# Patient Record
Sex: FEMALE | Race: BLACK OR AFRICAN AMERICAN | NOT HISPANIC OR LATINO | Employment: UNEMPLOYED | URBAN - METROPOLITAN AREA
[De-identification: names, ages, dates, MRNs, and addresses within clinical notes are randomized per-mention and may not be internally consistent; named-entity substitution may affect disease eponyms.]

---

## 2023-04-17 ENCOUNTER — NURSING HOME VISIT (OUTPATIENT)
Dept: POST ACUTE CARE | Facility: EXTERNAL LOCATION | Age: 53
End: 2023-04-17
Payer: MEDICARE

## 2023-04-17 DIAGNOSIS — K51.819 OTHER ULCERATIVE COLITIS WITH COMPLICATION (MULTI): ICD-10-CM

## 2023-04-17 DIAGNOSIS — E78.5 HYPERLIPIDEMIA, UNSPECIFIED HYPERLIPIDEMIA TYPE: ICD-10-CM

## 2023-04-17 DIAGNOSIS — M62.81 GENERALIZED MUSCLE WEAKNESS: ICD-10-CM

## 2023-04-17 DIAGNOSIS — I69.30 SEQUELA OF CEREBROVASCULAR ACCIDENT: ICD-10-CM

## 2023-04-17 DIAGNOSIS — N93.9 VAGINAL BLEEDING: ICD-10-CM

## 2023-04-17 DIAGNOSIS — I50.22 CHRONIC SYSTOLIC HEART FAILURE (MULTI): ICD-10-CM

## 2023-04-17 DIAGNOSIS — G81.91 RIGHT HEMIPARESIS (MULTI): ICD-10-CM

## 2023-04-17 DIAGNOSIS — I10 BENIGN ESSENTIAL HTN: ICD-10-CM

## 2023-04-17 DIAGNOSIS — K21.9 GASTROESOPHAGEAL REFLUX DISEASE, UNSPECIFIED WHETHER ESOPHAGITIS PRESENT: ICD-10-CM

## 2023-04-17 DIAGNOSIS — E11.69 TYPE 2 DIABETES MELLITUS WITH OTHER SPECIFIED COMPLICATION, WITH LONG-TERM CURRENT USE OF INSULIN (MULTI): ICD-10-CM

## 2023-04-17 DIAGNOSIS — M79.604 RIGHT LEG PAIN: ICD-10-CM

## 2023-04-17 DIAGNOSIS — R26.89 IMPAIRED GAIT AND MOBILITY: ICD-10-CM

## 2023-04-17 DIAGNOSIS — I26.99 ACUTE PULMONARY EMBOLISM, UNSPECIFIED PULMONARY EMBOLISM TYPE, UNSPECIFIED WHETHER ACUTE COR PULMONALE PRESENT (MULTI): Primary | ICD-10-CM

## 2023-04-17 DIAGNOSIS — R09.02 HYPOXIA: ICD-10-CM

## 2023-04-17 DIAGNOSIS — E03.9 HYPOTHYROIDISM, UNSPECIFIED TYPE: ICD-10-CM

## 2023-04-17 DIAGNOSIS — R06.02 SHORTNESS OF BREATH: ICD-10-CM

## 2023-04-17 DIAGNOSIS — F32.9 MAJOR DEPRESSIVE DISORDER, REMISSION STATUS UNSPECIFIED, UNSPECIFIED WHETHER RECURRENT: ICD-10-CM

## 2023-04-17 DIAGNOSIS — B20 HIV INFECTION, UNSPECIFIED SYMPTOM STATUS (MULTI): ICD-10-CM

## 2023-04-17 DIAGNOSIS — J18.9 PNEUMONIA DUE TO INFECTIOUS ORGANISM, UNSPECIFIED LATERALITY, UNSPECIFIED PART OF LUNG: ICD-10-CM

## 2023-04-17 DIAGNOSIS — Z79.4 TYPE 2 DIABETES MELLITUS WITH OTHER SPECIFIED COMPLICATION, WITH LONG-TERM CURRENT USE OF INSULIN (MULTI): ICD-10-CM

## 2023-04-17 PROCEDURE — 99310 SBSQ NF CARE HIGH MDM 45: CPT | Performed by: NURSE PRACTITIONER

## 2023-04-17 NOTE — LETTER
Patient: Janette Martinez  : 1970    Encounter Date: 2023    Name: Janette Martinez    YOB: 1970    Chief complaint:  Initial visit after hospitalization;  Acute pulmonary embolism; pneumonia; etc.....      HPI 52-year-old female with past medical history of heart failure with reduced EF,   diabetes, hypertension, hyperlipidemia and CVA with right-sided weakness and HIV.      RECENT HOSPITAL COURSES:   Patient was sent  to [facility] For complaints of difficulty breathing for 4 days.  Patient has history of a CVA 8 months ago.  She does not normally require oxygen but she was put on 2 L.    Patient did not complain of any chest pain fevers or chills.  No coughing.    Nothing seems to make it better or worse, she normally does not have a respiratory history.  Evaluation in the ER was suspicious for pneumonia before but CT chest was performed.    CT chest showed a small pulmonary embolus, CT of the chest did not show any pulmonary infiltrates.    However patient was treated for pneumonia.   Discharged on Augmentin and azithromycin.   Pulmonology was consulted.  Patient is a poor historian.  Patient did not have any lower extremity edema.  Bilateral lower extremity Dopplers were negative for DVT.  Patient did have leukocytosis.    Patient was also recently sent to [facility] in 2023 for evaluation of her  vaginal bleeding.  GYN was consulted ultrasound pelvic ultrasound was ordered.  Per history and chart review and patient reported she has had a hysterectomy.  She was unsure if it was a full hysterectomy but cervix was visualized on the ED exam with blood in the vaginal canal and cervical bleeding.  CT AP in the ED showed no enlargement of the uterus with vascular calcifications versus small fibroids.  Ovaries were not confidently identified.    Patient reported what ever is left over uterus or ovaries she wanted removed.        PREVIOUS HOSPITAL  COURSE:   Patient presented to the  ED after a fall in May, 2022.  Patient had reported right lower extremity weakness and pain, denies head trauma or loss of consciousness.   In the ED, bat was called, with concern for stroke in the   setting of hyperglycemia, no tPA was administered.   Plan is for patient will be on lifelong aspirin.   Cholesterol is controlled on current statin.   Patient had echocardiogram which showed global hypokinesis   and continued reduced EF, mildly improved to 45%, both study was negative for   PFO.   [facility] then patient was [facility] to [facility] 6/29[facility] for skilled services then patient was discharged home.     Patient recently re-admitted to [facility] On 4/11/23.       Hospital and chronic diagnoses as follows:    Acute pulmonary embolism; pneumonia:  Patient was started on Eliquis 10 mg p.o. twice daily, then it was decreased to 5 mg PO BID.  Pulmonary was consulted.  Plan is to continue with Eliquis for 6 months.  Discharged on Augmentin and azithromycin.   Patient should consider hematology oncology consultation for hematologic work-up of her unprovoked pulmonary embolism, though the fact that she is bedridden and minimally mobile will increase the risk of her having a PE even though her Dopplers were negative for DVTs.  Titrate oxygen for saturation more than 88% especially at night.  She will need cardiology consult and echocardiogram to evaluate RV size and possibly pulmonary hypertension.  She will need repeat CT of the chest with contrast in 6 months for chronic thromboembolic disease.     Patient seen today, she is in bed.  Calm, cooperative.  No complaints of cough or SOB.  No fevers reported.       Dyspnea:  Probably occurring secondary to PE, but cannot rule out a pneumonia.    Hypoxia:  Was on 2 L oxygen in the hospital.  Hypoxia was probably secondary to PE.    Sequela of  Cerebrovascular accident;      History of Left ischemic stroke) stroke; h/o CVA with right sided weakness: dysphagia 2/2 cva:  Has right sided weakness/hemiparesis, facial droop, dysarthria.   h/o stroke 2018.   On atorvastatin.  Per hospital records might have possible central sleep apnea secondary to her previous CVA.  She also may have Cheyne-Kessler respirations.   She should have a work-up done and will need an in lab polysomnogram.     Chronic systolic HF & HTN:  On hydrochlorothiazide and amlodipine.  LVEF 35-40% 2/17/2022, improved to 45% on recent echo 5/23.   Seen by Cardiology during admission at [facility].    DM 2:   Uncontrolled   On Lantus 40 units QHS & Insulin Lispro 8 units subcutaneous before meals and Humalog sliding scale.    HIV:  On dolutegravir and Descovy.      RLE pain -  Unclear etiology;      Intermittent and sharp.   On gabapentin & Percocet PRN.        Hypothyroidism:      On Levothyroxine.      No recent TSH available.       HLD:    On Atorvastatin 40 mg daily      Major Depressive Disorder:   On Zoloft.  Flat affect.   No weight loss reported.      Hypothyroidism:  On Synthroid 200 mcg      Ulcerative colitis:  On Linaclotide 72 mg daily      GERD:   On omeprazole.     Vaginal bleeding/post-menopausal.   - pt reports inconsistent history of menopause, states she has not had bleeding   in years, and per sister and pt , they were under the impression she had a   hysterectomy. per imaging and gyn exam uterus is intact.     Impaired gait and mobility; Generalized muscle weakness:  At [facility] participating in PT/OT.             Reviewed [facility] records from recent hospitalization.  Reviewed medical, social, surgical and family history.  Reviewed all current medications and performed medication reconciliation.  Reviewed vital signs and recent blood work results.  Reviewed [facility] Documentation  Discussed patient with nursing.   Spent greater than 50 minutes on visit.        ROS: 10 point ROS performed; negative unless noted in HPI.       Code Status: Full Code.    Past Medical History:   Diagnosis Date   • Cervicalgia     Neck pain   • Essential (primary) hypertension 07/21/2013    Hypertension   • Pain in unspecified shoulder     Pain, joint, shoulder   • Personal history of other diseases of the digestive system 07/06/2022    History of ulcerative colitis   • Personal history of other diseases of the digestive system     History of esophageal reflux   • Personal history of other diseases of the nervous system and sense organs     History of cataract   • Personal history of other diseases of the nervous system and sense organs     History of glaucoma   • Personal history of other diseases of the respiratory system     History of bronchitis   • Personal history of other endocrine, nutritional and metabolic disease     History of diabetes mellitus   • Personal history of other endocrine, nutritional and metabolic disease     History of hyperlipidemia   • Personal history of other mental and behavioral disorders     History of depression   • Snoring     Snoring   • Unilateral primary osteoarthritis, unspecified knee 11/02/2015    Arthritis of knee           Past Surgical History:   Procedure Laterality Date   • CHOLECYSTECTOMY  01/08/2016    Cholecystectomy   • COLON SURGERY  01/08/2016    Colon Surgery   • CT HEAD ANGIO W AND WO IV CONTRAST  3/19/2019    CT HEAD ANGIO W AND WO IV [place] [facility] [place]   • CT NECK ANGIO W AND WO IV CONTRAST  3/19/2019    CT NECK ANGIO W AND WO IV CONTRAST [place] [facility] [place]   • HERNIA REPAIR  01/08/2016    Hernia Repair   • HYSTERECTOMY  01/08/2016    Hysterectomy   • MR HEAD ANGIO WO IV CONTRAST  6/4/2018    MR HEAD ANGIO WO IV CONTRAST 6/4/2018 [facility] CLINICAL LEGACY   • MR HEAD ANGIO WO IV CONTRAST  7/11/2020    MR HEAD ANGIO WO IV CONTRAST 7/11/2020 [facility] CLINICAL LEGACY   • MR HEAD ANGIO WO IV CONTRAST  9/30/2020     MR HEAD ANGIO WO IV CONTRAST 9/30/2020 [facility]   • MR HEAD ANGIO WO IV CONTRAST  5/22/2022    MR HEAD ANGIO WO IV CONTRAST 5/22/2022 [facility] CLINICAL LEGACY   • MR NECK ANGIO WO IV CONTRAST  6/4/2018    MR NECK ANGIO WO IV CONTRAST 6/4/2018 [facility] CLINICAL LEGACY   • MR NECK ANGIO WO IV CONTRAST  7/11/2020    MR NECK ANGIO WO IV CONTRAST 7/11/2020 [facility] CLINICAL LEGACY   • MR NECK ANGIO WO IV CONTRAST  9/30/2020    MR NECK ANGIO WO IV CONTRAST 9/30/2020 [facility]   • MR NECK ANGIO WO IV CONTRAST  5/22/2022    MR NECK ANGIO WO IV CONTRAST 5/22/2022 [facility] CLINICAL LEGACY   • OTHER SURGICAL HISTORY  01/08/2016    Tubal Stabilization   • OTHER SURGICAL HISTORY  09/01/2016    Cervical Surgery (Gyn) Laser Vaporization Of Transformation Zone   • THYROID SURGERY  09/27/2013    Thyroid Surgery          Lab Results   Component Value Date    WBC 10.1 11/25/2022    HGB 11.8 (L) 11/25/2022    HCT 34.2 (L) 11/25/2022     11/25/2022    CHOL 129 08/07/2022    TRIG 182 (H) 08/07/2022    HDL 31.0 (A) 08/07/2022    ALT 9 11/25/2022    AST 20 11/25/2022     11/25/2022    K 3.6 11/25/2022    CL 99 11/25/2022    CREATININE 1.21 (H) 11/25/2022    BUN 20 11/25/2022    CO2 26 11/25/2022    TSH 0.11 (L) 06/14/2022    INR 1.0 09/04/2022    HGBA1C 9.0 (A) 09/04/2022     Surgical History  Problems    · History of Cervical Surgery (Gyn) Laser Vaporization Of Transformation Zone   · History of Cholecystectomy   · History of Colon Surgery   · History of Hernia Repair   · History of Hysterectomy   · History of Thyroid Surgery   · History of Tubal Stabilization     Family History  Mother    · Family history of Cancer  Family History    · Family history of Diabetes Mellitus (V18.0)   · Family history of Hypertension (V17.49)     Social History  Problems    · Disabled   · Does not use illicit drugs (V49.89) (Z78.9)   · Former smoker (V15.82) (Z87.891)   · Marital History - Single   · No alcohol.   · Denied: History of Secondhand  "smoke exposure   · Single     Allergies  Medication    · Aspirin TABS   · Contrast Media Ready-Box MISC   · morphine   · Sulfa Drugs        /62   Pulse 89   Temp 36.5 °C (97.7 °F)   Resp 17   Ht 1.626 m (5' 4\")   Wt 105 kg (231 lb 9.6 oz)   SpO2 96%   BMI 39.75 kg/m²      Physical Exam  Vitals and nursing note reviewed.   Constitutional:       Appearance: Normal appearance.   HENT:      Head: Normocephalic.      Right Ear: External ear normal.      Left Ear: External ear normal.      Nose: Nose normal.      Mouth/Throat:      Mouth: Mucous membranes are moist.      Pharynx: Oropharynx is clear.   Eyes:      Extraocular Movements: Extraocular movements intact.      Conjunctiva/sclera: Conjunctivae normal.      Pupils: Pupils are equal, round, and reactive to light.   Cardiovascular:      Rate and Rhythm: Normal rate and regular rhythm.      Pulses: Normal pulses.      Heart sounds: Normal heart sounds.   Pulmonary:      Effort: Pulmonary effort is normal.      Breath sounds: Normal breath sounds.   Abdominal:      General: Bowel sounds are normal.      Palpations: Abdomen is soft.   Musculoskeletal:         General: Normal range of motion.      Cervical back: Normal range of motion and neck supple.   Skin:     General: Skin is warm and dry.   Neurological:      Mental Status: She is alert. Mental status is at baseline.      Motor: Weakness present.      Coordination: Coordination abnormal.      Gait: Gait abnormal.      Comments: Right sided weakness--RLE 1/5; RUE 3/5; sensation intact to touch x 4 extremities.      Psychiatric:         Mood and Affect: Mood normal.         Behavior: Behavior normal.          Assessment/Plan     Ordered CMP and CBC with diff. For tomorrow.    Acute pulmonary embolism; pneumonia:  Patient was started on Eliquis 10 mg p.o. twice daily, then it was decreased to 5 mg PO BID.  Pulmonary was consulted.  Continue Eliquis 5 mg PO BID.  Continue Augmentin and azithromycin. "   Patient should consider hematology oncology consultation for hematologic work-up of her unprovoked pulmonary embolism.  She will need cardiology consult and echocardiogram to evaluate RV size and possibly pulmonary hypertension.  She will need repeat CT of the chest with contrast in 6 months for chronic thromboembolic disease.    Dyspnea:  Probably occurring secondary to PE, but cannot rule out a pneumonia.  Monitor oxygen sats.    Hypoxia:  Continue 2 L oxygen in the hospital via O2 NC if I have a dog.  Hypoxia was probably secondary to PE.    Sequela Cerebrovascular accident;      History of Left ischemic stroke) stroke; h/o CVA with right sided weakness; dysphagia 2/2 cva:  Has right sided weakness/hemiparesis, facial droop, dysarthria.   h/o stroke 2018.   Continue atorvastatin.  Per hospital records might have possible central sleep apnea secondary to her previous CVA.  She also may have Cheyne-Kessler respirations.   She should have a work-up done and will need an in lab polysomnogram.     Chronic systolic HF & HTN:  Continue hydrochlorothiazide and amlodipine.  Follow up with cardiology.     DM 2--Uncontrolled:  Continue Lantus 40 units QHS & Insulin Lispro 8 units subcutaneous before meals and Humalog sliding scale.  Monitor accuchecks.  Monitor for hypoglycemia.     HIV:  Continue  dolutegravir and Descovy.   Follow up with ID.      RLE pain -  Unclear etiology;      Intermittent and sharp.   Continue gabapentin & Percocet PRN.        Hypothyroidism:      Continue Levothyroxine.     Monitor TSH.     HLD:    Continue Atorvastatin 40 mg daily      Major Depressive Disorder:   Continue  Zoloft.       Ulcerative colitis:  Continue Linaclotide 72 mg daily      GERD:   Continue omeprazole.     Vaginal bleeding/post-menopausal.   Monitor for bleeding.  Evaluated by GYN.     Impaired gait and mobility; Generalized muscle weakness:  At [facility]  participating in PT/OT.   Fall prevention strategies.                    Problem List Items Addressed This Visit    None  Visit Diagnoses       Acute pulmonary embolism, unspecified pulmonary embolism type, unspecified whether acute cor pulmonale present (CMS/formerly [facility])    -  Primary    Pneumonia due to infectious organism, unspecified laterality, unspecified part of lung        Shortness of breath        Hypoxia        Sequela of cerebrovascular accident        Right hemiparesis (CMS/formerly [facility])        Chronic systolic heart failure (CMS/formerly [facility])        Benign essential HTN        Type 2 diabetes mellitus with other specified complication, with long-term current use of insulin (CMS/formerly [facility])        HIV infection, unspecified symptom status (CMS/formerly [facility])        Right leg pain        Hypothyroidism, unspecified type        Hyperlipidemia, unspecified hyperlipidemia type        Major depressive disorder, remission status unspecified, unspecified whether recurrent        Other ulcerative colitis with complication (CMS/formerly [facility])        Gastroesophageal reflux disease, unspecified whether esophagitis present        Vaginal bleeding        Impaired gait and mobility        Generalized muscle weakness                JASON Espitia       Electronically Signed By: JASON Espitia   4/19/23  1:30 AM

## 2023-04-18 VITALS
TEMPERATURE: 97.7 F | SYSTOLIC BLOOD PRESSURE: 114 MMHG | OXYGEN SATURATION: 96 % | RESPIRATION RATE: 17 BRPM | HEART RATE: 89 BPM | DIASTOLIC BLOOD PRESSURE: 62 MMHG | HEIGHT: 64 IN | BODY MASS INDEX: 39.54 KG/M2 | WEIGHT: 231.6 LBS

## 2023-04-19 NOTE — PROGRESS NOTES
Name: Janette Martinez    YOB: 1970    Chief complaint:  Initial visit after hospitalization;  Acute pulmonary embolism; pneumonia; etc.....      HPI 52-year-old female with past medical history of heart failure with reduced EF,   diabetes, hypertension, hyperlipidemia and CVA with right-sided weakness and HIV.      RECENT HOSPITAL COURSES:   Patient was [facility]  [facility] For complaints of difficulty breathing for 4 days.  Patient has history of a CVA 8 months ago.  She does not normally require oxygen but she was put on 2 L.    Patient did not complain of any chest pain fevers or chills.  No coughing.    Nothing seems to make it better or worse, she normally does not have a respiratory history.  Evaluation in the ER was suspicious for pneumonia before but CT chest was performed.    CT chest showed a small pulmonary embolus, CT of the chest did not show any pulmonary infiltrates.    However patient was treated for pneumonia.   Discharged on Augmentin and azithromycin.   Pulmonology was consulted.  Patient is a poor historian.  Patient did not have any lower extremity edema.  Bilateral lower extremity Dopplers were negative for DVT.  Patient did have leukocytosis.    Patient was also recently sent to [facility] in February 2023 for evaluation of her  vaginal bleeding.  GYN was consulted ultrasound pelvic ultrasound was ordered.  Per history and chart review and patient reported she has had a hysterectomy.  She was unsure if it was a full hysterectomy but cervix was visualized on the ED exam with blood in the vaginal canal and cervical bleeding.  CT AP in the ED showed no enlargement of the uterus with vascular calcifications versus small fibroids.  Ovaries were not confidently identified.    Patient reported what ever is left over uterus or ovaries she wanted removed.        PREVIOUS HOSPITAL COURSE:   Patient presented to the  ED after a fall in May,  2022.  Patient had reported right lower extremity weakness and pain, denies head trauma or loss of consciousness.   In the ED, bat was called, with concern for stroke in the   setting of hyperglycemia, no tPA was administered.   Plan is for patient will be on lifelong aspirin.   Cholesterol is controlled on current statin.   Patient had echocardiogram which showed global hypokinesis   and continued reduced EF, mildly improved to 45%, both study was negative for   PFO.   Patient was transferred to [facility] on 5/21/22 from hospital for rehab., then patient was transferred to [facility] on 6/29/22 for skilled services then patient was discharged home.     Patient recently re-admitted to [facility]. On 4/11/23.       Hospital and chronic diagnoses as follows:    Acute pulmonary embolism; pneumonia:  Patient was started on Eliquis 10 mg p.o. twice daily, then it was decreased to 5 mg PO BID.  Pulmonary was consulted.  Plan is to continue with Eliquis for 6 months.  Discharged on Augmentin and azithromycin.   Patient should consider hematology oncology consultation for hematologic work-up of her unprovoked pulmonary embolism, though the fact that she is bedridden and minimally mobile will increase the risk of her having a PE even though her Dopplers were negative for DVTs.  Titrate oxygen for saturation more than 88% especially at night.  She will need cardiology consult and echocardiogram to evaluate RV size and possibly pulmonary hypertension.  She will need repeat CT of the chest with contrast in 6 months for chronic thromboembolic disease.     Patient seen today, she is in bed.  Calm, cooperative.  No complaints of cough or SOB.  No fevers reported.       Dyspnea:  Probably occurring secondary to PE, but cannot rule out a pneumonia.    Hypoxia:  Was on 2 L oxygen in the hospital.  Hypoxia was probably secondary to PE.    Sequela of Cerebrovascular accident;      History of Left ischemic stroke)  stroke; h/o CVA with right sided weakness: dysphagia 2/2 cva:  Has right sided weakness/hemiparesis, facial droop, dysarthria.   h/o stroke 2018.   On atorvastatin.  Per hospital records might have possible central sleep apnea secondary to her previous CVA.  She also may have Cheyne-Kessler respirations.   She should have a work-up done and will need an in lab polysomnogram.     Chronic systolic HF & HTN:  On hydrochlorothiazide and amlodipine.  LVEF 35-40% 2/17/2022, improved to 45% on recent echo 5/23.   Seen by Cardiology during admission at [facility].    DM 2:   Uncontrolled   On Lantus 40 units QHS & Insulin Lispro 8 units subcutaneous before meals and Humalog sliding scale.    HIV:  On dolutegravir and Descovy.      RLE pain -  Unclear etiology;      Intermittent and sharp.   On gabapentin & Percocet PRN.        Hypothyroidism:      On Levothyroxine.      No recent TSH available.       HLD:    On Atorvastatin 40 mg daily      Major Depressive Disorder:   On Zoloft.  Flat affect.   No weight loss reported.      Hypothyroidism:  On Synthroid 200 mcg      Ulcerative colitis:  On Linaclotide 72 mg daily      GERD:   On omeprazole.     Vaginal bleeding/post-menopausal.   - pt reports inconsistent history of menopause, states she has not had bleeding   in years, and per sister and pt , they were under the impression she had a   hysterectomy. per imaging and gyn exam uterus is intact.     Impaired gait and mobility; Generalized muscle weakness:  At [facility] participating in PT/OT.             Reviewed [facility] records from recent hospitalization.  Reviewed medical, social, surgical and family history.  Reviewed all current medications and performed medication reconciliation.  Reviewed vital signs and recent blood work results.  Reviewed [facility] Documentation  Discussed patient with nursing.   Spent greater than 50 minutes on visit.       ROS: 10 point ROS performed; negative unless noted in HPI.        Code Status: Full Code.    Past Medical History:   Diagnosis Date    Cervicalgia     Neck pain    Essential (primary) hypertension 07/21/2013    Hypertension    Pain in unspecified shoulder     Pain, joint, shoulder    Personal history of other diseases of the digestive system 07/06/2022    History of ulcerative colitis    Personal history of other diseases of the digestive system     History of esophageal reflux    Personal history of other diseases of the nervous system and sense organs     History of cataract    Personal history of other diseases of the nervous system and sense organs     History of glaucoma    Personal history of other diseases of the respiratory system     History of bronchitis    Personal history of other endocrine, nutritional and metabolic disease     History of diabetes mellitus    Personal history of other endocrine, nutritional and metabolic disease     History of hyperlipidemia    Personal history of other mental and behavioral disorders     History of depression    Snoring     Snoring    Unilateral primary osteoarthritis, unspecified knee 11/02/2015    Arthritis of knee           Past Surgical History:   Procedure Laterality Date    CHOLECYSTECTOMY  01/08/2016    Cholecystectomy    COLON SURGERY  01/08/2016    Colon Surgery    CT HEAD ANGIO W AND WO IV CONTRAST  3/19/2019    CT HEAD ANGIO W AND WO IV CONTRAST 3/19/2019 [facility]    CT NECK ANGIO W AND WO IV CONTRAST  3/19/2019    CT NECK ANGIO W AND WO IV CONTRAST 3/19/2019 [facility]    HERNIA REPAIR  01/08/2016    Hernia Repair    HYSTERECTOMY  01/08/2016    Hysterectomy    MR HEAD ANGIO WO IV CONTRAST  6/4/2018    MR HEAD ANGIO WO IV CONTRAST 6/4/2018 [facility]    MR HEAD ANGIO WO IV CONTRAST  7/11/2020    MR HEAD ANGIO WO IV CONTRAST 7/11/2020 [facility]    MR HEAD ANGIO WO IV CONTRAST  9/30/2020    MR HEAD ANGIO WO IV CONTRAST 9/30/2020 [facility]    MR HEAD ANGIO WO IV CONTRAST   5/22/2022    MR HEAD ANGIO WO IV CONTRAST 5/22/2022 [facility]ICAL [facility]GAC[facility]    MR NECK ANGIO WO IV CONTRAST  6/4/2018    MR NECK ANGIO WO IV CONTRAST 6/4/2018 [facility]    MR NECK ANGIO WO IV CONTRAST  7/11/2020    MR NECK ANGIO WO IV CONTRAST 7/11/2020 [facility]    MR NECK ANGIO WO IV CONTRAST  9/30/2020    MR NECK ANGIO WO IV CONTRAST 9/30/2020 [facility]    MR NECK ANGIO WO IV CONTRAST  5/22/2022    MR NECK ANGIO WO IV CONTRAST 5/22/2022 [facility]ICAL [facility]GAC[facility]    OTHER SURGICAL HISTORY  01/08/2016    Tubal Stabilization    OTHER SURGICAL HISTORY  09/01/2016    Cervical Surgery (Gyn) Laser Vaporization Of Transformation Zone    THYROID SURGERY  09/27/2013    Thyroid Surgery          Lab Results   Component Value Date    WBC 10.1 11/25/2022    HGB 11.8 (L) 11/25/2022    HCT 34.2 (L) 11/25/2022     11/25/2022    CHOL 129 08/07/2022    TRIG 182 (H) 08/07/2022    HDL 31.0 (A) 08/07/2022    ALT 9 11/25/2022    AST 20 11/25/2022     11/25/2022    K 3.6 11/25/2022    CL 99 11/25/2022    CREATININE 1.21 (H) 11/25/2022    BUN 20 11/25/2022    CO2 26 11/25/2022    TSH 0.11 (L) 06/14/2022    INR 1.0 09/04/2022    HGBA1C 9.0 (A) 09/04/2022     Surgical History  Problems    · History of Cervical Surgery (Gyn) Laser Vaporization Of Transformation Zone   · History of Cholecystectomy   · History of Colon Surgery   · History of Hernia Repair   · History of Hysterectomy   · History of Thyroid Surgery   · History of Tubal Stabilization     Family History  Mother    · Family history of Cancer  Family History    · Family history of Diabetes Mellitus (V18.0)   · Family history of Hypertension (V17.49)     Social History  Problems    · Disabled   · Does not use illicit drugs (V49.89) (Z78.9)   · Former smoker (V15.82) (Z87.891)   · Marital History - Single   · No alcohol.   · Denied: History of Secondhand smoke exposure   · Single     Allergies  Medication    · Aspirin TABS   · Contrast Media Ready-Box  "MISC   · morphine   · Sulfa Drugs        /62   Pulse 89   Temp 36.5 °C (97.7 °F)   Resp 17   Ht 1.626 m (5' 4\")   Wt 105 kg (231 lb 9.6 oz)   SpO2 96%   BMI 39.75 kg/m²      Physical Exam  Vitals and nursing note reviewed.   Constitutional:       Appearance: Normal appearance.   HENT:      Head: Normocephalic.      Right Ear: External ear normal.      Left Ear: External ear normal.      Nose: Nose normal.      Mouth/Throat:      Mouth: Mucous membranes are moist.      Pharynx: Oropharynx is clear.   Eyes:      Extraocular Movements: Extraocular movements intact.      Conjunctiva/sclera: Conjunctivae normal.      Pupils: Pupils are equal, round, and reactive to light.   Cardiovascular:      Rate and Rhythm: Normal rate and regular rhythm.      Pulses: Normal pulses.      Heart sounds: Normal heart sounds.   Pulmonary:      Effort: Pulmonary effort is normal.      Breath sounds: Normal breath sounds.   Abdominal:      General: Bowel sounds are normal.      Palpations: Abdomen is soft.   Musculoskeletal:         General: Normal range of motion.      Cervical back: Normal range of motion and neck supple.   Skin:     General: Skin is warm and dry.   Neurological:      Mental Status: She is alert. Mental status is at baseline.      Motor: Weakness present.      Coordination: Coordination abnormal.      Gait: Gait abnormal.      Comments: Right sided weakness--RLE 1/5; RUE 3/5; sensation intact to touch x 4 extremities.      Psychiatric:         Mood and Affect: Mood normal.         Behavior: Behavior normal.          Assessment/Plan      Ordered CMP and CBC with diff. For tomorrow.    Acute pulmonary embolism; pneumonia:  Patient was started on Eliquis 10 mg p.o. twice daily, then it was decreased to 5 mg PO BID.  Pulmonary was consulted.  Continue Eliquis 5 mg PO BID.  Continue Augmentin and azithromycin.   Patient should consider hematology oncology consultation for hematologic work-up of her unprovoked " pulmonary embolism.  She will need cardiology consult and echocardiogram to evaluate RV size and possibly pulmonary hypertension.  She will need repeat CT of the chest with contrast in 6 months for chronic thromboembolic disease.    Dyspnea:  Probably occurring secondary to PE, but cannot rule out a pneumonia.  Monitor oxygen sats.    Hypoxia:  Continue 2 L oxygen in the hospital via O2 NC if I have a dog.  Hypoxia was probably secondary to PE.    Sequela Cerebrovascular accident;      History of Left ischemic stroke) stroke; h/o CVA with right sided weakness; dysphagia 2/2 cva:  Has right sided weakness/hemiparesis, facial droop, dysarthria.   h/o stroke 2018.   Continue atorvastatin.  Per hospital records might have possible central sleep apnea secondary to her previous CVA.  She also may have Cheyne-Kessler respirations.   She should have a work-up done and will need an in lab polysomnogram.     Chronic systolic HF & HTN:  Continue hydrochlorothiazide and amlodipine.  Follow up with cardiology.     DM 2--Uncontrolled:  Continue Lantus 40 units QHS & Insulin Lispro 8 units subcutaneous before meals and Humalog sliding scale.  Monitor accuchecks.  Monitor for hypoglycemia.     HIV:  Continue  dolutegravir and Descovy.   Follow up with ID.      RLE pain -  Unclear etiology;      Intermittent and sharp.   Continue gabapentin & Percocet PRN.        Hypothyroidism:      Continue Levothyroxine.     Monitor TSH.     HLD:    Continue Atorvastatin 40 mg daily      Major Depressive Disorder:   Continue  Zoloft.       Ulcerative colitis:  Continue Linaclotide 72 mg daily      GERD:   Continue omeprazole.     Vaginal bleeding/post-menopausal.   Monitor for bleeding.  Evaluated by GYN.     Impaired gait and mobility; Generalized muscle weakness:  At [facility]  participating in PT/OT.   Fall prevention strategies.                   Problem List Items Addressed This Visit    None  Visit Diagnoses       Acute pulmonary  embolism, unspecified pulmonary embolism type, unspecified whether acute cor pulmonale present (CMS/[facility])    -  Primary    Pneumonia due to infectious organism, unspecified laterality, unspecified part of lung        Shortness of breath        Hypoxia        Sequela of cerebrovascular accident        Right hemiparesis (CMS/[facility])        Chronic systolic heart failure (CMS/[facility])        Benign essential HTN        Type 2 diabetes mellitus with other specified complication, with long-term current use of insulin (CMS/[facility])        HIV infection, unspecified symptom status (CMS/[facility])        Right leg pain        Hypothyroidism, unspecified type        Hyperlipidemia, unspecified hyperlipidemia type        Major depressive disorder, remission status unspecified, unspecified whether recurrent        Other ulcerative colitis with complication (CMS/[facility])        Gastroesophageal reflux disease, unspecified whether esophagitis present        Vaginal bleeding        Impaired gait and mobility        Generalized muscle weakness                Maria Del Carmen Garduno, APRN-CNP

## 2023-04-24 ENCOUNTER — NURSING HOME VISIT (OUTPATIENT)
Dept: POST ACUTE CARE | Facility: EXTERNAL LOCATION | Age: 53
End: 2023-04-24
Payer: MEDICARE

## 2023-04-24 DIAGNOSIS — M62.81 GENERALIZED MUSCLE WEAKNESS: ICD-10-CM

## 2023-04-24 DIAGNOSIS — I50.22 CHRONIC SYSTOLIC HEART FAILURE (MULTI): ICD-10-CM

## 2023-04-24 DIAGNOSIS — I69.351 HEMIPARESIS AFFECTING RIGHT SIDE AS LATE EFFECT OF STROKE (MULTI): ICD-10-CM

## 2023-04-24 DIAGNOSIS — I69.30 SEQUELA OF CEREBROVASCULAR ACCIDENT: Primary | ICD-10-CM

## 2023-04-24 DIAGNOSIS — K51.80 OTHER ULCERATIVE COLITIS WITHOUT COMPLICATION (MULTI): ICD-10-CM

## 2023-04-24 DIAGNOSIS — I26.99 ACUTE PULMONARY EMBOLISM, UNSPECIFIED PULMONARY EMBOLISM TYPE, UNSPECIFIED WHETHER ACUTE COR PULMONALE PRESENT (MULTI): ICD-10-CM

## 2023-04-24 DIAGNOSIS — Z79.4 TYPE 2 DIABETES MELLITUS WITH OTHER SPECIFIED COMPLICATION, WITH LONG-TERM CURRENT USE OF INSULIN (MULTI): ICD-10-CM

## 2023-04-24 DIAGNOSIS — Z86.73 RECENT CEREBROVASCULAR ACCIDENT: ICD-10-CM

## 2023-04-24 DIAGNOSIS — R26.89 IMPAIRED GAIT AND MOBILITY: ICD-10-CM

## 2023-04-24 DIAGNOSIS — R47.01 EXPRESSIVE APHASIA: ICD-10-CM

## 2023-04-24 DIAGNOSIS — R13.10 DYSPHAGIA, UNSPECIFIED TYPE: ICD-10-CM

## 2023-04-24 DIAGNOSIS — B20 HIV INFECTION, UNSPECIFIED SYMPTOM STATUS (MULTI): ICD-10-CM

## 2023-04-24 DIAGNOSIS — E11.69 TYPE 2 DIABETES MELLITUS WITH OTHER SPECIFIED COMPLICATION, WITH LONG-TERM CURRENT USE OF INSULIN (MULTI): ICD-10-CM

## 2023-04-24 DIAGNOSIS — I10 BENIGN ESSENTIAL HTN: ICD-10-CM

## 2023-04-24 PROCEDURE — 99310 SBSQ NF CARE HIGH MDM 45: CPT | Performed by: NURSE PRACTITIONER

## 2023-04-24 NOTE — LETTER
Patient: Janette Martinez  : 1970    Encounter Date: 2023    Name: Janette Martinez    YOB: 1970    Chief complaint:  Readmit after hospitalization;   Sequela of Cerebrovascular accident; etc.....      HPI     HOSPITAL COURSE:    52-year-old female with past medical history of heart failure with reduced EF,   diabetes, hypertension, hyperlipidemia, HIV,        CVA (left MCA  with residual right-sided hemiparesis), GERD, HIV, hypothyroidism, hypertension,   diabetes, VTE on Eliquis presenting with aphasia, right gaze preference,   left-sided weakness.     Patient was sent to [place] [facility] on 23.  She was Afebrile, vital signs were stable.   Stroke alert  was initiated.  Van positive, NIHSS 22.    Differential included acute CVA, complex migraine, seizure with postictal weakness, functional   neurologic disorder.  Plan was to get CT head and CT angio head and neck   however patient  has a contrast allergy so the vessel imaging was   unable to be obtained on arrival.  Patient as baseline has some dysarthria   but no significant aphasia, is requiring [facility] for transfers.  Dr. Jiang from [facility] neuro/stroke was consulted whether patient may benefit from transfer to [facility],   rapid MRA however given patient's baseline functional status, it was determined   that she was not a mechanical thrombectomy candidate.    Patient was not a thrombolytic candidate given Eliquis use.    Patient did not have nuchal rigidity, was low suspicion for CNS infection given this and no fever.    Labs reviewed and CBC with no leukocytosis, INR 1.6 consistent with DOAC use.    Chemistry reassuring.   Troponin negative.  VBG lactic acid was 0.7.  CT head did not show any acute   findings, no signs of intracranial hemorrhage.    Chest x-ray without acute findings.    Neurology was consulted and to see patient.         EKG showed normal sinus rhythm, rate 81, normal axis, normal   intervals, no STEMI, no ectopy,  similar to EKG from November 2022.       Pt has baseline dysarthria from a previous CVA and has Hemiparesis.       In the hospital she had worsening bilateral limb weakness   in both UE and LE. She also has expressive aphasia. Patient was able to nod   appropriately to questions.       Patient re-admitted to [facility] On 4/21/23 after her most recent hospitalization.    Hospital and chronic diagnoses as follows:      Acute/subacute stroke with probable extension; previous CVA with right-sided hemiparesis/right sided weakness; dysphagia 2/2 cva; expressive aphasia;  Sequela of Cerebrovascular accident:   MRI was performed it showed Acute/subacute tiny infarct in the left anterior brock.   Neuro was  consulted in the hospital.  She was NPO in the hospital and received IV fluids, and had a swallow evaluation.   Discharged on regular diet, regular texture, thin consistency.  Patient seen today she is in a wheelchair in her room.  Mentation at baseline.   Follow commands.   Alert and awake.  Appears comfortable.  Good appetite. No swallowing problems reported.   At [facility] for rehab. PT/OT and in house speech therapy following patient.        Benign essential HTN; Chronic systolic HF:      Treated with IV hydralazine in the hospital.  On Amlodipine and  hydrochlorothiazide.  Recent /92.  No complaints of chest pain.  No headaches no dizziness.   No SOB.       Recent acute PE:      In the hospital Eliquis was discontinued and she was started on Lovenox.    Then Eliquis was resumed. Currently on Eliquis.      Hematology oncology consultation for hematologic work-up of her unprovoked pulmonary embolism should be considered;     the fact that she is bedridden and minimally mobile will increase the risk of her having a PE even though her Dopplers were negative for DVTs.      DM type 2:    On Humalog sliding scale.  On Lantus 40 units QHS & Insulin Lispro 8 units subcutaneous before meals and Humalog  sliding scale.  Recent accuchecks: 138 mg/dL, 156 mg/dL, 250 mg/dL.   No episodes of hypoglycemia reported.       HIV   Descovy and Dolutegravir was stopped in the hospital.  She was discharged on Emtricitabine-Tenofovir.       RLE pain -  Unclear etiology;      Intermittent and sharp.   On gabapentin & Percocet PRN.        Hypothyroidism:      On Levothyroxine.      No recent TSH available.       HLD:    On Atorvastatin 40 mg daily      Major Depressive Disorder:   On Sertraline.  Flat affect.   No weight loss reported.      Hypothyroidism:  On Synthroid 175 mcg      Ulcerative colitis:  On Linaclotide 72 mg daily      Impaired gait and mobility; Generalized muscle weakness:  At  participating in PT/OT.                Reviewed [facility] records from recent hospitalization.  Reviewed medical, social, surgical and family history.  Reviewed all current medications and performed medication reconciliation.  Reviewed vital signs and recent blood work results.  Reviewed [facility] Documentation  Discussed patient with nursing.   Spent greater than 50 minutes on visit.   Ordered RX for Percocet      ROS: 10 point ROS performed; negative unless noted in HPI.       Code Status: Full Code.    Past Medical History:   Diagnosis Date   • Cervicalgia     Neck pain   • Essential (primary) hypertension 07/21/2013    Hypertension   • Pain in unspecified shoulder     Pain, joint, shoulder   • Personal history of other diseases of the digestive system 07/06/2022    History of ulcerative colitis   • Personal history of other diseases of the digestive system     History of esophageal reflux   • Personal history of other diseases of the nervous system and sense organs     History of cataract   • Personal history of other diseases of the nervous system and sense organs     History of glaucoma   • Personal history of other diseases of the respiratory system     History of bronchitis   • Personal history of other endocrine,  nutritional and metabolic disease     History of diabetes mellitus   • Personal history of other endocrine, nutritional and metabolic disease     History of hyperlipidemia   • Personal history of other mental and behavioral disorders     History of depression   • Snoring     Snoring   • Unilateral primary osteoarthritis, unspecified knee 11/02/2015    Arthritis of knee           Past Surgical History:   Procedure Laterality Date   • CHOLECYSTECTOMY  01/08/2016    Cholecystectomy   • COLON SURGERY  01/08/2016    Colon Surgery   • CT HEAD ANGIO W AND WO IV CONTRAST  3/19/2019    CT HEAD ANGIO W AND WO IV CONTRAST 3/19/2019 [facility]   • CT NECK ANGIO W AND WO IV CONTRAST  3/19/2019    CT NECK ANGIO W AND WO IV CONTRAST 3/19/2019 [facility]   • HERNIA REPAIR  01/08/2016    Hernia Repair   • HYSTERECTOMY  01/08/2016    Hysterectomy   • MR HEAD ANGIO WO IV CONTRAST  6/4/2018    MR HEAD ANGIO WO IV CONTRAST 6/4/2018 [facility] CLINICAL LEGAC[facility]   • MR HEAD ANGIO WO IV CONTRAST  7/11/2020    MR HEAD ANGIO WO IV CONTRAST 7/11/2020 [facility] CLINICAL LEGAC[facility]   • MR HEAD ANGIO WO IV CONTRAST  9/30/2020    MR HEAD ANGIO WO IV CONTRAST 9/30/2020 [facility] AIB LEGACY   • MR HEAD ANGIO WO IV CONTRAST  5/22/2022    MR HEAD ANGIO WO IV CONTRAST 5/22/2022 [facility] CLINICAL LEGACY   • MR HEAD ANGIO WO IV CONTRAST  4/19/2023    MR HEAD ANGIO WO IV CONTRAST [facility] MRI   • MR NECK ANGIO WO IV CONTRAST  6/4/2018    MR NECK ANGIO WO IV CONTRAST 6/4/2018 [facility] CLINICAL LEGACY   • MR NECK ANGIO WO IV CONTRAST  7/11/2020    MR NECK ANGIO WO IV CONTRAST 7/11/2020 [facility] CLINICAL LEGACY   • MR NECK ANGIO WO IV CONTRAST  9/30/2020    MR NECK ANGIO WO IV CONTRAST 9/30/2020 [facility] AIB LEGACY   • MR NECK ANGIO WO IV CONTRAST  5/22/2022    MR NECK ANGIO WO IV CONTRAST 5/22/2022 [facility] CLINICAL LEGACY   • MR NECK ANGIO WO IV CONTRAST  4/19/2023    MR NECK ANGIO WO IV CONTRAST [facility] MRI   • OTHER SURGICAL HISTORY  01/08/2016    Tubal Stabilization   • OTHER SURGICAL  "HISTORY  09/01/2016    Cervical Surgery (Gyn) Laser Vaporization Of Transformation Zone   • THYROID SURGERY  09/27/2013    Thyroid Surgery          Lab Results   Component Value Date    WBC CANCELED 04/22/2023    HGB CANCELED 04/22/2023    HCT CANCELED 04/22/2023    PLT CANCELED 04/22/2023    CHOL 129 08/07/2022    TRIG 182 (H) 08/07/2022    HDL 31.0 (A) 08/07/2022    ALT 8 04/21/2023    AST 23 04/21/2023    NA CANCELED 04/22/2023    K CANCELED 04/22/2023    CL CANCELED 04/22/2023    CREATININE CANCELED 04/22/2023    BUN CANCELED 04/22/2023    CO2 CANCELED 04/22/2023    TSH 0.32 (L) 04/20/2023    INR CANCELED 04/19/2023    HGBA1C 8.6 (A) 04/20/2023     Surgical History  Problems    · History of Cervical Surgery (Gyn) Laser Vaporization Of Transformation Zone   · History of Cholecystectomy   · History of Colon Surgery   · History of Hernia Repair   · History of Hysterectomy   · History of Thyroid Surgery   · History of Tubal Stabilization     Family History  Mother    · Family history of Cancer  Family History    · Family history of Diabetes Mellitus (V18.0)   · Family history of Hypertension (V17.49)     Social History  Problems    · Disabled   · Does not use illicit drugs (V49.89) (Z78.9)   · Former smoker (V15.82) (Z87.891)   · Marital History - Single   · No alcohol.   · Denied: History of Secondhand smoke exposure   · Single     Allergies  Medication    · Aspirin TABS   · Contrast [employer] Ready-Box MISC   · morphine   · Sulfa Drugs        BP (!) 135/92   Pulse 77   Temp 36.4 °C (97.6 °F)   Resp 16   Ht 1.626 m (5' 4\")   Wt 105 kg (231 lb 9.6 oz)   SpO2 98%   BMI 39.75 kg/m²      Physical Exam  Vitals and nursing note reviewed.   Constitutional:       Appearance: Normal appearance.   HENT:      Head: Normocephalic.      Right Ear: External ear normal.      Left Ear: External ear normal.      Nose: Nose normal.      Mouth/Throat:      Mouth: Mucous membranes are moist.      Pharynx: Oropharynx is clear. "   Eyes:      Extraocular Movements: Extraocular movements intact.      Conjunctiva/sclera: Conjunctivae normal.      Pupils: Pupils are equal, round, and reactive to light.   Cardiovascular:      Rate and Rhythm: Normal rate and regular rhythm.      Pulses: Normal pulses.      Heart sounds: Normal heart sounds.   Pulmonary:      Effort: Pulmonary effort is normal.      Breath sounds: Normal breath sounds.   Abdominal:      General: Bowel sounds are normal.      Palpations: Abdomen is soft.   Musculoskeletal:      Cervical back: Normal range of motion and neck supple.      Comments: Generalized muscle weakness; immobility.   Skin:     General: Skin is warm and dry.   Neurological:      Mental Status: She is alert. Mental status is at baseline.      Motor: Weakness present.      Coordination: Coordination abnormal.      Gait: Gait abnormal.      Comments: Right sided weakness--RLE 1/5; RUE 3/5; sensation intact to touch x 4 extremities.     Left sided facial droop.     Generalized muscle weakness   Psychiatric:         Mood and Affect: Mood normal.         Behavior: Behavior normal.          Assessment/Plan     Ordered CMP and CBC with diff. For tomorrow and next Monday.  Ordered Ha1c and TSH for next Monday.      Sequela of Cerebrovascular accident;   Acute and subacute stroke with probable extension;   previous CVA with right sided weakness; right-sided hemiparesis; dysphagia 2/2 cva;  MRI was performed it showed Acute/subacute tiny infarct in the left anterior brock.   Neuro was  consulted in the hospital.  She was NPO in the hospital and received IV fluids, and had a swallow evaluation.   Discharged on regular diet, regular texture, thin consistency.  Follow up with neurology Dr. Luz Black on 5/9/23 as scheduled.   Continue at  with therapy PT/OT/ST.         Benign essential HTN; Chronic systolic HF:  Continue Amlodipine and  hydrochlorothiazide.  Monitor Bps.       Recent acute PE:      Continue Eliquis as  ordered.     Hematology oncology consultation for hematologic work-up of her unprovoked pulmonary embolism should be considered;     the fact that she is bedridden and minimally mobile will increase the risk of her having a PE even though her Dopplers were negative for DVTs.      DM type 2:    Continue Humalog sliding scale.  Continue Lantus 40 units subcutaneous QD & Insulin Lispro 8 units subcutaneous before meals and Humalog sliding scale.      Monitor accuchecks.       Order Ha1c.      HIV:  Continue Emtricitabine-Tenofovir.       Ulcerative colitis:   Continue Linaclotide 145 mg daily       RLE pain:       Continue gabapentin & Percocet PRN.        Hypothyroidism:      Continue Levothyroxine.      Order TSH.     HLD:   Continue Atorvastatin 40 mg daily      Major Depressive Disorder:   Continue Sertraline.     Hypothyroidism:  Continue Synthroid 175 mcg      Ulcerative colitis:  Continue Linaclotide 72 mg daily      Impaired gait and mobility; Generalized muscle weakness:  Continue at  with skilled services PT/OT.               Problem List Items Addressed This Visit    None  Visit Diagnoses       Sequela of cerebrovascular accident    -  Primary    Recent cerebrovascular accident        Hemiparesis affecting right side as late effect of stroke (CMS/HCC)        Dysphagia, unspecified type        Expressive aphasia        Benign essential HTN        Chronic systolic heart failure (CMS/HCC)        Acute pulmonary embolism, unspecified pulmonary embolism type, unspecified whether acute cor pulmonale present (CMS/HCC)        Type 2 diabetes mellitus with other specified complication, with long-term current use of insulin (CMS/HCC)        HIV infection, unspecified symptom status (CMS/HCC)        Other ulcerative colitis without complication (CMS/HCC)        Impaired gait and mobility        Generalized muscle weakness              Maria Del Carmen Garduno, NAVEEN-CNP       Electronically Signed By: Maria Del Carmen Garduno,  APRN-CNP   4/25/23  5:45 PM

## 2023-04-25 VITALS
HEART RATE: 77 BPM | WEIGHT: 231.6 LBS | HEIGHT: 64 IN | BODY MASS INDEX: 39.54 KG/M2 | SYSTOLIC BLOOD PRESSURE: 135 MMHG | TEMPERATURE: 97.6 F | OXYGEN SATURATION: 98 % | DIASTOLIC BLOOD PRESSURE: 92 MMHG | RESPIRATION RATE: 16 BRPM

## 2023-04-25 NOTE — PROGRESS NOTES
Name: Janette Martinez    YOB: 1970    Chief complaint:  Readmit after hospitalization;   Sequela of Cerebrovascular accident; etc.....      HPI     HOSPITAL COURSE:    52-year-old female with past medical history of heart failure with reduced EF,   diabetes, hypertension, hyperlipidemia, HIV,        CVA (left MCA 2022 with residual right-sided hemiparesis), GERD, HIV, hypothyroidism, hypertension,   diabetes, VTE on Eliquis presenting with aphasia, right gaze preference,   left-sided weakness.     Patient was sent to [facility] ER on 4/19/23.  She was Afebrile, vital signs were stable.   Stroke alert  was initiated.  Van positive, NIHSS 22.    Differential included acute CVA, complex migraine, seizure with postictal weakness, functional   neurologic disorder.  Plan was to get CT head and CT angio head and neck   however patient  has a contrast allergy so the vessel imaging was   unable to be obtained on arrival.  Patient as baseline has some dysarthria   but no significant aphasia, is requiring [facility] for transfers.  Dr. Jiang from [facility] [place] [place] neuro/stroke was consulted whether patient may benefit from transfer to [facility] [place] [place],   rapid MRA however given patient's baseline functional status, it was determined   that she was not a mechanical thrombectomy candidate.    Patient was not a thrombolytic candidate given Eliquis use.    Patient did not have nuchal rigidity, was low suspicion for CNS infection given this and no fever.    Labs reviewed and CBC with no leukocytosis, INR 1.6 consistent with DOAC use.    Chemistry reassuring.   Troponin negative.  VBG lactic acid was 0.7.  CT head did not show any acute   findings, no signs of intracranial hemorrhage.    Chest x-ray without acute findings.    Neurology was consulted and to see patient.         EKG showed normal sinus rhythm, rate 81, normal axis, normal   intervals, no STEMI, no ectopy, similar to EKG from November 2022.       Pt has baseline dysarthria from a  previous CVA and has Hemiparesis.       In the hospital she had worsening bilateral limb weakness   in both UE and LE. She also has expressive aphasia. Patient was able to nod   appropriately to questions.       Patient re-admitted to [facility]. On 4/21/23 after her most recent hospitalization.    Hospital and chronic diagnoses as follows:      Acute/subacute stroke with probable extension; previous CVA with right-sided hemiparesis/right sided weakness; dysphagia 2/2 cva; expressive aphasia;  Sequela of Cerebrovascular accident:   MRI was performed it showed Acute/subacute tiny infarct in the left anterior brock.   Neuro was  consulted in the hospital.  She was NPO in the hospital and received IV fluids, and had a swallow evaluation.   Discharged on regular diet, regular texture, thin consistency.  Patient seen today she is in a wheelchair in her room.  Mentation at baseline.   Follow commands.   Alert and awake.  Appears comfortable.  Good appetite. No swallowing problems reported.   At [facility] for rehab. PT/OT and in house speech therapy following patient.        Benign essential HTN; Chronic systolic HF:      Treated with IV hydralazine in the hospital.  On Amlodipine and  hydrochlorothiazide.  Recent /92.  No complaints of chest pain.  No headaches no dizziness.   No SOB.       Recent acute PE:      In the hospital Eliquis was discontinued and she was started on Lovenox.    Then Eliquis was resumed. Currently on Eliquis.      Hematology oncology consultation for hematologic work-up of her unprovoked pulmonary embolism should be considered;     the fact that she is bedridden and minimally mobile will increase the risk of her having a PE even though her Dopplers were negative for DVTs.      DM type 2:    On Humalog sliding scale.  On Lantus 40 units QHS & Insulin Lispro 8 units subcutaneous before meals and Humalog sliding scale.  Recent accuchecks: 138 mg/dL, 156 mg/dL, 250 mg/dL.   No  episodes of hypoglycemia reported.       HIV   Descovy and Dolutegravir was stopped in the hospital.  She was discharged on Emtricitabine-Tenofovir.       RLE pain -  Unclear etiology;      Intermittent and sharp.   On gabapentin & Percocet PRN.        Hypothyroidism:      On Levothyroxine.      No recent TSH available.       HLD:    On Atorvastatin 40 mg daily      Major Depressive Disorder:   On Sertraline.  Flat affect.   No weight loss reported.      Hypothyroidism:  On Synthroid 175 mcg      Ulcerative colitis:  On Linaclotide 72 mg daily      Impaired gait and mobility; Generalized muscle weakness:  At  participating in PT/OT.                Reviewed [facility] records from recent hospitalization.  Reviewed medical, social, surgical and family history.  Reviewed all current medications and performed medication reconciliation.  Reviewed vital signs and recent blood work results.  Reviewed [facility] Documentation  Discussed patient with nursing.   Spent greater than 50 minutes on visit.   Ordered RX for Percocet      ROS: 10 point ROS performed; negative unless noted in HPI.       Code Status: Full Code.    Past Medical History:   Diagnosis Date    Cervicalgia     Neck pain    Essential (primary) hypertension 07/21/2013    Hypertension    Pain in unspecified shoulder     Pain, joint, shoulder    Personal history of other diseases of the digestive system 07/06/2022    History of ulcerative colitis    Personal history of other diseases of the digestive system     History of esophageal reflux    Personal history of other diseases of the nervous system and sense organs     History of cataract    Personal history of other diseases of the nervous system and sense organs     History of glaucoma    Personal history of other diseases of the respiratory system     History of bronchitis    Personal history of other endocrine, nutritional and metabolic disease     History of diabetes mellitus    Personal  history of other endocrine, nutritional and metabolic disease     History of hyperlipidemia    Personal history of other mental and behavioral disorders     History of depression    Snoring     Snoring    Unilateral primary osteoarthritis, unspecified knee 11/02/2015    Arthritis of knee           Past Surgical History:   Procedure Laterality Date    CHOLECYSTECTOMY  01/08/2016    Cholecystectomy    COLON SURGERY  01/08/2016    Colon Surgery    CT HEAD ANGIO W AND WO IV CONTRAST  3/19/2019    CT HEAD ANGIO W AND WO IV CONTRAST 3/19/2019 [facility] [place] [facility]    CT NECK ANGIO W AND WO IV CONTRAST  3/19/2019    CT NECK ANGIO W AND WO IV CONTRAST 3/19/2019 [facility] [place] [facility]    HERNIA REPAIR  01/08/2016    Hernia Repair    HYSTERECTOMY  01/08/2016    Hysterectomy    MR HEAD ANGIO WO IV CONTRAST  6/4/2018    MR HEAD ANGIO WO IV CONTRAST 6/4/2018 [facility] [place] CLINICAL LEGACY    MR HEAD ANGIO WO IV CONTRAST  7/11/2020    MR HEAD ANGIO WO IV CONTRAST 7/11/2020 [facility] [place] CLINICAL LEGACY    MR HEAD ANGIO WO IV CONTRAST  9/30/2020    MR HEAD ANGIO WO IV CONTRAST 9/30/2020 [facility] LEGAC[facility]    MR HEAD ANGIO WO IV CONTRAST  5/22/2022    MR HEAD ANGIO WO IV CONTRAST 5/22/2022 [facility] [place] CLINICAL LEGACY    MR HEAD ANGIO WO IV CONTRAST  4/19/2023    MR HEAD ANGIO WO IV CONTRAST [facility] MRI    MR NECK ANGIO WO IV CONTRAST  6/4/2018    MR NECK ANGIO WO IV CONTRAST 6/4/2018 [facility] [place] CLINICAL LEGACY    MR NECK ANGIO WO IV CONTRAST  7/11/2020    MR NECK ANGIO WO IV CONTRAST 7/11/2020 [facility] [place] CLINICAL LEGACY    MR NECK ANGIO WO IV CONTRAST  9/30/2020    MR NECK ANGIO WO IV CONTRAST 9/30/2020 [facility]GAC[facility]    MR NECK ANGIO WO IV CONTRAST  5/22/2022    MR NECK ANGIO WO IV CONTRAST 5/22/2022 [facility] [place] CLINICAL LEGAC[facility]    MR NECK ANGIO WO IV CONTRAST  4/19/2023    MR NECK ANGIO WO IV CONTRAST [facility] MRI    OTHER SURGICAL HISTORY  01/08/2016    Tubal Stabilization    OTHER SURGICAL HISTORY  09/01/2016    Cervical Surgery (Gyn) Laser Vaporization Of Transformation Zone    THYROID  "SURGERY  09/27/2013    Thyroid Surgery          Lab Results   Component Value Date    WBC CANCELED 04/22/2023    HGB CANCELED 04/22/2023    HCT CANCELED 04/22/2023    PLT CANCELED 04/22/2023    CHOL 129 08/07/2022    TRIG 182 (H) 08/07/2022    HDL 31.0 (A) 08/07/2022    ALT 8 04/21/2023    AST 23 04/21/2023    NA CANCELED 04/22/2023    K CANCELED 04/22/2023    CL CANCELED 04/22/2023    CREATININE CANCELED 04/22/2023    BUN CANCELED 04/22/2023    CO2 CANCELED 04/22/2023    TSH 0.32 (L) 04/20/2023    INR CANCELED 04/19/2023    HGBA1C 8.6 (A) 04/20/2023     Surgical History  Problems    · History of Cervical Surgery (Gyn) Laser Vaporization Of Transformation Zone   · History of Cholecystectomy   · History of Colon Surgery   · History of Hernia Repair   · History of Hysterectomy   · History of Thyroid Surgery   · History of Tubal Stabilization     Family History  Mother    · Family history of Cancer  Family History    · Family history of Diabetes Mellitus (V18.0)   · Family history of Hypertension (V17.49)     Social History  Problems    · Disabled   · Does not use illicit drugs (V49.89) (Z78.9)   · Former smoker (V15.82) (Z87.891)   · Marital History - Single   · No alcohol.   · Denied: History of Secondhand smoke exposure   · Single     Allergies  Medication    · Aspirin TABS   · Contrast Media Ready-Box MISC   · morphine   · Sulfa Drugs        BP (!) 135/92   Pulse 77   Temp 36.4 °C (97.6 °F)   Resp 16   Ht 1.626 m (5' 4\")   Wt 105 kg (231 lb 9.6 oz)   SpO2 98%   BMI 39.75 kg/m²      Physical Exam  Vitals and nursing note reviewed.   Constitutional:       Appearance: Normal appearance.   HENT:      Head: Normocephalic.      Right Ear: External ear normal.      Left Ear: External ear normal.      Nose: Nose normal.      Mouth/Throat:      Mouth: Mucous membranes are moist.      Pharynx: Oropharynx is clear.   Eyes:      Extraocular Movements: Extraocular movements intact.      Conjunctiva/sclera: Conjunctivae " normal.      Pupils: Pupils are equal, round, and reactive to light.   Cardiovascular:      Rate and Rhythm: Normal rate and regular rhythm.      Pulses: Normal pulses.      Heart sounds: Normal heart sounds.   Pulmonary:      Effort: Pulmonary effort is normal.      Breath sounds: Normal breath sounds.   Abdominal:      General: Bowel sounds are normal.      Palpations: Abdomen is soft.   Musculoskeletal:      Cervical back: Normal range of motion and neck supple.      Comments: Generalized muscle weakness; immobility.   Skin:     General: Skin is warm and dry.   Neurological:      Mental Status: She is alert. Mental status is at baseline.      Motor: Weakness present.      Coordination: Coordination abnormal.      Gait: Gait abnormal.      Comments: Right sided weakness--RLE 1/5; RUE 3/5; sensation intact to touch x 4 extremities.     Left sided facial droop.     Generalized muscle weakness   Psychiatric:         Mood and Affect: Mood normal.         Behavior: Behavior normal.          Assessment/Plan      Ordered CMP and CBC with diff. For tomorrow and next Monday.  Ordered Ha1c and TSH for next Monday.      Sequela of Cerebrovascular accident;   Acute and subacute stroke with probable extension;   previous CVA with right sided weakness; right-sided hemiparesis; dysphagia 2/2 cva;  MRI was performed it showed Acute/subacute tiny infarct in the left anterior brock.   Neuro was  consulted in the hospital.  She was NPO in the hospital and received IV fluids, and had a swallow evaluation.   Discharged on regular diet, regular texture, thin consistency.  Follow up with neurology Dr. Luz Black on 5/9/23 as scheduled.   Continue at  with therapy PT/OT/ST.         Benign essential HTN; Chronic systolic HF:  Continue Amlodipine and  hydrochlorothiazide.  Monitor Bps.       Recent acute PE:      Continue Eliquis as ordered.     Hematology oncology consultation for hematologic work-up of her unprovoked pulmonary  embolism should be considered;     the fact that she is bedridden and minimally mobile will increase the risk of her having a PE even though her Dopplers were negative for DVTs.      DM type 2:    Continue Humalog sliding scale.  Continue Lantus 40 units subcutaneous QD & Insulin Lispro 8 units subcutaneous before meals and Humalog sliding scale.      Monitor accuchecks.       Order Ha1c.      HIV:  Continue Emtricitabine-Tenofovir.       Ulcerative colitis:   Continue Linaclotide 145 mg daily       RLE pain:       Continue gabapentin & Percocet PRN.        Hypothyroidism:      Continue Levothyroxine.      Order TSH.     HLD:   Continue Atorvastatin 40 mg daily      Major Depressive Disorder:   Continue Sertraline.     Hypothyroidism:  Continue Synthroid 175 mcg      Ulcerative colitis:  Continue Linaclotide 72 mg daily      Impaired gait and mobility; Generalized muscle weakness:  Continue at  with skilled services PT/OT.               Problem List Items Addressed This Visit    None  Visit Diagnoses       Sequela of cerebrovascular accident    -  Primary    Recent cerebrovascular accident        Hemiparesis affecting right side as late effect of stroke (CMS/HCC)        Dysphagia, unspecified type        Expressive aphasia        Benign essential HTN        Chronic systolic heart failure (CMS/HCC)        Acute pulmonary embolism, unspecified pulmonary embolism type, unspecified whether acute cor pulmonale present (CMS/HCC)        Type 2 diabetes mellitus with other specified complication, with long-term current use of insulin (CMS/HCC)        HIV infection, unspecified symptom status (CMS/HCC)        Other ulcerative colitis without complication (CMS/HCC)        Impaired gait and mobility        Generalized muscle weakness              Maria Del Carmen Garduno, APRN-CNP

## 2023-05-08 ENCOUNTER — NURSING HOME VISIT (OUTPATIENT)
Dept: POST ACUTE CARE | Facility: EXTERNAL LOCATION | Age: 53
End: 2023-05-08
Payer: MEDICARE

## 2023-05-08 DIAGNOSIS — R13.10 DYSPHAGIA, UNSPECIFIED TYPE: ICD-10-CM

## 2023-05-08 DIAGNOSIS — I10 BENIGN ESSENTIAL HTN: ICD-10-CM

## 2023-05-08 DIAGNOSIS — M62.81 GENERALIZED MUSCLE WEAKNESS: ICD-10-CM

## 2023-05-08 DIAGNOSIS — E11.69 TYPE 2 DIABETES MELLITUS WITH OTHER SPECIFIED COMPLICATION, WITH LONG-TERM CURRENT USE OF INSULIN (MULTI): Primary | ICD-10-CM

## 2023-05-08 DIAGNOSIS — I69.351 HEMIPARESIS AFFECTING RIGHT SIDE AS LATE EFFECT OF STROKE (MULTI): ICD-10-CM

## 2023-05-08 DIAGNOSIS — E03.9 HYPOTHYROIDISM, UNSPECIFIED TYPE: ICD-10-CM

## 2023-05-08 DIAGNOSIS — M79.604 RIGHT LEG PAIN: ICD-10-CM

## 2023-05-08 DIAGNOSIS — R26.89 IMPAIRED GAIT AND MOBILITY: ICD-10-CM

## 2023-05-08 DIAGNOSIS — I69.30 SEQUELA OF CEREBROVASCULAR ACCIDENT: ICD-10-CM

## 2023-05-08 DIAGNOSIS — Z86.73 RECENT CEREBROVASCULAR ACCIDENT: ICD-10-CM

## 2023-05-08 DIAGNOSIS — I50.22 CHRONIC SYSTOLIC HEART FAILURE (MULTI): ICD-10-CM

## 2023-05-08 DIAGNOSIS — F32.9 MAJOR DEPRESSIVE DISORDER, REMISSION STATUS UNSPECIFIED, UNSPECIFIED WHETHER RECURRENT: ICD-10-CM

## 2023-05-08 DIAGNOSIS — Z79.4 TYPE 2 DIABETES MELLITUS WITH OTHER SPECIFIED COMPLICATION, WITH LONG-TERM CURRENT USE OF INSULIN (MULTI): Primary | ICD-10-CM

## 2023-05-08 PROCEDURE — 99309 SBSQ NF CARE MODERATE MDM 30: CPT | Performed by: NURSE PRACTITIONER

## 2023-05-08 NOTE — LETTER
Patient: Jantete Martinez  : 1970    Encounter Date: 2023    Name: Janette Martinez    YOB: 1970    Code Status: Full Code.      Chief complaint:  Follow up on DM 2;  etc.....      HPI:    52-year-old female with past medical history of heart failure with reduced EF,   diabetes, hypertension, hyperlipidemia, HIV,        CVA (left MCA  with residual right-sided hemiparesis), GERD, HIV, hypothyroidism, hypertension,   and diabetes.    Patient re-admitted to [facility]. On 23 after her most recent hospitalization.    Diagnoses as follows:    DM type 2:   On Lantus 30 units at bedtime and Humalog sliding scale.  Recent accuchecks: 286 mg/dL, 245 mg/dL, 176 mg/dL.   No episodes of hypoglycemia reported.   Recent Ha1c 8.1 % on 23.  Patient seen today she is in a wheelchair in her room.  Mentation at baseline.   Follow commands.   Alert and awake.  Appears comfortable.  Good appetite. No swallowing problems reported.   At [facility] for rehab. PT/OT and in house speech therapy following patient.       CVA Sequela; Acute/subacute stroke with probable extension; previous CVA with right-sided hemiparesis/right sided weakness;   dysphagia 2/2 cva; expressive aphasia;  Sequela of Cerebrovascular accident:   MRI was performed it showed Acute/subacute tiny infarct in the left anterior brock.   Neuro was  consulted in the hospital.  Dr. Jiang from [facility] neuro/stroke was consulted.  It was determined that she was not a mechanical thrombectomy candidate.    Patient was not a thrombolytic candidate given Eliquis use.   Pt has baseline dysarthria from a previous CVA and has Hemiparesis.  In the hospital she had worsening bilateral limb weakness in both UE and LE.   She also has expressive aphasia. Patient was able to nod   appropriately to questions.   Discharged from the hospital on regular diet, regular texture, thin consistency.       Benign essential HTN; Chronic systolic  HF:      Treated with IV hydralazine in the hospital.  On Amlodipine and  hydrochlorothiazide.  Recent /74  No complaints of chest pain.  No headaches no dizziness.   No SOB.         RLE pain -  Unclear etiology;      Intermittent and sharp.   On gabapentin & Percocet PRN.        Hypothyroidism:      On Levothyroxine.      No recent TSH available.        Major Depressive Disorder:   On Sertraline.  Flat affect.   No weight loss reported.      Impaired gait and mobility; Generalized muscle weakness:  At  participating in PT/OT.                 Reviewed medical, social, surgical and family history.  Reviewed all current medications and performed medication reconciliation.  Reviewed vital signs and recent blood work results.  Reviewed [facility] Documentation  Discussed patient with nursing.         ROS: 10 point ROS performed; negative unless noted in HPI.     BMP: Date: 5/1/2023 Na 137 K 4.3 Cr 1.1 BUN 15 glucose 141 Ca 8.8 GFR 52  CBC: Date: 5/1/2023 WBC 8.4 Hgb 11.1 hematocrit 33.9 platelets 337  Liver function: Date: 5/1/2023 ALT 10 AST 29 alkaline phos.  63 bilirubin 0.4 albumin 3.6 protein 7.7       Ha1c 8.1 % on 5/1/23      Past Medical History:   Diagnosis Date   • Cervicalgia     Neck pain   • Essential (primary) hypertension 07/21/2013    Hypertension   • Pain in unspecified shoulder     Pain, joint, shoulder   • Personal history of other diseases of the digestive system 07/06/2022    History of ulcerative colitis   • Personal history of other diseases of the digestive system     History of esophageal reflux   • Personal history of other diseases of the nervous system and sense organs     History of cataract   • Personal history of other diseases of the nervous system and sense organs     History of glaucoma   • Personal history of other diseases of the respiratory system     History of bronchitis   • Personal history of other endocrine, nutritional and metabolic disease     History of  diabetes mellitus   • Personal history of other endocrine, nutritional and metabolic disease     History of hyperlipidemia   • Personal history of other mental and behavioral disorders     History of depression   • Snoring     Snoring   • Unilateral primary osteoarthritis, unspecified knee 11/02/2015    Arthritis of knee           Past Surgical History:   Procedure Laterality Date   • CHOLECYSTECTOMY  01/08/2016    Cholecystectomy   • COLON SURGERY  01/08/2016    Colon Surgery   • CT HEAD ANGIO W AND WO IV CONTRAST  3/19/2019    CT HEAD ANGIO W AND WO IV CONTRAST 3/19/2019 [facility]GENCY LEGAC[facility]   • CT NECK ANGIO W AND WO IV CONTRAST  3/19/2019    CT NECK ANGIO W AND WO IV CONTRAST 3/19/2019 [facility]   • HERNIA REPAIR  01/08/2016    Hernia Repair   • HYSTERECTOMY  01/08/2016    Hysterectomy   • MR HEAD ANGIO WO IV CONTRAST  6/4/2018    MR HEAD ANGIO WO IV CONTRAST 6/4/2018 [facility] CLINICAL LEGACY   • MR HEAD ANGIO WO IV CONTRAST  7/11/2020    MR HEAD ANGIO WO IV CONTRAST 7/11/2020 [facility] CLINICAL LEGACY   • MR HEAD ANGIO WO IV CONTRAST  9/30/2020    MR HEAD ANGIO WO IV CONTRAST 9/30/2020 [facility] AIB LEGACY   • MR HEAD ANGIO WO IV CONTRAST  5/22/2022    MR HEAD ANGIO WO IV CONTRAST 5/22/2022 [facility] CLINICAL LEGACY   • MR HEAD ANGIO WO IV CONTRAST  4/19/2023    MR HEAD ANGIO WO IV CONTRAST [facility] MRI   • MR NECK ANGIO WO IV CONTRAST  6/4/2018    MR NECK ANGIO WO IV CONTRAST 6/4/2018 [facility] CLINICAL LEGACY   • MR NECK ANGIO WO IV CONTRAST  7/11/2020    MR NECK ANGIO WO IV CONTRAST 7/11/2020 [facility] CLINICAL LEGACY   • MR NECK ANGIO WO IV CONTRAST  9/30/2020    MR NECK ANGIO WO IV CONTRAST 9/30/2020 [facility] AIB LEGACY   • MR NECK ANGIO WO IV CONTRAST  5/22/2022    MR NECK ANGIO WO IV CONTRAST 5/22/2022 [facility] CLINICAL LEGACY   • MR NECK ANGIO WO IV CONTRAST  4/19/2023    MR NECK ANGIO WO IV CONTRAST [facility] MRI   • OTHER SURGICAL HISTORY  01/08/2016    Tubal Stabilization   • OTHER SURGICAL HISTORY  09/01/2016    Cervical Surgery (Gyn)  "Laser Vaporization Of Transformation Zone   • THYROID SURGERY  09/27/2013    Thyroid Surgery          Lab Results   Component Value Date    WBC CANCELED 04/22/2023    HGB CANCELED 04/22/2023    HCT CANCELED 04/22/2023    PLT CANCELED 04/22/2023    CHOL 129 08/07/2022    TRIG 182 (H) 08/07/2022    HDL 31.0 (A) 08/07/2022    ALT 8 04/21/2023    AST 23 04/21/2023    NA CANCELED 04/22/2023    K CANCELED 04/22/2023    CL CANCELED 04/22/2023    CREATININE CANCELED 04/22/2023    BUN CANCELED 04/22/2023    CO2 CANCELED 04/22/2023    TSH 0.32 (L) 04/20/2023    INR CANCELED 04/19/2023    HGBA1C 8.6 (A) 04/20/2023     Surgical History  Problems    · History of Cervical Surgery (Gyn) Laser Vaporization Of Transformation Zone   · History of Cholecystectomy   · History of Colon Surgery   · History of Hernia Repair   · History of Hysterectomy   · History of Thyroid Surgery   · History of Tubal Stabilization     Family History  Mother    · Family history of Cancer  Family History    · Family history of Diabetes Mellitus (V18.0)   · Family history of Hypertension (V17.49)     Social History  Problems    · Disabled   · Does not use illicit drugs (V49.89) (Z78.9)   · Former smoker (V15.82) (Z87.891)   · Marital History - Single   · No alcohol.   · Denied: History of Secondhand smoke exposure   · Single     Allergies  Medication    · Aspirin TABS   · Contrast Media Ready-Box MISC   · morphine   · Sulfa Drugs        /74   Pulse 65   Temp 35.6 °C (96.1 °F)   Resp 16   Ht 1.372 m (4' 6\")   Wt 106 kg (233 lb 9.6 oz)   SpO2 96%   BMI 56.32 kg/m²      Physical Exam  Vitals and nursing note reviewed.   Constitutional:       Appearance: Normal appearance.   HENT:      Head: Normocephalic.      Right Ear: External ear normal.      Left Ear: External ear normal.      Nose: Nose normal.      Mouth/Throat:      Mouth: Mucous membranes are moist.      Pharynx: Oropharynx is clear.   Eyes:      Extraocular Movements: Extraocular " movements intact.      Conjunctiva/sclera: Conjunctivae normal.      Pupils: Pupils are equal, round, and reactive to light.   Cardiovascular:      Rate and Rhythm: Normal rate and regular rhythm.      Pulses: Normal pulses.      Heart sounds: Normal heart sounds.   Pulmonary:      Effort: Pulmonary effort is normal.      Breath sounds: Normal breath sounds.   Abdominal:      General: Bowel sounds are normal.      Palpations: Abdomen is soft.   Musculoskeletal:      Cervical back: Normal range of motion and neck supple.      Comments: Generalized muscle weakness; immobility.   Skin:     General: Skin is warm and dry.   Neurological:      Mental Status: She is alert. Mental status is at baseline.      Motor: Weakness present.      Coordination: Coordination abnormal.      Gait: Gait abnormal.      Comments: Right sided weakness--RLE 1/5; RUE 3/5; sensation intact to touch x 4 extremities.     Left sided facial droop.     Generalized muscle weakness   Psychiatric:         Mood and Affect: Mood normal.         Behavior: Behavior normal.          Assessment/Plan     Ordered CMP and CBC with diff. for tomorrow     DM type 2:   Continue Lantus 40 units subcutaneous every day and Humalog sliding scale.      Monitor accuchecks.      Monitor for hypoglycemia.      Order Ha1c.        Sequela of Cerebrovascular accident;   Acute and subacute stroke with probable extension;   previous CVA with right sided weakness; right-sided hemiparesis; dysphagia 2/2 cva;  Neuro was  consulted in the hospital.  She was NPO in the hospital and received IV fluids, and had a swallow evaluation.   Discharged on regular diet, regular texture, thin consistency.  Follow up with neurology Dr. Luz Black on 5/9/23 as scheduled.   Continue at  with therapy PT/OT/ST.         Benign essential HTN; Chronic systolic HF:  Continue Amlodipine and  hydrochlorothiazide.  Monitor Bps.        RLE pain:  Continue gabapentin & Percocet PRN.         Hypothyroidism:      Continue Levothyroxine.      Order TSH.      Major Depressive Disorder:   Continue Sertraline.     Impaired gait and mobility; Generalized muscle weakness:  Continue at  participating in PT/OT.   Wheelchair bound.  Fall prevention strategies.          Problem List Items Addressed This Visit    None  Visit Diagnoses       Type 2 diabetes mellitus with other specified complication, with long-term current use of insulin (CMS/HCC)    -  Primary    Sequela of cerebrovascular accident        Recent cerebrovascular accident        Hemiparesis affecting right side as late effect of stroke (CMS/HCC)        Dysphagia, unspecified type        Benign essential HTN        Chronic systolic heart failure (CMS/HCC)        Right leg pain        Hypothyroidism, unspecified type        Major depressive disorder, remission status unspecified, unspecified whether recurrent        Impaired gait and mobility        Generalized muscle weakness            JASON Espitia       Electronically Signed By: JASON Espitia   5/9/23  6:33 PM

## 2023-05-09 VITALS
DIASTOLIC BLOOD PRESSURE: 74 MMHG | BODY MASS INDEX: 54.06 KG/M2 | RESPIRATION RATE: 16 BRPM | WEIGHT: 233.6 LBS | OXYGEN SATURATION: 96 % | TEMPERATURE: 96.1 F | HEIGHT: 55 IN | SYSTOLIC BLOOD PRESSURE: 142 MMHG | HEART RATE: 65 BPM

## 2023-05-09 NOTE — PROGRESS NOTES
Name: Janette Martinez    YOB: 1970    Code Status: Full Code.      Chief complaint:  Follow up on DM 2;  etc.....      HPI:    52-year-old female with past medical history of heart failure with reduced EF,   diabetes, hypertension, hyperlipidemia, HIV,        CVA (left MCA 2022 with residual right-sided hemiparesis), GERD, HIV, hypothyroidism, hypertension,   and diabetes.    Patient re-admitted to [facility]. On 4/21/23 after her most recent hospitalization.    Diagnoses as follows:    DM type 2:   On Lantus 30 units at bedtime and Humalog sliding scale.  Recent accuchecks: 286 mg/dL, 245 mg/dL, 176 mg/dL.   No episodes of hypoglycemia reported.   Recent Ha1c 8.1 % on 5/1/23.  Patient seen today she is in a wheelchair in her room.  Mentation at baseline.   Follow commands.   Alert and awake.  Appears comfortable.  Good appetite. No swallowing problems reported.   At [facility] for rehab. PT/OT and in house speech therapy following patient.       CVA Sequela; Acute/subacute stroke with probable extension; previous CVA with right-sided hemiparesis/right sided weakness;   dysphagia 2/2 cva; expressive aphasia;  Sequela of Cerebrovascular accident:   MRI was performed it showed Acute/subacute tiny infarct in the left anterior brock.   Neuro was  consulted in the hospital.  Dr. Jiang from [facility] neuro/stroke was consulted.  It was determined that she was not a mechanical thrombectomy candidate.    Patient was not a thrombolytic candidate given Eliquis use.   Pt has baseline dysarthria from a previous CVA and has Hemiparesis.  In the hospital she had worsening bilateral limb weakness in both UE and LE.   She also has expressive aphasia. Patient was able to nod   appropriately to questions.   Discharged from the hospital on regular diet, regular texture, thin consistency.       Benign essential HTN; Chronic systolic HF:      Treated with IV hydralazine in the hospital.  On Amlodipine and   hydrochlorothiazide.  Recent /74  No complaints of chest pain.  No headaches no dizziness.   No SOB.         RLE pain -  Unclear etiology;      Intermittent and sharp.   On gabapentin & Percocet PRN.        Hypothyroidism:      On Levothyroxine.      No recent TSH available.        Major Depressive Disorder:   On Sertraline.  Flat affect.   No weight loss reported.      Impaired gait and mobility; Generalized muscle weakness:  At  participating in PT/OT.                 Reviewed medical, social, surgical and family history.  Reviewed all current medications and performed medication reconciliation.  Reviewed vital signs and recent blood work results.  Reviewed [facility] Documentation  Discussed patient with nursing.         ROS: 10 point ROS performed; negative unless noted in HPI.     BMP: Date: 5/1/2023 Na 137 K 4.3 Cr 1.1 BUN 15 glucose 141 Ca 8.8 GFR 52  CBC: Date: 5/1/2023 WBC 8.4 Hgb 11.1 hematocrit 33.9 platelets 337  Liver function: Date: 5/1/2023 ALT 10 AST 29 alkaline phos.  63 bilirubin 0.4 albumin 3.6 protein 7.7       Ha1c 8.1 % on 5/1/23      Past Medical History:   Diagnosis Date    Cervicalgia     Neck pain    Essential (primary) hypertension 07/21/2013    Hypertension    Pain in unspecified shoulder     Pain, joint, shoulder    Personal history of other diseases of the digestive system 07/06/2022    History of ulcerative colitis    Personal history of other diseases of the digestive system     History of esophageal reflux    Personal history of other diseases of the nervous system and sense organs     History of cataract    Personal history of other diseases of the nervous system and sense organs     History of glaucoma    Personal history of other diseases of the respiratory system     History of bronchitis    Personal history of other endocrine, nutritional and metabolic disease     History of diabetes mellitus    Personal history of other endocrine, nutritional and metabolic  disease     History of hyperlipidemia    Personal history of other mental and behavioral disorders     History of depression    Snoring     Snoring    Unilateral primary osteoarthritis, unspecified knee 11/02/2015    Arthritis of knee           Past Surgical History:   Procedure Laterality Date    CHOLECYSTECTOMY  01/08/2016    Cholecystectomy    COLON SURGERY  01/08/2016    Colon Surgery    CT HEAD ANGIO W AND WO IV CONTRAST  3/19/2019    CT HEAD ANGIO W AND WO IV CONTRAST 3/19/2019 [facility]    CT NECK ANGIO W AND WO IV CONTRAST  3/19/2019    CT NECK ANGIO W AND WO IV CONTRAST 3/19/2019 [facility] [place] [facility]    HERNIA REPAIR  01/08/2016    Hernia Repair    HYSTERECTOMY  01/08/2016    Hysterectomy    MR HEAD ANGIO WO IV CONTRAST  6/4/2018    MR HEAD ANGIO WO IV CONTRAST 6/4/2018 [facility] CLINICAL LEGACY    MR HEAD ANGIO WO IV CONTRAST  7/11/2020    MR HEAD ANGIO WO IV CONTRAST 7/11/2020 [facility] CLINICAL LEGACY    MR HEAD ANGIO WO IV CONTRAST  9/30/2020    MR HEAD ANGIO WO IV CONTRAST 9/30/2020 [facility] AIB LEGACY    MR HEAD ANGIO WO IV CONTRAST  5/22/2022    MR HEAD ANGIO WO IV CONTRAST 5/22/2022 [facility] CLINICAL LEGACY    MR HEAD ANGIO WO IV CONTRAST  4/19/2023    MR HEAD ANGIO WO IV CONTRAST [facility] MRI    MR NECK ANGIO WO IV CONTRAST  6/4/2018    MR NECK ANGIO WO IV CONTRAST 6/4/2018 [facility] CLINICAL LEGACY    MR NECK ANGIO WO IV CONTRAST  7/11/2020    MR NECK ANGIO WO IV CONTRAST 7/11/2020 [facility] CLINICAL LEGACY    MR NECK ANGIO WO IV CONTRAST  9/30/2020    MR NECK ANGIO WO IV CONTRAST 9/30/2020 [facility] AIB LEGACY    MR NECK ANGIO WO IV CONTRAST  5/22/2022    MR NECK ANGIO WO IV CONTRAST 5/22/2022 [facility] CLINICAL LEGACY    MR NECK ANGIO WO IV CONTRAST  4/19/2023    MR NECK ANGIO WO IV CONTRAST [facility] MRI    OTHER SURGICAL HISTORY  01/08/2016    Tubal Stabilization    OTHER SURGICAL HISTORY  09/01/2016    Cervical Surgery (Gyn) Laser Vaporization Of Transformation Zone    THYROID SURGERY  09/27/2013    Thyroid Surgery          Lab  "Results   Component Value Date    WBC CANCELED 04/22/2023    HGB CANCELED 04/22/2023    HCT CANCELED 04/22/2023    PLT CANCELED 04/22/2023    CHOL 129 08/07/2022    TRIG 182 (H) 08/07/2022    HDL 31.0 (A) 08/07/2022    ALT 8 04/21/2023    AST 23 04/21/2023    NA CANCELED 04/22/2023    K CANCELED 04/22/2023    CL CANCELED 04/22/2023    CREATININE CANCELED 04/22/2023    BUN CANCELED 04/22/2023    CO2 CANCELED 04/22/2023    TSH 0.32 (L) 04/20/2023    INR CANCELED 04/19/2023    HGBA1C 8.6 (A) 04/20/2023     Surgical History  Problems    · History of Cervical Surgery (Gyn) Laser Vaporization Of Transformation Zone   · History of Cholecystectomy   · History of Colon Surgery   · History of Hernia Repair   · History of Hysterectomy   · History of Thyroid Surgery   · History of Tubal Stabilization     Family History  Mother    · Family history of Cancer  Family History    · Family history of Diabetes Mellitus (V18.0)   · Family history of Hypertension (V17.49)     Social History  Problems    · Disabled   · Does not use illicit drugs (V49.89) (Z78.9)   · Former smoker (V15.82) (Z87.891)   · Marital History - Single   · No alcohol.   · Denied: History of Secondhand smoke exposure   · Single     Allergies  Medication    · Aspirin TABS   · Contrast Media Ready-Box MISC   · morphine   · Sulfa Drugs        /74   Pulse 65   Temp 35.6 °C (96.1 °F)   Resp 16   Ht 1.372 m (4' 6\")   Wt 106 kg (233 lb 9.6 oz)   SpO2 96%   BMI 56.32 kg/m²      Physical Exam  Vitals and nursing note reviewed.   Constitutional:       Appearance: Normal appearance.   HENT:      Head: Normocephalic.      Right Ear: External ear normal.      Left Ear: External ear normal.      Nose: Nose normal.      Mouth/Throat:      Mouth: Mucous membranes are moist.      Pharynx: Oropharynx is clear.   Eyes:      Extraocular Movements: Extraocular movements intact.      Conjunctiva/sclera: Conjunctivae normal.      Pupils: Pupils are equal, round, and " reactive to light.   Cardiovascular:      Rate and Rhythm: Normal rate and regular rhythm.      Pulses: Normal pulses.      Heart sounds: Normal heart sounds.   Pulmonary:      Effort: Pulmonary effort is normal.      Breath sounds: Normal breath sounds.   Abdominal:      General: Bowel sounds are normal.      Palpations: Abdomen is soft.   Musculoskeletal:      Cervical back: Normal range of motion and neck supple.      Comments: Generalized muscle weakness; immobility.   Skin:     General: Skin is warm and dry.   Neurological:      Mental Status: She is alert. Mental status is at baseline.      Motor: Weakness present.      Coordination: Coordination abnormal.      Gait: Gait abnormal.      Comments: Right sided weakness--RLE 1/5; RUE 3/5; sensation intact to touch x 4 extremities.     Left sided facial droop.     Generalized muscle weakness   Psychiatric:         Mood and Affect: Mood normal.         Behavior: Behavior normal.          Assessment/Plan      Ordered CMP and CBC with diff. for tomorrow     DM type 2:   Continue Lantus 40 units subcutaneous every day and Humalog sliding scale.      Monitor accuchecks.      Monitor for hypoglycemia.      Order Ha1c.        Sequela of Cerebrovascular accident;   Acute and subacute stroke with probable extension;   previous CVA with right sided weakness; right-sided hemiparesis; dysphagia 2/2 cva;  Neuro was  consulted in the hospital.  She was NPO in the hospital and received IV fluids, and had a swallow evaluation.   Discharged on regular diet, regular texture, thin consistency.  Follow up with neurology Dr. Luz Black on 5/9/23 as scheduled.   Continue at  with therapy PT/OT/ST.         Benign essential HTN; Chronic systolic HF:  Continue Amlodipine and  hydrochlorothiazide.  Monitor Bps.        RLE pain:  Continue gabapentin & Percocet PRN.        Hypothyroidism:      Continue Levothyroxine.      Order TSH.      Major Depressive Disorder:   Continue  Sertraline.     Impaired gait and mobility; Generalized muscle weakness:  Continue at  participating in PT/OT.   Wheelchair bound.  Fall prevention strategies.          Problem List Items Addressed This Visit    None  Visit Diagnoses       Type 2 diabetes mellitus with other specified complication, with long-term current use of insulin (CMS/[facility])    -  Primary    Sequela of cerebrovascular accident        Recent cerebrovascular accident        Hemiparesis affecting right side as late effect of stroke (CMS/HCC)        Dysphagia, unspecified type        Benign essential HTN        Chronic systolic heart failure (CMS/HCC)        Right leg pain        Hypothyroidism, unspecified type        Major depressive disorder, remission status unspecified, unspecified whether recurrent        Impaired gait and mobility        Generalized muscle weakness            Maria Del Carmen Garduno, APRN-CNP

## 2023-05-22 ENCOUNTER — NURSING HOME VISIT (OUTPATIENT)
Dept: POST ACUTE CARE | Facility: EXTERNAL LOCATION | Age: 53
End: 2023-05-22
Payer: MEDICARE

## 2023-05-22 DIAGNOSIS — K21.9 GASTROESOPHAGEAL REFLUX DISEASE, UNSPECIFIED WHETHER ESOPHAGITIS PRESENT: ICD-10-CM

## 2023-05-22 DIAGNOSIS — R26.89 IMPAIRED GAIT AND MOBILITY: ICD-10-CM

## 2023-05-22 DIAGNOSIS — E11.69 TYPE 2 DIABETES MELLITUS WITH OTHER SPECIFIED COMPLICATION, WITH LONG-TERM CURRENT USE OF INSULIN (MULTI): ICD-10-CM

## 2023-05-22 DIAGNOSIS — R47.01 EXPRESSIVE APHASIA: ICD-10-CM

## 2023-05-22 DIAGNOSIS — M62.838 MUSCLE SPASM: ICD-10-CM

## 2023-05-22 DIAGNOSIS — I50.22 CHRONIC SYSTOLIC HEART FAILURE (MULTI): ICD-10-CM

## 2023-05-22 DIAGNOSIS — M62.81 GENERALIZED MUSCLE WEAKNESS: ICD-10-CM

## 2023-05-22 DIAGNOSIS — M79.604 RIGHT LEG PAIN: ICD-10-CM

## 2023-05-22 DIAGNOSIS — I10 BENIGN ESSENTIAL HTN: ICD-10-CM

## 2023-05-22 DIAGNOSIS — Z79.4 TYPE 2 DIABETES MELLITUS WITH OTHER SPECIFIED COMPLICATION, WITH LONG-TERM CURRENT USE OF INSULIN (MULTI): ICD-10-CM

## 2023-05-22 DIAGNOSIS — E87.6 HYPOKALEMIA: Primary | ICD-10-CM

## 2023-05-22 DIAGNOSIS — R13.10 DYSPHAGIA, UNSPECIFIED TYPE: ICD-10-CM

## 2023-05-22 DIAGNOSIS — I69.30 SEQUELA OF CEREBROVASCULAR ACCIDENT: ICD-10-CM

## 2023-05-22 DIAGNOSIS — I69.351 HEMIPARESIS AFFECTING RIGHT SIDE AS LATE EFFECT OF STROKE (MULTI): ICD-10-CM

## 2023-05-22 DIAGNOSIS — F32.9 MAJOR DEPRESSIVE DISORDER, REMISSION STATUS UNSPECIFIED, UNSPECIFIED WHETHER RECURRENT: ICD-10-CM

## 2023-05-22 PROCEDURE — 99309 SBSQ NF CARE MODERATE MDM 30: CPT | Performed by: NURSE PRACTITIONER

## 2023-05-22 NOTE — LETTER
Patient: Janette Martinez  : 1970    Encounter Date: 2023    Name: Janette Martinez    YOB: 1970    Code Status: Full Code.      Chief complaint:  Follow up on hypokalemia;  etc.....      HPI:    52-year-old female with past medical history of heart failure with reduced EF,   diabetes, hypertension, hyperlipidemia, HIV,        CVA (left MCA  with residual right-sided hemiparesis), GERD, HIV, hypothyroidism, hypertension,   and diabetes.    Patient re-admitted to [facility]. On 23 after her most recent hospitalization.    Diagnoses as follows:    Hypokalemia:   Not on potassium chloride.  Recent potassium trends:  5/15/23 potassium 3.4 L   23 potassium 3.1 L (today)  Patient seen today.  She is sitting in her room.  Calm, cooperative.  Mentation at baseline.   No complaints of chest pain.  No headaches. No dizziness.           Chronic systolic HF;  Benign essential HTN:  On Amlodipine and hydrochlorothiazide.  Recent /74  No complaints of chest pain.  No headaches no dizziness.   No SOB.         DM type 2:   On Lantus 30 units at bedtime and Humalog sliding scale.  Recent accuchecks: 278 mg/dL, 284 mg/dL, 400 mg/dL.   No episodes of hypoglycemia reported.   Recent Ha1c 8.1 % on 23.        CVA Sequela; Acute/subacute stroke with probable extension; previous CVA with right-sided hemiparesis/right sided weakness;   dysphagia 2/2 cva; expressive aphasia;  Sequela of Cerebrovascular accident:   MRI was performed it showed Acute/subacute tiny infarct in the left anterior brock.   Neuro was  consulted in the hospital.  [place] [facility] [place].  Patient was not a thrombolytic candidate given Eliquis use.   Pt has baseline dysarthria from a previous CVA and has Hemiparesis.  She also has expressive aphasia.   Patient able to nod appropriately to questions.   On regular diet, regular texture, thin consistency.            Muscle spasms; RLE  pain :      Intermittent.   On gabapentin, Percocet PRN and baclofen.         GERD:      On omeprazole.      No complaints of reflux or N/V today.    Major Depressive Disorder:   On Sertraline.  Flat affect.   No weight loss reported.      Impaired gait and mobility; Generalized muscle weakness:  At  participating in PT/OT.   Not able to ambulate.              Reviewed medical, social, surgical and family history.  Reviewed all current medications and performed medication reconciliation.  Reviewed vital signs and recent blood work results.  Reviewed [facility] Documentation  Discussed patient with nursing.         ROS: 10 point ROS performed; negative unless noted in HPI.     BMP: Date: 5/1/2023 Na 137 K 4.3 Cr 1.1 BUN 15 glucose 141 Ca 8.8 GFR 52  CBC: Date: 5/1/2023 WBC 8.4 Hgb 11.1 hematocrit 33.9 platelets 337  Liver function: Date: 5/1/2023 ALT 10 AST 29 alkaline phos.  63 bilirubin 0.4 albumin 3.6 protein 7.7       Ha1c 8.1 % on 5/1/23          BMP: Date: 5/15/2023 Na 139  K 3.4 Cr 1.2 BUN 20 glucose 122 Ca 8.8 GFR 57        CBC: Date: 5/15/2023 WBC 9 Hgb 10.2 hematocrit 30.9 platelets 277       Liver function: 5/15/2023 ALT 8 AST 28 alkaline phos.  57 bilirubin 0.5 albumin 3.5 protein 7.3          RECENT LABS:        BMP: Date: 5/22/2023 Na 141 K 3.1 Cr 1 BUN 11 glucose 227 Ca 8.7 GFR 70        CBC: Date: 5/22/2023 WBC 8.2 Hgb 10 hematocrit 30.5 platelets 372        Liver function: Date: 5/22/2023 ALT 8 AST 22 alkaline phos.  63 bilirubin 0.3 albumin 3.2 protein 7.2        Past Medical History:   Diagnosis Date   • Cervicalgia     Neck pain   • Essential (primary) hypertension 07/21/2013    Hypertension   • Pain in unspecified shoulder     Pain, joint, shoulder   • Personal history of other diseases of the digestive system 07/06/2022    History of ulcerative colitis   • Personal history of other diseases of the digestive system     History of esophageal reflux   • Personal history of other diseases  of the nervous system and sense organs     History of cataract   • Personal history of other diseases of the nervous system and sense organs     History of glaucoma   • Personal history of other diseases of the respiratory system     History of bronchitis   • Personal history of other endocrine, nutritional and metabolic disease     History of diabetes mellitus   • Personal history of other endocrine, nutritional and metabolic disease     History of hyperlipidemia   • Personal history of other mental and behavioral disorders     History of depression   • Snoring     Snoring   • Unilateral primary osteoarthritis, unspecified knee 11/02/2015    Arthritis of knee           Past Surgical History:   Procedure Laterality Date   • CHOLECYSTECTOMY  01/08/2016    Cholecystectomy   • COLON SURGERY  01/08/2016    Colon Surgery   • CT HEAD ANGIO W AND WO IV CONTRAST  3/19/2019    CT HEAD ANGIO W AND WO IV CONTRAST 3/19/2019 [facility] [place] [facility]   • CT NECK ANGIO W AND WO IV CONTRAST  3/19/2019    CT NECK ANGIO W AND WO IV CONTRAST 3/19/2019 [facility] [place] [facility]   • HERNIA REPAIR  01/08/2016    Hernia Repair   • HYSTERECTOMY  01/08/2016    Hysterectomy   • MR HEAD ANGIO WO IV CONTRAST  6/4/2018    MR HEAD ANGIO WO IV CONTRAST 6/4/2018 [facility] CLINICAL LEGACY   • MR HEAD ANGIO WO IV CONTRAST  7/11/2020    MR HEAD ANGIO WO IV CONTRAST 7/11/2020 [facility] CLINICAL LEGACY   • MR HEAD ANGIO WO IV CONTRAST  9/30/2020    MR HEAD ANGIO WO IV CONTRAST 9/30/2020 [facility]   • MR HEAD ANGIO WO IV CONTRAST  5/22/2022    MR HEAD ANGIO WO IV CONTRAST 5/22/2022 [facility] CLINICAL LEGACY   • MR HEAD ANGIO WO IV CONTRAST  4/19/2023    MR HEAD ANGIO WO IV CONTRAST [facility] MRI   • MR NECK ANGIO WO IV CONTRAST  6/4/2018    MR NECK ANGIO WO IV CONTRAST 6/4/2018 [facility] CLINICAL LEGACY   • MR NECK ANGIO WO IV CONTRAST  7/11/2020    MR NECK ANGIO WO IV CONTRAST 7/11/2020 [facility] CLINICAL LEGACY   • MR NECK ANGIO WO IV CONTRAST  9/30/2020    MR NECK ANGIO WO IV CONTRAST  "9/30/2020 [facility]   • MR NECK ANGIO WO IV CONTRAST  5/22/2022    MR NECK ANGIO WO IV CONTRAST 5/22/2022 [facility] CLINICAL LEGACY   • MR NECK ANGIO WO IV CONTRAST  4/19/2023    MR NECK ANGIO WO IV CONTRAST [facility] MRI   • OTHER SURGICAL HISTORY  01/08/2016    Tubal Stabilization   • OTHER SURGICAL HISTORY  09/01/2016    Cervical Surgery (Gyn) Laser Vaporization Of Transformation Zone   • THYROID SURGERY  09/27/2013    Thyroid Surgery          Lab Results   Component Value Date    WBC CANCELED 04/22/2023    HGB CANCELED 04/22/2023    HCT CANCELED 04/22/2023    PLT CANCELED 04/22/2023    CHOL 129 08/07/2022    TRIG 182 (H) 08/07/2022    HDL 31.0 (A) 08/07/2022    ALT 8 04/21/2023    AST 23 04/21/2023    NA CANCELED 04/22/2023    K CANCELED 04/22/2023    CL CANCELED 04/22/2023    CREATININE CANCELED 04/22/2023    BUN CANCELED 04/22/2023    CO2 CANCELED 04/22/2023    TSH 0.32 (L) 04/20/2023    INR CANCELED 04/19/2023    HGBA1C 8.6 (A) 04/20/2023     Surgical History  Problems    · History of Cervical Surgery (Gyn) Laser Vaporization Of Transformation Zone   · History of Cholecystectomy   · History of Colon Surgery   · History of Hernia Repair   · History of Hysterectomy   · History of Thyroid Surgery   · History of Tubal Stabilization     Family History  Mother    · Family history of Cancer  Family History    · Family history of Diabetes Mellitus (V18.0)   · Family history of Hypertension (V17.49)     Social History  Problems    · Disabled   · Does not use illicit drugs (V49.89) (Z78.9)   · Former smoker (V15.82) (Z87.891)   · Marital History - Single   · No alcohol.   · Denied: History of Secondhand smoke exposure   · Single     Allergies  Medication    · Aspirin TABS   · Contrast Media Ready-Box MISC   · morphine   · Sulfa Drugs        /74   Pulse 69   Temp 36.5 °C (97.7 °F)   Resp 18   Ht 1.372 m (4' 6\")   Wt 106 kg (233 lb 9.6 oz)   SpO2 98%   BMI 56.32 kg/m²      Physical Exam  Vitals and nursing note " reviewed.   Constitutional:       Appearance: Normal appearance.   HENT:      Head: Normocephalic.      Right Ear: External ear normal.      Left Ear: External ear normal.      Nose: Nose normal.      Mouth/Throat:      Mouth: Mucous membranes are moist.      Pharynx: Oropharynx is clear.   Eyes:      Extraocular Movements: Extraocular movements intact.      Conjunctiva/sclera: Conjunctivae normal.      Pupils: Pupils are equal, round, and reactive to light.   Cardiovascular:      Rate and Rhythm: Normal rate and regular rhythm.      Pulses: Normal pulses.      Heart sounds: Normal heart sounds.   Pulmonary:      Effort: Pulmonary effort is normal.      Breath sounds: Normal breath sounds.   Abdominal:      General: Bowel sounds are normal.      Palpations: Abdomen is soft.   Musculoskeletal:      Cervical back: Normal range of motion and neck supple.      Comments: Generalized muscle weakness; immobility.   Skin:     General: Skin is warm and dry.   Neurological:      Mental Status: She is alert. Mental status is at baseline.      Motor: Weakness present.      Coordination: Coordination abnormal.      Gait: Gait abnormal.      Comments: Right sided weakness--RLE 1/5; RUE 3/5; sensation intact to touch x 4 extremities.     Left sided facial droop.     Generalized muscle weakness   Psychiatric:         Mood and Affect: Mood normal.         Behavior: Behavior normal.          Assessment/Plan     Ordered CMP and CBC with diff. for tomorrow     Hypokalemia:   Monitor potassium level.  Give potassium chloride 20 meq PO X 1 now.  Start potassium 20 meq PO BID routinely.          Chronic systolic HF;  Benign essential HTN:  Continue Amlodipine and hydrochlorothiazide.  Monitor Bps.          DM type 2:   Continue Lantus 30 units at bedtime and Humalog sliding scale.  Monitor accuchecks.  Monitor for hypoglycemia.  Monitor Ha1c q 3 months.    CVA Sequela; Acute/subacute stroke with probable extension; previous CVA with  right-sided hemiparesis/right sided weakness;   dysphagia 2/2 cva; expressive aphasia;  Sequela of Cerebrovascular accident:   MRI was performed it showed Acute/subacute tiny infarct in the left anterior brock.   Neuro was  consulted in the hospital.  [place]. Jiang [place] [facility] [place].      Follow up with neurology.           Muscle spasms; RLE pain :  Continue gabapentin, Percocet PRN and baclofen.         GERD:      Continue omeprazole.      Sit upright for 2-3 hours after meals.      Major Depressive Disorder:   Continue Sertraline.     Impaired gait and mobility; Generalized muscle weakness:  Continue at  participating in PT/OT.   Wheelchair bound.  Patient requires a custom wheelchair-therapy department is ordering for patient.   A custom wheelchair will increase mobility, decrease risk of developing pressure ulcers and increase quality of life.   Fall prevention strategies.          Problem List Items Addressed This Visit    None  Visit Diagnoses       Hypokalemia    -  Primary    Chronic systolic heart failure (CMS/HCC)        Benign essential HTN        Type 2 diabetes mellitus with other specified complication, with long-term current use of insulin (CMS/HCC)        Sequela of cerebrovascular accident        Hemiparesis affecting right side as late effect of stroke (CMS/HCC)        Dysphagia, unspecified type        Expressive aphasia        Muscle spasm        Right leg pain        Gastroesophageal reflux disease, unspecified whether esophagitis present        Major depressive disorder, remission status unspecified, unspecified whether recurrent        Impaired gait and mobility        Generalized muscle weakness            JASON Espitia       Electronically Signed By: JASON Espitia   5/23/23  8:53 PM

## 2023-05-23 VITALS
TEMPERATURE: 97.7 F | RESPIRATION RATE: 18 BRPM | WEIGHT: 233.6 LBS | OXYGEN SATURATION: 98 % | BODY MASS INDEX: 54.06 KG/M2 | SYSTOLIC BLOOD PRESSURE: 132 MMHG | DIASTOLIC BLOOD PRESSURE: 74 MMHG | HEIGHT: 55 IN | HEART RATE: 69 BPM

## 2023-05-23 NOTE — PROGRESS NOTES
Name: Janette Martinez    YOB: 1970    Code Status: Full Code.      Chief complaint:  Follow up on hypokalemia;  etc.....      HPI:    52-year-old female with past medical history of heart failure with reduced EF,   diabetes, hypertension, hyperlipidemia, HIV,        CVA (left MCA 2022 with residual right-sided hemiparesis), GERD, HIV, hypothyroidism, hypertension,   and diabetes.    Patient re-admitted to [facility]. On 4/21/23 after her most recent hospitalization.    Diagnoses as follows:    Hypokalemia:   Not on potassium chloride.  Recent potassium trends:  5/15/23 potassium 3.4 L   5/22/23 potassium 3.1 L (today)  Patient seen today.  She is sitting in her room.  Calm, cooperative.  Mentation at baseline.   No complaints of chest pain.  No headaches. No dizziness.           Chronic systolic HF;  Benign essential HTN:  On Amlodipine and hydrochlorothiazide.  Recent /74  No complaints of chest pain.  No headaches no dizziness.   No SOB.         DM type 2:   On Lantus 30 units at bedtime and Humalog sliding scale.  Recent accuchecks: 278 mg/dL, 284 mg/dL, 400 mg/dL.   No episodes of hypoglycemia reported.   Recent Ha1c 8.1 % on 5/1/23.        CVA Sequela; Acute/subacute stroke with probable extension; previous CVA with right-sided hemiparesis/right sided weakness;   dysphagia 2/2 cva; expressive aphasia;  Sequela of Cerebrovascular accident:   MRI was performed it showed Acute/subacute tiny infarct in the left anterior brock.   Neuro was  consulted in the hospital.  [place]. [place] [facility] [place].  Patient was not a thrombolytic candidate given Eliquis use.   Pt has baseline dysarthria from a previous CVA and has Hemiparesis.  She also has expressive aphasia.   Patient able to nod appropriately to questions.   On regular diet, regular texture, thin consistency.            Muscle spasms; RLE pain :      Intermittent.   On gabapentin, Percocet PRN and baclofen.          GERD:      On omeprazole.      No complaints of reflux or N/V today.    Major Depressive Disorder:   On Sertraline.  Flat affect.   No weight loss reported.      Impaired gait and mobility; Generalized muscle weakness:  At  participating in PT/OT.   Not able to ambulate.              Reviewed medical, social, surgical and family history.  Reviewed all current medications and performed medication reconciliation.  Reviewed vital signs and recent blood work results.  Reviewed [facility] Documentation  Discussed patient with nursing.         ROS: 10 point ROS performed; negative unless noted in HPI.     BMP: Date: 5/1/2023 Na 137 K 4.3 Cr 1.1 BUN 15 glucose 141 Ca 8.8 GFR 52  CBC: Date: 5/1/2023 WBC 8.4 Hgb 11.1 hematocrit 33.9 platelets 337  Liver function: Date: 5/1/2023 ALT 10 AST 29 alkaline phos.  63 bilirubin 0.4 albumin 3.6 protein 7.7       Ha1c 8.1 % on 5/1/23          BMP: Date: 5/15/2023 Na 139  K 3.4 Cr 1.2 BUN 20 glucose 122 Ca 8.8 GFR 57        CBC: Date: 5/15/2023 WBC 9 Hgb 10.2 hematocrit 30.9 platelets 277       Liver function: 5/15/2023 ALT 8 AST 28 alkaline phos.  57 bilirubin 0.5 albumin 3.5 protein 7.3          RECENT LABS:        BMP: Date: 5/22/2023 Na 141 K 3.1 Cr 1 BUN 11 glucose 227 Ca 8.7 GFR 70        CBC: Date: 5/22/2023 WBC 8.2 Hgb 10 hematocrit 30.5 platelets 372        Liver function: Date: 5/22/2023 ALT 8 AST 22 alkaline phos.  63 bilirubin 0.3 albumin 3.2 protein 7.2        Past Medical History:   Diagnosis Date    Cervicalgia     Neck pain    Essential (primary) hypertension 07/21/2013    Hypertension    Pain in unspecified shoulder     Pain, joint, shoulder    Personal history of other diseases of the digestive system 07/06/2022    History of ulcerative colitis    Personal history of other diseases of the digestive system     History of esophageal reflux    Personal history of other diseases of the nervous system and sense organs     History of cataract    Personal  history of other diseases of the nervous system and sense organs     History of glaucoma    Personal history of other diseases of the respiratory system     History of bronchitis    Personal history of other endocrine, nutritional and metabolic disease     History of diabetes mellitus    Personal history of other endocrine, nutritional and metabolic disease     History of hyperlipidemia    Personal history of other mental and behavioral disorders     History of depression    Snoring     Snoring    Unilateral primary osteoarthritis, unspecified knee 11/02/2015    Arthritis of knee           Past Surgical History:   Procedure Laterality Date    CHOLECYSTECTOMY  01/08/2016    Cholecystectomy    COLON SURGERY  01/08/2016    Colon Surgery    CT HEAD ANGIO W AND WO IV CONTRAST  3/19/2019    CT HEAD ANGIO W AND WO IV CONTRAST 3/19/2019 [facility] [place] [facility]GENCY LEGACY    CT NECK ANGIO W AND WO IV CONTRAST  3/19/2019    CT NECK ANGIO W AND WO IV CONTRAST 3/19/2019 [facility] [place] [facility]ENCY [facility]    HERNIA REPAIR  01/08/2016    Hernia Repair    HYSTERECTOMY  01/08/2016    Hysterectomy    MR HEAD ANGIO WO IV CONTRAST  6/4/2018    MR HEAD ANGIO WO IV CONTRAST 6/4/2018 [facility] CLINICAL LEGACY    MR HEAD ANGIO WO IV CONTRAST  7/11/2020    MR HEAD ANGIO WO IV CONTRAST 7/11/2020 [facility] CLINICAL LEGACY    MR HEAD ANGIO WO IV CONTRAST  9/30/2020    MR HEAD ANGIO WO IV CONTRAST 9/30/2020 [facility]    MR HEAD ANGIO WO IV CONTRAST  5/22/2022    MR HEAD ANGIO WO IV CONTRAST 5/22/2022 [facility] CLINICAL LEGACY    MR HEAD ANGIO WO IV CONTRAST  4/19/2023    MR HEAD ANGIO WO IV CONTRAST [facility] MRI    MR NECK ANGIO WO IV CONTRAST  6/4/2018    MR NECK ANGIO WO IV CONTRAST 6/4/2018 [facility] CLINICAL LEGACY    MR NECK ANGIO WO IV CONTRAST  7/11/2020    MR NECK ANGIO WO IV CONTRAST 7/11/2020 [facility] CLINICAL LEGACY    MR NECK ANGIO WO IV CONTRAST  9/30/2020    MR NECK ANGIO WO IV CONTRAST 9/30/2020 [facility]    MR NECK ANGIO WO IV CONTRAST  5/22/2022    MR NECK ANGIO WO IV  "CONTRAST 5/22/2022 [facility] CLINICAL LEGACY    MR NECK ANGIO WO IV CONTRAST  4/19/2023    MR NECK ANGIO WO IV CONTRAST [facility] MRI    OTHER SURGICAL HISTORY  01/08/2016    Tubal Stabilization    OTHER SURGICAL HISTORY  09/01/2016    Cervical Surgery (Gyn) Laser Vaporization Of Transformation Zone    THYROID SURGERY  09/27/2013    Thyroid Surgery          Lab Results   Component Value Date    WBC CANCELED 04/22/2023    HGB CANCELED 04/22/2023    HCT CANCELED 04/22/2023    PLT CANCELED 04/22/2023    CHOL 129 08/07/2022    TRIG 182 (H) 08/07/2022    HDL 31.0 (A) 08/07/2022    ALT 8 04/21/2023    AST 23 04/21/2023    NA CANCELED 04/22/2023    K CANCELED 04/22/2023    CL CANCELED 04/22/2023    CREATININE CANCELED 04/22/2023    BUN CANCELED 04/22/2023    CO2 CANCELED 04/22/2023    TSH 0.32 (L) 04/20/2023    INR CANCELED 04/19/2023    HGBA1C 8.6 (A) 04/20/2023     Surgical History  Problems    · History of Cervical Surgery (Gyn) Laser Vaporization Of Transformation Zone   · History of Cholecystectomy   · History of Colon Surgery   · History of Hernia Repair   · History of Hysterectomy   · History of Thyroid Surgery   · History of Tubal Stabilization     Family History  Mother    · Family history of Cancer  Family History    · Family history of Diabetes Mellitus (V18.0)   · Family history of Hypertension (V17.49)     Social History  Problems    · Disabled   · Does not use illicit drugs (V49.89) (Z78.9)   · Former smoker (V15.82) (Z87.891)   · Marital History - Single   · No alcohol.   · Denied: History of Secondhand smoke exposure   · Single     Allergies  Medication    · Aspirin TABS   · Contrast Media Ready-Box MISC   · morphine   · Sulfa Drugs        /74   Pulse 69   Temp 36.5 °C (97.7 °F)   Resp 18   Ht 1.372 m (4' 6\")   Wt 106 kg (233 lb 9.6 oz)   SpO2 98%   BMI 56.32 kg/m²      Physical Exam  Vitals and nursing note reviewed.   Constitutional:       Appearance: Normal appearance.   HENT:      Head: Normocephalic. "      Right Ear: External ear normal.      Left Ear: External ear normal.      Nose: Nose normal.      Mouth/Throat:      Mouth: Mucous membranes are moist.      Pharynx: Oropharynx is clear.   Eyes:      Extraocular Movements: Extraocular movements intact.      Conjunctiva/sclera: Conjunctivae normal.      Pupils: Pupils are equal, round, and reactive to light.   Cardiovascular:      Rate and Rhythm: Normal rate and regular rhythm.      Pulses: Normal pulses.      Heart sounds: Normal heart sounds.   Pulmonary:      Effort: Pulmonary effort is normal.      Breath sounds: Normal breath sounds.   Abdominal:      General: Bowel sounds are normal.      Palpations: Abdomen is soft.   Musculoskeletal:      Cervical back: Normal range of motion and neck supple.      Comments: Generalized muscle weakness; immobility.   Skin:     General: Skin is warm and dry.   Neurological:      Mental Status: She is alert. Mental status is at baseline.      Motor: Weakness present.      Coordination: Coordination abnormal.      Gait: Gait abnormal.      Comments: Right sided weakness--RLE 1/5; RUE 3/5; sensation intact to touch x 4 extremities.     Left sided facial droop.     Generalized muscle weakness   Psychiatric:         Mood and Affect: Mood normal.         Behavior: Behavior normal.          Assessment/Plan      Ordered CMP and CBC with diff. for tomorrow     Hypokalemia:   Monitor potassium level.  Give potassium chloride 20 meq PO X 1 now.  Start potassium 20 meq PO BID routinely.          Chronic systolic HF;  Benign essential HTN:  Continue Amlodipine and hydrochlorothiazide.  Monitor Bps.          DM type 2:   Continue Lantus 30 units at bedtime and Humalog sliding scale.  Monitor accuchecks.  Monitor for hypoglycemia.  Monitor Ha1c q 3 months.    CVA Sequela; Acute/subacute stroke with probable extension; previous CVA with right-sided hemiparesis/right sided weakness;   dysphagia 2/2 cva; expressive aphasia;  Sequela of  Cerebrovascular accident:   MRI was performed it showed Acute/subacute tiny infarct in the left anterior brock.   Neuro was  consulted in the hospital.  Dr. Jiang from [facility] [place] [place] neuro/stroke was consulted.      Follow up with neurology.           Muscle spasms; RLE pain :  Continue gabapentin, Percocet PRN and baclofen.         GERD:      Continue omeprazole.      Sit upright for 2-3 hours after meals.      Major Depressive Disorder:   Continue Sertraline.     Impaired gait and mobility; Generalized muscle weakness:  Continue at  participating in PT/OT.   Wheelchair bound.  Patient requires a custom wheelchair-therapy department is ordering for patient.   A custom wheelchair will increase mobility, decrease risk of developing pressure ulcers and increase quality of life.   Fall prevention strategies.          Problem List Items Addressed This Visit    None  Visit Diagnoses       Hypokalemia    -  Primary    Chronic systolic heart failure (CMS/HCC)        Benign essential HTN        Type 2 diabetes mellitus with other specified complication, with long-term current use of insulin (CMS/HCC)        Sequela of cerebrovascular accident        Hemiparesis affecting right side as late effect of stroke (CMS/HCC)        Dysphagia, unspecified type        Expressive aphasia        Muscle spasm        Right leg pain        Gastroesophageal reflux disease, unspecified whether esophagitis present        Major depressive disorder, remission status unspecified, unspecified whether recurrent        Impaired gait and mobility        Generalized muscle weakness            Maria Del Carmen Garduno, APRN-CNP

## 2023-06-05 ENCOUNTER — NURSING HOME VISIT (OUTPATIENT)
Dept: POST ACUTE CARE | Facility: EXTERNAL LOCATION | Age: 53
End: 2023-06-05
Payer: MEDICARE

## 2023-06-05 VITALS
RESPIRATION RATE: 18 BRPM | BODY MASS INDEX: 54.06 KG/M2 | HEART RATE: 68 BPM | SYSTOLIC BLOOD PRESSURE: 134 MMHG | DIASTOLIC BLOOD PRESSURE: 72 MMHG | OXYGEN SATURATION: 96 % | WEIGHT: 233.6 LBS | HEIGHT: 55 IN | TEMPERATURE: 97.7 F

## 2023-06-05 DIAGNOSIS — M62.81 GENERALIZED MUSCLE WEAKNESS: ICD-10-CM

## 2023-06-05 DIAGNOSIS — Z86.73 RECENT CEREBROVASCULAR ACCIDENT: ICD-10-CM

## 2023-06-05 DIAGNOSIS — I50.22 CHRONIC SYSTOLIC HEART FAILURE (MULTI): ICD-10-CM

## 2023-06-05 DIAGNOSIS — Z79.4 TYPE 2 DIABETES MELLITUS WITH OTHER SPECIFIED COMPLICATION, WITH LONG-TERM CURRENT USE OF INSULIN (MULTI): ICD-10-CM

## 2023-06-05 DIAGNOSIS — M79.601 RIGHT ARM PAIN: Primary | ICD-10-CM

## 2023-06-05 DIAGNOSIS — R06.02 SHORTNESS OF BREATH: ICD-10-CM

## 2023-06-05 DIAGNOSIS — E11.69 TYPE 2 DIABETES MELLITUS WITH OTHER SPECIFIED COMPLICATION, WITH LONG-TERM CURRENT USE OF INSULIN (MULTI): ICD-10-CM

## 2023-06-05 DIAGNOSIS — R05.9 COUGH, UNSPECIFIED TYPE: ICD-10-CM

## 2023-06-05 DIAGNOSIS — E87.6 HYPOKALEMIA: ICD-10-CM

## 2023-06-05 DIAGNOSIS — I69.30 SEQUELA OF CEREBROVASCULAR ACCIDENT: ICD-10-CM

## 2023-06-05 DIAGNOSIS — Z74.09 IMMOBILITY: ICD-10-CM

## 2023-06-05 DIAGNOSIS — I10 BENIGN ESSENTIAL HTN: ICD-10-CM

## 2023-06-05 DIAGNOSIS — R47.01 EXPRESSIVE APHASIA: ICD-10-CM

## 2023-06-05 DIAGNOSIS — I69.351 HEMIPARESIS AFFECTING RIGHT SIDE AS LATE EFFECT OF STROKE (MULTI): ICD-10-CM

## 2023-06-05 DIAGNOSIS — R13.10 DYSPHAGIA, UNSPECIFIED TYPE: ICD-10-CM

## 2023-06-05 PROCEDURE — 99309 SBSQ NF CARE MODERATE MDM 30: CPT | Performed by: NURSE PRACTITIONER

## 2023-06-05 NOTE — LETTER
Patient: Janette Martinez  : 1970    Encounter Date: 2023    Name: Janette Martinez    YOB: 1970    Code Status: DNRcc arrest      Chief complaint:  Right arm pain;  etc.....      HPI:    52-year-old female with past medical history of heart failure with reduced EF,   diabetes, hypertension, hyperlipidemia, HIV,        CVA (left MCA  with residual right-sided hemiparesis), GERD, HIV, hypothyroidism, hypertension,   and diabetes.    Patient re-admitted to [facility]. On 23 after her most recent hospitalization.    Diagnoses as follows:    Right arm pain; previous CVA with right-sided hemiparesis; right sided weakness:  Patient is complaining of increased pain in her right arm.   She states the pain has been occurring for days and it is getting worse.  Also has related right sided hemiparesis/right sided weakness.  On Percocet 5-325 mg PO BID PRN.  Patient seen today, she is in bed.  Calm, cooperative, mentation at baseline.      No complaints of chest pain or dizziness.    Cough:  Patient complaining of a cough today.  It started few days ago.  Non productive cough.  No fevers reported.  Denies runny nose. No sore throat.      CVA Sequela; Acute/subacute stroke with probable extension;    dysphagia 2/2 cva; expressive aphasia;  Sequela of Cerebrovascular accident:   MRI was performed it showed Acute/subacute tiny infarct in the left anterior brock.   Neuro was  consulted in the hospital.  Dr. Jiang from [facility] neuro/stroke was consulted.  Patient was not a thrombolytic candidate given Eliquis use.   Pt has baseline dysarthria from a previous CVA and has Hemiparesis.  She also has expressive aphasia.   Patient able to nod appropriately to questions.   On regular diet, regular texture, thin consistency.          Hypokalemia:   Improved.  On potassium chloride.  Recent potassium trends:  5/15/23 potassium 3.4 L   23 potassium 3.1 L   23 K 4.2  Patient seen  today.  She is sitting in her room.  Calm, cooperative.  Mentation at baseline.   No complaints of chest pain.  No headaches. No dizziness.           Chronic systolic HF;  Benign essential HTN:  On Amlodipine and hydrochlorothiazide.  Recent /72.  No complaints of chest pain.  No headaches no dizziness.   No SOB.         DM type 2:   On Lantus 30 units at bedtime and Humalog sliding scale.  Recent accuchecks: 210 mg/dL, 181 mg/dL, 290 mg/dL.   No episodes of hypoglycemia reported.   Recent Ha1c 8.1 % on 5/1/23.      Immobility; Generalized muscle weakness:  At  participating in PT/OT.   Not able to ambulate.              Reviewed medical, social, surgical and family history.  Reviewed all current medications and performed medication reconciliation.  Reviewed vital signs and recent blood work results.  Reviewed [facility] Documentation  Discussed patient with nursing.         ROS: 10 point ROS performed; negative unless noted in HPI.     BMP: Date: 5/1/2023 Na 137 K 4.3 Cr 1.1 BUN 15 glucose 141 Ca 8.8 GFR 52  CBC: Date: 5/1/2023 WBC 8.4 Hgb 11.1 hematocrit 33.9 platelets 337  Liver function: Date: 5/1/2023 ALT 10 AST 29 alkaline phos.  63 bilirubin 0.4 albumin 3.6 protein 7.7       Ha1c 8.1 % on 5/1/23          BMP: Date: 5/15/2023 Na 139  K 3.4 Cr 1.2 BUN 20 glucose 122 Ca 8.8 GFR 57        CBC: Date: 5/15/2023 WBC 9 Hgb 10.2 hematocrit 30.9 platelets 277       Liver function: 5/15/2023 ALT 8 AST 28 alkaline phos.  57 bilirubin 0.5 albumin 3.5 protein 7.3          RECENT LABS:        BMP: Date: 5/22/2023 Na 141 K 3.1 Cr 1 BUN 11 glucose 227 Ca 8.7 GFR 70        CBC: Date: 5/22/2023 WBC 8.2 Hgb 10 hematocrit 30.5 platelets 372        Liver function: Date: 5/22/2023 ALT 8 AST 22 alkaline phos.  63 bilirubin 0.3 albumin 3.2 protein 7.2           BMP: Date: 6/5/2023 Na 140 K 4.2 Cr 1.1 BUN 14 glucose 79 Ca 1.1 GFR 63  CBC: Date: 6/5/2023 WBC 8.1 Hgb 10.5 hematocrit 32.3 platelets 350  Liver function:  Date: 6/5/2023 ALT 7 AST 25 alkaline phos.  59 bilirubin 0.4 albumin 3.3 protein 7.7          Past Medical History:   Diagnosis Date   • Cervicalgia     Neck pain   • Essential (primary) hypertension 07/21/2013    Hypertension   • Pain in unspecified shoulder     Pain, joint, shoulder   • Personal history of other diseases of the digestive system 07/06/2022    History of ulcerative colitis   • Personal history of other diseases of the digestive system     History of esophageal reflux   • Personal history of other diseases of the nervous system and sense organs     History of cataract   • Personal history of other diseases of the nervous system and sense organs     History of glaucoma   • Personal history of other diseases of the respiratory system     History of bronchitis   • Personal history of other endocrine, nutritional and metabolic disease     History of diabetes mellitus   • Personal history of other endocrine, nutritional and metabolic disease     History of hyperlipidemia   • Personal history of other mental and behavioral disorders     History of depression   • Snoring     Snoring   • Unilateral primary osteoarthritis, unspecified knee 11/02/2015    Arthritis of knee           Past Surgical History:   Procedure Laterality Date   • CHOLECYSTECTOMY  01/08/2016    Cholecystectomy   • COLON SURGERY  01/08/2016    Colon Surgery   • CT HEAD ANGIO W AND WO IV CONTRAST  3/19/2019    CT HEAD ANGIO W AND WO IV CONTRAST 3/19/2019 [facility] [place]   • CT NECK ANGIO W AND WO IV CONTRAST  3/19/2019    CT NECK ANGIO W AND WO IV CONTRAST 3/19/2019 [facility] [place] [facility]   • HERNIA REPAIR  01/08/2016    Hernia Repair   • HYSTERECTOMY  01/08/2016    Hysterectomy   • MR HEAD ANGIO WO IV CONTRAST  6/4/2018    MR HEAD ANGIO WO IV CONTRAST 6/4/2018 [facility] CLINICAL LEGACY   • MR HEAD ANGIO WO IV CONTRAST  7/11/2020    MR HEAD ANGIO WO IV CONTRAST 7/11/2020 [facility] CLINICAL LEGACY   • MR HEAD ANGIO WO IV CONTRAST  9/30/2020    MR  HEAD ANGIO WO IV CONTRAST 9/30/2020 [facility]   • MR HEAD ANGIO WO IV CONTRAST  5/22/2022    MR HEAD ANGIO WO IV CONTRAST 5/22/2022 [facility] CLINICAL LEGACY   • MR HEAD ANGIO WO IV CONTRAST  4/19/2023    MR HEAD ANGIO WO IV CONTRAST [facility] MRI   • MR NECK ANGIO WO IV CONTRAST  6/4/2018    MR NECK ANGIO WO IV CONTRAST 6/4/2018 [facility] CLINICAL LEGACY   • MR NECK ANGIO WO IV CONTRAST  7/11/2020    MR NECK ANGIO WO IV CONTRAST 7/11/2020 [facility] CLINICAL LEGACY   • MR NECK ANGIO WO IV CONTRAST  9/30/2020    MR NECK ANGIO WO IV CONTRAST 9/30/2020 [facility]GAC[facility]   • MR NECK ANGIO WO IV CONTRAST  5/22/2022    MR NECK ANGIO WO IV CONTRAST 5/22/2022 [facility] CLINICAL LEGACY   • MR NECK ANGIO WO IV CONTRAST  4/19/2023    MR NECK ANGIO WO IV CONTRAST [facility] MRI   • OTHER SURGICAL HISTORY  01/08/2016    Tubal Stabilization   • OTHER SURGICAL HISTORY  09/01/2016    Cervical Surgery (Gyn) Laser Vaporization Of Transformation Zone   • THYROID SURGERY  09/27/2013    Thyroid Surgery          Lab Results   Component Value Date    WBC CANCELED 04/22/2023    HGB CANCELED 04/22/2023    HCT CANCELED 04/22/2023    PLT CANCELED 04/22/2023    CHOL 129 08/07/2022    TRIG 182 (H) 08/07/2022    HDL 31.0 (A) 08/07/2022    ALT 8 04/21/2023    AST 23 04/21/2023    NA CANCELED 04/22/2023    K CANCELED 04/22/2023    CL CANCELED 04/22/2023    CREATININE CANCELED 04/22/2023    BUN CANCELED 04/22/2023    CO2 CANCELED 04/22/2023    TSH 0.32 (L) 04/20/2023    INR CANCELED 04/19/2023    HGBA1C 8.6 (A) 04/20/2023     Surgical History  Problems    · History of Cervical Surgery (Gyn) Laser Vaporization Of Transformation Zone   · History of Cholecystectomy   · History of Colon Surgery   · History of Hernia Repair   · History of Hysterectomy   · History of Thyroid Surgery   · History of Tubal Stabilization     Family History  Mother    · Family history of Cancer  Family History    · Family history of Diabetes Mellitus (V18.0)   · Family history of Hypertension (V17.49)    "  Social History  Problems    · Disabled   · Does not use illicit drugs (V49.89) (Z78.9)   · Former smoker (V15.82) (Z87.891)   · Marital History - Single   · No alcohol.   · Denied: History of Secondhand smoke exposure   · Single     Allergies  Medication    · Aspirin TABS   · Contrast Media Ready-Box MISC   · morphine   · Sulfa Drugs        /72   Pulse 68   Temp 36.5 °C (97.7 °F)   Resp 18   Ht 1.372 m (4' 6\")   Wt 106 kg (233 lb 9.6 oz)   SpO2 96%   BMI 56.32 kg/m²      Physical Exam  Vitals and nursing note reviewed.   Constitutional:       Appearance: Normal appearance.   HENT:      Head: Normocephalic.      Right Ear: External ear normal.      Left Ear: External ear normal.      Nose: Nose normal.      Mouth/Throat:      Mouth: Mucous membranes are moist.      Pharynx: Oropharynx is clear.   Eyes:      Extraocular Movements: Extraocular movements intact.      Conjunctiva/sclera: Conjunctivae normal.      Pupils: Pupils are equal, round, and reactive to light.   Cardiovascular:      Rate and Rhythm: Normal rate and regular rhythm.      Pulses: Normal pulses.      Heart sounds: Normal heart sounds.   Pulmonary:      Effort: Pulmonary effort is normal.      Breath sounds: Normal breath sounds.   Abdominal:      General: Bowel sounds are normal.      Palpations: Abdomen is soft.   Musculoskeletal:      Cervical back: Normal range of motion and neck supple.      Comments: Generalized muscle weakness; immobility.   Skin:     General: Skin is warm and dry.   Neurological:      Mental Status: She is alert. Mental status is at baseline.      Motor: Weakness present.      Coordination: Coordination abnormal.      Gait: Gait abnormal.      Comments: Right sided weakness--RLE 1/5; RUE 3/5; sensation intact to touch x 4 extremities.     Left sided facial droop.     Generalized muscle weakness   Psychiatric:         Mood and Affect: Mood normal.         Behavior: Behavior normal.          Assessment/Plan "     Ordered CMP and CBC with diff. for tomorrow     Right arm pain;previous CVA with right-sided hemiparesis/right sided weakness:  Ordered right arm x-ray (2-view)  Continue Percocet 5-325 mg PO BID PRN.    Cough:  Ordered chest x-ray 2-view.    CVA Sequela; Acute/subacute stroke with probable extension;    dysphagia 2/2 cva; expressive aphasia;  Sequela of Cerebrovascular accident:   Dr. Jiang [place] [facility] [place] in the hospital.  She also has expressive aphasia.   Continue regular diet, regular texture, thin consistency.  Continue PT/OT/ST at [facility].        Hypokalemia:   Continue potassium chloride.  Monitor potassium levels.Patient seen today.          Chronic systolic HF;  Benign essential HTN:        Continue Amlodipine and hydrochlorothiazide.        Monitor Bps.         DM type 2:   Continue Lantus 30 units at bedtime and Humalog sliding scale.       Monitor accuchecks.       Monitor Ha1c Q 3 months.       Immobility; Generalized muscle weakness:  Continue at   PT/OT.   Not able to ambulate.  Fall prevention.         Problem List Items Addressed This Visit    None  Visit Diagnoses       Right arm pain    -  Primary    Recent cerebrovascular accident        Hemiparesis affecting right side as late effect of stroke (CMS/HCC)        Cough, unspecified type        Sequela of cerebrovascular accident        Shortness of breath        Dysphagia, unspecified type        Expressive aphasia        Hypokalemia        Chronic systolic heart failure (CMS/HCC)        Benign essential HTN        Type 2 diabetes mellitus with other specified complication, with long-term current use of insulin (CMS/HCC)        Immobility        Generalized muscle weakness            JASON Espitia       Electronically Signed By: JASON Espitia   6/6/23  6:06 PM

## 2023-06-06 NOTE — PROGRESS NOTES
Name: Janette Martinez    YOB: 1970    Code Status: DNRcc arrest      Chief complaint:  Right arm pain;  etc.....      HPI:    52-year-old female with past medical history of heart failure with reduced EF,   diabetes, hypertension, hyperlipidemia, HIV,        CVA (left MCA 2022 with residual right-sided hemiparesis), GERD, HIV, hypothyroidism, hypertension,   and diabetes.    Patient re-admitted to [facility]. On 4/21/23 after her most recent hospitalization.    Diagnoses as follows:    Right arm pain; previous CVA with right-sided hemiparesis; right sided weakness:  Patient is complaining of increased pain in her right arm.   She states the pain has been occurring for days and it is getting worse.  Also has related right sided hemiparesis/right sided weakness.  On Percocet 5-325 mg PO BID PRN.  Patient seen today, she is in bed.  Calm, cooperative, mentation at baseline.      No complaints of chest pain or dizziness.    Cough:  Patient complaining of a cough today.  It started few days ago.  Non productive cough.  No fevers reported.  Denies runny nose. No sore throat.      CVA Sequela; Acute/subacute stroke with probable extension;    dysphagia 2/2 cva; expressive aphasia;  Sequela of Cerebrovascular accident:   MRI was performed it showed Acute/subacute tiny infarct in the left anterior brock.   Neuro was  consulted in the hospital.  Dr. Jiang from [facility] neuro/stroke was consulted.  Patient was not a thrombolytic candidate given Eliquis use.   Pt has baseline dysarthria from a previous CVA and has Hemiparesis.  She also has expressive aphasia.   Patient able to nod appropriately to questions.   On regular diet, regular texture, thin consistency.          Hypokalemia:   Improved.  On potassium chloride.  Recent potassium trends:  5/15/23 potassium 3.4 L   5/22/23 potassium 3.1 L   6/5/23 K 4.2  Patient seen today.  She is sitting in her room.  Calm, cooperative.  Mentation at  baseline.   No complaints of chest pain.  No headaches. No dizziness.           Chronic systolic HF;  Benign essential HTN:  On Amlodipine and hydrochlorothiazide.  Recent /72.  No complaints of chest pain.  No headaches no dizziness.   No SOB.         DM type 2:   On Lantus 30 units at bedtime and Humalog sliding scale.  Recent accuchecks: 210 mg/dL, 181 mg/dL, 290 mg/dL.   No episodes of hypoglycemia reported.   Recent Ha1c 8.1 % on 5/1/23.      Immobility; Generalized muscle weakness:  At  participating in PT/OT.   Not able to ambulate.              Reviewed medical, social, surgical and family history.  Reviewed all current medications and performed medication reconciliation.  Reviewed vital signs and recent blood work results.  Reviewed [facility] Documentation  Discussed patient with nursing.         ROS: 10 point ROS performed; negative unless noted in HPI.     BMP: Date: 5/1/2023 Na 137 K 4.3 Cr 1.1 BUN 15 glucose 141 Ca 8.8 GFR 52  CBC: Date: 5/1/2023 WBC 8.4 Hgb 11.1 hematocrit 33.9 platelets 337  Liver function: Date: 5/1/2023 ALT 10 AST 29 alkaline phos.  63 bilirubin 0.4 albumin 3.6 protein 7.7       Ha1c 8.1 % on 5/1/23          BMP: Date: 5/15/2023 Na 139  K 3.4 Cr 1.2 BUN 20 glucose 122 Ca 8.8 GFR 57        CBC: Date: 5/15/2023 WBC 9 Hgb 10.2 hematocrit 30.9 platelets 277       Liver function: 5/15/2023 ALT 8 AST 28 alkaline phos.  57 bilirubin 0.5 albumin 3.5 protein 7.3          RECENT LABS:        BMP: Date: 5/22/2023 Na 141 K 3.1 Cr 1 BUN 11 glucose 227 Ca 8.7 GFR 70        CBC: Date: 5/22/2023 WBC 8.2 Hgb 10 hematocrit 30.5 platelets 372        Liver function: Date: 5/22/2023 ALT 8 AST 22 alkaline phos.  63 bilirubin 0.3 albumin 3.2 protein 7.2           BMP: Date: 6/5/2023 Na 140 K 4.2 Cr 1.1 BUN 14 glucose 79 Ca 1.1 GFR 63  CBC: Date: 6/5/2023 WBC 8.1 Hgb 10.5 hematocrit 32.3 platelets 350  Liver function: Date: 6/5/2023 ALT 7 AST 25 alkaline phos.  59 bilirubin 0.4 albumin 3.3  protein 7.7          Past Medical History:   Diagnosis Date    Cervicalgia     Neck pain    Essential (primary) hypertension 07/21/2013    Hypertension    Pain in unspecified shoulder     Pain, joint, shoulder    Personal history of other diseases of the digestive system 07/06/2022    History of ulcerative colitis    Personal history of other diseases of the digestive system     History of esophageal reflux    Personal history of other diseases of the nervous system and sense organs     History of cataract    Personal history of other diseases of the nervous system and sense organs     History of glaucoma    Personal history of other diseases of the respiratory system     History of bronchitis    Personal history of other endocrine, nutritional and metabolic disease     History of diabetes mellitus    Personal history of other endocrine, nutritional and metabolic disease     History of hyperlipidemia    Personal history of other mental and behavioral disorders     History of depression    Snoring     Snoring    Unilateral primary osteoarthritis, unspecified knee 11/02/2015    Arthritis of knee           Past Surgical History:   Procedure Laterality Date    CHOLECYSTECTOMY  01/08/2016    Cholecystectomy    COLON SURGERY  01/08/2016    Colon Surgery    CT HEAD ANGIO W AND WO IV CONTRAST  3/19/2019    CT HEAD ANGIO W AND WO IV CONTRAST 3/19/2019 [facility] [place] [facility]GENCY LEGAC[facility]    CT NECK ANGIO W AND WO IV CONTRAST  3/19/2019    CT NECK ANGIO W AND WO IV CONTRAST 3/19/2019 [facility] [place] [facility]ENCY [facility]GAC[facility]    HERNIA REPAIR  01/08/2016    Hernia Repair    HYSTERECTOMY  01/08/2016    Hysterectomy    MR HEAD ANGIO WO IV CONTRAST  6/4/2018    MR HEAD ANGIO WO IV CONTRAST 6/4/2018 [facility] CLINICAL LEGACY    MR HEAD ANGIO WO IV CONTRAST  7/11/2020    MR HEAD ANGIO WO IV CONTRAST 7/11/2020 [facility] CLINICAL LEGACY    MR HEAD ANGIO WO IV CONTRAST  9/30/2020    MR HEAD ANGIO WO IV CONTRAST 9/30/2020 [facility]    MR HEAD ANGIO WO IV CONTRAST  5/22/2022     MR HEAD ANGIO WO IV CONTRAST 5/22/2022 [facility] CLINICAL LEGACY    MR HEAD ANGIO WO IV CONTRAST  4/19/2023    MR HEAD ANGIO WO IV CONTRAST [facility]    MR NECK ANGIO WO IV CONTRAST  6/4/2018    MR NECK ANGIO WO IV CONTRAST 6/4/2018 [facility] CLINICAL LEGACY    MR NECK ANGIO WO IV CONTRAST  7/11/2020    MR NECK ANGIO WO IV CONTRAST 7/11/2020 [facility] CLINICAL LEGACY    MR NECK ANGIO WO IV CONTRAST  9/30/2020    MR NECK ANGIO WO IV CONTRAST 9/30/2020 [facility]GAC[facility]    MR NECK ANGIO WO IV CONTRAST  5/22/2022    MR NECK ANGIO WO IV CONTRAST 5/22/2022 [facility] CLINICAL LEGACY    MR NECK ANGIO WO IV CONTRAST  4/19/2023    MR NECK ANGIO WO IV CONTRAST [facility]    OTHER SURGICAL HISTORY  01/08/2016    Tubal Stabilization    OTHER SURGICAL HISTORY  09/01/2016    Cervical Surgery (Gyn) Laser Vaporization Of Transformation Zone    THYROID SURGERY  09/27/2013    Thyroid Surgery          Lab Results   Component Value Date    WBC CANCELED 04/22/2023    HGB CANCELED 04/22/2023    HCT CANCELED 04/22/2023    PLT CANCELED 04/22/2023    CHOL 129 08/07/2022    TRIG 182 (H) 08/07/2022    HDL 31.0 (A) 08/07/2022    ALT 8 04/21/2023    AST 23 04/21/2023    NA CANCELED 04/22/2023    K CANCELED 04/22/2023    CL CANCELED 04/22/2023    CREATININE CANCELED 04/22/2023    BUN CANCELED 04/22/2023    CO2 CANCELED 04/22/2023    TSH 0.32 (L) 04/20/2023    INR CANCELED 04/19/2023    HGBA1C 8.6 (A) 04/20/2023     Surgical History  Problems    · History of Cervical Surgery (Gyn) Laser Vaporization Of Transformation Zone   · History of Cholecystectomy   · History of Colon Surgery   · History of Hernia Repair   · History of Hysterectomy   · History of Thyroid Surgery   · History of Tubal Stabilization     Family History  Mother    · Family history of Cancer  Family History    · Family history of Diabetes Mellitus (V18.0)   · Family history of Hypertension (V17.49)     Social History  Problems    · Disabled   · Does not use illicit drugs (V49.89) (Z78.9)   · Former smoker  "(V15.82) (Z87.891)   · Marital History - Single   · No alcohol.   · Denied: History of Secondhand smoke exposure   · Single     Allergies  Medication    · Aspirin TABS   · Contrast Media Ready-Box MISC   · morphine   · Sulfa Drugs        /72   Pulse 68   Temp 36.5 °C (97.7 °F)   Resp 18   Ht 1.372 m (4' 6\")   Wt 106 kg (233 lb 9.6 oz)   SpO2 96%   BMI 56.32 kg/m²      Physical Exam  Vitals and nursing note reviewed.   Constitutional:       Appearance: Normal appearance.   HENT:      Head: Normocephalic.      Right Ear: External ear normal.      Left Ear: External ear normal.      Nose: Nose normal.      Mouth/Throat:      Mouth: Mucous membranes are moist.      Pharynx: Oropharynx is clear.   Eyes:      Extraocular Movements: Extraocular movements intact.      Conjunctiva/sclera: Conjunctivae normal.      Pupils: Pupils are equal, round, and reactive to light.   Cardiovascular:      Rate and Rhythm: Normal rate and regular rhythm.      Pulses: Normal pulses.      Heart sounds: Normal heart sounds.   Pulmonary:      Effort: Pulmonary effort is normal.      Breath sounds: Normal breath sounds.   Abdominal:      General: Bowel sounds are normal.      Palpations: Abdomen is soft.   Musculoskeletal:      Cervical back: Normal range of motion and neck supple.      Comments: Generalized muscle weakness; immobility.   Skin:     General: Skin is warm and dry.   Neurological:      Mental Status: She is alert. Mental status is at baseline.      Motor: Weakness present.      Coordination: Coordination abnormal.      Gait: Gait abnormal.      Comments: Right sided weakness--RLE 1/5; RUE 3/5; sensation intact to touch x 4 extremities.     Left sided facial droop.     Generalized muscle weakness   Psychiatric:         Mood and Affect: Mood normal.         Behavior: Behavior normal.          Assessment/Plan      Ordered CMP and CBC with diff. for tomorrow     Right arm pain;previous CVA with right-sided " hemiparesis/right sided weakness:  Ordered right arm x-ray (2-view)  Continue Percocet 5-325 mg PO BID PRN.    Cough:  Ordered chest x-ray 2-view.    CVA Sequela; Acute/subacute stroke with probable extension;    dysphagia 2/2 cva; expressive aphasia;  Sequela of Cerebrovascular accident:   [place] [facility] [place].  She also has expressive aphasia.   Continue regular diet, regular texture, thin consistency.  Continue PT/OT/ST at [facility].        Hypokalemia:   Continue potassium chloride.  Monitor potassium levels.Patient seen today.          Chronic systolic HF;  Benign essential HTN:        Continue Amlodipine and hydrochlorothiazide.        Monitor Bps.         DM type 2:   Continue Lantus 30 units at bedtime and Humalog sliding scale.       Monitor accuchecks.       Monitor Ha1c Q 3 months.       Immobility; Generalized muscle weakness:  Continue at   PT/OT.   Not able to ambulate.  Fall prevention.         Problem List Items Addressed This Visit    None  Visit Diagnoses       Right arm pain    -  Primary    Recent cerebrovascular accident        Hemiparesis affecting right side as late effect of stroke (CMS/[facility])        Cough, unspecified type        Sequela of cerebrovascular accident        Shortness of breath        Dysphagia, unspecified type        Expressive aphasia        Hypokalemia        Chronic systolic heart failure (CMS/HCC)        Benign essential HTN        Type 2 diabetes mellitus with other specified complication, with long-term current use of insulin (CMS/[facility])        Immobility        Generalized muscle weakness            Maria Del Carmen Garduno, APRN-CNP

## 2023-06-12 ENCOUNTER — NURSING HOME VISIT (OUTPATIENT)
Dept: POST ACUTE CARE | Facility: EXTERNAL LOCATION | Age: 53
End: 2023-06-12
Payer: MEDICARE

## 2023-06-12 DIAGNOSIS — Z79.4 TYPE 2 DIABETES MELLITUS WITH OTHER SPECIFIED COMPLICATION, WITH LONG-TERM CURRENT USE OF INSULIN (MULTI): ICD-10-CM

## 2023-06-12 DIAGNOSIS — I69.30 SEQUELA OF CEREBROVASCULAR ACCIDENT: ICD-10-CM

## 2023-06-12 DIAGNOSIS — I50.22 CHRONIC SYSTOLIC HEART FAILURE (MULTI): ICD-10-CM

## 2023-06-12 DIAGNOSIS — Z86.73 RECENT CEREBROVASCULAR ACCIDENT: ICD-10-CM

## 2023-06-12 DIAGNOSIS — R47.01 EXPRESSIVE APHASIA: ICD-10-CM

## 2023-06-12 DIAGNOSIS — E11.69 TYPE 2 DIABETES MELLITUS WITH OTHER SPECIFIED COMPLICATION, WITH LONG-TERM CURRENT USE OF INSULIN (MULTI): ICD-10-CM

## 2023-06-12 DIAGNOSIS — I10 BENIGN ESSENTIAL HTN: ICD-10-CM

## 2023-06-12 DIAGNOSIS — M62.81 GENERALIZED MUSCLE WEAKNESS: ICD-10-CM

## 2023-06-12 DIAGNOSIS — Z74.09 IMMOBILITY: ICD-10-CM

## 2023-06-12 DIAGNOSIS — R13.10 DYSPHAGIA, UNSPECIFIED TYPE: ICD-10-CM

## 2023-06-12 DIAGNOSIS — M62.838 MUSCLE SPASTICITY: ICD-10-CM

## 2023-06-12 DIAGNOSIS — I69.351 HEMIPARESIS AFFECTING RIGHT SIDE AS LATE EFFECT OF STROKE (MULTI): ICD-10-CM

## 2023-06-12 DIAGNOSIS — M25.511 RIGHT SHOULDER PAIN, UNSPECIFIED CHRONICITY: Primary | ICD-10-CM

## 2023-06-12 DIAGNOSIS — R05.9 COUGH, UNSPECIFIED TYPE: ICD-10-CM

## 2023-06-12 PROCEDURE — 99309 SBSQ NF CARE MODERATE MDM 30: CPT | Performed by: NURSE PRACTITIONER

## 2023-06-12 NOTE — LETTER
Patient: Janette Martinez  : 1970    Encounter Date: 2023    Name: Janette Martinez    YOB: 1970    Code Status: DNRcc arrest      Chief complaint:  Follow up on right arm pain/right shoulder pain;  etc.....      HPI:    52-year-old female with past medical history of heart failure with reduced EF,   diabetes, hypertension, hyperlipidemia, HIV,        CVA (left MCA  with residual right-sided hemiparesis), GERD, HIV, hypothyroidism, hypertension,   and diabetes.    [facility] On [facility] after her most recent hospitalization.    Diagnoses as follows:    Right shoulder and arm pain; muscle spasticity; previous CVA with right-sided hemiparesis; right sided weakness:  Patient was complaining of increased pain in her right arm last week.  Right arm x-rays were ordered.  ###On 23 Humerus right arm x-ray performed.   Results as follows: Mild degenerative changes are present. No acute fracture of dislocation. No osteomyelitis.  ###On 23 radius and ulna x-ray was also performed.  Results as follows: right radius and ulna has normal ossification pattern.  No fracture or dislocation is seen.  Previously stated the pain has been occurring for days.  Patient still complaining of right arm pain today.  States entire arm hurts from shoulder to fingers.   Also has some spasticity  in the right arm.   Has baclofen 10 mg ordered PRN.   Also has related right sided hemiparesis/right sided weakness.  On Percocet 5-325 mg PO BID PRN.  Patient seen today, she is in bed.  Calm, cooperative, mentation at baseline.      No complaints of chest pain or dizziness.  She is sitting in her room.  No complaints of chest pain.  No headaches. No dizziness.     Cough:  Patient complaining of a cough last week.  ###Chest x-ray a/p lateral ordered/performed on 23.  Chest x-ray results: Both lungs are clear. No pleural effusion. Aorta and heart within normal limits.  No acute  pulmonary finding. No acute bony abnormality.   Cough improved.  No complaints of cough today.  No complaints of fevers.            Sequela of Cerebrovascular accident; CVA Sequela; Acute/subacute stroke with probable extension;    dysphagia 2/2 cva; expressive aphasia;   MRI was performed during recent hospitalizaiton it showed Acute/subacute tiny infarct in the left anterior brock.   Neuro was also consulted in the hospital.  Dr. Jiang from [facility] neuro/stroke was consulted.  Patient was not a thrombolytic candidate given Eliquis use.   Pt has baseline dysarthria from a previous CVA and has Hemiparesis.  She also has expressive aphasia.   Patient able to nod appropriately to questions.   On regular diet, regular texture, thin consistency.          Chronic systolic HF;  Benign essential HTN:  On Amlodipine and hydrochlorothiazide.  Recent /72.  No complaints of chest pain.  No headaches no dizziness.   No SOB.         DM type 2:   On Lantus 30 units at bedtime and Humalog sliding scale.  Recent accuchecks: 291 mg/dL,  264 mg/dL,188 mg/dL.   No episodes of hypoglycemia reported.   Recent Ha1c 8.1 % on 5/1/23.      Immobility; Generalized muscle weakness:  At  participating in PT/OT.   Not able to ambulate.              Reviewed medical, social, surgical and family history.  Reviewed all current medications and performed medication reconciliation.  Reviewed vital signs AND recent blood work results.  Performed prescription drug management.   Reviewed [facility] Documentation  Discussed patient with nursing and .        ROS: 10 point ROS performed; negative unless noted in HPI.     BMP: Date: 5/1/2023 Na 137 K 4.3 Cr 1.1 BUN 15 glucose 141 Ca 8.8 GFR 52  CBC: Date: 5/1/2023 WBC 8.4 Hgb 11.1 hematocrit 33.9 platelets 337  Liver function: Date: 5/1/2023 ALT 10 AST 29 alkaline phos.  63 bilirubin 0.4 albumin 3.6 protein 7.7       Ha1c 8.1 % on 5/1/23          BMP: Date: 5/15/2023 Na 139   K 3.4 Cr 1.2 BUN 20 glucose 122 Ca 8.8 GFR 57        CBC: Date: 5/15/2023 WBC 9 Hgb 10.2 hematocrit 30.9 platelets 277       Liver function: 5/15/2023 ALT 8 AST 28 alkaline phos.  57 bilirubin 0.5 albumin 3.5 protein 7.3          RECENT LABS:        BMP: Date: 5/22/2023 Na 141 K 3.1 Cr 1 BUN 11 glucose 227 Ca 8.7 GFR 70        CBC: Date: 5/22/2023 WBC 8.2 Hgb 10 hematocrit 30.5 platelets 372        Liver function: Date: 5/22/2023 ALT 8 AST 22 alkaline phos.  63 bilirubin 0.3 albumin 3.2 protein 7.2           BMP: Date: 6/5/2023 Na 140 K 4.2 Cr 1.1 BUN 14 glucose 79 Ca 1.1 GFR 63  CBC: Date: 6/5/2023 WBC 8.1 Hgb 10.5 hematocrit 32.3 platelets 350  Liver function: Date: 6/5/2023 ALT 7 AST 25 alkaline phos.  59 bilirubin 0.4 albumin 3.3 protein 7.7      Past Medical History:   Diagnosis Date   • Cervicalgia     Neck pain   • Essential (primary) hypertension 07/21/2013    Hypertension   • Pain in unspecified shoulder     Pain, joint, shoulder   • Personal history of other diseases of the digestive system 07/06/2022    History of ulcerative colitis   • Personal history of other diseases of the digestive system     History of esophageal reflux   • Personal history of other diseases of the nervous system and sense organs     History of cataract   • Personal history of other diseases of the nervous system and sense organs     History of glaucoma   • Personal history of other diseases of the respiratory system     History of bronchitis   • Personal history of other endocrine, nutritional and metabolic disease     History of diabetes mellitus   • Personal history of other endocrine, nutritional and metabolic disease     History of hyperlipidemia   • Personal history of other mental and behavioral disorders     History of depression   • Snoring     Snoring   • Unilateral primary osteoarthritis, unspecified knee 11/02/2015    Arthritis of knee           Past Surgical History:   Procedure Laterality Date   • CHOLECYSTECTOMY   01/08/2016    Cholecystectomy   • COLON SURGERY  01/08/2016    Colon Surgery   • CT HEAD ANGIO W AND WO IV CONTRAST  3/19/2019    CT HEAD ANGIO W AND WO IV CONTRAST 3/19/2019 [facility] EMERGENCY LEGACY   • CT NECK ANGIO W AND WO IV CONTRAST  3/19/2019    CT NECK ANGIO W AND WO IV CONTRAST 3/19/2019 [facility] EMERGENCY LEGACY   • HERNIA REPAIR  01/08/2016    Hernia Repair   • HYSTERECTOMY  01/08/2016    Hysterectomy   • MR HEAD ANGIO WO IV CONTRAST  6/4/2018    MR HEAD ANGIO WO IV CONTRAST 6/4/2018 [facility] CLINICAL LEGACY   • MR HEAD ANGIO WO IV CONTRAST  7/11/2020    MR HEAD ANGIO WO IV CONTRAST 7/11/2020 [facility] CLINICAL LEGACY   • MR HEAD ANGIO WO IV CONTRAST  9/30/2020    MR HEAD ANGIO WO IV CONTRAST 9/30/2020 [facility] AIB LEGACY   • MR HEAD ANGIO WO IV CONTRAST  5/22/2022    MR HEAD ANGIO WO IV CONTRAST 5/22/2022 [facility] CLINICAL LEGACY   • MR HEAD ANGIO WO IV CONTRAST  4/19/2023    MR HEAD ANGIO WO IV CONTRAST [facility] MRI   • MR NECK ANGIO WO IV CONTRAST  6/4/2018    MR NECK ANGIO WO IV CONTRAST 6/4/2018 [facility] CLINICAL LEGACY   • MR NECK ANGIO WO IV CONTRAST  7/11/2020    MR NECK ANGIO WO IV CONTRAST 7/11/2020 [facility] CLINICAL LEGACY   • MR NECK ANGIO WO IV CONTRAST  9/30/2020    MR NECK ANGIO WO IV CONTRAST 9/30/2020 [facility] AIB LEGACY   • MR NECK ANGIO WO IV CONTRAST  5/22/2022    MR NECK ANGIO WO IV CONTRAST 5/22/2022 [facility] CLINICAL LEGACY   • MR NECK ANGIO WO IV CONTRAST  4/19/2023    MR NECK ANGIO WO IV CONTRAST [facility] MRI   • OTHER SURGICAL HISTORY  01/08/2016    Tubal Stabilization   • OTHER SURGICAL HISTORY  09/01/2016    Cervical Surgery (Gyn) Laser Vaporization Of Transformation Zone   • THYROID SURGERY  09/27/2013    Thyroid Surgery          Lab Results   Component Value Date    WBC CANCELED 04/22/2023    HGB CANCELED 04/22/2023    HCT CANCELED 04/22/2023    PLT CANCELED 04/22/2023    CHOL 129 08/07/2022    TRIG 182 (H) 08/07/2022    HDL 31.0 (A) 08/07/2022    ALT 8 04/21/2023    AST 23 04/21/2023    NA CANCELED 04/22/2023    K CANCELED  "04/22/2023    CL CANCELED 04/22/2023    CREATININE CANCELED 04/22/2023    BUN CANCELED 04/22/2023    CO2 CANCELED 04/22/2023    TSH 0.32 (L) 04/20/2023    INR CANCELED 04/19/2023    HGBA1C 8.6 (A) 04/20/2023     Surgical History  Problems    · History of Cervical Surgery (Gyn) Laser Vaporization Of Transformation Zone   · History of Cholecystectomy   · History of Colon Surgery   · History of Hernia Repair   · History of Hysterectomy   · History of Thyroid Surgery   · History of Tubal Stabilization     Family History  Mother    · Family history of Cancer  Family History    · Family history of Diabetes Mellitus (V18.0)   · Family history of Hypertension (V17.49)     Social History  Problems    · Disabled   · Does not use illicit drugs (V49.89) (Z78.9)   · Former smoker (V15.82) (Z87.891)   · Marital History - Single   · No alcohol.   · Denied: History of Secondhand smoke exposure   · Single     Allergies  Medication    · Aspirin TABS   · Contrast Media Ready-Box MISC   · morphine   · Sulfa Drugs        /70   Pulse 71   Temp 36.5 °C (97.7 °F)   Resp 18   Ht 1.372 m (4' 6\")   Wt 105 kg (231 lb 6.4 oz)   SpO2 98%   BMI 55.79 kg/m²      Physical Exam  Vitals and nursing note reviewed.   Constitutional:       Appearance: Normal appearance.   HENT:      Head: Normocephalic.      Right Ear: External ear normal.      Left Ear: External ear normal.      Nose: Nose normal.      Mouth/Throat:      Mouth: Mucous membranes are moist.      Pharynx: Oropharynx is clear.   Eyes:      Extraocular Movements: Extraocular movements intact.      Conjunctiva/sclera: Conjunctivae normal.      Pupils: Pupils are equal, round, and reactive to light.   Cardiovascular:      Rate and Rhythm: Normal rate and regular rhythm.      Pulses: Normal pulses.      Heart sounds: Normal heart sounds.   Pulmonary:      Effort: Pulmonary effort is normal.      Breath sounds: Normal breath sounds.   Abdominal:      General: Bowel sounds are " normal.      Palpations: Abdomen is soft.   Musculoskeletal:      Cervical back: Normal range of motion and neck supple.      Comments: Generalized muscle weakness; immobility.    Right sided hemiparesis   Skin:     General: Skin is warm and dry.   Neurological:      Mental Status: She is alert. Mental status is at baseline.      Motor: Weakness present.      Coordination: Coordination abnormal.      Gait: Gait abnormal.      Comments: Right sided weakness--RLE 1/5; RUE 3/5; sensation intact to touch x 4 extremities.     Left sided facial droop.     Generalized muscle weakness   Psychiatric:         Mood and Affect: Mood normal.         Behavior: Behavior normal.          Assessment/Plan     Ordered CMP and CBC with diff. for tomorrow     Right shoulder and arm pain; muscle spasticity; previous CVA with right-sided hemiparesis/right sided weakness:  Ordered right shoulder x-ray (2-view)  Discontinue Baclofen 10 mg PO Q 8 hour PRN order.  Start Baclofen 5 mg PO Q 8 hours routinely for muscle spasticity.   Continue Percocet 5-325 mg PO BID PRN.    Cough:  Resolved.  Reviewed chest x-ray results.     CVA Sequela;  Hx Acute/subacute stroke with probable extension;    dysphagia 2/2 cva; expressive aphasia;  Sequela of Cerebrovascular accident:   Dr. Jiang from [facility] neuro/stroke was consulted in the hospital.  She also has expressive aphasia.   Continue regular diet, regular texture, thin consistency.  Continue PT/OT/ST at [facility].              Chronic systolic HF;  Benign essential HTN:        Continue Amlodipine and hydrochlorothiazide.        Monitor Bps.        Monitor weights.         DM type 2:   Continue Lantus 30 units at bedtime and Humalog sliding scale.       Monitor accuchecks.       Monitor Ha1c Q 3 months.       Immobility; Generalized muscle weakness:  Continue at   PT/OT.   Not able to ambulate.  Fall prevention.         Problem List Items Addressed This Visit    None  Visit Diagnoses        Right shoulder pain, unspecified chronicity    -  Primary    Hemiparesis affecting right side as late effect of stroke (CMS/[facility])        Muscle spasticity        Sequela of cerebrovascular accident        Cough, unspecified type        Expressive aphasia        Dysphagia, unspecified type        Chronic systolic heart failure (CMS/[facility])        Recent cerebrovascular accident        Benign essential HTN        Type 2 diabetes mellitus with other specified complication, with long-term current use of insulin (CMS/[facility])        Immobility        Generalized muscle weakness            JASON Espitia       Electronically Signed By: JASON Espitia   6/13/23  8:07 PM

## 2023-06-13 VITALS
HEART RATE: 71 BPM | DIASTOLIC BLOOD PRESSURE: 70 MMHG | SYSTOLIC BLOOD PRESSURE: 128 MMHG | OXYGEN SATURATION: 98 % | BODY MASS INDEX: 53.55 KG/M2 | TEMPERATURE: 97.7 F | HEIGHT: 55 IN | WEIGHT: 231.4 LBS | RESPIRATION RATE: 18 BRPM

## 2023-06-13 NOTE — PROGRESS NOTES
Name: Janette Martinez    YOB: 1970    Code Status: DNRcc arrest      Chief complaint:  Follow up on right arm pain/right shoulder pain;  etc.....      HPI:    52-year-old female with past medical history of heart failure with reduced EF,   diabetes, hypertension, hyperlipidemia, HIV,        CVA (left MCA 2022 with residual right-sided hemiparesis), GERD, HIV, hypothyroidism, hypertension,   and diabetes.    [facility] On [facility] after her most recent hospitalization.    Diagnoses as follows:    Right shoulder and arm pain; muscle spasticity; previous CVA with right-sided hemiparesis; right sided weakness:  Patient was complaining of increased pain in her right arm last week.  Right arm x-rays were ordered.  ###On 6/5/23 Humerus right arm x-ray performed.   Results as follows: Mild degenerative changes are present. No acute fracture of dislocation. No osteomyelitis.  ###On 6/5/23 radius and ulna x-ray was also performed.  Results as follows: right radius and ulna has normal ossification pattern.  No fracture or dislocation is seen.  Previously stated the pain has been occurring for days.  Patient still complaining of right arm pain today.  States entire arm hurts from shoulder to fingers.   Also has some spasticity  in the right arm.   Has baclofen 10 mg ordered PRN.   Also has related right sided hemiparesis/right sided weakness.  On Percocet 5-325 mg PO BID PRN.  Patient seen today, she is in bed.  Calm, cooperative, mentation at baseline.      No complaints of chest pain or dizziness.  She is sitting in her room.  No complaints of chest pain.  No headaches. No dizziness.     Cough:  Patient complaining of a cough last week.  ###Chest x-ray a/p lateral ordered/performed on 6/5/23.  Chest x-ray results: Both lungs are clear. No pleural effusion. Aorta and heart within normal limits.  No acute pulmonary finding. No acute bony abnormality.   Cough improved.  No  complaints of cough today.  No complaints of fevers.            Sequela of Cerebrovascular accident; CVA Sequela; Acute/subacute stroke with probable extension;    dysphagia 2/2 cva; expressive aphasia;   MRI was performed during recent hospitalizaiton it showed Acute/subacute tiny infarct in the left anterior brock.   Neuro was also consulted in the hospital.  Dr. Jiang from [facility] [place] neuro/[place] was consulted.  Patient was not a thrombolytic candidate given Eliquis use.   Pt has baseline dysarthria from a previous CVA and has Hemiparesis.  She also has expressive aphasia.   Patient able to nod appropriately to questions.   On regular diet, regular texture, thin consistency.          Chronic systolic HF;  Benign essential HTN:  On Amlodipine and hydrochlorothiazide.  Recent /72.  No complaints of chest pain.  No headaches no dizziness.   No SOB.         DM type 2:   On Lantus 30 units at bedtime and Humalog sliding scale.  Recent accuchecks: 291 mg/dL,  264 mg/dL,188 mg/dL.   No episodes of hypoglycemia reported.   Recent Ha1c 8.1 % on 5/1/23.      Immobility; Generalized muscle weakness:  At [facility] participating in PT/OT.   Not able to ambulate.              Reviewed medical, social, surgical and family history.  Reviewed all current medications and performed medication reconciliation.  Reviewed vital signs AND recent blood work results.  Performed prescription drug management.   Reviewed [facility] Documentation  Discussed patient with nursing and .        ROS: 10 point ROS performed; negative unless noted in HPI.     BMP: Date: 5/1/2023 Na 137 K 4.3 Cr 1.1 BUN 15 glucose 141 Ca 8.8 GFR 52  CBC: Date: 5/1/2023 WBC 8.4 Hgb 11.1 hematocrit 33.9 platelets 337  Liver function: Date: 5/1/2023 ALT 10 AST 29 alkaline phos.  63 bilirubin 0.4 albumin 3.6 protein 7.7       Ha1c 8.1 % on 5/1/23          BMP: Date: 5/15/2023 Na 139  K 3.4 Cr 1.2 BUN 20 glucose 122 Ca 8.8 GFR 57        CBC: Date:  5/15/2023 WBC 9 Hgb 10.2 hematocrit 30.9 platelets 277       Liver function: 5/15/2023 ALT 8 AST 28 alkaline phos.  57 bilirubin 0.5 albumin 3.5 protein 7.3          RECENT LABS:        BMP: Date: 5/22/2023 Na 141 K 3.1 Cr 1 BUN 11 glucose 227 Ca 8.7 GFR 70        CBC: Date: 5/22/2023 WBC 8.2 Hgb 10 hematocrit 30.5 platelets 372        Liver function: Date: 5/22/2023 ALT 8 AST 22 alkaline phos.  63 bilirubin 0.3 albumin 3.2 protein 7.2           BMP: Date: 6/5/2023 Na 140 K 4.2 Cr 1.1 BUN 14 glucose 79 Ca 1.1 GFR 63  CBC: Date: 6/5/2023 WBC 8.1 Hgb 10.5 hematocrit 32.3 platelets 350  Liver function: Date: 6/5/2023 ALT 7 AST 25 alkaline phos.  59 bilirubin 0.4 albumin 3.3 protein 7.7      Past Medical History:   Diagnosis Date    Cervicalgia     Neck pain    Essential (primary) hypertension 07/21/2013    Hypertension    Pain in unspecified shoulder     Pain, joint, shoulder    Personal history of other diseases of the digestive system 07/06/2022    History of ulcerative colitis    Personal history of other diseases of the digestive system     History of esophageal reflux    Personal history of other diseases of the nervous system and sense organs     History of cataract    Personal history of other diseases of the nervous system and sense organs     History of glaucoma    Personal history of other diseases of the respiratory system     History of bronchitis    Personal history of other endocrine, nutritional and metabolic disease     History of diabetes mellitus    Personal history of other endocrine, nutritional and metabolic disease     History of hyperlipidemia    Personal history of other mental and behavioral disorders     History of depression    Snoring     Snoring    Unilateral primary osteoarthritis, unspecified knee 11/02/2015    Arthritis of knee           Past Surgical History:   Procedure Laterality Date    CHOLECYSTECTOMY  01/08/2016    Cholecystectomy    COLON SURGERY  01/08/2016    Colon Surgery    CT  HEAD ANGIO W AND WO IV CONTRAST  3/19/2019    CT HEAD ANGIO W AND WO IV CONTRAST 3/19/2019 [facility] [place] [facility]GENCY [facility]GAC[facility]    CT NECK ANGIO W AND WO IV CONTRAST  3/19/2019    CT NECK ANGIO W AND WO IV CONTRAST 3/19/2019 [facility] [place] [facility]GENCY LEGAC[facility]    HERNIA REPAIR  01/08/2016    Hernia Repair    HYSTERECTOMY  01/08/2016    Hysterectomy    MR HEAD ANGIO WO IV CONTRAST  6/4/2018    MR HEAD ANGIO WO IV CONTRAST 6/4/2018 [facility] CLINICAL LEGACY    MR HEAD ANGIO WO IV CONTRAST  7/11/2020    MR HEAD ANGIO WO IV CONTRAST 7/11/2020 [facility] CLINICAL LEGACY    MR HEAD ANGIO WO IV CONTRAST  9/30/2020    MR HEAD ANGIO WO IV CONTRAST 9/30/2020 [facility] AIB LEGACY    MR HEAD ANGIO WO IV CONTRAST  5/22/2022    MR HEAD ANGIO WO IV CONTRAST 5/22/2022 [facility] CLINICAL LEGACY    MR HEAD ANGIO WO IV CONTRAST  4/19/2023    MR HEAD ANGIO WO IV CONTRAST [facility] MRI    MR NECK ANGIO WO IV CONTRAST  6/4/2018    MR NECK ANGIO WO IV CONTRAST 6/4/2018 [facility] CLINICAL LEGACY    MR NECK ANGIO WO IV CONTRAST  7/11/2020    MR NECK ANGIO WO IV CONTRAST 7/11/2020 [facility] CLINICAL LEGACY    MR NECK ANGIO WO IV CONTRAST  9/30/2020    MR NECK ANGIO WO IV CONTRAST 9/30/2020 [facility]IB LEGACY    MR NECK ANGIO WO IV CONTRAST  5/22/2022    MR NECK ANGIO WO IV CONTRAST 5/22/2022 [facility] CLINICAL LEGACY    MR NECK ANGIO WO IV CONTRAST  4/19/2023    MR NECK ANGIO WO IV CONTRAST [facility] MRI    OTHER SURGICAL HISTORY  01/08/2016    Tubal Stabilization    OTHER SURGICAL HISTORY  09/01/2016    Cervical Surgery (Gyn) Laser Vaporization Of Transformation Zone    THYROID SURGERY  09/27/2013    Thyroid Surgery          Lab Results   Component Value Date    WBC CANCELED 04/22/2023    HGB CANCELED 04/22/2023    HCT CANCELED 04/22/2023    PLT CANCELED 04/22/2023    CHOL 129 08/07/2022    TRIG 182 (H) 08/07/2022    HDL 31.0 (A) 08/07/2022    ALT 8 04/21/2023    AST 23 04/21/2023    NA CANCELED 04/22/2023    K CANCELED 04/22/2023    CL CANCELED 04/22/2023    CREATININE CANCELED 04/22/2023    BUN CANCELED 04/22/2023    CO2  "CANCELED 04/22/2023    TSH 0.32 (L) 04/20/2023    INR CANCELED 04/19/2023    HGBA1C 8.6 (A) 04/20/2023     Surgical History  Problems    · History of Cervical Surgery (Gyn) Laser Vaporization Of Transformation Zone   · History of Cholecystectomy   · History of Colon Surgery   · History of Hernia Repair   · History of Hysterectomy   · History of Thyroid Surgery   · History of Tubal Stabilization     Family History  Mother    · Family history of Cancer  Family History    · Family history of Diabetes Mellitus (V18.0)   · Family history of Hypertension (V17.49)     Social History  Problems    · Disabled   · Does not use illicit drugs (V49.89) (Z78.9)   · Former smoker (V15.82) (Z87.891)   · Marital History - Single   · No alcohol.   · Denied: History of Secondhand smoke exposure   · Single     Allergies  Medication    · Aspirin TABS   · Contrast Media Ready-Box MISC   · morphine   · Sulfa Drugs        /70   Pulse 71   Temp 36.5 °C (97.7 °F)   Resp 18   Ht 1.372 m (4' 6\")   Wt 105 kg (231 lb 6.4 oz)   SpO2 98%   BMI 55.79 kg/m²      Physical Exam  Vitals and nursing note reviewed.   Constitutional:       Appearance: Normal appearance.   HENT:      Head: Normocephalic.      Right Ear: External ear normal.      Left Ear: External ear normal.      Nose: Nose normal.      Mouth/Throat:      Mouth: Mucous membranes are moist.      Pharynx: Oropharynx is clear.   Eyes:      Extraocular Movements: Extraocular movements intact.      Conjunctiva/sclera: Conjunctivae normal.      Pupils: Pupils are equal, round, and reactive to light.   Cardiovascular:      Rate and Rhythm: Normal rate and regular rhythm.      Pulses: Normal pulses.      Heart sounds: Normal heart sounds.   Pulmonary:      Effort: Pulmonary effort is normal.      Breath sounds: Normal breath sounds.   Abdominal:      General: Bowel sounds are normal.      Palpations: Abdomen is soft.   Musculoskeletal:      Cervical back: Normal range of motion and " neck supple.      Comments: Generalized muscle weakness; immobility.    Right sided hemiparesis   Skin:     General: Skin is warm and dry.   Neurological:      Mental Status: She is alert. Mental status is at baseline.      Motor: Weakness present.      Coordination: Coordination abnormal.      Gait: Gait abnormal.      Comments: Right sided weakness--RLE 1/5; RUE 3/5; sensation intact to touch x 4 extremities.     Left sided facial droop.     Generalized muscle weakness   Psychiatric:         Mood and Affect: Mood normal.         Behavior: Behavior normal.          Assessment/Plan      Ordered CMP and CBC with diff. for tomorrow     Right shoulder and arm pain; muscle spasticity; previous CVA with right-sided hemiparesis/right sided weakness:  Ordered right shoulder x-ray (2-view)  Discontinue Baclofen 10 mg PO Q 8 hour PRN order.  Start Baclofen 5 mg PO Q 8 hours routinely for muscle spasticity.   Continue Percocet 5-325 mg PO BID PRN.    Cough:  Resolved.  Reviewed chest x-ray results.     CVA Sequela;  Hx Acute/subacute stroke with probable extension;    dysphagia 2/2 cva; expressive aphasia;  Sequela of Cerebrovascular accident:   Dr. Jiang from [facility] [place] neuro/stroke was consulted in the hospital.  She also has expressive aphasia.   Continue regular diet, regular texture, thin consistency.  Continue PT/OT/ST at [facility].              Chronic systolic HF;  Benign essential HTN:        Continue Amlodipine and hydrochlorothiazide.        Monitor Bps.        Monitor weights.         DM type 2:   Continue Lantus 30 units at bedtime and Humalog sliding scale.       Monitor accuchecks.       Monitor Ha1c Q 3 months.       Immobility; Generalized muscle weakness:  Continue at   PT/OT.   Not able to ambulate.  Fall prevention.         Problem List Items Addressed This Visit    None  Visit Diagnoses       Right shoulder pain, unspecified chronicity    -  Primary    Hemiparesis affecting right side as late  effect of stroke (CMS/HCC)        Muscle spasticity        Sequela of cerebrovascular accident        Cough, unspecified type        Expressive aphasia        Dysphagia, unspecified type        Chronic systolic heart failure (CMS/HCC)        Recent cerebrovascular accident        Benign essential HTN        Type 2 diabetes mellitus with other specified complication, with long-term current use of insulin (CMS/[facility])        Immobility        Generalized muscle weakness            Maria Del Carmen Garduno, APRN-CNP

## 2023-07-10 ENCOUNTER — NURSING HOME VISIT (OUTPATIENT)
Dept: POST ACUTE CARE | Facility: EXTERNAL LOCATION | Age: 53
End: 2023-07-10
Payer: MEDICARE

## 2023-07-10 VITALS
DIASTOLIC BLOOD PRESSURE: 74 MMHG | BODY MASS INDEX: 53.55 KG/M2 | OXYGEN SATURATION: 97 % | WEIGHT: 231.4 LBS | HEIGHT: 55 IN | HEART RATE: 72 BPM | TEMPERATURE: 97.5 F | RESPIRATION RATE: 18 BRPM | SYSTOLIC BLOOD PRESSURE: 130 MMHG

## 2023-07-10 DIAGNOSIS — M79.601 RIGHT ARM PAIN: ICD-10-CM

## 2023-07-10 DIAGNOSIS — Z74.09 IMMOBILITY: ICD-10-CM

## 2023-07-10 DIAGNOSIS — R47.01 EXPRESSIVE APHASIA: ICD-10-CM

## 2023-07-10 DIAGNOSIS — E11.69 TYPE 2 DIABETES MELLITUS WITH OTHER SPECIFIED COMPLICATION, WITH LONG-TERM CURRENT USE OF INSULIN (MULTI): ICD-10-CM

## 2023-07-10 DIAGNOSIS — M62.838 MUSCLE SPASTICITY: Primary | ICD-10-CM

## 2023-07-10 DIAGNOSIS — R13.10 DYSPHAGIA, UNSPECIFIED TYPE: ICD-10-CM

## 2023-07-10 DIAGNOSIS — I69.30 SEQUELA OF CEREBROVASCULAR ACCIDENT: ICD-10-CM

## 2023-07-10 DIAGNOSIS — I69.351 HEMIPARESIS AFFECTING RIGHT SIDE AS LATE EFFECT OF STROKE (MULTI): ICD-10-CM

## 2023-07-10 DIAGNOSIS — Z79.4 TYPE 2 DIABETES MELLITUS WITH OTHER SPECIFIED COMPLICATION, WITH LONG-TERM CURRENT USE OF INSULIN (MULTI): ICD-10-CM

## 2023-07-10 DIAGNOSIS — M25.511 RIGHT SHOULDER PAIN, UNSPECIFIED CHRONICITY: ICD-10-CM

## 2023-07-10 DIAGNOSIS — M62.81 GENERALIZED MUSCLE WEAKNESS: ICD-10-CM

## 2023-07-10 PROCEDURE — 99309 SBSQ NF CARE MODERATE MDM 30: CPT | Performed by: NURSE PRACTITIONER

## 2023-07-10 NOTE — LETTER
Patient: Janette Martinez  : 1970    Encounter Date: 07/10/2023    Name: Janette Martinez    YOB: 1970    Code Status: DNRcc arrest      Chief complaint:  Follow up on muscle spasticity; etc.....      HPI:    52-year-old female with past medical history of heart failure with reduced EF,   diabetes, hypertension, hyperlipidemia, HIV,        CVA (left MCA  with residual right-sided hemiparesis), GERD, HIV, hypothyroidism, hypertension,   and diabetes.    Patient re-admitted to [facility]. On 23 after her most recent hospitalization.    Diagnoses as follows:    Muscle spasticity; Right shoulder and arm pain;  previous CVA with right-sided hemiparesis; right sided weakness:  Patient was complaining of increased pain in her right arm weeks ago.  Right arm x-rays were ordered.  ###On 23 Humerus right arm x-ray performed.   Results as follows: Mild degenerative changes are present. No acute fracture of dislocation. No osteomyelitis.  ###On 23 radius and ulna x-ray was also performed.  Results as follows: right radius and ulna has normal ossification pattern.  No fracture or dislocation is seen.  The arm pain has become chronic.  She states entire arm hurts from shoulder to fingers.   Also spasticity  in the right arm.   Has baclofen 5 mg PO Q 8 hours ordered.  Also has related right sided hemiparesis/right sided weakness.  On Percocet 5-325 mg PO Q 6 hours PRN.  Patient seen today, she is in bed.  Calm, cooperative, mentation at baseline.      No complaints of chest pain or dizziness.  She is sitting in her room.  No complaints of chest pain.  No headaches. No dizziness.     DM type 2:   On Lantus 30 units at bedtime and Humalog sliding scale.  Recent accuchecks: 250 mg/dL, 391 mg/dL, 270 mg/dL.   No episodes of hypoglycemia reported.   Recent Ha1c 8.1 % on 23.    Benign essential HTN; Chronic systolic HF :  On Amlodipine and hydrochlorothiazide.  Recent /74  No  complaints of chest pain.  No headaches no dizziness.   No SOB.           Sequela of Cerebrovascular accident; CVA Sequela; Recent acute/subacute stroke;    dysphagia 2/2 cva; expressive aphasia;   MRI was performed during recent hospitalizaiton it showed Acute/subacute tiny infarct in the left anterior brock.   Neuro was also consulted in the hospital.  Dr. Jiang from [facility] neuro/stroke was consulted.  Patient was not a thrombolytic candidate given Eliquis use.   Pt has baseline dysarthria from a previous CVA and has Hemiparesis.  She also has expressive aphasia, but able to communicate short sentences.  Patient able to nod appropriately to questions.   On regular diet, regular texture, thin consistency.          Immobility; Generalized muscle weakness:  At  participating in PT/OT.   Not able to ambulate.              Reviewed medical, social, surgical and family history.  Reviewed all current medications and performed medication reconciliation.  Reviewed vital signs AND recent blood work results.  Performed prescription drug management.   Reviewed [facility] Documentation  Discussed patient with nursing and [facility] NP Pili.        ROS: 10 point ROS performed; negative unless noted in HPI.     BMP: Date: 5/1/2023 Na 137 K 4.3 Cr 1.1 BUN 15 glucose 141 Ca 8.8 GFR 52  CBC: Date: 5/1/2023 WBC 8.4 Hgb 11.1 hematocrit 33.9 platelets 337  Liver function: Date: 5/1/2023 ALT 10 AST 29 alkaline phos.  63 bilirubin 0.4 albumin 3.6 protein 7.7       Ha1c 8.1 % on 5/1/23          BMP: Date: 5/15/2023 Na 139  K 3.4 Cr 1.2 BUN 20 glucose 122 Ca 8.8 GFR 57        CBC: Date: 5/15/2023 WBC 9 Hgb 10.2 hematocrit 30.9 platelets 277       Liver function: 5/15/2023 ALT 8 AST 28 alkaline phos.  57 bilirubin 0.5 albumin 3.5 protein 7.3          RECENT LABS:        BMP: Date: 5/22/2023 Na 141 K 3.1 Cr 1 BUN 11 glucose 227 Ca 8.7 GFR 70        CBC: Date: 5/22/2023 WBC 8.2 Hgb 10 hematocrit 30.5 platelets 372        Liver  function: Date: 5/22/2023 ALT 8 AST 22 alkaline phos.  63 bilirubin 0.3 albumin 3.2 protein 7.2       BMP: Date: 6/5/2023 Na 140 K 4.2 Cr 1.1 BUN 14 glucose 79 Ca 1.1 GFR 63  CBC: Date: 6/5/2023 WBC 8.1 Hgb 10.5 hematocrit 32.3 platelets 350  Liver function: Date: 6/5/2023 ALT 7 AST 25 alkaline phos.  59 bilirubin 0.4 albumin 3.3 protein 7.7          Past Medical History:   Diagnosis Date   • Cervicalgia     Neck pain   • Essential (primary) hypertension 07/21/2013    Hypertension   • Pain in unspecified shoulder     Pain, joint, shoulder   • Personal history of other diseases of the digestive system 07/06/2022    History of ulcerative colitis   • Personal history of other diseases of the digestive system     History of esophageal reflux   • Personal history of other diseases of the nervous system and sense organs     History of cataract   • Personal history of other diseases of the nervous system and sense organs     History of glaucoma   • Personal history of other diseases of the respiratory system     History of bronchitis   • Personal history of other endocrine, nutritional and metabolic disease     History of diabetes mellitus   • Personal history of other endocrine, nutritional and metabolic disease     History of hyperlipidemia   • Personal history of other mental and behavioral disorders     History of depression   • Snoring     Snoring   • Unilateral primary osteoarthritis, unspecified knee 11/02/2015    Arthritis of knee           Past Surgical History:   Procedure Laterality Date   • CHOLECYSTECTOMY  01/08/2016    Cholecystectomy   • COLON SURGERY  01/08/2016    Colon Surgery   • CT HEAD ANGIO W AND WO IV CONTRAST  3/19/2019    CT HEAD ANGIO W AND WO IV CONTRAST 3/19/2019 [facility] EMERGENCY LEGACY   • CT NECK ANGIO W AND WO IV CONTRAST  3/19/2019    CT NECK ANGIO W AND WO IV CONTRAST 3/19/2019 [facility] EMERGENCY LEGACY   • HERNIA REPAIR  01/08/2016    Hernia Repair   • HYSTERECTOMY  01/08/2016    Hysterectomy   •  MR HEAD ANGIO WO IV CONTRAST  6/4/2018    MR HEAD ANGIO WO IV CONTRAST 6/4/2018 [facility] CLINICAL LEGACY   • MR HEAD ANGIO WO IV CONTRAST  7/11/2020    MR HEAD ANGIO WO IV CONTRAST 7/11/2020 [facility] CLINICAL LEGACY   • MR HEAD ANGIO WO IV CONTRAST  9/30/2020    MR HEAD ANGIO WO IV CONTRAST 9/30/2020 [facility]GAC[facility]   • MR HEAD ANGIO WO IV CONTRAST  5/22/2022    MR HEAD ANGIO WO IV CONTRAST 5/22/2022 [facility] CLINICAL LEGACY   • MR HEAD ANGIO WO IV CONTRAST  4/19/2023    MR HEAD ANGIO WO IV CONTRAST [facility] MRI   • MR NECK ANGIO WO IV CONTRAST  6/4/2018    MR NECK ANGIO WO IV CONTRAST 6/4/2018 [facility] CLINICAL LEGACY   • MR NECK ANGIO WO IV CONTRAST  7/11/2020    MR NECK ANGIO WO IV CONTRAST 7/11/2020 [facility] CLINICAL LEGACY   • MR NECK ANGIO WO IV CONTRAST  9/30/2020    MR NECK ANGIO WO IV CONTRAST 9/30/2020 [facility] LEGACY   • MR NECK ANGIO WO IV CONTRAST  5/22/2022    MR NECK ANGIO WO IV CONTRAST 5/22/2022 [facility] CLINICAL LEGACY   • MR NECK ANGIO WO IV CONTRAST  4/19/2023    MR NECK ANGIO WO IV CONTRAST [facility] MRI   • OTHER SURGICAL HISTORY  01/08/2016    Tubal Stabilization   • OTHER SURGICAL HISTORY  09/01/2016    Cervical Surgery (Gyn) Laser Vaporization Of Transformation Zone   • THYROID SURGERY  09/27/2013    Thyroid Surgery          Lab Results   Component Value Date    WBC CANCELED 04/22/2023    HGB CANCELED 04/22/2023    HCT CANCELED 04/22/2023    PLT CANCELED 04/22/2023    CHOL 129 08/07/2022    TRIG 182 (H) 08/07/2022    HDL 31.0 (A) 08/07/2022    ALT 8 04/21/2023    AST 23 04/21/2023    NA CANCELED 04/22/2023    K CANCELED 04/22/2023    CL CANCELED 04/22/2023    CREATININE CANCELED 04/22/2023    BUN CANCELED 04/22/2023    CO2 CANCELED 04/22/2023    TSH 0.32 (L) 04/20/2023    INR CANCELED 04/19/2023    HGBA1C 8.6 (A) 04/20/2023     Surgical History  Problems    · History of Cervical Surgery (Gyn) Laser Vaporization Of Transformation Zone   · History of Cholecystectomy   · History of Colon Surgery   · History of Hernia Repair   ·  "History of Hysterectomy   · History of Thyroid Surgery   · History of Tubal Stabilization     Family History  Mother    · Family history of Cancer  Family History    · Family history of Diabetes Mellitus (V18.0)   · Family history of Hypertension (V17.49)     Social History  Problems    · Disabled   · Does not use illicit drugs (V49.89) (Z78.9)   · Former smoker (V15.82) (Z87.891)   · Marital History - Single   · No alcohol.   · Denied: History of Secondhand smoke exposure   · Single     Allergies  Medication    · Aspirin TABS   · Contrast Media Ready-Box MISC   · morphine   · Sulfa Drugs        /74   Pulse 72   Temp 36.4 °C (97.5 °F)   Resp 18   Ht 1.372 m (4' 6\")   Wt 105 kg (231 lb 6.4 oz)   SpO2 97%   BMI 55.79 kg/m²      Physical Exam  Vitals and nursing note reviewed.   Constitutional:       Appearance: Normal appearance.   HENT:      Head: Normocephalic.      Right Ear: External ear normal.      Left Ear: External ear normal.      Nose: Nose normal.      Mouth/Throat:      Mouth: Mucous membranes are moist.      Pharynx: Oropharynx is clear.   Eyes:      Extraocular Movements: Extraocular movements intact.      Conjunctiva/sclera: Conjunctivae normal.      Pupils: Pupils are equal, round, and reactive to light.   Cardiovascular:      Rate and Rhythm: Normal rate and regular rhythm.      Pulses: Normal pulses.      Heart sounds: Normal heart sounds.   Pulmonary:      Effort: Pulmonary effort is normal.      Breath sounds: Normal breath sounds.   Abdominal:      General: Bowel sounds are normal.      Palpations: Abdomen is soft.   Musculoskeletal:      Cervical back: Normal range of motion and neck supple.      Comments: Generalized muscle weakness; immobility.    Right sided hemiparesis   Skin:     General: Skin is warm and dry.   Neurological:      Mental Status: She is alert. Mental status is at baseline.      Motor: Weakness present.      Coordination: Coordination abnormal.      Gait: Gait " abnormal.      Comments: Right sided weakness--RLE 1/5; RUE 3/5; sensation intact to touch x 4 extremities.     Left sided facial droop.     Generalized muscle weakness   Psychiatric:         Mood and Affect: Mood normal.         Behavior: Behavior normal.          Assessment/Plan     Ordered CMP and CBC with diff. & TSH for tomorrow     Muscle spasticity; Right shoulder and arm pain; ; previous CVA with right-sided hemiparesis/right sided weakness:  Discontinue Baclofen 5 mg PO Q 8 hours routinely for muscle spasticity.   Start Baclofen 10 mg PO Q 8 hours routinely for muscle spasticity.  Continue Percocet 5-325 mg PO Q 6 hours PRN.  In house palliative Care with [facility] ordered for pain control.     DM type 2:   Continue Lantus 30 units at bedtime and Humalog sliding scale.  Monitor accuchecks.               Monitor Ha1c Q 3 months.    Benign essential HTN; Chronic systolic HF :  Continue Amlodipine and hydrochlorothiazide.  Monitor Bps.         Sequela of Cerebrovascular accident; CVA Sequela; Recent acute/subacute stroke;    dysphagia 2/2 cva; expressive aphasia;   MRI was performed during recent hospitalizaiton it showed Acute/subacute tiny infarct in the left anterior brock.   Neuro was also consulted in the hospital.  Dr. Jiang from [facility] neuro/stroke was consulted.  Patient was not a thrombolytic candidate given Eliquis use.   Pt has baseline dysarthria from a previous CVA and has Hemiparesis.  She also has expressive aphasia, but able to communicate short sentences.  Patient able to nod appropriately to questions.    Continue regular diet, regular texture, thin consistency.          Immobility; Generalized muscle weakness:  Continue at  participating in PT/OT.   Not able to ambulate.  Fall prevention strategies.       Problem List Items Addressed This Visit    None  Visit Diagnoses       Muscle spasticity    -  Primary    Right shoulder pain, unspecified chronicity        Right arm pain         Sequela of cerebrovascular accident        Hemiparesis affecting right side as late effect of stroke (CMS/[facility])        Type 2 diabetes mellitus with other specified complication, with long-term current use of insulin (CMS/[facility])        Expressive aphasia        Dysphagia, unspecified type        Immobility        Generalized muscle weakness            JASON Espitia       Electronically Signed By: JASON Espitia   7/10/23 11:25 PM

## 2023-07-11 NOTE — PROGRESS NOTES
Name: Janette Martinez    YOB: 1970    Code Status: DNRcc arrest      Chief complaint:  Follow up on muscle spasticity; etc.....      HPI:    52-year-old female with past medical history of heart failure with reduced EF,   diabetes, hypertension, hyperlipidemia, HIV,        CVA (left MCA 2022 with residual right-sided hemiparesis), GERD, HIV, hypothyroidism, hypertension,   and diabetes.    Patient re-admitted to [facility]. On 4/21/23 after her most recent hospitalization.    Diagnoses as follows:    Muscle spasticity; Right shoulder and arm pain;  previous CVA with right-sided hemiparesis; right sided weakness:  Patient was complaining of increased pain in her right arm weeks ago.  Right arm x-rays were ordered.  ###On 6/5/23 Humerus right arm x-ray performed.   Results as follows: Mild degenerative changes are present. No acute fracture of dislocation. No osteomyelitis.  ###On 6/5/23 radius and ulna x-ray was also performed.  Results as follows: right radius and ulna has normal ossification pattern.  No fracture or dislocation is seen.  The arm pain has become chronic.  She states entire arm hurts from shoulder to fingers.   Also spasticity  in the right arm.   Has baclofen 5 mg PO Q 8 hours ordered.  Also has related right sided hemiparesis/right sided weakness.  On Percocet 5-325 mg PO Q 6 hours PRN.  Patient seen today, she is in bed.  Calm, cooperative, mentation at baseline.      No complaints of chest pain or dizziness.  She is sitting in her room.  No complaints of chest pain.  No headaches. No dizziness.     DM type 2:   On Lantus 30 units at bedtime and Humalog sliding scale.  Recent accuchecks: 250 mg/dL, 391 mg/dL, 270 mg/dL.   No episodes of hypoglycemia reported.   Recent Ha1c 8.1 % on 5/1/23.    Benign essential HTN; Chronic systolic HF :  On Amlodipine and hydrochlorothiazide.  Recent /74  No complaints of chest pain.  No headaches no dizziness.   No SOB.            Sequela of Cerebrovascular accident; CVA Sequela; Recent acute/subacute stroke;    dysphagia 2/2 cva; expressive aphasia;   MRI was performed during recent hospitalizaiton it showed Acute/subacute tiny infarct in the left anterior brock.   Neuro was also consulted in the hospital.  Dr. Jiang from [facility] neuro/stroke was consulted.  Patient was not a thrombolytic candidate given Eliquis use.   Pt has baseline dysarthria from a previous CVA and has Hemiparesis.  She also has expressive aphasia, but able to communicate short sentences.  Patient able to nod appropriately to questions.   On regular diet, regular texture, thin consistency.          Immobility; Generalized muscle weakness:  At  participating in PT/OT.   Not able to ambulate.              Reviewed medical, social, surgical and family history.  Reviewed all current medications and performed medication reconciliation.  Reviewed vital signs AND recent blood work results.  Performed prescription drug management.   Reviewed [facility] Documentation  Discussed patient with nursing and [facility] NP Pili.        ROS: 10 point ROS performed; negative unless noted in HPI.     BMP: Date: 5/1/2023 Na 137 K 4.3 Cr 1.1 BUN 15 glucose 141 Ca 8.8 GFR 52  CBC: Date: 5/1/2023 WBC 8.4 Hgb 11.1 hematocrit 33.9 platelets 337  Liver function: Date: 5/1/2023 ALT 10 AST 29 alkaline phos.  63 bilirubin 0.4 albumin 3.6 protein 7.7       Ha1c 8.1 % on 5/1/23          BMP: Date: 5/15/2023 Na 139  K 3.4 Cr 1.2 BUN 20 glucose 122 Ca 8.8 GFR 57        CBC: Date: 5/15/2023 WBC 9 Hgb 10.2 hematocrit 30.9 platelets 277       Liver function: 5/15/2023 ALT 8 AST 28 alkaline phos.  57 bilirubin 0.5 albumin 3.5 protein 7.3          RECENT LABS:        BMP: Date: 5/22/2023 Na 141 K 3.1 Cr 1 BUN 11 glucose 227 Ca 8.7 GFR 70        CBC: Date: 5/22/2023 WBC 8.2 Hgb 10 hematocrit 30.5 platelets 372        Liver function: Date: 5/22/2023 ALT 8 AST 22 alkaline phos.  63 bilirubin 0.3 albumin  3.2 protein 7.2       BMP: Date: 6/5/2023 Na 140 K 4.2 Cr 1.1 BUN 14 glucose 79 Ca 1.1 GFR 63  CBC: Date: 6/5/2023 WBC 8.1 Hgb 10.5 hematocrit 32.3 platelets 350  Liver function: Date: 6/5/2023 ALT 7 AST 25 alkaline phos.  59 bilirubin 0.4 albumin 3.3 protein 7.7          Past Medical History:   Diagnosis Date    Cervicalgia     Neck pain    Essential (primary) hypertension 07/21/2013    Hypertension    Pain in unspecified shoulder     Pain, joint, shoulder    Personal history of other diseases of the digestive system 07/06/2022    History of ulcerative colitis    Personal history of other diseases of the digestive system     History of esophageal reflux    Personal history of other diseases of the nervous system and sense organs     History of cataract    Personal history of other diseases of the nervous system and sense organs     History of glaucoma    Personal history of other diseases of the respiratory system     History of bronchitis    Personal history of other endocrine, nutritional and metabolic disease     History of diabetes mellitus    Personal history of other endocrine, nutritional and metabolic disease     History of hyperlipidemia    Personal history of other mental and behavioral disorders     History of depression    Snoring     Snoring    Unilateral primary osteoarthritis, unspecified knee 11/02/2015    Arthritis of knee           Past Surgical History:   Procedure Laterality Date    CHOLECYSTECTOMY  01/08/2016    Cholecystectomy    COLON SURGERY  01/08/2016    Colon Surgery    CT HEAD ANGIO W AND WO IV CONTRAST  3/19/2019    CT HEAD ANGIO W AND WO IV CONTRAST 3/19/2019 [facility]    CT NECK ANGIO W AND WO IV CONTRAST  3/19/2019    CT NECK ANGIO W AND WO IV CONTRAST 3/19/2019 [facility] [place] [facility]    HERNIA REPAIR  01/08/2016    Hernia Repair    HYSTERECTOMY  01/08/2016    Hysterectomy    MR HEAD ANGIO WO IV CONTRAST  6/4/2018    MR HEAD ANGIO WO IV CONTRAST 6/4/2018 [facility] CLINICAL LEGAC[facility]     MR HEAD ANGIO WO IV CONTRAST  7/11/2020    MR HEAD ANGIO WO IV CONTRAST 7/11/2020 [facility] CLINICAL LEGACY    MR HEAD ANGIO WO IV CONTRAST  9/30/2020    MR HEAD ANGIO WO IV CONTRAST 9/30/2020 [facility] LEGACY    MR HEAD ANGIO WO IV CONTRAST  5/22/2022    MR HEAD ANGIO WO IV CONTRAST 5/22/2022 [facility] CLINICAL LEGACY    MR HEAD ANGIO WO IV CONTRAST  4/19/2023    MR HEAD ANGIO WO IV CONTRAST [facility] MRI    MR NECK ANGIO WO IV CONTRAST  6/4/2018    MR NECK ANGIO WO IV CONTRAST 6/4/2018 [facility] CLINICAL LEGACY    MR NECK ANGIO WO IV CONTRAST  7/11/2020    MR NECK ANGIO WO IV CONTRAST 7/11/2020 [facility] CLINICAL LEGACY    MR NECK ANGIO WO IV CONTRAST  9/30/2020    MR NECK ANGIO WO IV CONTRAST 9/30/2020 [facility] LEGACY    MR NECK ANGIO WO IV CONTRAST  5/22/2022    MR NECK ANGIO WO IV CONTRAST 5/22/2022 [facility] CLINICAL LEGACY    MR NECK ANGIO WO IV CONTRAST  4/19/2023    MR NECK ANGIO WO IV CONTRAST [facility] MRI    OTHER SURGICAL HISTORY  01/08/2016    Tubal Stabilization    OTHER SURGICAL HISTORY  09/01/2016    Cervical Surgery (Gyn) Laser Vaporization Of Transformation Zone    THYROID SURGERY  09/27/2013    Thyroid Surgery          Lab Results   Component Value Date    WBC CANCELED 04/22/2023    HGB CANCELED 04/22/2023    HCT CANCELED 04/22/2023    PLT CANCELED 04/22/2023    CHOL 129 08/07/2022    TRIG 182 (H) 08/07/2022    HDL 31.0 (A) 08/07/2022    ALT 8 04/21/2023    AST 23 04/21/2023    NA CANCELED 04/22/2023    K CANCELED 04/22/2023    CL CANCELED 04/22/2023    CREATININE CANCELED 04/22/2023    BUN CANCELED 04/22/2023    CO2 CANCELED 04/22/2023    TSH 0.32 (L) 04/20/2023    INR CANCELED 04/19/2023    HGBA1C 8.6 (A) 04/20/2023     Surgical History  Problems    · History of Cervical Surgery (Gyn) Laser Vaporization Of Transformation Zone   · History of Cholecystectomy   · History of Colon Surgery   · History of Hernia Repair   · History of Hysterectomy   · History of Thyroid Surgery   · History of Tubal Stabilization     Family  "History  Mother    · Family history of Cancer  Family History    · Family history of Diabetes Mellitus (V18.0)   · Family history of Hypertension (V17.49)     Social History  Problems    · Disabled   · Does not use illicit drugs (V49.89) (Z78.9)   · Former smoker (V15.82) (Z87.891)   · Marital History - Single   · No alcohol.   · Denied: History of Secondhand smoke exposure   · Single     Allergies  Medication    · Aspirin TABS   · Contrast Media Ready-Box MISC   · morphine   · Sulfa Drugs        /74   Pulse 72   Temp 36.4 °C (97.5 °F)   Resp 18   Ht 1.372 m (4' 6\")   Wt 105 kg (231 lb 6.4 oz)   SpO2 97%   BMI 55.79 kg/m²      Physical Exam  Vitals and nursing note reviewed.   Constitutional:       Appearance: Normal appearance.   HENT:      Head: Normocephalic.      Right Ear: External ear normal.      Left Ear: External ear normal.      Nose: Nose normal.      Mouth/Throat:      Mouth: Mucous membranes are moist.      Pharynx: Oropharynx is clear.   Eyes:      Extraocular Movements: Extraocular movements intact.      Conjunctiva/sclera: Conjunctivae normal.      Pupils: Pupils are equal, round, and reactive to light.   Cardiovascular:      Rate and Rhythm: Normal rate and regular rhythm.      Pulses: Normal pulses.      Heart sounds: Normal heart sounds.   Pulmonary:      Effort: Pulmonary effort is normal.      Breath sounds: Normal breath sounds.   Abdominal:      General: Bowel sounds are normal.      Palpations: Abdomen is soft.   Musculoskeletal:      Cervical back: Normal range of motion and neck supple.      Comments: Generalized muscle weakness; immobility.    Right sided hemiparesis   Skin:     General: Skin is warm and dry.   Neurological:      Mental Status: She is alert. Mental status is at baseline.      Motor: Weakness present.      Coordination: Coordination abnormal.      Gait: Gait abnormal.      Comments: Right sided weakness--RLE 1/5; RUE 3/5; sensation intact to touch x 4 " extremities.     Left sided facial droop.     Generalized muscle weakness   Psychiatric:         Mood and Affect: Mood normal.         Behavior: Behavior normal.          Assessment/Plan      Ordered CMP and CBC with diff. & TSH for tomorrow     Muscle spasticity; Right shoulder and arm pain; ; previous CVA with right-sided hemiparesis/right sided weakness:  Discontinue Baclofen 5 mg PO Q 8 hours routinely for muscle spasticity.   Start Baclofen 10 mg PO Q 8 hours routinely for muscle spasticity.  Continue Percocet 5-325 mg PO Q 6 hours PRN.  In house palliative Care with [facility] ordered for pain control.     DM type 2:   Continue Lantus 30 units at bedtime and Humalog sliding scale.  Monitor accuchecks.               Monitor Ha1c Q 3 months.    Benign essential HTN; Chronic systolic HF :  Continue Amlodipine and hydrochlorothiazide.  Monitor Bps.         Sequela of Cerebrovascular accident; CVA Sequela; Recent acute/subacute stroke;    dysphagia 2/2 cva; expressive aphasia;   MRI was performed during recent hospitalizaiton it showed Acute/subacute tiny infarct in the left anterior brock.   Neuro was also consulted in the hospital.  Dr. Jiang from [facility] neuro/stroke was consulted.  Patient was not a thrombolytic candidate given Eliquis use.   Pt has baseline dysarthria from a previous CVA and has Hemiparesis.  She also has expressive aphasia, but able to communicate short sentences.  Patient able to nod appropriately to questions.    Continue regular diet, regular texture, thin consistency.          Immobility; Generalized muscle weakness:  Continue at  participating in PT/OT.   Not able to ambulate.  Fall prevention strategies.       Problem List Items Addressed This Visit    None  Visit Diagnoses       Muscle spasticity    -  Primary    Right shoulder pain, unspecified chronicity        Right arm pain        Sequela of cerebrovascular accident        Hemiparesis affecting right side as late effect  of stroke (CMS/[facility])        Type 2 diabetes mellitus with other specified complication, with long-term current use of insulin (CMS/[facility])        Expressive aphasia        Dysphagia, unspecified type        Immobility        Generalized muscle weakness            Maria DelC armen Garduno, APRN-CNP

## 2023-07-24 ENCOUNTER — NURSING HOME VISIT (OUTPATIENT)
Dept: POST ACUTE CARE | Facility: EXTERNAL LOCATION | Age: 53
End: 2023-07-24
Payer: MEDICARE

## 2023-07-24 DIAGNOSIS — I50.22 CHRONIC SYSTOLIC HEART FAILURE (MULTI): ICD-10-CM

## 2023-07-24 DIAGNOSIS — E11.65 TYPE 2 DIABETES MELLITUS WITH HYPERGLYCEMIA, WITH LONG-TERM CURRENT USE OF INSULIN (MULTI): ICD-10-CM

## 2023-07-24 DIAGNOSIS — J06.9 UPPER RESPIRATORY TRACT INFECTION, UNSPECIFIED TYPE: ICD-10-CM

## 2023-07-24 DIAGNOSIS — Z74.09 IMMOBILITY: ICD-10-CM

## 2023-07-24 DIAGNOSIS — R47.01 EXPRESSIVE APHASIA: ICD-10-CM

## 2023-07-24 DIAGNOSIS — I69.30 SEQUELA OF CEREBROVASCULAR ACCIDENT: ICD-10-CM

## 2023-07-24 DIAGNOSIS — R13.10 DYSPHAGIA, UNSPECIFIED TYPE: ICD-10-CM

## 2023-07-24 DIAGNOSIS — M62.81 GENERALIZED MUSCLE WEAKNESS: ICD-10-CM

## 2023-07-24 DIAGNOSIS — R05.9 COUGH, UNSPECIFIED TYPE: ICD-10-CM

## 2023-07-24 DIAGNOSIS — Z86.73 RECENT CEREBROVASCULAR ACCIDENT: ICD-10-CM

## 2023-07-24 DIAGNOSIS — Z79.4 TYPE 2 DIABETES MELLITUS WITH HYPERGLYCEMIA, WITH LONG-TERM CURRENT USE OF INSULIN (MULTI): ICD-10-CM

## 2023-07-24 DIAGNOSIS — D72.829 LEUKOCYTOSIS, UNSPECIFIED TYPE: Primary | ICD-10-CM

## 2023-07-24 DIAGNOSIS — I10 BENIGN ESSENTIAL HTN: ICD-10-CM

## 2023-07-24 PROCEDURE — 99309 SBSQ NF CARE MODERATE MDM 30: CPT | Performed by: NURSE PRACTITIONER

## 2023-07-24 NOTE — LETTER
Patient: Janette Martinez  : 1970    Encounter Date: 2023    Name: Janette Martinez    YOB: 1970    Code Status: DNRcc arrest      Chief complaint:  Leukocytosis; etc.....      HPI:    52-year-old female with past medical history of heart failure with reduced EF,   diabetes, hypertension, hyperlipidemia, HIV,        CVA (left MCA  with residual right-sided hemiparesis), GERD, HIV, hypothyroidism, hypertension,   and diabetes.    Patient re-admitted to [facility]. On 23 after her most recent hospitalization.    Diagnoses as follows:    Leukocytosis; cough; upper respiratory infection:  WBC trends as follows:  23 WBC 14.1 H  23 WBC 10.7  23 WBC 11.6 H  Patient was coughing, chest x-ray performed.   Negative for pneumonia, but at risk for aspiration.   Patient was not feeling well, was coughing.   Patient started on Augmentin on 23 and on prednisolone on 23.  Also on Duonebs PRN.   Patient seen today, she is feeling better.   Denies cough. On RA, no desaturations or fevers reported.  No complaints of chest pain, dizziness or headaches.  Calm, cooperative, mentation at baseline.  Patient is in bed today.    DM type 2 with hyperglycemia:   On Lantus 30 units at bedtime and Humalog sliding scale.  Patient has elevated accuchecks/hyperglycemia since she was started on prednisolone  Recent accuchecks: 234 mg/dL, 400 mg/dL, 365 mg/dL.   No episodes of hypoglycemia reported.   Recent Ha1c 8.1 % on 23.            Chronic systolic HF; Benign essential HTN:          On Amlodipine and hydrochlorothiazide.  Recent /72  No complaints of chest pain.  No headaches no dizziness.   No SOB.           Sequela of Cerebrovascular accident; Recent acute/subacute stroke;    dysphagia 2/2 cva; expressive aphasia;   MRI was performed during recent hospitalizaiton it showed Acute/subacute tiny infarct in the left anterior brock.   [facility]uro [facility] [place]  [place] [facility] [place].  Patient was not a thrombolytic candidate given Eliquis use.   Pt has baseline dysarthria from a previous CVA and has Hemiparesis.  She also has expressive aphasia, but able to communicate short sentences.  Patient able to nod appropriately to questions.   On regular diet, regular texture, thin consistency.         Generalized muscle weakness;  Immobility; :  At  participating in PT/OT.   Not able to ambulate.  Very weak.  Bedbound/wheelchair bound.               Reviewed medical, social, surgical and family history.  Reviewed all current medications and performed medication reconciliation.  Reviewed vital signs AND recent blood work results.  Performed prescription drug management.   Reviewed [facility] Documentation  Discussed patient with nursing and [facility] NP Pili.        ROS: 10 point ROS performed; negative unless noted in HPI.     BMP: Date: 5/1/2023 Na 137 K 4.3 Cr 1.1 BUN 15 glucose 141 Ca 8.8 GFR 52  CBC: Date: 5/1/2023 WBC 8.4 Hgb 11.1 hematocrit 33.9 platelets 337  Liver function: Date: 5/1/2023 ALT 10 AST 29 alkaline phos.  63 bilirubin 0.4 albumin 3.6 protein 7.7       Ha1c 8.1 % on 5/1/23          BMP: Date: 5/15/2023 Na 139  K 3.4 Cr 1.2 BUN 20 glucose 122 Ca 8.8 GFR 57        CBC: Date: 5/15/2023 WBC 9 Hgb 10.2 hematocrit 30.9 platelets 277       Liver function: 5/15/2023 ALT 8 AST 28 alkaline phos.  57 bilirubin 0.5 albumin 3.5 protein 7.3          BMP: Date: 5/22/2023 Na 141 K 3.1 Cr 1 BUN 11 glucose 227 Ca 8.7 GFR 70        CBC: Date: 5/22/2023 WBC 8.2 Hgb 10 hematocrit 30.5 platelets 372        Liver function: Date: 5/22/2023 ALT 8 AST 22 alkaline phos.  63 bilirubin 0.3 albumin 3.2 protein 7.2     BMP: Date: 6/5/2023 Na 140 K 4.2 Cr 1.1 BUN 14 glucose 79 Ca 1.1 GFR 63  CBC: Date: 6/5/2023 WBC 8.1 Hgb 10.5 hematocrit 32.3 platelets 350  Liver function: Date: 6/5/2023 ALT 7 AST 25 alkaline phos.  59 bilirubin 0.4 albumin 3.3  protein 7.7    BMP: Date: 7/17/2023 Na 140 K 4.1 Cr 0.8 BUN 17 glucose 126 Ca 9.1 GFR 90  CBC: Date: 7/17/2023 WBC 14.1 Hgb 10.2 hematocrit 32.1 platelets 265  Liver function: Date: 7/17/2023 ALT 8 AST 23 alkaline phos.  72 bilirubin 0.5 albumin 3.4 protein 7.4    CBC: Date: 7/19/2023 WBC 10.7 Hgb 9.5 hematocrit 28.8 platelets 257    RECENT LABS:  BMP: Date: 7/24/2023 Na 137 K 4.2 Cr 0.9 BUN 19 glucose 186 Ca 9.1 GFR 79  CBC: Date: 7/24/2023 WBC 11.6 Hgb 10.3 hematocrit 31.4 platelets 305  Liver function: Date: 7/24/2023 ALT 11 AST 27 alkaline phos.  77 bilirubin 0.3 albumin 3.6 protein 7.8      Past Medical History:   Diagnosis Date   • Cervicalgia     Neck pain   • Essential (primary) hypertension 07/21/2013    Hypertension   • Pain in unspecified shoulder     Pain, joint, shoulder   • Personal history of other diseases of the digestive system 07/06/2022    History of ulcerative colitis   • Personal history of other diseases of the digestive system     History of esophageal reflux   • Personal history of other diseases of the nervous system and sense organs     History of cataract   • Personal history of other diseases of the nervous system and sense organs     History of glaucoma   • Personal history of other diseases of the respiratory system     History of bronchitis   • Personal history of other endocrine, nutritional and metabolic disease     History of diabetes mellitus   • Personal history of other endocrine, nutritional and metabolic disease     History of hyperlipidemia   • Personal history of other mental and behavioral disorders     History of depression   • Snoring     Snoring   • Unilateral primary osteoarthritis, unspecified knee 11/02/2015    Arthritis of knee           Past Surgical History:   Procedure Laterality Date   • CHOLECYSTECTOMY  01/08/2016    Cholecystectomy   • COLON SURGERY  01/08/2016    Colon Surgery   • CT HEAD ANGIO W AND WO IV CONTRAST  3/19/2019    CT HEAD ANGIO W AND WO IV  CONTRAST 3/19/2019 [facility] [place] [facility]GENCY LEGAC[facility]   • CT NECK ANGIO W AND WO IV CONTRAST  3/19/2019    CT NECK ANGIO W AND WO IV CONTRAST 3/19/2019 [facility] [place] [facility]ERGENCY LEGACY   • HERNIA REPAIR  01/08/2016    Hernia Repair   • HYSTERECTOMY  01/08/2016    Hysterectomy   • MR HEAD ANGIO WO IV CONTRAST  6/4/2018    MR HEAD ANGIO WO IV CONTRAST 6/4/2018 [facility] CLINICAL LEGACY   • MR HEAD ANGIO WO IV CONTRAST  7/11/2020    MR HEAD ANGIO WO IV CONTRAST 7/11/2020 [facility] CLINICAL LEGACY   • MR HEAD ANGIO WO IV CONTRAST  9/30/2020    MR HEAD ANGIO WO IV CONTRAST 9/30/2020 [facility] LEGACY   • MR HEAD ANGIO WO IV CONTRAST  5/22/2022    MR HEAD ANGIO WO IV CONTRAST 5/22/2022 [facility] CLINICAL LEGACY   • MR HEAD ANGIO WO IV CONTRAST  4/19/2023    MR HEAD ANGIO WO IV CONTRAST [facility] MRI   • MR NECK ANGIO WO IV CONTRAST  6/4/2018    MR NECK ANGIO WO IV CONTRAST 6/4/2018 [facility] CLINICAL LEGACY   • MR NECK ANGIO WO IV CONTRAST  7/11/2020    MR NECK ANGIO WO IV CONTRAST 7/11/2020 [facility] CLINICAL LEGACY   • MR NECK ANGIO WO IV CONTRAST  9/30/2020    MR NECK ANGIO WO IV CONTRAST 9/30/2020 [facility] LEGACY   • MR NECK ANGIO WO IV CONTRAST  5/22/2022    MR NECK ANGIO WO IV CONTRAST 5/22/2022 [facility] CLINICAL LEGACY   • MR NECK ANGIO WO IV CONTRAST  4/19/2023    MR NECK ANGIO WO IV CONTRAST [facility] MRI   • OTHER SURGICAL HISTORY  01/08/2016    Tubal Stabilization   • OTHER SURGICAL HISTORY  09/01/2016    Cervical Surgery (Gyn) Laser Vaporization Of Transformation Zone   • THYROID SURGERY  09/27/2013    Thyroid Surgery          Lab Results   Component Value Date    WBC CANCELED 04/22/2023    HGB CANCELED 04/22/2023    HCT CANCELED 04/22/2023    PLT CANCELED 04/22/2023    CHOL 129 08/07/2022    TRIG 182 (H) 08/07/2022    HDL 31.0 (A) 08/07/2022    ALT 8 04/21/2023    AST 23 04/21/2023    NA CANCELED 04/22/2023    K CANCELED 04/22/2023    CL CANCELED 04/22/2023    CREATININE CANCELED 04/22/2023    BUN CANCELED 04/22/2023    CO2 CANCELED 04/22/2023    TSH 0.32 (L) 04/20/2023    INR  "CANCELED 04/19/2023    HGBA1C 8.6 (A) 04/20/2023     Surgical History  Problems    · History of Cervical Surgery (Gyn) Laser Vaporization Of Transformation Zone   · History of Cholecystectomy   · History of Colon Surgery   · History of Hernia Repair   · History of Hysterectomy   · History of Thyroid Surgery   · History of Tubal Stabilization     Family History  Mother    · Family history of Cancer  Family History    · Family history of Diabetes Mellitus (V18.0)   · Family history of Hypertension (V17.49)     Social History  Problems    · Disabled   · Does not use illicit drugs (V49.89) (Z78.9)   · Former smoker (V15.82) (Z87.891)   · Marital History - Single   · No alcohol.   · Denied: History of Secondhand smoke exposure   · Single     Allergies  Medication    · Aspirin TABS   · Contrast Media Ready-Box MISC   · morphine   · Sulfa Drugs        /72   Pulse 76   Temp 36.1 °C (97 °F)   Resp 18   Ht 1.372 m (4' 6\")   Wt 105 kg (231 lb 6.4 oz)   SpO2 97%   BMI 55.79 kg/m²      Physical Exam  Vitals and nursing note reviewed.   Constitutional:       Appearance: Normal appearance.   HENT:      Head: Normocephalic.      Right Ear: External ear normal.      Left Ear: External ear normal.      Nose: Nose normal.      Mouth/Throat:      Mouth: Mucous membranes are moist.      Pharynx: Oropharynx is clear.   Eyes:      Extraocular Movements: Extraocular movements intact.      Conjunctiva/sclera: Conjunctivae normal.      Pupils: Pupils are equal, round, and reactive to light.   Cardiovascular:      Rate and Rhythm: Normal rate and regular rhythm.      Pulses: Normal pulses.      Heart sounds: Normal heart sounds.   Pulmonary:      Effort: Pulmonary effort is normal.      Breath sounds: Normal breath sounds.   Abdominal:      General: Bowel sounds are normal.      Palpations: Abdomen is soft.   Musculoskeletal:      Cervical back: Normal range of motion and neck supple.      Comments: Generalized muscle weakness; " immobility.    Right sided hemiparesis   Skin:     General: Skin is warm and dry.   Neurological:      Mental Status: She is alert. Mental status is at baseline.      Motor: Weakness present.      Coordination: Coordination abnormal.      Gait: Gait abnormal.      Comments: Right sided weakness--RLE 1/5; RUE 3/5; sensation intact to touch x 4 extremities.     Left sided facial droop.     Generalized muscle weakness   Psychiatric:         Mood and Affect: Mood normal.         Behavior: Behavior normal.          Assessment/Plan     Ordered CMP, CBC with diff., Ha1c and lipid panel for tomorrow.    Leukocytosis; upper respiratory infection; cough:  Monitor WBC.  Continue Augmentin as ordered end date 7/25/23      Continue prednisolone as ordered end date 7/26/23.  Continue Duonebs PRN.      DM type 2 with hyperglycemia:   Discontinue Lantus 30 units at bedtime  Start Lantus 36 units subcutaneous at bedtime.   Continue Humalog sliding scale.  Monitor accuchecks.   Ordered Ha1c for tomorrow.             Chronic systolic HF; Benign essential HTN:          Continue Amlodipine and hydrochlorothiazide.          Monitor Bps.          Sequela of Cerebrovascular accident; Recent acute/subacute stroke;    dysphagia 2/2 cva; expressive aphasia;   MRI was performed during recent hospitalization it showed Acute/subacute tiny infarct in the left anterior brock.   Neuro was also consulted in the hospital.  Dr. Jiang from [facility] [place] was consulted.  Patient was not a thrombolytic candidate given Eliquis use.   Pt has baseline dysarthria from a previous CVA and has Hemiparesis.  She also has expressive aphasia, but able to communicate short sentences.  Patient able to nod appropriately to questions.   On regular diet, regular texture, thin consistency.         Generalized muscle weakness;  Immobility; :  At  participating in PT/OT.   Not able to ambulate.  Very weak.  Bedbound/wheelchair bound.         Problem List Items  Addressed This Visit    None  Visit Diagnoses       Leukocytosis, unspecified type    -  Primary    Upper respiratory tract infection, unspecified type        Cough, unspecified type        Type 2 diabetes mellitus with hyperglycemia, with long-term current use of insulin (CMS/[facility])        Chronic systolic heart failure (CMS/[facility])        Benign essential HTN        Sequela of cerebrovascular accident        Recent cerebrovascular accident        Dysphagia, unspecified type        Expressive aphasia        Generalized muscle weakness        Immobility            JASON Espitia       Electronically Signed By: JASON Espitia   7/25/23  8:41 PM

## 2023-07-25 VITALS
HEART RATE: 76 BPM | WEIGHT: 231.4 LBS | RESPIRATION RATE: 18 BRPM | DIASTOLIC BLOOD PRESSURE: 72 MMHG | OXYGEN SATURATION: 97 % | BODY MASS INDEX: 53.55 KG/M2 | HEIGHT: 55 IN | SYSTOLIC BLOOD PRESSURE: 130 MMHG | TEMPERATURE: 97 F

## 2023-07-25 NOTE — PROGRESS NOTES
Name: Janette Martinez    YOB: 1970    Code Status: DNRcc arrest      Chief complaint:  Leukocytosis; etc.....      HPI:    52-year-old female with past medical history of heart failure with reduced EF,   diabetes, hypertension, hyperlipidemia, HIV,        CVA (left MCA 2022 with residual right-sided hemiparesis), GERD, HIV, hypothyroidism, hypertension,   and diabetes.    Patient re-admitted to [facility]. On 4/21/23 after her most recent hospitalization.    Diagnoses as follows:    Leukocytosis; cough; upper respiratory infection:  WBC trends as follows:  7/17/23 WBC 14.1 H  7/19/23 WBC 10.7  7/24/23 WBC 11.6 H  Patient was coughing, chest x-ray performed.   Negative for pneumonia, but at risk for aspiration.   Patient was not feeling well, was coughing.   Patient started on Augmentin on 7/18/23 and on prednisolone on 7/21/23.  Also on Duonebs PRN.   Patient seen today, she is feeling better.   Denies cough. On RA, no desaturations or fevers reported.  No complaints of chest pain, dizziness or headaches.  Calm, cooperative, mentation at baseline.  Patient is in bed today.    DM type 2 with hyperglycemia:   On Lantus 30 units at bedtime and Humalog sliding scale.  Patient has elevated accuchecks/hyperglycemia since she was started on prednisolone  Recent accuchecks: 234 mg/dL, 400 mg/dL, 365 mg/dL.   No episodes of hypoglycemia reported.   Recent Ha1c 8.1 % on 5/1/23.            Chronic systolic HF; Benign essential HTN:          On Amlodipine and hydrochlorothiazide.  Recent /72  No complaints of chest pain.  No headaches no dizziness.   No SOB.           Sequela of Cerebrovascular accident; Recent acute/subacute stroke;    dysphagia 2/2 cva; expressive aphasia;   MRI was performed during recent hospitalizaiton it showed Acute/subacute tiny infarct in the left anterior brock.   Neuro was also consulted in the hospital.  Dr. Jiang from [facility] [place] neuro/stroke was consulted.  Patient  was not a thrombolytic candidate given Eliquis use.   Pt has baseline dysarthria from a previous CVA and has Hemiparesis.  She also has expressive aphasia, but able to communicate short sentences.  Patient able to nod appropriately to questions.   On regular diet, regular texture, thin consistency.         Generalized muscle weakness;  Immobility; :  At [facility] participating in PT/OT.   Not able to ambulate.  Very weak.  Bedbound/wheelchair bound.               Reviewed medical, social, surgical and family history.  Reviewed all current medications and performed medication reconciliation.  Reviewed vital signs AND recent blood work results.  Performed prescription drug management.   Reviewed [facility] Documentation  Discussed patient with nursing and [facility] NP Pili.        ROS: 10 point ROS performed; negative unless noted in HPI.     BMP: Date: 5/1/2023 Na 137 K 4.3 Cr 1.1 BUN 15 glucose 141 Ca 8.8 GFR 52  CBC: Date: 5/1/2023 WBC 8.4 Hgb 11.1 hematocrit 33.9 platelets 337  Liver function: Date: 5/1/2023 ALT 10 AST 29 alkaline phos.  63 bilirubin 0.4 albumin 3.6 protein 7.7       Ha1c 8.1 % on 5/1/23          BMP: Date: 5/15/2023 Na 139  K 3.4 Cr 1.2 BUN 20 glucose 122 Ca 8.8 GFR 57        CBC: Date: 5/15/2023 WBC 9 Hgb 10.2 hematocrit 30.9 platelets 277       Liver function: 5/15/2023 ALT 8 AST 28 alkaline phos.  57 bilirubin 0.5 albumin 3.5 protein 7.3          BMP: Date: 5/22/2023 Na 141 K 3.1 Cr 1 BUN 11 glucose 227 Ca 8.7 GFR 70        CBC: Date: 5/22/2023 WBC 8.2 Hgb 10 hematocrit 30.5 platelets 372        Liver function: Date: 5/22/2023 ALT 8 AST 22 alkaline phos.  63 bilirubin 0.3 albumin 3.2 protein 7.2     BMP: Date: 6/5/2023 Na 140 K 4.2 Cr 1.1 BUN 14 glucose 79 Ca 1.1 GFR 63  CBC: Date: 6/5/2023 WBC 8.1 Hgb 10.5 hematocrit 32.3 platelets 350  Liver function: Date: 6/5/2023 ALT 7 AST 25 alkaline phos.  59 bilirubin 0.4 albumin 3.3 protein 7.7    BMP: Date: 7/17/2023 Na 140 K 4.1 Cr 0.8 BUN 17 glucose  126 Ca 9.1 GFR 90  CBC: Date: 7/17/2023 WBC 14.1 Hgb 10.2 hematocrit 32.1 platelets 265  Liver function: Date: 7/17/2023 ALT 8 AST 23 alkaline phos.  72 bilirubin 0.5 albumin 3.4 protein 7.4    CBC: Date: 7/19/2023 WBC 10.7 Hgb 9.5 hematocrit 28.8 platelets 257    RECENT LABS:  BMP: Date: 7/24/2023 Na 137 K 4.2 Cr 0.9 BUN 19 glucose 186 Ca 9.1 GFR 79  CBC: Date: 7/24/2023 WBC 11.6 Hgb 10.3 hematocrit 31.4 platelets 305  Liver function: Date: 7/24/2023 ALT 11 AST 27 alkaline phos.  77 bilirubin 0.3 albumin 3.6 protein 7.8      Past Medical History:   Diagnosis Date    Cervicalgia     Neck pain    Essential (primary) hypertension 07/21/2013    Hypertension    Pain in unspecified shoulder     Pain, joint, shoulder    Personal history of other diseases of the digestive system 07/06/2022    History of ulcerative colitis    Personal history of other diseases of the digestive system     History of esophageal reflux    Personal history of other diseases of the nervous system and sense organs     History of cataract    Personal history of other diseases of the nervous system and sense organs     History of glaucoma    Personal history of other diseases of the respiratory system     History of bronchitis    Personal history of other endocrine, nutritional and metabolic disease     History of diabetes mellitus    Personal history of other endocrine, nutritional and metabolic disease     History of hyperlipidemia    Personal history of other mental and behavioral disorders     History of depression    Snoring     Snoring    Unilateral primary osteoarthritis, unspecified knee 11/02/2015    Arthritis of knee           Past Surgical History:   Procedure Laterality Date    CHOLECYSTECTOMY  01/08/2016    Cholecystectomy    COLON SURGERY  01/08/2016    Colon Surgery    CT HEAD ANGIO W AND WO IV CONTRAST  3/19/2019    CT HEAD ANGIO W AND WO IV CONTRAST 3/19/2019 [facility] [place] [facility]    CT NECK ANGIO W AND WO IV CONTRAST  3/19/2019     CT NECK ANGIO W AND WO IV CONTRAST 3/19/2019 [facility] EMERGENCY LEGACY    HERNIA REPAIR  01/08/2016    Hernia Repair    HYSTERECTOMY  01/08/2016    Hysterectomy    MR HEAD ANGIO WO IV CONTRAST  6/4/2018    MR HEAD ANGIO WO IV CONTRAST 6/4/2018 [facility] CLINICAL LEGACY    MR HEAD ANGIO WO IV CONTRAST  7/11/2020    MR HEAD ANGIO WO IV CONTRAST 7/11/2020 [facility] CLINICAL LEGACY    MR HEAD ANGIO WO IV CONTRAST  9/30/2020    MR HEAD ANGIO WO IV CONTRAST 9/30/2020 [facility] AIB LEGACY    MR HEAD ANGIO WO IV CONTRAST  5/22/2022    MR HEAD ANGIO WO IV CONTRAST 5/22/2022 [facility] CLINICAL LEGACY    MR HEAD ANGIO WO IV CONTRAST  4/19/2023    MR HEAD ANGIO WO IV CONTRAST [facility] MRI    MR NECK ANGIO WO IV CONTRAST  6/4/2018    MR NECK ANGIO WO IV CONTRAST 6/4/2018 [facility] CLINICAL LEGACY    MR NECK ANGIO WO IV CONTRAST  7/11/2020    MR NECK ANGIO WO IV CONTRAST 7/11/2020 [facility] CLINICAL LEGACY    MR NECK ANGIO WO IV CONTRAST  9/30/2020    MR NECK ANGIO WO IV CONTRAST 9/30/2020 [facility]IB LEGACY    MR NECK ANGIO WO IV CONTRAST  5/22/2022    MR NECK ANGIO WO IV CONTRAST 5/22/2022 [facility] CLINICAL LEGACY    MR NECK ANGIO WO IV CONTRAST  4/19/2023    MR NECK ANGIO WO IV CONTRAST [facility] MRI    OTHER SURGICAL HISTORY  01/08/2016    Tubal Stabilization    OTHER SURGICAL HISTORY  09/01/2016    Cervical Surgery (Gyn) Laser Vaporization Of Transformation Zone    THYROID SURGERY  09/27/2013    Thyroid Surgery          Lab Results   Component Value Date    WBC CANCELED 04/22/2023    HGB CANCELED 04/22/2023    HCT CANCELED 04/22/2023    PLT CANCELED 04/22/2023    CHOL 129 08/07/2022    TRIG 182 (H) 08/07/2022    HDL 31.0 (A) 08/07/2022    ALT 8 04/21/2023    AST 23 04/21/2023    NA CANCELED 04/22/2023    K CANCELED 04/22/2023    CL CANCELED 04/22/2023    CREATININE CANCELED 04/22/2023    BUN CANCELED 04/22/2023    CO2 CANCELED 04/22/2023    TSH 0.32 (L) 04/20/2023    INR CANCELED 04/19/2023    HGBA1C 8.6 (A) 04/20/2023     Surgical History  Problems    · History of Cervical  "Surgery (Gyn) Laser Vaporization Of Transformation Zone   · History of Cholecystectomy   · History of Colon Surgery   · History of Hernia Repair   · History of Hysterectomy   · History of Thyroid Surgery   · History of Tubal Stabilization     Family History  Mother    · Family history of Cancer  Family History    · Family history of Diabetes Mellitus (V18.0)   · Family history of Hypertension (V17.49)     Social History  Problems    · Disabled   · Does not use illicit drugs (V49.89) (Z78.9)   · Former smoker (V15.82) (Z87.891)   · Marital History - Single   · No alcohol.   · Denied: History of Secondhand smoke exposure   · Single     Allergies  Medication    · Aspirin TABS   · Contrast Media Ready-Box MISC   · morphine   · Sulfa Drugs        /72   Pulse 76   Temp 36.1 °C (97 °F)   Resp 18   Ht 1.372 m (4' 6\")   Wt 105 kg (231 lb 6.4 oz)   SpO2 97%   BMI 55.79 kg/m²      Physical Exam  Vitals and nursing note reviewed.   Constitutional:       Appearance: Normal appearance.   HENT:      Head: Normocephalic.      Right Ear: External ear normal.      Left Ear: External ear normal.      Nose: Nose normal.      Mouth/Throat:      Mouth: Mucous membranes are moist.      Pharynx: Oropharynx is clear.   Eyes:      Extraocular Movements: Extraocular movements intact.      Conjunctiva/sclera: Conjunctivae normal.      Pupils: Pupils are equal, round, and reactive to light.   Cardiovascular:      Rate and Rhythm: Normal rate and regular rhythm.      Pulses: Normal pulses.      Heart sounds: Normal heart sounds.   Pulmonary:      Effort: Pulmonary effort is normal.      Breath sounds: Normal breath sounds.   Abdominal:      General: Bowel sounds are normal.      Palpations: Abdomen is soft.   Musculoskeletal:      Cervical back: Normal range of motion and neck supple.      Comments: Generalized muscle weakness; immobility.    Right sided hemiparesis   Skin:     General: Skin is warm and dry.   Neurological:      " Mental Status: She is alert. Mental status is at baseline.      Motor: Weakness present.      Coordination: Coordination abnormal.      Gait: Gait abnormal.      Comments: Right sided weakness--RLE 1/5; RUE 3/5; sensation intact to touch x 4 extremities.     Left sided facial droop.     Generalized muscle weakness   Psychiatric:         Mood and Affect: Mood normal.         Behavior: Behavior normal.          Assessment/Plan      Ordered CMP, CBC with diff., Ha1c and lipid panel for tomorrow.    Leukocytosis; upper respiratory infection; cough:  Monitor WBC.  Continue Augmentin as ordered end date 7/25/23      Continue prednisolone as ordered end date 7/26/23.  Continue Duonebs PRN.      DM type 2 with hyperglycemia:   Discontinue Lantus 30 units at bedtime  Start Lantus 36 units subcutaneous at bedtime.   Continue Humalog sliding scale.  Monitor accuchecks.   Ordered Ha1c for tomorrow.             Chronic systolic HF; Benign essential HTN:          Continue Amlodipine and hydrochlorothiazide.          Monitor Bps.          Sequela of Cerebrovascular accident; Recent acute/subacute stroke;    dysphagia 2/2 cva; expressive aphasia;   MRI was performed during recent hospitalization it showed Acute/subacute tiny infarct in the left anterior brock.   Neuro was also consulted in the hospital.  Dr. Jiang from [facility] neuro/stroke was consulted.  Patient was not a thrombolytic candidate given Eliquis use.   Pt has baseline dysarthria from a previous CVA and has Hemiparesis.  She also has expressive aphasia, but able to communicate short sentences.  Patient able to nod appropriately to questions.   On regular diet, regular texture, thin consistency.         Generalized muscle weakness;  Immobility; :  At  participating in PT/OT.   Not able to ambulate.  Very weak.  Bedbound/wheelchair bound.         Problem List Items Addressed This Visit    None  Visit Diagnoses       Leukocytosis, unspecified type    -  Primary     Upper respiratory tract infection, unspecified type        Cough, unspecified type        Type 2 diabetes mellitus with hyperglycemia, with long-term current use of insulin (CMS/HCC)        Chronic systolic heart failure (CMS/HCC)        Benign essential HTN        Sequela of cerebrovascular accident        Recent cerebrovascular accident        Dysphagia, unspecified type        Expressive aphasia        Generalized muscle weakness        Immobility            Maria Del Carmen Garduno, APRN-CNP

## 2023-07-31 ENCOUNTER — NURSING HOME VISIT (OUTPATIENT)
Dept: POST ACUTE CARE | Facility: EXTERNAL LOCATION | Age: 53
End: 2023-07-31
Payer: MEDICARE

## 2023-07-31 DIAGNOSIS — I69.30 SEQUELA OF CEREBROVASCULAR ACCIDENT: ICD-10-CM

## 2023-07-31 DIAGNOSIS — Z74.09 IMMOBILITY: ICD-10-CM

## 2023-07-31 DIAGNOSIS — I10 BENIGN ESSENTIAL HTN: ICD-10-CM

## 2023-07-31 DIAGNOSIS — R13.10 DYSPHAGIA, UNSPECIFIED TYPE: ICD-10-CM

## 2023-07-31 DIAGNOSIS — Z79.4 TYPE 2 DIABETES MELLITUS WITH HYPERGLYCEMIA, WITH LONG-TERM CURRENT USE OF INSULIN (MULTI): Primary | ICD-10-CM

## 2023-07-31 DIAGNOSIS — J06.9 UPPER RESPIRATORY TRACT INFECTION, UNSPECIFIED TYPE: ICD-10-CM

## 2023-07-31 DIAGNOSIS — I69.351 HEMIPARESIS AFFECTING RIGHT SIDE AS LATE EFFECT OF STROKE (MULTI): ICD-10-CM

## 2023-07-31 DIAGNOSIS — I50.22 CHRONIC SYSTOLIC HEART FAILURE (MULTI): ICD-10-CM

## 2023-07-31 DIAGNOSIS — D72.829 LEUKOCYTOSIS, UNSPECIFIED TYPE: ICD-10-CM

## 2023-07-31 DIAGNOSIS — R05.9 COUGH, UNSPECIFIED TYPE: ICD-10-CM

## 2023-07-31 DIAGNOSIS — E03.9 HYPOTHYROIDISM, UNSPECIFIED TYPE: ICD-10-CM

## 2023-07-31 DIAGNOSIS — E11.65 TYPE 2 DIABETES MELLITUS WITH HYPERGLYCEMIA, WITH LONG-TERM CURRENT USE OF INSULIN (MULTI): Primary | ICD-10-CM

## 2023-07-31 DIAGNOSIS — M62.81 GENERALIZED MUSCLE WEAKNESS: ICD-10-CM

## 2023-07-31 DIAGNOSIS — Z86.73 RECENT CEREBROVASCULAR ACCIDENT: ICD-10-CM

## 2023-07-31 PROCEDURE — 99309 SBSQ NF CARE MODERATE MDM 30: CPT | Performed by: NURSE PRACTITIONER

## 2023-07-31 NOTE — LETTER
Patient: Janette Martinez  : 1970    Encounter Date: 2023    Name: Janette Martinez    YOB: 1970    Code Status: DNRcc arrest      Chief complaint:  Follow up on DM type 2 with hyperglycemia; etc.....      HPI:    52-year-old female with past medical history of heart failure with reduced EF,   diabetes, hypertension, hyperlipidemia, HIV,        CVA (left MCA  with residual right-sided hemiparesis), GERD, HIV, hypothyroidism, hypertension,   and diabetes.    Patient re-admitted to [facility]. On 23 after her most recent hospitalization.    Diagnoses as follows:    DM type 2 with hyperglycemia:   On Lantus 36 units at bedtime and Humalog sliding scale.  Patient has elevated accuchecks/hyperglycemia since she was started on prednisolone  Recent accuchecks: 382 mg/dL, 294 mg/dL, 179 mg/dL.   No episodes of hypoglycemia reported.   Recent Ha1c 8.1 % on 23.  Patient seen today, she is in bed.   Calm, cooperative, mentation at baseline.  Follows commands.  Reviewed importance  of being on a diabetic diet and of eating/drinking appropriate foods due to hyperglycemia.        Leukocytosis; cough; upper respiratory infection:  WBC trends as follows:  23 WBC 14.1 H  23 WBC 10.7  23 WBC 11.6 H  23 WBC 12.3 H   Patient was coughing, chest x-ray performed.   Negative for pneumonia, but at risk for aspiration.   Patient was not feeling well, was coughing.   Patient was treated with Augmentin and on prednisolone.  Also on Duonebs PRN.   Denies cough today.  On RA, no desaturations or fevers reported.  No complaints of chest pain, dizziness or headaches.            Benign essential HTN; Chronic systolic HF; :          On Amlodipine and hydrochlorothiazide.  Recent /68.  No complaints of chest pain.  No headaches no dizziness.   No SOB.         Hypothyroidism:       On levothyroxine.        23 TSH 1.610 wnl        Sequela of Cerebrovascular accident;  Recent acute/subacute stroke; right hemiparesis;   dysphagia 2/2 cva; expressive aphasia;   [facility] showed Acute/subacute tiny infarct in the left anterior brock.   Neuro was also consulted in the hospital.  Dr. Jiang from [facility] neuro/stroke was consulted.  Pt has baseline dysarthria from a previous CVA and has Hemiparesis.  She also has expressive aphasia, but able to communicate short sentences.  Patient able to nod appropriately to questions.   On regular diet, regular texture, thin consistency.         Immobility; Generalized muscle weakness;  :  At  participating in PT/OT.   Not able to ambulate.  Very weak.  Bedbound/wheelchair bound.               Reviewed medical, social, surgical and family history.  Reviewed all current medications and performed medication reconciliation.  Reviewed vital signs AND recent blood work results.  Performed prescription drug management.   Reviewed [facility] Documentation  Discussed patient with nursing and [facility] NP Pili.        ROS: 10 point ROS performed; negative unless noted in HPI.     BMP: Date: 5/1/2023 Na 137 K 4.3 Cr 1.1 BUN 15 glucose 141 Ca 8.8 GFR 52  CBC: Date: 5/1/2023 WBC 8.4 Hgb 11.1 hematocrit 33.9 platelets 337  Liver function: Date: 5/1/2023 ALT 10 AST 29 alkaline phos.  63 bilirubin 0.4 albumin 3.6 protein 7.7       Ha1c 8.1 % on 5/1/23          BMP: Date: 5/15/2023 Na 139  K 3.4 Cr 1.2 BUN 20 glucose 122 Ca 8.8 GFR 57        CBC: Date: 5/15/2023 WBC 9 Hgb 10.2 hematocrit 30.9 platelets 277       Liver function: 5/15/2023 ALT 8 AST 28 alkaline phos.  57 bilirubin 0.5 albumin 3.5 protein 7.3          BMP: Date: 5/22/2023 Na 141 K 3.1 Cr 1 BUN 11 glucose 227 Ca 8.7 GFR 70        CBC: Date: 5/22/2023 WBC 8.2 Hgb 10 hematocrit 30.5 platelets 372        Liver function: Date: 5/22/2023 ALT 8 AST 22 alkaline phos.  63 bilirubin 0.3 albumin 3.2 protein 7.2     BMP: Date: 6/5/2023 Na 140 K 4.2 Cr 1.1 BUN 14  glucose 79 Ca 1.1 GFR 63  CBC: Date: 6/5/2023 WBC 8.1 Hgb 10.5 hematocrit 32.3 platelets 350  Liver function: Date: 6/5/2023 ALT 7 AST 25 alkaline phos.  59 bilirubin 0.4 albumin 3.3 protein 7.7    BMP: Date: 7/17/2023 Na 140 K 4.1 Cr 0.8 BUN 17 glucose 126 Ca 9.1 GFR 90  CBC: Date: 7/17/2023 WBC 14.1 Hgb 10.2 hematocrit 32.1 platelets 265  Liver function: Date: 7/17/2023 ALT 8 AST 23 alkaline phos.  72 bilirubin 0.5 albumin 3.4 protein 7.4    CBC: Date: 7/19/2023 WBC 10.7 Hgb 9.5 hematocrit 28.8 platelets 257    BMP: Date: 7/24/2023 Na 137 K 4.2 Cr 0.9 BUN 19 glucose 186 Ca 9.1 GFR 79  CBC: Date: 7/24/2023 WBC 11.6 Hgb 10.3 hematocrit 31.4 platelets 305  Liver function: Date: 7/24/2023 ALT 11 AST 27 alkaline phos.  77 bilirubin 0.3 albumin 3.6 protein 7.8    BMP: Date: 7/31/2023 Na 141 K 4.2 Cr 0.8 BUN 17 glucose 138 Ca 8.7 GFR 90  CBC: Date: 7/31/2023 WBC 12.3 Hgb 10.5 hematocrit 32.4 platelets 307  Liver function: Date: 7/31/2023 ALT 14 AST 24 alkaline phos.  81 bilirubin 0.4 albumin 3.5 protein 7.4        Past Medical History:   Diagnosis Date   • Cervicalgia     Neck pain   • Essential (primary) hypertension 07/21/2013    Hypertension   • Pain in unspecified shoulder     Pain, joint, shoulder   • Personal history of other diseases of the digestive system 07/06/2022    History of ulcerative colitis   • Personal history of other diseases of the digestive system     History of esophageal reflux   • Personal history of other diseases of the nervous system and sense organs     History of cataract   • Personal history of other diseases of the nervous system and sense organs     History of glaucoma   • Personal history of other diseases of the respiratory system     History of bronchitis   • Personal history of other endocrine, nutritional and metabolic disease     History of diabetes mellitus   • Personal history of other endocrine, nutritional and metabolic disease     History of hyperlipidemia   • Personal history  of other mental and behavioral disorders     History of depression   • Snoring     Snoring   • Unilateral primary osteoarthritis, unspecified knee 11/02/2015    Arthritis of knee           Past Surgical History:   Procedure Laterality Date   • CHOLECYSTECTOMY  01/08/2016    Cholecystectomy   • COLON SURGERY  01/08/2016    Colon Surgery   • CT HEAD ANGIO W AND WO IV CONTRAST  3/19/2019    CT HEAD ANGIO W AND WO [place] [facility] [place]   • CT NECK ANGIO W AND WO IV CONTRAST  3/19/2019    CT NECK ANGIO W AND WO IV CONTRAST [place] [facility] [place]   • HERNIA REPAIR  01/08/2016    Hernia Repair   • HYSTERECTOMY  01/08/2016    Hysterectomy   • MR HEAD ANGIO WO IV CONTRAST  6/4/2018    MR HEAD ANGIO WO IV CONTRAST 6/4/2018 [facility] CLINICAL LEGAC[facility]   • MR HEAD ANGIO WO IV CONTRAST  7/11/2020    MR HEAD ANGIO WO IV CONTRAST 7/11/2020 [facility] CLINICAL LEGAC[facility]   • MR HEAD ANGIO WO IV CONTRAST  9/30/2020    MR HEAD ANGIO WO IV CONTRAST 9/30/2020 [facility]   • MR HEAD ANGIO WO IV CONTRAST  5/22/2022    MR HEAD ANGIO WO IV CONTRAST 5/22/2022 [facility] CLINICAL LEGACY   • MR HEAD ANGIO WO IV CONTRAST  4/19/2023    MR HEAD ANGIO WO IV CONTRAST [facility]   • MR NECK ANGIO WO IV CONTRAST  6/4/2018    MR NECK ANGIO WO IV CONTRAST 6/4/2018 [facility] CLINICAL LEGACY   • MR NECK ANGIO WO IV CONTRAST  7/11/2020    MR NECK ANGIO WO IV CONTRAST 7/11/2020 [facility] CL[facility]ICAL LEGAC[facility]   • MR NECK ANGIO WO IV CONTRAST  9/30/2020    MR NECK ANGIO WO IV CONTRAST 9/30/2020 [facility]   • MR NECK ANGIO WO IV CONTRAST  5/22/2022    MR NECK ANGIO WO IV CONTRAST 5/22/2022 [facility] CLINICAL LEGAC[facility]   • MR NECK ANGIO WO IV CONTRAST  4/19/2023    MR NECK ANGIO WO IV CONTRAST [facility] MRI   • OTHER SURGICAL HISTORY  01/08/2016    Tubal Stabilization   • OTHER SURGICAL HISTORY  09/01/2016    Cervical Surgery (Gyn) Laser Vaporization Of Transformation Zone   • THYROID SURGERY  09/27/2013    Thyroid Surgery          Lab Results   Component Value Date    WBC  "CANCELED 04/22/2023    HGB CANCELED 04/22/2023    HCT CANCELED 04/22/2023    PLT CANCELED 04/22/2023    CHOL 129 08/07/2022    TRIG 182 (H) 08/07/2022    HDL 31.0 (A) 08/07/2022    ALT 8 04/21/2023    AST 23 04/21/2023    NA CANCELED 04/22/2023    K CANCELED 04/22/2023    CL CANCELED 04/22/2023    CREATININE CANCELED 04/22/2023    BUN CANCELED 04/22/2023    CO2 CANCELED 04/22/2023    TSH 0.32 (L) 04/20/2023    INR CANCELED 04/19/2023    HGBA1C 8.6 (A) 04/20/2023     Surgical History  Problems    · History of Cervical Surgery (Gyn) Laser Vaporization Of Transformation Zone   · History of Cholecystectomy   · History of Colon Surgery   · History of Hernia Repair   · History of Hysterectomy   · History of Thyroid Surgery   · History of Tubal Stabilization     Family History  Mother    · Family history of Cancer  Family History    · Family history of Diabetes Mellitus (V18.0)   · Family history of Hypertension (V17.49)     Social History  Problems    · Disabled   · Does not use illicit drugs (V49.89) (Z78.9)   · Former smoker (V15.82) (Z87.891)   · Marital History - Single   · No alcohol.   · Denied: History of Secondhand smoke exposure   · Single     Allergies  Medication    · Aspirin TABS   · Contrast Media Ready-Box MISC   · morphine   · Sulfa Drugs        /68   Pulse 74   Temp 36.4 °C (97.5 °F)   Resp 18   Ht 1.372 m (4' 6\")   Wt 105 kg (231 lb 6.4 oz)   SpO2 97%   BMI 55.79 kg/m²      Physical Exam  Vitals and nursing note reviewed.   Constitutional:       Appearance: Normal appearance.   HENT:      Head: Normocephalic.      Right Ear: External ear normal.      Left Ear: External ear normal.      Nose: Nose normal.      Mouth/Throat:      Mouth: Mucous membranes are moist.      Pharynx: Oropharynx is clear.   Eyes:      Extraocular Movements: Extraocular movements intact.      Conjunctiva/sclera: Conjunctivae normal.      Pupils: Pupils are equal, round, and reactive to light.   Cardiovascular:      " Rate and Rhythm: Normal rate and regular rhythm.      Pulses: Normal pulses.      Heart sounds: Normal heart sounds.   Pulmonary:      Effort: Pulmonary effort is normal.      Breath sounds: Normal breath sounds.   Abdominal:      General: Bowel sounds are normal.      Palpations: Abdomen is soft.   Musculoskeletal:      Cervical back: Normal range of motion and neck supple.      Comments: Generalized muscle weakness; immobility.    Right sided hemiparesis   Skin:     General: Skin is warm and dry.   Neurological:      Mental Status: She is alert. Mental status is at baseline.      Motor: Weakness present.      Coordination: Coordination abnormal.      Gait: Gait abnormal.      Comments: Right sided weakness--RLE 1/5; RUE 3/5; sensation intact to touch x 4 extremities.     Left sided facial droop.     Generalized muscle weakness   Psychiatric:         Mood and Affect: Mood normal.         Behavior: Behavior normal.          Assessment/Plan     Ordered BMP and CBC with diff. For tomorrow.    DM type 2 with hyperglycemia:   DC Lantus 36 units subcutaneous at bedtime.  Start Lantus 40 units subcutaneous at bedtime.   Continue Humalog sliding scale.           Monitor accuchecks.           Monitor Ha1c Q 3 months.         Leukocytosis; cough; upper respiratory infection:      Monitor WBC.  Patient was treated with Augmentin and on prednisolone.  Continue Duonebs PRN.         Benign essential HTN; Chronic systolic HF; :      Continue amlodipine and hydrochlorothiazide.      Continue Bps.         Hypothyroidism:       Continue levothyroxine.        Monitor TSH.        Sequela of Cerebrovascular accident; Recent acute/subacute stroke; right hemiparesis;   dysphagia 2/2 cva; expressive aphasia;  Dr. Jiang from [facility] [place] was consulted during recent hospitalization.       Monitor for s/s of aspiration.         Immobility; Generalized muscle weakness;  :  Continue at  in skilled services if indicated and/or long  term care.   Not able to ambulate.  Bedbound/wheelchair bound.   Fall prevention strategies.       Problem List Items Addressed This Visit    None  Visit Diagnoses       Type 2 diabetes mellitus with hyperglycemia, with long-term current use of insulin (CMS/[facility])    -  Primary    Leukocytosis, unspecified type        Cough, unspecified type        Upper respiratory tract infection, unspecified type        Benign essential HTN        Chronic systolic heart failure (CMS/[facility])        Hypothyroidism, unspecified type        Sequela of cerebrovascular accident        Recent cerebrovascular accident        Hemiparesis affecting right side as late effect of stroke (CMS/[facility])        Dysphagia, unspecified type        Immobility        Generalized muscle weakness            JASON Espitia       Electronically Signed By: JASON Espitia   8/1/23  9:31 PM

## 2023-08-01 VITALS
DIASTOLIC BLOOD PRESSURE: 68 MMHG | TEMPERATURE: 97.5 F | OXYGEN SATURATION: 97 % | HEIGHT: 55 IN | BODY MASS INDEX: 53.55 KG/M2 | HEART RATE: 74 BPM | WEIGHT: 231.4 LBS | SYSTOLIC BLOOD PRESSURE: 134 MMHG | RESPIRATION RATE: 18 BRPM

## 2023-08-02 NOTE — PROGRESS NOTES
Name: Janette Martniez    YOB: 1970    Code Status: DNRcc arrest      Chief complaint:  Follow up on DM type 2 with hyperglycemia; etc.....      HPI:    52-year-old female with past medical history of heart failure with reduced EF,   diabetes, hypertension, hyperlipidemia, HIV,        CVA (left MCA 2022 with residual right-sided hemiparesis), GERD, HIV, hypothyroidism, hypertension,   and diabetes.    Patient re-admitted to [facility]. On 4/21/23 after her most recent hospitalization.    Diagnoses as follows:    DM type 2 with hyperglycemia:   On Lantus 36 units at bedtime and Humalog sliding scale.  Patient has elevated accuchecks/hyperglycemia since she was started on prednisolone  Recent accuchecks: 382 mg/dL, 294 mg/dL, 179 mg/dL.   No episodes of hypoglycemia reported.   Recent Ha1c 8.1 % on 5/1/23.  Patient seen today, she is in bed.   Calm, cooperative, mentation at baseline.  Follows commands.  Reviewed importance  of being on a diabetic diet and of eating/drinking appropriate foods due to hyperglycemia.        Leukocytosis; cough; upper respiratory infection:  WBC trends as follows:  7/17/23 WBC 14.1 H  7/19/23 WBC 10.7  7/24/23 WBC 11.6 H  7/31/23 WBC 12.3 H   Patient was coughing, chest x-ray performed.   Negative for pneumonia, but at risk for aspiration.   Patient was not feeling well, was coughing.   Patient was treated with Augmentin and on prednisolone.  Also on Duonebs PRN.   Denies cough today.  On RA, no desaturations or fevers reported.  No complaints of chest pain, dizziness or headaches.            Benign essential HTN; Chronic systolic HF; :          On Amlodipine and hydrochlorothiazide.  Recent /68.  No complaints of chest pain.  No headaches no dizziness.   No SOB.         Hypothyroidism:       On levothyroxine.        5/1/23 TSH 1.610 wnl        Sequela of Cerebrovascular accident; Recent acute/subacute stroke; right hemiparesis;   dysphagia 2/2 cva;  expressive aphasia;   MRI was performed during recent hospitalizaiton it showed Acute/subacute tiny infarct in the left anterior brock.   Neuro was also consulted in the hospital.  [place]. Jiang [place] [facility].  Pt has baseline dysarthria from a previous CVA and has Hemiparesis.  She also has expressive aphasia, but able to communicate short sentences.  Patient able to nod appropriately to questions.   On regular diet, regular texture, thin consistency.         Immobility; Generalized muscle weakness;  :  At  participating in PT/OT.   Not able to ambulate.  Very weak.  Bedbound/wheelchair bound.               Reviewed medical, social, surgical and family history.  Reviewed all current medications and performed medication reconciliation.  Reviewed vital signs AND recent blood work results.  Performed prescription drug management.   Reviewed [facility] Documentation  Discussed patient with nursing and [facility] NP Pili.        ROS: 10 point ROS performed; negative unless noted in HPI.     BMP: Date: 5/1/2023 Na 137 K 4.3 Cr 1.1 BUN 15 glucose 141 Ca 8.8 GFR 52  CBC: Date: 5/1/2023 WBC 8.4 Hgb 11.1 hematocrit 33.9 platelets 337  Liver function: Date: 5/1/2023 ALT 10 AST 29 alkaline phos.  63 bilirubin 0.4 albumin 3.6 protein 7.7       Ha1c 8.1 % on 5/1/23          BMP: Date: 5/15/2023 Na 139  K 3.4 Cr 1.2 BUN 20 glucose 122 Ca 8.8 GFR 57        CBC: Date: 5/15/2023 WBC 9 Hgb 10.2 hematocrit 30.9 platelets 277       Liver function: 5/15/2023 ALT 8 AST 28 alkaline phos.  57 bilirubin 0.5 albumin 3.5 protein 7.3          BMP: Date: 5/22/2023 Na 141 K 3.1 Cr 1 BUN 11 glucose 227 Ca 8.7 GFR 70        CBC: Date: 5/22/2023 WBC 8.2 Hgb 10 hematocrit 30.5 platelets 372        Liver function: Date: 5/22/2023 ALT 8 AST 22 alkaline phos.  63 bilirubin 0.3 albumin 3.2 protein 7.2     BMP: Date: 6/5/2023 Na 140 K 4.2 Cr 1.1 BUN 14 glucose 79 Ca 1.1 GFR 63  CBC: Date: 6/5/2023 WBC 8.1 Hgb 10.5 hematocrit  32.3 platelets 350  Liver function: Date: 6/5/2023 ALT 7 AST 25 alkaline phos.  59 bilirubin 0.4 albumin 3.3 protein 7.7    BMP: Date: 7/17/2023 Na 140 K 4.1 Cr 0.8 BUN 17 glucose 126 Ca 9.1 GFR 90  CBC: Date: 7/17/2023 WBC 14.1 Hgb 10.2 hematocrit 32.1 platelets 265  Liver function: Date: 7/17/2023 ALT 8 AST 23 alkaline phos.  72 bilirubin 0.5 albumin 3.4 protein 7.4    CBC: Date: 7/19/2023 WBC 10.7 Hgb 9.5 hematocrit 28.8 platelets 257    BMP: Date: 7/24/2023 Na 137 K 4.2 Cr 0.9 BUN 19 glucose 186 Ca 9.1 GFR 79  CBC: Date: 7/24/2023 WBC 11.6 Hgb 10.3 hematocrit 31.4 platelets 305  Liver function: Date: 7/24/2023 ALT 11 AST 27 alkaline phos.  77 bilirubin 0.3 albumin 3.6 protein 7.8    BMP: Date: 7/31/2023 Na 141 K 4.2 Cr 0.8 BUN 17 glucose 138 Ca 8.7 GFR 90  CBC: Date: 7/31/2023 WBC 12.3 Hgb 10.5 hematocrit 32.4 platelets 307  Liver function: Date: 7/31/2023 ALT 14 AST 24 alkaline phos.  81 bilirubin 0.4 albumin 3.5 protein 7.4        Past Medical History:   Diagnosis Date    Cervicalgia     Neck pain    Essential (primary) hypertension 07/21/2013    Hypertension    Pain in unspecified shoulder     Pain, joint, shoulder    Personal history of other diseases of the digestive system 07/06/2022    History of ulcerative colitis    Personal history of other diseases of the digestive system     History of esophageal reflux    Personal history of other diseases of the nervous system and sense organs     History of cataract    Personal history of other diseases of the nervous system and sense organs     History of glaucoma    Personal history of other diseases of the respiratory system     History of bronchitis    Personal history of other endocrine, nutritional and metabolic disease     History of diabetes mellitus    Personal history of other endocrine, nutritional and metabolic disease     History of hyperlipidemia    Personal history of other mental and behavioral disorders     History of depression    Snoring      Snoring    Unilateral primary osteoarthritis, unspecified knee 11/02/2015    Arthritis of knee           Past Surgical History:   Procedure Laterality Date    CHOLECYSTECTOMY  01/08/2016    Cholecystectomy    COLON SURGERY  01/08/2016    Colon Surgery    CT HEAD ANGIO W AND WO IV CONTRAST  3/19/2019    CT HEAD ANGIO W AND WO IV CONTRAST 3/19/2019 [facility] [place] [facility]Y [facility]GAC[facility]    CT NECK ANGIO W AND WO IV CONTRAST  3/19/2019    CT NECK ANGIO W AND WO IV CONTRAST 3/19/2019 [facility] [place] [facility]GENCY LEGAC[facility]    HERNIA REPAIR  01/08/2016    Hernia Repair    HYSTERECTOMY  01/08/2016    Hysterectomy    MR HEAD ANGIO WO IV CONTRAST  6/4/2018    MR HEAD ANGIO WO IV CONTRAST 6/4/2018 [facility] CLINICAL LEGACY    MR HEAD ANGIO WO IV CONTRAST  7/11/2020    MR HEAD ANGIO WO IV CONTRAST 7/11/2020 [facility] CLINICAL LEGACY    MR HEAD ANGIO WO IV CONTRAST  9/30/2020    MR HEAD ANGIO WO IV CONTRAST 9/30/2020 [facility]IB LEGACY    MR HEAD ANGIO WO IV CONTRAST  5/22/2022    MR HEAD ANGIO WO IV CONTRAST 5/22/2022 [facility] CLINICAL LEGACY    MR HEAD ANGIO WO IV CONTRAST  4/19/2023    MR HEAD ANGIO WO IV CONTRAST [facility] MRI    MR NECK ANGIO WO IV CONTRAST  6/4/2018    MR NECK ANGIO WO IV CONTRAST 6/4/2018 [facility] CLINICAL LEGACY    MR NECK ANGIO WO IV CONTRAST  7/11/2020    MR NECK ANGIO WO IV CONTRAST 7/11/2020 [facility] CLINICAL LEGACY    MR NECK ANGIO WO IV CONTRAST  9/30/2020    MR NECK ANGIO WO IV CONTRAST 9/30/2020 [facility] LEGACY    MR NECK ANGIO WO IV CONTRAST  5/22/2022    MR NECK ANGIO WO IV CONTRAST 5/22/2022 [facility] CLINICAL LEGACY    MR NECK ANGIO WO IV CONTRAST  4/19/2023    MR NECK ANGIO WO IV CONTRAST [facility] MRI    OTHER SURGICAL HISTORY  01/08/2016    Tubal Stabilization    OTHER SURGICAL HISTORY  09/01/2016    Cervical Surgery (Gyn) Laser Vaporization Of Transformation Zone    THYROID SURGERY  09/27/2013    Thyroid Surgery          Lab Results   Component Value Date    WBC CANCELED 04/22/2023    HGB CANCELED 04/22/2023    HCT CANCELED 04/22/2023    PLT CANCELED 04/22/2023     "CHOL 129 08/07/2022    TRIG 182 (H) 08/07/2022    HDL 31.0 (A) 08/07/2022    ALT 8 04/21/2023    AST 23 04/21/2023    NA CANCELED 04/22/2023    K CANCELED 04/22/2023    CL CANCELED 04/22/2023    CREATININE CANCELED 04/22/2023    BUN CANCELED 04/22/2023    CO2 CANCELED 04/22/2023    TSH 0.32 (L) 04/20/2023    INR CANCELED 04/19/2023    HGBA1C 8.6 (A) 04/20/2023     Surgical History  Problems    · History of Cervical Surgery (Gyn) Laser Vaporization Of Transformation Zone   · History of Cholecystectomy   · History of Colon Surgery   · History of Hernia Repair   · History of Hysterectomy   · History of Thyroid Surgery   · History of Tubal Stabilization     Family History  Mother    · Family history of Cancer  Family History    · Family history of Diabetes Mellitus (V18.0)   · Family history of Hypertension (V17.49)     Social History  Problems    · Disabled   · Does not use illicit drugs (V49.89) (Z78.9)   · Former smoker (V15.82) (Z87.891)   · Marital History - Single   · No alcohol.   · Denied: History of Secondhand smoke exposure   · Single     Allergies  Medication    · Aspirin TABS   · Contrast Media Ready-Box MISC   · morphine   · Sulfa Drugs        /68   Pulse 74   Temp 36.4 °C (97.5 °F)   Resp 18   Ht 1.372 m (4' 6\")   Wt 105 kg (231 lb 6.4 oz)   SpO2 97%   BMI 55.79 kg/m²      Physical Exam  Vitals and nursing note reviewed.   Constitutional:       Appearance: Normal appearance.   HENT:      Head: Normocephalic.      Right Ear: External ear normal.      Left Ear: External ear normal.      Nose: Nose normal.      Mouth/Throat:      Mouth: Mucous membranes are moist.      Pharynx: Oropharynx is clear.   Eyes:      Extraocular Movements: Extraocular movements intact.      Conjunctiva/sclera: Conjunctivae normal.      Pupils: Pupils are equal, round, and reactive to light.   Cardiovascular:      Rate and Rhythm: Normal rate and regular rhythm.      Pulses: Normal pulses.      Heart sounds: Normal " heart sounds.   Pulmonary:      Effort: Pulmonary effort is normal.      Breath sounds: Normal breath sounds.   Abdominal:      General: Bowel sounds are normal.      Palpations: Abdomen is soft.   Musculoskeletal:      Cervical back: Normal range of motion and neck supple.      Comments: Generalized muscle weakness; immobility.    Right sided hemiparesis   Skin:     General: Skin is warm and dry.   Neurological:      Mental Status: She is alert. Mental status is at baseline.      Motor: Weakness present.      Coordination: Coordination abnormal.      Gait: Gait abnormal.      Comments: Right sided weakness--RLE 1/5; RUE 3/5; sensation intact to touch x 4 extremities.     Left sided facial droop.     Generalized muscle weakness   Psychiatric:         Mood and Affect: Mood normal.         Behavior: Behavior normal.          Assessment/Plan      Ordered BMP and CBC with diff. For tomorrow.    DM type 2 with hyperglycemia:   DC Lantus 36 units subcutaneous at bedtime.  Start Lantus 40 units subcutaneous at bedtime.   Continue Humalog sliding scale.           Monitor accuchecks.           Monitor Ha1c Q 3 months.         Leukocytosis; cough; upper respiratory infection:      Monitor WBC.  Patient was treated with Augmentin and on prednisolone.  Continue Duonebs PRN.         Benign essential HTN; Chronic systolic HF; :      Continue amlodipine and hydrochlorothiazide.      Continue Bps.         Hypothyroidism:       Continue levothyroxine.        Monitor TSH.        Sequela of Cerebrovascular accident; Recent acute/subacute stroke; right hemiparesis;   dysphagia 2/2 cva; expressive aphasia;  Dr. Jiang from [facility] [place] neuro/[place] was consulted during recent hospitalization.       Monitor for s/s of aspiration.         Immobility; Generalized muscle weakness;  :  Continue at  in skilled services if indicated and/or long term care.   Not able to ambulate.  Bedbound/wheelchair bound.   Fall prevention strategies.        Problem List Items Addressed This Visit    None  Visit Diagnoses       Type 2 diabetes mellitus with hyperglycemia, with long-term current use of insulin (CMS/[facility])    -  Primary    Leukocytosis, unspecified type        Cough, unspecified type        Upper respiratory tract infection, unspecified type        Benign essential HTN        Chronic systolic heart failure (CMS/[facility])        Hypothyroidism, unspecified type        Sequela of cerebrovascular accident        Recent cerebrovascular accident        Hemiparesis affecting right side as late effect of stroke (CMS/[facility])        Dysphagia, unspecified type        Immobility        Generalized muscle weakness            Maria Del Carmen Garduno, APRN-CNP

## 2023-08-07 ENCOUNTER — NURSING HOME VISIT (OUTPATIENT)
Dept: POST ACUTE CARE | Facility: EXTERNAL LOCATION | Age: 53
End: 2023-08-07
Payer: COMMERCIAL

## 2023-08-07 VITALS
BODY MASS INDEX: 52.58 KG/M2 | TEMPERATURE: 97.5 F | HEIGHT: 55 IN | HEART RATE: 75 BPM | WEIGHT: 227.2 LBS | SYSTOLIC BLOOD PRESSURE: 124 MMHG | RESPIRATION RATE: 18 BRPM | DIASTOLIC BLOOD PRESSURE: 69 MMHG | OXYGEN SATURATION: 99 %

## 2023-08-07 DIAGNOSIS — J06.9 UPPER RESPIRATORY TRACT INFECTION, UNSPECIFIED TYPE: ICD-10-CM

## 2023-08-07 DIAGNOSIS — E11.65 TYPE 2 DIABETES MELLITUS WITH HYPERGLYCEMIA, WITH LONG-TERM CURRENT USE OF INSULIN (MULTI): Primary | ICD-10-CM

## 2023-08-07 DIAGNOSIS — D72.829 LEUKOCYTOSIS, UNSPECIFIED TYPE: ICD-10-CM

## 2023-08-07 DIAGNOSIS — Z79.4 TYPE 2 DIABETES MELLITUS WITH HYPERGLYCEMIA, WITH LONG-TERM CURRENT USE OF INSULIN (MULTI): Primary | ICD-10-CM

## 2023-08-07 DIAGNOSIS — Z74.09 IMMOBILITY: ICD-10-CM

## 2023-08-07 DIAGNOSIS — R05.9 COUGH, UNSPECIFIED TYPE: ICD-10-CM

## 2023-08-07 DIAGNOSIS — I69.30 SEQUELA OF CEREBROVASCULAR ACCIDENT: ICD-10-CM

## 2023-08-07 DIAGNOSIS — M62.81 GENERALIZED MUSCLE WEAKNESS: ICD-10-CM

## 2023-08-07 DIAGNOSIS — I10 BENIGN ESSENTIAL HTN: ICD-10-CM

## 2023-08-07 PROCEDURE — 99309 SBSQ NF CARE MODERATE MDM 30: CPT | Performed by: NURSE PRACTITIONER

## 2023-08-07 NOTE — LETTER
Patient: Janette Martinez  : 1970    Encounter Date: 2023    Name: Janette Martinez    YOB: 1970    Code Status: DNRcc arrest      Chief complaint:  Follow up on DM type 2 with hyperglycemia; etc.....      HPI:    52-year-old female with past medical history of heart failure with reduced EF,   diabetes, hypertension, hyperlipidemia, HIV,        CVA (left MCA  with residual right-sided hemiparesis), GERD, HIV, hypothyroidism, hypertension,   and diabetes.    Patient re-admitted to [facility]. On 23 after her most recent hospitalization.    Diagnoses as follows:    DM type 2 with hyperglycemia:   On Lantus 40 units at bedtime and Humalog sliding scale.  Patient has elevated accuchecks/hyperglycemia since she was started on prednisolone, now finished with   Recent accuchecks: 326 mg/dL, 354 mg/dL, 274 mg/dL.   No episodes of hypoglycemia reported.   Recent Ha1c 8.1 % on 23.  Patient seen today, she is in bed.   Calm, cooperative, mentation at baseline.  Follows commands.  Reviewed importance  of being on a diabetic diet and of eating/drinking appropriate foods due to hyperglycemia.        Leukocytosis; cough; upper respiratory infection:  WBC trends as follows:  23 WBC 14.1 H  23 WBC 10.7  23 WBC 11.6 H  23 WBC 12.3 H   23 WBC 10 wnl  Patient was previously coughing, chest x-ray was performed.   It was negative for pneumonia, but she is at risk for aspiration.   Therefore she was treated with Augmentin and prednisolone.  Also on Duonebs PRN.   Denies cough today.  On RA, no desaturations or fevers reported.  No complaints of chest pain, dizziness or headaches.            Chronic systolic HF; Benign essential HTN :          On Amlodipine and hydrochlorothiazide.  Recent /69    No complaints of chest pain.  No headaches no dizziness.   No SOB.               Sequela of Cerebrovascular accident; Recent acute/subacute stroke; ;   dysphagia 2/2  cva; expressive aphasia;   MRI was performed during recent hospitalizaiton it showed Acute/subacute tiny infarct in the left anterior brock.   Neuro was also consulted in the hospital.  [place] [facility] [place].  Pt has baseline dysarthria from a previous CVA and has Hemiparesis.  She also has expressive aphasia, but able to communicate short sentences.  Patient able to nod appropriately to questions.   On regular diet, regular texture, thin consistency.    Hypothyroidism:       On levothyroxine.        5/1/23 TSH 1.610 wnl           Immobility; Generalized muscle weakness :  At  participating in PT/OT.   Not able to ambulate.  Very weak.  Bedbound/wheelchair bound.               Reviewed medical, social, surgical and family history.  Reviewed all current medications and performed medication reconciliation.  Reviewed vital signs AND recent blood work results.  Performed prescription drug management.   Reviewed [facility] Documentation  Discussed patient with nursing and [facility] NP Pili.        ROS: 10 point ROS performed; negative unless noted in HPI.     BMP: Date: 5/1/2023 Na 137 K 4.3 Cr 1.1 BUN 15 glucose 141 Ca 8.8 GFR 52  CBC: Date: 5/1/2023 WBC 8.4 Hgb 11.1 hematocrit 33.9 platelets 337  Liver function: Date: 5/1/2023 ALT 10 AST 29 alkaline phos.  63 bilirubin 0.4 albumin 3.6 protein 7.7       Ha1c 8.1 % on 5/1/23          BMP: Date: 5/15/2023 Na 139  K 3.4 Cr 1.2 BUN 20 glucose 122 Ca 8.8 GFR 57        CBC: Date: 5/15/2023 WBC 9 Hgb 10.2 hematocrit 30.9 platelets 277       Liver function: 5/15/2023 ALT 8 AST 28 alkaline phos.  57 bilirubin 0.5 albumin 3.5 protein 7.3          BMP: Date: 5/22/2023 Na 141 K 3.1 Cr 1 BUN 11 glucose 227 Ca 8.7 GFR 70        CBC: Date: 5/22/2023 WBC 8.2 Hgb 10 hematocrit 30.5 platelets 372        Liver function: Date: 5/22/2023 ALT 8 AST 22 alkaline phos.  63 bilirubin 0.3 albumin 3.2 protein 7.2     BMP: Date: 6/5/2023 Na 140 K 4.2 Cr 1.1 BUN 14  glucose 79 Ca 1.1 GFR 63  CBC: Date: 6/5/2023 WBC 8.1 Hgb 10.5 hematocrit 32.3 platelets 350  Liver function: Date: 6/5/2023 ALT 7 AST 25 alkaline phos.  59 bilirubin 0.4 albumin 3.3 protein 7.7    BMP: Date: 7/17/2023 Na 140 K 4.1 Cr 0.8 BUN 17 glucose 126 Ca 9.1 GFR 90  CBC: Date: 7/17/2023 WBC 14.1 Hgb 10.2 hematocrit 32.1 platelets 265  Liver function: Date: 7/17/2023 ALT 8 AST 23 alkaline phos.  72 bilirubin 0.5 albumin 3.4 protein 7.4    CBC: Date: 7/19/2023 WBC 10.7 Hgb 9.5 hematocrit 28.8 platelets 257    BMP: Date: 7/24/2023 Na 137 K 4.2 Cr 0.9 BUN 19 glucose 186 Ca 9.1 GFR 79  CBC: Date: 7/24/2023 WBC 11.6 Hgb 10.3 hematocrit 31.4 platelets 305  Liver function: Date: 7/24/2023 ALT 11 AST 27 alkaline phos.  77 bilirubin 0.3 albumin 3.6 protein 7.8    BMP: Date: 7/31/2023 Na 141 K 4.2 Cr 0.8 BUN 17 glucose 138 Ca 8.7 GFR 90  CBC: Date: 7/31/2023 WBC 12.3 Hgb 10.5 hematocrit 32.4 platelets 307  Liver function: Date: 7/31/2023 ALT 14 AST 24 alkaline phos.  81 bilirubin 0.4 albumin 3.5 protein 7.4    BMP: Date: 8/7/2023 Na 141 K 3.9 Cr 1.0 BUN 12 glucose 167 Ca 8.6 GFR 70  CBC: Date: 8/7/2023 WBC 10 Hgb 10.5 hematocrit 32.7 platelets 256  Liver function: Date: 8/7/2023 ALT 9 AST 23 alkaline phos.  76 bilirubin 0.5 albumin 3.3 protein 7          Past Medical History:   Diagnosis Date   • Cervicalgia     Neck pain   • Essential (primary) hypertension 07/21/2013    Hypertension   • Pain in unspecified shoulder     Pain, joint, shoulder   • Personal history of other diseases of the digestive system 07/06/2022    History of ulcerative colitis   • Personal history of other diseases of the digestive system     History of esophageal reflux   • Personal history of other diseases of the nervous system and sense organs     History of cataract   • Personal history of other diseases of the nervous system and sense organs     History of glaucoma   • Personal history of other diseases of the respiratory system     History of  bronchitis   • Personal history of other endocrine, nutritional and metabolic disease     History of diabetes mellitus   • Personal history of other endocrine, nutritional and metabolic disease     History of hyperlipidemia   • Personal history of other mental and behavioral disorders     History of depression   • Snoring     Snoring   • Unilateral primary osteoarthritis, unspecified knee 11/02/2015    Arthritis of knee           Past Surgical History:   Procedure Laterality Date   • CHOLECYSTECTOMY  01/08/2016    Cholecystectomy   • COLON SURGERY  01/08/2016    Colon Surgery   • CT HEAD ANGIO W AND WO IV CONTRAST  3/19/2019    CT HEAD ANGIO W AND WO IV CONTRAST [place]19[place] [facility] [place]   • CT NECK ANGIO W AND WO IV CONTRAST  3/19/2019    CT NECK ANGIO W AND WO IV CONTRAST 3[place]19/2019 [facility] [place]   • HERNIA REPAIR  01/08/2016    Hernia Repair   • HYSTERECTOMY  01/08/2016    Hysterectomy   • MR HEAD ANGIO WO IV CONTRAST  6/4/2018    MR HEAD ANGIO WO IV CONTRAST 6/4/2018 [facility] CLINICAL LEGACY   • MR HEAD ANGIO WO IV CONTRAST  7/11/2020    MR HEAD ANGIO WO IV CONTRAST 7/11/2020 [facility] CLINICAL LEGACY   • MR HEAD ANGIO WO IV CONTRAST  9/30/2020    MR HEAD ANGIO WO IV CONTRAST 9/30/2020 [facility] AIB LEGACY   • MR HEAD ANGIO WO IV CONTRAST  5/22/2022    MR HEAD ANGIO WO IV CONTRAST 5/22/2022 [facility] CLINICAL LEGACY   • MR HEAD ANGIO WO IV CONTRAST  4/19/2023    MR HEAD ANGIO WO IV CONTRAST [facility] MRI   • MR NECK ANGIO WO IV CONTRAST  6/4/2018    MR NECK ANGIO WO IV CONTRAST 6/4/2018 [facility] CLINICAL LEGACY   • MR NECK ANGIO WO IV CONTRAST  7/11/2020    MR NECK ANGIO WO IV CONTRAST 7/11/2020 [facility] CLINICAL LEGACY   • MR NECK ANGIO WO IV CONTRAST  9/30/2020    MR NECK ANGIO WO IV CONTRAST 9/30/2020 [facility] AIB LEGACY   • MR NECK ANGIO WO IV CONTRAST  5/22/2022    MR NECK ANGIO WO IV CONTRAST 5/22/2022 [facility] CLINICAL LEGACY   • MR NECK ANGIO WO IV CONTRAST  4/19/2023    MR NECK ANGIO WO IV CONTRAST [facility] MRI   • OTHER SURGICAL HISTORY   "01/08/2016    Tubal Stabilization   • OTHER SURGICAL HISTORY  09/01/2016    Cervical Surgery (Gyn) Laser Vaporization Of Transformation Zone   • THYROID SURGERY  09/27/2013    Thyroid Surgery          Lab Results   Component Value Date    WBC CANCELED 04/22/2023    HGB CANCELED 04/22/2023    HCT CANCELED 04/22/2023    PLT CANCELED 04/22/2023    CHOL 129 08/07/2022    TRIG 182 (H) 08/07/2022    HDL 31.0 (A) 08/07/2022    ALT 8 04/21/2023    AST 23 04/21/2023    NA CANCELED 04/22/2023    K CANCELED 04/22/2023    CL CANCELED 04/22/2023    CREATININE CANCELED 04/22/2023    BUN CANCELED 04/22/2023    CO2 CANCELED 04/22/2023    TSH 0.32 (L) 04/20/2023    INR CANCELED 04/19/2023    HGBA1C 8.6 (A) 04/20/2023     Surgical History  Problems    · History of Cervical Surgery (Gyn) Laser Vaporization Of Transformation Zone   · History of Cholecystectomy   · History of Colon Surgery   · History of Hernia Repair   · History of Hysterectomy   · History of Thyroid Surgery   · History of Tubal Stabilization     Family History  Mother    · Family history of Cancer  Family History    · Family history of Diabetes Mellitus (V18.0)   · Family history of Hypertension (V17.49)     Social History  Problems    · Disabled   · Does not use illicit drugs (V49.89) (Z78.9)   · Former smoker (V15.82) (Z87.891)   · Marital History - Single   · No alcohol.   · Denied: History of Secondhand smoke exposure   · Single     Allergies  Medication    · Aspirin TABS   · Contrast Media Ready-Box MISC   · morphine   · Sulfa Drugs        /69   Pulse 75   Temp 36.4 °C (97.5 °F)   Resp 18   Ht 1.372 m (4' 6\")   Wt 103 kg (227 lb 3.2 oz)   SpO2 99%   BMI 54.78 kg/m²      Physical Exam  Vitals and nursing note reviewed.   Constitutional:       Appearance: Normal appearance.   HENT:      Head: Normocephalic.      Right Ear: External ear normal.      Left Ear: External ear normal.      Nose: Nose normal.      Mouth/Throat:      Mouth: Mucous membranes " are moist.      Pharynx: Oropharynx is clear.   Eyes:      Extraocular Movements: Extraocular movements intact.      Conjunctiva/sclera: Conjunctivae normal.      Pupils: Pupils are equal, round, and reactive to light.   Cardiovascular:      Rate and Rhythm: Normal rate and regular rhythm.      Pulses: Normal pulses.      Heart sounds: Normal heart sounds.   Pulmonary:      Effort: Pulmonary effort is normal.      Breath sounds: Normal breath sounds.   Abdominal:      General: Bowel sounds are normal.      Palpations: Abdomen is soft.   Musculoskeletal:      Cervical back: Normal range of motion and neck supple.      Comments: Generalized muscle weakness; immobility.    Right sided hemiparesis   Skin:     General: Skin is warm and dry.   Neurological:      Mental Status: She is alert. Mental status is at baseline.      Motor: Weakness present.      Coordination: Coordination abnormal.      Gait: Gait abnormal.      Comments: Right sided weakness--RLE 1/5; RUE 3/5; sensation intact to touch x 4 extremities.     Left sided facial droop.     Generalized muscle weakness   Psychiatric:         Mood and Affect: Mood normal.         Behavior: Behavior normal.          Assessment/Plan     Ordered BMP,  CBC with diff. For next Monday    DM type 2 with hyperglycemia:   DC Lantus 40 units subcutaneous at bedtime.  Start Lantus 43 units subcutaneous at bedtime.   Continue Humalog sliding scale.           Monitor accuchecks.           Monitor Ha1c Q 3 months.         Leukocytosis; cough; upper respiratory infection:      Monitor WBC.       Resolved leukocytosis.  Patient was treated with Augmentin and on prednisolone.  Continue Duonebs PRN.         Benign essential HTN; Chronic systolic HF; :      Continue amlodipine and hydrochlorothiazide.      Continue Bps.         Hypothyroidism:       Continue levothyroxine.        Monitor TSH.        Sequela of Cerebrovascular accident; Recent acute/subacute stroke; right hemiparesis;    dysphagia 2/2 cva; expressive aphasia;  Dr. Jiang from [facility] [place] neuro/[place] was consulted during recent hospitalization.       Monitor for s/s of aspiration.         Immobility; Generalized muscle weakness;  :  Continue at  in skilled services if indicated and/or long term care.   Not able to ambulate.  Bedbound/wheelchair bound.   Fall prevention strategies.       Problem List Items Addressed This Visit    None  Visit Diagnoses       Type 2 diabetes mellitus with hyperglycemia, with long-term current use of insulin (CMS/[facility])    -  Primary    Leukocytosis, unspecified type        Cough, unspecified type        Upper respiratory tract infection, unspecified type        Benign essential HTN        Sequela of cerebrovascular accident        Immobility        Generalized muscle weakness            JASON Espitia       Electronically Signed By: JASON Espitia   8/7/23 11:48 PM

## 2023-08-08 NOTE — PROGRESS NOTES
Name: Janette Martinez    YOB: 1970    Code Status: DNRcc arrest      Chief complaint:  Follow up on DM type 2 with hyperglycemia; etc.....      HPI:    52-year-old female with past medical history of heart failure with reduced EF,   diabetes, hypertension, hyperlipidemia, HIV,        CVA (left MCA 2022 with residual right-sided hemiparesis), GERD, HIV, hypothyroidism, hypertension,   and diabetes.    Patient re-admitted to [facility]. On 4/21/23 after her most recent hospitalization.    Diagnoses as follows:    DM type 2 with hyperglycemia:   On Lantus 40 units at bedtime and Humalog sliding scale.  Patient has elevated accuchecks/hyperglycemia since she was started on prednisolone, now finished with   Recent accuchecks: 326 mg/dL, 354 mg/dL, 274 mg/dL.   No episodes of hypoglycemia reported.   Recent Ha1c 8.1 % on 5/1/23.  Patient seen today, she is in bed.   Calm, cooperative, mentation at baseline.  Follows commands.  Reviewed importance  of being on a diabetic diet and of eating/drinking appropriate foods due to hyperglycemia.        Leukocytosis; cough; upper respiratory infection:  WBC trends as follows:  7/17/23 WBC 14.1 H  7/19/23 WBC 10.7  7/24/23 WBC 11.6 H  7/31/23 WBC 12.3 H   8/7/23 WBC 10 wnl  Patient was previously coughing, chest x-ray was performed.   It was negative for pneumonia, but she is at risk for aspiration.   Therefore she was treated with Augmentin and prednisolone.  Also on Duonebs PRN.   Denies cough today.  On RA, no desaturations or fevers reported.  No complaints of chest pain, dizziness or headaches.            Chronic systolic HF; Benign essential HTN :          On Amlodipine and hydrochlorothiazide.  Recent /69    No complaints of chest pain.  No headaches no dizziness.   No SOB.               Sequela of Cerebrovascular accident; Recent acute/subacute stroke; ;   dysphagia 2/2 cva; expressive aphasia;   MRI was performed during recent  [facility] it showed Acute/subacute tiny infarct in the left anterior brock.   Neuro was also consulted in the hospital.  [place] [facility] [place].  Pt has baseline dysarthria from a previous CVA and has Hemiparesis.  She also has expressive aphasia, but able to communicate short sentences.  Patient able to nod appropriately to questions.   On regular diet, regular texture, thin consistency.    Hypothyroidism:       On levothyroxine.        5/1/23 TSH 1.610 wnl           Immobility; Generalized muscle weakness :  At  participating in PT/OT.   Not able to ambulate.  Very weak.  Bedbound/wheelchair bound.               Reviewed medical, social, surgical and family history.  Reviewed all current medications and performed medication reconciliation.  Reviewed vital signs AND recent blood work results.  Performed prescription drug management.   Reviewed [facility] Documentation  Discussed patient with nursing and [facility] NP Pili.        ROS: 10 point ROS performed; negative unless noted in HPI.     BMP: Date: 5/1/2023 Na 137 K 4.3 Cr 1.1 BUN 15 glucose 141 Ca 8.8 GFR 52  CBC: Date: 5/1/2023 WBC 8.4 Hgb 11.1 hematocrit 33.9 platelets 337  Liver function: Date: 5/1/2023 ALT 10 AST 29 alkaline phos.  63 bilirubin 0.4 albumin 3.6 protein 7.7       Ha1c 8.1 % on 5/1/23          BMP: Date: 5/15/2023 Na 139  K 3.4 Cr 1.2 BUN 20 glucose 122 Ca 8.8 GFR 57        CBC: Date: 5/15/2023 WBC 9 Hgb 10.2 hematocrit 30.9 platelets 277       Liver function: 5/15/2023 ALT 8 AST 28 alkaline phos.  57 bilirubin 0.5 albumin 3.5 protein 7.3          BMP: Date: 5/22/2023 Na 141 K 3.1 Cr 1 BUN 11 glucose 227 Ca 8.7 GFR 70        CBC: Date: 5/22/2023 WBC 8.2 Hgb 10 hematocrit 30.5 platelets 372        Liver function: Date: 5/22/2023 ALT 8 AST 22 alkaline phos.  63 bilirubin 0.3 albumin 3.2 protein 7.2     BMP: Date: 6/5/2023 Na 140 K 4.2 Cr 1.1 BUN 14 glucose 79 Ca 1.1 GFR 63  CBC: Date: 6/5/2023 WBC 8.1 Hgb  10.5 hematocrit 32.3 platelets 350  Liver function: Date: 6/5/2023 ALT 7 AST 25 alkaline phos.  59 bilirubin 0.4 albumin 3.3 protein 7.7    BMP: Date: 7/17/2023 Na 140 K 4.1 Cr 0.8 BUN 17 glucose 126 Ca 9.1 GFR 90  CBC: Date: 7/17/2023 WBC 14.1 Hgb 10.2 hematocrit 32.1 platelets 265  Liver function: Date: 7/17/2023 ALT 8 AST 23 alkaline phos.  72 bilirubin 0.5 albumin 3.4 protein 7.4    CBC: Date: 7/19/2023 WBC 10.7 Hgb 9.5 hematocrit 28.8 platelets 257    BMP: Date: 7/24/2023 Na 137 K 4.2 Cr 0.9 BUN 19 glucose 186 Ca 9.1 GFR 79  CBC: Date: 7/24/2023 WBC 11.6 Hgb 10.3 hematocrit 31.4 platelets 305  Liver function: Date: 7/24/2023 ALT 11 AST 27 alkaline phos.  77 bilirubin 0.3 albumin 3.6 protein 7.8    BMP: Date: 7/31/2023 Na 141 K 4.2 Cr 0.8 BUN 17 glucose 138 Ca 8.7 GFR 90  CBC: Date: 7/31/2023 WBC 12.3 Hgb 10.5 hematocrit 32.4 platelets 307  Liver function: Date: 7/31/2023 ALT 14 AST 24 alkaline phos.  81 bilirubin 0.4 albumin 3.5 protein 7.4    BMP: Date: 8/7/2023 Na 141 K 3.9 Cr 1.0 BUN 12 glucose 167 Ca 8.6 GFR 70  CBC: Date: 8/7/2023 WBC 10 Hgb 10.5 hematocrit 32.7 platelets 256  Liver function: Date: 8/7/2023 ALT 9 AST 23 alkaline phos.  76 bilirubin 0.5 albumin 3.3 protein 7          Past Medical History:   Diagnosis Date    Cervicalgia     Neck pain    Essential (primary) hypertension 07/21/2013    Hypertension    Pain in unspecified shoulder     Pain, joint, shoulder    Personal history of other diseases of the digestive system 07/06/2022    History of ulcerative colitis    Personal history of other diseases of the digestive system     History of esophageal reflux    Personal history of other diseases of the nervous system and sense organs     History of cataract    Personal history of other diseases of the nervous system and sense organs     History of glaucoma    Personal history of other diseases of the respiratory system     History of bronchitis    Personal history of other endocrine, nutritional and  metabolic disease     History of diabetes mellitus    Personal history of other endocrine, nutritional and metabolic disease     History of hyperlipidemia    Personal history of other mental and behavioral disorders     History of depression    Snoring     Snoring    Unilateral primary osteoarthritis, unspecified knee 11/02/2015    Arthritis of knee           Past Surgical History:   Procedure Laterality Date    CHOLECYSTECTOMY  01/08/2016    Cholecystectomy    COLON SURGERY  01/08/2016    Colon Surgery    CT HEAD ANGIO W AND WO IV CONTRAST  3/19/2019    CT HEAD ANGIO W AND WO IV CONTRAST 3/19/2019 [facility]GENCY LEGAC[facility]    CT NECK ANGIO W AND WO IV CONTRAST  3/19/2019    CT NECK ANGIO W AND WO IV CONTRAST 3/19/2019 [facility] EMERGENCY LEGACY    HERNIA REPAIR  01/08/2016    Hernia Repair    HYSTERECTOMY  01/08/2016    Hysterectomy    MR HEAD ANGIO WO IV CONTRAST  6/4/2018    MR HEAD ANGIO WO IV CONTRAST 6/4/2018 [facility] CLINICAL LEGACY    MR HEAD ANGIO WO IV CONTRAST  7/11/2020    MR HEAD ANGIO WO IV CONTRAST 7/11/2020 [facility] CLINICAL LEGACY    MR HEAD ANGIO WO IV CONTRAST  9/30/2020    MR HEAD ANGIO WO IV CONTRAST 9/30/2020 [facility] LEGACY    MR HEAD ANGIO WO IV CONTRAST  5/22/2022    MR HEAD ANGIO WO IV CONTRAST 5/22/2022 [facility] CLINICAL LEGACY    MR HEAD ANGIO WO IV CONTRAST  4/19/2023    MR HEAD ANGIO WO IV CONTRAST [facility] MRI    MR NECK ANGIO WO IV CONTRAST  6/4/2018    MR NECK ANGIO WO IV CONTRAST 6/4/2018 [facility] CLINICAL LEGACY    MR NECK ANGIO WO IV CONTRAST  7/11/2020    MR NECK ANGIO WO IV CONTRAST 7/11/2020 [facility] CLINICAL LEGACY    MR NECK ANGIO WO IV CONTRAST  9/30/2020    MR NECK ANGIO WO IV CONTRAST 9/30/2020 [facility]IB LEGACY    MR NECK ANGIO WO IV CONTRAST  5/22/2022    MR NECK ANGIO WO IV CONTRAST 5/22/2022 [facility] CLINICAL LEGACY    MR NECK ANGIO WO IV CONTRAST  4/19/2023    MR NECK ANGIO WO IV CONTRAST [facility] MRI    OTHER SURGICAL HISTORY  01/08/2016    Tubal Stabilization    OTHER SURGICAL HISTORY  09/01/2016    Cervical  "Surgery (Gyn) Laser Vaporization Of Transformation Zone    THYROID SURGERY  09/27/2013    Thyroid Surgery          Lab Results   Component Value Date    WBC CANCELED 04/22/2023    HGB CANCELED 04/22/2023    HCT CANCELED 04/22/2023    PLT CANCELED 04/22/2023    CHOL 129 08/07/2022    TRIG 182 (H) 08/07/2022    HDL 31.0 (A) 08/07/2022    ALT 8 04/21/2023    AST 23 04/21/2023    NA CANCELED 04/22/2023    K CANCELED 04/22/2023    CL CANCELED 04/22/2023    CREATININE CANCELED 04/22/2023    BUN CANCELED 04/22/2023    CO2 CANCELED 04/22/2023    TSH 0.32 (L) 04/20/2023    INR CANCELED 04/19/2023    HGBA1C 8.6 (A) 04/20/2023     Surgical History  Problems    · History of Cervical Surgery (Gyn) Laser Vaporization Of Transformation Zone   · History of Cholecystectomy   · History of Colon Surgery   · History of Hernia Repair   · History of Hysterectomy   · History of Thyroid Surgery   · History of Tubal Stabilization     Family History  Mother    · Family history of Cancer  Family History    · Family history of Diabetes Mellitus (V18.0)   · Family history of Hypertension (V17.49)     Social History  Problems    · Disabled   · Does not use illicit drugs (V49.89) (Z78.9)   · Former smoker (V15.82) (Z87.891)   · Marital History - Single   · No alcohol.   · Denied: History of Secondhand smoke exposure   · Single     Allergies  Medication    · Aspirin TABS   · Contrast Media Ready-Box MISC   · morphine   · Sulfa Drugs        /69   Pulse 75   Temp 36.4 °C (97.5 °F)   Resp 18   Ht 1.372 m (4' 6\")   Wt 103 kg (227 lb 3.2 oz)   SpO2 99%   BMI 54.78 kg/m²      Physical Exam  Vitals and nursing note reviewed.   Constitutional:       Appearance: Normal appearance.   HENT:      Head: Normocephalic.      Right Ear: External ear normal.      Left Ear: External ear normal.      Nose: Nose normal.      Mouth/Throat:      Mouth: Mucous membranes are moist.      Pharynx: Oropharynx is clear.   Eyes:      Extraocular Movements: " Extraocular movements intact.      Conjunctiva/sclera: Conjunctivae normal.      Pupils: Pupils are equal, round, and reactive to light.   Cardiovascular:      Rate and Rhythm: Normal rate and regular rhythm.      Pulses: Normal pulses.      Heart sounds: Normal heart sounds.   Pulmonary:      Effort: Pulmonary effort is normal.      Breath sounds: Normal breath sounds.   Abdominal:      General: Bowel sounds are normal.      Palpations: Abdomen is soft.   Musculoskeletal:      Cervical back: Normal range of motion and neck supple.      Comments: Generalized muscle weakness; immobility.    Right sided hemiparesis   Skin:     General: Skin is warm and dry.   Neurological:      Mental Status: She is alert. Mental status is at baseline.      Motor: Weakness present.      Coordination: Coordination abnormal.      Gait: Gait abnormal.      Comments: Right sided weakness--RLE 1/5; RUE 3/5; sensation intact to touch x 4 extremities.     Left sided facial droop.     Generalized muscle weakness   Psychiatric:         Mood and Affect: Mood normal.         Behavior: Behavior normal.          Assessment/Plan      Ordered BMP,  CBC with diff. For next Monday    DM type 2 with hyperglycemia:   DC Lantus 40 units subcutaneous at bedtime.  Start Lantus 43 units subcutaneous at bedtime.   Continue Humalog sliding scale.           Monitor accuchecks.           Monitor Ha1c Q 3 months.         Leukocytosis; cough; upper respiratory infection:      Monitor WBC.       Resolved leukocytosis.  Patient was treated with Augmentin and on prednisolone.  Continue Duonebs PRN.         Benign essential HTN; Chronic systolic HF; :      Continue amlodipine and hydrochlorothiazide.      Continue Bps.         Hypothyroidism:       Continue levothyroxine.        Monitor TSH.        Sequela of Cerebrovascular accident; Recent acute/subacute stroke; right hemiparesis;   dysphagia 2/2 cva; expressive aphasia;  [place] [facility] [place] recent [place].       Monitor for s/s of aspiration.         Immobility; Generalized muscle weakness;  :  Continue at  in skilled services if indicated and/or long term care.   Not able to ambulate.  Bedbound/wheelchair bound.   Fall prevention strategies.       Problem List Items Addressed This Visit    None  Visit Diagnoses       Type 2 diabetes mellitus with hyperglycemia, with long-term current use of insulin (CMS/[facility])    -  Primary    Leukocytosis, unspecified type        Cough, unspecified type        Upper respiratory tract infection, unspecified type        Benign essential HTN        Sequela of cerebrovascular accident        Immobility        Generalized muscle weakness            Maria Del Carmen Garduno, APRN-CNP

## 2023-08-28 ENCOUNTER — NURSING HOME VISIT (OUTPATIENT)
Dept: POST ACUTE CARE | Facility: EXTERNAL LOCATION | Age: 53
End: 2023-08-28
Payer: COMMERCIAL

## 2023-08-28 DIAGNOSIS — Z79.4 TYPE 2 DIABETES MELLITUS WITH HYPERGLYCEMIA, WITH LONG-TERM CURRENT USE OF INSULIN (MULTI): ICD-10-CM

## 2023-08-28 DIAGNOSIS — J06.9 UPPER RESPIRATORY TRACT INFECTION, UNSPECIFIED TYPE: ICD-10-CM

## 2023-08-28 DIAGNOSIS — I50.22 CHRONIC SYSTOLIC HEART FAILURE (MULTI): ICD-10-CM

## 2023-08-28 DIAGNOSIS — Z86.73 RECENT CEREBROVASCULAR ACCIDENT: ICD-10-CM

## 2023-08-28 DIAGNOSIS — M62.81 GENERALIZED MUSCLE WEAKNESS: ICD-10-CM

## 2023-08-28 DIAGNOSIS — D72.829 LEUKOCYTOSIS, UNSPECIFIED TYPE: Primary | ICD-10-CM

## 2023-08-28 DIAGNOSIS — I69.351 HEMIPARESIS AFFECTING RIGHT SIDE AS LATE EFFECT OF STROKE (MULTI): ICD-10-CM

## 2023-08-28 DIAGNOSIS — E11.65 TYPE 2 DIABETES MELLITUS WITH HYPERGLYCEMIA, WITH LONG-TERM CURRENT USE OF INSULIN (MULTI): ICD-10-CM

## 2023-08-28 DIAGNOSIS — Z74.09 IMMOBILITY: ICD-10-CM

## 2023-08-28 DIAGNOSIS — I10 BENIGN ESSENTIAL HTN: ICD-10-CM

## 2023-08-28 DIAGNOSIS — R05.9 COUGH, UNSPECIFIED TYPE: ICD-10-CM

## 2023-08-28 DIAGNOSIS — R13.10 DYSPHAGIA, UNSPECIFIED TYPE: ICD-10-CM

## 2023-08-28 DIAGNOSIS — I69.30 SEQUELA OF CEREBROVASCULAR ACCIDENT: ICD-10-CM

## 2023-08-28 DIAGNOSIS — R47.01 EXPRESSIVE APHASIA: ICD-10-CM

## 2023-08-28 PROCEDURE — 99309 SBSQ NF CARE MODERATE MDM 30: CPT | Performed by: NURSE PRACTITIONER

## 2023-08-28 NOTE — LETTER
Patient: Janette Martinez  : 1970    Encounter Date: 2023    Name: Janette Martinez    YOB: 1970    Code Status: DNRcc arrest      Chief complaint:  Follow up on Leukocytosis; etc.....      HPI:    52-year-old female with past medical history of heart failure with reduced EF,   diabetes, hypertension, hyperlipidemia, HIV,        CVA (left MCA  with residual right-sided hemiparesis), GERD, HIV, hypothyroidism, hypertension,   and diabetes.    Patient re-admitted to [facility]. On 23 after her most recent hospitalization.    Diagnoses as follows:        Leukocytosis; cough; upper respiratory infection:  WBC trends as follows:  23 WBC 14.1 H  23 WBC 10.7  23 WBC 11.6 H  23 WBC 12.3 H   23 WBC 10 wnl  23 WBC 10.9 H  WBC only slightly elevated, patient is feeling better.  Patient was previously coughing, chest x-ray was performed.   It was negative for pneumonia, but she is at risk for aspiration.   She was treated with Augmentin and prednisolone.  On Duonebs routine and PRN and on mucinex q12 hours.  Denies cough today.  On RA, no desaturations or fevers reported.  No complaints of chest pain, dizziness or headaches.  Patient seen today.  Calm and cooperative, mentation at baseline.  Follows commands.   No shortness of breath.         Benign essential HTN; Chronic systolic HF :       On Amlodipine and hydrochlorothiazide.       Recent /70  No complaints of chest pain.  No headaches no dizziness.   No SOB.    DM type 2 with hyperglycemia:   On Lantus 43 units at bedtime and Humalog sliding scale.  Patient has elevated accuchecks/hyperglycemia since she was started on prednisolone, now finished with   Recent accuchecks: 266 mg/dL, 217 mg/dL, 253 mg/dL.   No episodes of hypoglycemia reported.   Recent Ha1c 8.1 % on 23.  Reviewed importance  of being on a diabetic diet and of eating/drinking appropriate foods due to hyperglycemia.         Sequela of Cerebrovascular accident; Recent acute/subacute stroke; ;   dysphagia 2/2 cva; expressive aphasia;   MRI was performed during recent hospitalizaiton it showed Acute/subacute tiny infarct in the left anterior brock.   Neuro was consulted in the hospital.  Dr. Jiang from [facility] neuro/stroke was consulted.  Pt has baseline dysarthria from a previous CVA and has Hemiparesis.  She also has expressive aphasia, but able to communicate short sentences.  Patient able to nod appropriately to questions.   On regular diet, regular texture, thin consistency.         Immobility; Generalized muscle weakness :  At  participating in PT/OT.   Not able to ambulate.  Very weak.  Bedbound/wheelchair bound.               Reviewed medical, social, surgical and family history.  Reviewed all current medications and performed medication reconciliation.  Reviewed vital signs AND recent blood work results.  Performed prescription drug management.   Reviewed [facility] Documentation  Discussed patient with nursing and [facility] NP Pili.        ROS: 10 point ROS performed; negative unless noted in HPI.     BMP: Date: 5/1/2023 Na 137 K 4.3 Cr 1.1 BUN 15 glucose 141 Ca 8.8 GFR 52  CBC: Date: 5/1/2023 WBC 8.4 Hgb 11.1 hematocrit 33.9 platelets 337  Liver function: Date: 5/1/2023 ALT 10 AST 29 alkaline phos.  63 bilirubin 0.4 albumin 3.6 protein 7.7       Ha1c 8.1 % on 5/1/23          BMP: Date: 5/15/2023 Na 139  K 3.4 Cr 1.2 BUN 20 glucose 122 Ca 8.8 GFR 57        CBC: Date: 5/15/2023 WBC 9 Hgb 10.2 hematocrit 30.9 platelets 277       Liver function: 5/15/2023 ALT 8 AST 28 alkaline phos.  57 bilirubin 0.5 albumin 3.5 protein 7.3          BMP: Date: 5/22/2023 Na 141 K 3.1 Cr 1 BUN 11 glucose 227 Ca 8.7 GFR 70        CBC: Date: 5/22/2023 WBC 8.2 Hgb 10 hematocrit 30.5 platelets 372        Liver function: Date: 5/22/2023 ALT 8 AST 22 alkaline phos.  63 bilirubin 0.3 albumin 3.2 protein 7.2     BMP: Date: 6/5/2023 Na 140 K 4.2 Cr 1.1  BUN 14 glucose 79 Ca 1.1 GFR 63  CBC: Date: 6/5/2023 WBC 8.1 Hgb 10.5 hematocrit 32.3 platelets 350  Liver function: Date: 6/5/2023 ALT 7 AST 25 alkaline phos.  59 bilirubin 0.4 albumin 3.3 protein 7.7    BMP: Date: 7/17/2023 Na 140 K 4.1 Cr 0.8 BUN 17 glucose 126 Ca 9.1 GFR 90  CBC: Date: 7/17/2023 WBC 14.1 Hgb 10.2 hematocrit 32.1 platelets 265  Liver function: Date: 7/17/2023 ALT 8 AST 23 alkaline phos.  72 bilirubin 0.5 albumin 3.4 protein 7.4    CBC: Date: 7/19/2023 WBC 10.7 Hgb 9.5 hematocrit 28.8 platelets 257    BMP: Date: 7/24/2023 Na 137 K 4.2 Cr 0.9 BUN 19 glucose 186 Ca 9.1 GFR 79  CBC: Date: 7/24/2023 WBC 11.6 Hgb 10.3 hematocrit 31.4 platelets 305  Liver function: Date: 7/24/2023 ALT 11 AST 27 alkaline phos.  77 bilirubin 0.3 albumin 3.6 protein 7.8    BMP: Date: 7/31/2023 Na 141 K 4.2 Cr 0.8 BUN 17 glucose 138 Ca 8.7 GFR 90  CBC: Date: 7/31/2023 WBC 12.3 Hgb 10.5 hematocrit 32.4 platelets 307  Liver function: Date: 7/31/2023 ALT 14 AST 24 alkaline phos.  81 bilirubin 0.4 albumin 3.5 protein 7.4    BMP: Date: 8/7/2023 Na 141 K 3.9 Cr 1.0 BUN 12 glucose 167 Ca 8.6 GFR 70  CBC: Date: 8/7/2023 WBC 10 Hgb 10.5 hematocrit 32.7 platelets 256  Liver function: Date: 8/7/2023 ALT 9 AST 23 alkaline phos.  76 bilirubin 0.5 albumin 3.3 protein 7    BMP: Date: 8/21/2023 Na 142 K 4.1 Cr 1 BUN 17 glucose 195 Ca 8.5 GFR 70  CBC: Date: 8/21/2023 WBC 10.9 Hgb 9.6 hematocrit 30 platelets 304  Liver function: Date: 8/21/2023 ALT 9 AST 24 alkaline phos.  74 bilirubin 0.4 albumin 3.2 protein 7.1        Past Medical History:   Diagnosis Date   • Cervicalgia     Neck pain   • Essential (primary) hypertension 07/21/2013    Hypertension   • Pain in unspecified shoulder     Pain, joint, shoulder   • Personal history of other diseases of the digestive system 07/06/2022    History of ulcerative colitis   • Personal history of other diseases of the digestive system     History of esophageal reflux   • Personal history of other  diseases of the nervous system and sense organs     History of cataract   • Personal history of other diseases of the nervous system and sense organs     History of glaucoma   • Personal history of other diseases of the respiratory system     History of bronchitis   • Personal history of other endocrine, nutritional and metabolic disease     History of diabetes mellitus   • Personal history of other endocrine, nutritional and metabolic disease     History of hyperlipidemia   • Personal history of other mental and behavioral disorders     History of depression   • Snoring     Snoring   • Unilateral primary osteoarthritis, unspecified knee 11/02/2015    Arthritis of knee           Past Surgical History:   Procedure Laterality Date   • CHOLECYSTECTOMY  01/08/2016    Cholecystectomy   • COLON SURGERY  01/08/2016    Colon Surgery   • CT HEAD ANGIO W AND WO IV CONTRAST  3/19/2019    CT HEAD ANGIO W AND WO IV CONTRAST 3/19/2019 [facility] EMERGENCY LEGACY   • CT NECK ANGIO W AND WO IV CONTRAST  3/19/2019    CT NECK ANGIO W AND WO IV CONTRAST 3/19/2019 [facility] EMERGENCY LEGACY   • HERNIA REPAIR  01/08/2016    Hernia Repair   • HYSTERECTOMY  01/08/2016    Hysterectomy   • MR HEAD ANGIO WO IV CONTRAST  6/4/2018    MR HEAD ANGIO WO IV CONTRAST 6/4/2018 [facility] CLINICAL LEGACY   • MR HEAD ANGIO WO IV CONTRAST  7/11/2020    MR HEAD ANGIO WO IV CONTRAST 7/11/2020 [facility] CLINICAL LEGACY   • MR HEAD ANGIO WO IV CONTRAST  9/30/2020    MR HEAD ANGIO WO IV CONTRAST 9/30/2020 [facility]   • MR HEAD ANGIO WO IV CONTRAST  5/22/2022    MR HEAD ANGIO WO IV CONTRAST 5/22/2022 [facility] CLINICAL LEGACY   • MR HEAD ANGIO WO IV CONTRAST  4/19/2023    MR HEAD ANGIO WO IV CONTRAST [facility]   • MR NECK ANGIO WO IV CONTRAST  6/4/2018    MR NECK ANGIO WO IV CONTRAST 6/4/2018 [facility] CLINICAL LEGACY   • MR NECK ANGIO WO IV CONTRAST  7/11/2020    MR NECK ANGIO WO IV CONTRAST 7/11/2020 [facility] CLINICAL LEGACY   • MR NECK ANGIO WO IV CONTRAST  9/30/2020    MR NECK ANGIO WO IV  "CONTRAST 9/30/2020 [facility]   • MR NECK ANGIO WO IV CONTRAST  5/22/2022    MR NECK ANGIO WO IV CONTRAST 5/22/2022 [facility] CLINICAL LEGACY   • MR NECK ANGIO WO IV CONTRAST  4/19/2023    MR NECK ANGIO WO IV CONTRAST [facility] MRI   • OTHER SURGICAL HISTORY  01/08/2016    Tubal Stabilization   • OTHER SURGICAL HISTORY  09/01/2016    Cervical Surgery (Gyn) Laser Vaporization Of Transformation Zone   • THYROID SURGERY  09/27/2013    Thyroid Surgery          Lab Results   Component Value Date    WBC CANCELED 04/22/2023    HGB CANCELED 04/22/2023    HCT CANCELED 04/22/2023    PLT CANCELED 04/22/2023    CHOL 129 08/07/2022    TRIG 182 (H) 08/07/2022    HDL 31.0 (A) 08/07/2022    ALT 8 04/21/2023    AST 23 04/21/2023    NA CANCELED 04/22/2023    K CANCELED 04/22/2023    CL CANCELED 04/22/2023    CREATININE CANCELED 04/22/2023    BUN CANCELED 04/22/2023    CO2 CANCELED 04/22/2023    TSH 0.32 (L) 04/20/2023    INR CANCELED 04/19/2023    HGBA1C 8.6 (A) 04/20/2023     Surgical History  Problems    · History of Cervical Surgery (Gyn) Laser Vaporization Of Transformation Zone   · History of Cholecystectomy   · History of Colon Surgery   · History of Hernia Repair   · History of Hysterectomy   · History of Thyroid Surgery   · History of Tubal Stabilization     Family History  Mother    · Family history of Cancer  Family History    · Family history of Diabetes Mellitus (V18.0)   · Family history of Hypertension (V17.49)     Social History  Problems    · Disabled   · Does not use illicit drugs (V49.89) (Z78.9)   · Former smoker (V15.82) (Z87.891)   · Marital History - Single   · No alcohol.   · Denied: History of Secondhand smoke exposure   · Single     Allergies  Medication    · Aspirin TABS   · Contrast Media Ready-Box MISC   · morphine   · Sulfa Drugs        /70   Pulse 72   Temp 36.3 °C (97.4 °F)   Resp 18   Ht 1.372 m (4' 6\")   Wt 103 kg (227 lb 3.2 oz)   SpO2 97%   BMI 54.78 kg/m²      Physical Exam  Vitals and " nursing note reviewed.   Constitutional:       Appearance: Normal appearance.   HENT:      Head: Normocephalic.      Right Ear: External ear normal.      Left Ear: External ear normal.      Nose: Nose normal.      Mouth/Throat:      Mouth: Mucous membranes are moist.      Pharynx: Oropharynx is clear.   Eyes:      Extraocular Movements: Extraocular movements intact.      Conjunctiva/sclera: Conjunctivae normal.      Pupils: Pupils are equal, round, and reactive to light.   Cardiovascular:      Rate and Rhythm: Normal rate and regular rhythm.      Pulses: Normal pulses.      Heart sounds: Normal heart sounds.   Pulmonary:      Effort: Pulmonary effort is normal.      Breath sounds: Normal breath sounds.   Abdominal:      General: Bowel sounds are normal.      Palpations: Abdomen is soft.   Musculoskeletal:      Cervical back: Normal range of motion and neck supple.      Comments: Generalized muscle weakness; immobility.    Right sided hemiparesis   Skin:     General: Skin is warm and dry.   Neurological:      Mental Status: She is alert. Mental status is at baseline.      Motor: Weakness present.      Coordination: Coordination abnormal.      Gait: Gait abnormal.      Comments: Right sided weakness--RLE 1/5; RUE 3/5; sensation intact to touch x 4 extremities.     Left sided facial droop.     Generalized muscle weakness   Psychiatric:         Mood and Affect: Mood normal.         Behavior: Behavior normal.          Assessment/Plan     Ordered BMP,  CBC with diff. For tomorrow.    Leukocytosis; cough; upper respiratory infection:  Monitor WBC.  Monitor for recurrent cough/URI.  She was treated with Augmentin and prednisolone.  DC Duonebs routine order.  Continue Duonebs PRN.  Discontinue mucinex q12 hours.         Benign essential HTN; Chronic systolic HF :       Continue Amlodipine and hydrochlorothiazide.       Monitor Bps.              DM type 2 with hyperglycemia:         Continue Lantus 43 units at bedtime and  Humalog sliding scale.         Monitor accuchecks.         Monitor Ha1c Q 3 months.        Sequela of Cerebrovascular accident; Recent acute/subacute stroke;   dysphagia 2/2 cva; expressive aphasia;   MRI was performed during recent hospitalizaiton it showed Acute/subacute tiny infarct in the left anterior brock.   Neuro was consulted in the hospital.  Dr. Jiang from [facility] neuro/stroke was consulted.  Pt has baseline dysarthria from a previous CVA and has Hemiparesis.  She also has expressive aphasia, but able to communicate short sentences.  Patient able to nod appropriately to questions.   Continue regular diet, regular texture, thin consistency.  Follow up with neurology.         Immobility; Generalized muscle weakness :  At  participating in PT/OT if indicated.  Not able to ambulate.  Very weak.  Bedbound/wheelchair bound.   Fall prevention strategies.  Requires full care.      Problem List Items Addressed This Visit    None  Visit Diagnoses       Leukocytosis, unspecified type    -  Primary    Cough, unspecified type        Upper respiratory tract infection, unspecified type        Benign essential HTN        Chronic systolic heart failure (CMS/HCC)        Type 2 diabetes mellitus with hyperglycemia, with long-term current use of insulin (CMS/HCC)        Sequela of cerebrovascular accident        Hemiparesis affecting right side as late effect of stroke (CMS/HCC)        Recent cerebrovascular accident        Expressive aphasia        Dysphagia, unspecified type        Immobility        Generalized muscle weakness            JASON Espitia       Electronically Signed By: JASON Espitia   8/29/23 11:33 PM

## 2023-08-29 VITALS
OXYGEN SATURATION: 97 % | RESPIRATION RATE: 18 BRPM | HEIGHT: 55 IN | DIASTOLIC BLOOD PRESSURE: 70 MMHG | HEART RATE: 72 BPM | SYSTOLIC BLOOD PRESSURE: 132 MMHG | BODY MASS INDEX: 52.58 KG/M2 | TEMPERATURE: 97.4 F | WEIGHT: 227.2 LBS

## 2023-08-30 NOTE — PROGRESS NOTES
Name: Janette Martinez    YOB: 1970    Code Status: DNRcc arrest      Chief complaint:  Follow up on Leukocytosis; etc.....      HPI:    52-year-old female with past medical history of heart failure with reduced EF,   diabetes, hypertension, hyperlipidemia, HIV,        CVA (left MCA 2022 with residual right-sided hemiparesis), GERD, HIV, hypothyroidism, hypertension,   and diabetes.    Patient re-admitted to [facility]. On 4/21/23 after her most recent hospitalization.    Diagnoses as follows:        Leukocytosis; cough; upper respiratory infection:  WBC trends as follows:  7/17/23 WBC 14.1 H  7/19/23 WBC 10.7  7/24/23 WBC 11.6 H  7/31/23 WBC 12.3 H   8/7/23 WBC 10 wnl  8/21/23 WBC 10.9 H  WBC only slightly elevated, patient is feeling better.  Patient was previously coughing, chest x-ray was performed.   It was negative for pneumonia, but she is at risk for aspiration.   She was treated with Augmentin and prednisolone.  On Duonebs routine and PRN and on mucinex q12 hours.  Denies cough today.  On RA, no desaturations or fevers reported.  No complaints of chest pain, dizziness or headaches.  Patient seen today.  Calm and cooperative, mentation at baseline.  Follows commands.   No shortness of breath.         Benign essential HTN; Chronic systolic HF :       On Amlodipine and hydrochlorothiazide.       Recent /70  No complaints of chest pain.  No headaches no dizziness.   No SOB.    DM type 2 with hyperglycemia:   On Lantus 43 units at bedtime and Humalog sliding scale.  Patient has elevated accuchecks/hyperglycemia since she was started on prednisolone, now finished with   Recent accuchecks: 266 mg/dL, 217 mg/dL, 253 mg/dL.   No episodes of hypoglycemia reported.   Recent Ha1c 8.1 % on 5/1/23.  Reviewed importance  of being on a diabetic diet and of eating/drinking appropriate foods due to hyperglycemia.        Sequela of Cerebrovascular accident; Recent acute/subacute stroke; ;    dysphagia 2/2 cva; expressive aphasia;   MRI was performed during recent hospitalizaiton it showed Acute/subacute tiny infarct in the left anterior brock.   Neuro was consulted in the hospital.  [place] [facility] [place].  Pt has baseline dysarthria from a previous CVA and has Hemiparesis.  She also has expressive aphasia, but able to communicate short sentences.  Patient able to nod appropriately to questions.   On regular diet, regular texture, thin consistency.         Immobility; Generalized muscle weakness :  At  participating in PT/OT.   Not able to ambulate.  Very weak.  Bedbound/wheelchair bound.               Reviewed medical, social, surgical and family history.  Reviewed all current medications and performed medication reconciliation.  Reviewed vital signs AND recent blood work results.  Performed prescription drug management.   Reviewed [facility] Documentation  Discussed patient with nursing and [facility] NP Pili.        ROS: 10 point ROS performed; negative unless noted in HPI.     BMP: Date: 5/1/2023 Na 137 K 4.3 Cr 1.1 BUN 15 glucose 141 Ca 8.8 GFR 52  CBC: Date: 5/1/2023 WBC 8.4 Hgb 11.1 hematocrit 33.9 platelets 337  Liver function: Date: 5/1/2023 ALT 10 AST 29 alkaline phos.  63 bilirubin 0.4 albumin 3.6 protein 7.7       Ha1c 8.1 % on 5/1/23          BMP: Date: 5/15/2023 Na 139  K 3.4 Cr 1.2 BUN 20 glucose 122 Ca 8.8 GFR 57        CBC: Date: 5/15/2023 WBC 9 Hgb 10.2 hematocrit 30.9 platelets 277       Liver function: 5/15/2023 ALT 8 AST 28 alkaline phos.  57 bilirubin 0.5 albumin 3.5 protein 7.3          BMP: Date: 5/22/2023 Na 141 K 3.1 Cr 1 BUN 11 glucose 227 Ca 8.7 GFR 70        CBC: Date: 5/22/2023 WBC 8.2 Hgb 10 hematocrit 30.5 platelets 372        Liver function: Date: 5/22/2023 ALT 8 AST 22 alkaline phos.  63 bilirubin 0.3 albumin 3.2 protein 7.2     BMP: Date: 6/5/2023 Na 140 K 4.2 Cr 1.1 BUN 14 glucose 79 Ca 1.1 GFR 63  CBC: Date: 6/5/2023 WBC 8.1 Hgb  10.5 hematocrit 32.3 platelets 350  Liver function: Date: 6/5/2023 ALT 7 AST 25 alkaline phos.  59 bilirubin 0.4 albumin 3.3 protein 7.7    BMP: Date: 7/17/2023 Na 140 K 4.1 Cr 0.8 BUN 17 glucose 126 Ca 9.1 GFR 90  CBC: Date: 7/17/2023 WBC 14.1 Hgb 10.2 hematocrit 32.1 platelets 265  Liver function: Date: 7/17/2023 ALT 8 AST 23 alkaline phos.  72 bilirubin 0.5 albumin 3.4 protein 7.4    CBC: Date: 7/19/2023 WBC 10.7 Hgb 9.5 hematocrit 28.8 platelets 257    BMP: Date: 7/24/2023 Na 137 K 4.2 Cr 0.9 BUN 19 glucose 186 Ca 9.1 GFR 79  CBC: Date: 7/24/2023 WBC 11.6 Hgb 10.3 hematocrit 31.4 platelets 305  Liver function: Date: 7/24/2023 ALT 11 AST 27 alkaline phos.  77 bilirubin 0.3 albumin 3.6 protein 7.8    BMP: Date: 7/31/2023 Na 141 K 4.2 Cr 0.8 BUN 17 glucose 138 Ca 8.7 GFR 90  CBC: Date: 7/31/2023 WBC 12.3 Hgb 10.5 hematocrit 32.4 platelets 307  Liver function: Date: 7/31/2023 ALT 14 AST 24 alkaline phos.  81 bilirubin 0.4 albumin 3.5 protein 7.4    BMP: Date: 8/7/2023 Na 141 K 3.9 Cr 1.0 BUN 12 glucose 167 Ca 8.6 GFR 70  CBC: Date: 8/7/2023 WBC 10 Hgb 10.5 hematocrit 32.7 platelets 256  Liver function: Date: 8/7/2023 ALT 9 AST 23 alkaline phos.  76 bilirubin 0.5 albumin 3.3 protein 7    BMP: Date: 8/21/2023 Na 142 K 4.1 Cr 1 BUN 17 glucose 195 Ca 8.5 GFR 70  CBC: Date: 8/21/2023 WBC 10.9 Hgb 9.6 hematocrit 30 platelets 304  Liver function: Date: 8/21/2023 ALT 9 AST 24 alkaline phos.  74 bilirubin 0.4 albumin 3.2 protein 7.1        Past Medical History:   Diagnosis Date    Cervicalgia     Neck pain    Essential (primary) hypertension 07/21/2013    Hypertension    Pain in unspecified shoulder     Pain, joint, shoulder    Personal history of other diseases of the digestive system 07/06/2022    History of ulcerative colitis    Personal history of other diseases of the digestive system     History of esophageal reflux    Personal history of other diseases of the nervous system and sense organs     History of cataract     Personal history of other diseases of the nervous system and sense organs     History of glaucoma    Personal history of other diseases of the respiratory system     History of bronchitis    Personal history of other endocrine, nutritional and metabolic disease     History of diabetes mellitus    Personal history of other endocrine, nutritional and metabolic disease     History of hyperlipidemia    Personal history of other mental and behavioral disorders     History of depression    Snoring     Snoring    Unilateral primary osteoarthritis, unspecified knee 11/02/2015    Arthritis of knee           Past Surgical History:   Procedure Laterality Date    CHOLECYSTECTOMY  01/08/2016    Cholecystectomy    COLON SURGERY  01/08/2016    Colon Surgery    CT HEAD ANGIO W AND WO IV CONTRAST  3/19/2019    CT HEAD ANGIO W AND WO IV CONTRAST 3[place]19[place] [facility] [place]    CT NECK ANGIO W AND WO IV CONTRAST  3/19/2019    CT NECK ANGIO W AND WO IV CONTRAST 3/19/2019 [facility] [place]    HERNIA REPAIR  01/08/2016    Hernia Repair    HYSTERECTOMY  01/08/2016    Hysterectomy    MR HEAD ANGIO WO IV CONTRAST  6/4/2018    MR HEAD ANGIO WO IV CONTRAST 6/4/2018 [facility] CLINICAL LEGAC[facility]    MR HEAD ANGIO WO IV CONTRAST  7/11/2020    MR HEAD ANGIO WO IV CONTRAST 7/11/2020 [facility] CLINICAL LEGACY    MR HEAD ANGIO WO IV CONTRAST  9/30/2020    MR HEAD ANGIO WO IV CONTRAST 9/30/2020 [facility]    MR HEAD ANGIO WO IV CONTRAST  5/22/2022    MR HEAD ANGIO WO IV CONTRAST 5/22/2022 [facility] CLINICAL LEGACY    MR HEAD ANGIO WO IV CONTRAST  4/19/2023    MR HEAD ANGIO WO IV CONTRAST [facility]    MR NECK ANGIO WO IV CONTRAST  6/4/2018    MR NECK ANGIO WO IV CONTRAST 6/4/2018 [facility] CLINICAL LEGACY    MR NECK ANGIO WO IV CONTRAST  7/11/2020    MR NECK ANGIO WO IV CONTRAST 7/11/2020 [facility] CL[facility]ICAL [facility]GAC[facility]    MR NECK ANGIO WO IV CONTRAST  9/30/2020    MR NECK ANGIO WO IV CONTRAST 9/30/2020 [facility]    MR NECK ANGIO WO IV CONTRAST  5/22/2022    MR NECK ANGIO  "WO IV CONTRAST 5/22/2022 [facility] CLINICAL LEGAC[facility]    MR NECK ANGIO WO IV CONTRAST  4/19/2023    MR NECK ANGIO WO IV CONTRAST [facility] MRI    OTHER SURGICAL HISTORY  01/08/2016    Tubal Stabilization    OTHER SURGICAL HISTORY  09/01/2016    Cervical Surgery (Gyn) Laser Vaporization Of Transformation Zone    THYROID SURGERY  09/27/2013    Thyroid Surgery          Lab Results   Component Value Date    WBC CANCELED 04/22/2023    HGB CANCELED 04/22/2023    HCT CANCELED 04/22/2023    PLT CANCELED 04/22/2023    CHOL 129 08/07/2022    TRIG 182 (H) 08/07/2022    HDL 31.0 (A) 08/07/2022    ALT 8 04/21/2023    AST 23 04/21/2023    NA CANCELED 04/22/2023    K CANCELED 04/22/2023    CL CANCELED 04/22/2023    CREATININE CANCELED 04/22/2023    BUN CANCELED 04/22/2023    CO2 CANCELED 04/22/2023    TSH 0.32 (L) 04/20/2023    INR CANCELED 04/19/2023    HGBA1C 8.6 (A) 04/20/2023     Surgical History  Problems    · History of Cervical Surgery (Gyn) Laser Vaporization Of Transformation Zone   · History of Cholecystectomy   · History of Colon Surgery   · History of Hernia Repair   · History of Hysterectomy   · History of Thyroid Surgery   · History of Tubal Stabilization     Family History  Mother    · Family history of Cancer  Family History    · Family history of Diabetes Mellitus (V18.0)   · Family history of Hypertension (V17.49)     Social History  Problems    · Disabled   · Does not use illicit drugs (V49.89) (Z78.9)   · Former smoker (V15.82) (Z87.891)   · Marital History - Single   · No alcohol.   · Denied: History of Secondhand smoke exposure   · Single     Allergies  Medication    · Aspirin TABS   · Contrast Media Ready-Box MISC   · morphine   · Sulfa Drugs        /70   Pulse 72   Temp 36.3 °C (97.4 °F)   Resp 18   Ht 1.372 m (4' 6\")   Wt 103 kg (227 lb 3.2 oz)   SpO2 97%   BMI 54.78 kg/m²      Physical Exam  Vitals and nursing note reviewed.   Constitutional:       Appearance: Normal appearance.   HENT:      Head: " Normocephalic.      Right Ear: External ear normal.      Left Ear: External ear normal.      Nose: Nose normal.      Mouth/Throat:      Mouth: Mucous membranes are moist.      Pharynx: Oropharynx is clear.   Eyes:      Extraocular Movements: Extraocular movements intact.      Conjunctiva/sclera: Conjunctivae normal.      Pupils: Pupils are equal, round, and reactive to light.   Cardiovascular:      Rate and Rhythm: Normal rate and regular rhythm.      Pulses: Normal pulses.      Heart sounds: Normal heart sounds.   Pulmonary:      Effort: Pulmonary effort is normal.      Breath sounds: Normal breath sounds.   Abdominal:      General: Bowel sounds are normal.      Palpations: Abdomen is soft.   Musculoskeletal:      Cervical back: Normal range of motion and neck supple.      Comments: Generalized muscle weakness; immobility.    Right sided hemiparesis   Skin:     General: Skin is warm and dry.   Neurological:      Mental Status: She is alert. Mental status is at baseline.      Motor: Weakness present.      Coordination: Coordination abnormal.      Gait: Gait abnormal.      Comments: Right sided weakness--RLE 1/5; RUE 3/5; sensation intact to touch x 4 extremities.     Left sided facial droop.     Generalized muscle weakness   Psychiatric:         Mood and Affect: Mood normal.         Behavior: Behavior normal.          Assessment/Plan      Ordered BMP,  CBC with diff. For tomorrow.    Leukocytosis; cough; upper respiratory infection:  Monitor WBC.  Monitor for recurrent cough/URI.  She was treated with Augmentin and prednisolone.  DC Duonebs routine order.  Continue Duonebs PRN.  Discontinue mucinex q12 hours.         Benign essential HTN; Chronic systolic HF :       Continue Amlodipine and hydrochlorothiazide.       Monitor Bps.              DM type 2 with hyperglycemia:         Continue Lantus 43 units at bedtime and Humalog sliding scale.         Monitor accuchecks.         Monitor Ha1c Q 3 months.        Sequela  of Cerebrovascular accident; Recent acute/subacute stroke;   dysphagia 2/2 cva; expressive aphasia;   MRI was performed during recent hospitalizaiton it showed Acute/subacute tiny infarct in the left anterior brock.   Neuro was consulted in the hospital.  Dr. Jiang from [facility] [place] was consulted.  Pt has baseline dysarthria from a previous CVA and has Hemiparesis.  She also has expressive aphasia, but able to communicate short sentences.  Patient able to nod appropriately to questions.   Continue regular diet, regular texture, thin consistency.  Follow up with neurology.         Immobility; Generalized muscle weakness :  At  participating in PT/OT if indicated.  Not able to ambulate.  Very weak.  Bedbound/wheelchair bound.   Fall prevention strategies.  Requires full care.      Problem List Items Addressed This Visit    None  Visit Diagnoses       Leukocytosis, unspecified type    -  Primary    Cough, unspecified type        Upper respiratory tract infection, unspecified type        Benign essential HTN        Chronic systolic heart failure (CMS/HCC)        Type 2 diabetes mellitus with hyperglycemia, with long-term current use of insulin (CMS/HCC)        Sequela of cerebrovascular accident        Hemiparesis affecting right side as late effect of stroke (CMS/HCC)        Recent cerebrovascular accident        Expressive aphasia        Dysphagia, unspecified type        Immobility        Generalized muscle weakness            Maria Del Carmen Garduno, APRN-CNP

## 2023-09-05 ENCOUNTER — NURSING HOME VISIT (OUTPATIENT)
Dept: POST ACUTE CARE | Facility: EXTERNAL LOCATION | Age: 53
End: 2023-09-05
Payer: COMMERCIAL

## 2023-09-05 DIAGNOSIS — R13.10 DYSPHAGIA, UNSPECIFIED TYPE: ICD-10-CM

## 2023-09-05 DIAGNOSIS — I69.30 SEQUELA OF CEREBROVASCULAR ACCIDENT: ICD-10-CM

## 2023-09-05 DIAGNOSIS — Z79.4 TYPE 2 DIABETES MELLITUS WITH HYPERGLYCEMIA, WITH LONG-TERM CURRENT USE OF INSULIN (MULTI): ICD-10-CM

## 2023-09-05 DIAGNOSIS — D72.829 LEUKOCYTOSIS, UNSPECIFIED TYPE: ICD-10-CM

## 2023-09-05 DIAGNOSIS — M62.81 GENERALIZED MUSCLE WEAKNESS: ICD-10-CM

## 2023-09-05 DIAGNOSIS — I50.22 CHRONIC SYSTOLIC HEART FAILURE (MULTI): ICD-10-CM

## 2023-09-05 DIAGNOSIS — Z86.73 RECENT CEREBROVASCULAR ACCIDENT: ICD-10-CM

## 2023-09-05 DIAGNOSIS — Z74.09 IMMOBILITY: ICD-10-CM

## 2023-09-05 DIAGNOSIS — I10 BENIGN ESSENTIAL HTN: ICD-10-CM

## 2023-09-05 DIAGNOSIS — K59.00 CONSTIPATION, UNSPECIFIED CONSTIPATION TYPE: Primary | ICD-10-CM

## 2023-09-05 DIAGNOSIS — R47.01 EXPRESSIVE APHASIA: ICD-10-CM

## 2023-09-05 DIAGNOSIS — E11.65 TYPE 2 DIABETES MELLITUS WITH HYPERGLYCEMIA, WITH LONG-TERM CURRENT USE OF INSULIN (MULTI): ICD-10-CM

## 2023-09-05 PROCEDURE — 99309 SBSQ NF CARE MODERATE MDM 30: CPT | Performed by: NURSE PRACTITIONER

## 2023-09-05 NOTE — LETTER
Patient: Janette Martinez  : 1970    Encounter Date: 2023    Name: Janette Martinez    YOB: 1970    Code Status: DNRcc arrest      Chief complaint:  Constipation; Follow up on Leukocytosis; etc.....      HPI:    52-year-old female with past medical history of heart failure with reduced EF,   diabetes, hypertension, hyperlipidemia, HIV,        CVA (left MCA  with residual right-sided hemiparesis), GERD, HIV, hypothyroidism, hypertension,   and diabetes.    Patient re-admitted to [facility]. On 23 after her most recent hospitalization.    Diagnoses as follows:    Constipation:  Patient states she feels constipated and having slight abdominal pain.  Patient states she has not had a BM for days.  Both patient and nurse are unsure of when patient had her last BM.   On Colace BID routinely.  On Lactulose PRN and bisacodyl PRN.  Staff reports patient recents some PRN medication and it is not helping   No nausea or vomiting reported.   Patient seen today.  Calm and cooperative, mentation at baseline.  Follows commands.          Leukocytosis:  WBC trends as follows:  23 WBC 14.1 H  23 WBC 10.7  23 WBC 11.6 H  23 WBC 12.3 H   23 WBC 10 wnl  23 WBC 10.9 H  23 WBC 12 H  23 WBC 12 H  WBC still elevated.  No fevers reported.  Patient not having any respiratory symptoms or dysuria.  Recent chest -x-ray last week was WNL.  UA C & S ordered, results pending.   Denies cough today.  On RA, no desaturations or fevers reported.  No complaints of chest pain, dizziness or headaches.         Benign essential HTN; Chronic systolic HF :       On Amlodipine and hydrochlorothiazide.       Recent /70.  No complaints of chest pain.  No headaches no dizziness.   No SOB.    DM type 2 with hyperglycemia:   On Lantus 43 units at bedtime and Humalog sliding scale.  Patient has elevated accuchecks/hyperglycemia since she was started on prednisolone, now finished  with   Recent accuchecks: 226 mg/dL, 146 mg/dL, 200 mg/dL.   No episodes of hypoglycemia reported.   Recent Ha1c 8.1 % on 5/1/23.  Reviewed importance  of being on a diabetic diet and of eating/drinking appropriate foods due to hyperglycemia.        Sequela of Cerebrovascular accident; Recent acute/subacute stroke ;   dysphagia 2/2 cva; expressive aphasia;   MRI was performed during recent hospitalizaiton it showed Acute/subacute tiny infarct in the left anterior brock.   Neuro was consulted in the hospital.  Dr. Jiang from [facility] [place] neuro/stroke was consulted.  Pt has baseline dysarthria from a previous CVA and has Hemiparesis.  She also has expressive aphasia, but able to communicate short sentences.  Patient able to nod appropriately to questions.   On regular diet, regular texture, thin consistency.         Generalized muscle weakness; Immobility :  At  participating in PT/OT.   Not able to ambulate.  Very weak.  Bedbound/wheelchair bound.               Reviewed medical, social, surgical and family history.  Reviewed all current medications and performed medication reconciliation.  Reviewed vital signs AND recent blood work results.  Performed prescription drug management.   Reviewed [facility] Documentation  Discussed patient with nursing and [facility] NP [facility].        ROS: 10 point ROS performed; negative unless noted in HPI.     BMP: Date: 5/1/2023 Na 137 K 4.3 Cr 1.1 BUN 15 glucose 141 Ca 8.8 GFR 52  CBC: Date: 5/1/2023 WBC 8.4 Hgb 11.1 hematocrit 33.9 platelets 337  Liver function: Date: 5/1/2023 ALT 10 AST 29 alkaline phos.  63 bilirubin 0.4 albumin 3.6 protein 7.7       Ha1c 8.1 % on 5/1/23          BMP: Date: 5/15/2023 Na 139  K 3.4 Cr 1.2 BUN 20 glucose 122 Ca 8.8 GFR 57        CBC: Date: 5/15/2023 WBC 9 Hgb 10.2 hematocrit 30.9 platelets 277       Liver function: 5/15/2023 ALT 8 AST 28 alkaline phos.  57 bilirubin 0.5 albumin 3.5 protein 7.3          BMP: Date: 5/22/2023 Na 141 K 3.1 Cr 1 BUN 11  glucose 227 Ca 8.7 GFR 70        CBC: Date: 5/22/2023 WBC 8.2 Hgb 10 hematocrit 30.5 platelets 372        Liver function: Date: 5/22/2023 ALT 8 AST 22 alkaline phos.  63 bilirubin 0.3 albumin 3.2 protein 7.2     BMP: Date: 6/5/2023 Na 140 K 4.2 Cr 1.1 BUN 14 glucose 79 Ca 1.1 GFR 63  CBC: Date: 6/5/2023 WBC 8.1 Hgb 10.5 hematocrit 32.3 platelets 350  Liver function: Date: 6/5/2023 ALT 7 AST 25 alkaline phos.  59 bilirubin 0.4 albumin 3.3 protein 7.7    BMP: Date: 7/17/2023 Na 140 K 4.1 Cr 0.8 BUN 17 glucose 126 Ca 9.1 GFR 90  CBC: Date: 7/17/2023 WBC 14.1 Hgb 10.2 hematocrit 32.1 platelets 265  Liver function: Date: 7/17/2023 ALT 8 AST 23 alkaline phos.  72 bilirubin 0.5 albumin 3.4 protein 7.4    CBC: Date: 7/19/2023 WBC 10.7 Hgb 9.5 hematocrit 28.8 platelets 257    BMP: Date: 7/24/2023 Na 137 K 4.2 Cr 0.9 BUN 19 glucose 186 Ca 9.1 GFR 79  CBC: Date: 7/24/2023 WBC 11.6 Hgb 10.3 hematocrit 31.4 platelets 305  Liver function: Date: 7/24/2023 ALT 11 AST 27 alkaline phos.  77 bilirubin 0.3 albumin 3.6 protein 7.8    BMP: Date: 7/31/2023 Na 141 K 4.2 Cr 0.8 BUN 17 glucose 138 Ca 8.7 GFR 90  CBC: Date: 7/31/2023 WBC 12.3 Hgb 10.5 hematocrit 32.4 platelets 307  Liver function: Date: 7/31/2023 ALT 14 AST 24 alkaline phos.  81 bilirubin 0.4 albumin 3.5 protein 7.4    BMP: Date: 8/7/2023 Na 141 K 3.9 Cr 1.0 BUN 12 glucose 167 Ca 8.6 GFR 70  CBC: Date: 8/7/2023 WBC 10 Hgb 10.5 hematocrit 32.7 platelets 256  Liver function: Date: 8/7/2023 ALT 9 AST 23 alkaline phos.  76 bilirubin 0.5 albumin 3.3 protein 7    BMP: Date: 8/21/2023 Na 142 K 4.1 Cr 1 BUN 17 glucose 195 Ca 8.5 GFR 70  CBC: Date: 8/21/2023 WBC 10.9 Hgb 9.6 hematocrit 30 platelets 304  Liver function: Date: 8/21/2023 ALT 9 AST 24 alkaline phos.  74 bilirubin 0.4 albumin 3.2 protein 7.1    BMP: Date: 8/31/2023 Na 141 K 3.8 Cr 0.9 BUN 14 glucose 118 Ca 8.2 GFR 79  CBC: Date: 8/31/2023 WBC 12 Hgb 9.6 hematocrit 29.8 platelets 294  Liver function: Date: 8/31/2023 ALT 8  AST 21 alkaline phos.  64 bilirubin 0.4 albumin 3.1 protein 7    RECENT LABS:  BMP: Date: 9/4/2023 Na 141 K 3.9 Cr 1.1 BUN 17 glucose 107 Ca 8.5 GFR 63   CBC: Date: 9/4/2023 WBC 12 Hgb 9.5 hematocrit 29.7 platelets 282  Liver function: Date: 9/4/2023 ALT 7 AST 22 alkaline phos.  64 bilirubin 0.4 albumin 3.3 protein 7.1            Past Medical History:   Diagnosis Date   • Cervicalgia     Neck pain   • Essential (primary) hypertension 07/21/2013    Hypertension   • Pain in unspecified shoulder     Pain, joint, shoulder   • Personal history of other diseases of the digestive system 07/06/2022    History of ulcerative colitis   • Personal history of other diseases of the digestive system     History of esophageal reflux   • Personal history of other diseases of the nervous system and sense organs     History of cataract   • Personal history of other diseases of the nervous system and sense organs     History of glaucoma   • Personal history of other diseases of the respiratory system     History of bronchitis   • Personal history of other endocrine, nutritional and metabolic disease     History of diabetes mellitus   • Personal history of other endocrine, nutritional and metabolic disease     History of hyperlipidemia   • Personal history of other mental and behavioral disorders     History of depression   • Snoring     Snoring   • Unilateral primary osteoarthritis, unspecified knee 11/02/2015    Arthritis of knee           Past Surgical History:   Procedure Laterality Date   • CHOLECYSTECTOMY  01/08/2016    Cholecystectomy   • COLON SURGERY  01/08/2016    Colon Surgery   • CT HEAD ANGIO W AND WO IV CONTRAST  3/19/2019    CT HEAD ANGIO W AND WO IV [place]AST [place] [facility] [place]   • CT NECK ANGIO W AND WO IV CONTRAST  3/19/2019    CT NECK ANGIO W AND WO IV CONTRAST [place] [facility] [place]   • HERNIA REPAIR  01/08/2016    Hernia Repair   • HYSTERECTOMY  01/08/2016    Hysterectomy   • MR HEAD ANGIO WO IV  CONTRAST  6/4/2018    MR HEAD ANGIO WO IV CONTRAST 6/4/2018 [facility] CLINICAL LEGAC[facility]   • MR HEAD ANGIO WO IV CONTRAST  7/11/2020    MR HEAD ANGIO WO IV CONTRAST 7/11/2020 [facility] CLINICAL LEGAC[facility]   • MR HEAD ANGIO WO IV CONTRAST  9/30/2020    MR HEAD ANGIO WO IV CONTRAST 9/30/2020 [facility]   • MR HEAD ANGIO WO IV CONTRAST  5/22/2022    MR HEAD ANGIO WO IV CONTRAST 5/22/2022 [facility] CLINICAL LEGAC[facility]   • MR HEAD ANGIO WO IV CONTRAST  4/19/2023    MR HEAD ANGIO WO IV CONTRAST [facility] MRI   • MR NECK ANGIO WO IV CONTRAST  6/4/2018    MR NECK ANGIO WO IV CONTRAST 6/4/2018 [facility] CLINICAL LEGAC[facility]   • MR NECK ANGIO WO IV CONTRAST  7/11/2020    MR NECK ANGIO WO IV CONTRAST 7/11/2020 [facility]   • MR NECK ANGIO WO IV CONTRAST  9/30/2020    MR NECK ANGIO WO IV CONTRAST 9/30/2020 [facility]   • MR NECK ANGIO WO IV CONTRAST  5/22/2022    MR NECK ANGIO WO IV CONTRAST 5/22/2022 [facility] CLINICAL LEGAC[facility]   • MR NECK ANGIO WO IV CONTRAST  4/19/2023    MR NECK ANGIO WO IV CONTRAST [facility] MRI   • OTHER SURGICAL HISTORY  01/08/2016    Tubal Stabilization   • OTHER SURGICAL HISTORY  09/01/2016    Cervical Surgery (Gyn) Laser Vaporization Of Transformation Zone   • THYROID SURGERY  09/27/2013    Thyroid Surgery          Lab Results   Component Value Date    WBC CANCELED 04/22/2023    HGB CANCELED 04/22/2023    HCT CANCELED 04/22/2023    PLT CANCELED 04/22/2023    CHOL 129 08/07/2022    TRIG 182 (H) 08/07/2022    HDL 31.0 (A) 08/07/2022    ALT 8 04/21/2023    AST 23 04/21/2023    NA CANCELED 04/22/2023    K CANCELED 04/22/2023    CL CANCELED 04/22/2023    CREATININE CANCELED 04/22/2023    BUN CANCELED 04/22/2023    CO2 CANCELED 04/22/2023    TSH 0.32 (L) 04/20/2023    INR CANCELED 04/19/2023    HGBA1C 8.6 (A) 04/20/2023     Surgical History  Problems    · History of Cervical Surgery (Gyn) Laser Vaporization Of Transformation Zone   · History of Cholecystectomy   · History of Colon Surgery   · History of Hernia Repair   · History of  "Hysterectomy   · History of Thyroid Surgery   · History of Tubal Stabilization     Family History  Mother    · Family history of Cancer  Family History    · Family history of Diabetes Mellitus (V18.0)   · Family history of Hypertension (V17.49)     Social History  Problems    · Disabled   · Does not use illicit drugs (V49.89) (Z78.9)   · Former smoker (V15.82) (Z87.891)   · Marital History - Single   · No alcohol.   · Denied: History of Secondhand smoke exposure   · Single     Allergies  Medication    · Aspirin TABS   · Contrast Media Ready-Box MISC   · morphine   · Sulfa Drugs        /70   Pulse 93   Temp 36.5 °C (97.7 °F)   Resp 18   Ht 1.372 m (4' 6\")   Wt 103 kg (227 lb 3.2 oz)   SpO2 97%   BMI 54.78 kg/m²      Physical Exam  Vitals and nursing note reviewed.   Constitutional:       Appearance: Normal appearance.   HENT:      Head: Normocephalic.      Right Ear: External ear normal.      Left Ear: External ear normal.      Nose: Nose normal.      Mouth/Throat:      Mouth: Mucous membranes are moist.      Pharynx: Oropharynx is clear.   Eyes:      Extraocular Movements: Extraocular movements intact.      Conjunctiva/sclera: Conjunctivae normal.      Pupils: Pupils are equal, round, and reactive to light.   Cardiovascular:      Rate and Rhythm: Normal rate and regular rhythm.      Pulses: Normal pulses.      Heart sounds: Normal heart sounds.   Pulmonary:      Effort: Pulmonary effort is normal.      Breath sounds: Normal breath sounds.   Abdominal:      General: Bowel sounds are normal.      Palpations: Abdomen is soft.   Musculoskeletal:      Cervical back: Normal range of motion and neck supple.      Comments: Generalized muscle weakness; immobility.    Right sided hemiparesis   Skin:     General: Skin is warm and dry.   Neurological:      Mental Status: She is alert. Mental status is at baseline.      Motor: Weakness present.      Coordination: Coordination abnormal.      Gait: Gait abnormal.      " Comments: Right sided weakness--RLE 1/5; RUE 3/5; sensation intact to touch x 4 extremities.     Left sided facial droop.     Generalized muscle weakness   Psychiatric:         Mood and Affect: Mood normal.         Behavior: Behavior normal.          Assessment/Plan     Constipation:  Continue Colace routinely.  Give Lactulose 30 ml PO x one NOW.  Give bisacodyl rectal suppository 10 mg CT x one NOW.  Give enema later today before bed if no bowel movement.  KUB STAT if no response to the above.          Leukocytosis:  Monitor WBC. WBC still elevated.  UA C & S results pending.         Benign essential HTN; Chronic systolic HF :       Continue Amlodipine and hydrochlorothiazide.       Monitor Bps.        DM type 2 with hyperglycemia:          Continue Lantus 43 units at bedtime and Humalog sliding scale.         Monitor accuchecks.        Monitor Ha1c.        Sequela of Cerebrovascular accident; Recent acute/subacute stroke ;   dysphagia 2/2 cva; expressive aphasia;   MRI was performed during recent hospitalization it showed Acute/subacute tiny infarct in the left anterior brock.   Neuro was consulted in the hospital.  Dr. Jiang from [facility] [place].  Pt has baseline dysarthria from a previous CVA and has Hemiparesis.  She also has expressive aphasia, but able to communicate short sentences.  Patient able to nod appropriately to questions.   On regular diet, regular texture, thin consistency.        Generalized muscle weakness; immobility:  Continue at  participating in PT/OT.   Not able to ambulate.  Very weak.  Bedbound/wheelchair bound.       Problem List Items Addressed This Visit    None  Visit Diagnoses       Constipation, unspecified constipation type    -  Primary    Leukocytosis, unspecified type        Benign essential HTN        Chronic systolic heart failure (CMS/HCC)        Type 2 diabetes mellitus with hyperglycemia, with long-term current use of insulin (CMS/HCC)        Sequela  of cerebrovascular accident        Recent cerebrovascular accident        Dysphagia, unspecified type        Expressive aphasia        Generalized muscle weakness        Immobility            JASON Espitia       Electronically Signed By: JASON Espitia   9/6/23  7:19 PM

## 2023-09-06 VITALS
SYSTOLIC BLOOD PRESSURE: 112 MMHG | RESPIRATION RATE: 18 BRPM | DIASTOLIC BLOOD PRESSURE: 70 MMHG | WEIGHT: 227.2 LBS | HEART RATE: 93 BPM | BODY MASS INDEX: 52.58 KG/M2 | OXYGEN SATURATION: 97 % | TEMPERATURE: 97.7 F | HEIGHT: 55 IN

## 2023-09-06 NOTE — PROGRESS NOTES
Name: Janette Martinez    YOB: 1970    Code Status: DNRcc arrest      Chief complaint:  Constipation; Follow up on Leukocytosis; etc.....      HPI:    52-year-old female with past medical history of heart failure with reduced EF,   diabetes, hypertension, hyperlipidemia, HIV,        CVA (left MCA 2022 with residual right-sided hemiparesis), GERD, HIV, hypothyroidism, hypertension,   and diabetes.    Patient re-admitted to [facility]. On 4/21/23 after her most recent hospitalization.    Diagnoses as follows:    Constipation:  Patient states she feels constipated and having slight abdominal pain.  Patient states she has not had a BM for days.  Both patient and nurse are unsure of when patient had her last BM.   On Colace BID routinely.  On Lactulose PRN and bisacodyl PRN.  Staff reports patient recents some PRN medication and it is not helping   No nausea or vomiting reported.   Patient seen today.  Calm and cooperative, mentation at baseline.  Follows commands.          Leukocytosis:  WBC trends as follows:  7/17/23 WBC 14.1 H  7/19/23 WBC 10.7  7/24/23 WBC 11.6 H  7/31/23 WBC 12.3 H   8/7/23 WBC 10 wnl  8/21/23 WBC 10.9 H  8/31/23 WBC 12 H  9/4/23 WBC 12 H  WBC still elevated.  No fevers reported.  Patient not having any respiratory symptoms or dysuria.  Recent chest -x-ray last week was WNL.  UA C & S ordered, results pending.   Denies cough today.  On RA, no desaturations or fevers reported.  No complaints of chest pain, dizziness or headaches.         Benign essential HTN; Chronic systolic HF :       On Amlodipine and hydrochlorothiazide.       Recent /70.  No complaints of chest pain.  No headaches no dizziness.   No SOB.    DM type 2 with hyperglycemia:   On Lantus 43 units at bedtime and Humalog sliding scale.  Patient has elevated accuchecks/hyperglycemia since she was started on prednisolone, now finished with   Recent accuchecks: 226 mg/dL, 146 mg/dL, 200 mg/dL.   No episodes  of hypoglycemia reported.   Recent Ha1c 8.1 % on 5/1/23.  Reviewed importance  of being on a diabetic diet and of eating/drinking appropriate foods due to hyperglycemia.        Sequela of Cerebrovascular accident; Recent acute/subacute stroke ;   dysphagia 2/2 cva; expressive aphasia;   MRI was performed during recent hospitalizaiton it showed Acute/subacute tiny infarct in the left anterior brock.   Neuro was consulted in the hospital.  Dr. Jiang from [facility] [place] neuro/stroke was consulted.  Pt has baseline dysarthria from a previous CVA and has Hemiparesis.  She also has expressive aphasia, but able to communicate short sentences.  Patient able to nod appropriately to questions.   On regular diet, regular texture, thin consistency.         Generalized muscle weakness; Immobility :  At  participating in PT/OT.   Not able to ambulate.  Very weak.  Bedbound/wheelchair bound.               Reviewed medical, social, surgical and family history.  Reviewed all current medications and performed medication reconciliation.  Reviewed vital signs AND recent blood work results.  Performed prescription drug management.   Reviewed [facility] Documentation  Discussed patient with nursing and [facility] NP Pili.        ROS: 10 point ROS performed; negative unless noted in HPI.     BMP: Date: 5/1/2023 Na 137 K 4.3 Cr 1.1 BUN 15 glucose 141 Ca 8.8 GFR 52  CBC: Date: 5/1/2023 WBC 8.4 Hgb 11.1 hematocrit 33.9 platelets 337  Liver function: Date: 5/1/2023 ALT 10 AST 29 alkaline phos.  63 bilirubin 0.4 albumin 3.6 protein 7.7       Ha1c 8.1 % on 5/1/23          BMP: Date: 5/15/2023 Na 139  K 3.4 Cr 1.2 BUN 20 glucose 122 Ca 8.8 GFR 57        CBC: Date: 5/15/2023 WBC 9 Hgb 10.2 hematocrit 30.9 platelets 277       Liver function: 5/15/2023 ALT 8 AST 28 alkaline phos.  57 bilirubin 0.5 albumin 3.5 protein 7.3          BMP: Date: 5/22/2023 Na 141 K 3.1 Cr 1 BUN 11 glucose 227 Ca 8.7 GFR 70        CBC: Date: 5/22/2023 WBC 8.2 Hgb 10  hematocrit 30.5 platelets 372        Liver function: Date: 5/22/2023 ALT 8 AST 22 alkaline phos.  63 bilirubin 0.3 albumin 3.2 protein 7.2     BMP: Date: 6/5/2023 Na 140 K 4.2 Cr 1.1 BUN 14 glucose 79 Ca 1.1 GFR 63  CBC: Date: 6/5/2023 WBC 8.1 Hgb 10.5 hematocrit 32.3 platelets 350  Liver function: Date: 6/5/2023 ALT 7 AST 25 alkaline phos.  59 bilirubin 0.4 albumin 3.3 protein 7.7    BMP: Date: 7/17/2023 Na 140 K 4.1 Cr 0.8 BUN 17 glucose 126 Ca 9.1 GFR 90  CBC: Date: 7/17/2023 WBC 14.1 Hgb 10.2 hematocrit 32.1 platelets 265  Liver function: Date: 7/17/2023 ALT 8 AST 23 alkaline phos.  72 bilirubin 0.5 albumin 3.4 protein 7.4    CBC: Date: 7/19/2023 WBC 10.7 Hgb 9.5 hematocrit 28.8 platelets 257    BMP: Date: 7/24/2023 Na 137 K 4.2 Cr 0.9 BUN 19 glucose 186 Ca 9.1 GFR 79  CBC: Date: 7/24/2023 WBC 11.6 Hgb 10.3 hematocrit 31.4 platelets 305  Liver function: Date: 7/24/2023 ALT 11 AST 27 alkaline phos.  77 bilirubin 0.3 albumin 3.6 protein 7.8    BMP: Date: 7/31/2023 Na 141 K 4.2 Cr 0.8 BUN 17 glucose 138 Ca 8.7 GFR 90  CBC: Date: 7/31/2023 WBC 12.3 Hgb 10.5 hematocrit 32.4 platelets 307  Liver function: Date: 7/31/2023 ALT 14 AST 24 alkaline phos.  81 bilirubin 0.4 albumin 3.5 protein 7.4    BMP: Date: 8/7/2023 Na 141 K 3.9 Cr 1.0 BUN 12 glucose 167 Ca 8.6 GFR 70  CBC: Date: 8/7/2023 WBC 10 Hgb 10.5 hematocrit 32.7 platelets 256  Liver function: Date: 8/7/2023 ALT 9 AST 23 alkaline phos.  76 bilirubin 0.5 albumin 3.3 protein 7    BMP: Date: 8/21/2023 Na 142 K 4.1 Cr 1 BUN 17 glucose 195 Ca 8.5 GFR 70  CBC: Date: 8/21/2023 WBC 10.9 Hgb 9.6 hematocrit 30 platelets 304  Liver function: Date: 8/21/2023 ALT 9 AST 24 alkaline phos.  74 bilirubin 0.4 albumin 3.2 protein 7.1    BMP: Date: 8/31/2023 Na 141 K 3.8 Cr 0.9 BUN 14 glucose 118 Ca 8.2 GFR 79  CBC: Date: 8/31/2023 WBC 12 Hgb 9.6 hematocrit 29.8 platelets 294  Liver function: Date: 8/31/2023 ALT 8 AST 21 alkaline phos.  64 bilirubin 0.4 albumin 3.1 protein  7    RECENT LABS:  BMP: Date: 9/4/2023 Na 141 K 3.9 Cr 1.1 BUN 17 glucose 107 Ca 8.5 GFR 63   CBC: Date: 9/4/2023 WBC 12 Hgb 9.5 hematocrit 29.7 platelets 282  Liver function: Date: 9/4/2023 ALT 7 AST 22 alkaline phos.  64 bilirubin 0.4 albumin 3.3 protein 7.1            Past Medical History:   Diagnosis Date    Cervicalgia     Neck pain    Essential (primary) hypertension 07/21/2013    Hypertension    Pain in unspecified shoulder     Pain, joint, shoulder    Personal history of other diseases of the digestive system 07/06/2022    History of ulcerative colitis    Personal history of other diseases of the digestive system     History of esophageal reflux    Personal history of other diseases of the nervous system and sense organs     History of cataract    Personal history of other diseases of the nervous system and sense organs     History of glaucoma    Personal history of other diseases of the respiratory system     History of bronchitis    Personal history of other endocrine, nutritional and metabolic disease     History of diabetes mellitus    Personal history of other endocrine, nutritional and metabolic disease     History of hyperlipidemia    Personal history of other mental and behavioral disorders     History of depression    Snoring     Snoring    Unilateral primary osteoarthritis, unspecified knee 11/02/2015    Arthritis of knee           Past Surgical History:   Procedure Laterality Date    CHOLECYSTECTOMY  01/08/2016    Cholecystectomy    COLON SURGERY  01/08/2016    Colon Surgery    CT HEAD ANGIO W AND WO IV CONTRAST  3/19/2019    CT HEAD ANGIO W AND WO IV CONTRAST 3/19/2019 [facility] [place] [facility]    CT NECK ANGIO W AND WO IV CONTRAST  3/19/2019    CT NECK ANGIO W AND WO IV CONTRAST 3/19/2019 [facility] [place] [facility]    HERNIA REPAIR  01/08/2016    Hernia Repair    HYSTERECTOMY  01/08/2016    Hysterectomy    MR HEAD ANGIO WO IV CONTRAST  6/4/2018    MR HEAD ANGIO WO IV CONTRAST 6/4/2018 [facility] CLINICAL LEGAC[facility]     MR HEAD ANGIO WO IV CONTRAST  7/11/2020    MR HEAD ANGIO WO IV CONTRAST 7/11/2020 [facility] CLINICAL LEGACY    MR HEAD ANGIO WO IV CONTRAST  9/30/2020    MR HEAD ANGIO WO IV CONTRAST 9/30/2020 [facility]    MR HEAD ANGIO WO IV CONTRAST  5/22/2022    MR HEAD ANGIO WO IV CONTRAST 5/22/2022 [facility] CLINICAL LEGACY    MR HEAD ANGIO WO IV CONTRAST  4/19/2023    MR HEAD ANGIO WO IV CONTRAST [facility] MRI    MR NECK ANGIO WO IV CONTRAST  6/4/2018    MR NECK ANGIO WO IV CONTRAST 6/4/2018 [facility] CLINICAL LEGACY    MR NECK ANGIO WO IV CONTRAST  7/11/2020    MR NECK ANGIO WO IV CONTRAST 7/11/2020 [facility] CLINICAL LEGACY    MR NECK ANGIO WO IV CONTRAST  9/30/2020    MR NECK ANGIO WO IV CONTRAST 9/30/2020 [facility]    MR NECK ANGIO WO IV CONTRAST  5/22/2022    MR NECK ANGIO WO IV CONTRAST 5/22/2022 [facility] CLINICAL LEGACY    MR NECK ANGIO WO IV CONTRAST  4/19/2023    MR NECK ANGIO WO IV CONTRAST [facility] MRI    OTHER SURGICAL HISTORY  01/08/2016    Tubal Stabilization    OTHER SURGICAL HISTORY  09/01/2016    Cervical Surgery (Gyn) Laser Vaporization Of Transformation Zone    THYROID SURGERY  09/27/2013    Thyroid Surgery          Lab Results   Component Value Date    WBC CANCELED 04/22/2023    HGB CANCELED 04/22/2023    HCT CANCELED 04/22/2023    PLT CANCELED 04/22/2023    CHOL 129 08/07/2022    TRIG 182 (H) 08/07/2022    HDL 31.0 (A) 08/07/2022    ALT 8 04/21/2023    AST 23 04/21/2023    NA CANCELED 04/22/2023    K CANCELED 04/22/2023    CL CANCELED 04/22/2023    CREATININE CANCELED 04/22/2023    BUN CANCELED 04/22/2023    CO2 CANCELED 04/22/2023    TSH 0.32 (L) 04/20/2023    INR CANCELED 04/19/2023    HGBA1C 8.6 (A) 04/20/2023     Surgical History  Problems    · History of Cervical Surgery (Gyn) Laser Vaporization Of Transformation Zone   · History of Cholecystectomy   · History of Colon Surgery   · History of Hernia Repair   · History of Hysterectomy   · History of Thyroid Surgery   · History of Tubal Stabilization     Family  "History  Mother    · Family history of Cancer  Family History    · Family history of Diabetes Mellitus (V18.0)   · Family history of Hypertension (V17.49)     Social History  Problems    · Disabled   · Does not use illicit drugs (V49.89) (Z78.9)   · Former smoker (V15.82) (Z87.891)   · Marital History - Single   · No alcohol.   · Denied: History of Secondhand smoke exposure   · Single     Allergies  Medication    · Aspirin TABS   · Contrast Media Ready-Box MISC   · morphine   · Sulfa Drugs        /70   Pulse 93   Temp 36.5 °C (97.7 °F)   Resp 18   Ht 1.372 m (4' 6\")   Wt 103 kg (227 lb 3.2 oz)   SpO2 97%   BMI 54.78 kg/m²      Physical Exam  Vitals and nursing note reviewed.   Constitutional:       Appearance: Normal appearance.   HENT:      Head: Normocephalic.      Right Ear: External ear normal.      Left Ear: External ear normal.      Nose: Nose normal.      Mouth/Throat:      Mouth: Mucous membranes are moist.      Pharynx: Oropharynx is clear.   Eyes:      Extraocular Movements: Extraocular movements intact.      Conjunctiva/sclera: Conjunctivae normal.      Pupils: Pupils are equal, round, and reactive to light.   Cardiovascular:      Rate and Rhythm: Normal rate and regular rhythm.      Pulses: Normal pulses.      Heart sounds: Normal heart sounds.   Pulmonary:      Effort: Pulmonary effort is normal.      Breath sounds: Normal breath sounds.   Abdominal:      General: Bowel sounds are normal.      Palpations: Abdomen is soft.   Musculoskeletal:      Cervical back: Normal range of motion and neck supple.      Comments: Generalized muscle weakness; immobility.    Right sided hemiparesis   Skin:     General: Skin is warm and dry.   Neurological:      Mental Status: She is alert. Mental status is at baseline.      Motor: Weakness present.      Coordination: Coordination abnormal.      Gait: Gait abnormal.      Comments: Right sided weakness--RLE 1/5; RUE 3/5; sensation intact to touch x 4 " extremities.     Left sided facial droop.     Generalized muscle weakness   Psychiatric:         Mood and Affect: Mood normal.         Behavior: Behavior normal.          Assessment/Plan      Constipation:  Continue Colace routinely.  Give Lactulose 30 ml PO x one NOW.  Give bisacodyl rectal suppository 10 mg AR x one NOW.  Give enema later today before bed if no bowel movement.  KUB STAT if no response to the above.          Leukocytosis:  Monitor WBC. WBC still elevated.  UA C & S results pending.         Benign essential HTN; Chronic systolic HF :       Continue Amlodipine and hydrochlorothiazide.       Monitor Bps.        DM type 2 with hyperglycemia:          Continue Lantus 43 units at bedtime and Humalog sliding scale.         Monitor accuchecks.        Monitor Ha1c.        Sequela of Cerebrovascular accident; Recent acute/subacute stroke ;   dysphagia 2/2 cva; expressive aphasia;   MRI was performed during recent hospitalization it showed Acute/subacute tiny infarct in the left anterior brock.   Neuro was consulted in the hospital.  Dr. Jiang from [facility] [place] neuro/stroke was consulted.  Pt has baseline dysarthria from a previous CVA and has Hemiparesis.  She also has expressive aphasia, but able to communicate short sentences.  Patient able to nod appropriately to questions.   On regular diet, regular texture, thin consistency.        Generalized muscle weakness; immobility:  Continue at  participating in PT/OT.   Not able to ambulate.  Very weak.  Bedbound/wheelchair bound.       Problem List Items Addressed This Visit    None  Visit Diagnoses       Constipation, unspecified constipation type    -  Primary    Leukocytosis, unspecified type        Benign essential HTN        Chronic systolic heart failure (CMS/HCC)        Type 2 diabetes mellitus with hyperglycemia, with long-term current use of insulin (CMS/HCC)        Sequela of cerebrovascular accident        Recent cerebrovascular accident         Dysphagia, unspecified type        Expressive aphasia        Generalized muscle weakness        Immobility            Maria Del Carmen Garduno, APRN-CNP

## 2023-09-11 ENCOUNTER — NURSING HOME VISIT (OUTPATIENT)
Dept: POST ACUTE CARE | Facility: EXTERNAL LOCATION | Age: 53
End: 2023-09-11
Payer: COMMERCIAL

## 2023-09-11 DIAGNOSIS — M62.81 GENERALIZED MUSCLE WEAKNESS: ICD-10-CM

## 2023-09-11 DIAGNOSIS — Z74.09 IMMOBILITY: ICD-10-CM

## 2023-09-11 DIAGNOSIS — R47.01 EXPRESSIVE APHASIA: ICD-10-CM

## 2023-09-11 DIAGNOSIS — I50.22 CHRONIC SYSTOLIC HEART FAILURE (MULTI): ICD-10-CM

## 2023-09-11 DIAGNOSIS — D72.829 LEUKOCYTOSIS, UNSPECIFIED TYPE: ICD-10-CM

## 2023-09-11 DIAGNOSIS — M25.471 EDEMA OF RIGHT ANKLE: ICD-10-CM

## 2023-09-11 DIAGNOSIS — R13.10 DYSPHAGIA, UNSPECIFIED TYPE: ICD-10-CM

## 2023-09-11 DIAGNOSIS — M25.571 PAIN IN JOINT INVOLVING RIGHT ANKLE AND FOOT: ICD-10-CM

## 2023-09-11 DIAGNOSIS — I69.30 SEQUELA OF CEREBROVASCULAR ACCIDENT: ICD-10-CM

## 2023-09-11 DIAGNOSIS — I10 BENIGN ESSENTIAL HTN: ICD-10-CM

## 2023-09-11 DIAGNOSIS — Z86.73 RECENT CEREBROVASCULAR ACCIDENT: ICD-10-CM

## 2023-09-11 DIAGNOSIS — K59.00 CONSTIPATION, UNSPECIFIED CONSTIPATION TYPE: ICD-10-CM

## 2023-09-11 DIAGNOSIS — N39.0 URINARY TRACT INFECTION WITHOUT HEMATURIA, SITE UNSPECIFIED: Primary | ICD-10-CM

## 2023-09-11 PROCEDURE — 99309 SBSQ NF CARE MODERATE MDM 30: CPT | Performed by: NURSE PRACTITIONER

## 2023-09-11 NOTE — LETTER
Patient: Janette Martinez  : 1970    Encounter Date: 2023    Name: Janette Martinez    YOB: 1970    Code Status: DNRcc arrest    Chief complaint:  UTI; etc.....      HPI:    52-year-old female with past medical history of heart failure with reduced EF,   diabetes, hypertension, hyperlipidemia, HIV,        CVA (left MCA  with residual right-sided hemiparesis), GERD, HIV, hypothyroidism, hypertension,   and diabetes.    Patient re-admitted to [facility]. On 23 after her most recent hospitalization.    Diagnoses as follows:    UTI:  Patient had a UA C & S performed on 23.   Results were reported positive on 9/10/23 for providencia stuartii 60-70,000 CFU/ml  Proteus <10,000 CFU/ml      Susceptibility recommended ciprofloxacin S <= 0.25      Patient has some dysuria, but no costovertebral angle pain.      Patient seen today, she is in bed.      Calm, cooperative, pleasant.      Mentation at baseline.   No fevers reported.  Denies cough today.  On RA, no desaturations or fevers reported.  No complaints of chest pain, dizziness or headaches.    Right foot and ankle edema:  Patient states her right foot and ankle are painful and swollen.  She noticed it the last couple of days.  No falls reported.          Leukocytosis:  WBC trends as follows:  23 WBC 14.1 H  23 WBC 10.7  23 WBC 11.6 H  23 WBC 12.3 H   23 WBC 10 wnl  23 WBC 10.9 H  23 WBC 12 H  23 WBC 12 H  UA C & S was ordered.     Constipation:  Patient states she feels constipated.  She had a BM a couple days ago.  On Colace BID routinely.  On Lactulose PRN and bisacodyl PRN.      No nausea or vomiting reported.        Chronic systolic HF; Benign essential HTN :       On Amlodipine and hydrochlorothiazide.       Recent /80.  No complaints of chest pain.  No headaches no dizziness.   No SOB.        Sequela of Cerebrovascular accident; Recent acute/subacute stroke ;   dysphagia  2/2 cva; expressive aphasia;   MRI was performed during recent hospitalizaiton it showed Acute/subacute tiny infarct in the left anterior brock.   Neuro was consulted in the hospital.  Dr. Jiang from [facility] neuro[facility]stroke was consulted.  Pt has baseline dysarthria from a previous CVA and has Hemiparesis.  She also has expressive aphasia, but able to communicate short sentences.  Patient able to nod appropriately to questions.   On regular diet, regular texture, thin consistency.         Generalized muscle weakness; Immobility :  At  participating in PT/OT.   Not able to ambulate.  Very weak.  Bedbound/wheelchair bound.               Reviewed medical, social, surgical and family history.  Reviewed all current medications and performed medication reconciliation.  Reviewed vital signs AND recent blood work results.  Performed prescription drug management.   Reviewed [facility] Documentation  Discussed patient with nursing and [facility] NP Pili.        ROS: 10 point ROS performed; negative unless noted in HPI.     BMP: Date: 5/1/2023 Na 137 K 4.3 Cr 1.1 BUN 15 glucose 141 Ca 8.8 GFR 52  CBC: Date: 5/1/2023 WBC 8.4 Hgb 11.1 hematocrit 33.9 platelets 337  Liver function: Date: 5/1/2023 ALT 10 AST 29 alkaline phos.  63 bilirubin 0.4 albumin 3.6 protein 7.7       Ha1c 8.1 % on 5/1/23          BMP: Date: 5/15/2023 Na 139  K 3.4 Cr 1.2 BUN 20 glucose 122 Ca 8.8 GFR 57        CBC: Date: 5/15/2023 WBC 9 Hgb 10.2 hematocrit 30.9 platelets 277       Liver function: 5/15/2023 ALT 8 AST 28 alkaline phos.  57 bilirubin 0.5 albumin 3.5 protein 7.3          BMP: Date: 5/22/2023 Na 141 K 3.1 Cr 1 BUN 11 glucose 227 Ca 8.7 GFR 70        CBC: Date: 5/22/2023 WBC 8.2 Hgb 10 hematocrit 30.5 platelets 372        Liver function: Date: 5/22/2023 ALT 8 AST 22 alkaline phos.  63 bilirubin 0.3 albumin 3.2 protein 7.2     BMP: Date: 6/5/2023 Na 140 K 4.2 Cr 1.1 BUN 14 glucose 79 Ca 1.1 GFR 63  CBC: Date: 6/5/2023 WBC 8.1 Hgb 10.5 hematocrit  32.3 platelets 350  Liver function: Date: 6/5/2023 ALT 7 AST 25 alkaline phos.  59 bilirubin 0.4 albumin 3.3 protein 7.7    BMP: Date: 7/17/2023 Na 140 K 4.1 Cr 0.8 BUN 17 glucose 126 Ca 9.1 GFR 90  CBC: Date: 7/17/2023 WBC 14.1 Hgb 10.2 hematocrit 32.1 platelets 265  Liver function: Date: 7/17/2023 ALT 8 AST 23 alkaline phos.  72 bilirubin 0.5 albumin 3.4 protein 7.4    CBC: Date: 7/19/2023 WBC 10.7 Hgb 9.5 hematocrit 28.8 platelets 257    BMP: Date: 7/24/2023 Na 137 K 4.2 Cr 0.9 BUN 19 glucose 186 Ca 9.1 GFR 79  CBC: Date: 7/24/2023 WBC 11.6 Hgb 10.3 hematocrit 31.4 platelets 305  Liver function: Date: 7/24/2023 ALT 11 AST 27 alkaline phos.  77 bilirubin 0.3 albumin 3.6 protein 7.8    BMP: Date: 7/31/2023 Na 141 K 4.2 Cr 0.8 BUN 17 glucose 138 Ca 8.7 GFR 90  CBC: Date: 7/31/2023 WBC 12.3 Hgb 10.5 hematocrit 32.4 platelets 307  Liver function: Date: 7/31/2023 ALT 14 AST 24 alkaline phos.  81 bilirubin 0.4 albumin 3.5 protein 7.4    BMP: Date: 8/7/2023 Na 141 K 3.9 Cr 1.0 BUN 12 glucose 167 Ca 8.6 GFR 70  CBC: Date: 8/7/2023 WBC 10 Hgb 10.5 hematocrit 32.7 platelets 256  Liver function: Date: 8/7/2023 ALT 9 AST 23 alkaline phos.  76 bilirubin 0.5 albumin 3.3 protein 7    BMP: Date: 8/21/2023 Na 142 K 4.1 Cr 1 BUN 17 glucose 195 Ca 8.5 GFR 70  CBC: Date: 8/21/2023 WBC 10.9 Hgb 9.6 hematocrit 30 platelets 304  Liver function: Date: 8/21/2023 ALT 9 AST 24 alkaline phos.  74 bilirubin 0.4 albumin 3.2 protein 7.1    BMP: Date: 8/31/2023 Na 141 K 3.8 Cr 0.9 BUN 14 glucose 118 Ca 8.2 GFR 79  CBC: Date: 8/31/2023 WBC 12 Hgb 9.6 hematocrit 29.8 platelets 294  Liver function: Date: 8/31/2023 ALT 8 AST 21 alkaline phos.  64 bilirubin 0.4 albumin 3.1 protein 7    BMP: Date: 9/4/2023 Na 141 K 3.9 Cr 1.1 BUN 17 glucose 107 Ca 8.5 GFR 63   CBC: Date: 9/4/2023 WBC 12 Hgb 9.5 hematocrit 29.7 platelets 282  Liver function: Date: 9/4/2023 ALT 7 AST 22 alkaline phos.  64 bilirubin 0.4 albumin 3.3 protein 7.1        Past Medical  History:   Diagnosis Date   • Cervicalgia     Neck pain   • Essential (primary) hypertension 07/21/2013    Hypertension   • Pain in unspecified shoulder     Pain, joint, shoulder   • Personal history of other diseases of the digestive system 07/06/2022    History of ulcerative colitis   • Personal history of other diseases of the digestive system     History of esophageal reflux   • Personal history of other diseases of the nervous system and sense organs     History of cataract   • Personal history of other diseases of the nervous system and sense organs     History of glaucoma   • Personal history of other diseases of the respiratory system     History of bronchitis   • Personal history of other endocrine, nutritional and metabolic disease     History of diabetes mellitus   • Personal history of other endocrine, nutritional and metabolic disease     History of hyperlipidemia   • Personal history of other mental and behavioral disorders     History of depression   • Snoring     Snoring   • Unilateral primary osteoarthritis, unspecified knee 11/02/2015    Arthritis of knee           Past Surgical History:   Procedure Laterality Date   • CHOLECYSTECTOMY  01/08/2016    Cholecystectomy   • COLON SURGERY  01/08/2016    Colon Surgery   • CT HEAD ANGIO W AND WO IV CONTRAST  3/19/2019    CT HEAD ANGIO W AND WO IV CONTRAST 3/19/2019 [facility]   • CT NECK ANGIO W AND WO IV CONTRAST  3/19/2019    CT NECK ANGIO W AND WO IV CONTRAST 3/19/2019 [facility]   • HERNIA REPAIR  01/08/2016    Hernia Repair   • HYSTERECTOMY  01/08/2016    Hysterectomy   • MR HEAD ANGIO WO IV CONTRAST  6/4/2018    MR HEAD ANGIO WO IV CONTRAST 6/4/2018 [facility] CLINICAL LEGACY   • MR HEAD ANGIO WO IV CONTRAST  7/11/2020    MR HEAD ANGIO WO IV CONTRAST 7/11/2020 [facility] CLINICAL LEGACY   • MR HEAD ANGIO WO IV CONTRAST  9/30/2020    MR HEAD ANGIO WO IV CONTRAST 9/30/2020 [facility] AIB LEGACY   • MR HEAD ANGIO WO IV CONTRAST  5/22/2022    MR HEAD  ANGIO WO IV CONTRAST 5/22/2022 [facility] CLINICAL LEGACY   • MR HEAD ANGIO WO IV CONTRAST  4/19/2023    MR HEAD ANGIO WO IV CONTRAST [facility] MRI   • MR NECK ANGIO WO IV CONTRAST  6/4/2018    MR NECK ANGIO WO IV CONTRAST 6/4/2018 [facility] CLINICAL LEGACY   • MR NECK ANGIO WO IV CONTRAST  7/11/2020    MR NECK ANGIO WO IV CONTRAST 7/11/2020 [facility] CLINICAL LEGACY   • MR NECK ANGIO WO IV CONTRAST  9/30/2020    MR NECK ANGIO WO IV CONTRAST 9/30/2020 [facility] AIB LEGACY   • MR NECK ANGIO WO IV CONTRAST  5/22/2022    MR NECK ANGIO WO IV CONTRAST 5/22/2022 [facility] CLINICAL LEGACY   • MR NECK ANGIO WO IV CONTRAST  4/19/2023    MR NECK ANGIO WO IV CONTRAST [facility] MRI   • OTHER SURGICAL HISTORY  01/08/2016    Tubal Stabilization   • OTHER SURGICAL HISTORY  09/01/2016    Cervical Surgery (Gyn) Laser Vaporization Of Transformation Zone   • THYROID SURGERY  09/27/2013    Thyroid Surgery          Lab Results   Component Value Date    WBC CANCELED 04/22/2023    HGB CANCELED 04/22/2023    HCT CANCELED 04/22/2023    PLT CANCELED 04/22/2023    CHOL 129 08/07/2022    TRIG 182 (H) 08/07/2022    HDL 31.0 (A) 08/07/2022    ALT 8 04/21/2023    AST 23 04/21/2023    NA CANCELED 04/22/2023    K CANCELED 04/22/2023    CL CANCELED 04/22/2023    CREATININE CANCELED 04/22/2023    BUN CANCELED 04/22/2023    CO2 CANCELED 04/22/2023    TSH 0.32 (L) 04/20/2023    INR CANCELED 04/19/2023    HGBA1C 8.6 (A) 04/20/2023     Surgical History  Problems    · History of Cervical Surgery (Gyn) Laser Vaporization Of Transformation Zone   · History of Cholecystectomy   · History of Colon Surgery   · History of Hernia Repair   · History of Hysterectomy   · History of Thyroid Surgery   · History of Tubal Stabilization     Family History  Mother    · Family history of Cancer  Family History    · Family history of Diabetes Mellitus (V18.0)   · Family history of Hypertension (V17.49)     Social History  Problems    · Disabled   · Does not use illicit drugs (V49.89) (Z78.9)   · Former smoker  "(V15.82) (Z87.891)   · Marital History - Single   · No alcohol.   · Denied: History of Secondhand smoke exposure   · Single     Allergies  Medication    · Aspirin TABS   · Contrast Media Ready-Box MISC   · morphine   · Sulfa Drugs        /80   Pulse 90   Temp 36.5 °C (97.7 °F)   Resp 18   Ht 1.372 m (4' 6\")   Wt 104 kg (228 lb 4.8 oz)   SpO2 98%   BMI 55.05 kg/m²      Physical Exam  Vitals and nursing note reviewed.   Constitutional:       Appearance: Normal appearance.   HENT:      Head: Normocephalic.      Right Ear: External ear normal.      Left Ear: External ear normal.      Nose: Nose normal.      Mouth/Throat:      Mouth: Mucous membranes are moist.      Pharynx: Oropharynx is clear.   Eyes:      Extraocular Movements: Extraocular movements intact.      Conjunctiva/sclera: Conjunctivae normal.      Pupils: Pupils are equal, round, and reactive to light.   Cardiovascular:      Rate and Rhythm: Normal rate and regular rhythm.      Pulses: Normal pulses.      Heart sounds: Normal heart sounds.   Pulmonary:      Effort: Pulmonary effort is normal.      Breath sounds: Normal breath sounds.   Abdominal:      General: Bowel sounds are normal.      Palpations: Abdomen is soft.   Musculoskeletal:      Cervical back: Normal range of motion and neck supple.      Comments: Generalized muscle weakness; immobility.    Right sided hemiparesis   Skin:     General: Skin is warm and dry.   Neurological:      Mental Status: She is alert. Mental status is at baseline.      Motor: Weakness present.      Coordination: Coordination abnormal.      Gait: Gait abnormal.      Comments: Right sided weakness--RLE 1/5; RUE 3/5; sensation intact to touch x 4 extremities.     Left sided facial droop.     Generalized muscle weakness   Psychiatric:         Mood and Affect: Mood normal.         Behavior: Behavior normal.          Assessment/Plan   Ordered BMP and CBC with diff. On Wednesday.        UTI:  Start ciprofloxacin 500 mg " PO Q 12 hours for 10 days for UTI.    Right foot and ankle pain & edema:  Elevate right ankle/foot.  Ordered right ankle/foot 2 view x-ray.          Leukocytosis:   Monitor WBC.     Constipation:  Continue Colace BID routinely.  Continue Lactulose PRN and bisacodyl PRN.  Give bisacodyl 10 mg supp [place] x 1        Chronic systolic HF; Benign essential HTN :       Continue Amlodipine and hydrochlorothiazide.       Monitor Bps.        Sequela of Cerebrovascular accident; Recent acute/subacute stroke ;   dysphagia 2/2 cva; expressive aphasia;   MRI was performed during recent hospitalizaiton it showed Acute/subacute tiny infarct in the left anterior brock.   Neuro was consulted in the hospital.  Dr. Jiang from [facility] neuro/stroke was consulted.  Pt has baseline dysarthria from a previous CVA and has Hemiparesis.  She also has expressive aphasia, but able to communicate short sentences.         Generalized muscle weakness; Immobility :  Continue at  participating in PT/OT.   Fall prevention strategies.    Problem List Items Addressed This Visit    None  Visit Diagnoses       Urinary tract infection without hematuria, site unspecified    -  Primary    Pain in joint involving right ankle and foot        Edema of right ankle        Leukocytosis, unspecified type        Constipation, unspecified constipation type        Chronic systolic heart failure (CMS/HCC)        Benign essential HTN        Sequela of cerebrovascular accident        Recent cerebrovascular accident        Dysphagia, unspecified type        Expressive aphasia        Generalized muscle weakness        Immobility            JASON Espitia       Electronically Signed By: JASON Espitia   9/12/23 11:02 PM

## 2023-09-12 VITALS
RESPIRATION RATE: 18 BRPM | BODY MASS INDEX: 52.84 KG/M2 | HEIGHT: 55 IN | SYSTOLIC BLOOD PRESSURE: 126 MMHG | HEART RATE: 90 BPM | WEIGHT: 228.3 LBS | OXYGEN SATURATION: 98 % | DIASTOLIC BLOOD PRESSURE: 80 MMHG | TEMPERATURE: 97.7 F

## 2023-09-13 NOTE — PROGRESS NOTES
Name: Janette Martinez    YOB: 1970    Code Status: DNRcc arrest    Chief complaint:  UTI; etc.....      HPI:    52-year-old female with past medical history of heart failure with reduced EF,   diabetes, hypertension, hyperlipidemia, HIV,        CVA (left MCA 2022 with residual right-sided hemiparesis), GERD, HIV, hypothyroidism, hypertension,   and diabetes.    Patient [facility] On [facility]23 after her most recent hospitalization.    Diagnoses as follows:    UTI:  Patient had a UA C & S performed on 9/7/23.   Results were reported positive on 9/10/23 for providencia stuartii 60-70,000 CFU/ml  Proteus <10,000 CFU/ml      Susceptibility recommended ciprofloxacin S <= 0.25      Patient has some dysuria, but no costovertebral angle pain.      Patient seen today, she is in bed.      Calm, cooperative, pleasant.      Mentation at baseline.   No fevers reported.  Denies cough today.  On RA, no desaturations or fevers reported.  No complaints of chest pain, dizziness or headaches.    Right foot and ankle edema:  Patient states her right foot and ankle are painful and swollen.  She noticed it the last couple of days.  No falls reported.          Leukocytosis:  WBC trends as follows:  7/17/23 WBC 14.1 H  7/19/23 WBC 10.7  7/24/23 WBC 11.6 H  7/31/23 WBC 12.3 H   8/7/23 WBC 10 wnl  8/21/23 WBC 10.9 H  8/31/23 WBC 12 H  9/4/23 WBC 12 H  UA C & S was ordered.     Constipation:  Patient states she feels constipated.  She had a BM a couple days ago.  On Colace BID routinely.  On Lactulose PRN and bisacodyl PRN.      No nausea or vomiting reported.        Chronic systolic HF; Benign essential HTN :       On Amlodipine and hydrochlorothiazide.       Recent /80.  No complaints of chest pain.  No headaches no dizziness.   No SOB.        Sequela of Cerebrovascular accident; Recent acute/subacute stroke ;   dysphagia 2/2 cva; expressive aphasia;   MRI was performed during recent  [facility] it showed Acute/subacute tiny infarct in the left anterior brock.   Neuro was consulted in the hospital.  Dr. Jiang from [facility] neuro/stroke was consulted.  Pt has baseline dysarthria from a previous CVA and has Hemiparesis.  She also has expressive aphasia, but able to communicate short sentences.  Patient able to nod appropriately to questions.   On regular diet, regular texture, thin consistency.         Generalized muscle weakness; Immobility :  At  participating in PT/OT.   Not able to ambulate.  Very weak.  Bedbound/wheelchair bound.               Reviewed medical, social, surgical and family history.  Reviewed all current medications and performed medication reconciliation.  Reviewed vital signs AND recent blood work results.  Performed prescription drug management.   Reviewed [facility] Documentation  Discussed patient with nursing and [facility] NP Pili.        ROS: 10 point ROS performed; negative unless noted in HPI.     BMP: Date: 5/1/2023 Na 137 K 4.3 Cr 1.1 BUN 15 glucose 141 Ca 8.8 GFR 52  CBC: Date: 5/1/2023 WBC 8.4 Hgb 11.1 hematocrit 33.9 platelets 337  Liver function: Date: 5/1/2023 ALT 10 AST 29 alkaline phos.  63 bilirubin 0.4 albumin 3.6 protein 7.7       Ha1c 8.1 % on 5/1/23          BMP: Date: 5/15/2023 Na 139  K 3.4 Cr 1.2 BUN 20 glucose 122 Ca 8.8 GFR 57        CBC: Date: 5/15/2023 WBC 9 Hgb 10.2 hematocrit 30.9 platelets 277       Liver function: 5/15/2023 ALT 8 AST 28 alkaline phos.  57 bilirubin 0.5 albumin 3.5 protein 7.3          BMP: Date: 5/22/2023 Na 141 K 3.1 Cr 1 BUN 11 glucose 227 Ca 8.7 GFR 70        CBC: Date: 5/22/2023 WBC 8.2 Hgb 10 hematocrit 30.5 platelets 372        Liver function: Date: 5/22/2023 ALT 8 AST 22 alkaline phos.  63 bilirubin 0.3 albumin 3.2 protein 7.2     BMP: Date: 6/5/2023 Na 140 K 4.2 Cr 1.1 BUN 14 glucose 79 Ca 1.1 GFR 63  CBC: Date: 6/5/2023 WBC 8.1 Hgb 10.5 hematocrit 32.3 platelets 350  Liver function: Date: 6/5/2023 ALT 7 AST  25 alkaline phos.  59 bilirubin 0.4 albumin 3.3 protein 7.7    BMP: Date: 7/17/2023 Na 140 K 4.1 Cr 0.8 BUN 17 glucose 126 Ca 9.1 GFR 90  CBC: Date: 7/17/2023 WBC 14.1 Hgb 10.2 hematocrit 32.1 platelets 265  Liver function: Date: 7/17/2023 ALT 8 AST 23 alkaline phos.  72 bilirubin 0.5 albumin 3.4 protein 7.4    CBC: Date: 7/19/2023 WBC 10.7 Hgb 9.5 hematocrit 28.8 platelets 257    BMP: Date: 7/24/2023 Na 137 K 4.2 Cr 0.9 BUN 19 glucose 186 Ca 9.1 GFR 79  CBC: Date: 7/24/2023 WBC 11.6 Hgb 10.3 hematocrit 31.4 platelets 305  Liver function: Date: 7/24/2023 ALT 11 AST 27 alkaline phos.  77 bilirubin 0.3 albumin 3.6 protein 7.8    BMP: Date: 7/31/2023 Na 141 K 4.2 Cr 0.8 BUN 17 glucose 138 Ca 8.7 GFR 90  CBC: Date: 7/31/2023 WBC 12.3 Hgb 10.5 hematocrit 32.4 platelets 307  Liver function: Date: 7/31/2023 ALT 14 AST 24 alkaline phos.  81 bilirubin 0.4 albumin 3.5 protein 7.4    BMP: Date: 8/7/2023 Na 141 K 3.9 Cr 1.0 BUN 12 glucose 167 Ca 8.6 GFR 70  CBC: Date: 8/7/2023 WBC 10 Hgb 10.5 hematocrit 32.7 platelets 256  Liver function: Date: 8/7/2023 ALT 9 AST 23 alkaline phos.  76 bilirubin 0.5 albumin 3.3 protein 7    BMP: Date: 8/21/2023 Na 142 K 4.1 Cr 1 BUN 17 glucose 195 Ca 8.5 GFR 70  CBC: Date: 8/21/2023 WBC 10.9 Hgb 9.6 hematocrit 30 platelets 304  Liver function: Date: 8/21/2023 ALT 9 AST 24 alkaline phos.  74 bilirubin 0.4 albumin 3.2 protein 7.1    BMP: Date: 8/31/2023 Na 141 K 3.8 Cr 0.9 BUN 14 glucose 118 Ca 8.2 GFR 79  CBC: Date: 8/31/2023 WBC 12 Hgb 9.6 hematocrit 29.8 platelets 294  Liver function: Date: 8/31/2023 ALT 8 AST 21 alkaline phos.  64 bilirubin 0.4 albumin 3.1 protein 7    BMP: Date: 9/4/2023 Na 141 K 3.9 Cr 1.1 BUN 17 glucose 107 Ca 8.5 GFR 63   CBC: Date: 9/4/2023 WBC 12 Hgb 9.5 hematocrit 29.7 platelets 282  Liver function: Date: 9/4/2023 ALT 7 AST 22 alkaline phos.  64 bilirubin 0.4 albumin 3.3 protein 7.1        Past Medical History:   Diagnosis Date    Cervicalgia     Neck pain    Essential  (primary) hypertension 07/21/2013    Hypertension    Pain in unspecified shoulder     Pain, joint, shoulder    Personal history of other diseases of the digestive system 07/06/2022    History of ulcerative colitis    Personal history of other diseases of the digestive system     History of esophageal reflux    Personal history of other diseases of the nervous system and sense organs     History of cataract    Personal history of other diseases of the nervous system and sense organs     History of glaucoma    Personal history of other diseases of the respiratory system     History of bronchitis    Personal history of other endocrine, nutritional and metabolic disease     History of diabetes mellitus    Personal history of other endocrine, nutritional and metabolic disease     History of hyperlipidemia    Personal history of other mental and behavioral disorders     History of depression    Snoring     Snoring    Unilateral primary osteoarthritis, unspecified knee 11/02/2015    Arthritis of knee           Past Surgical History:   Procedure Laterality Date    CHOLECYSTECTOMY  01/08/2016    Cholecystectomy    COLON SURGERY  01/08/2016    Colon Surgery    CT HEAD ANGIO W AND WO IV CONTRAST  3/19/2019    CT HEAD ANGIO W AND WO IV CONTRAST [place] [facility]    CT NECK ANGIO W AND WO IV CONTRAST  3/19/2019    CT NECK ANGIO W AND WO IV CONTRAST [place]19/[place] [facility]    HERNIA REPAIR  01/08/2016    Hernia Repair    HYSTERECTOMY  01/08/2016    Hysterectomy    MR HEAD ANGIO WO IV CONTRAST  6/4/2018    MR HEAD ANGIO WO IV CONTRAST 6/4/2018 [facility] CLINICAL LEGACY    MR HEAD ANGIO WO IV CONTRAST  7/11/2020    MR HEAD ANGIO WO IV CONTRAST 7/11/2020 [facility] CLINICAL LEGACY    MR HEAD ANGIO WO IV CONTRAST  9/30/2020    MR HEAD ANGIO WO IV CONTRAST 9/30/2020 [facility] LEGAC[facility]    MR HEAD ANGIO WO IV CONTRAST  5/22/2022    MR HEAD ANGIO WO IV CONTRAST 5/22/2022 [facility] CLINICAL LEGACY    MR HEAD ANGIO WO IV CONTRAST   4/19/2023    MR HEAD ANGIO WO IV CONTRAST [facility] MRI    MR NECK ANGIO WO IV CONTRAST  6/4/2018    MR NECK ANGIO WO IV CONTRAST 6/4/2018 [facility] CLINICAL LEGACY    MR NECK ANGIO WO IV CONTRAST  7/11/2020    MR NECK ANGIO WO IV CONTRAST 7/11/2020 [facility] CLINICAL LEGACY    MR NECK ANGIO WO IV CONTRAST  9/30/2020    MR NECK ANGIO WO IV CONTRAST 9/30/2020 [facility]GAC[facility]    MR NECK ANGIO WO IV CONTRAST  5/22/2022    MR NECK ANGIO WO IV CONTRAST 5/22/2022 [facility] CLINICAL LEGACY    MR NECK ANGIO WO IV CONTRAST  4/19/2023    MR NECK ANGIO WO IV CONTRAST [facility] MRI    OTHER SURGICAL HISTORY  01/08/2016    Tubal Stabilization    OTHER SURGICAL HISTORY  09/01/2016    Cervical Surgery (Gyn) Laser Vaporization Of Transformation Zone    THYROID SURGERY  09/27/2013    Thyroid Surgery          Lab Results   Component Value Date    WBC CANCELED 04/22/2023    HGB CANCELED 04/22/2023    HCT CANCELED 04/22/2023    PLT CANCELED 04/22/2023    CHOL 129 08/07/2022    TRIG 182 (H) 08/07/2022    HDL 31.0 (A) 08/07/2022    ALT 8 04/21/2023    AST 23 04/21/2023    NA CANCELED 04/22/2023    K CANCELED 04/22/2023    CL CANCELED 04/22/2023    CREATININE CANCELED 04/22/2023    BUN CANCELED 04/22/2023    CO2 CANCELED 04/22/2023    TSH 0.32 (L) 04/20/2023    INR CANCELED 04/19/2023    HGBA1C 8.6 (A) 04/20/2023     Surgical History  Problems    · History of Cervical Surgery (Gyn) Laser Vaporization Of Transformation Zone   · History of Cholecystectomy   · History of Colon Surgery   · History of Hernia Repair   · History of Hysterectomy   · History of Thyroid Surgery   · History of Tubal Stabilization     Family History  Mother    · Family history of Cancer  Family History    · Family history of Diabetes Mellitus (V18.0)   · Family history of Hypertension (V17.49)     Social History  Problems    · Disabled   · Does not use illicit drugs (V49.89) (Z78.9)   · Former smoker (V15.82) (Z87.891)   · Marital History - Single   · No alcohol.   · Denied: History of  "Secondhand smoke exposure   · Single     Allergies  Medication    · Aspirin TABS   · Contrast Media Ready-Box MISC   · morphine   · Sulfa Drugs        /80   Pulse 90   Temp 36.5 °C (97.7 °F)   Resp 18   Ht 1.372 m (4' 6\")   Wt 104 kg (228 lb 4.8 oz)   SpO2 98%   BMI 55.05 kg/m²      Physical Exam  Vitals and nursing note reviewed.   Constitutional:       Appearance: Normal appearance.   HENT:      Head: Normocephalic.      Right Ear: External ear normal.      Left Ear: External ear normal.      Nose: Nose normal.      Mouth/Throat:      Mouth: Mucous membranes are moist.      Pharynx: Oropharynx is clear.   Eyes:      Extraocular Movements: Extraocular movements intact.      Conjunctiva/sclera: Conjunctivae normal.      Pupils: Pupils are equal, round, and reactive to light.   Cardiovascular:      Rate and Rhythm: Normal rate and regular rhythm.      Pulses: Normal pulses.      Heart sounds: Normal heart sounds.   Pulmonary:      Effort: Pulmonary effort is normal.      Breath sounds: Normal breath sounds.   Abdominal:      General: Bowel sounds are normal.      Palpations: Abdomen is soft.   Musculoskeletal:      Cervical back: Normal range of motion and neck supple.      Comments: Generalized muscle weakness; immobility.    Right sided hemiparesis   Skin:     General: Skin is warm and dry.   Neurological:      Mental Status: She is alert. Mental status is at baseline.      Motor: Weakness present.      Coordination: Coordination abnormal.      Gait: Gait abnormal.      Comments: Right sided weakness--RLE 1/5; RUE 3/5; sensation intact to touch x 4 extremities.     Left sided facial droop.     Generalized muscle weakness   Psychiatric:         Mood and Affect: Mood normal.         Behavior: Behavior normal.          Assessment/Plan    Ordered BMP and CBC with diff. On Wednesday.        UTI:  Start ciprofloxacin 500 mg PO Q 12 hours for 10 days for UTI.    Right foot and ankle pain & edema:  Elevate " right ankle/foot.  Ordered right ankle/foot 2 view x-ray.          Leukocytosis:   Monitor WBC.     Constipation:  Continue Colace BID routinely.  Continue Lactulose PRN and bisacodyl PRN.  Give bisacodyl 10 mg supp TN x 1        Chronic systolic HF; Benign essential HTN :       Continue Amlodipine and hydrochlorothiazide.       Monitor Bps.        Sequela of Cerebrovascular accident; Recent acute/subacute stroke ;   dysphagia 2/2 cva; expressive aphasia;   MRI was performed during recent hospitalizaiton it showed Acute/subacute tiny infarct in the left anterior brock.   Neuro was consulted in the hospital.  Dr. Jiang from [facility] [place] was consulted.  Pt has baseline dysarthria from a previous CVA and has Hemiparesis.  She also has expressive aphasia, but able to communicate short sentences.         Generalized muscle weakness; Immobility :  Continue at  participating in PT/OT.   Fall prevention strategies.    Problem List Items Addressed This Visit    None  Visit Diagnoses       Urinary tract infection without hematuria, site unspecified    -  Primary    Pain in joint involving right ankle and foot        Edema of right ankle        Leukocytosis, unspecified type        Constipation, unspecified constipation type        Chronic systolic heart failure (CMS/HCC)        Benign essential HTN        Sequela of cerebrovascular accident        Recent cerebrovascular accident        Dysphagia, unspecified type        Expressive aphasia        Generalized muscle weakness        Immobility            Maria Del Carmen Garduno, APRN-CNP

## 2023-09-18 ENCOUNTER — NURSING HOME VISIT (OUTPATIENT)
Dept: POST ACUTE CARE | Facility: EXTERNAL LOCATION | Age: 53
End: 2023-09-18
Payer: COMMERCIAL

## 2023-09-18 DIAGNOSIS — Z74.09 IMMOBILITY: ICD-10-CM

## 2023-09-18 DIAGNOSIS — I10 BENIGN ESSENTIAL HTN: ICD-10-CM

## 2023-09-18 DIAGNOSIS — I69.30 SEQUELA OF CEREBROVASCULAR ACCIDENT: ICD-10-CM

## 2023-09-18 DIAGNOSIS — I50.22 CHRONIC SYSTOLIC HEART FAILURE (MULTI): ICD-10-CM

## 2023-09-18 DIAGNOSIS — K59.00 CONSTIPATION, UNSPECIFIED CONSTIPATION TYPE: ICD-10-CM

## 2023-09-18 DIAGNOSIS — M25.471 EDEMA OF RIGHT ANKLE: ICD-10-CM

## 2023-09-18 DIAGNOSIS — R13.10 DYSPHAGIA, UNSPECIFIED TYPE: ICD-10-CM

## 2023-09-18 DIAGNOSIS — N39.0 URINARY TRACT INFECTION WITHOUT HEMATURIA, SITE UNSPECIFIED: Primary | ICD-10-CM

## 2023-09-18 DIAGNOSIS — M62.81 GENERALIZED MUSCLE WEAKNESS: ICD-10-CM

## 2023-09-18 DIAGNOSIS — M25.571 PAIN IN JOINT INVOLVING RIGHT ANKLE AND FOOT: ICD-10-CM

## 2023-09-18 DIAGNOSIS — D72.829 LEUKOCYTOSIS, UNSPECIFIED TYPE: ICD-10-CM

## 2023-09-18 DIAGNOSIS — Z86.73 RECENT CEREBROVASCULAR ACCIDENT: ICD-10-CM

## 2023-09-18 DIAGNOSIS — R47.01 EXPRESSIVE APHASIA: ICD-10-CM

## 2023-09-18 PROCEDURE — 99309 SBSQ NF CARE MODERATE MDM 30: CPT | Performed by: NURSE PRACTITIONER

## 2023-09-18 NOTE — LETTER
Patient: Janette Martinez  : 1970    Encounter Date: 2023    Name: Janette Martinez    YOB: 1970    Code Status: DNRcc arrest    Chief complaint:  Follow up on UTI; etc.....      HPI:    52-year-old female with past medical history of heart failure with reduced EF,   diabetes, hypertension, hyperlipidemia, HIV,        CVA (left MCA  with residual right-sided hemiparesis), GERD, HIV, hypothyroidism, hypertension,   and diabetes.    Patient re-admitted to [facility]. On 23 after her most recent hospitalization.    Diagnoses as follows:    UTI:  Patient had a UA C & S performed on 23.   Results were reported positive on 9/10/23 for providencia stuartii 60-70,000 CFU/ml  Proteus <10,000 CFU/ml      Susceptibility recommended ciprofloxacin S <= 0.25      Patient was started on ciprofloxacin as recommended.      No dysuria complaints today.      No costovertebral angle pain.      Patient seen today, she is in bed.      Calm, cooperative, pleasant.      Mentation at baseline.   No fevers reported.  Denies cough today.  On RA, no desaturations or fevers reported.  No complaints of chest pain, dizziness or headaches.    Right foot and ankle edema:  During last visit patient stated her right foot and ankle were painful and swollen.  X-ray of the right ankle was performed, results were negative for fracture.   No falls reported.          Leukocytosis:  WBC trends as follows:  23 WBC 11.6 H  23 WBC 12.3 H   23 WBC 10 wnl  23 WBC 10.9 H  23 WBC 12 H  23 WBC 12 H  23 WBC 8.7  23 WBC 12.1 H  No fevers reported.     Chronic systolic HF; Benign essential HTN :       On Amlodipine and hydrochlorothiazide.       Recent /73.  No complaints of chest pain.  No headaches no dizziness.   No SOB.    Constipation:      Patient's constipation improved.      No complaints of constipation.  On Colace BID routinely.  On Lactulose PRN and bisacodyl  PRN.      No nausea or vomiting reported.             Sequela of Cerebrovascular accident; Recent acute/subacute stroke ;   dysphagia 2/2 cva; expressive aphasia;   MRI was performed during recent hospitalizaiton it showed Acute/subacute tiny infarct in the left anterior brock.   Neuro was consulted in the hospital.  Dr. Jiang from [facility] [place] neuro/stroke was consulted.  Pt has baseline dysarthria from a previous CVA and has Hemiparesis.  She also has expressive aphasia, but able to communicate short sentences.  Patient able to nod appropriately to questions.   On regular diet, regular texture, thin consistency.         Generalized muscle weakness; Immobility :  At  participating in PT/OT.   Not able to ambulate.  Very weak.  Bedbound/wheelchair bound.               Reviewed medical, social, surgical and family history.  Reviewed all current medications and performed medication reconciliation.  Reviewed vital signs AND recent blood work results.  Performed prescription drug management.   Reviewed [facility] Documentation  Discussed patient with nursing and [facility] NP Plii.        ROS: 10 point ROS performed; negative unless noted in HPI.     BMP: Date: 5/1/2023 Na 137 K 4.3 Cr 1.1 BUN 15 glucose 141 Ca 8.8 GFR 52  CBC: Date: 5/1/2023 WBC 8.4 Hgb 11.1 hematocrit 33.9 platelets 337  Liver function: Date: 5/1/2023 ALT 10 AST 29 alkaline phos.  63 bilirubin 0.4 albumin 3.6 protein 7.7       Ha1c 8.1 % on 5/1/23          BMP: Date: 5/15/2023 Na 139  K 3.4 Cr 1.2 BUN 20 glucose 122 Ca 8.8 GFR 57        CBC: Date: 5/15/2023 WBC 9 Hgb 10.2 hematocrit 30.9 platelets 277       Liver function: 5/15/2023 ALT 8 AST 28 alkaline phos.  57 bilirubin 0.5 albumin 3.5 protein 7.3          BMP: Date: 5/22/2023 Na 141 K 3.1 Cr 1 BUN 11 glucose 227 Ca 8.7 GFR 70        CBC: Date: 5/22/2023 WBC 8.2 Hgb 10 hematocrit 30.5 platelets 372        Liver function: Date: 5/22/2023 ALT 8 AST 22 alkaline phos.  63 bilirubin 0.3 albumin 3.2  protein 7.2     BMP: Date: 6/5/2023 Na 140 K 4.2 Cr 1.1 BUN 14 glucose 79 Ca 1.1 GFR 63  CBC: Date: 6/5/2023 WBC 8.1 Hgb 10.5 hematocrit 32.3 platelets 350  Liver function: Date: 6/5/2023 ALT 7 AST 25 alkaline phos.  59 bilirubin 0.4 albumin 3.3 protein 7.7    BMP: Date: 7/17/2023 Na 140 K 4.1 Cr 0.8 BUN 17 glucose 126 Ca 9.1 GFR 90  CBC: Date: 7/17/2023 WBC 14.1 Hgb 10.2 hematocrit 32.1 platelets 265  Liver function: Date: 7/17/2023 ALT 8 AST 23 alkaline phos.  72 bilirubin 0.5 albumin 3.4 protein 7.4    CBC: Date: 7/19/2023 WBC 10.7 Hgb 9.5 hematocrit 28.8 platelets 257    BMP: Date: 7/24/2023 Na 137 K 4.2 Cr 0.9 BUN 19 glucose 186 Ca 9.1 GFR 79  CBC: Date: 7/24/2023 WBC 11.6 Hgb 10.3 hematocrit 31.4 platelets 305  Liver function: Date: 7/24/2023 ALT 11 AST 27 alkaline phos.  77 bilirubin 0.3 albumin 3.6 protein 7.8    BMP: Date: 7/31/2023 Na 141 K 4.2 Cr 0.8 BUN 17 glucose 138 Ca 8.7 GFR 90  CBC: Date: 7/31/2023 WBC 12.3 Hgb 10.5 hematocrit 32.4 platelets 307  Liver function: Date: 7/31/2023 ALT 14 AST 24 alkaline phos.  81 bilirubin 0.4 albumin 3.5 protein 7.4    BMP: Date: 8/7/2023 Na 141 K 3.9 Cr 1.0 BUN 12 glucose 167 Ca 8.6 GFR 70  CBC: Date: 8/7/2023 WBC 10 Hgb 10.5 hematocrit 32.7 platelets 256  Liver function: Date: 8/7/2023 ALT 9 AST 23 alkaline phos.  76 bilirubin 0.5 albumin 3.3 protein 7    BMP: Date: 8/21/2023 Na 142 K 4.1 Cr 1 BUN 17 glucose 195 Ca 8.5 GFR 70  CBC: Date: 8/21/2023 WBC 10.9 Hgb 9.6 hematocrit 30 platelets 304  Liver function: Date: 8/21/2023 ALT 9 AST 24 alkaline phos.  74 bilirubin 0.4 albumin 3.2 protein 7.1    BMP: Date: 8/31/2023 Na 141 K 3.8 Cr 0.9 BUN 14 glucose 118 Ca 8.2 GFR 79  CBC: Date: 8/31/2023 WBC 12 Hgb 9.6 hematocrit 29.8 platelets 294  Liver function: Date: 8/31/2023 ALT 8 AST 21 alkaline phos.  64 bilirubin 0.4 albumin 3.1 protein 7    BMP: Date: 9/4/2023 Na 141 K 3.9 Cr 1.1 BUN 17 glucose 107 Ca 8.5 GFR 63   CBC: Date: 9/4/2023 WBC 12 Hgb 9.5 hematocrit 29.7  platelets 282  Liver function: Date: 9/4/2023 ALT 7 AST 22 alkaline phos.  64 bilirubin 0.4 albumin 3.3 protein 7.1    BMP: Date: 9/11/2023 Na 142 K 4 Cr 1 BUN 12 glucose 81 Ca 8.2 GFR 70  CBC: Date: 9/11/2023 WBC 8.7 Hgb 9.2 hematocrit 28.3 platelets 305  Liver function: Date: 9/11/2023 ALT 7 AST 22 alkaline phos.  61 bilirubin 0.3 albumin 3 protein 6.8    BMP: Date: 9/18/2023 Na 140 K 4 Cr 1 BUN 15 glucose 150 Ca 8.3 GFR 70  CBC: Date: 9/18/2023 WBC 12.1 Hgb 9.5 hematocrit 29.8 platelets 287  Liver function: Date: 9/18/2023 ALT 7 AST 23 alkaline phos.  62 bilirubin 0.4 albumin 3.4 protein 7.1      Past Medical History:   Diagnosis Date   • Cervicalgia     Neck pain   • Essential (primary) hypertension 07/21/2013    Hypertension   • Pain in unspecified shoulder     Pain, joint, shoulder   • Personal history of other diseases of the digestive system 07/06/2022    History of ulcerative colitis   • Personal history of other diseases of the digestive system     History of esophageal reflux   • Personal history of other diseases of the nervous system and sense organs     History of cataract   • Personal history of other diseases of the nervous system and sense organs     History of glaucoma   • Personal history of other diseases of the respiratory system     History of bronchitis   • Personal history of other endocrine, nutritional and metabolic disease     History of diabetes mellitus   • Personal history of other endocrine, nutritional and metabolic disease     History of hyperlipidemia   • Personal history of other mental and behavioral disorders     History of depression   • Snoring     Snoring   • Unilateral primary osteoarthritis, unspecified knee 11/02/2015    Arthritis of knee           Past Surgical History:   Procedure Laterality Date   • CHOLECYSTECTOMY  01/08/2016    Cholecystectomy   • COLON SURGERY  01/08/2016    Colon Surgery   • CT HEAD ANGIO W AND WO IV CONTRAST  3/19/2019    CT HEAD ANGIO W AND WO IV  CONTRAST 3/19/2019 [facility] [place] EMERGENCY LEGACY   • CT NECK ANGIO W AND WO IV CONTRAST  3/19/2019    CT NECK ANGIO W AND WO IV CONTRAST 3/19/2019 [facility] [place] EMERGENCY LEGACY   • HERNIA REPAIR  01/08/2016    Hernia Repair   • HYSTERECTOMY  01/08/2016    Hysterectomy   • MR HEAD ANGIO WO IV CONTRAST  6/4/2018    MR HEAD ANGIO WO IV CONTRAST 6/4/2018 [facility] CLINICAL LEGACY   • MR HEAD ANGIO WO IV CONTRAST  7/11/2020    MR HEAD ANGIO WO IV CONTRAST 7/11/2020 [facility] CLINICAL LEGACY   • MR HEAD ANGIO WO IV CONTRAST  9/30/2020    MR HEAD ANGIO WO IV CONTRAST 9/30/2020 [facility]IB LEGACY   • MR HEAD ANGIO WO IV CONTRAST  5/22/2022    MR HEAD ANGIO WO IV CONTRAST 5/22/2022 [facility] CLINICAL LEGACY   • MR HEAD ANGIO WO IV CONTRAST  4/19/2023    MR HEAD ANGIO WO IV CONTRAST [facility] MRI   • MR NECK ANGIO WO IV CONTRAST  6/4/2018    MR NECK ANGIO WO IV CONTRAST 6/4/2018 [facility] CLINICAL LEGACY   • MR NECK ANGIO WO IV CONTRAST  7/11/2020    MR NECK ANGIO WO IV CONTRAST 7/11/2020 [facility] CLINICAL LEGACY   • MR NECK ANGIO WO IV CONTRAST  9/30/2020    MR NECK ANGIO WO IV CONTRAST 9/30/2020 [facility]IB LEGACY   • MR NECK ANGIO WO IV CONTRAST  5/22/2022    MR NECK ANGIO WO IV CONTRAST 5/22/2022 [facility] CLINICAL LEGACY   • MR NECK ANGIO WO IV CONTRAST  4/19/2023    MR NECK ANGIO WO IV CONTRAST [facility] MRI   • OTHER SURGICAL HISTORY  01/08/2016    Tubal Stabilization   • OTHER SURGICAL HISTORY  09/01/2016    Cervical Surgery (Gyn) Laser Vaporization Of Transformation Zone   • THYROID SURGERY  09/27/2013    Thyroid Surgery            Surgical History  Problems    · History of Cervical Surgery (Gyn) Laser Vaporization Of Transformation Zone   · History of Cholecystectomy   · History of Colon Surgery   · History of Hernia Repair   · History of Hysterectomy   · History of Thyroid Surgery   · History of Tubal Stabilization     Family History  Mother    · Family history of Cancer  Family History    · Family history of Diabetes Mellitus (V18.0)   · Family history of Hypertension (V17.49)    "  Social History  Problems    · Disabled   · Does not use illicit drugs (V49.89) (Z78.9)   · Former smoker (V15.82) (Z87.891)   · Marital History - Single   · No alcohol.   · Denied: History of Secondhand smoke exposure   · Single     Allergies  Medication    · Aspirin TABS   · Contrast Media Ready-Box MISC   · morphine   · Sulfa Drugs        /73   Pulse 75   Temp 36.5 °C (97.7 °F)   Resp 18   Ht 1.372 m (4' 6\")   Wt 104 kg (228 lb 4.8 oz)   SpO2 97%   BMI 55.05 kg/m²      Physical Exam  Vitals and nursing note reviewed.   Constitutional:       Appearance: Normal appearance.   HENT:      Head: Normocephalic.      Right Ear: External ear normal.      Left Ear: External ear normal.      Nose: Nose normal.      Mouth/Throat:      Mouth: Mucous membranes are moist.      Pharynx: Oropharynx is clear.   Eyes:      Extraocular Movements: Extraocular movements intact.      Conjunctiva/sclera: Conjunctivae normal.      Pupils: Pupils are equal, round, and reactive to light.   Cardiovascular:      Rate and Rhythm: Normal rate and regular rhythm.      Pulses: Normal pulses.      Heart sounds: Normal heart sounds.   Pulmonary:      Effort: Pulmonary effort is normal.      Breath sounds: Normal breath sounds.   Abdominal:      General: Bowel sounds are normal.      Palpations: Abdomen is soft.   Musculoskeletal:      Cervical back: Normal range of motion and neck supple.      Comments: Generalized muscle weakness; immobility.    Right sided hemiparesis   Skin:     General: Skin is warm and dry.   Neurological:      Mental Status: She is alert. Mental status is at baseline.      Motor: Weakness present.      Coordination: Coordination abnormal.      Gait: Gait abnormal.      Comments: Right sided weakness--RLE 1/5; RUE 3/5; sensation intact to touch x 4 extremities.     Left sided facial droop.     Generalized muscle weakness   Psychiatric:         Mood and Affect: Mood normal.         Behavior: Behavior normal.      "     Assessment/Plan   Ordered BMP and CBC with diff. For tomorrow.        UTI:  Continue ciprofloxacin as ordered.  Encourage PO fluids.   Monitor for recurrent UTIs.    Right foot and ankle pain & edema:  Pain improved.  Elevate right ankle/foot.         Leukocytosis:   Monitor WBC.   Monitor for fevers.     Chronic systolic HF; Benign essential HTN :       Continue Amlodipine and hydrochlorothiazide.       Monitor Bps.        Constipation:  Continue Colace BID routinely.  Continue Lactulose PRN and bisacodyl PRN.         Sequela of Cerebrovascular accident; Recent acute/subacute stroke ;   dysphagia 2/2 cva; expressive aphasia;   MRI was performed during recent hospitalizaiton it showed Acute/subacute tiny infarct in the left anterior brock.   Neuro was consulted in the hospital.  [place] [facility].  Pt has baseline dysarthria from a previous CVA and has Hemiparesis.  She also has expressive aphasia, but able to communicate short sentences.         Generalized muscle weakness; Immobility :  Continue at  participating in PT/OT.   Fall prevention strategies.    Problem List Items Addressed This Visit    None  Visit Diagnoses       Urinary tract infection without hematuria, site unspecified    -  Primary    Edema of right ankle        Pain in joint involving right ankle and foot        Leukocytosis, unspecified type        Chronic systolic heart failure (CMS/HCC)        Benign essential HTN        Constipation, unspecified constipation type        Sequela of cerebrovascular accident        Recent cerebrovascular accident        Dysphagia, unspecified type        Expressive aphasia        Generalized muscle weakness        Immobility            JASON Espitia       Electronically Signed By: JASON Espitia   9/20/23  7:17 PM

## 2023-09-20 VITALS
HEART RATE: 75 BPM | HEIGHT: 55 IN | OXYGEN SATURATION: 97 % | BODY MASS INDEX: 52.84 KG/M2 | WEIGHT: 228.3 LBS | RESPIRATION RATE: 18 BRPM | SYSTOLIC BLOOD PRESSURE: 124 MMHG | TEMPERATURE: 97.7 F | DIASTOLIC BLOOD PRESSURE: 73 MMHG

## 2023-09-20 NOTE — PROGRESS NOTES
Name: Janette Martinez    YOB: 1970    Code Status: DNRcc arrest    Chief complaint:  Follow up on UTI; etc.....      HPI:    52-year-old female with past medical history of heart failure with reduced EF,   diabetes, hypertension, hyperlipidemia, HIV,        CVA (left MCA 2022 with residual right-sided hemiparesis), GERD, HIV, hypothyroidism, hypertension,   and diabetes.    Patient re-admitted to [facility] On 4/21/23 after her most recent hospitalization.    Diagnoses as follows:    UTI:  Patient had a UA C & S performed on 9/7/23.   Results were reported positive on 9/10/23 for providencia stuartii 60-70,000 CFU/ml  Proteus <10,000 CFU/ml      Susceptibility recommended ciprofloxacin S <= 0.25      Patient was started on ciprofloxacin as recommended.      No dysuria complaints today.      No costovertebral angle pain.      Patient seen today, she is in bed.      Calm, cooperative, pleasant.      Mentation at baseline.   No fevers reported.  Denies cough today.  On RA, no desaturations or fevers reported.  No complaints of chest pain, dizziness or headaches.    Right foot and ankle edema:  During last visit patient stated her right foot and ankle were painful and swollen.  X-ray of the right ankle was performed, results were negative for fracture.   No falls reported.          Leukocytosis:  WBC trends as follows:  7/24/23 WBC 11.6 H  7/31/23 WBC 12.3 H   8/7/23 WBC 10 wnl  8/21/23 WBC 10.9 H  8/31/23 WBC 12 H  9/4/23 WBC 12 H  9/11/23 WBC 8.7  9/18/23 WBC 12.1 H  No fevers reported.     Chronic systolic HF; Benign essential HTN :       On Amlodipine and hydrochlorothiazide.       Recent /73.  No complaints of chest pain.  No headaches no dizziness.   No SOB.    Constipation:      Patient's constipation improved.      No complaints of constipation.  On Colace BID routinely.  On Lactulose PRN and bisacodyl PRN.      No nausea or vomiting reported.             Sequela of  Cerebrovascular accident; Recent acute/subacute stroke ;   dysphagia 2/2 cva; expressive aphasia;   [facility] Acute/subacute tiny infarct in the left anterior brock.   Neuro was consulted in the hospital.  Dr. Jiang from [facility] [place] was consulted.  Pt has baseline dysarthria from a previous CVA and has Hemiparesis.  She also has expressive aphasia, but able to communicate short sentences.  Patient able to nod appropriately to questions.   On regular diet, regular texture, thin consistency.         Generalized muscle weakness; Immobility :  At  participating in PT/OT.   Not able to ambulate.  Very weak.  Bedbound/wheelchair bound.               Reviewed medical, social, surgical and family history.  Reviewed all current medications and performed medication reconciliation.  Reviewed vital signs AND recent blood work results.  Performed prescription drug management.   Reviewed [facility] Documentation  Discussed patient with nursing and [facility] NP Pili.        ROS: 10 point ROS performed; negative unless noted in HPI.     BMP: Date: 5/1/2023 Na 137 K 4.3 Cr 1.1 BUN 15 glucose 141 Ca 8.8 GFR 52  CBC: Date: 5/1/2023 WBC 8.4 Hgb 11.1 hematocrit 33.9 platelets 337  Liver function: Date: 5/1/2023 ALT 10 AST 29 alkaline phos.  63 bilirubin 0.4 albumin 3.6 protein 7.7       Ha1c 8.1 % on 5/1/23          BMP: Date: 5/15/2023 Na 139  K 3.4 Cr 1.2 BUN 20 glucose 122 Ca 8.8 GFR 57        CBC: Date: 5/15/2023 WBC 9 Hgb 10.2 hematocrit 30.9 platelets 277       Liver function: 5/15/2023 ALT 8 AST 28 alkaline phos.  57 bilirubin 0.5 albumin 3.5 protein 7.3          BMP: Date: 5/22/2023 Na 141 K 3.1 Cr 1 BUN 11 glucose 227 Ca 8.7 GFR 70        CBC: Date: 5/22/2023 WBC 8.2 Hgb 10 hematocrit 30.5 platelets 372        Liver function: Date: 5/22/2023 ALT 8 AST 22 alkaline phos.  63 bilirubin 0.3 albumin 3.2 protein 7.2     BMP: Date: 6/5/2023 Na 140 K 4.2 Cr 1.1 BUN 14  glucose 79 Ca 1.1 GFR 63  CBC: Date: 6/5/2023 WBC 8.1 Hgb 10.5 hematocrit 32.3 platelets 350  Liver function: Date: 6/5/2023 ALT 7 AST 25 alkaline phos.  59 bilirubin 0.4 albumin 3.3 protein 7.7    BMP: Date: 7/17/2023 Na 140 K 4.1 Cr 0.8 BUN 17 glucose 126 Ca 9.1 GFR 90  CBC: Date: 7/17/2023 WBC 14.1 Hgb 10.2 hematocrit 32.1 platelets 265  Liver function: Date: 7/17/2023 ALT 8 AST 23 alkaline phos.  72 bilirubin 0.5 albumin 3.4 protein 7.4    CBC: Date: 7/19/2023 WBC 10.7 Hgb 9.5 hematocrit 28.8 platelets 257    BMP: Date: 7/24/2023 Na 137 K 4.2 Cr 0.9 BUN 19 glucose 186 Ca 9.1 GFR 79  CBC: Date: 7/24/2023 WBC 11.6 Hgb 10.3 hematocrit 31.4 platelets 305  Liver function: Date: 7/24/2023 ALT 11 AST 27 alkaline phos.  77 bilirubin 0.3 albumin 3.6 protein 7.8    BMP: Date: 7/31/2023 Na 141 K 4.2 Cr 0.8 BUN 17 glucose 138 Ca 8.7 GFR 90  CBC: Date: 7/31/2023 WBC 12.3 Hgb 10.5 hematocrit 32.4 platelets 307  Liver function: Date: 7/31/2023 ALT 14 AST 24 alkaline phos.  81 bilirubin 0.4 albumin 3.5 protein 7.4    BMP: Date: 8/7/2023 Na 141 K 3.9 Cr 1.0 BUN 12 glucose 167 Ca 8.6 GFR 70  CBC: Date: 8/7/2023 WBC 10 Hgb 10.5 hematocrit 32.7 platelets 256  Liver function: Date: 8/7/2023 ALT 9 AST 23 alkaline phos.  76 bilirubin 0.5 albumin 3.3 protein 7    BMP: Date: 8/21/2023 Na 142 K 4.1 Cr 1 BUN 17 glucose 195 Ca 8.5 GFR 70  CBC: Date: 8/21/2023 WBC 10.9 Hgb 9.6 hematocrit 30 platelets 304  Liver function: Date: 8/21/2023 ALT 9 AST 24 alkaline phos.  74 bilirubin 0.4 albumin 3.2 protein 7.1    BMP: Date: 8/31/2023 Na 141 K 3.8 Cr 0.9 BUN 14 glucose 118 Ca 8.2 GFR 79  CBC: Date: 8/31/2023 WBC 12 Hgb 9.6 hematocrit 29.8 platelets 294  Liver function: Date: 8/31/2023 ALT 8 AST 21 alkaline phos.  64 bilirubin 0.4 albumin 3.1 protein 7    BMP: Date: 9/4/2023 Na 141 K 3.9 Cr 1.1 BUN 17 glucose 107 Ca 8.5 GFR 63   CBC: Date: 9/4/2023 WBC 12 Hgb 9.5 hematocrit 29.7 platelets 282  Liver function: Date: 9/4/2023 ALT 7 AST 22 alkaline  phos.  64 bilirubin 0.4 albumin 3.3 protein 7.1    BMP: Date: 9/11/2023 Na 142 K 4 Cr 1 BUN 12 glucose 81 Ca 8.2 GFR 70  CBC: Date: 9/11/2023 WBC 8.7 Hgb 9.2 hematocrit 28.3 platelets 305  Liver function: Date: 9/11/2023 ALT 7 AST 22 alkaline phos.  61 bilirubin 0.3 albumin 3 protein 6.8    BMP: Date: 9/18/2023 Na 140 K 4 Cr 1 BUN 15 glucose 150 Ca 8.3 GFR 70  CBC: Date: 9/18/2023 WBC 12.1 Hgb 9.5 hematocrit 29.8 platelets 287  Liver function: Date: 9/18/2023 ALT 7 AST 23 alkaline phos.  62 bilirubin 0.4 albumin 3.4 protein 7.1      Past Medical History:   Diagnosis Date    Cervicalgia     Neck pain    Essential (primary) hypertension 07/21/2013    Hypertension    Pain in unspecified shoulder     Pain, joint, shoulder    Personal history of other diseases of the digestive system 07/06/2022    History of ulcerative colitis    Personal history of other diseases of the digestive system     History of esophageal reflux    Personal history of other diseases of the nervous system and sense organs     History of cataract    Personal history of other diseases of the nervous system and sense organs     History of glaucoma    Personal history of other diseases of the respiratory system     History of bronchitis    Personal history of other endocrine, nutritional and metabolic disease     History of diabetes mellitus    Personal history of other endocrine, nutritional and metabolic disease     History of hyperlipidemia    Personal history of other mental and behavioral disorders     History of depression    Snoring     Snoring    Unilateral primary osteoarthritis, unspecified knee 11/02/2015    Arthritis of knee           Past Surgical History:   Procedure Laterality Date    CHOLECYSTECTOMY  01/08/2016    Cholecystectomy    COLON SURGERY  01/08/2016    Colon Surgery    CT HEAD ANGIO W AND WO IV CONTRAST  3/19/2019    CT HEAD ANGIO W AND WO IV CONTRAST 3/19/2019 [facility] [place] [facility]GENCY LEGACY    CT NECK ANGIO W AND WO IV CONTRAST   3/19/2019    CT NECK ANGIO W AND WO IV CONTRAST 3/19/2019 [facility] EMERGENCY LEGACY    HERNIA REPAIR  01/08/2016    Hernia Repair    HYSTERECTOMY  01/08/2016    Hysterectomy    MR HEAD ANGIO WO IV CONTRAST  6/4/2018    MR HEAD ANGIO WO IV CONTRAST 6/4/2018 [facility] CLINICAL LEGACY    MR HEAD ANGIO WO IV CONTRAST  7/11/2020    MR HEAD ANGIO WO IV CONTRAST 7/11/2020 [facility] CLINICAL LEGACY    MR HEAD ANGIO WO IV CONTRAST  9/30/2020    MR HEAD ANGIO WO IV CONTRAST 9/30/2020 [facility] AIB LEGACY    MR HEAD ANGIO WO IV CONTRAST  5/22/2022    MR HEAD ANGIO WO IV CONTRAST 5/22/2022 [facility] CLINICAL LEGACY    MR HEAD ANGIO WO IV CONTRAST  4/19/2023    MR HEAD ANGIO WO IV CONTRAST [facility] MRI    MR NECK ANGIO WO IV CONTRAST  6/4/2018    MR NECK ANGIO WO IV CONTRAST 6/4/2018 [facility] CLINICAL LEGACY    MR NECK ANGIO WO IV CONTRAST  7/11/2020    MR NECK ANGIO WO IV CONTRAST 7/11/2020 [facility] CLINICAL LEGACY    MR NECK ANGIO WO IV CONTRAST  9/30/2020    MR NECK ANGIO WO IV CONTRAST 9/30/2020 [facility]IB LEGACY    MR NECK ANGIO WO IV CONTRAST  5/22/2022    MR NECK ANGIO WO IV CONTRAST 5/22/2022 [facility] CLINICAL LEGACY    MR NECK ANGIO WO IV CONTRAST  4/19/2023    MR NECK ANGIO WO IV CONTRAST [facility] MRI    OTHER SURGICAL HISTORY  01/08/2016    Tubal Stabilization    OTHER SURGICAL HISTORY  09/01/2016    Cervical Surgery (Gyn) Laser Vaporization Of Transformation Zone    THYROID SURGERY  09/27/2013    Thyroid Surgery            Surgical History  Problems    · History of Cervical Surgery (Gyn) Laser Vaporization Of Transformation Zone   · History of Cholecystectomy   · History of Colon Surgery   · History of Hernia Repair   · History of Hysterectomy   · History of Thyroid Surgery   · History of Tubal Stabilization     Family History  Mother    · Family history of Cancer  Family History    · Family history of Diabetes Mellitus (V18.0)   · Family history of Hypertension (V17.49)     Social History  Problems    · Disabled   · Does not use illicit drugs (V49.89) (Z78.9)   ·  "Former smoker (V15.82) (Z87.891)   · Marital History - Single   · No alcohol.   · Denied: History of Secondhand smoke exposure   · Single     Allergies  Medication    · Aspirin TABS   · Contrast Media Ready-Box MISC   · morphine   · Sulfa Drugs        /73   Pulse 75   Temp 36.5 °C (97.7 °F)   Resp 18   Ht 1.372 m (4' 6\")   Wt 104 kg (228 lb 4.8 oz)   SpO2 97%   BMI 55.05 kg/m²      Physical Exam  Vitals and nursing note reviewed.   Constitutional:       Appearance: Normal appearance.   HENT:      Head: Normocephalic.      Right Ear: External ear normal.      Left Ear: External ear normal.      Nose: Nose normal.      Mouth/Throat:      Mouth: Mucous membranes are moist.      Pharynx: Oropharynx is clear.   Eyes:      Extraocular Movements: Extraocular movements intact.      Conjunctiva/sclera: Conjunctivae normal.      Pupils: Pupils are equal, round, and reactive to light.   Cardiovascular:      Rate and Rhythm: Normal rate and regular rhythm.      Pulses: Normal pulses.      Heart sounds: Normal heart sounds.   Pulmonary:      Effort: Pulmonary effort is normal.      Breath sounds: Normal breath sounds.   Abdominal:      General: Bowel sounds are normal.      Palpations: Abdomen is soft.   Musculoskeletal:      Cervical back: Normal range of motion and neck supple.      Comments: Generalized muscle weakness; immobility.    Right sided hemiparesis   Skin:     General: Skin is warm and dry.   Neurological:      Mental Status: She is alert. Mental status is at baseline.      Motor: Weakness present.      Coordination: Coordination abnormal.      Gait: Gait abnormal.      Comments: Right sided weakness--RLE 1/5; RUE 3/5; sensation intact to touch x 4 extremities.     Left sided facial droop.     Generalized muscle weakness   Psychiatric:         Mood and Affect: Mood normal.         Behavior: Behavior normal.          Assessment/Plan    Ordered BMP and CBC with diff. For tomorrow.        UTI:  Continue " ciprofloxacin as ordered.  Encourage PO fluids.   Monitor for recurrent UTIs.    Right foot and ankle pain & edema:  Pain improved.  Elevate right ankle/foot.         Leukocytosis:   Monitor WBC.   Monitor for fevers.     Chronic systolic HF; Benign essential HTN :       Continue Amlodipine and hydrochlorothiazide.       Monitor Bps.        Constipation:  Continue Colace BID routinely.  Continue Lactulose PRN and bisacodyl PRN.         Sequela of Cerebrovascular accident; Recent acute/subacute stroke ;   dysphagia 2/2 cva; expressive aphasia;   MRI was performed during recent hospitalizaiton it showed Acute/subacute tiny infarct in the left anterior brock.   Neuro was consulted in the hospital.  Dr. Jiang from [facility] neuro[facility]stroke was consulted.  Pt has baseline dysarthria from a previous CVA and has Hemiparesis.  She also has expressive aphasia, but able to communicate short sentences.         Generalized muscle weakness; Immobility :  Continue at  participating in PT/OT.   Fall prevention strategies.    Problem List Items Addressed This Visit    None  Visit Diagnoses       Urinary tract infection without hematuria, site unspecified    -  Primary    Edema of right ankle        Pain in joint involving right ankle and foot        Leukocytosis, unspecified type        Chronic systolic heart failure (CMS/HCC)        Benign essential HTN        Constipation, unspecified constipation type        Sequela of cerebrovascular accident        Recent cerebrovascular accident        Dysphagia, unspecified type        Expressive aphasia        Generalized muscle weakness        Immobility            Maria Del Carmen Garduno, NAVEEN-CNP

## 2023-10-23 ENCOUNTER — NURSING HOME VISIT (OUTPATIENT)
Dept: POST ACUTE CARE | Facility: EXTERNAL LOCATION | Age: 53
End: 2023-10-23
Payer: MEDICARE

## 2023-10-23 DIAGNOSIS — Z79.4 TYPE 2 DIABETES MELLITUS WITH HYPERGLYCEMIA, WITH LONG-TERM CURRENT USE OF INSULIN (MULTI): ICD-10-CM

## 2023-10-23 DIAGNOSIS — I10 BENIGN ESSENTIAL HTN: ICD-10-CM

## 2023-10-23 DIAGNOSIS — R47.01 EXPRESSIVE APHASIA: ICD-10-CM

## 2023-10-23 DIAGNOSIS — I69.30 SEQUELA OF CEREBROVASCULAR ACCIDENT: ICD-10-CM

## 2023-10-23 DIAGNOSIS — K59.00 CONSTIPATION, UNSPECIFIED CONSTIPATION TYPE: ICD-10-CM

## 2023-10-23 DIAGNOSIS — B20 HIV INFECTION, UNSPECIFIED SYMPTOM STATUS (MULTI): ICD-10-CM

## 2023-10-23 DIAGNOSIS — E78.5 HYPERLIPIDEMIA, UNSPECIFIED HYPERLIPIDEMIA TYPE: ICD-10-CM

## 2023-10-23 DIAGNOSIS — R41.82 ALTERED MENTAL STATUS, UNSPECIFIED ALTERED MENTAL STATUS TYPE: Primary | ICD-10-CM

## 2023-10-23 DIAGNOSIS — M62.81 GENERALIZED MUSCLE WEAKNESS: ICD-10-CM

## 2023-10-23 DIAGNOSIS — F32.9 MAJOR DEPRESSIVE DISORDER, REMISSION STATUS UNSPECIFIED, UNSPECIFIED WHETHER RECURRENT: ICD-10-CM

## 2023-10-23 DIAGNOSIS — E03.9 HYPOTHYROIDISM, UNSPECIFIED TYPE: ICD-10-CM

## 2023-10-23 DIAGNOSIS — D72.829 LEUKOCYTOSIS, UNSPECIFIED TYPE: ICD-10-CM

## 2023-10-23 DIAGNOSIS — R13.10 DYSPHAGIA, UNSPECIFIED TYPE: ICD-10-CM

## 2023-10-23 DIAGNOSIS — M62.838 MUSCLE SPASTICITY: ICD-10-CM

## 2023-10-23 DIAGNOSIS — I50.22 CHRONIC SYSTOLIC HEART FAILURE (MULTI): ICD-10-CM

## 2023-10-23 DIAGNOSIS — Z86.73 RECENT CEREBROVASCULAR ACCIDENT: ICD-10-CM

## 2023-10-23 DIAGNOSIS — G47.00 INSOMNIA, UNSPECIFIED TYPE: ICD-10-CM

## 2023-10-23 DIAGNOSIS — I69.351 HEMIPARESIS AFFECTING RIGHT SIDE AS LATE EFFECT OF STROKE (MULTI): ICD-10-CM

## 2023-10-23 DIAGNOSIS — M25.511 RIGHT SHOULDER PAIN, UNSPECIFIED CHRONICITY: ICD-10-CM

## 2023-10-23 DIAGNOSIS — E11.65 TYPE 2 DIABETES MELLITUS WITH HYPERGLYCEMIA, WITH LONG-TERM CURRENT USE OF INSULIN (MULTI): ICD-10-CM

## 2023-10-23 DIAGNOSIS — Z74.09 IMMOBILITY: ICD-10-CM

## 2023-10-23 PROCEDURE — 99310 SBSQ NF CARE HIGH MDM 45: CPT | Performed by: NURSE PRACTITIONER

## 2023-10-23 NOTE — LETTER
Patient: Janette Martinez  : 1970    Encounter Date: 10/23/2023    Name: Janette Martinez    YOB: 1970    Code Status: DNRcc arrest    Chief complaint:  Readmit after hospitalization;   Altered mental status; etc.....      HPI:    52-year-old female with past medical history of heart failure with reduced EF,   diabetes, hypertension, hyperlipidemia,        CVA (left MCA  with residual right-sided hemiparesis), GERD, HIV, hypothyroidism, hypertension,   and diabetes.    HOSPITAL COURSE:  Patient was sent to [facility] ER on 10/17/23 from SNF via 911.  Patient was sent to ER for possible stroke because she was not responsive and   had a change in mental status, in which she was not able to follow commands.  Nursing also reported that her pupils were fixed.   Patient has history of CVA with right sided weakness which is her baseline.  In the hospital she was evaluated and multiple diagnostic tests were performed.  NIH score 22. CT of the head was performed and it did not show ICH.  CTA was performed and it did not demonstrate LVO (large vessel occlusion).  Patient was discussed by attending in ER and telestroke physician.  She was not a good TNK (tenecteplase) candidate for multiple reasons.  Including that she is on eliquis, she had an ischemic stroke 2 months ago,   Her presentation was an atypical stroke presentation, and she had a   Hemorrhagic stroke in .   It was thought that patient possibly had a TIA.  MRI was performed and was negative for new stroke.  EKG was performed and showed normal sinus rhythm with nonspecific T wave abnormality.  Patient has baseline aphasia and dysarthria.  She had mild leukocytosis and a UA C & S was obtained.  The urine culture results were >= 100,000 CFU/ml (mixed microbiota).  She returned to her baseline mentation and was discharged back to SNF.        Patient re-admitted to [facility]. On 10/20/23 after her most recent  hospitalization.    Hospital and chronic diagnoses as follows:        Altered mental status:      Sent to [facility] ER via 911.      Was evaluated for possible CVA.      Patient was found to not have a CVA.     Multiple diagnostic tests performed and patient returned to baseline mental status.      Patient seen today, she is in bed.      Calm, cooperative, pleasant.      Mentation at baseline.   No fevers reported.  No cough or dysuria reported.  On RA, no desaturations or fevers reported.  No complaints of chest pain, dizziness or headaches.    Sequela of Cerebrovascular accident; History of acute/subacute stroke ; previous CVA with right-sided hemiparesis; right sided weakness;   dysphagia 2/2 cva; expressive aphasia:  On Eliquis 5 mg PO BID.   MRI was performed during a PREVIOUS hospitalization and it showed Acute/subacute tiny infarct in the left anterior brock.   Dr. Jiang from [facility] [place] neuro/stroke was consulted at that time.  Pt has baseline dysarthria from a previous CVA and has also has Hemiparesis.  Additionally she has expressive aphasia, but is able to communicate in short sentences.  Patient is able to nod appropriately to questions.   She is on a mechanical soft, thin consistency diet.    Leukocytosis:  Patient also had leukocytosis in the hospital.  Today 10/23/23 WBC 12.5 H           Benign essential HTN; Chronic systolic HF;        On Amlodipine and hydrochlorothiazide.       Recent /63.       No complaints of chest pain.           No headaches no dizziness.            No SOB.             DM type 2 with hyperglycemia:            On Lantus 30 units at bedtime and Humalog sliding scale.           Recent accuchecks: 268 mg/dL, 203 mg/dL, 311 mg/dL.            No episodes of hypoglycemia reported.            Recent Ha1c 8.1 % on 5/1/23.     Muscle spasticity; Right shoulder and arm pain:  Patient was complaining of increased pain in her right arm weeks ago.  Right arm x-rays were  ordered.  ###On 6/5/23 Humerus right arm x-ray performed.   Results as follows: Mild degenerative changes are present. No acute fracture of dislocation. No osteomyelitis.  ###On 6/5/23 radius and ulna x-ray was also performed.  Results as follows: right radius and ulna has normal ossification pattern.  No fracture or dislocation was seen.  The arm pain is chronic.  She also has spasticity in her right arm.   On gabapentin, Voltaren 1% gel, baclofen 10 mg PO Q 8 hours PRN and oxycodone 5 mg PO Q 6 hours PRN.    Hypothyroidism:  On levothyroxine.  No recent TSH available.    HIV:  On Descovy 200-25 mg PO every day.    Hyperlipidemia:  On atorvastatin.    Major depressive disorder:  On Sertraline.    Insomnia:  On melatonin.   No complaints of insomnia today.    Constipation:      On Colace, Linzess, and Senna PRN.       No complaints of recent constipation today.       No abdominal pain or N/V.       Generalized muscle weakness; Immobility :  At  in long term care.  Not able to ambulate.  Very weak.  Bedbound/wheelchair bound.             Reviewed hospital records from recent hospitalization at [facility].  Reviewed medical, social, surgical and family history.  Reviewed all current medications and performed medication reconciliation.  Reviewed vital signs AND recent blood work results.  Performed prescription drug management.   Reviewed [facility] Documentation  Discussed patient with nursing and [facility] NP Pili.  Spent greater than 50 minutes on visit.        ROS: 10 point ROS performed; negative unless noted in HPI.     BMP: Date: 5/1/2023 Na 137 K 4.3 Cr 1.1 BUN 15 glucose 141 Ca 8.8 GFR 52  CBC: Date: 5/1/2023 WBC 8.4 Hgb 11.1 hematocrit 33.9 platelets 337  Liver function: Date: 5/1/2023 ALT 10 AST 29 alkaline phos.  63 bilirubin 0.4 albumin 3.6 protein 7.7       Ha1c 8.1 % on 5/1/23          BMP: Date: 5/15/2023 Na 139  K 3.4 Cr 1.2 BUN 20 glucose 122 Ca 8.8 GFR 57        CBC: Date: 5/15/2023 WBC 9 Hgb  10.2 hematocrit 30.9 platelets 277       Liver function: 5/15/2023 ALT 8 AST 28 alkaline phos.  57 bilirubin 0.5 albumin 3.5 protein 7.3          BMP: Date: 5/22/2023 Na 141 K 3.1 Cr 1 BUN 11 glucose 227 Ca 8.7 GFR 70        CBC: Date: 5/22/2023 WBC 8.2 Hgb 10 hematocrit 30.5 platelets 372        Liver function: Date: 5/22/2023 ALT 8 AST 22 alkaline phos.  63 bilirubin 0.3 albumin 3.2 protein 7.2     BMP: Date: 6/5/2023 Na 140 K 4.2 Cr 1.1 BUN 14 glucose 79 Ca 1.1 GFR 63  CBC: Date: 6/5/2023 WBC 8.1 Hgb 10.5 hematocrit 32.3 platelets 350  Liver function: Date: 6/5/2023 ALT 7 AST 25 alkaline phos.  59 bilirubin 0.4 albumin 3.3 protein 7.7    BMP: Date: 7/17/2023 Na 140 K 4.1 Cr 0.8 BUN 17 glucose 126 Ca 9.1 GFR 90  CBC: Date: 7/17/2023 WBC 14.1 Hgb 10.2 hematocrit 32.1 platelets 265  Liver function: Date: 7/17/2023 ALT 8 AST 23 alkaline phos.  72 bilirubin 0.5 albumin 3.4 protein 7.4    CBC: Date: 7/19/2023 WBC 10.7 Hgb 9.5 hematocrit 28.8 platelets 257    BMP: Date: 7/24/2023 Na 137 K 4.2 Cr 0.9 BUN 19 glucose 186 Ca 9.1 GFR 79  CBC: Date: 7/24/2023 WBC 11.6 Hgb 10.3 hematocrit 31.4 platelets 305  Liver function: Date: 7/24/2023 ALT 11 AST 27 alkaline phos.  77 bilirubin 0.3 albumin 3.6 protein 7.8    BMP: Date: 7/31/2023 Na 141 K 4.2 Cr 0.8 BUN 17 glucose 138 Ca 8.7 GFR 90  CBC: Date: 7/31/2023 WBC 12.3 Hgb 10.5 hematocrit 32.4 platelets 307  Liver function: Date: 7/31/2023 ALT 14 AST 24 alkaline phos.  81 bilirubin 0.4 albumin 3.5 protein 7.4    BMP: Date: 8/7/2023 Na 141 K 3.9 Cr 1.0 BUN 12 glucose 167 Ca 8.6 GFR 70  CBC: Date: 8/7/2023 WBC 10 Hgb 10.5 hematocrit 32.7 platelets 256  Liver function: Date: 8/7/2023 ALT 9 AST 23 alkaline phos.  76 bilirubin 0.5 albumin 3.3 protein 7    BMP: Date: 8/21/2023 Na 142 K 4.1 Cr 1 BUN 17 glucose 195 Ca 8.5 GFR 70  CBC: Date: 8/21/2023 WBC 10.9 Hgb 9.6 hematocrit 30 platelets 304  Liver function: Date: 8/21/2023 ALT 9 AST 24 alkaline phos.  74 bilirubin 0.4 albumin 3.2  protein 7.1    BMP: Date: 8/31/2023 Na 141 K 3.8 Cr 0.9 BUN 14 glucose 118 Ca 8.2 GFR 79  CBC: Date: 8/31/2023 WBC 12 Hgb 9.6 hematocrit 29.8 platelets 294  Liver function: Date: 8/31/2023 ALT 8 AST 21 alkaline phos.  64 bilirubin 0.4 albumin 3.1 protein 7    BMP: Date: 9/4/2023 Na 141 K 3.9 Cr 1.1 BUN 17 glucose 107 Ca 8.5 GFR 63   CBC: Date: 9/4/2023 WBC 12 Hgb 9.5 hematocrit 29.7 platelets 282  Liver function: Date: 9/4/2023 ALT 7 AST 22 alkaline phos.  64 bilirubin 0.4 albumin 3.3 protein 7.1    BMP: Date: 9/11/2023 Na 142 K 4 Cr 1 BUN 12 glucose 81 Ca 8.2 GFR 70  CBC: Date: 9/11/2023 WBC 8.7 Hgb 9.2 hematocrit 28.3 platelets 305  Liver function: Date: 9/11/2023 ALT 7 AST 22 alkaline phos.  61 bilirubin 0.3 albumin 3 protein 6.8    BMP: Date: 9/18/2023 Na 140 K 4 Cr 1 BUN 15 glucose 150 Ca 8.3 GFR 70  CBC: Date: 9/18/2023 WBC 12.1 Hgb 9.5 hematocrit 29.8 platelets 287  Liver function: Date: 9/18/2023 ALT 7 AST 23 alkaline phos.  62 bilirubin 0.4 albumin 3.4 protein 7.1    10/17/23 Ha1c 8.3 %    BMP: Date: 10/23/2023 Na 141 K 3.6 Cr 1 BUN 22 glucose 65 Ca 8.3 GFR 70  CBC: Date: 10/23/2023 WBC 12.5 Hgb 10.8  hematocrit 33.2 platelets 290  Liver function: Date: 10/23/2023 ALT 9 AST 31 alkaline phos.  66 bilirubin 0.6 albumin 3.6 protein 7.8        Past Medical History:   Diagnosis Date   • Cervicalgia     Neck pain   • Essential (primary) hypertension 07/21/2013    Hypertension   • Pain in unspecified shoulder     Pain, joint, shoulder   • Personal history of other diseases of the digestive system 07/06/2022    History of ulcerative colitis   • Personal history of other diseases of the digestive system     History of esophageal reflux   • Personal history of other diseases of the nervous system and sense organs     History of cataract   • Personal history of other diseases of the nervous system and sense organs     History of glaucoma   • Personal history of other diseases of the respiratory system     History of  bronchitis   • Personal history of other endocrine, nutritional and metabolic disease     History of diabetes mellitus   • Personal history of other endocrine, nutritional and metabolic disease     History of hyperlipidemia   • Personal history of other mental and behavioral disorders     History of depression   • Snoring     Snoring   • Unilateral primary osteoarthritis, unspecified knee 11/02/2015    Arthritis of knee           Past Surgical History:   Procedure Laterality Date   • CHOLECYSTECTOMY  01/08/2016    Cholecystectomy   • COLON SURGERY  01/08/2016    Colon Surgery   • CT HEAD ANGIO W AND WO IV CONTRAST  3/19/2019    CT HEAD ANGIO W AND WO IV CONTRAST [place] [facility] [place]   • CT NECK ANGIO W AND WO IV CONTRAST  3/19/2019    CT NECK ANGIO W AND WO IV CONTRAST [place] [facility] [place]   • HERNIA REPAIR  01/08/2016    Hernia Repair   • HYSTERECTOMY  01/08/2016    Hysterectomy   • MR HEAD ANGIO WO IV CONTRAST  6/4/2018    MR HEAD ANGIO WO IV CONTRAST 6/4/2018 [facility] CLINICAL LEGACY   • MR HEAD ANGIO WO IV CONTRAST  7/11/2020    MR HEAD ANGIO WO IV CONTRAST 7/11/2020 [facility] CLINICAL LEGACY   • MR HEAD ANGIO WO IV CONTRAST  9/30/2020    MR HEAD ANGIO WO IV CONTRAST 9/30/2020 [facility] AIB LEGACY   • MR HEAD ANGIO WO IV CONTRAST  5/22/2022    MR HEAD ANGIO WO IV CONTRAST 5/22/2022 [facility] CLINICAL LEGACY   • MR HEAD ANGIO WO IV CONTRAST  4/19/2023    MR HEAD ANGIO WO IV CONTRAST [facility] MRI   • MR NECK ANGIO WO IV CONTRAST  6/4/2018    MR NECK ANGIO WO IV CONTRAST 6/4/2018 [facility] CLINICAL LEGACY   • MR NECK ANGIO WO IV CONTRAST  7/11/2020    MR NECK ANGIO WO IV CONTRAST 7/11/2020 [facility] CLINICAL LEGACY   • MR NECK ANGIO WO IV CONTRAST  9/30/2020    MR NECK ANGIO WO IV CONTRAST 9/30/2020 [facility] AIB LEGACY   • MR NECK ANGIO WO IV CONTRAST  5/22/2022    MR NECK ANGIO WO IV CONTRAST 5/22/2022 [facility] CLINICAL LEGACY   • MR NECK ANGIO WO IV CONTRAST  4/19/2023    MR NECK ANGIO WO IV CONTRAST [facility] MRI   • OTHER SURGICAL HISTORY   "01/08/2016    Tubal Stabilization   • OTHER SURGICAL HISTORY  09/01/2016    Cervical Surgery (Gyn) Laser Vaporization Of Transformation Zone   • THYROID SURGERY  09/27/2013    Thyroid Surgery            Surgical History  Problems    · History of Cervical Surgery (Gyn) Laser Vaporization Of Transformation Zone   · History of Cholecystectomy   · History of Colon Surgery   · History of Hernia Repair   · History of Hysterectomy   · History of Thyroid Surgery   · History of Tubal Stabilization     Family History  Mother    · Family history of Cancer  Family History    · Family history of Diabetes Mellitus (V18.0)   · Family history of Hypertension (V17.49)     Social History  Problems    · Disabled   · Does not use illicit drugs (V49.89) (Z78.9)   · Former smoker (V15.82) (Z87.891)   · Marital History - Single   · No alcohol.   · Denied: History of Secondhand smoke exposure   · Single     Allergies  Medication    · Aspirin TABS   · Contrast Media Ready-Box MISC   · morphine   · Sulfa Drugs        /68   Pulse 72   Temp 36.4 °C (97.6 °F)   Resp 18   Ht 1.372 m (4' 6\")   Wt 104 kg (228 lb 12.8 oz)   SpO2 98%   BMI 55.17 kg/m²      Physical Exam  Vitals and nursing note reviewed.   Constitutional:       Appearance: Normal appearance.   HENT:      Head: Normocephalic.      Right Ear: External ear normal.      Left Ear: External ear normal.      Nose: Nose normal.      Mouth/Throat:      Mouth: Mucous membranes are moist.      Pharynx: Oropharynx is clear.   Eyes:      Extraocular Movements: Extraocular movements intact.      Conjunctiva/sclera: Conjunctivae normal.      Pupils: Pupils are equal, round, and reactive to light.   Cardiovascular:      Rate and Rhythm: Normal rate and regular rhythm.      Pulses: Normal pulses.      Heart sounds: Normal heart sounds.   Pulmonary:      Effort: Pulmonary effort is normal.      Breath sounds: Normal breath sounds.   Abdominal:      General: Bowel sounds are normal.      " Palpations: Abdomen is soft.   Musculoskeletal:      Cervical back: Normal range of motion and neck supple.      Comments: Generalized muscle weakness; immobility.    Right sided hemiparesis   Skin:     General: Skin is warm and dry.   Neurological:      Mental Status: She is alert. Mental status is at baseline.      Motor: Weakness present.      Coordination: Coordination abnormal.      Gait: Gait abnormal.      Comments: Right sided weakness--RLE 1/5; RUE 3/5; sensation intact to touch x 4 extremities.     Left sided facial droop.     Generalized muscle weakness   Psychiatric:         Mood and Affect: Mood normal.         Behavior: Behavior normal.          Assessment/Plan   Ordered CMP and CBC with diff. For next Tuesday.     Altered mental status:      Evaluated at [facility] for possible CVA.      Patient was found to not have a CVA.     Multiple diagnostic tests performed and patient returned to baseline mental status.       Sequela of Cerebrovascular accident; History of acute/subacute stroke ; previous CVA with right-sided hemiparesis; right sided weakness;   dysphagia 2/2 cva; expressive aphasia:  Continue Eliquis 5 mg PO BID.   Pt has baseline dysarthria from a previous CVA and has also has Hemiparesis.  Continue mechanical soft, thin consistency diet.  Monitor for s/s of aspiration.  Follow up with speech therapy PRN.    Leukocytosis:  Monitor WBC.           Benign essential HTN; Chronic systolic HF;        Continue Amlodipine and hydrochlorothiazide.       Monitor Bps.                  DM type 2 with hyperglycemia:            Discontinue Lantus 30 units at bedtime.           Start Lantus 33 units subcutaneous at bedtime.           Continue Humalog sliding scale.            Monitor accuchecks.            Monitor Ha1c q 3 months.               Muscle spasticity; Right shoulder and arm pain:  The arm pain is chronic.  She also has spasticity in her right arm.   Continue gabapentin, Voltaren 1% gel,  baclofen 10 mg PO Q 8 hours PRN and oxycodone 5 mg PO Q 6 hours PRN.    Hypothyroidism:  Continue levothyroxine.  Monitor TSH.    HIV:  Continue Descovy 200-25 mg PO every day.    Hyperlipidemia:  Continue atorvastatin.    Major depressive disorder:  Continue Sertraline.    Insomnia:  Continue melatonin.    Constipation:      Continue Colace, Linzess, and Senna PRN.            Generalized muscle weakness; Immobility :  Continue at  in long term care.  Not able to ambulate.  Very weak.  Bedbound/wheelchair bound.   Fall prevention strategies.            Problem List Items Addressed This Visit    None  Visit Diagnoses       Altered mental status, unspecified altered mental status type    -  Primary    Sequela of cerebrovascular accident        Recent cerebrovascular accident        Hemiparesis affecting right side as late effect of stroke (CMS/HCC)        Dysphagia, unspecified type        Expressive aphasia        Leukocytosis, unspecified type        Benign essential HTN        Chronic systolic heart failure (CMS/HCC)        Type 2 diabetes mellitus with hyperglycemia, with long-term current use of insulin (CMS/HCC)        Muscle spasticity        Right shoulder pain, unspecified chronicity        Hypothyroidism, unspecified type        HIV infection, unspecified symptom status (CMS/HCC)        Hyperlipidemia, unspecified hyperlipidemia type        Major depressive disorder, remission status unspecified, unspecified whether recurrent        Insomnia, unspecified type        Constipation, unspecified constipation type        Generalized muscle weakness        Immobility            JASON Espitia       Electronically Signed By: JASON Espitia   10/25/23 10:15 PM

## 2023-10-25 VITALS
DIASTOLIC BLOOD PRESSURE: 68 MMHG | TEMPERATURE: 97.6 F | WEIGHT: 228.8 LBS | OXYGEN SATURATION: 98 % | HEART RATE: 72 BPM | SYSTOLIC BLOOD PRESSURE: 134 MMHG | RESPIRATION RATE: 18 BRPM | BODY MASS INDEX: 52.95 KG/M2 | HEIGHT: 55 IN

## 2023-10-26 NOTE — PROGRESS NOTES
Name: Janette Martinez    YOB: 1970    Code Status: DNRcc arrest    Chief complaint:  Readmit after hospitalization;   Altered mental status; etc.....      HPI:    52-year-old female with past medical history of heart failure with reduced EF,   diabetes, hypertension, hyperlipidemia,        CVA (left MCA 2022 with residual right-sided hemiparesis), GERD, HIV, hypothyroidism, hypertension,   and diabetes.    HOSPITAL COURSE:  Patient was sent to [facility] ER on 10/17/23 from SNF via 911.  Patient was sent to ER for possible stroke because she was not responsive and   had a change in mental status, in which she was not able to follow commands.  Nursing also reported that her pupils were fixed.   Patient has history of CVA with right sided weakness which is her baseline.  In the hospital she was evaluated and multiple diagnostic tests were performed.  NIH score 22. CT of the head was performed and it did not show ICH.  CTA was performed and it did not demonstrate LVO (large vessel occlusion).  Patient was discussed by attending in ER and telestroke physician.  She was not a good TNK (tenecteplase) candidate for multiple reasons.  Including that she is on eliquis, she had an ischemic stroke 2 months ago,   Her presentation was an atypical stroke presentation, and she had a   Hemorrhagic stroke in 2010.   It was thought that patient possibly had a TIA.  MRI was performed and was negative for new stroke.  EKG was performed and showed normal sinus rhythm with nonspecific T wave abnormality.  Patient has baseline aphasia and dysarthria.  She had mild leukocytosis and a UA C & S was obtained.  The urine culture results were >= 100,000 CFU/ml (mixed microbiota).  She returned to her baseline mentation and was discharged back to SNF.        Patient re-admitted to [facility] On 10/20/23 after her most recent hospitalization.    Hospital and chronic diagnoses as follows:        Altered mental  status:      Sent to [facility] ER via 911.      Was evaluated for possible CVA.      Patient was found to not have a CVA.     Multiple diagnostic tests performed and patient returned to baseline mental status.      Patient seen today, she is in bed.      Calm, cooperative, pleasant.      Mentation at baseline.   No fevers reported.  No cough or dysuria reported.  On RA, no desaturations or fevers reported.  No complaints of chest pain, dizziness or headaches.    Sequela of Cerebrovascular accident; History of acute/subacute stroke ; previous CVA with right-sided hemiparesis; right sided weakness;   dysphagia 2/2 cva; expressive aphasia:  On Eliquis 5 mg PO BID.   MRI was performed during a PREVIOUS hospitalization and it showed Acute/subacute tiny infarct in the left anterior brock.   Dr. Jiang from [facility] neuro/stroke was consulted at that time.  Pt has baseline dysarthria from a previous CVA and has also has Hemiparesis.  Additionally she has expressive aphasia, but is able to communicate in short sentences.  Patient is able to nod appropriately to questions.   She is on a mechanical soft, thin consistency diet.    Leukocytosis:  Patient also had leukocytosis in the hospital.  Today 10/23/23 WBC 12.5 H           Benign essential HTN; Chronic systolic HF;        On Amlodipine and hydrochlorothiazide.       Recent /63.       No complaints of chest pain.           No headaches no dizziness.            No SOB.             DM type 2 with hyperglycemia:            On Lantus 30 units at bedtime and Humalog sliding scale.           Recent accuchecks: 268 mg/dL, 203 mg/dL, 311 mg/dL.            No episodes of hypoglycemia reported.            Recent Ha1c 8.1 % on 5/1/23.     Muscle spasticity; Right shoulder and arm pain:  Patient was complaining of increased pain in her right arm weeks ago.  Right arm x-rays were ordered.  ###On 6/5/23 Humerus right arm x-ray performed.   Results as follows: Mild degenerative  changes are present. No acute fracture of dislocation. No osteomyelitis.  ###On 6/5/23 radius and ulna x-ray was also performed.  Results as follows: right radius and ulna has normal ossification pattern.  No fracture or dislocation was seen.  The arm pain is chronic.  She also has spasticity in her right arm.   On gabapentin, Voltaren 1% gel, baclofen 10 mg PO Q 8 hours PRN and oxycodone 5 mg PO Q 6 hours PRN.    Hypothyroidism:  On levothyroxine.  No recent TSH available.    HIV:  On Descovy 200-25 mg PO every day.    Hyperlipidemia:  On atorvastatin.    Major depressive disorder:  On Sertraline.    Insomnia:  On melatonin.   No complaints of insomnia today.    Constipation:      On Colace, Linzess, and Senna PRN.       No complaints of recent constipation today.       No abdominal pain or N/V.       Generalized muscle weakness; Immobility :  At  in long term care.  Not able to ambulate.  Very weak.  Bedbound/wheelchair bound.             Reviewed hospital records from recent hospitalization at [facility].  Reviewed medical, social, surgical and family history.  Reviewed all current medications and performed medication reconciliation.  Reviewed vital signs AND recent blood work results.  Performed prescription drug management.   Reviewed [facility] Documentation  Discussed patient with nursing and [facility] NP Pili.  Spent greater than 50 minutes on visit.        ROS: 10 point ROS performed; negative unless noted in HPI.     BMP: Date: 5/1/2023 Na 137 K 4.3 Cr 1.1 BUN 15 glucose 141 Ca 8.8 GFR 52  CBC: Date: 5/1/2023 WBC 8.4 Hgb 11.1 hematocrit 33.9 platelets 337  Liver function: Date: 5/1/2023 ALT 10 AST 29 alkaline phos.  63 bilirubin 0.4 albumin 3.6 protein 7.7       Ha1c 8.1 % on 5/1/23          BMP: Date: 5/15/2023 Na 139  K 3.4 Cr 1.2 BUN 20 glucose 122 Ca 8.8 GFR 57        CBC: Date: 5/15/2023 WBC 9 Hgb 10.2 hematocrit 30.9 platelets 277       Liver function: 5/15/2023 ALT 8 AST 28 alkaline phos.   57 bilirubin 0.5 albumin 3.5 protein 7.3          BMP: Date: 5/22/2023 Na 141 K 3.1 Cr 1 BUN 11 glucose 227 Ca 8.7 GFR 70        CBC: Date: 5/22/2023 WBC 8.2 Hgb 10 hematocrit 30.5 platelets 372        Liver function: Date: 5/22/2023 ALT 8 AST 22 alkaline phos.  63 bilirubin 0.3 albumin 3.2 protein 7.2     BMP: Date: 6/5/2023 Na 140 K 4.2 Cr 1.1 BUN 14 glucose 79 Ca 1.1 GFR 63  CBC: Date: 6/5/2023 WBC 8.1 Hgb 10.5 hematocrit 32.3 platelets 350  Liver function: Date: 6/5/2023 ALT 7 AST 25 alkaline phos.  59 bilirubin 0.4 albumin 3.3 protein 7.7    BMP: Date: 7/17/2023 Na 140 K 4.1 Cr 0.8 BUN 17 glucose 126 Ca 9.1 GFR 90  CBC: Date: 7/17/2023 WBC 14.1 Hgb 10.2 hematocrit 32.1 platelets 265  Liver function: Date: 7/17/2023 ALT 8 AST 23 alkaline phos.  72 bilirubin 0.5 albumin 3.4 protein 7.4    CBC: Date: 7/19/2023 WBC 10.7 Hgb 9.5 hematocrit 28.8 platelets 257    BMP: Date: 7/24/2023 Na 137 K 4.2 Cr 0.9 BUN 19 glucose 186 Ca 9.1 GFR 79  CBC: Date: 7/24/2023 WBC 11.6 Hgb 10.3 hematocrit 31.4 platelets 305  Liver function: Date: 7/24/2023 ALT 11 AST 27 alkaline phos.  77 bilirubin 0.3 albumin 3.6 protein 7.8    BMP: Date: 7/31/2023 Na 141 K 4.2 Cr 0.8 BUN 17 glucose 138 Ca 8.7 GFR 90  CBC: Date: 7/31/2023 WBC 12.3 Hgb 10.5 hematocrit 32.4 platelets 307  Liver function: Date: 7/31/2023 ALT 14 AST 24 alkaline phos.  81 bilirubin 0.4 albumin 3.5 protein 7.4    BMP: Date: 8/7/2023 Na 141 K 3.9 Cr 1.0 BUN 12 glucose 167 Ca 8.6 GFR 70  CBC: Date: 8/7/2023 WBC 10 Hgb 10.5 hematocrit 32.7 platelets 256  Liver function: Date: 8/7/2023 ALT 9 AST 23 alkaline phos.  76 bilirubin 0.5 albumin 3.3 protein 7    BMP: Date: 8/21/2023 Na 142 K 4.1 Cr 1 BUN 17 glucose 195 Ca 8.5 GFR 70  CBC: Date: 8/21/2023 WBC 10.9 Hgb 9.6 hematocrit 30 platelets 304  Liver function: Date: 8/21/2023 ALT 9 AST 24 alkaline phos.  74 bilirubin 0.4 albumin 3.2 protein 7.1    BMP: Date: 8/31/2023 Na 141 K 3.8 Cr 0.9 BUN 14 glucose 118 Ca 8.2 GFR 79  CBC: Date:  8/31/2023 WBC 12 Hgb 9.6 hematocrit 29.8 platelets 294  Liver function: Date: 8/31/2023 ALT 8 AST 21 alkaline phos.  64 bilirubin 0.4 albumin 3.1 protein 7    BMP: Date: 9/4/2023 Na 141 K 3.9 Cr 1.1 BUN 17 glucose 107 Ca 8.5 GFR 63   CBC: Date: 9/4/2023 WBC 12 Hgb 9.5 hematocrit 29.7 platelets 282  Liver function: Date: 9/4/2023 ALT 7 AST 22 alkaline phos.  64 bilirubin 0.4 albumin 3.3 protein 7.1    BMP: Date: 9/11/2023 Na 142 K 4 Cr 1 BUN 12 glucose 81 Ca 8.2 GFR 70  CBC: Date: 9/11/2023 WBC 8.7 Hgb 9.2 hematocrit 28.3 platelets 305  Liver function: Date: 9/11/2023 ALT 7 AST 22 alkaline phos.  61 bilirubin 0.3 albumin 3 protein 6.8    BMP: Date: 9/18/2023 Na 140 K 4 Cr 1 BUN 15 glucose 150 Ca 8.3 GFR 70  CBC: Date: 9/18/2023 WBC 12.1 Hgb 9.5 hematocrit 29.8 platelets 287  Liver function: Date: 9/18/2023 ALT 7 AST 23 alkaline phos.  62 bilirubin 0.4 albumin 3.4 protein 7.1    10/17/23 Ha1c 8.3 %    BMP: Date: 10/23/2023 Na 141 K 3.6 Cr 1 BUN 22 glucose 65 Ca 8.3 GFR 70  CBC: Date: 10/23/2023 WBC 12.5 Hgb 10.8  hematocrit 33.2 platelets 290  Liver function: Date: 10/23/2023 ALT 9 AST 31 alkaline phos.  66 bilirubin 0.6 albumin 3.6 protein 7.8        Past Medical History:   Diagnosis Date    Cervicalgia     Neck pain    Essential (primary) hypertension 07/21/2013    Hypertension    Pain in unspecified shoulder     Pain, joint, shoulder    Personal history of other diseases of the digestive system 07/06/2022    History of ulcerative colitis    Personal history of other diseases of the digestive system     History of esophageal reflux    Personal history of other diseases of the nervous system and sense organs     History of cataract    Personal history of other diseases of the nervous system and sense organs     History of glaucoma    Personal history of other diseases of the respiratory system     History of bronchitis    Personal history of other endocrine, nutritional and metabolic disease     History of diabetes  mellitus    Personal history of other endocrine, nutritional and metabolic disease     History of hyperlipidemia    Personal history of other mental and behavioral disorders     History of depression    Snoring     Snoring    Unilateral primary osteoarthritis, unspecified knee 11/02/2015    Arthritis of knee           Past Surgical History:   Procedure Laterality Date    CHOLECYSTECTOMY  01/08/2016    Cholecystectomy    COLON SURGERY  01/08/2016    Colon Surgery    CT HEAD ANGIO W AND WO IV CONTRAST  3/19/2019    CT HEAD ANGIO W AND WO IV CONTRAST 3/19/2019 [facility] [place] EMERGENCY LEGACY    CT NECK ANGIO W AND WO IV CONTRAST  3/19/2019    CT NECK ANGIO W AND WO IV CONTRAST 3/19/2019 [facility] [place] EMERGENCY LEGAC[facility]    HERNIA REPAIR  01/08/2016    Hernia Repair    HYSTERECTOMY  01/08/2016    Hysterectomy    MR HEAD ANGIO WO IV CONTRAST  6/4/2018    MR HEAD ANGIO WO IV CONTRAST 6/4/2018 [facility] CLINICAL LEGACY    MR HEAD ANGIO WO IV CONTRAST  7/11/2020    MR HEAD ANGIO WO IV CONTRAST 7/11/2020 [facility] CLINICAL LEGACY    MR HEAD ANGIO WO IV CONTRAST  9/30/2020    MR HEAD ANGIO WO IV CONTRAST 9/30/2020 [facility]IB LEGACY    MR HEAD ANGIO WO IV CONTRAST  5/22/2022    MR HEAD ANGIO WO IV CONTRAST 5/22/2022 [facility] CLINICAL LEGACY    MR HEAD ANGIO WO IV CONTRAST  4/19/2023    MR HEAD ANGIO WO IV CONTRAST [facility] MRI    MR NECK ANGIO WO IV CONTRAST  6/4/2018    MR NECK ANGIO WO IV CONTRAST 6/4/2018 [facility] CLINICAL LEGACY    MR NECK ANGIO WO IV CONTRAST  7/11/2020    MR NECK ANGIO WO IV CONTRAST 7/11/2020 [facility] CLINICAL LEGACY    MR NECK ANGIO WO IV CONTRAST  9/30/2020    MR NECK ANGIO WO IV CONTRAST 9/30/2020 [facility] LEGACY    MR NECK ANGIO WO IV CONTRAST  5/22/2022    MR NECK ANGIO WO IV CONTRAST 5/22/2022 [facility] CLINICAL LEGACY    MR NECK ANGIO WO IV CONTRAST  4/19/2023    MR NECK ANGIO WO IV CONTRAST [facility] MRI    OTHER SURGICAL HISTORY  01/08/2016    Tubal Stabilization    OTHER SURGICAL HISTORY  09/01/2016    Cervical Surgery (Gyn) Laser Vaporization Of  "Transformation Zone    THYROID SURGERY  09/27/2013    Thyroid Surgery            Surgical History  Problems    · History of Cervical Surgery (Gyn) Laser Vaporization Of Transformation Zone   · History of Cholecystectomy   · History of Colon Surgery   · History of Hernia Repair   · History of Hysterectomy   · History of Thyroid Surgery   · History of Tubal Stabilization     Family History  Mother    · Family history of Cancer  Family History    · Family history of Diabetes Mellitus (V18.0)   · Family history of Hypertension (V17.49)     Social History  Problems    · Disabled   · Does not use illicit drugs (V49.89) (Z78.9)   · Former smoker (V15.82) (Z87.891)   · Marital History - Single   · No alcohol.   · Denied: History of Secondhand smoke exposure   · Single     Allergies  Medication    · Aspirin TABS   · Contrast Media Ready-Box MISC   · morphine   · Sulfa Drugs        /68   Pulse 72   Temp 36.4 °C (97.6 °F)   Resp 18   Ht 1.372 m (4' 6\")   Wt 104 kg (228 lb 12.8 oz)   SpO2 98%   BMI 55.17 kg/m²      Physical Exam  Vitals and nursing note reviewed.   Constitutional:       Appearance: Normal appearance.   HENT:      Head: Normocephalic.      Right Ear: External ear normal.      Left Ear: External ear normal.      Nose: Nose normal.      Mouth/Throat:      Mouth: Mucous membranes are moist.      Pharynx: Oropharynx is clear.   Eyes:      Extraocular Movements: Extraocular movements intact.      Conjunctiva/sclera: Conjunctivae normal.      Pupils: Pupils are equal, round, and reactive to light.   Cardiovascular:      Rate and Rhythm: Normal rate and regular rhythm.      Pulses: Normal pulses.      Heart sounds: Normal heart sounds.   Pulmonary:      Effort: Pulmonary effort is normal.      Breath sounds: Normal breath sounds.   Abdominal:      General: Bowel sounds are normal.      Palpations: Abdomen is soft.   Musculoskeletal:      Cervical back: Normal range of motion and neck supple.      Comments: " Generalized muscle weakness; immobility.    Right sided hemiparesis   Skin:     General: Skin is warm and dry.   Neurological:      Mental Status: She is alert. Mental status is at baseline.      Motor: Weakness present.      Coordination: Coordination abnormal.      Gait: Gait abnormal.      Comments: Right sided weakness--RLE 1/5; RUE 3/5; sensation intact to touch x 4 extremities.     Left sided facial droop.     Generalized muscle weakness   Psychiatric:         Mood and Affect: Mood normal.         Behavior: Behavior normal.          Assessment/Plan    Ordered CMP and CBC with diff. For next Tuesday.     Altered mental status:      Evaluated at [facility] for possible CVA.      Patient was found to not have a CVA.     Multiple diagnostic tests performed and patient returned to baseline mental status.       Sequela of Cerebrovascular accident; History of acute/subacute stroke ; previous CVA with right-sided hemiparesis; right sided weakness;   dysphagia 2/2 cva; expressive aphasia:  Continue Eliquis 5 mg PO BID.   Pt has baseline dysarthria from a previous CVA and has also has Hemiparesis.  Continue mechanical soft, thin consistency diet.  Monitor for s/s of aspiration.  Follow up with speech therapy PRN.    Leukocytosis:  Monitor WBC.           Benign essential HTN; Chronic systolic HF;        Continue Amlodipine and hydrochlorothiazide.       Monitor Bps.                  DM type 2 with hyperglycemia:            Discontinue Lantus 30 units at bedtime.           Start Lantus 33 units subcutaneous at bedtime.           Continue Humalog sliding scale.            Monitor accuchecks.            Monitor Ha1c q 3 months.               Muscle spasticity; Right shoulder and arm pain:  The arm pain is chronic.  She also has spasticity in her right arm.   Continue gabapentin, Voltaren 1% gel, baclofen 10 mg PO Q 8 hours PRN and oxycodone 5 mg PO Q 6 hours PRN.    Hypothyroidism:  Continue levothyroxine.  Monitor  TSH.    HIV:  Continue Descovy 200-25 mg PO every day.    Hyperlipidemia:  Continue atorvastatin.    Major depressive disorder:  Continue Sertraline.    Insomnia:  Continue melatonin.    Constipation:      Continue Colace, Linzess, and Senna PRN.            Generalized muscle weakness; Immobility :  Continue at  in long term care.  Not able to ambulate.  Very weak.  Bedbound/wheelchair bound.   Fall prevention strategies.            Problem List Items Addressed This Visit    None  Visit Diagnoses       Altered mental status, unspecified altered mental status type    -  Primary    Sequela of cerebrovascular accident        Recent cerebrovascular accident        Hemiparesis affecting right side as late effect of stroke (CMS/[facility])        Dysphagia, unspecified type        Expressive aphasia        Leukocytosis, unspecified type        Benign essential HTN        Chronic systolic heart failure (CMS/[facility])        Type 2 diabetes mellitus with hyperglycemia, with long-term current use of insulin (CMS/HCC)        Muscle spasticity        Right shoulder pain, unspecified chronicity        Hypothyroidism, unspecified type        HIV infection, unspecified symptom status (CMS/HCC)        Hyperlipidemia, unspecified hyperlipidemia type        Major depressive disorder, remission status unspecified, unspecified whether recurrent        Insomnia, unspecified type        Constipation, unspecified constipation type        Generalized muscle weakness        Immobility            Maria Del Carmen Garduno, APRN-CNP

## 2023-10-31 ENCOUNTER — NURSING HOME VISIT (OUTPATIENT)
Dept: POST ACUTE CARE | Facility: EXTERNAL LOCATION | Age: 53
End: 2023-10-31
Payer: MEDICAID

## 2023-10-31 DIAGNOSIS — I69.30 SEQUELA OF CEREBROVASCULAR ACCIDENT: ICD-10-CM

## 2023-10-31 DIAGNOSIS — I50.22 CHRONIC SYSTOLIC HEART FAILURE (MULTI): ICD-10-CM

## 2023-10-31 DIAGNOSIS — E78.5 HYPERLIPIDEMIA, UNSPECIFIED HYPERLIPIDEMIA TYPE: ICD-10-CM

## 2023-10-31 DIAGNOSIS — R13.10 DYSPHAGIA, UNSPECIFIED TYPE: ICD-10-CM

## 2023-10-31 DIAGNOSIS — Z86.73 RECENT CEREBROVASCULAR ACCIDENT: ICD-10-CM

## 2023-10-31 DIAGNOSIS — M62.81 GENERALIZED MUSCLE WEAKNESS: ICD-10-CM

## 2023-10-31 DIAGNOSIS — R47.01 EXPRESSIVE APHASIA: ICD-10-CM

## 2023-10-31 DIAGNOSIS — I10 BENIGN ESSENTIAL HTN: ICD-10-CM

## 2023-10-31 DIAGNOSIS — I69.351 HEMIPARESIS AFFECTING RIGHT SIDE AS LATE EFFECT OF STROKE (MULTI): ICD-10-CM

## 2023-10-31 DIAGNOSIS — B20 HIV INFECTION, UNSPECIFIED SYMPTOM STATUS (MULTI): ICD-10-CM

## 2023-10-31 DIAGNOSIS — Z79.4 TYPE 2 DIABETES MELLITUS WITH HYPERGLYCEMIA, WITH LONG-TERM CURRENT USE OF INSULIN (MULTI): ICD-10-CM

## 2023-10-31 DIAGNOSIS — E11.65 TYPE 2 DIABETES MELLITUS WITH HYPERGLYCEMIA, WITH LONG-TERM CURRENT USE OF INSULIN (MULTI): ICD-10-CM

## 2023-10-31 DIAGNOSIS — H61.21 IMPACTED CERUMEN OF RIGHT EAR: ICD-10-CM

## 2023-10-31 DIAGNOSIS — D72.829 LEUKOCYTOSIS, UNSPECIFIED TYPE: ICD-10-CM

## 2023-10-31 DIAGNOSIS — U07.1 COVID-19: Primary | ICD-10-CM

## 2023-10-31 DIAGNOSIS — G47.00 INSOMNIA, UNSPECIFIED TYPE: ICD-10-CM

## 2023-10-31 PROCEDURE — 99309 SBSQ NF CARE MODERATE MDM 30: CPT | Performed by: NURSE PRACTITIONER

## 2023-10-31 NOTE — LETTER
Patient: Janette Martinez  : 1970    Encounter Date: 10/31/2023    Name: Janette Martinez    YOB: 1970    Code Status: DNRcc arrest    Chief complaint:  Covid-19; etc.....      HPI:    52-year-old female with past medical history of heart failure with reduced EF,   diabetes, hypertension, hyperlipidemia,        CVA (left MCA  with residual right-sided hemiparesis), GERD, HIV, hypothyroidism, hypertension,   and diabetes.        Patient re-admitted to [facility]. On 10/20/23 after her most recent hospitalization.    Diagnoses as follows:        Covid-19:      Patient recently diagnosed with Covid-19 last week.      Patient has been in Covid-isolation since diagnosed.      No fevers reported.       No oxygen desaturations.       No complaints of cough or SOB.       Patient seen today, she is in bed.      Calm, cooperative, pleasant.      Mentation at baseline.   No complaints of chest pain, dizziness or headaches.    Right ear cerumen impaction:  Patient complaining that she cannot hear out of right ear.  Audiologist consult ordered.   Has cerumen in right ear.   No complaints of ear pain.     DM type 2 with hyperglycemia:            On Lantus 33 units at bedtime and Humalog sliding scale.           Recent accuchecks: 278 mg/dL, 234 mg/dL, 180 mg/dL.            No episodes of hypoglycemia reported.           Ha1c trends:           23 Ha1c 8.1 %            10/17/23 Ha1c 8.3 %      Sequela of Cerebrovascular accident; History of acute/subacute stroke ; previous CVA with right-sided hemiparesis; right sided weakness;   dysphagia 2/2 cva; expressive aphasia:   MRI was performed during a PREVIOUS hospitalization and it showed Acute/subacute tiny infarct in the left anterior brock.   Dr. Jiang from [facility] [place] at that time.  On Eliquis 5 mg PO BID.  Pt has baseline dysarthria from a previous CVA, right Hemiparesis,  and expressive aphasia.  She is able to  communicate in short sentences.  Patient is also able to nod appropriately to questions.   On a mechanical soft, thin consistency diet.    Leukocytosis:  Patient  had leukocytosis during her recent hospitalization.  Recent WBC results:  10/23/23 WBC 12.5 H  10/30/23 WBC 9.2            Benign essential HTN; Chronic systolic HF:       On Amlodipine and hydrochlorothiazide.       Recent /64.       No complaints of chest pain.           No headaches no dizziness.            No SOB.         HIV:   On Descovy 200-25 mg PO every day.    Hyperlipidemia:  On atorvastatin.  10/26/2023: Lipid panel: Cholesterol 111; triglycerides 111; HDL 33; LDL 56; VLDL 22.        Immobility; Generalized muscle weakness:  At  in long term care.  Not able to ambulate.  Very weak.  Bedbound/wheelchair bound.                      Reviewed medical, social, surgical and family history.  Reviewed all current medications and performed medication reconciliation.  Reviewed vital signs AND recent blood work results.  Performed prescription drug management.   Reviewed [facility] Documentation  Discussed patient with nursing.      ROS: 10 point ROS performed; negative unless noted in HPI.       LABS:  BMP: Date: 5/1/2023 Na 137 K 4.3 Cr 1.1 BUN 15 glucose 141 Ca 8.8 GFR 52  CBC: Date: 5/1/2023 WBC 8.4 Hgb 11.1 hematocrit 33.9 platelets 337  Liver function: Date: 5/1/2023 ALT 10 AST 29 alkaline phos.  63 bilirubin 0.4 albumin 3.6 protein 7.7       Ha1c 8.1 % on 5/1/23          BMP: Date: 5/15/2023 Na 139  K 3.4 Cr 1.2 BUN 20 glucose 122 Ca 8.8 GFR 57        CBC: Date: 5/15/2023 WBC 9 Hgb 10.2 hematocrit 30.9 platelets 277       Liver function: 5/15/2023 ALT 8 AST 28 alkaline phos.  57 bilirubin 0.5 albumin 3.5 protein 7.3          BMP: Date: 5/22/2023 Na 141 K 3.1 Cr 1 BUN 11 glucose 227 Ca 8.7 GFR 70        CBC: Date: 5/22/2023 WBC 8.2 Hgb 10 hematocrit 30.5 platelets 372        Liver function: Date: 5/22/2023 ALT 8 AST 22 alkaline phos.   63 bilirubin 0.3 albumin 3.2 protein 7.2     BMP: Date: 6/5/2023 Na 140 K 4.2 Cr 1.1 BUN 14 glucose 79 Ca 1.1 GFR 63  CBC: Date: 6/5/2023 WBC 8.1 Hgb 10.5 hematocrit 32.3 platelets 350  Liver function: Date: 6/5/2023 ALT 7 AST 25 alkaline phos.  59 bilirubin 0.4 albumin 3.3 protein 7.7    BMP: Date: 7/17/2023 Na 140 K 4.1 Cr 0.8 BUN 17 glucose 126 Ca 9.1 GFR 90  CBC: Date: 7/17/2023 WBC 14.1 Hgb 10.2 hematocrit 32.1 platelets 265  Liver function: Date: 7/17/2023 ALT 8 AST 23 alkaline phos.  72 bilirubin 0.5 albumin 3.4 protein 7.4    CBC: Date: 7/19/2023 WBC 10.7 Hgb 9.5 hematocrit 28.8 platelets 257    BMP: Date: 7/24/2023 Na 137 K 4.2 Cr 0.9 BUN 19 glucose 186 Ca 9.1 GFR 79  CBC: Date: 7/24/2023 WBC 11.6 Hgb 10.3 hematocrit 31.4 platelets 305  Liver function: Date: 7/24/2023 ALT 11 AST 27 alkaline phos.  77 bilirubin 0.3 albumin 3.6 protein 7.8    BMP: Date: 7/31/2023 Na 141 K 4.2 Cr 0.8 BUN 17 glucose 138 Ca 8.7 GFR 90  CBC: Date: 7/31/2023 WBC 12.3 Hgb 10.5 hematocrit 32.4 platelets 307  Liver function: Date: 7/31/2023 ALT 14 AST 24 alkaline phos.  81 bilirubin 0.4 albumin 3.5 protein 7.4    BMP: Date: 8/7/2023 Na 141 K 3.9 Cr 1.0 BUN 12 glucose 167 Ca 8.6 GFR 70  CBC: Date: 8/7/2023 WBC 10 Hgb 10.5 hematocrit 32.7 platelets 256  Liver function: Date: 8/7/2023 ALT 9 AST 23 alkaline phos.  76 bilirubin 0.5 albumin 3.3 protein 7    BMP: Date: 8/21/2023 Na 142 K 4.1 Cr 1 BUN 17 glucose 195 Ca 8.5 GFR 70  CBC: Date: 8/21/2023 WBC 10.9 Hgb 9.6 hematocrit 30 platelets 304  Liver function: Date: 8/21/2023 ALT 9 AST 24 alkaline phos.  74 bilirubin 0.4 albumin 3.2 protein 7.1    BMP: Date: 8/31/2023 Na 141 K 3.8 Cr 0.9 BUN 14 glucose 118 Ca 8.2 GFR 79  CBC: Date: 8/31/2023 WBC 12 Hgb 9.6 hematocrit 29.8 platelets 294  Liver function: Date: 8/31/2023 ALT 8 AST 21 alkaline phos.  64 bilirubin 0.4 albumin 3.1 protein 7    BMP: Date: 9/4/2023 Na 141 K 3.9 Cr 1.1 BUN 17 glucose 107 Ca 8.5 GFR 63   CBC: Date: 9/4/2023 WBC 12  Hgb 9.5 hematocrit 29.7 platelets 282  Liver function: Date: 9/4/2023 ALT 7 AST 22 alkaline phos.  64 bilirubin 0.4 albumin 3.3 protein 7.1    BMP: Date: 9/11/2023 Na 142 K 4 Cr 1 BUN 12 glucose 81 Ca 8.2 GFR 70  CBC: Date: 9/11/2023 WBC 8.7 Hgb 9.2 hematocrit 28.3 platelets 305  Liver function: Date: 9/11/2023 ALT 7 AST 22 alkaline phos.  61 bilirubin 0.3 albumin 3 protein 6.8    BMP: Date: 9/18/2023 Na 140 K 4 Cr 1 BUN 15 glucose 150 Ca 8.3 GFR 70  CBC: Date: 9/18/2023 WBC 12.1 Hgb 9.5 hematocrit 29.8 platelets 287  Liver function: Date: 9/18/2023 ALT 7 AST 23 alkaline phos.  62 bilirubin 0.4 albumin 3.4 protein 7.1    10/17/23 Ha1c 8.3 %    BMP: Date: 10/23/2023 Na 141 K 3.6 Cr 1 BUN 22 glucose 65 Ca 8.3 GFR 70  CBC: Date: 10/23/2023 WBC 12.5 Hgb 10.8  hematocrit 33.2 platelets 290  Liver function: Date: 10/23/2023 ALT 9 AST 31 alkaline phos.  66 bilirubin 0.6 albumin 3.6 protein 7.8    10/26/2023: Lipid panel: Cholesterol 111; triglycerides 111; HDL 33; LDL 56; VLDL 22.    10/26/2023: TSH: 1.081 (range 0.340-5.500).    BMP: Date: 10/30/2023 Na 143  K 3.5 Cr 0.9 BUN 11 glucose 171 Ca 8 GFR 65  CBC: Date: 10/30/2023 WBC 9.2 Hgb 10.1 hematocrit 31.1 platelets 280  Liver function: Date: 10/30/2023 ALT 11 AST 27 alkaline phos.  66 bilirubin 0.4 albumin 3.4 protein 7.2          Past Medical History:   Diagnosis Date   • Cervicalgia     Neck pain   • Essential (primary) hypertension 07/21/2013    Hypertension   • Pain in unspecified shoulder     Pain, joint, shoulder   • Personal history of other diseases of the digestive system 07/06/2022    History of ulcerative colitis   • Personal history of other diseases of the digestive system     History of esophageal reflux   • Personal history of other diseases of the nervous system and sense organs     History of cataract   • Personal history of other diseases of the nervous system and sense organs     History of glaucoma   • Personal history of other diseases of the  respiratory system     History of bronchitis   • Personal history of other endocrine, nutritional and metabolic disease     History of diabetes mellitus   • Personal history of other endocrine, nutritional and metabolic disease     History of hyperlipidemia   • Personal history of other mental and behavioral disorders     History of depression   • Snoring     Snoring   • Unilateral primary osteoarthritis, unspecified knee 11/02/2015    Arthritis of knee           Past Surgical History:   Procedure Laterality Date   • CHOLECYSTECTOMY  01/08/2016    Cholecystectomy   • COLON SURGERY  01/08/2016    Colon Surgery   • CT HEAD ANGIO W AND WO IV CONTRAST  3/19/2019    CT HEAD ANGIO W AND WO IV CONTRAST 3/19/2019 [facility] [place] EMERGENCY LEGACY   • CT NECK ANGIO W AND WO IV CONTRAST  3/19/2019    CT NECK ANGIO W AND WO IV CONTRAST 3/19/2019 [facility] [place] EMERGENCY LEGACY   • HERNIA REPAIR  01/08/2016    Hernia Repair   • HYSTERECTOMY  01/08/2016    Hysterectomy   • MR HEAD ANGIO WO IV CONTRAST  6/4/2018    MR HEAD ANGIO WO IV CONTRAST 6/4/2018 [facility] CLINICAL LEGACY   • MR HEAD ANGIO WO IV CONTRAST  7/11/2020    MR HEAD ANGIO WO IV CONTRAST 7/11/2020 [facility] CLINICAL LEGACY   • MR HEAD ANGIO WO IV CONTRAST  9/30/2020    MR HEAD ANGIO WO IV CONTRAST 9/30/2020 [facility] AIB LEGACY   • MR HEAD ANGIO WO IV CONTRAST  5/22/2022    MR HEAD ANGIO WO IV CONTRAST 5/22/2022 [facility] CLINICAL LEGACY   • MR HEAD ANGIO WO IV CONTRAST  4/19/2023    MR HEAD ANGIO WO IV CONTRAST [facility] MRI   • MR NECK ANGIO WO IV CONTRAST  6/4/2018    MR NECK ANGIO WO IV CONTRAST 6/4/2018 [facility] CLINICAL LEGACY   • MR NECK ANGIO WO IV CONTRAST  7/11/2020    MR NECK ANGIO WO IV CONTRAST 7/11/2020 [facility] CLINICAL LEGACY   • MR NECK ANGIO WO IV CONTRAST  9/30/2020    MR NECK ANGIO WO IV CONTRAST 9/30/2020 [facility] AIB LEGACY   • MR NECK ANGIO WO IV CONTRAST  5/22/2022    MR NECK ANGIO WO IV CONTRAST 5/22/2022 [facility] CLINICAL LEGACY   • MR NECK ANGIO WO IV CONTRAST  4/19/2023    MR NECK ANGIO WO IV CONTRAST  "[facility] MRI   • OTHER SURGICAL HISTORY  01/08/2016    Tubal Stabilization   • OTHER SURGICAL HISTORY  09/01/2016    Cervical Surgery (Gyn) Laser Vaporization Of Transformation Zone   • THYROID SURGERY  09/27/2013    Thyroid Surgery            Surgical History  Problems    · History of Cervical Surgery (Gyn) Laser Vaporization Of Transformation Zone   · History of Cholecystectomy   · History of Colon Surgery   · History of Hernia Repair   · History of Hysterectomy   · History of Thyroid Surgery   · History of Tubal Stabilization     Family History  Mother    · Family history of Cancer  Family History    · Family history of Diabetes Mellitus (V18.0)   · Family history of Hypertension (V17.49)     Social History  Problems    · Disabled   · Does not use illicit drugs (V49.89) (Z78.9)   · Former smoker (V15.82) (Z87.891)   · Marital History - Single   · No alcohol.   · Denied: History of Secondhand smoke exposure   · Single     Allergies  Medication    · Aspirin TABS   · Contrast Media Ready-Box MISC   · morphine   · Sulfa Drugs        /64   Pulse 96   Temp 36.5 °C (97.7 °F)   Resp 18   Ht 1.372 m (4' 6\")   Wt 108 kg (238 lb)   SpO2 98%   BMI 57.38 kg/m²      Physical Exam  Vitals and nursing note reviewed.   Constitutional:       Appearance: Normal appearance.   HENT:      Head: Normocephalic.      Right Ear: External ear normal.      Left Ear: External ear normal.      Nose: Nose normal.      Mouth/Throat:      Mouth: Mucous membranes are moist.      Pharynx: Oropharynx is clear.   Eyes:      Extraocular Movements: Extraocular movements intact.      Conjunctiva/sclera: Conjunctivae normal.      Pupils: Pupils are equal, round, and reactive to light.   Cardiovascular:      Rate and Rhythm: Normal rate and regular rhythm.      Pulses: Normal pulses.      Heart sounds: Normal heart sounds.   Pulmonary:      Effort: Pulmonary effort is normal.      Breath sounds: Normal breath sounds.   Abdominal:      General: " Bowel sounds are normal.      Palpations: Abdomen is soft.   Musculoskeletal:      Cervical back: Normal range of motion and neck supple.      Comments: Generalized muscle weakness; immobility.    Right sided hemiparesis   Skin:     General: Skin is warm and dry.   Neurological:      Mental Status: She is alert. Mental status is at baseline.      Motor: Weakness present.      Coordination: Coordination abnormal.      Gait: Gait abnormal.      Comments: Right sided weakness--RLE 1/5; RUE 3/5; sensation intact to touch x 4 extremities.     Left sided facial droop.     Generalized muscle weakness   Psychiatric:         Mood and Affect: Mood normal.         Behavior: Behavior normal.          Assessment/Plan   Ordered CMP and CBC with diff. For next Thursday.    Covid-19:       Continue in Covid-isolation room.       Monitor vitals Q shift.       Monitor for fevers.        Right ear cerumen impaction:  Start carbamide peroxide 6.5 % otic solution: 5 drops in right ear for 4 days for cerumen obstruction.  Audiologist consult ordered.      DM type 2 with hyperglycemia:    Continue Lantus 33 units at bedtime and Humalog sliding scale.    Monitor accuchecks.    Monitor Ha1c Q 3 months.    Sequela of Cerebrovascular accident; History of acute/subacute stroke ; previous CVA with right-sided hemiparesis; right sided weakness;   dysphagia 2/2 cva; expressive aphasia:   [place] [facility] [place]  Continue  Eliquis 5 mg PO BID.  Patient requires full supportive care.  Continue mechanical soft, thin consistency diet.    Leukocytosis:           Monitor WBC.           Benign essential HTN; Chronic systolic HF:      Continue Amlodipine and hydrochlorothiazide.      Monitor Bps.          HIV:   Continue Descovy 200-25 mg PO every day.    Hyperlipidemia:  Continue atorvastatin.  Monitor lipid panel.        Immobility; Generalized muscle weakness:  Continue at  in long term care.  Fall prevention  strategies.   Patient requires full supportive care.      Problem List Items Addressed This Visit    None  Visit Diagnoses       COVID-19    -  Primary    Impacted cerumen of right ear        Type 2 diabetes mellitus with hyperglycemia, with long-term current use of insulin (CMS/[facility])        Sequela of cerebrovascular accident        Recent cerebrovascular accident        Hemiparesis affecting right side as late effect of stroke (CMS/[facility])        Dysphagia, unspecified type        Expressive aphasia        Leukocytosis, unspecified type        Benign essential HTN        Chronic systolic heart failure (CMS/[facility])        HIV infection, unspecified symptom status (CMS/[facility])        Hyperlipidemia, unspecified hyperlipidemia type        Insomnia, unspecified type        Generalized muscle weakness            JASON Espitia       Electronically Signed By: JASON Espitia   11/1/23  8:57 PM

## 2023-11-01 VITALS
WEIGHT: 238 LBS | BODY MASS INDEX: 55.08 KG/M2 | HEIGHT: 55 IN | SYSTOLIC BLOOD PRESSURE: 132 MMHG | TEMPERATURE: 97.7 F | OXYGEN SATURATION: 98 % | HEART RATE: 96 BPM | DIASTOLIC BLOOD PRESSURE: 64 MMHG | RESPIRATION RATE: 18 BRPM

## 2023-11-01 NOTE — PROGRESS NOTES
Name: Janette Martinez    YOB: 1970    Code Status: DNRcc arrest    Chief complaint:  Covid-19; etc.....      HPI:    52-year-old female with past medical history of heart failure with reduced EF,   diabetes, hypertension, hyperlipidemia,        CVA (left MCA 2022 with residual right-sided hemiparesis), GERD, HIV, hypothyroidism, hypertension,   and diabetes.        Patient re-admitted to [facility] On 10/20/23 after her most recent hospitalization.    Diagnoses as follows:        Covid-19:      Patient recently diagnosed with Covid-19 last week.      Patient has been in Covid-isolation since diagnosed.      No fevers reported.       No oxygen desaturations.       No complaints of cough or SOB.       Patient seen today, she is in bed.      Calm, cooperative, pleasant.      Mentation at baseline.   No complaints of chest pain, dizziness or headaches.    Right ear cerumen impaction:  Patient complaining that she cannot hear out of right ear.  Audiologist consult ordered.   Has cerumen in right ear.   No complaints of ear pain.     DM type 2 with hyperglycemia:            On Lantus 33 units at bedtime and Humalog sliding scale.           Recent accuchecks: 278 mg/dL, 234 mg/dL, 180 mg/dL.            No episodes of hypoglycemia reported.           Ha1c trends:           5/1/23 Ha1c 8.1 %            10/17/23 Ha1c 8.3 %      Sequela of Cerebrovascular accident; History of acute/subacute stroke ; previous CVA with right-sided hemiparesis; right sided weakness;   dysphagia 2/2 cva; expressive aphasia:   MRI was performed during a PREVIOUS hospitalization and it showed Acute/subacute tiny infarct in the left anterior brock.   Dr. Jiang from [facility] [place] neuro/stroke was consulted at that time.  On Eliquis 5 mg PO BID.  Pt has baseline dysarthria from a previous CVA, right Hemiparesis,  and expressive aphasia.  She is able to communicate in short sentences.  Patient is also able to nod appropriately to  questions.   On a mechanical soft, thin consistency diet.    Leukocytosis:  Patient  had leukocytosis during her recent hospitalization.  Recent WBC results:  10/23/23 WBC 12.5 H  10/30/23 WBC 9.2            Benign essential HTN; Chronic systolic HF:       On Amlodipine and hydrochlorothiazide.       Recent /64.       No complaints of chest pain.           No headaches no dizziness.            No SOB.         HIV:   On Descovy 200-25 mg PO every day.    Hyperlipidemia:  On atorvastatin.  10/26/2023: Lipid panel: Cholesterol 111; triglycerides 111; HDL 33; LDL 56; VLDL 22.        Immobility; Generalized muscle weakness:  At  in long term care.  Not able to ambulate.  Very weak.  Bedbound/wheelchair bound.                      Reviewed medical, social, surgical and family history.  Reviewed all current medications and performed medication reconciliation.  Reviewed vital signs AND recent blood work results.  Performed prescription drug management.   Reviewed [facility] Documentation  Discussed patient with nursing.      ROS: 10 point ROS performed; negative unless noted in HPI.       LABS:  BMP: Date: 5/1/2023 Na 137 K 4.3 Cr 1.1 BUN 15 glucose 141 Ca 8.8 GFR 52  CBC: Date: 5/1/2023 WBC 8.4 Hgb 11.1 hematocrit 33.9 platelets 337  Liver function: Date: 5/1/2023 ALT 10 AST 29 alkaline phos.  63 bilirubin 0.4 albumin 3.6 protein 7.7       Ha1c 8.1 % on 5/1/23          BMP: Date: 5/15/2023 Na 139  K 3.4 Cr 1.2 BUN 20 glucose 122 Ca 8.8 GFR 57        CBC: Date: 5/15/2023 WBC 9 Hgb 10.2 hematocrit 30.9 platelets 277       Liver function: 5/15/2023 ALT 8 AST 28 alkaline phos.  57 bilirubin 0.5 albumin 3.5 protein 7.3          BMP: Date: 5/22/2023 Na 141 K 3.1 Cr 1 BUN 11 glucose 227 Ca 8.7 GFR 70        CBC: Date: 5/22/2023 WBC 8.2 Hgb 10 hematocrit 30.5 platelets 372        Liver function: Date: 5/22/2023 ALT 8 AST 22 alkaline phos.  63 bilirubin 0.3 albumin 3.2 protein 7.2     BMP: Date: 6/5/2023 Na 140 K 4.2  Cr 1.1 BUN 14 glucose 79 Ca 1.1 GFR 63  CBC: Date: 6/5/2023 WBC 8.1 Hgb 10.5 hematocrit 32.3 platelets 350  Liver function: Date: 6/5/2023 ALT 7 AST 25 alkaline phos.  59 bilirubin 0.4 albumin 3.3 protein 7.7    BMP: Date: 7/17/2023 Na 140 K 4.1 Cr 0.8 BUN 17 glucose 126 Ca 9.1 GFR 90  CBC: Date: 7/17/2023 WBC 14.1 Hgb 10.2 hematocrit 32.1 platelets 265  Liver function: Date: 7/17/2023 ALT 8 AST 23 alkaline phos.  72 bilirubin 0.5 albumin 3.4 protein 7.4    CBC: Date: 7/19/2023 WBC 10.7 Hgb 9.5 hematocrit 28.8 platelets 257    BMP: Date: 7/24/2023 Na 137 K 4.2 Cr 0.9 BUN 19 glucose 186 Ca 9.1 GFR 79  CBC: Date: 7/24/2023 WBC 11.6 Hgb 10.3 hematocrit 31.4 platelets 305  Liver function: Date: 7/24/2023 ALT 11 AST 27 alkaline phos.  77 bilirubin 0.3 albumin 3.6 protein 7.8    BMP: Date: 7/31/2023 Na 141 K 4.2 Cr 0.8 BUN 17 glucose 138 Ca 8.7 GFR 90  CBC: Date: 7/31/2023 WBC 12.3 Hgb 10.5 hematocrit 32.4 platelets 307  Liver function: Date: 7/31/2023 ALT 14 AST 24 alkaline phos.  81 bilirubin 0.4 albumin 3.5 protein 7.4    BMP: Date: 8/7/2023 Na 141 K 3.9 Cr 1.0 BUN 12 glucose 167 Ca 8.6 GFR 70  CBC: Date: 8/7/2023 WBC 10 Hgb 10.5 hematocrit 32.7 platelets 256  Liver function: Date: 8/7/2023 ALT 9 AST 23 alkaline phos.  76 bilirubin 0.5 albumin 3.3 protein 7    BMP: Date: 8/21/2023 Na 142 K 4.1 Cr 1 BUN 17 glucose 195 Ca 8.5 GFR 70  CBC: Date: 8/21/2023 WBC 10.9 Hgb 9.6 hematocrit 30 platelets 304  Liver function: Date: 8/21/2023 ALT 9 AST 24 alkaline phos.  74 bilirubin 0.4 albumin 3.2 protein 7.1    BMP: Date: 8/31/2023 Na 141 K 3.8 Cr 0.9 BUN 14 glucose 118 Ca 8.2 GFR 79  CBC: Date: 8/31/2023 WBC 12 Hgb 9.6 hematocrit 29.8 platelets 294  Liver function: Date: 8/31/2023 ALT 8 AST 21 alkaline phos.  64 bilirubin 0.4 albumin 3.1 protein 7    BMP: Date: 9/4/2023 Na 141 K 3.9 Cr 1.1 BUN 17 glucose 107 Ca 8.5 GFR 63   CBC: Date: 9/4/2023 WBC 12 Hgb 9.5 hematocrit 29.7 platelets 282  Liver function: Date: 9/4/2023 ALT 7 AST  22 alkaline phos.  64 bilirubin 0.4 albumin 3.3 protein 7.1    BMP: Date: 9/11/2023 Na 142 K 4 Cr 1 BUN 12 glucose 81 Ca 8.2 GFR 70  CBC: Date: 9/11/2023 WBC 8.7 Hgb 9.2 hematocrit 28.3 platelets 305  Liver function: Date: 9/11/2023 ALT 7 AST 22 alkaline phos.  61 bilirubin 0.3 albumin 3 protein 6.8    BMP: Date: 9/18/2023 Na 140 K 4 Cr 1 BUN 15 glucose 150 Ca 8.3 GFR 70  CBC: Date: 9/18/2023 WBC 12.1 Hgb 9.5 hematocrit 29.8 platelets 287  Liver function: Date: 9/18/2023 ALT 7 AST 23 alkaline phos.  62 bilirubin 0.4 albumin 3.4 protein 7.1    10/17/23 Ha1c 8.3 %    BMP: Date: 10/23/2023 Na 141 K 3.6 Cr 1 BUN 22 glucose 65 Ca 8.3 GFR 70  CBC: Date: 10/23/2023 WBC 12.5 Hgb 10.8  hematocrit 33.2 platelets 290  Liver function: Date: 10/23/2023 ALT 9 AST 31 alkaline phos.  66 bilirubin 0.6 albumin 3.6 protein 7.8    10/26/2023: Lipid panel: Cholesterol 111; triglycerides 111; HDL 33; LDL 56; VLDL 22.    10/26/2023: TSH: 1.081 (range 0.340-5.500).    BMP: Date: 10/30/2023 Na 143  K 3.5 Cr 0.9 BUN 11 glucose 171 Ca 8 GFR 65  CBC: Date: 10/30/2023 WBC 9.2 Hgb 10.1 hematocrit 31.1 platelets 280  Liver function: Date: 10/30/2023 ALT 11 AST 27 alkaline phos.  66 bilirubin 0.4 albumin 3.4 protein 7.2          Past Medical History:   Diagnosis Date    Cervicalgia     Neck pain    Essential (primary) hypertension 07/21/2013    Hypertension    Pain in unspecified shoulder     Pain, joint, shoulder    Personal history of other diseases of the digestive system 07/06/2022    History of ulcerative colitis    Personal history of other diseases of the digestive system     History of esophageal reflux    Personal history of other diseases of the nervous system and sense organs     History of cataract    Personal history of other diseases of the nervous system and sense organs     History of glaucoma    Personal history of other diseases of the respiratory system     History of bronchitis    Personal history of other endocrine,  nutritional and metabolic disease     History of diabetes mellitus    Personal history of other endocrine, nutritional and metabolic disease     History of hyperlipidemia    Personal history of other mental and behavioral disorders     History of depression    Snoring     Snoring    Unilateral primary osteoarthritis, unspecified knee 11/02/2015    Arthritis of knee           Past Surgical History:   Procedure Laterality Date    CHOLECYSTECTOMY  01/08/2016    Cholecystectomy    COLON SURGERY  01/08/2016    Colon Surgery    CT HEAD ANGIO W AND WO IV CONTRAST  3/19/2019    CT HEAD ANGIO W AND WO IV CONTRAST 3/19/2019 [facility] [place] [facility]    CT NECK ANGIO W AND WO IV CONTRAST  3/19/2019    CT NECK ANGIO W AND WO IV CONTRAST 3/19/2019 [facility] [place] [facility]    HERNIA REPAIR  01/08/2016    Hernia Repair    HYSTERECTOMY  01/08/2016    Hysterectomy    MR HEAD ANGIO WO IV CONTRAST  6/4/2018    MR HEAD ANGIO WO IV CONTRAST 6/4/2018 [facility] CLINICAL LEGACY    MR HEAD ANGIO WO IV CONTRAST  7/11/2020    MR HEAD ANGIO WO IV CONTRAST 7/11/2020 [facility] CLINICAL LEGACY    MR HEAD ANGIO WO IV CONTRAST  9/30/2020    MR HEAD ANGIO WO IV CONTRAST 9/30/2020 [facility] LEGAC[facility]    MR HEAD ANGIO WO IV CONTRAST  5/22/2022    MR HEAD ANGIO WO IV CONTRAST 5/22/2022 [facility] CLINICAL LEGACY    MR HEAD ANGIO WO IV CONTRAST  4/19/2023    MR HEAD ANGIO WO IV CONTRAST [facility] MRI    MR NECK ANGIO WO IV CONTRAST  6/4/2018    MR NECK ANGIO WO IV CONTRAST 6/4/2018 [facility] CLINICAL LEGACY    MR NECK ANGIO WO IV CONTRAST  7/11/2020    MR NECK ANGIO WO IV CONTRAST 7/11/2020 [facility] CLINICAL LEGAC[facility]    MR NECK ANGIO WO IV CONTRAST  9/30/2020    MR NECK ANGIO WO IV CONTRAST 9/30/2020 [facility]GAC[facility]    MR NECK ANGIO WO IV CONTRAST  5/22/2022    MR NECK ANGIO WO IV CONTRAST 5/22/2022 [facility] CLINICAL LEGAC[facility]    MR NECK ANGIO WO IV CONTRAST  4/19/2023    MR NECK ANGIO WO IV CONTRAST [facility] MRI    OTHER SURGICAL HISTORY  01/08/2016    Tubal Stabilization    OTHER SURGICAL HISTORY  09/01/2016  "   Cervical Surgery (Gyn) Laser Vaporization Of Transformation Zone    THYROID SURGERY  09/27/2013    Thyroid Surgery            Surgical History  Problems    · History of Cervical Surgery (Gyn) Laser Vaporization Of Transformation Zone   · History of Cholecystectomy   · History of Colon Surgery   · History of Hernia Repair   · History of Hysterectomy   · History of Thyroid Surgery   · History of Tubal Stabilization     Family History  Mother    · Family history of Cancer  Family History    · Family history of Diabetes Mellitus (V18.0)   · Family history of Hypertension (V17.49)     Social History  Problems    · Disabled   · Does not use illicit drugs (V49.89) (Z78.9)   · Former smoker (V15.82) (Z87.891)   · Marital History - Single   · No alcohol.   · Denied: History of Secondhand smoke exposure   · Single     Allergies  Medication    · Aspirin TABS   · Contrast Media Ready-Box MISC   · morphine   · Sulfa Drugs        /64   Pulse 96   Temp 36.5 °C (97.7 °F)   Resp 18   Ht 1.372 m (4' 6\")   Wt 108 kg (238 lb)   SpO2 98%   BMI 57.38 kg/m²      Physical Exam  Vitals and nursing note reviewed.   Constitutional:       Appearance: Normal appearance.   HENT:      Head: Normocephalic.      Right Ear: External ear normal.      Left Ear: External ear normal.      Nose: Nose normal.      Mouth/Throat:      Mouth: Mucous membranes are moist.      Pharynx: Oropharynx is clear.   Eyes:      Extraocular Movements: Extraocular movements intact.      Conjunctiva/sclera: Conjunctivae normal.      Pupils: Pupils are equal, round, and reactive to light.   Cardiovascular:      Rate and Rhythm: Normal rate and regular rhythm.      Pulses: Normal pulses.      Heart sounds: Normal heart sounds.   Pulmonary:      Effort: Pulmonary effort is normal.      Breath sounds: Normal breath sounds.   Abdominal:      General: Bowel sounds are normal.      Palpations: Abdomen is soft.   Musculoskeletal:      Cervical back: Normal range " of motion and neck supple.      Comments: Generalized muscle weakness; immobility.    Right sided hemiparesis   Skin:     General: Skin is warm and dry.   Neurological:      Mental Status: She is alert. Mental status is at baseline.      Motor: Weakness present.      Coordination: Coordination abnormal.      Gait: Gait abnormal.      Comments: Right sided weakness--RLE 1/5; RUE 3/5; sensation intact to touch x 4 extremities.     Left sided facial droop.     Generalized muscle weakness   Psychiatric:         Mood and Affect: Mood normal.         Behavior: Behavior normal.          Assessment/Plan    Ordered CMP and CBC with diff. For next Thursday.    Covid-19:       Continue in Covid-isolation room.       Monitor vitals Q shift.       Monitor for fevers.        Right ear cerumen impaction:  Start carbamide peroxide 6.5 % otic solution: 5 drops in right ear for 4 days for cerumen obstruction.  Audiologist consult ordered.      DM type 2 with hyperglycemia:    Continue Lantus 33 units at bedtime and Humalog sliding scale.    Monitor accuchecks.    Monitor Ha1c Q 3 months.    Sequela of Cerebrovascular accident; History of acute/subacute stroke ; previous CVA with right-sided hemiparesis; right sided weakness;   dysphagia 2/2 cva; expressive aphasia:   Follow up with  Dr. Jiang from [facility] [place].  Continue  Eliquis 5 mg PO BID.  Patient requires full supportive care.  Continue mechanical soft, thin consistency diet.    Leukocytosis:           Monitor WBC.           Benign essential HTN; Chronic systolic HF:      Continue Amlodipine and hydrochlorothiazide.      Monitor Bps.          HIV:   Continue Descovy 200-25 mg PO every day.    Hyperlipidemia:  Continue atorvastatin.  Monitor lipid panel.        Immobility; Generalized muscle weakness:  Continue at  in long term care.  Fall prevention strategies.   Patient requires full supportive care.      Problem List Items Addressed This Visit    None  Visit  Diagnoses       COVID-19    -  Primary    Impacted cerumen of right ear        Type 2 diabetes mellitus with hyperglycemia, with long-term current use of insulin (CMS/[facility])        Sequela of cerebrovascular accident        Recent cerebrovascular accident        Hemiparesis affecting right side as late effect of stroke (CMS/[facility])        Dysphagia, unspecified type        Expressive aphasia        Leukocytosis, unspecified type        Benign essential HTN        Chronic systolic heart failure (CMS/[facility])        HIV infection, unspecified symptom status (CMS/[facility])        Hyperlipidemia, unspecified hyperlipidemia type        Insomnia, unspecified type        Generalized muscle weakness            Maria Del Carmen Garduno, APRN-CNP

## 2023-11-06 ENCOUNTER — NURSING HOME VISIT (OUTPATIENT)
Dept: POST ACUTE CARE | Facility: EXTERNAL LOCATION | Age: 53
End: 2023-11-06
Payer: MEDICARE

## 2023-11-06 DIAGNOSIS — I50.22 CHRONIC SYSTOLIC HEART FAILURE (MULTI): ICD-10-CM

## 2023-11-06 DIAGNOSIS — D72.829 LEUKOCYTOSIS, UNSPECIFIED TYPE: ICD-10-CM

## 2023-11-06 DIAGNOSIS — I69.351 HEMIPARESIS AFFECTING RIGHT SIDE AS LATE EFFECT OF STROKE (MULTI): ICD-10-CM

## 2023-11-06 DIAGNOSIS — R47.01 EXPRESSIVE APHASIA: ICD-10-CM

## 2023-11-06 DIAGNOSIS — I10 BENIGN ESSENTIAL HTN: ICD-10-CM

## 2023-11-06 DIAGNOSIS — H91.91 HEARING LOSS OF RIGHT EAR, UNSPECIFIED HEARING LOSS TYPE: ICD-10-CM

## 2023-11-06 DIAGNOSIS — M62.81 GENERALIZED MUSCLE WEAKNESS: ICD-10-CM

## 2023-11-06 DIAGNOSIS — R13.10 DYSPHAGIA, UNSPECIFIED TYPE: ICD-10-CM

## 2023-11-06 DIAGNOSIS — Z74.09 IMMOBILITY: ICD-10-CM

## 2023-11-06 DIAGNOSIS — H61.21 IMPACTED CERUMEN OF RIGHT EAR: ICD-10-CM

## 2023-11-06 DIAGNOSIS — Z79.4 TYPE 2 DIABETES MELLITUS WITH HYPERGLYCEMIA, WITH LONG-TERM CURRENT USE OF INSULIN (MULTI): ICD-10-CM

## 2023-11-06 DIAGNOSIS — I69.30 SEQUELA OF CEREBROVASCULAR ACCIDENT: ICD-10-CM

## 2023-11-06 DIAGNOSIS — U07.1 COVID-19: Primary | ICD-10-CM

## 2023-11-06 DIAGNOSIS — E11.65 TYPE 2 DIABETES MELLITUS WITH HYPERGLYCEMIA, WITH LONG-TERM CURRENT USE OF INSULIN (MULTI): ICD-10-CM

## 2023-11-06 PROCEDURE — 99309 SBSQ NF CARE MODERATE MDM 30: CPT | Performed by: NURSE PRACTITIONER

## 2023-11-06 NOTE — LETTER
Patient: Janette Martinez  : 1970    Encounter Date: 2023    Name: Janette Martinez    YOB: 1970    Code Status: DNRcc arrest    Chief complaint:  Follow up Covid-19 and cerumen impaction;  etc.....      HPI:    52-year-old female with past medical history of heart failure with reduced EF,   diabetes, hypertension, hyperlipidemia,        CVA (left MCA  with residual right-sided hemiparesis), GERD, HIV, hypothyroidism, hypertension,   and diabetes.        Patient re[facility] On 10/20/23 after her most recent hospitalization.    Diagnoses as follows:        Covid-19:      Patient has finished her Covid isolation.      She states she is feeling better.      No fevers reported.       No oxygen desaturations.       No complaints of cough or SOB.       Patient seen today, she is in bed.      Calm, cooperative, pleasant.      Mentation at baseline.   No complaints of chest pain, dizziness or headaches.    Right ear cerumen impaction; hearing loss right ear:   Last week patient was complaining that she could not hear out of right ear, she had some cerumen in her ear.  She received carbamide peroxide drops for 4 days.  Cerumen is gone, but she still cannot hear out of ear.   Audiologist consult ordered.   No complaints of ear pain.  No nasal congestion.      DM type 2 with hyperglycemia:            On Lantus 33 units at bedtime and Humalog sliding scale.           Recent accuchecks: 263 mg/dL, 188 mg/dL, 321 mg/dL.            No episodes of hypoglycemia reported.           Ha1c trends:           23 Ha1c 8.1 %            10/17/23 Ha1c 8.3 %      Sequela of Cerebrovascular accident; History of acute/subacute stroke ; previous CVA with right-sided hemiparesis; right sided weakness;   dysphagia 2/2 cva; expressive aphasia:   MRI was performed during a PREVIOUS hospitalization and it showed Acute/subacute tiny infarct in the left anterior brock.   Dr. Jiang from  [facility] neuro/stroke was consulted at that time.  On Eliquis 5 mg PO BID.  Pt has baseline dysarthria from a previous CVA, right Hemiparesis,  and expressive aphasia.  She is able to communicate in short sentences.  Patient is also able to nod appropriately to questions.   On a mechanical soft, thin consistency diet.            Chronic systolic HF; Benign essential HTN:       On Amlodipine and hydrochlorothiazide.       Recent /64.       No complaints of chest pain.           No headaches no dizziness.            No SOB.    Leukocytosis:  Patient  had leukocytosis during her recent hospitalization.  Recent WBC results:  10/23/23 WBC 12.5 H  10/30/23 WBC 9.2          Generalized muscle weakness; Immobility:  At  in long term care.  Not able to ambulate.  Very weak.  Bedbound/wheelchair bound.                      Reviewed medical, social, surgical and family history.  Reviewed all current medications and performed medication reconciliation.  Reviewed vital signs AND recent blood work results.  Performed prescription drug management.   Reviewed [facility] Documentation  Discussed patient with nursing.      ROS: 10 point ROS performed; negative unless noted in HPI.       LABS:  BMP: Date: 5/1/2023 Na 137 K 4.3 Cr 1.1 BUN 15 glucose 141 Ca 8.8 GFR 52  CBC: Date: 5/1/2023 WBC 8.4 Hgb 11.1 hematocrit 33.9 platelets 337  Liver function: Date: 5/1/2023 ALT 10 AST 29 alkaline phos.  63 bilirubin 0.4 albumin 3.6 protein 7.7       Ha1c 8.1 % on 5/1/23          BMP: Date: 5/15/2023 Na 139  K 3.4 Cr 1.2 BUN 20 glucose 122 Ca 8.8 GFR 57        CBC: Date: 5/15/2023 WBC 9 Hgb 10.2 hematocrit 30.9 platelets 277       Liver function: 5/15/2023 ALT 8 AST 28 alkaline phos.  57 bilirubin 0.5 albumin 3.5 protein 7.3          BMP: Date: 5/22/2023 Na 141 K 3.1 Cr 1 BUN 11 glucose 227 Ca 8.7 GFR 70        CBC: Date: 5/22/2023 WBC 8.2 Hgb 10 hematocrit 30.5 platelets 372        Liver function: Date: 5/22/2023 ALT 8 AST 22 alkaline  phos.  63 bilirubin 0.3 albumin 3.2 protein 7.2     BMP: Date: 6/5/2023 Na 140 K 4.2 Cr 1.1 BUN 14 glucose 79 Ca 1.1 GFR 63  CBC: Date: 6/5/2023 WBC 8.1 Hgb 10.5 hematocrit 32.3 platelets 350  Liver function: Date: 6/5/2023 ALT 7 AST 25 alkaline phos.  59 bilirubin 0.4 albumin 3.3 protein 7.7    BMP: Date: 7/17/2023 Na 140 K 4.1 Cr 0.8 BUN 17 glucose 126 Ca 9.1 GFR 90  CBC: Date: 7/17/2023 WBC 14.1 Hgb 10.2 hematocrit 32.1 platelets 265  Liver function: Date: 7/17/2023 ALT 8 AST 23 alkaline phos.  72 bilirubin 0.5 albumin 3.4 protein 7.4    CBC: Date: 7/19/2023 WBC 10.7 Hgb 9.5 hematocrit 28.8 platelets 257    BMP: Date: 7/24/2023 Na 137 K 4.2 Cr 0.9 BUN 19 glucose 186 Ca 9.1 GFR 79  CBC: Date: 7/24/2023 WBC 11.6 Hgb 10.3 hematocrit 31.4 platelets 305  Liver function: Date: 7/24/2023 ALT 11 AST 27 alkaline phos.  77 bilirubin 0.3 albumin 3.6 protein 7.8    BMP: Date: 7/31/2023 Na 141 K 4.2 Cr 0.8 BUN 17 glucose 138 Ca 8.7 GFR 90  CBC: Date: 7/31/2023 WBC 12.3 Hgb 10.5 hematocrit 32.4 platelets 307  Liver function: Date: 7/31/2023 ALT 14 AST 24 alkaline phos.  81 bilirubin 0.4 albumin 3.5 protein 7.4    BMP: Date: 8/7/2023 Na 141 K 3.9 Cr 1.0 BUN 12 glucose 167 Ca 8.6 GFR 70  CBC: Date: 8/7/2023 WBC 10 Hgb 10.5 hematocrit 32.7 platelets 256  Liver function: Date: 8/7/2023 ALT 9 AST 23 alkaline phos.  76 bilirubin 0.5 albumin 3.3 protein 7    BMP: Date: 8/21/2023 Na 142 K 4.1 Cr 1 BUN 17 glucose 195 Ca 8.5 GFR 70  CBC: Date: 8/21/2023 WBC 10.9 Hgb 9.6 hematocrit 30 platelets 304  Liver function: Date: 8/21/2023 ALT 9 AST 24 alkaline phos.  74 bilirubin 0.4 albumin 3.2 protein 7.1    BMP: Date: 8/31/2023 Na 141 K 3.8 Cr 0.9 BUN 14 glucose 118 Ca 8.2 GFR 79  CBC: Date: 8/31/2023 WBC 12 Hgb 9.6 hematocrit 29.8 platelets 294  Liver function: Date: 8/31/2023 ALT 8 AST 21 alkaline phos.  64 bilirubin 0.4 albumin 3.1 protein 7    BMP: Date: 9/4/2023 Na 141 K 3.9 Cr 1.1 BUN 17 glucose 107 Ca 8.5 GFR 63   CBC: Date: 9/4/2023  WBC 12 Hgb 9.5 hematocrit 29.7 platelets 282  Liver function: Date: 9/4/2023 ALT 7 AST 22 alkaline phos.  64 bilirubin 0.4 albumin 3.3 protein 7.1    BMP: Date: 9/11/2023 Na 142 K 4 Cr 1 BUN 12 glucose 81 Ca 8.2 GFR 70  CBC: Date: 9/11/2023 WBC 8.7 Hgb 9.2 hematocrit 28.3 platelets 305  Liver function: Date: 9/11/2023 ALT 7 AST 22 alkaline phos.  61 bilirubin 0.3 albumin 3 protein 6.8    BMP: Date: 9/18/2023 Na 140 K 4 Cr 1 BUN 15 glucose 150 Ca 8.3 GFR 70  CBC: Date: 9/18/2023 WBC 12.1 Hgb 9.5 hematocrit 29.8 platelets 287  Liver function: Date: 9/18/2023 ALT 7 AST 23 alkaline phos.  62 bilirubin 0.4 albumin 3.4 protein 7.1    10/17/23 Ha1c 8.3 %    BMP: Date: 10/23/2023 Na 141 K 3.6 Cr 1 BUN 22 glucose 65 Ca 8.3 GFR 70  CBC: Date: 10/23/2023 WBC 12.5 Hgb 10.8  hematocrit 33.2 platelets 290  Liver function: Date: 10/23/2023 ALT 9 AST 31 alkaline phos.  66 bilirubin 0.6 albumin 3.6 protein 7.8    10/26/2023: Lipid panel: Cholesterol 111; triglycerides 111; HDL 33; LDL 56; VLDL 22.    10/26/2023: TSH: 1.081 (range 0.340-5.500).    BMP: Date: 10/30/2023 Na 143  K 3.5 Cr 0.9 BUN 11 glucose 171 Ca 8 GFR 65  CBC: Date: 10/30/2023 WBC 9.2 Hgb 10.1 hematocrit 31.1 platelets 280  Liver function: Date: 10/30/2023 ALT 11 AST 27 alkaline phos.  66 bilirubin 0.4 albumin 3.4 protein 7.2          Past Medical History:   Diagnosis Date   • Cervicalgia     Neck pain   • Essential (primary) hypertension 07/21/2013    Hypertension   • Pain in unspecified shoulder     Pain, joint, shoulder   • Personal history of other diseases of the digestive system 07/06/2022    History of ulcerative colitis   • Personal history of other diseases of the digestive system     History of esophageal reflux   • Personal history of other diseases of the nervous system and sense organs     History of cataract   • Personal history of other diseases of the nervous system and sense organs     History of glaucoma   • Personal history of other diseases of the  respiratory system     History of bronchitis   • Personal history of other endocrine, nutritional and metabolic disease     History of diabetes mellitus   • Personal history of other endocrine, nutritional and metabolic disease     History of hyperlipidemia   • Personal history of other mental and behavioral disorders     History of depression   • Snoring     Snoring   • Unilateral primary osteoarthritis, unspecified knee 11/02/2015    Arthritis of knee           Past Surgical History:   Procedure Laterality Date   • CHOLECYSTECTOMY  01/08/2016    Cholecystectomy   • COLON SURGERY  01/08/2016    Colon Surgery   • CT HEAD ANGIO W AND WO IV CONTRAST  3/19/2019    CT HEAD ANGIO W AND WO IV CONTRAST 3/19/2019 [facility]   • CT NECK ANGIO W AND WO IV CONTRAST  3/19/2019    CT NECK ANGIO W AND WO IV CONTRAST 3/19/2019 [facility]   • HERNIA REPAIR  01/08/2016    Hernia Repair   • HYSTERECTOMY  01/08/2016    Hysterectomy   • MR HEAD ANGIO WO IV CONTRAST  6/4/2018    MR HEAD ANGIO WO IV CONTRAST 6/4/2018 [facility] CL[facility]ICAL LEGAC[facility]   • MR HEAD ANGIO WO IV CONTRAST  7/11/2020    MR HEAD ANGIO WO IV CONTRAST 7/11/2020 [facility] CLINICAL LEGAC[facility]   • MR HEAD ANGIO WO IV CONTRAST  9/30/2020    MR HEAD ANGIO WO IV CONTRAST 9/30/2020 [facility] AIB LEGACY   • MR HEAD ANGIO WO IV CONTRAST  5/22/2022    MR HEAD ANGIO WO IV CONTRAST 5/22/2022 [facility] CLINICAL LEGAC[facility]   • MR HEAD ANGIO WO IV CONTRAST  4/19/2023    MR HEAD ANGIO WO IV CONTRAST [facility] MRI   • MR NECK ANGIO WO IV CONTRAST  6/4/2018    MR NECK ANGIO WO IV CONTRAST 6/4/2018 [facility] CLINICAL LEGACY   • MR NECK ANGIO WO IV CONTRAST  7/11/2020    MR NECK ANGIO WO IV CONTRAST 7/11/2020 [facility]ICAL [facility]GAC[facility]   • MR NECK ANGIO WO IV CONTRAST  9/30/2020    MR NECK ANGIO WO IV CONTRAST 9/30/2020 [facility] AIB LEGAC[facility]   • MR NECK ANGIO WO IV CONTRAST  5/22/2022    MR NECK ANGIO WO IV CONTRAST 5/22/2022 [facility] CLINICAL LEGACY   • MR NECK ANGIO WO IV CONTRAST  4/19/2023    MR NECK ANGIO WO IV CONTRAST  [facility] MRI   • OTHER SURGICAL HISTORY  01/08/2016    Tubal Stabilization   • OTHER SURGICAL HISTORY  09/01/2016    Cervical Surgery (Gyn) Laser Vaporization Of Transformation Zone   • THYROID SURGERY  09/27/2013    Thyroid Surgery            Surgical History  Problems    · History of Cervical Surgery (Gyn) Laser Vaporization Of Transformation Zone   · History of Cholecystectomy   · History of Colon Surgery   · History of Hernia Repair   · History of Hysterectomy   · History of Thyroid Surgery   · History of Tubal Stabilization     Family History  Mother    · Family history of Cancer  Family History    · Family history of Diabetes Mellitus (V18.0)   · Family history of Hypertension (V17.49)     Social History  Problems    · Disabled   · Does not use illicit drugs (V49.89) (Z78.9)   · Former smoker (V15.82) (Z87.891)   · Marital History - Single   · No alcohol.   · Denied: History of Secondhand smoke exposure   · Single     Allergies  Medication    · Aspirin TABS   · Contrast Media Ready-Box MISC   · morphine   · Sulfa Drugs        /64   Pulse 96   Temp 36.5 °C (97.7 °F)   Resp 18   Wt 108 kg (238 lb)   SpO2 98%   BMI 57.38 kg/m²      Physical Exam  Vitals and nursing note reviewed.   Constitutional:       Appearance: Normal appearance.   HENT:      Head: Normocephalic.      Right Ear: External ear normal.      Left Ear: External ear normal.      Nose: Nose normal.      Mouth/Throat:      Mouth: Mucous membranes are moist.      Pharynx: Oropharynx is clear.   Eyes:      Extraocular Movements: Extraocular movements intact.      Conjunctiva/sclera: Conjunctivae normal.      Pupils: Pupils are equal, round, and reactive to light.   Cardiovascular:      Rate and Rhythm: Normal rate and regular rhythm.      Pulses: Normal pulses.      Heart sounds: Normal heart sounds.   Pulmonary:      Effort: Pulmonary effort is normal.      Breath sounds: Normal breath sounds.   Abdominal:      General: Bowel sounds are normal.       Palpations: Abdomen is soft.   Musculoskeletal:      Cervical back: Normal range of motion and neck supple.      Comments: Generalized muscle weakness; immobility.    Right sided hemiparesis   Skin:     General: Skin is warm and dry.   Neurological:      Mental Status: She is alert. Mental status is at baseline.      Motor: Weakness present.      Coordination: Coordination abnormal.      Gait: Gait abnormal.      Comments: Right sided weakness--RLE 1/5; RUE 3/5; sensation intact to touch x 4 extremities.     Left sided facial droop.     Generalized muscle weakness   Psychiatric:         Mood and Affect: Mood normal.         Behavior: Behavior normal.          Assessment/Plan   Ordered CMP and CBC with diff. Tomorrow.    Covid-19:       Finished Covid isolation.       Continue to monitor vitals Q shift.       Monitor for cough.        Right ear cerumen impaction; hearing loss right ear:  Cerumen impaction treated with carbamide peroxide 6.5 % otic solution.  In house audiology consult ordered.  Ordered  ENT consult for hearing loss--appointment needs to be scheduled.          DM type 2 with hyperglycemia:    Continue Lantus 33 units at bedtime and Humalog sliding scale.    Monitor accuchecks.    Monitor Ha1c Q 3 months.     Sequela of Cerebrovascular accident;  previous CVA with right-sided hemiparesis; right sided weakness;   dysphagia 2/2 cva; expressive aphasia:   Follow up with  Dr. Jiang from [facility] neuro/stroke.  Continue Eliquis 5 mg PO BID.  Patient requires full supportive care.  Continue mechanical soft, thin consistency diet.    Leukocytosis:           Monitor WBC.           Chronic systolic HF; Benign essential HTN; :      Continue Amlodipine and hydrochlorothiazide.      Monitor Bps.               Generalized muscle weakness; Immobility:  Continue at  in long term care.  Fall prevention strategies.   Patient requires full supportive care.      Problem List Items Addressed This Visit     None  Visit Diagnoses       COVID-19    -  Primary    Impacted cerumen of right ear        Hearing loss of right ear, unspecified hearing loss type        Type 2 diabetes mellitus with hyperglycemia, with long-term current use of insulin (CMS/[facility])        Sequela of cerebrovascular accident        Hemiparesis affecting right side as late effect of stroke (CMS/[facility])        Dysphagia, unspecified type        Expressive aphasia        Leukocytosis, unspecified type        Chronic systolic heart failure (CMS/[facility])        Benign essential HTN        Generalized muscle weakness        Immobility            JASON Espitia       Electronically Signed By: JASON Espitia   11/7/23  9:05 PM

## 2023-11-07 VITALS
HEART RATE: 96 BPM | TEMPERATURE: 97.7 F | BODY MASS INDEX: 57.38 KG/M2 | SYSTOLIC BLOOD PRESSURE: 132 MMHG | DIASTOLIC BLOOD PRESSURE: 64 MMHG | WEIGHT: 238 LBS | OXYGEN SATURATION: 98 % | RESPIRATION RATE: 18 BRPM

## 2023-11-08 NOTE — PROGRESS NOTES
Name: Janette Martinez    YOB: 1970    Code Status: DNRcc arrest    Chief complaint:  Follow up Covid-19 and cerumen impaction;  etc.....      HPI:    52-year-old female with past medical history of heart failure with reduced EF,   diabetes, hypertension, hyperlipidemia,        CVA (left MCA 2022 with residual right-sided hemiparesis), GERD, HIV, hypothyroidism, hypertension,   and diabetes.        [facility] after her most recent hospitalization.    Diagnoses as follows:        Covid-19:      Patient has finished her Covid isolation.      She states she is feeling better.      No fevers reported.       No oxygen desaturations.       No complaints of cough or SOB.       Patient seen today, she is in bed.      Calm, cooperative, pleasant.      Mentation at baseline.   No complaints of chest pain, dizziness or headaches.    Right ear cerumen impaction; hearing loss right ear:   Last week patient was complaining that she could not hear out of right ear, she had some cerumen in her ear.  She received carbamide peroxide drops for 4 days.  Cerumen is gone, but she still cannot hear out of ear.   Audiologist consult ordered.   No complaints of ear pain.  No nasal congestion.      DM type 2 with hyperglycemia:            On Lantus 33 units at bedtime and Humalog sliding scale.           Recent accuchecks: 263 mg/dL, 188 mg/dL, 321 mg/dL.            No episodes of hypoglycemia reported.           Ha1c trends:           5/1/23 Ha1c 8.1 %            10/17/23 Ha1c 8.3 %      Sequela of Cerebrovascular accident; History of acute/subacute stroke ; previous CVA with right-sided hemiparesis; right sided weakness;   dysphagia 2/2 cva; expressive aphasia:   MRI was performed during a PREVIOUS hospitalization and it showed Acute/subacute tiny infarct in the left anterior brock.   Dr. Jiang from [facility] [place] [facility] was consulted at that time.  On Eliquis 5 mg PO BID.  Pt  has baseline dysarthria from a previous CVA, right Hemiparesis,  and expressive aphasia.  She is able to communicate in short sentences.  Patient is also able to nod appropriately to questions.   On a mechanical soft, thin consistency diet.            Chronic systolic HF; Benign essential HTN:       On Amlodipine and hydrochlorothiazide.       Recent /64.       No complaints of chest pain.           No headaches no dizziness.            No SOB.    Leukocytosis:  Patient  had leukocytosis during her recent hospitalization.  Recent WBC results:  10/23/23 WBC 12.5 H  10/30/23 WBC 9.2          Generalized muscle weakness; Immobility:  At  in long term care.  Not able to ambulate.  Very weak.  Bedbound/wheelchair bound.                      Reviewed medical, social, surgical and family history.  Reviewed all current medications and performed medication reconciliation.  Reviewed vital signs AND recent blood work results.  Performed prescription drug management.   Reviewed [facility] Documentation  Discussed patient with nursing.      ROS: 10 point ROS performed; negative unless noted in HPI.       LABS:  BMP: Date: 5/1/2023 Na 137 K 4.3 Cr 1.1 BUN 15 glucose 141 Ca 8.8 GFR 52  CBC: Date: 5/1/2023 WBC 8.4 Hgb 11.1 hematocrit 33.9 platelets 337  Liver function: Date: 5/1/2023 ALT 10 AST 29 alkaline phos.  63 bilirubin 0.4 albumin 3.6 protein 7.7       Ha1c 8.1 % on 5/1/23          BMP: Date: 5/15/2023 Na 139  K 3.4 Cr 1.2 BUN 20 glucose 122 Ca 8.8 GFR 57        CBC: Date: 5/15/2023 WBC 9 Hgb 10.2 hematocrit 30.9 platelets 277       Liver function: 5/15/2023 ALT 8 AST 28 alkaline phos.  57 bilirubin 0.5 albumin 3.5 protein 7.3          BMP: Date: 5/22/2023 Na 141 K 3.1 Cr 1 BUN 11 glucose 227 Ca 8.7 GFR 70        CBC: Date: 5/22/2023 WBC 8.2 Hgb 10 hematocrit 30.5 platelets 372        Liver function: Date: 5/22/2023 ALT 8 AST 22 alkaline phos.  63 bilirubin 0.3 albumin 3.2 protein 7.2     BMP: Date: 6/5/2023  Na 140 K 4.2 Cr 1.1 BUN 14 glucose 79 Ca 1.1 GFR 63  CBC: Date: 6/5/2023 WBC 8.1 Hgb 10.5 hematocrit 32.3 platelets 350  Liver function: Date: 6/5/2023 ALT 7 AST 25 alkaline phos.  59 bilirubin 0.4 albumin 3.3 protein 7.7    BMP: Date: 7/17/2023 Na 140 K 4.1 Cr 0.8 BUN 17 glucose 126 Ca 9.1 GFR 90  CBC: Date: 7/17/2023 WBC 14.1 Hgb 10.2 hematocrit 32.1 platelets 265  Liver function: Date: 7/17/2023 ALT 8 AST 23 alkaline phos.  72 bilirubin 0.5 albumin 3.4 protein 7.4    CBC: Date: 7/19/2023 WBC 10.7 Hgb 9.5 hematocrit 28.8 platelets 257    BMP: Date: 7/24/2023 Na 137 K 4.2 Cr 0.9 BUN 19 glucose 186 Ca 9.1 GFR 79  CBC: Date: 7/24/2023 WBC 11.6 Hgb 10.3 hematocrit 31.4 platelets 305  Liver function: Date: 7/24/2023 ALT 11 AST 27 alkaline phos.  77 bilirubin 0.3 albumin 3.6 protein 7.8    BMP: Date: 7/31/2023 Na 141 K 4.2 Cr 0.8 BUN 17 glucose 138 Ca 8.7 GFR 90  CBC: Date: 7/31/2023 WBC 12.3 Hgb 10.5 hematocrit 32.4 platelets 307  Liver function: Date: 7/31/2023 ALT 14 AST 24 alkaline phos.  81 bilirubin 0.4 albumin 3.5 protein 7.4    BMP: Date: 8/7/2023 Na 141 K 3.9 Cr 1.0 BUN 12 glucose 167 Ca 8.6 GFR 70  CBC: Date: 8/7/2023 WBC 10 Hgb 10.5 hematocrit 32.7 platelets 256  Liver function: Date: 8/7/2023 ALT 9 AST 23 alkaline phos.  76 bilirubin 0.5 albumin 3.3 protein 7    BMP: Date: 8/21/2023 Na 142 K 4.1 Cr 1 BUN 17 glucose 195 Ca 8.5 GFR 70  CBC: Date: 8/21/2023 WBC 10.9 Hgb 9.6 hematocrit 30 platelets 304  Liver function: Date: 8/21/2023 ALT 9 AST 24 alkaline phos.  74 bilirubin 0.4 albumin 3.2 protein 7.1    BMP: Date: 8/31/2023 Na 141 K 3.8 Cr 0.9 BUN 14 glucose 118 Ca 8.2 GFR 79  CBC: Date: 8/31/2023 WBC 12 Hgb 9.6 hematocrit 29.8 platelets 294  Liver function: Date: 8/31/2023 ALT 8 AST 21 alkaline phos.  64 bilirubin 0.4 albumin 3.1 protein 7    BMP: Date: 9/4/2023 Na 141 K 3.9 Cr 1.1 BUN 17 glucose 107 Ca 8.5 GFR 63   CBC: Date: 9/4/2023 WBC 12 Hgb 9.5 hematocrit 29.7 platelets 282  Liver function: Date:  9/4/2023 ALT 7 AST 22 alkaline phos.  64 bilirubin 0.4 albumin 3.3 protein 7.1    BMP: Date: 9/11/2023 Na 142 K 4 Cr 1 BUN 12 glucose 81 Ca 8.2 GFR 70  CBC: Date: 9/11/2023 WBC 8.7 Hgb 9.2 hematocrit 28.3 platelets 305  Liver function: Date: 9/11/2023 ALT 7 AST 22 alkaline phos.  61 bilirubin 0.3 albumin 3 protein 6.8    BMP: Date: 9/18/2023 Na 140 K 4 Cr 1 BUN 15 glucose 150 Ca 8.3 GFR 70  CBC: Date: 9/18/2023 WBC 12.1 Hgb 9.5 hematocrit 29.8 platelets 287  Liver function: Date: 9/18/2023 ALT 7 AST 23 alkaline phos.  62 bilirubin 0.4 albumin 3.4 protein 7.1    10/17/23 Ha1c 8.3 %    BMP: Date: 10/23/2023 Na 141 K 3.6 Cr 1 BUN 22 glucose 65 Ca 8.3 GFR 70  CBC: Date: 10/23/2023 WBC 12.5 Hgb 10.8  hematocrit 33.2 platelets 290  Liver function: Date: 10/23/2023 ALT 9 AST 31 alkaline phos.  66 bilirubin 0.6 albumin 3.6 protein 7.8    10/26/2023: Lipid panel: Cholesterol 111; triglycerides 111; HDL 33; LDL 56; VLDL 22.    10/26/2023: TSH: 1.081 (range 0.340-5.500).    BMP: Date: 10/30/2023 Na 143  K 3.5 Cr 0.9 BUN 11 glucose 171 Ca 8 GFR 65  CBC: Date: 10/30/2023 WBC 9.2 Hgb 10.1 hematocrit 31.1 platelets 280  Liver function: Date: 10/30/2023 ALT 11 AST 27 alkaline phos.  66 bilirubin 0.4 albumin 3.4 protein 7.2          Past Medical History:   Diagnosis Date    Cervicalgia     Neck pain    Essential (primary) hypertension 07/21/2013    Hypertension    Pain in unspecified shoulder     Pain, joint, shoulder    Personal history of other diseases of the digestive system 07/06/2022    History of ulcerative colitis    Personal history of other diseases of the digestive system     History of esophageal reflux    Personal history of other diseases of the nervous system and sense organs     History of cataract    Personal history of other diseases of the nervous system and sense organs     History of glaucoma    Personal history of other diseases of the respiratory system     History of bronchitis    Personal history of other  endocrine, nutritional and metabolic disease     History of diabetes mellitus    Personal history of other endocrine, nutritional and metabolic disease     History of hyperlipidemia    Personal history of other mental and behavioral disorders     History of depression    Snoring     Snoring    Unilateral primary osteoarthritis, unspecified knee 11/02/2015    Arthritis of knee           Past Surgical History:   Procedure Laterality Date    CHOLECYSTECTOMY  01/08/2016    Cholecystectomy    COLON SURGERY  01/08/2016    Colon Surgery    CT HEAD ANGIO W AND WO IV CONTRAST  3/19/2019    CT HEAD ANGIO W AND WO IV CONTRAST 3/19/2019 [facility]GENCY LEGACY    CT NECK ANGIO W AND WO IV CONTRAST  3/19/2019    CT NECK ANGIO W AND WO IV CONTRAST 3/19/2019 [facility] EMERGENCY LEGACY    HERNIA REPAIR  01/08/2016    Hernia Repair    HYSTERECTOMY  01/08/2016    Hysterectomy    MR HEAD ANGIO WO IV CONTRAST  6/4/2018    MR HEAD ANGIO WO IV CONTRAST 6/4/2018 [facility] CLINICAL LEGACY    MR HEAD ANGIO WO IV CONTRAST  7/11/2020    MR HEAD ANGIO WO IV CONTRAST 7/11/2020 [facility] CLINICAL LEGACY    MR HEAD ANGIO WO IV CONTRAST  9/30/2020    MR HEAD ANGIO WO IV CONTRAST 9/30/2020 [facility] LEGACY    MR HEAD ANGIO WO IV CONTRAST  5/22/2022    MR HEAD ANGIO WO IV CONTRAST 5/22/2022 [facility] CLINICAL LEGACY    MR HEAD ANGIO WO IV CONTRAST  4/19/2023    MR HEAD ANGIO WO IV CONTRAST [facility] MRI    MR NECK ANGIO WO IV CONTRAST  6/4/2018    MR NECK ANGIO WO IV CONTRAST 6/4/2018 [facility] CLINICAL LEGACY    MR NECK ANGIO WO IV CONTRAST  7/11/2020    MR NECK ANGIO WO IV CONTRAST 7/11/2020 [facility] CLINICAL LEGACY    MR NECK ANGIO WO IV CONTRAST  9/30/2020    MR NECK ANGIO WO IV CONTRAST 9/30/2020 [facility] LEGACY    MR NECK ANGIO WO IV CONTRAST  5/22/2022    MR NECK ANGIO WO IV CONTRAST 5/22/2022 [facility] CLINICAL LEGACY    MR NECK ANGIO WO IV CONTRAST  4/19/2023    MR NECK ANGIO WO IV CONTRAST [facility] MRI    OTHER SURGICAL HISTORY  01/08/2016    Tubal Stabilization    OTHER SURGICAL HISTORY   09/01/2016    Cervical Surgery (Gyn) Laser Vaporization Of Transformation Zone    THYROID SURGERY  09/27/2013    Thyroid Surgery            Surgical History  Problems    · History of Cervical Surgery (Gyn) Laser Vaporization Of Transformation Zone   · History of Cholecystectomy   · History of Colon Surgery   · History of Hernia Repair   · History of Hysterectomy   · History of Thyroid Surgery   · History of Tubal Stabilization     Family History  Mother    · Family history of Cancer  Family History    · Family history of Diabetes Mellitus (V18.0)   · Family history of Hypertension (V17.49)     Social History  Problems    · Disabled   · Does not use illicit drugs (V49.89) (Z78.9)   · Former smoker (V15.82) (Z87.891)   · Marital History - Single   · No alcohol.   · Denied: History of Secondhand smoke exposure   · Single     Allergies  Medication    · Aspirin TABS   · Contrast Media Ready-Box MISC   · morphine   · Sulfa Drugs        /64   Pulse 96   Temp 36.5 °C (97.7 °F)   Resp 18   Wt 108 kg (238 lb)   SpO2 98%   BMI 57.38 kg/m²      Physical Exam  Vitals and nursing note reviewed.   Constitutional:       Appearance: Normal appearance.   HENT:      Head: Normocephalic.      Right Ear: External ear normal.      Left Ear: External ear normal.      Nose: Nose normal.      Mouth/Throat:      Mouth: Mucous membranes are moist.      Pharynx: Oropharynx is clear.   Eyes:      Extraocular Movements: Extraocular movements intact.      Conjunctiva/sclera: Conjunctivae normal.      Pupils: Pupils are equal, round, and reactive to light.   Cardiovascular:      Rate and Rhythm: Normal rate and regular rhythm.      Pulses: Normal pulses.      Heart sounds: Normal heart sounds.   Pulmonary:      Effort: Pulmonary effort is normal.      Breath sounds: Normal breath sounds.   Abdominal:      General: Bowel sounds are normal.      Palpations: Abdomen is soft.   Musculoskeletal:      Cervical back: Normal range of motion  and neck supple.      Comments: Generalized muscle weakness; immobility.    Right sided hemiparesis   Skin:     General: Skin is warm and dry.   Neurological:      Mental Status: She is alert. Mental status is at baseline.      Motor: Weakness present.      Coordination: Coordination abnormal.      Gait: Gait abnormal.      Comments: Right sided weakness--RLE 1/5; RUE 3/5; sensation intact to touch x 4 extremities.     Left sided facial droop.     Generalized muscle weakness   Psychiatric:         Mood and Affect: Mood normal.         Behavior: Behavior normal.          Assessment/Plan    Ordered CMP and CBC with diff. Tomorrow.    Covid-19:       Finished Covid isolation.       Continue to monitor vitals Q shift.       Monitor for cough.        Right ear cerumen impaction; hearing loss right ear:  Cerumen impaction treated with carbamide peroxide 6.5 % otic solution.  In house audiology consult ordered.  Ordered  ENT consult for hearing loss--appointment needs to be scheduled.          DM type 2 with hyperglycemia:    Continue Lantus 33 units at bedtime and Humalog sliding scale.    Monitor accuchecks.    Monitor Ha1c Q 3 months.     Sequela of Cerebrovascular accident;  previous CVA with right-sided hemiparesis; right sided weakness;   dysphagia 2/2 cva; expressive aphasia:   Follow up with  Dr. Jiang from [facility] [place].  Continue Eliquis 5 mg PO BID.  Patient requires full supportive care.  Continue mechanical soft, thin consistency diet.    Leukocytosis:           Monitor WBC.           Chronic systolic HF; Benign essential HTN; :      Continue Amlodipine and hydrochlorothiazide.      Monitor Bps.               Generalized muscle weakness; Immobility:  Continue at  in long term care.  Fall prevention strategies.   Patient requires full supportive care.      Problem List Items Addressed This Visit    None  Visit Diagnoses       COVID-19    -  Primary    Impacted cerumen of right ear        Hearing  loss of right ear, unspecified hearing loss type        Type 2 diabetes mellitus with hyperglycemia, with long-term current use of insulin (CMS/HCC)        Sequela of cerebrovascular accident        Hemiparesis affecting right side as late effect of stroke (CMS/HCC)        Dysphagia, unspecified type        Expressive aphasia        Leukocytosis, unspecified type        Chronic systolic heart failure (CMS/HCC)        Benign essential HTN        Generalized muscle weakness        Immobility            Maria Del Carmen Garduno, APRN-CNP

## 2023-11-13 ENCOUNTER — NURSING HOME VISIT (OUTPATIENT)
Dept: POST ACUTE CARE | Facility: EXTERNAL LOCATION | Age: 53
End: 2023-11-13
Payer: MEDICAID

## 2023-11-13 DIAGNOSIS — R47.01 EXPRESSIVE APHASIA: ICD-10-CM

## 2023-11-13 DIAGNOSIS — I10 BENIGN ESSENTIAL HTN: ICD-10-CM

## 2023-11-13 DIAGNOSIS — Z79.4 TYPE 2 DIABETES MELLITUS WITH HYPERGLYCEMIA, WITH LONG-TERM CURRENT USE OF INSULIN (MULTI): ICD-10-CM

## 2023-11-13 DIAGNOSIS — E11.65 TYPE 2 DIABETES MELLITUS WITH HYPERGLYCEMIA, WITH LONG-TERM CURRENT USE OF INSULIN (MULTI): ICD-10-CM

## 2023-11-13 DIAGNOSIS — R13.10 DYSPHAGIA, UNSPECIFIED TYPE: ICD-10-CM

## 2023-11-13 DIAGNOSIS — Z74.09 IMMOBILITY: ICD-10-CM

## 2023-11-13 DIAGNOSIS — I69.351 HEMIPARESIS AFFECTING RIGHT SIDE AS LATE EFFECT OF STROKE (MULTI): ICD-10-CM

## 2023-11-13 DIAGNOSIS — R06.00 DYSPNEA, UNSPECIFIED TYPE: ICD-10-CM

## 2023-11-13 DIAGNOSIS — I69.30 SEQUELA OF CEREBROVASCULAR ACCIDENT: ICD-10-CM

## 2023-11-13 DIAGNOSIS — I50.22 CHRONIC SYSTOLIC HEART FAILURE (MULTI): Primary | ICD-10-CM

## 2023-11-13 DIAGNOSIS — D72.829 LEUKOCYTOSIS, UNSPECIFIED TYPE: ICD-10-CM

## 2023-11-13 DIAGNOSIS — Z86.16 PERSONAL HISTORY OF COVID-19: ICD-10-CM

## 2023-11-13 DIAGNOSIS — M62.81 GENERALIZED MUSCLE WEAKNESS: ICD-10-CM

## 2023-11-13 PROCEDURE — 99309 SBSQ NF CARE MODERATE MDM 30: CPT | Performed by: NURSE PRACTITIONER

## 2023-11-13 NOTE — LETTER
Patient: Janette Martinez  : 1970    Encounter Date: 2023    Name: Janette Martinez    YOB: 1970    Code Status: DNRcc arrest    Chief complaint:  Chronic systolic HF; Dyspnea;  etc.....      HPI:    52-year-old female with past medical history of heart failure with reduced EF,   diabetes, hypertension, hyperlipidemia,        CVA (left MCA  with residual right-sided hemiparesis), GERD, HIV, hypothyroidism, hypertension,   and diabetes.        Patient re-admitted to [facility]. On 10/20/23 after her most recent hospitalization.    Diagnoses as follows:    Chronic systolic HF; Dyspnea; Benign essential HTN:            Patient is complaining of increased dyspnea.             She has CHF and recently had Covid.            No oxygen desaturations reported.       On Amlodipine and hydrochlorothiazide.       Recent /64       No complaints of chest pain.           No headaches no dizziness.       Patient seen today, she is in bed.      Calm, cooperative, pleasant.      Mentation at baseline.      DM type 2 with hyperglycemia:            On Lantus 33 units at bedtime and Humalog sliding scale.           Recent accuchecks: 242 mg/dL, 205 mg/dL, 289 mg/dL.            No episodes of hypoglycemia reported.           Ha1c trends:           23 Ha1c 8.1 %            10/17/23 Ha1c 8.3 %     Leukocytosis:  Patient  had leukocytosis during her recent hospitalization.  Recent WBC results:  10/23/23 WBC 12.5 H  10/30/23 WBC 9.2  23 WBC 8.2         Recent Covid-19:      Patient finished her Covid isolation a couple of weeks ago      No fevers reported.       No oxygen desaturations.       No complaints of cough.       Having some SOB.       No complaints of chest pain, dizziness or headaches.    Sequela of Cerebrovascular accident;  ; previous CVA with right-sided hemiparesis; right sided weakness;   dysphagia 2/2 cva; expressive aphasia; History of acute/subacute stroke:   MRI  was performed during a PREVIOUS hospitalization and it showed Acute/subacute tiny infarct in the left anterior brock.   Dr. Jiang from [facility] neuro/stroke was consulted at that time.  On Eliquis 5 mg PO BID.  Pt has baseline dysarthria from a previous CVA, right Hemiparesis,  and expressive aphasia.  She is able to communicate in short sentences.  Patient is also able to nod appropriately to questions.   On a mechanical soft, thin consistency diet.         Generalized muscle weakness; Immobility:  At  in long term care.  Not able to ambulate.  Very weak.  Bedbound/wheelchair bound.                      Reviewed medical, social, surgical and family history.  Reviewed all current medications and performed medication reconciliation.  Reviewed vital signs AND recent blood work results.  Performed prescription drug management.   Reviewed [facility] Documentation  Discussed patient with nursing.      ROS: 10 point ROS performed; negative unless noted in HPI.       LABS:  BMP: Date: 5/1/2023 Na 137 K 4.3 Cr 1.1 BUN 15 glucose 141 Ca 8.8 GFR 52  CBC: Date: 5/1/2023 WBC 8.4 Hgb 11.1 hematocrit 33.9 platelets 337  Liver function: Date: 5/1/2023 ALT 10 AST 29 alkaline phos.  63 bilirubin 0.4 albumin 3.6 protein 7.7       Ha1c 8.1 % on 5/1/23          BMP: Date: 5/15/2023 Na 139  K 3.4 Cr 1.2 BUN 20 glucose 122 Ca 8.8 GFR 57        CBC: Date: 5/15/2023 WBC 9 Hgb 10.2 hematocrit 30.9 platelets 277       Liver function: 5/15/2023 ALT 8 AST 28 alkaline phos.  57 bilirubin 0.5 albumin 3.5 protein 7.3          BMP: Date: 5/22/2023 Na 141 K 3.1 Cr 1 BUN 11 glucose 227 Ca 8.7 GFR 70        CBC: Date: 5/22/2023 WBC 8.2 Hgb 10 hematocrit 30.5 platelets 372        Liver function: Date: 5/22/2023 ALT 8 AST 22 alkaline phos.  63 bilirubin 0.3 albumin 3.2 protein 7.2     BMP: Date: 6/5/2023 Na 140 K 4.2 Cr 1.1 BUN 14 glucose 79 Ca 1.1 GFR 63  CBC: Date: 6/5/2023 WBC 8.1 Hgb 10.5 hematocrit 32.3 platelets 350  Liver function:  Date: 6/5/2023 ALT 7 AST 25 alkaline phos.  59 bilirubin 0.4 albumin 3.3 protein 7.7    BMP: Date: 7/17/2023 Na 140 K 4.1 Cr 0.8 BUN 17 glucose 126 Ca 9.1 GFR 90  CBC: Date: 7/17/2023 WBC 14.1 Hgb 10.2 hematocrit 32.1 platelets 265  Liver function: Date: 7/17/2023 ALT 8 AST 23 alkaline phos.  72 bilirubin 0.5 albumin 3.4 protein 7.4    CBC: Date: 7/19/2023 WBC 10.7 Hgb 9.5 hematocrit 28.8 platelets 257    BMP: Date: 7/24/2023 Na 137 K 4.2 Cr 0.9 BUN 19 glucose 186 Ca 9.1 GFR 79  CBC: Date: 7/24/2023 WBC 11.6 Hgb 10.3 hematocrit 31.4 platelets 305  Liver function: Date: 7/24/2023 ALT 11 AST 27 alkaline phos.  77 bilirubin 0.3 albumin 3.6 protein 7.8    BMP: Date: 7/31/2023 Na 141 K 4.2 Cr 0.8 BUN 17 glucose 138 Ca 8.7 GFR 90  CBC: Date: 7/31/2023 WBC 12.3 Hgb 10.5 hematocrit 32.4 platelets 307  Liver function: Date: 7/31/2023 ALT 14 AST 24 alkaline phos.  81 bilirubin 0.4 albumin 3.5 protein 7.4    BMP: Date: 8/7/2023 Na 141 K 3.9 Cr 1.0 BUN 12 glucose 167 Ca 8.6 GFR 70  CBC: Date: 8/7/2023 WBC 10 Hgb 10.5 hematocrit 32.7 platelets 256  Liver function: Date: 8/7/2023 ALT 9 AST 23 alkaline phos.  76 bilirubin 0.5 albumin 3.3 protein 7    BMP: Date: 8/21/2023 Na 142 K 4.1 Cr 1 BUN 17 glucose 195 Ca 8.5 GFR 70  CBC: Date: 8/21/2023 WBC 10.9 Hgb 9.6 hematocrit 30 platelets 304  Liver function: Date: 8/21/2023 ALT 9 AST 24 alkaline phos.  74 bilirubin 0.4 albumin 3.2 protein 7.1    BMP: Date: 8/31/2023 Na 141 K 3.8 Cr 0.9 BUN 14 glucose 118 Ca 8.2 GFR 79  CBC: Date: 8/31/2023 WBC 12 Hgb 9.6 hematocrit 29.8 platelets 294  Liver function: Date: 8/31/2023 ALT 8 AST 21 alkaline phos.  64 bilirubin 0.4 albumin 3.1 protein 7    BMP: Date: 9/4/2023 Na 141 K 3.9 Cr 1.1 BUN 17 glucose 107 Ca 8.5 GFR 63   CBC: Date: 9/4/2023 WBC 12 Hgb 9.5 hematocrit 29.7 platelets 282  Liver function: Date: 9/4/2023 ALT 7 AST 22 alkaline phos.  64 bilirubin 0.4 albumin 3.3 protein 7.1    BMP: Date: 9/11/2023 Na 142 K 4 Cr 1 BUN 12 glucose 81 Ca  8.2 GFR 70  CBC: Date: 9/11/2023 WBC 8.7 Hgb 9.2 hematocrit 28.3 platelets 305  Liver function: Date: 9/11/2023 ALT 7 AST 22 alkaline phos.  61 bilirubin 0.3 albumin 3 protein 6.8    BMP: Date: 9/18/2023 Na 140 K 4 Cr 1 BUN 15 glucose 150 Ca 8.3 GFR 70  CBC: Date: 9/18/2023 WBC 12.1 Hgb 9.5 hematocrit 29.8 platelets 287  Liver function: Date: 9/18/2023 ALT 7 AST 23 alkaline phos.  62 bilirubin 0.4 albumin 3.4 protein 7.1    10/17/23 Ha1c 8.3 %    BMP: Date: 10/23/2023 Na 141 K 3.6 Cr 1 BUN 22 glucose 65 Ca 8.3 GFR 70  CBC: Date: 10/23/2023 WBC 12.5 Hgb 10.8  hematocrit 33.2 platelets 290  Liver function: Date: 10/23/2023 ALT 9 AST 31 alkaline phos.  66 bilirubin 0.6 albumin 3.6 protein 7.8    10/26/2023: Lipid panel: Cholesterol 111; triglycerides 111; HDL 33; LDL 56; VLDL 22.    10/26/2023: TSH: 1.081 (range 0.340-5.500).    BMP: Date: 10/30/2023 Na 143  K 3.5 Cr 0.9 BUN 11 glucose 171 Ca 8 GFR 65  CBC: Date: 10/30/2023 WBC 9.2 Hgb 10.1 hematocrit 31.1 platelets 280  Liver function: Date: 10/30/2023 ALT 11 AST 27 alkaline phos.  66 bilirubin 0.4 albumin 3.4 protein 7.2    BMP: Date: 11/13/2023 Na 138 K 3.9 Cr 0.9 BUN 11 glucose 308 Ca 8.4 GFR 79  CBC: Date: 11/13/2023 WBC 8.2 Hgb 10.6 hematocrit 33 platelets 295  Liver function: Date: 11/13/2023 ALT 12 AST 37 alkaline phos.  69 bilirubin 0.4 albumin 3.2 protein 7.1            Past Medical History:   Diagnosis Date   • Cervicalgia     Neck pain   • Essential (primary) hypertension 07/21/2013    Hypertension   • Pain in unspecified shoulder     Pain, joint, shoulder   • Personal history of other diseases of the digestive system 07/06/2022    History of ulcerative colitis   • Personal history of other diseases of the digestive system     History of esophageal reflux   • Personal history of other diseases of the nervous system and sense organs     History of cataract   • Personal history of other diseases of the nervous system and sense organs     History of glaucoma    • Personal history of other diseases of the respiratory system     History of bronchitis   • Personal history of other endocrine, nutritional and metabolic disease     History of diabetes mellitus   • Personal history of other endocrine, nutritional and metabolic disease     History of hyperlipidemia   • Personal history of other mental and behavioral disorders     History of depression   • Snoring     Snoring   • Unilateral primary osteoarthritis, unspecified knee 11/02/2015    Arthritis of knee           Past Surgical History:   Procedure Laterality Date   • CHOLECYSTECTOMY  01/08/2016    Cholecystectomy   • COLON SURGERY  01/08/2016    Colon Surgery   • CT ANGIO NECK W AND WO IV CONTRAST  3/19/2019    CT NECK ANGIO W AND WO IV CONTRAST 3[place]19/2019 [facility]   • CT HEAD ANGIO W AND WO IV CONTRAST  3/19/2019    CT HEAD ANGIO W AND WO IV CONTRAST 3[place]19/[place] [facility]   • HERNIA REPAIR  01/08/2016    Hernia Repair   • HYSTERECTOMY  01/08/2016    Hysterectomy   • MR HEAD ANGIO WO IV CONTRAST  6/4/2018    MR HEAD ANGIO WO IV CONTRAST 6/4/2018 [facility] CLINICAL LEGACY   • MR HEAD ANGIO WO IV CONTRAST  7/11/2020    MR HEAD ANGIO WO IV CONTRAST 7/11/2020 [facility] CLINICAL LEGACY   • MR HEAD ANGIO WO IV CONTRAST  9/30/2020    MR HEAD ANGIO WO IV CONTRAST 9/30/2020 [facility] AIB LEGACY   • MR HEAD ANGIO WO IV CONTRAST  5/22/2022    MR HEAD ANGIO WO IV CONTRAST 5/22/2022 [facility] CLINICAL LEGACY   • MR HEAD ANGIO WO IV CONTRAST  4/19/2023    MR HEAD ANGIO WO IV CONTRAST [facility] MRI   • MR NECK ANGIO WO IV CONTRAST  6/4/2018    MR NECK ANGIO WO IV CONTRAST 6/4/2018 [facility] CLINICAL LEGACY   • MR NECK ANGIO WO IV CONTRAST  7/11/2020    MR NECK ANGIO WO IV CONTRAST 7/11/2020 [facility] CLINICAL LEGACY   • MR NECK ANGIO WO IV CONTRAST  9/30/2020    MR NECK ANGIO WO IV CONTRAST 9/30/2020 [facility] AIB LEGACY   • MR NECK ANGIO WO IV CONTRAST  5/22/2022    MR NECK ANGIO WO IV CONTRAST 5/22/2022 [facility] CLINICAL LEGACY   • MR NECK ANGIO WO IV CONTRAST  " 4/19/2023    MR NECK ANGIO WO IV CONTRAST [facility] MRI   • OTHER SURGICAL HISTORY  01/08/2016    Tubal Stabilization   • OTHER SURGICAL HISTORY  09/01/2016    Cervical Surgery (Gyn) Laser Vaporization Of Transformation Zone   • THYROID SURGERY  09/27/2013    Thyroid Surgery            Surgical History  Problems    · History of Cervical Surgery (Gyn) Laser Vaporization Of Transformation Zone   · History of Cholecystectomy   · History of Colon Surgery   · History of Hernia Repair   · History of Hysterectomy   · History of Thyroid Surgery   · History of Tubal Stabilization     Family History  Mother    · Family history of Cancer  Family History    · Family history of Diabetes Mellitus (V18.0)   · Family history of Hypertension (V17.49)     Social History  Problems    · Disabled   · Does not use illicit drugs (V49.89) (Z78.9)   · Former smoker (V15.82) (Z87.891)   · Marital History - Single   · No alcohol.   · Denied: History of Secondhand smoke exposure   · Single     Allergies  Medication    · Aspirin TABS   · Contrast Media Ready-Box MISC   · morphine   · Sulfa Drugs        /64   Pulse 85   Temp 36.1 °C (97 °F)   Resp 18   Ht 1.372 m (4' 6\")   Wt 108 kg (238 lb)   SpO2 97%   BMI 57.38 kg/m²      Physical Exam  Vitals and nursing note reviewed.   Constitutional:       Appearance: Normal appearance.   HENT:      Head: Normocephalic.      Right Ear: External ear normal.      Left Ear: External ear normal.      Nose: Nose normal.      Mouth/Throat:      Mouth: Mucous membranes are moist.      Pharynx: Oropharynx is clear.   Eyes:      Extraocular Movements: Extraocular movements intact.      Conjunctiva/sclera: Conjunctivae normal.      Pupils: Pupils are equal, round, and reactive to light.   Cardiovascular:      Rate and Rhythm: Normal rate and regular rhythm.      Pulses: Normal pulses.      Heart sounds: Normal heart sounds.   Pulmonary:      Effort: Pulmonary effort is normal.      Breath sounds: Normal " breath sounds.   Abdominal:      General: Bowel sounds are normal.      Palpations: Abdomen is soft.   Musculoskeletal:      Cervical back: Normal range of motion and neck supple.      Comments: Generalized muscle weakness; immobility.    Right sided hemiparesis   Skin:     General: Skin is warm and dry.   Neurological:      Mental Status: She is alert. Mental status is at baseline.      Motor: Weakness present.      Coordination: Coordination abnormal.      Gait: Gait abnormal.      Comments: Right sided weakness--RLE 1/5; RUE 3/5; sensation intact to touch x 4 extremities.     Left sided facial droop.     Generalized muscle weakness   Psychiatric:         Mood and Affect: Mood normal.         Behavior: Behavior normal.          Assessment/Plan     Chronic systolic HF; Dyspnea; Benign essential HTN:            Ordered chest x-ray 2-view a/p lateral for SOB.            Monitor oxygen saturations.        Continue Amlodipine and hydrochlorothiazide.        Monitor Bps.          DM type 2 with hyperglycemia:           DC Lantus 33 units at bedtime.          Start Lantus 36 units subcutaneous at bedtime.          Continue Humalog sliding scale.          Monitor accuchecks.           Monitor Ha1c Q 3 months.     Leukocytosis:        Monitor WBC.        Monitor for fevers.           Recent Covid-19:      Patient finished her Covid isolation a couple of weeks ago      Monitor vitals signs.      Encourage PO fluids.     Sequela of Cerebrovascular accident;  ; previous CVA with right-sided hemiparesis; right sided weakness;   dysphagia 2/2 cva; expressive aphasia; History of acute/subacute stroke:   MRI was performed during a PREVIOUS hospitalization and it showed Acute/subacute tiny infarct in the left anterior brock.   Follow up with Dr. Jiang from [facility] neuro/stroke PRN.  Continue Eliquis 5 mg PO BID.  Pt has baseline dysarthria from a previous CVA, right Hemiparesis,  and expressive aphasia.          Continue current  diet.          Generalized muscle weakness; Immobility:  Continue at  in long term care.  Bedbound/wheelchair bound.  Fall prevention strategies.     Problem List Items Addressed This Visit    None  Visit Diagnoses       Chronic systolic heart failure (CMS/HCC)    -  Primary    Dyspnea, unspecified type        Benign essential HTN        Type 2 diabetes mellitus with hyperglycemia, with long-term current use of insulin (CMS/HCC)        Leukocytosis, unspecified type        Personal history of COVID-19        Sequela of cerebrovascular accident        Hemiparesis affecting right side as late effect of stroke (CMS/HCC)        Dysphagia, unspecified type        Expressive aphasia        Generalized muscle weakness        Immobility            JASON Espitia       Electronically Signed By: JASON Espitia   11/14/23  9:36 PM

## 2023-11-14 VITALS
SYSTOLIC BLOOD PRESSURE: 132 MMHG | HEART RATE: 85 BPM | OXYGEN SATURATION: 97 % | HEIGHT: 55 IN | BODY MASS INDEX: 55.08 KG/M2 | DIASTOLIC BLOOD PRESSURE: 64 MMHG | RESPIRATION RATE: 18 BRPM | TEMPERATURE: 97 F | WEIGHT: 238 LBS

## 2023-11-15 NOTE — PROGRESS NOTES
Name: Janette Martinez    YOB: 1970    Code Status: DNRcc arrest    Chief complaint:  Chronic systolic HF; Dyspnea;  etc.....      HPI:    52-year-old female with past medical history of heart failure with reduced EF,   diabetes, hypertension, hyperlipidemia,        CVA (left MCA 2022 with residual right-sided hemiparesis), GERD, HIV, hypothyroidism, hypertension,   and diabetes.        Patient re-admitted to [facility]. On 10/20/23 after her most recent hospitalization.    Diagnoses as follows:    Chronic systolic HF; Dyspnea; Benign essential HTN:            Patient is complaining of increased dyspnea.             She has CHF and recently had Covid.            No oxygen desaturations reported.       On Amlodipine and hydrochlorothiazide.       Recent /64       No complaints of chest pain.           No headaches no dizziness.       Patient seen today, she is in bed.      Calm, cooperative, pleasant.      Mentation at baseline.      DM type 2 with hyperglycemia:            On Lantus 33 units at bedtime and Humalog sliding scale.           Recent accuchecks: 242 mg/dL, 205 mg/dL, 289 mg/dL.            No episodes of hypoglycemia reported.           Ha1c trends:           5/1/23 Ha1c 8.1 %            10/17/23 Ha1c 8.3 %     Leukocytosis:  Patient  had leukocytosis during her recent hospitalization.  Recent WBC results:  10/23/23 WBC 12.5 H  10/30/23 WBC 9.2  11/13/23 WBC 8.2         Recent Covid-19:      Patient finished her Covid isolation a couple of weeks ago      No fevers reported.       No oxygen desaturations.       No complaints of cough.       Having some SOB.       No complaints of chest pain, dizziness or headaches.    Sequela of Cerebrovascular accident;  ; previous CVA with right-sided hemiparesis; right sided weakness;   dysphagia 2/2 cva; expressive aphasia; History of acute/subacute stroke:   MRI was performed during a PREVIOUS hospitalization and it showed  Acute/subacute tiny infarct in the left anterior brock.   Dr. Jiang from [facility] [place] neuro/stroke was consulted at that time.  On Eliquis 5 mg PO BID.  Pt has baseline dysarthria from a previous CVA, right Hemiparesis,  and expressive aphasia.  She is able to communicate in short sentences.  Patient is also able to nod appropriately to questions.   On a mechanical soft, thin consistency diet.         Generalized muscle weakness; Immobility:  At  in long term care.  Not able to ambulate.  Very weak.  Bedbound/wheelchair bound.                      Reviewed medical, social, surgical and family history.  Reviewed all current medications and performed medication reconciliation.  Reviewed vital signs AND recent blood work results.  Performed prescription drug management.   Reviewed [facility] Documentation  Discussed patient with nursing.      ROS: 10 point ROS performed; negative unless noted in HPI.       LABS:  BMP: Date: 5/1/2023 Na 137 K 4.3 Cr 1.1 BUN 15 glucose 141 Ca 8.8 GFR 52  CBC: Date: 5/1/2023 WBC 8.4 Hgb 11.1 hematocrit 33.9 platelets 337  Liver function: Date: 5/1/2023 ALT 10 AST 29 alkaline phos.  63 bilirubin 0.4 albumin 3.6 protein 7.7       Ha1c 8.1 % on 5/1/23          BMP: Date: 5/15/2023 Na 139  K 3.4 Cr 1.2 BUN 20 glucose 122 Ca 8.8 GFR 57        CBC: Date: 5/15/2023 WBC 9 Hgb 10.2 hematocrit 30.9 platelets 277       Liver function: 5/15/2023 ALT 8 AST 28 alkaline phos.  57 bilirubin 0.5 albumin 3.5 protein 7.3          BMP: Date: 5/22/2023 Na 141 K 3.1 Cr 1 BUN 11 glucose 227 Ca 8.7 GFR 70        CBC: Date: 5/22/2023 WBC 8.2 Hgb 10 hematocrit 30.5 platelets 372        Liver function: Date: 5/22/2023 ALT 8 AST 22 alkaline phos.  63 bilirubin 0.3 albumin 3.2 protein 7.2     BMP: Date: 6/5/2023 Na 140 K 4.2 Cr 1.1 BUN 14 glucose 79 Ca 1.1 GFR 63  CBC: Date: 6/5/2023 WBC 8.1 Hgb 10.5 hematocrit 32.3 platelets 350  Liver function: Date: 6/5/2023 ALT 7 AST 25 alkaline phos.  59 bilirubin 0.4  albumin 3.3 protein 7.7    BMP: Date: 7/17/2023 Na 140 K 4.1 Cr 0.8 BUN 17 glucose 126 Ca 9.1 GFR 90  CBC: Date: 7/17/2023 WBC 14.1 Hgb 10.2 hematocrit 32.1 platelets 265  Liver function: Date: 7/17/2023 ALT 8 AST 23 alkaline phos.  72 bilirubin 0.5 albumin 3.4 protein 7.4    CBC: Date: 7/19/2023 WBC 10.7 Hgb 9.5 hematocrit 28.8 platelets 257    BMP: Date: 7/24/2023 Na 137 K 4.2 Cr 0.9 BUN 19 glucose 186 Ca 9.1 GFR 79  CBC: Date: 7/24/2023 WBC 11.6 Hgb 10.3 hematocrit 31.4 platelets 305  Liver function: Date: 7/24/2023 ALT 11 AST 27 alkaline phos.  77 bilirubin 0.3 albumin 3.6 protein 7.8    BMP: Date: 7/31/2023 Na 141 K 4.2 Cr 0.8 BUN 17 glucose 138 Ca 8.7 GFR 90  CBC: Date: 7/31/2023 WBC 12.3 Hgb 10.5 hematocrit 32.4 platelets 307  Liver function: Date: 7/31/2023 ALT 14 AST 24 alkaline phos.  81 bilirubin 0.4 albumin 3.5 protein 7.4    BMP: Date: 8/7/2023 Na 141 K 3.9 Cr 1.0 BUN 12 glucose 167 Ca 8.6 GFR 70  CBC: Date: 8/7/2023 WBC 10 Hgb 10.5 hematocrit 32.7 platelets 256  Liver function: Date: 8/7/2023 ALT 9 AST 23 alkaline phos.  76 bilirubin 0.5 albumin 3.3 protein 7    BMP: Date: 8/21/2023 Na 142 K 4.1 Cr 1 BUN 17 glucose 195 Ca 8.5 GFR 70  CBC: Date: 8/21/2023 WBC 10.9 Hgb 9.6 hematocrit 30 platelets 304  Liver function: Date: 8/21/2023 ALT 9 AST 24 alkaline phos.  74 bilirubin 0.4 albumin 3.2 protein 7.1    BMP: Date: 8/31/2023 Na 141 K 3.8 Cr 0.9 BUN 14 glucose 118 Ca 8.2 GFR 79  CBC: Date: 8/31/2023 WBC 12 Hgb 9.6 hematocrit 29.8 platelets 294  Liver function: Date: 8/31/2023 ALT 8 AST 21 alkaline phos.  64 bilirubin 0.4 albumin 3.1 protein 7    BMP: Date: 9/4/2023 Na 141 K 3.9 Cr 1.1 BUN 17 glucose 107 Ca 8.5 GFR 63   CBC: Date: 9/4/2023 WBC 12 Hgb 9.5 hematocrit 29.7 platelets 282  Liver function: Date: 9/4/2023 ALT 7 AST 22 alkaline phos.  64 bilirubin 0.4 albumin 3.3 protein 7.1    BMP: Date: 9/11/2023 Na 142 K 4 Cr 1 BUN 12 glucose 81 Ca 8.2 GFR 70  CBC: Date: 9/11/2023 WBC 8.7 Hgb 9.2 hematocrit  28.3 platelets 305  Liver function: Date: 9/11/2023 ALT 7 AST 22 alkaline phos.  61 bilirubin 0.3 albumin 3 protein 6.8    BMP: Date: 9/18/2023 Na 140 K 4 Cr 1 BUN 15 glucose 150 Ca 8.3 GFR 70  CBC: Date: 9/18/2023 WBC 12.1 Hgb 9.5 hematocrit 29.8 platelets 287  Liver function: Date: 9/18/2023 ALT 7 AST 23 alkaline phos.  62 bilirubin 0.4 albumin 3.4 protein 7.1    10/17/23 Ha1c 8.3 %    BMP: Date: 10/23/2023 Na 141 K 3.6 Cr 1 BUN 22 glucose 65 Ca 8.3 GFR 70  CBC: Date: 10/23/2023 WBC 12.5 Hgb 10.8  hematocrit 33.2 platelets 290  Liver function: Date: 10/23/2023 ALT 9 AST 31 alkaline phos.  66 bilirubin 0.6 albumin 3.6 protein 7.8    10/26/2023: Lipid panel: Cholesterol 111; triglycerides 111; HDL 33; LDL 56; VLDL 22.    10/26/2023: TSH: 1.081 (range 0.340-5.500).    BMP: Date: 10/30/2023 Na 143  K 3.5 Cr 0.9 BUN 11 glucose 171 Ca 8 GFR 65  CBC: Date: 10/30/2023 WBC 9.2 Hgb 10.1 hematocrit 31.1 platelets 280  Liver function: Date: 10/30/2023 ALT 11 AST 27 alkaline phos.  66 bilirubin 0.4 albumin 3.4 protein 7.2    BMP: Date: 11/13/2023 Na 138 K 3.9 Cr 0.9 BUN 11 glucose 308 Ca 8.4 GFR 79  CBC: Date: 11/13/2023 WBC 8.2 Hgb 10.6 hematocrit 33 platelets 295  Liver function: Date: 11/13/2023 ALT 12 AST 37 alkaline phos.  69 bilirubin 0.4 albumin 3.2 protein 7.1            Past Medical History:   Diagnosis Date    Cervicalgia     Neck pain    Essential (primary) hypertension 07/21/2013    Hypertension    Pain in unspecified shoulder     Pain, joint, shoulder    Personal history of other diseases of the digestive system 07/06/2022    History of ulcerative colitis    Personal history of other diseases of the digestive system     History of esophageal reflux    Personal history of other diseases of the nervous system and sense organs     History of cataract    Personal history of other diseases of the nervous system and sense organs     History of glaucoma    Personal history of other diseases of the respiratory system      History of bronchitis    Personal history of other endocrine, nutritional and metabolic disease     History of diabetes mellitus    Personal history of other endocrine, nutritional and metabolic disease     History of hyperlipidemia    Personal history of other mental and behavioral disorders     History of depression    Snoring     Snoring    Unilateral primary osteoarthritis, unspecified knee 11/02/2015    Arthritis of knee           Past Surgical History:   Procedure Laterality Date    CHOLECYSTECTOMY  01/08/2016    Cholecystectomy    COLON SURGERY  01/08/2016    Colon Surgery    CT ANGIO NECK W AND WO IV CONTRAST  3/19/2019    CT NECK ANGIO W AND WO IV CONTRAST 3/19/2019 [facility] [place] [facility]    CT HEAD ANGIO W AND WO IV CONTRAST  3/19/2019    CT HEAD ANGIO W AND WO IV CONTRAST 3/19/2019 [facility] [place] [facility]    HERNIA REPAIR  01/08/2016    Hernia Repair    HYSTERECTOMY  01/08/2016    Hysterectomy    MR HEAD ANGIO WO IV CONTRAST  6/4/2018    MR HEAD ANGIO WO IV CONTRAST 6/4/2018 [facility] CLINICAL LEGAC[facility]    MR HEAD ANGIO WO IV CONTRAST  7/11/2020    MR HEAD ANGIO WO IV CONTRAST 7/11/2020 [facility] CLINICAL [facility]GAC[facility]    MR HEAD ANGIO WO IV CONTRAST  9/30/2020    MR HEAD ANGIO WO IV CONTRAST 9/30/2020 [facility]    MR HEAD ANGIO WO IV CONTRAST  5/22/2022    MR HEAD ANGIO WO IV CONTRAST 5/22/2022 [facility] CLINICAL LEGAC[facility]    MR HEAD ANGIO WO IV CONTRAST  4/19/2023    MR HEAD ANGIO WO IV CONTRAST [facility] MRI    MR NECK ANGIO WO IV CONTRAST  6/4/2018    MR NECK ANGIO WO IV CONTRAST 6/4/2018 [facility] CLINICAL LEGAC[facility]    MR NECK ANGIO WO IV CONTRAST  7/11/2020    MR NECK ANGIO WO IV CONTRAST 7/11/2020 [facility] CL[facility]GAC[facility]    MR NECK ANGIO WO IV CONTRAST  9/30/2020    MR NECK ANGIO WO IV CONTRAST 9/30/2020 [facility]    MR NECK ANGIO WO IV CONTRAST  5/22/2022    MR NECK ANGIO WO IV CONTRAST 5/22/2022 [facility] CLINICAL [facility]GAC[facility]    MR NECK ANGIO WO IV CONTRAST  4/19/2023    MR NECK ANGIO WO IV CONTRAST U MRI    OTHER SURGICAL HISTORY  01/08/2016  "   Tubal Stabilization    OTHER SURGICAL HISTORY  09/01/2016    Cervical Surgery (Gyn) Laser Vaporization Of Transformation Zone    THYROID SURGERY  09/27/2013    Thyroid Surgery            Surgical History  Problems    · History of Cervical Surgery (Gyn) Laser Vaporization Of Transformation Zone   · History of Cholecystectomy   · History of Colon Surgery   · History of Hernia Repair   · History of Hysterectomy   · History of Thyroid Surgery   · History of Tubal Stabilization     Family History  Mother    · Family history of Cancer  Family History    · Family history of Diabetes Mellitus (V18.0)   · Family history of Hypertension (V17.49)     Social History  Problems    · Disabled   · Does not use illicit drugs (V49.89) (Z78.9)   · Former smoker (V15.82) (Z87.891)   · Marital History - Single   · No alcohol.   · Denied: History of Secondhand smoke exposure   · Single     Allergies  Medication    · Aspirin TABS   · Contrast Media Ready-Box MISC   · morphine   · Sulfa Drugs        /64   Pulse 85   Temp 36.1 °C (97 °F)   Resp 18   Ht 1.372 m (4' 6\")   Wt 108 kg (238 lb)   SpO2 97%   BMI 57.38 kg/m²      Physical Exam  Vitals and nursing note reviewed.   Constitutional:       Appearance: Normal appearance.   HENT:      Head: Normocephalic.      Right Ear: External ear normal.      Left Ear: External ear normal.      Nose: Nose normal.      Mouth/Throat:      Mouth: Mucous membranes are moist.      Pharynx: Oropharynx is clear.   Eyes:      Extraocular Movements: Extraocular movements intact.      Conjunctiva/sclera: Conjunctivae normal.      Pupils: Pupils are equal, round, and reactive to light.   Cardiovascular:      Rate and Rhythm: Normal rate and regular rhythm.      Pulses: Normal pulses.      Heart sounds: Normal heart sounds.   Pulmonary:      Effort: Pulmonary effort is normal.      Breath sounds: Normal breath sounds.   Abdominal:      General: Bowel sounds are normal.      Palpations: Abdomen is " soft.   Musculoskeletal:      Cervical back: Normal range of motion and neck supple.      Comments: Generalized muscle weakness; immobility.    Right sided hemiparesis   Skin:     General: Skin is warm and dry.   Neurological:      Mental Status: She is alert. Mental status is at baseline.      Motor: Weakness present.      Coordination: Coordination abnormal.      Gait: Gait abnormal.      Comments: Right sided weakness--RLE 1/5; RUE 3/5; sensation intact to touch x 4 extremities.     Left sided facial droop.     Generalized muscle weakness   Psychiatric:         Mood and Affect: Mood normal.         Behavior: Behavior normal.          Assessment/Plan      Chronic systolic HF; Dyspnea; Benign essential HTN:            Ordered chest x-ray 2-view a/p lateral for SOB.            Monitor oxygen saturations.        Continue Amlodipine and hydrochlorothiazide.        Monitor Bps.          DM type 2 with hyperglycemia:           DC Lantus 33 units at bedtime.          Start Lantus 36 units subcutaneous at bedtime.          Continue Humalog sliding scale.          Monitor accuchecks.           Monitor Ha1c Q 3 months.     Leukocytosis:        Monitor WBC.        Monitor for fevers.           Recent Covid-19:      Patient finished her Covid isolation a couple of weeks ago      Monitor vitals signs.      Encourage PO fluids.     Sequela of Cerebrovascular accident;  ; previous CVA with right-sided hemiparesis; right sided weakness;   dysphagia 2/2 cva; expressive aphasia; History of acute/subacute stroke:   MRI was performed during a PREVIOUS hospitalization and it showed Acute/subacute tiny infarct in the left anterior brock.   Follow up with Dr. Jiang from [facility] [place] neuro/stroke PRN.  Continue Eliquis 5 mg PO BID.  Pt has baseline dysarthria from a previous CVA, right Hemiparesis,  and expressive aphasia.          Continue current diet.          Generalized muscle weakness; Immobility:  Continue at  in long term  care.  Bedbound/wheelchair bound.  Fall prevention strategies.     Problem List Items Addressed This Visit    None  Visit Diagnoses       Chronic systolic heart failure (CMS/HCC)    -  Primary    Dyspnea, unspecified type        Benign essential HTN        Type 2 diabetes mellitus with hyperglycemia, with long-term current use of insulin (CMS/HCC)        Leukocytosis, unspecified type        Personal history of COVID-19        Sequela of cerebrovascular accident        Hemiparesis affecting right side as late effect of stroke (CMS/HCC)        Dysphagia, unspecified type        Expressive aphasia        Generalized muscle weakness        Immobility            Maria Del Carmen Garduno, APRN-CNP

## 2023-11-21 ENCOUNTER — NURSING HOME VISIT (OUTPATIENT)
Dept: POST ACUTE CARE | Facility: EXTERNAL LOCATION | Age: 53
End: 2023-11-21
Payer: MEDICAID

## 2023-11-21 DIAGNOSIS — I69.30 SEQUELA OF CEREBROVASCULAR ACCIDENT: ICD-10-CM

## 2023-11-21 DIAGNOSIS — Z74.09 IMMOBILITY: ICD-10-CM

## 2023-11-21 DIAGNOSIS — D72.829 LEUKOCYTOSIS, UNSPECIFIED TYPE: ICD-10-CM

## 2023-11-21 DIAGNOSIS — Z86.73 RECENT CEREBROVASCULAR ACCIDENT: ICD-10-CM

## 2023-11-21 DIAGNOSIS — E11.65 TYPE 2 DIABETES MELLITUS WITH HYPERGLYCEMIA, WITH LONG-TERM CURRENT USE OF INSULIN (MULTI): Primary | ICD-10-CM

## 2023-11-21 DIAGNOSIS — Z91.119 NONCOMPLIANCE WITH DIETARY RESTRICTION: ICD-10-CM

## 2023-11-21 DIAGNOSIS — Z86.16 PERSONAL HISTORY OF COVID-19: ICD-10-CM

## 2023-11-21 DIAGNOSIS — Z79.4 TYPE 2 DIABETES MELLITUS WITH HYPERGLYCEMIA, WITH LONG-TERM CURRENT USE OF INSULIN (MULTI): Primary | ICD-10-CM

## 2023-11-21 DIAGNOSIS — R47.01 EXPRESSIVE APHASIA: ICD-10-CM

## 2023-11-21 DIAGNOSIS — I69.351 HEMIPARESIS AFFECTING RIGHT SIDE AS LATE EFFECT OF STROKE (MULTI): ICD-10-CM

## 2023-11-21 DIAGNOSIS — R13.10 DYSPHAGIA, UNSPECIFIED TYPE: ICD-10-CM

## 2023-11-21 DIAGNOSIS — M62.81 GENERALIZED MUSCLE WEAKNESS: ICD-10-CM

## 2023-11-21 DIAGNOSIS — I50.22 CHRONIC SYSTOLIC HEART FAILURE (MULTI): ICD-10-CM

## 2023-11-21 DIAGNOSIS — I10 BENIGN ESSENTIAL HTN: ICD-10-CM

## 2023-11-21 PROCEDURE — 99309 SBSQ NF CARE MODERATE MDM 30: CPT | Performed by: NURSE PRACTITIONER

## 2023-11-21 NOTE — LETTER
Patient: Janette Martinez  : 1970    Encounter Date: 2023    Name: Janette Martinez    YOB: 1970    Code Status: DNRcc arrest    Chief complaint:  Follow up on DM 2 with hyperglycemia; etc.....      HPI:    52-year-old female with past medical history of heart failure with reduced EF,   diabetes, hypertension, hyperlipidemia,        CVA (left MCA  with residual right-sided hemiparesis), GERD, HIV, hypothyroidism, hypertension,   and diabetes.        Patient re-admitted to [facility] On 10/20/23 after her most recent hospitalization.    Diagnoses as follows:     DM type 2 with hyperglycemia; noncompliance with diabetic diet:            Patient's accuchecks are elevated.           Patient is frequently noncompliant with diet.           Eating some cookies today.           On Lantus 36 units at bedtime and Humalog sliding scale.           Recent accuchecks: 218 mg/dL, 333 mg/dL, 293 mg/dL.            No episodes of hypoglycemia reported.           Ha1c trends:           23 Ha1c 8.1 %            10/17/23 Ha1c 8.3 %      Patient seen today, she is in bed.      Calm, cooperative, pleasant.      Mentation at baseline.       Discussed importance of compliance with diet.    Chronic systolic HF; Benign essential HTN:            On Amlodipine and hydrochlorothiazide.            No oxygen desaturations reported.       Recent /72                Leukocytosis:  Patient had leukocytosis during her recent hospitalization.  Recent WBC results:  10/23/23 WBC 12.5 H  10/30/23 WBC 9.2  23 WBC 8.2   2023 WBC 8.8         Sequela of Cerebrovascular accident;  ; previous CVA with right-sided hemiparesis; right sided weakness;   dysphagia 2/2 cva; expressive aphasia; History of acute/subacute stroke:   MRI was performed during a PREVIOUS hospitalization and it showed Acute/subacute tiny infarct in the left anterior brock.   Dr. Jiang from [facility] neuro/stroke was consulted at  that time.  On Eliquis 5 mg PO BID.  Pt has baseline dysarthria from a previous CVA, right Hemiparesis,  and expressive aphasia.  She is able to communicate in short sentences.  Patient is also able to nod appropriately to questions.   On a mechanical soft, thin consistency diet.     Hx. Covid-19:      Patient finished her Covid isolation a couple of weeks ago      No fevers reported.       No oxygen desaturations.       No complaints of cough.       Having some SOB.       No complaints of chest pain, dizziness or headaches.           Immobility; Generalized muscle weakness:  At  in long term care.  Not able to ambulate.  Very weak.  Bedbound/wheelchair bound.                        Reviewed medical, social, surgical and family history.  Reviewed all current medications and performed medication reconciliation.  Reviewed vital signs AND recent blood work results.  Performed prescription drug management.   Reviewed [facility] Documentation  Discussed patient with nursing.      ROS: 10 point ROS performed; negative unless noted in HPI.       LABS:  BMP: Date: 5/1/2023 Na 137 K 4.3 Cr 1.1 BUN 15 glucose 141 Ca 8.8 GFR 52  CBC: Date: 5/1/2023 WBC 8.4 Hgb 11.1 hematocrit 33.9 platelets 337  Liver function: Date: 5/1/2023 ALT 10 AST 29 alkaline phos.  63 bilirubin 0.4 albumin 3.6 protein 7.7       Ha1c 8.1 % on 5/1/23          BMP: Date: 5/15/2023 Na 139  K 3.4 Cr 1.2 BUN 20 glucose 122 Ca 8.8 GFR 57        CBC: Date: 5/15/2023 WBC 9 Hgb 10.2 hematocrit 30.9 platelets 277       Liver function: 5/15/2023 ALT 8 AST 28 alkaline phos.  57 bilirubin 0.5 albumin 3.5 protein 7.3          BMP: Date: 5/22/2023 Na 141 K 3.1 Cr 1 BUN 11 glucose 227 Ca 8.7 GFR 70        CBC: Date: 5/22/2023 WBC 8.2 Hgb 10 hematocrit 30.5 platelets 372        Liver function: Date: 5/22/2023 ALT 8 AST 22 alkaline phos.  63 bilirubin 0.3 albumin 3.2 protein 7.2     BMP: Date: 6/5/2023 Na 140 K 4.2 Cr 1.1 BUN 14 glucose 79 Ca 1.1 GFR 63  CBC:  Date: 6/5/2023 WBC 8.1 Hgb 10.5 hematocrit 32.3 platelets 350  Liver function: Date: 6/5/2023 ALT 7 AST 25 alkaline phos.  59 bilirubin 0.4 albumin 3.3 protein 7.7    BMP: Date: 7/17/2023 Na 140 K 4.1 Cr 0.8 BUN 17 glucose 126 Ca 9.1 GFR 90  CBC: Date: 7/17/2023 WBC 14.1 Hgb 10.2 hematocrit 32.1 platelets 265  Liver function: Date: 7/17/2023 ALT 8 AST 23 alkaline phos.  72 bilirubin 0.5 albumin 3.4 protein 7.4    CBC: Date: 7/19/2023 WBC 10.7 Hgb 9.5 hematocrit 28.8 platelets 257    BMP: Date: 7/24/2023 Na 137 K 4.2 Cr 0.9 BUN 19 glucose 186 Ca 9.1 GFR 79  CBC: Date: 7/24/2023 WBC 11.6 Hgb 10.3 hematocrit 31.4 platelets 305  Liver function: Date: 7/24/2023 ALT 11 AST 27 alkaline phos.  77 bilirubin 0.3 albumin 3.6 protein 7.8    BMP: Date: 7/31/2023 Na 141 K 4.2 Cr 0.8 BUN 17 glucose 138 Ca 8.7 GFR 90  CBC: Date: 7/31/2023 WBC 12.3 Hgb 10.5 hematocrit 32.4 platelets 307  Liver function: Date: 7/31/2023 ALT 14 AST 24 alkaline phos.  81 bilirubin 0.4 albumin 3.5 protein 7.4    BMP: Date: 8/7/2023 Na 141 K 3.9 Cr 1.0 BUN 12 glucose 167 Ca 8.6 GFR 70  CBC: Date: 8/7/2023 WBC 10 Hgb 10.5 hematocrit 32.7 platelets 256  Liver function: Date: 8/7/2023 ALT 9 AST 23 alkaline phos.  76 bilirubin 0.5 albumin 3.3 protein 7    BMP: Date: 8/21/2023 Na 142 K 4.1 Cr 1 BUN 17 glucose 195 Ca 8.5 GFR 70  CBC: Date: 8/21/2023 WBC 10.9 Hgb 9.6 hematocrit 30 platelets 304  Liver function: Date: 8/21/2023 ALT 9 AST 24 alkaline phos.  74 bilirubin 0.4 albumin 3.2 protein 7.1    BMP: Date: 8/31/2023 Na 141 K 3.8 Cr 0.9 BUN 14 glucose 118 Ca 8.2 GFR 79  CBC: Date: 8/31/2023 WBC 12 Hgb 9.6 hematocrit 29.8 platelets 294  Liver function: Date: 8/31/2023 ALT 8 AST 21 alkaline phos.  64 bilirubin 0.4 albumin 3.1 protein 7    BMP: Date: 9/4/2023 Na 141 K 3.9 Cr 1.1 BUN 17 glucose 107 Ca 8.5 GFR 63   CBC: Date: 9/4/2023 WBC 12 Hgb 9.5 hematocrit 29.7 platelets 282  Liver function: Date: 9/4/2023 ALT 7 AST 22 alkaline phos.  64 bilirubin 0.4 albumin  3.3 protein 7.1    BMP: Date: 9/11/2023 Na 142 K 4 Cr 1 BUN 12 glucose 81 Ca 8.2 GFR 70  CBC: Date: 9/11/2023 WBC 8.7 Hgb 9.2 hematocrit 28.3 platelets 305  Liver function: Date: 9/11/2023 ALT 7 AST 22 alkaline phos.  61 bilirubin 0.3 albumin 3 protein 6.8    BMP: Date: 9/18/2023 Na 140 K 4 Cr 1 BUN 15 glucose 150 Ca 8.3 GFR 70  CBC: Date: 9/18/2023 WBC 12.1 Hgb 9.5 hematocrit 29.8 platelets 287  Liver function: Date: 9/18/2023 ALT 7 AST 23 alkaline phos.  62 bilirubin 0.4 albumin 3.4 protein 7.1    10/17/23 Ha1c 8.3 %    BMP: Date: 10/23/2023 Na 141 K 3.6 Cr 1 BUN 22 glucose 65 Ca 8.3 GFR 70  CBC: Date: 10/23/2023 WBC 12.5 Hgb 10.8  hematocrit 33.2 platelets 290  Liver function: Date: 10/23/2023 ALT 9 AST 31 alkaline phos.  66 bilirubin 0.6 albumin 3.6 protein 7.8    10/26/2023: Lipid panel: Cholesterol 111; triglycerides 111; HDL 33; LDL 56; VLDL 22.    10/26/2023: TSH: 1.081 (range 0.340-5.500).    BMP: Date: 10/30/2023 Na 143  K 3.5 Cr 0.9 BUN 11 glucose 171 Ca 8 GFR 65  CBC: Date: 10/30/2023 WBC 9.2 Hgb 10.1 hematocrit 31.1 platelets 280  Liver function: Date: 10/30/2023 ALT 11 AST 27 alkaline phos.  66 bilirubin 0.4 albumin 3.4 protein 7.2    BMP: Date: 11/13/2023 Na 138 K 3.9 Cr 0.9 BUN 11 glucose 308 Ca 8.4 GFR 79  CBC: Date: 11/13/2023 WBC 8.2 Hgb 10.6 hematocrit 33 platelets 295  Liver function: Date: 11/13/2023 ALT 12 AST 37 alkaline phos.  69 bilirubin 0.4 albumin 3.2 protein 7.1    BMP: Date: 11/21/2023 Na 142 K 3.6 Cr 0.9 BUN 14 glucose 200 Ca 8.4 GFR 79  CBC: Date: 11/21/2023 WBC 8.8 Hgb 10.1 hematocrit 30.4 platelets 290              Past Medical History:   Diagnosis Date   • Cervicalgia     Neck pain   • Essential (primary) hypertension 07/21/2013    Hypertension   • Pain in unspecified shoulder     Pain, joint, shoulder   • Personal history of other diseases of the digestive system 07/06/2022    History of ulcerative colitis   • Personal history of other diseases of the digestive system     History  of esophageal reflux   • Personal history of other diseases of the nervous system and sense organs     History of cataract   • Personal history of other diseases of the nervous system and sense organs     History of glaucoma   • Personal history of other diseases of the respiratory system     History of bronchitis   • Personal history of other endocrine, nutritional and metabolic disease     History of diabetes mellitus   • Personal history of other endocrine, nutritional and metabolic disease     History of hyperlipidemia   • Personal history of other mental and behavioral disorders     History of depression   • Snoring     Snoring   • Unilateral primary osteoarthritis, unspecified knee 11/02/2015    Arthritis of knee           Past Surgical History:   Procedure Laterality Date   • CHOLECYSTECTOMY  01/08/2016    Cholecystectomy   • COLON SURGERY  01/08/2016    Colon Surgery   • CT ANGIO NECK W AND WO IV CONTRAST  3/19/2019    CT NECK ANGIO W AND WO IV CONTRAST [place] [facility] [place]   • CT HEAD ANGIO W AND WO IV CONTRAST  3/19/2019    CT HEAD ANGIO W AND WO IV CONTRAST [place] [facility] [place] [facility]   • HERNIA REPAIR  01/08/2016    Hernia Repair   • HYSTERECTOMY  01/08/2016    Hysterectomy   • MR HEAD ANGIO WO IV CONTRAST  6/4/2018    MR HEAD ANGIO WO IV CONTRAST 6/4/2018 [facility] CLINICAL LEGACY   • MR HEAD ANGIO WO IV CONTRAST  7/11/2020    MR HEAD ANGIO WO IV CONTRAST 7/11/2020 [facility] CLINICAL LEGACY   • MR HEAD ANGIO WO IV CONTRAST  9/30/2020    MR HEAD ANGIO WO IV CONTRAST 9/30/2020 [facility] AIB LEGACY   • MR HEAD ANGIO WO IV CONTRAST  5/22/2022    MR HEAD ANGIO WO IV CONTRAST 5/22/2022 [facility] CLINICAL LEGACY   • MR HEAD ANGIO WO IV CONTRAST  4/19/2023    MR HEAD ANGIO WO IV CONTRAST [facility] MRI   • MR NECK ANGIO WO IV CONTRAST  6/4/2018    MR NECK ANGIO WO IV CONTRAST 6/4/2018 [facility] CLINICAL LEGACY   • MR NECK ANGIO WO IV CONTRAST  7/11/2020    MR NECK ANGIO WO IV CONTRAST 7/11/2020 [facility] CLINICAL LEGACY   • MR NECK  "ANGIO WO IV CONTRAST  9/30/2020    MR NECK ANGIO WO IV CONTRAST 9/30/2020 [facility]IB LEGACY   • MR NECK ANGIO WO IV CONTRAST  5/22/2022    MR NECK ANGIO WO IV CONTRAST 5/22/2022 [facility] CLINICAL LEGACY   • MR NECK ANGIO WO IV CONTRAST  4/19/2023    MR NECK ANGIO WO IV CONTRAST [facility] MRI   • OTHER SURGICAL HISTORY  01/08/2016    Tubal Stabilization   • OTHER SURGICAL HISTORY  09/01/2016    Cervical Surgery (Gyn) Laser Vaporization Of Transformation Zone   • THYROID SURGERY  09/27/2013    Thyroid Surgery            Surgical History  Problems    · History of Cervical Surgery (Gyn) Laser Vaporization Of Transformation Zone   · History of Cholecystectomy   · History of Colon Surgery   · History of Hernia Repair   · History of Hysterectomy   · History of Thyroid Surgery   · History of Tubal Stabilization     Family History  Mother    · Family history of Cancer  Family History    · Family history of Diabetes Mellitus (V18.0)   · Family history of Hypertension (V17.49)     Social History  Problems    · Disabled   · Does not use illicit drugs (V49.89) (Z78.9)   · Former smoker (V15.82) (Z87.891)   · Marital History - Single   · No alcohol.   · Denied: History of Secondhand smoke exposure   · Single     Allergies  Medication    · Aspirin TABS   · Contrast Media Ready-Box MISC   · morphine   · Sulfa Drugs        /72   Pulse 84   Temp 36.7 °C (98 °F)   Resp 18   Ht 1.372 m (4' 6\")   Wt 108 kg (238 lb)   SpO2 97%   BMI 57.38 kg/m²      Physical Exam  Vitals and nursing note reviewed.   Constitutional:       Appearance: Normal appearance.   HENT:      Head: Normocephalic.      Right Ear: External ear normal.      Left Ear: External ear normal.      Nose: Nose normal.      Mouth/Throat:      Mouth: Mucous membranes are moist.      Pharynx: Oropharynx is clear.   Eyes:      Extraocular Movements: Extraocular movements intact.      Conjunctiva/sclera: Conjunctivae normal.      Pupils: Pupils are equal, round, and reactive to " light.   Cardiovascular:      Rate and Rhythm: Normal rate and regular rhythm.      Pulses: Normal pulses.      Heart sounds: Normal heart sounds.   Pulmonary:      Effort: Pulmonary effort is normal.      Breath sounds: Normal breath sounds.   Abdominal:      General: Bowel sounds are normal.      Palpations: Abdomen is soft.   Musculoskeletal:      Cervical back: Normal range of motion and neck supple.      Comments: Generalized muscle weakness; immobility.    Right sided hemiparesis   Skin:     General: Skin is warm and dry.   Neurological:      Mental Status: She is alert. Mental status is at baseline.      Motor: Weakness present.      Coordination: Coordination abnormal.      Gait: Gait abnormal.      Comments: Right sided weakness--RLE 1/5; RUE 3/5; sensation intact to touch x 4 extremities.     Left sided facial droop.     Generalized muscle weakness   Psychiatric:         Mood and Affect: Mood normal.         Behavior: Behavior normal.          Assessment/Plan     DM type 2 with hyperglycemia; noncompliance with diabetic diet:            DC Lantus 36 units at bedtime            Start Lantus 39 units at bedtime.           Continue Humalog sliding scale.           Monitor Ha1c q 3 months.            Chronic systolic HF; Benign essential HTN:            Continue Amlodipine and hydrochlorothiazide.           Monitor Bps.                Leukocytosis:  Monitor WBC.  Monitor for fevers.       Sequela of Cerebrovascular accident ; previous CVA with right-sided hemiparesis; right sided weakness;   dysphagia 2/2 cva; expressive aphasia; History of acute/subacute stroke:   MRI was performed during a PREVIOUS hospitalization and it showed Acute/subacute tiny infarct in the left anterior brock.   Dr. Jiang from [facility] neuro/stroke was consulted at that time.  Continue Eliquis 5 mg PO BID.  Pt has baseline dysarthria from a previous CVA, right Hemiparesis,  and expressive aphasia.  She is able to communicate in short  sentences.  Patient is also able to nod appropriately to questions.   Continue mechanical soft, thin consistency diet.     Hx. Covid-19:      Patient finished her Covid isolation a couple of weeks ago      Monitor for long Covid symptoms.         Immobility; Generalized muscle weakness:  Continue at   in long term care.  Not able to ambulate.  Bedbound/wheelchair bound.   Fall prevention strategies.      Problem List Items Addressed This Visit    None  Visit Diagnoses       Type 2 diabetes mellitus with hyperglycemia, with long-term current use of insulin (CMS/HCC)    -  Primary    Noncompliance with dietary restriction        Chronic systolic heart failure (CMS/HCC)        Benign essential HTN        Leukocytosis, unspecified type        Sequela of cerebrovascular accident        Recent cerebrovascular accident        Hemiparesis affecting right side as late effect of stroke (CMS/HCC)        Dysphagia, unspecified type        Expressive aphasia        Personal history of COVID-19        Immobility        Generalized muscle weakness            JASON Espitia       Electronically Signed By: JASON Espitia   11/22/23 11:39 PM

## 2023-11-22 VITALS
RESPIRATION RATE: 18 BRPM | WEIGHT: 238 LBS | DIASTOLIC BLOOD PRESSURE: 72 MMHG | TEMPERATURE: 98 F | HEART RATE: 84 BPM | HEIGHT: 55 IN | OXYGEN SATURATION: 97 % | BODY MASS INDEX: 55.08 KG/M2 | SYSTOLIC BLOOD PRESSURE: 136 MMHG

## 2023-11-23 NOTE — PROGRESS NOTES
Name: Janette Martinez    YOB: 1970    Code Status: DNRcc arrest    Chief complaint:  Follow up on DM 2 with hyperglycemia; etc.....      HPI:    52-year-old female with past medical history of heart failure with reduced EF,   diabetes, hypertension, hyperlipidemia,        CVA (left MCA 2022 with residual right-sided hemiparesis), GERD, HIV, hypothyroidism, hypertension,   and diabetes.        Patient re-admitted to [facility]. On 10/20/23 after her most recent hospitalization.    Diagnoses as follows:     DM type 2 with hyperglycemia; noncompliance with diabetic diet:            Patient's accuchecks are elevated.           Patient is frequently noncompliant with diet.           Eating some cookies today.           On Lantus 36 units at bedtime and Humalog sliding scale.           Recent accuchecks: 218 mg/dL, 333 mg/dL, 293 mg/dL.            No episodes of hypoglycemia reported.           Ha1c trends:           5/1/23 Ha1c 8.1 %            10/17/23 Ha1c 8.3 %      Patient seen today, she is in bed.      Calm, cooperative, pleasant.      Mentation at baseline.       Discussed importance of compliance with diet.    Chronic systolic HF; Benign essential HTN:            On Amlodipine and hydrochlorothiazide.            No oxygen desaturations reported.       Recent /72                Leukocytosis:  Patient had leukocytosis during her recent hospitalization.  Recent WBC results:  10/23/23 WBC 12.5 H  10/30/23 WBC 9.2  11/13/23 WBC 8.2   11/21/2023 WBC 8.8         Sequela of Cerebrovascular accident;  ; previous CVA with right-sided hemiparesis; right sided weakness;   dysphagia 2/2 cva; expressive aphasia; History of acute/subacute stroke:   MRI was performed during a PREVIOUS hospitalization and it showed Acute/subacute tiny infarct in the left anterior brock.   [place] [facility] [place].  On Eliquis 5 mg PO BID.  Pt has baseline dysarthria from a  previous CVA, right Hemiparesis,  and expressive aphasia.  She is able to communicate in short sentences.  Patient is also able to nod appropriately to questions.   On a mechanical soft, thin consistency diet.     Hx. Covid-19:      Patient finished her Covid isolation a couple of weeks ago      No fevers reported.       No oxygen desaturations.       No complaints of cough.       Having some SOB.       No complaints of chest pain, dizziness or headaches.           Immobility; Generalized muscle weakness:  At  in long term care.  Not able to ambulate.  Very weak.  Bedbound/wheelchair bound.                        Reviewed medical, social, surgical and family history.  Reviewed all current medications and performed medication reconciliation.  Reviewed vital signs AND recent blood work results.  Performed prescription drug management.   Reviewed [facility] Documentation  Discussed patient with nursing.      ROS: 10 point ROS performed; negative unless noted in HPI.       LABS:  BMP: Date: 5/1/2023 Na 137 K 4.3 Cr 1.1 BUN 15 glucose 141 Ca 8.8 GFR 52  CBC: Date: 5/1/2023 WBC 8.4 Hgb 11.1 hematocrit 33.9 platelets 337  Liver function: Date: 5/1/2023 ALT 10 AST 29 alkaline phos.  63 bilirubin 0.4 albumin 3.6 protein 7.7       Ha1c 8.1 % on 5/1/23          BMP: Date: 5/15/2023 Na 139  K 3.4 Cr 1.2 BUN 20 glucose 122 Ca 8.8 GFR 57        CBC: Date: 5/15/2023 WBC 9 Hgb 10.2 hematocrit 30.9 platelets 277       Liver function: 5/15/2023 ALT 8 AST 28 alkaline phos.  57 bilirubin 0.5 albumin 3.5 protein 7.3          BMP: Date: 5/22/2023 Na 141 K 3.1 Cr 1 BUN 11 glucose 227 Ca 8.7 GFR 70        CBC: Date: 5/22/2023 WBC 8.2 Hgb 10 hematocrit 30.5 platelets 372        Liver function: Date: 5/22/2023 ALT 8 AST 22 alkaline phos.  63 bilirubin 0.3 albumin 3.2 protein 7.2     BMP: Date: 6/5/2023 Na 140 K 4.2 Cr 1.1 BUN 14 glucose 79 Ca 1.1 GFR 63  CBC: Date: 6/5/2023 WBC 8.1 Hgb 10.5 hematocrit 32.3 platelets 350  Liver  function: Date: 6/5/2023 ALT 7 AST 25 alkaline phos.  59 bilirubin 0.4 albumin 3.3 protein 7.7    BMP: Date: 7/17/2023 Na 140 K 4.1 Cr 0.8 BUN 17 glucose 126 Ca 9.1 GFR 90  CBC: Date: 7/17/2023 WBC 14.1 Hgb 10.2 hematocrit 32.1 platelets 265  Liver function: Date: 7/17/2023 ALT 8 AST 23 alkaline phos.  72 bilirubin 0.5 albumin 3.4 protein 7.4    CBC: Date: 7/19/2023 WBC 10.7 Hgb 9.5 hematocrit 28.8 platelets 257    BMP: Date: 7/24/2023 Na 137 K 4.2 Cr 0.9 BUN 19 glucose 186 Ca 9.1 GFR 79  CBC: Date: 7/24/2023 WBC 11.6 Hgb 10.3 hematocrit 31.4 platelets 305  Liver function: Date: 7/24/2023 ALT 11 AST 27 alkaline phos.  77 bilirubin 0.3 albumin 3.6 protein 7.8    BMP: Date: 7/31/2023 Na 141 K 4.2 Cr 0.8 BUN 17 glucose 138 Ca 8.7 GFR 90  CBC: Date: 7/31/2023 WBC 12.3 Hgb 10.5 hematocrit 32.4 platelets 307  Liver function: Date: 7/31/2023 ALT 14 AST 24 alkaline phos.  81 bilirubin 0.4 albumin 3.5 protein 7.4    BMP: Date: 8/7/2023 Na 141 K 3.9 Cr 1.0 BUN 12 glucose 167 Ca 8.6 GFR 70  CBC: Date: 8/7/2023 WBC 10 Hgb 10.5 hematocrit 32.7 platelets 256  Liver function: Date: 8/7/2023 ALT 9 AST 23 alkaline phos.  76 bilirubin 0.5 albumin 3.3 protein 7    BMP: Date: 8/21/2023 Na 142 K 4.1 Cr 1 BUN 17 glucose 195 Ca 8.5 GFR 70  CBC: Date: 8/21/2023 WBC 10.9 Hgb 9.6 hematocrit 30 platelets 304  Liver function: Date: 8/21/2023 ALT 9 AST 24 alkaline phos.  74 bilirubin 0.4 albumin 3.2 protein 7.1    BMP: Date: 8/31/2023 Na 141 K 3.8 Cr 0.9 BUN 14 glucose 118 Ca 8.2 GFR 79  CBC: Date: 8/31/2023 WBC 12 Hgb 9.6 hematocrit 29.8 platelets 294  Liver function: Date: 8/31/2023 ALT 8 AST 21 alkaline phos.  64 bilirubin 0.4 albumin 3.1 protein 7    BMP: Date: 9/4/2023 Na 141 K 3.9 Cr 1.1 BUN 17 glucose 107 Ca 8.5 GFR 63   CBC: Date: 9/4/2023 WBC 12 Hgb 9.5 hematocrit 29.7 platelets 282  Liver function: Date: 9/4/2023 ALT 7 AST 22 alkaline phos.  64 bilirubin 0.4 albumin 3.3 protein 7.1    BMP: Date: 9/11/2023 Na 142 K 4 Cr 1 BUN 12  glucose 81 Ca 8.2 GFR 70  CBC: Date: 9/11/2023 WBC 8.7 Hgb 9.2 hematocrit 28.3 platelets 305  Liver function: Date: 9/11/2023 ALT 7 AST 22 alkaline phos.  61 bilirubin 0.3 albumin 3 protein 6.8    BMP: Date: 9/18/2023 Na 140 K 4 Cr 1 BUN 15 glucose 150 Ca 8.3 GFR 70  CBC: Date: 9/18/2023 WBC 12.1 Hgb 9.5 hematocrit 29.8 platelets 287  Liver function: Date: 9/18/2023 ALT 7 AST 23 alkaline phos.  62 bilirubin 0.4 albumin 3.4 protein 7.1    10/17/23 Ha1c 8.3 %    BMP: Date: 10/23/2023 Na 141 K 3.6 Cr 1 BUN 22 glucose 65 Ca 8.3 GFR 70  CBC: Date: 10/23/2023 WBC 12.5 Hgb 10.8  hematocrit 33.2 platelets 290  Liver function: Date: 10/23/2023 ALT 9 AST 31 alkaline phos.  66 bilirubin 0.6 albumin 3.6 protein 7.8    10/26/2023: Lipid panel: Cholesterol 111; triglycerides 111; HDL 33; LDL 56; VLDL 22.    10/26/2023: TSH: 1.081 (range 0.340-5.500).    BMP: Date: 10/30/2023 Na 143  K 3.5 Cr 0.9 BUN 11 glucose 171 Ca 8 GFR 65  CBC: Date: 10/30/2023 WBC 9.2 Hgb 10.1 hematocrit 31.1 platelets 280  Liver function: Date: 10/30/2023 ALT 11 AST 27 alkaline phos.  66 bilirubin 0.4 albumin 3.4 protein 7.2    BMP: Date: 11/13/2023 Na 138 K 3.9 Cr 0.9 BUN 11 glucose 308 Ca 8.4 GFR 79  CBC: Date: 11/13/2023 WBC 8.2 Hgb 10.6 hematocrit 33 platelets 295  Liver function: Date: 11/13/2023 ALT 12 AST 37 alkaline phos.  69 bilirubin 0.4 albumin 3.2 protein 7.1    BMP: Date: 11/21/2023 Na 142 K 3.6 Cr 0.9 BUN 14 glucose 200 Ca 8.4 GFR 79  CBC: Date: 11/21/2023 WBC 8.8 Hgb 10.1 hematocrit 30.4 platelets 290              Past Medical History:   Diagnosis Date    Cervicalgia     Neck pain    Essential (primary) hypertension 07/21/2013    Hypertension    Pain in unspecified shoulder     Pain, joint, shoulder    Personal history of other diseases of the digestive system 07/06/2022    History of ulcerative colitis    Personal history of other diseases of the digestive system     History of esophageal reflux    Personal history of other diseases of the  nervous system and sense organs     History of cataract    Personal history of other diseases of the nervous system and sense organs     History of glaucoma    Personal history of other diseases of the respiratory system     History of bronchitis    Personal history of other endocrine, nutritional and metabolic disease     History of diabetes mellitus    Personal history of other endocrine, nutritional and metabolic disease     History of hyperlipidemia    Personal history of other mental and behavioral disorders     History of depression    Snoring     Snoring    Unilateral primary osteoarthritis, unspecified knee 11/02/2015    Arthritis of knee           Past Surgical History:   Procedure Laterality Date    CHOLECYSTECTOMY  01/08/2016    Cholecystectomy    COLON SURGERY  01/08/2016    Colon Surgery    CT ANGIO NECK W AND WO IV CONTRAST  3/19/2019    CT NECK ANGIO W AND WO IV CONTRAST 3/19/2019 [facility] [place] [place] EMERGENCY LEGACY    CT HEAD ANGIO W AND WO IV CONTRAST  3/19/2019    CT HEAD ANGIO W AND WO IV CONTRAST 3/19/2019 [facility] [place] [place] EMERGENCY LEGACY    HERNIA REPAIR  01/08/2016    Hernia Repair    HYSTERECTOMY  01/08/2016    Hysterectomy    MR HEAD ANGIO WO IV CONTRAST  6/4/2018    MR HEAD ANGIO WO IV CONTRAST 6/4/2018 [facility] CLINICAL LEGACY    MR HEAD ANGIO WO IV CONTRAST  7/11/2020    MR HEAD ANGIO WO IV CONTRAST 7/11/2020 [facility] CLINICAL LEGACY    MR HEAD ANGIO WO IV CONTRAST  9/30/2020    MR HEAD ANGIO WO IV CONTRAST 9/30/2020 [facility]IB LEGACY    MR HEAD ANGIO WO IV CONTRAST  5/22/2022    MR HEAD ANGIO WO IV CONTRAST 5/22/2022 [facility] CLINICAL LEGACY    MR HEAD ANGIO WO IV CONTRAST  4/19/2023    MR HEAD ANGIO WO IV CONTRAST [facility] MRI    MR NECK ANGIO WO IV CONTRAST  6/4/2018    MR NECK ANGIO WO IV CONTRAST 6/4/2018 [facility] CLINICAL LEGACY    MR NECK ANGIO WO IV CONTRAST  7/11/2020    MR NECK ANGIO WO IV CONTRAST 7/11/2020 [facility] CLINICAL LEGACY    MR NECK ANGIO WO IV CONTRAST  9/30/2020    MR NECK ANGIO WO IV CONTRAST 9/30/2020 [facility] LEGACY     "MR NECK ANGIO WO IV CONTRAST  5/22/2022    MR NECK ANGIO WO IV CONTRAST 5/22/2022 [facility] CLINICAL LEGACY    MR NECK ANGIO WO IV CONTRAST  4/19/2023    MR NECK ANGIO WO IV CONTRAST [facility] MRI    OTHER SURGICAL HISTORY  01/08/2016    Tubal Stabilization    OTHER SURGICAL HISTORY  09/01/2016    Cervical Surgery (Gyn) Laser Vaporization Of Transformation Zone    THYROID SURGERY  09/27/2013    Thyroid Surgery            Surgical History  Problems    · History of Cervical Surgery (Gyn) Laser Vaporization Of Transformation Zone   · History of Cholecystectomy   · History of Colon Surgery   · History of Hernia Repair   · History of Hysterectomy   · History of Thyroid Surgery   · History of Tubal Stabilization     Family History  Mother    · Family history of Cancer  Family History    · Family history of Diabetes Mellitus (V18.0)   · Family history of Hypertension (V17.49)     Social History  Problems    · Disabled   · Does not use illicit drugs (V49.89) (Z78.9)   · Former smoker (V15.82) (Z87.891)   · Marital History - Single   · No alcohol.   · Denied: History of Secondhand smoke exposure   · Single     Allergies  Medication    · Aspirin TABS   · Contrast Media Ready-Box MISC   · morphine   · Sulfa Drugs        /72   Pulse 84   Temp 36.7 °C (98 °F)   Resp 18   Ht 1.372 m (4' 6\")   Wt 108 kg (238 lb)   SpO2 97%   BMI 57.38 kg/m²      Physical Exam  Vitals and nursing note reviewed.   Constitutional:       Appearance: Normal appearance.   HENT:      Head: Normocephalic.      Right Ear: External ear normal.      Left Ear: External ear normal.      Nose: Nose normal.      Mouth/Throat:      Mouth: Mucous membranes are moist.      Pharynx: Oropharynx is clear.   Eyes:      Extraocular Movements: Extraocular movements intact.      Conjunctiva/sclera: Conjunctivae normal.      Pupils: Pupils are equal, round, and reactive to light.   Cardiovascular:      Rate and Rhythm: Normal rate and regular rhythm.      Pulses: Normal " pulses.      Heart sounds: Normal heart sounds.   Pulmonary:      Effort: Pulmonary effort is normal.      Breath sounds: Normal breath sounds.   Abdominal:      General: Bowel sounds are normal.      Palpations: Abdomen is soft.   Musculoskeletal:      Cervical back: Normal range of motion and neck supple.      Comments: Generalized muscle weakness; immobility.    Right sided hemiparesis   Skin:     General: Skin is warm and dry.   Neurological:      Mental Status: She is alert. Mental status is at baseline.      Motor: Weakness present.      Coordination: Coordination abnormal.      Gait: Gait abnormal.      Comments: Right sided weakness--RLE 1/5; RUE 3/5; sensation intact to touch x 4 extremities.     Left sided facial droop.     Generalized muscle weakness   Psychiatric:         Mood and Affect: Mood normal.         Behavior: Behavior normal.          Assessment/Plan      DM type 2 with hyperglycemia; noncompliance with diabetic diet:            DC Lantus 36 units at bedtime            Start Lantus 39 units at bedtime.           Continue Humalog sliding scale.           Monitor Ha1c q 3 months.            Chronic systolic HF; Benign essential HTN:            Continue Amlodipine and hydrochlorothiazide.           Monitor Bps.                Leukocytosis:  Monitor WBC.  Monitor for fevers.       Sequela of Cerebrovascular accident ; previous CVA with right-sided hemiparesis; right sided weakness;   dysphagia 2/2 cva; expressive aphasia; History of acute/subacute stroke:   MRI was performed during a PREVIOUS hospitalization and it showed Acute/subacute tiny infarct in the left anterior brock.   Dr. Jiang from [place] [facility] [place] neuro/stroke was consulted at that time.  Continue Eliquis 5 mg PO BID.  Pt has baseline dysarthria from a previous CVA, right Hemiparesis,  and expressive aphasia.  She is able to communicate in short sentences.  Patient is also able to nod appropriately to questions.   Continue mechanical soft,  thin consistency diet.     Hx. Covid-19:      Patient finished her Covid isolation a couple of weeks ago      Monitor for long Covid symptoms.         Immobility; Generalized muscle weakness:  Continue at   in long term care.  Not able to ambulate.  Bedbound/wheelchair bound.   Fall prevention strategies.      Problem List Items Addressed This Visit    None  Visit Diagnoses       Type 2 diabetes mellitus with hyperglycemia, with long-term current use of insulin (CMS/HCC)    -  Primary    Noncompliance with dietary restriction        Chronic systolic heart failure (CMS/HCC)        Benign essential HTN        Leukocytosis, unspecified type        Sequela of cerebrovascular accident        Recent cerebrovascular accident        Hemiparesis affecting right side as late effect of stroke (CMS/HCC)        Dysphagia, unspecified type        Expressive aphasia        Personal history of COVID-19        Immobility        Generalized muscle weakness            Maria Del Carmen Garduno, APRN-CNP

## 2023-11-27 ENCOUNTER — NURSING HOME VISIT (OUTPATIENT)
Dept: POST ACUTE CARE | Facility: EXTERNAL LOCATION | Age: 53
End: 2023-11-27
Payer: MEDICAID

## 2023-11-27 DIAGNOSIS — D72.829 LEUKOCYTOSIS, UNSPECIFIED TYPE: Primary | ICD-10-CM

## 2023-11-27 DIAGNOSIS — E11.65 TYPE 2 DIABETES MELLITUS WITH HYPERGLYCEMIA, WITH LONG-TERM CURRENT USE OF INSULIN (MULTI): ICD-10-CM

## 2023-11-27 DIAGNOSIS — Z74.09 IMMOBILITY: ICD-10-CM

## 2023-11-27 DIAGNOSIS — I50.22 CHRONIC SYSTOLIC HEART FAILURE (MULTI): ICD-10-CM

## 2023-11-27 DIAGNOSIS — Z91.119 NONCOMPLIANCE WITH DIETARY RESTRICTION: ICD-10-CM

## 2023-11-27 DIAGNOSIS — I10 BENIGN ESSENTIAL HTN: ICD-10-CM

## 2023-11-27 DIAGNOSIS — R13.10 DYSPHAGIA, UNSPECIFIED TYPE: ICD-10-CM

## 2023-11-27 DIAGNOSIS — K59.00 CONSTIPATION, UNSPECIFIED CONSTIPATION TYPE: ICD-10-CM

## 2023-11-27 DIAGNOSIS — I69.351 HEMIPARESIS AFFECTING RIGHT SIDE AS LATE EFFECT OF STROKE (MULTI): ICD-10-CM

## 2023-11-27 DIAGNOSIS — Z79.4 TYPE 2 DIABETES MELLITUS WITH HYPERGLYCEMIA, WITH LONG-TERM CURRENT USE OF INSULIN (MULTI): ICD-10-CM

## 2023-11-27 DIAGNOSIS — I69.30 SEQUELA OF CEREBROVASCULAR ACCIDENT: ICD-10-CM

## 2023-11-27 DIAGNOSIS — R47.01 EXPRESSIVE APHASIA: ICD-10-CM

## 2023-11-27 DIAGNOSIS — M62.81 GENERALIZED MUSCLE WEAKNESS: ICD-10-CM

## 2023-11-27 DIAGNOSIS — Z86.73 RECENT CEREBROVASCULAR ACCIDENT: ICD-10-CM

## 2023-11-27 PROCEDURE — 99309 SBSQ NF CARE MODERATE MDM 30: CPT | Performed by: NURSE PRACTITIONER

## 2023-11-27 NOTE — LETTER
Patient: Janette Martinez  : 1970    Encounter Date: 2023    Name: Janette Martinez    YOB: 1970    Code Status: DNRcc arrest    Chief complaint:  Follow up on DM 2 with hyperglycemia; etc.....      HPI:    52-year-old female with past medical history of heart failure with reduced EF,   diabetes, hypertension, hyperlipidemia,        CVA (left MCA  with residual right-sided hemiparesis), GERD, HIV, hypothyroidism, hypertension,   and diabetes.        Patient re-admitted to [facility]. On 10/20/23 after her most recent hospitalization.    Diagnoses as follows:    Leukocytosis:  Patient had leukocytosis during her recent hospitalization, it had improved, but today WBC elevated.   Recent WBC results:  10/23/23 WBC 12.5 H  10/30/23 WBC 9.2  23 WBC 8.2   2023 WBC 8.8  TODAY---23 WBC 12.3 H   Patient seen today, she is in bed.      Calm, cooperative, pleasant.      Mentation at baseline.       No reports of fevers.     No complaints of cough or SOB.     No chest pain.     No dysuria.              DM type 2 with hyperglycemia; noncompliance with diabetic diet:            Patient's accuchecks are elevated.           Patient is frequently noncompliant with diet.           Eating some cookies today.           On Lantus 39 units at bedtime and Humalog sliding scale.           Recent accuchecks:  mg/dL,  mg/dL, mg/dL.            No episodes of hypoglycemia reported.           Ha1c trends:           23 Ha1c 8.1 %            10/17/23 Ha1c 8.3 %       Benign essential HTN; Chronic systolic HF:            On Amlodipine and hydrochlorothiazide.            No oxygen desaturations reported.       Recent /72.       No complaints of headaches or dizziness.          Sequela of Cerebrovascular accident;  ; previous CVA with right-sided hemiparesis; right sided weakness;   dysphagia 2/2 cva; expressive aphasia; History of acute/subacute stroke:   MRI was performed during  a PREVIOUS hospitalization and it showed Acute/subacute tiny infarct in the left anterior brock.   Dr. Jiang [place] [facility] [place] at that time.  On Eliquis 5 mg PO BID.  Pt has baseline dysarthria from a previous CVA, right Hemiparesis,  and expressive aphasia.  She is able to communicate in short sentences.  Patient is also able to nod appropriately to questions.   On a mechanical soft, thin consistency diet.    Constipation:  Patient was previously on Linzess, nursing reports that her insurance does not cover her Linzess.  Asking for alternative.  Patient states she had a BM yesterday.  No complaints of abdominal pain, nausea or vomiting today.  On Senna routinely and lactulose PRN.        Immobility; Generalized muscle weakness:  At  in long term care.  Not able to ambulate.  Very weak.  Bedbound/wheelchair bound.                        Reviewed medical, social, surgical and family history.  Reviewed all current medications and performed medication reconciliation.  Reviewed vital signs AND recent blood work results.  Performed prescription drug management.   Reviewed [facility] Documentation  Discussed patient with nursing.      ROS: 10 point ROS performed; negative unless noted in HPI.       LABS:  BMP: Date: 5/1/2023 Na 137 K 4.3 Cr 1.1 BUN 15 glucose 141 Ca 8.8 GFR 52  CBC: Date: 5/1/2023 WBC 8.4 Hgb 11.1 hematocrit 33.9 platelets 337  Liver function: Date: 5/1/2023 ALT 10 AST 29 alkaline phos.  63 bilirubin 0.4 albumin 3.6 protein 7.7       Ha1c 8.1 % on 5/1/23          BMP: Date: 5/15/2023 Na 139  K 3.4 Cr 1.2 BUN 20 glucose 122 Ca 8.8 GFR 57        CBC: Date: 5/15/2023 WBC 9 Hgb 10.2 hematocrit 30.9 platelets 277       Liver function: 5/15/2023 ALT 8 AST 28 alkaline phos.  57 bilirubin 0.5 albumin 3.5 protein 7.3          BMP: Date: 5/22/2023 Na 141 K 3.1 Cr 1 BUN 11 glucose 227 Ca 8.7 GFR 70        CBC: Date: 5/22/2023 WBC 8.2 Hgb 10 hematocrit 30.5 platelets 372        Liver  function: Date: 5/22/2023 ALT 8 AST 22 alkaline phos.  63 bilirubin 0.3 albumin 3.2 protein 7.2     BMP: Date: 6/5/2023 Na 140 K 4.2 Cr 1.1 BUN 14 glucose 79 Ca 1.1 GFR 63  CBC: Date: 6/5/2023 WBC 8.1 Hgb 10.5 hematocrit 32.3 platelets 350  Liver function: Date: 6/5/2023 ALT 7 AST 25 alkaline phos.  59 bilirubin 0.4 albumin 3.3 protein 7.7    BMP: Date: 7/17/2023 Na 140 K 4.1 Cr 0.8 BUN 17 glucose 126 Ca 9.1 GFR 90  CBC: Date: 7/17/2023 WBC 14.1 Hgb 10.2 hematocrit 32.1 platelets 265  Liver function: Date: 7/17/2023 ALT 8 AST 23 alkaline phos.  72 bilirubin 0.5 albumin 3.4 protein 7.4    CBC: Date: 7/19/2023 WBC 10.7 Hgb 9.5 hematocrit 28.8 platelets 257    BMP: Date: 7/24/2023 Na 137 K 4.2 Cr 0.9 BUN 19 glucose 186 Ca 9.1 GFR 79  CBC: Date: 7/24/2023 WBC 11.6 Hgb 10.3 hematocrit 31.4 platelets 305  Liver function: Date: 7/24/2023 ALT 11 AST 27 alkaline phos.  77 bilirubin 0.3 albumin 3.6 protein 7.8    BMP: Date: 7/31/2023 Na 141 K 4.2 Cr 0.8 BUN 17 glucose 138 Ca 8.7 GFR 90  CBC: Date: 7/31/2023 WBC 12.3 Hgb 10.5 hematocrit 32.4 platelets 307  Liver function: Date: 7/31/2023 ALT 14 AST 24 alkaline phos.  81 bilirubin 0.4 albumin 3.5 protein 7.4    BMP: Date: 8/7/2023 Na 141 K 3.9 Cr 1.0 BUN 12 glucose 167 Ca 8.6 GFR 70  CBC: Date: 8/7/2023 WBC 10 Hgb 10.5 hematocrit 32.7 platelets 256  Liver function: Date: 8/7/2023 ALT 9 AST 23 alkaline phos.  76 bilirubin 0.5 albumin 3.3 protein 7    BMP: Date: 8/21/2023 Na 142 K 4.1 Cr 1 BUN 17 glucose 195 Ca 8.5 GFR 70  CBC: Date: 8/21/2023 WBC 10.9 Hgb 9.6 hematocrit 30 platelets 304  Liver function: Date: 8/21/2023 ALT 9 AST 24 alkaline phos.  74 bilirubin 0.4 albumin 3.2 protein 7.1    BMP: Date: 8/31/2023 Na 141 K 3.8 Cr 0.9 BUN 14 glucose 118 Ca 8.2 GFR 79  CBC: Date: 8/31/2023 WBC 12 Hgb 9.6 hematocrit 29.8 platelets 294  Liver function: Date: 8/31/2023 ALT 8 AST 21 alkaline phos.  64 bilirubin 0.4 albumin 3.1 protein 7    BMP: Date: 9/4/2023 Na 141 K 3.9 Cr 1.1 BUN 17  glucose 107 Ca 8.5 GFR 63   CBC: Date: 9/4/2023 WBC 12 Hgb 9.5 hematocrit 29.7 platelets 282  Liver function: Date: 9/4/2023 ALT 7 AST 22 alkaline phos.  64 bilirubin 0.4 albumin 3.3 protein 7.1    BMP: Date: 9/11/2023 Na 142 K 4 Cr 1 BUN 12 glucose 81 Ca 8.2 GFR 70  CBC: Date: 9/11/2023 WBC 8.7 Hgb 9.2 hematocrit 28.3 platelets 305  Liver function: Date: 9/11/2023 ALT 7 AST 22 alkaline phos.  61 bilirubin 0.3 albumin 3 protein 6.8    BMP: Date: 9/18/2023 Na 140 K 4 Cr 1 BUN 15 glucose 150 Ca 8.3 GFR 70  CBC: Date: 9/18/2023 WBC 12.1 Hgb 9.5 hematocrit 29.8 platelets 287  Liver function: Date: 9/18/2023 ALT 7 AST 23 alkaline phos.  62 bilirubin 0.4 albumin 3.4 protein 7.1    10/17/23 Ha1c 8.3 %    BMP: Date: 10/23/2023 Na 141 K 3.6 Cr 1 BUN 22 glucose 65 Ca 8.3 GFR 70  CBC: Date: 10/23/2023 WBC 12.5 Hgb 10.8  hematocrit 33.2 platelets 290  Liver function: Date: 10/23/2023 ALT 9 AST 31 alkaline phos.  66 bilirubin 0.6 albumin 3.6 protein 7.8    10/26/2023: Lipid panel: Cholesterol 111; triglycerides 111; HDL 33; LDL 56; VLDL 22.    10/26/2023: TSH: 1.081 (range 0.340-5.500).    BMP: Date: 10/30/2023 Na 143  K 3.5 Cr 0.9 BUN 11 glucose 171 Ca 8 GFR 65  CBC: Date: 10/30/2023 WBC 9.2 Hgb 10.1 hematocrit 31.1 platelets 280  Liver function: Date: 10/30/2023 ALT 11 AST 27 alkaline phos.  66 bilirubin 0.4 albumin 3.4 protein 7.2    BMP: Date: 11/13/2023 Na 138 K 3.9 Cr 0.9 BUN 11 glucose 308 Ca 8.4 GFR 79  CBC: Date: 11/13/2023 WBC 8.2 Hgb 10.6 hematocrit 33 platelets 295  Liver function: Date: 11/13/2023 ALT 12 AST 37 alkaline phos.  69 bilirubin 0.4 albumin 3.2 protein 7.1    BMP: Date: 11/21/2023 Na 142 K 3.6 Cr 0.9 BUN 14 glucose 200 Ca 8.4 GFR 79.  CBC: Date: 11/21/2023 WBC 8.8 Hgb 10.1 hematocrit 30.4 platelets 290    RECENT LABS:  BMP: Date: 11/27/2023 Na 138 K 3.4 Cr 1 BUN 12 glucose 54 Ca 8.7 GFR 70  CBC: Date: 11/27/2023 WBC 12.3 Hgb 10.6 hematocrit 32.4 platelets 307  Liver function: Date: 11/27/2023 ALT 7 AST 27  alkaline phos.  72 bilirubin 0.5 albumin 3.3 protein 7.7        Past Medical History:   Diagnosis Date   • Cervicalgia     Neck pain   • Essential (primary) hypertension 07/21/2013    Hypertension   • Pain in unspecified shoulder     Pain, joint, shoulder   • Personal history of other diseases of the digestive system 07/06/2022    History of ulcerative colitis   • Personal history of other diseases of the digestive system     History of esophageal reflux   • Personal history of other diseases of the nervous system and sense organs     History of cataract   • Personal history of other diseases of the nervous system and sense organs     History of glaucoma   • Personal history of other diseases of the respiratory system     History of bronchitis   • Personal history of other endocrine, nutritional and metabolic disease     History of diabetes mellitus   • Personal history of other endocrine, nutritional and metabolic disease     History of hyperlipidemia   • Personal history of other mental and behavioral disorders     History of depression   • Snoring     Snoring   • Unilateral primary osteoarthritis, unspecified knee 11/02/2015    Arthritis of knee           Past Surgical History:   Procedure Laterality Date   • CHOLECYSTECTOMY  01/08/2016    Cholecystectomy   • COLON SURGERY  01/08/2016    Colon Surgery   • CT ANGIO NECK W AND WO IV CONTRAST  3/19/2019    CT NECK ANGIO W AND WO IV CONTRAST [place] [facility] [place] [facility]   • CT HEAD ANGIO W AND WO IV CONTRAST  3/19/2019    CT HEAD ANGIO W AND WO IV CONTRAST [place] [facility] [place] [facility]   • HERNIA REPAIR  01/08/2016    Hernia Repair   • HYSTERECTOMY  01/08/2016    Hysterectomy   • MR HEAD ANGIO WO IV CONTRAST  6/4/2018    MR HEAD ANGIO WO IV CONTRAST 6/4/2018 [facility]ICAL [facility]GAC[facility]   • MR HEAD ANGIO WO IV CONTRAST  7/11/2020    MR HEAD ANGIO WO IV CONTRAST 7/11/2020 [facility] CL[facility]ICAL LEGACY   • MR HEAD ANGIO WO IV CONTRAST  9/30/2020    MR HEAD ANGIO WO IV CONTRAST  "9/30/2020 [facility] LEGAC[facility]   • MR HEAD ANGIO WO IV CONTRAST  5/22/2022    MR HEAD ANGIO WO IV CONTRAST 5/22/2022 [facility] CLINICAL LEGACY   • MR HEAD ANGIO WO IV CONTRAST  4/19/2023    MR HEAD ANGIO WO IV CONTRAST [facility] MRI   • MR NECK ANGIO WO IV CONTRAST  6/4/2018    MR NECK ANGIO WO IV CONTRAST 6/4/2018 [facility] CLINICAL LEGACY   • MR NECK ANGIO WO IV CONTRAST  7/11/2020    MR NECK ANGIO WO IV CONTRAST 7/11/2020 [facility] CLINICAL LEGACY   • MR NECK ANGIO WO IV CONTRAST  9/30/2020    MR NECK ANGIO WO IV CONTRAST 9/30/2020 [facility]   • MR NECK ANGIO WO IV CONTRAST  5/22/2022    MR NECK ANGIO WO IV CONTRAST 5/22/2022 [facility] CLINICAL LEGACY   • MR NECK ANGIO WO IV CONTRAST  4/19/2023    MR NECK ANGIO WO IV CONTRAST [facility] MRI   • OTHER SURGICAL HISTORY  01/08/2016    Tubal Stabilization   • OTHER SURGICAL HISTORY  09/01/2016    Cervical Surgery (Gyn) Laser Vaporization Of Transformation Zone   • THYROID SURGERY  09/27/2013    Thyroid Surgery            Surgical History  Problems    · History of Cervical Surgery (Gyn) Laser Vaporization Of Transformation Zone   · History of Cholecystectomy   · History of Colon Surgery   · History of Hernia Repair   · History of Hysterectomy   · History of Thyroid Surgery   · History of Tubal Stabilization     Family History  Mother    · Family history of Cancer  Family History    · Family history of Diabetes Mellitus (V18.0)   · Family history of Hypertension (V17.49)     Social History  Problems    · Disabled   · Does not use illicit drugs (V49.89) (Z78.9)   · Former smoker (V15.82) (Z87.891)   · Marital History - Single   · No alcohol.   · Denied: History of Secondhand smoke exposure   · Single     Allergies  Medication    · Aspirin TABS   · Contrast Media Ready-Box MISC   · morphine   · Sulfa Drugs        /72   Pulse 84   Temp 36.7 °C (98 °F)   Resp 18   Ht 1.372 m (4' 6\")   Wt 108 kg (238 lb)   SpO2 97%   BMI 57.38 kg/m²      Physical Exam  Vitals and nursing note reviewed. "   Constitutional:       Appearance: Normal appearance.   HENT:      Head: Normocephalic.      Right Ear: External ear normal.      Left Ear: External ear normal.      Nose: Nose normal.      Mouth/Throat:      Mouth: Mucous membranes are moist.      Pharynx: Oropharynx is clear.   Eyes:      Extraocular Movements: Extraocular movements intact.      Conjunctiva/sclera: Conjunctivae normal.      Pupils: Pupils are equal, round, and reactive to light.   Cardiovascular:      Rate and Rhythm: Normal rate and regular rhythm.      Pulses: Normal pulses.      Heart sounds: Normal heart sounds.   Pulmonary:      Effort: Pulmonary effort is normal.      Breath sounds: Normal breath sounds.   Abdominal:      General: Bowel sounds are normal.      Palpations: Abdomen is soft.   Musculoskeletal:      Cervical back: Normal range of motion and neck supple.      Comments: Generalized muscle weakness; immobility.    Right sided hemiparesis   Skin:     General: Skin is warm and dry.   Neurological:      Mental Status: She is alert. Mental status is at baseline.      Motor: Weakness present.      Coordination: Coordination abnormal.      Gait: Gait abnormal.      Comments: Right sided weakness--RLE 1/5; RUE 3/5; sensation intact to touch x 4 extremities.     Left sided facial droop.     Generalized muscle weakness   Psychiatric:         Mood and Affect: Mood normal.         Behavior: Behavior normal.          Assessment/Plan   Ordered BMP and CBC with diff. In 3 days.         Leukocytosis:     Ordered chest x-ray a/p lateral STAT     Ordered UA C & S STAT     Ordered follow up CBC with diff.  in 3 days.        Monitor for fevers.        DM type 2 with hyperglycemia; noncompliance with diabetic diet:            DC Lantus 39 units at bedtime            Start Lantus 40 units at bedtime.           Continue Humalog sliding scale.           Monitor Ha1c q 3 months.            Benign essential HTN; Chronic systolic HF:            Continue  Amlodipine and hydrochlorothiazide.           Monitor Bps.               Sequela of Cerebrovascular accident ; previous CVA with right-sided hemiparesis; right sided weakness;   dysphagia 2/2 cva; expressive aphasia; History of acute/subacute stroke:   MRI was performed during a PREVIOUS hospitalization and it showed Acute/subacute tiny infarct in the left anterior brock.   Dr. Jiang from [facility] [place]/stroke was consulted at that time.  Continue Eliquis 5 mg PO BID.  Pt has baseline dysarthria from a previous CVA, right Hemiparesis,  and expressive aphasia.  She is able to communicate in short sentences.  Patient is also able to nod appropriately to questions.   Continue mechanical soft, thin consistency diet.    Constipation:  Patient was previously on Linzess, nursing reports that her insurance does not cover her Linzess.  Asking for alternative.   Start Miralax 17 gram PO every day.  Continue Senna routinely and lactulose PRN.  Monitor for constipation.         Immobility; Generalized muscle weakness:  Continue at   in long term care.  Not able to ambulate.  Bedbound/wheelchair bound.   Fall prevention strategies.      Problem List Items Addressed This Visit    None  Visit Diagnoses       Leukocytosis, unspecified type    -  Primary    Type 2 diabetes mellitus with hyperglycemia, with long-term current use of insulin (CMS/HCC)        Noncompliance with dietary restriction        Benign essential HTN        Chronic systolic heart failure (CMS/HCC)        Sequela of cerebrovascular accident        Recent cerebrovascular accident        Hemiparesis affecting right side as late effect of stroke (CMS/HCC)        Dysphagia, unspecified type        Expressive aphasia        Constipation, unspecified constipation type        Immobility        Generalized muscle weakness            JASON Espitia       Electronically Signed By: JASON Espitia   11/29/23 12:16 AM

## 2023-11-28 VITALS
OXYGEN SATURATION: 97 % | TEMPERATURE: 98 F | SYSTOLIC BLOOD PRESSURE: 136 MMHG | HEART RATE: 84 BPM | DIASTOLIC BLOOD PRESSURE: 72 MMHG | RESPIRATION RATE: 18 BRPM | WEIGHT: 238 LBS | HEIGHT: 55 IN | BODY MASS INDEX: 55.08 KG/M2

## 2023-11-29 NOTE — PROGRESS NOTES
Name: Janette Martinez    YOB: 1970    Code Status: DNRcc arrest    Chief complaint:  Follow up on DM 2 with hyperglycemia; etc.....      HPI:    52-year-old female with past medical history of heart failure with reduced EF,   diabetes, hypertension, hyperlipidemia,        CVA (left MCA 2022 with residual right-sided hemiparesis), GERD, HIV, hypothyroidism, hypertension,   and diabetes.        Patient re-admitted to [facility] On 10/20/23 after her most recent hospitalization.    Diagnoses as follows:    Leukocytosis:  Patient had leukocytosis during her recent hospitalization, it had improved, but today WBC elevated.   Recent WBC results:  10/23/23 WBC 12.5 H  10/30/23 WBC 9.2  11/13/23 WBC 8.2   11/21/2023 WBC 8.8  TODAY---11/27/23 WBC 12.3 H   Patient seen today, she is in bed.      Calm, cooperative, pleasant.      Mentation at baseline.       No reports of fevers.     No complaints of cough or SOB.     No chest pain.     No dysuria.              DM type 2 with hyperglycemia; noncompliance with diabetic diet:            Patient's accuchecks are elevated.           Patient is frequently noncompliant with diet.           Eating some cookies today.           On Lantus 39 units at bedtime and Humalog sliding scale.           Recent accuchecks:  mg/dL,  mg/dL, mg/dL.            No episodes of hypoglycemia reported.           Ha1c trends:           5/1/23 Ha1c 8.1 %            10/17/23 Ha1c 8.3 %       Benign essential HTN; Chronic systolic HF:            On Amlodipine and hydrochlorothiazide.            No oxygen desaturations reported.       Recent /72.       No complaints of headaches or dizziness.          Sequela of Cerebrovascular accident;  ; previous CVA with right-sided hemiparesis; right sided weakness;   dysphagia 2/2 cva; expressive aphasia; History of acute/subacute stroke:   MRI was performed during a PREVIOUS hospitalization and it showed Acute/subacute tiny infarct in  the left anterior brock.   [place] [facility] [place] at that time.  On Eliquis 5 mg PO BID.  Pt has baseline dysarthria from a previous CVA, right Hemiparesis,  and expressive aphasia.  She is able to communicate in short sentences.  Patient is also able to nod appropriately to questions.   On a mechanical soft, thin consistency diet.    Constipation:  Patient was previously on Linzess, nursing reports that her insurance does not cover her Linzess.  Asking for alternative.  Patient states she had a BM yesterday.  No complaints of abdominal pain, nausea or vomiting today.  On Senna routinely and lactulose PRN.        Immobility; Generalized muscle weakness:  At  in long term care.  Not able to ambulate.  Very weak.  Bedbound/wheelchair bound.                        Reviewed medical, social, surgical and family history.  Reviewed all current medications and performed medication reconciliation.  Reviewed vital signs AND recent blood work results.  Performed prescription drug management.   Reviewed [facility] Documentation  Discussed patient with nursing.      ROS: 10 point ROS performed; negative unless noted in HPI.       LABS:  BMP: Date: 5/1/2023 Na 137 K 4.3 Cr 1.1 BUN 15 glucose 141 Ca 8.8 GFR 52  CBC: Date: 5/1/2023 WBC 8.4 Hgb 11.1 hematocrit 33.9 platelets 337  Liver function: Date: 5/1/2023 ALT 10 AST 29 alkaline phos.  63 bilirubin 0.4 albumin 3.6 protein 7.7       Ha1c 8.1 % on 5/1/23          BMP: Date: 5/15/2023 Na 139  K 3.4 Cr 1.2 BUN 20 glucose 122 Ca 8.8 GFR 57        CBC: Date: 5/15/2023 WBC 9 Hgb 10.2 hematocrit 30.9 platelets 277       Liver function: 5/15/2023 ALT 8 AST 28 alkaline phos.  57 bilirubin 0.5 albumin 3.5 protein 7.3          BMP: Date: 5/22/2023 Na 141 K 3.1 Cr 1 BUN 11 glucose 227 Ca 8.7 GFR 70        CBC: Date: 5/22/2023 WBC 8.2 Hgb 10 hematocrit 30.5 platelets 372        Liver function: Date: 5/22/2023 ALT 8 AST 22 alkaline phos.  63 bilirubin 0.3  albumin 3.2 protein 7.2     BMP: Date: 6/5/2023 Na 140 K 4.2 Cr 1.1 BUN 14 glucose 79 Ca 1.1 GFR 63  CBC: Date: 6/5/2023 WBC 8.1 Hgb 10.5 hematocrit 32.3 platelets 350  Liver function: Date: 6/5/2023 ALT 7 AST 25 alkaline phos.  59 bilirubin 0.4 albumin 3.3 protein 7.7    BMP: Date: 7/17/2023 Na 140 K 4.1 Cr 0.8 BUN 17 glucose 126 Ca 9.1 GFR 90  CBC: Date: 7/17/2023 WBC 14.1 Hgb 10.2 hematocrit 32.1 platelets 265  Liver function: Date: 7/17/2023 ALT 8 AST 23 alkaline phos.  72 bilirubin 0.5 albumin 3.4 protein 7.4    CBC: Date: 7/19/2023 WBC 10.7 Hgb 9.5 hematocrit 28.8 platelets 257    BMP: Date: 7/24/2023 Na 137 K 4.2 Cr 0.9 BUN 19 glucose 186 Ca 9.1 GFR 79  CBC: Date: 7/24/2023 WBC 11.6 Hgb 10.3 hematocrit 31.4 platelets 305  Liver function: Date: 7/24/2023 ALT 11 AST 27 alkaline phos.  77 bilirubin 0.3 albumin 3.6 protein 7.8    BMP: Date: 7/31/2023 Na 141 K 4.2 Cr 0.8 BUN 17 glucose 138 Ca 8.7 GFR 90  CBC: Date: 7/31/2023 WBC 12.3 Hgb 10.5 hematocrit 32.4 platelets 307  Liver function: Date: 7/31/2023 ALT 14 AST 24 alkaline phos.  81 bilirubin 0.4 albumin 3.5 protein 7.4    BMP: Date: 8/7/2023 Na 141 K 3.9 Cr 1.0 BUN 12 glucose 167 Ca 8.6 GFR 70  CBC: Date: 8/7/2023 WBC 10 Hgb 10.5 hematocrit 32.7 platelets 256  Liver function: Date: 8/7/2023 ALT 9 AST 23 alkaline phos.  76 bilirubin 0.5 albumin 3.3 protein 7    BMP: Date: 8/21/2023 Na 142 K 4.1 Cr 1 BUN 17 glucose 195 Ca 8.5 GFR 70  CBC: Date: 8/21/2023 WBC 10.9 Hgb 9.6 hematocrit 30 platelets 304  Liver function: Date: 8/21/2023 ALT 9 AST 24 alkaline phos.  74 bilirubin 0.4 albumin 3.2 protein 7.1    BMP: Date: 8/31/2023 Na 141 K 3.8 Cr 0.9 BUN 14 glucose 118 Ca 8.2 GFR 79  CBC: Date: 8/31/2023 WBC 12 Hgb 9.6 hematocrit 29.8 platelets 294  Liver function: Date: 8/31/2023 ALT 8 AST 21 alkaline phos.  64 bilirubin 0.4 albumin 3.1 protein 7    BMP: Date: 9/4/2023 Na 141 K 3.9 Cr 1.1 BUN 17 glucose 107 Ca 8.5 GFR 63   CBC: Date: 9/4/2023 WBC 12 Hgb 9.5 hematocrit  29.7 platelets 282  Liver function: Date: 9/4/2023 ALT 7 AST 22 alkaline phos.  64 bilirubin 0.4 albumin 3.3 protein 7.1    BMP: Date: 9/11/2023 Na 142 K 4 Cr 1 BUN 12 glucose 81 Ca 8.2 GFR 70  CBC: Date: 9/11/2023 WBC 8.7 Hgb 9.2 hematocrit 28.3 platelets 305  Liver function: Date: 9/11/2023 ALT 7 AST 22 alkaline phos.  61 bilirubin 0.3 albumin 3 protein 6.8    BMP: Date: 9/18/2023 Na 140 K 4 Cr 1 BUN 15 glucose 150 Ca 8.3 GFR 70  CBC: Date: 9/18/2023 WBC 12.1 Hgb 9.5 hematocrit 29.8 platelets 287  Liver function: Date: 9/18/2023 ALT 7 AST 23 alkaline phos.  62 bilirubin 0.4 albumin 3.4 protein 7.1    10/17/23 Ha1c 8.3 %    BMP: Date: 10/23/2023 Na 141 K 3.6 Cr 1 BUN 22 glucose 65 Ca 8.3 GFR 70  CBC: Date: 10/23/2023 WBC 12.5 Hgb 10.8  hematocrit 33.2 platelets 290  Liver function: Date: 10/23/2023 ALT 9 AST 31 alkaline phos.  66 bilirubin 0.6 albumin 3.6 protein 7.8    10/26/2023: Lipid panel: Cholesterol 111; triglycerides 111; HDL 33; LDL 56; VLDL 22.    10/26/2023: TSH: 1.081 (range 0.340-5.500).    BMP: Date: 10/30/2023 Na 143  K 3.5 Cr 0.9 BUN 11 glucose 171 Ca 8 GFR 65  CBC: Date: 10/30/2023 WBC 9.2 Hgb 10.1 hematocrit 31.1 platelets 280  Liver function: Date: 10/30/2023 ALT 11 AST 27 alkaline phos.  66 bilirubin 0.4 albumin 3.4 protein 7.2    BMP: Date: 11/13/2023 Na 138 K 3.9 Cr 0.9 BUN 11 glucose 308 Ca 8.4 GFR 79  CBC: Date: 11/13/2023 WBC 8.2 Hgb 10.6 hematocrit 33 platelets 295  Liver function: Date: 11/13/2023 ALT 12 AST 37 alkaline phos.  69 bilirubin 0.4 albumin 3.2 protein 7.1    BMP: Date: 11/21/2023 Na 142 K 3.6 Cr 0.9 BUN 14 glucose 200 Ca 8.4 GFR 79.  CBC: Date: 11/21/2023 WBC 8.8 Hgb 10.1 hematocrit 30.4 platelets 290    RECENT LABS:  BMP: Date: 11/27/2023 Na 138 K 3.4 Cr 1 BUN 12 glucose 54 Ca 8.7 GFR 70  CBC: Date: 11/27/2023 WBC 12.3 Hgb 10.6 hematocrit 32.4 platelets 307  Liver function: Date: 11/27/2023 ALT 7 AST 27 alkaline phos.  72 bilirubin 0.5 albumin 3.3 protein 7.7        Past  Medical History:   Diagnosis Date    Cervicalgia     Neck pain    Essential (primary) hypertension 07/21/2013    Hypertension    Pain in unspecified shoulder     Pain, joint, shoulder    Personal history of other diseases of the digestive system 07/06/2022    History of ulcerative colitis    Personal history of other diseases of the digestive system     History of esophageal reflux    Personal history of other diseases of the nervous system and sense organs     History of cataract    Personal history of other diseases of the nervous system and sense organs     History of glaucoma    Personal history of other diseases of the respiratory system     History of bronchitis    Personal history of other endocrine, nutritional and metabolic disease     History of diabetes mellitus    Personal history of other endocrine, nutritional and metabolic disease     History of hyperlipidemia    Personal history of other mental and behavioral disorders     History of depression    Snoring     Snoring    Unilateral primary osteoarthritis, unspecified knee 11/02/2015    Arthritis of knee           Past Surgical History:   Procedure Laterality Date    CHOLECYSTECTOMY  01/08/2016    Cholecystectomy    COLON SURGERY  01/08/2016    Colon Surgery    CT ANGIO NECK W AND WO IV CONTRAST  3/19/2019    CT NECK ANGIO W AND WO IV CONTRAST 3/19/2019 [facility]ERGENCY LEGACY    CT HEAD ANGIO W AND WO IV CONTRAST  3/19/2019    CT HEAD ANGIO W AND WO IV CONTRAST 3/19/2019 [facility] EMERGENCY LEGACY    HERNIA REPAIR  01/08/2016    Hernia Repair    HYSTERECTOMY  01/08/2016    Hysterectomy    MR HEAD ANGIO WO IV CONTRAST  6/4/2018    MR HEAD ANGIO WO IV CONTRAST 6/4/2018 [facility] CLINICAL LEGACY    MR HEAD ANGIO WO IV CONTRAST  7/11/2020    MR HEAD ANGIO WO IV CONTRAST 7/11/2020 [facility] CLINICAL LEGACY    MR HEAD ANGIO WO IV CONTRAST  9/30/2020    MR HEAD ANGIO WO IV CONTRAST 9/30/2020 [facility]    MR HEAD ANGIO WO IV CONTRAST  5/22/2022    MR HEAD ANGIO WO IV  "CONTRAST 5/22/2022 [facility] CLINICAL LEGACY    MR HEAD ANGIO WO IV CONTRAST  4/19/2023    MR HEAD ANGIO WO IV CONTRAST [facility]    MR NECK ANGIO WO IV CONTRAST  6/4/2018    MR NECK ANGIO WO IV CONTRAST 6/4/2018 [facility] CLINICAL LEGACY    MR NECK ANGIO WO IV CONTRAST  7/11/2020    MR NECK ANGIO WO IV CONTRAST 7/11/2020 [facility] CLINICAL LEGACY    MR NECK ANGIO WO IV CONTRAST  9/30/2020    MR NECK ANGIO WO IV CONTRAST 9/30/2020 [facility] LEGAC[facility]    MR NECK ANGIO WO IV CONTRAST  5/22/2022    MR NECK ANGIO WO IV CONTRAST 5/22/2022 [facility] CLINICAL LEGACY    MR NECK ANGIO WO IV CONTRAST  4/19/2023    MR NECK ANGIO WO IV CONTRAST [facility] MRI    OTHER SURGICAL HISTORY  01/08/2016    Tubal Stabilization    OTHER SURGICAL HISTORY  09/01/2016    Cervical Surgery (Gyn) Laser Vaporization Of Transformation Zone    THYROID SURGERY  09/27/2013    Thyroid Surgery            Surgical History  Problems    · History of Cervical Surgery (Gyn) Laser Vaporization Of Transformation Zone   · History of Cholecystectomy   · History of Colon Surgery   · History of Hernia Repair   · History of Hysterectomy   · History of Thyroid Surgery   · History of Tubal Stabilization     Family History  Mother    · Family history of Cancer  Family History    · Family history of Diabetes Mellitus (V18.0)   · Family history of Hypertension (V17.49)     Social History  Problems    · Disabled   · Does not use illicit drugs (V49.89) (Z78.9)   · Former smoker (V15.82) (Z87.891)   · Marital History - Single   · No alcohol.   · Denied: History of Secondhand smoke exposure   · Single     Allergies  Medication    · Aspirin TABS   · Contrast Media Ready-Box MISC   · morphine   · Sulfa Drugs        /72   Pulse 84   Temp 36.7 °C (98 °F)   Resp 18   Ht 1.372 m (4' 6\")   Wt 108 kg (238 lb)   SpO2 97%   BMI 57.38 kg/m²      Physical Exam  Vitals and nursing note reviewed.   Constitutional:       Appearance: Normal appearance.   HENT:      Head: Normocephalic.      Right Ear: " External ear normal.      Left Ear: External ear normal.      Nose: Nose normal.      Mouth/Throat:      Mouth: Mucous membranes are moist.      Pharynx: Oropharynx is clear.   Eyes:      Extraocular Movements: Extraocular movements intact.      Conjunctiva/sclera: Conjunctivae normal.      Pupils: Pupils are equal, round, and reactive to light.   Cardiovascular:      Rate and Rhythm: Normal rate and regular rhythm.      Pulses: Normal pulses.      Heart sounds: Normal heart sounds.   Pulmonary:      Effort: Pulmonary effort is normal.      Breath sounds: Normal breath sounds.   Abdominal:      General: Bowel sounds are normal.      Palpations: Abdomen is soft.   Musculoskeletal:      Cervical back: Normal range of motion and neck supple.      Comments: Generalized muscle weakness; immobility.    Right sided hemiparesis   Skin:     General: Skin is warm and dry.   Neurological:      Mental Status: She is alert. Mental status is at baseline.      Motor: Weakness present.      Coordination: Coordination abnormal.      Gait: Gait abnormal.      Comments: Right sided weakness--RLE 1/5; RUE 3/5; sensation intact to touch x 4 extremities.     Left sided facial droop.     Generalized muscle weakness   Psychiatric:         Mood and Affect: Mood normal.         Behavior: Behavior normal.          Assessment/Plan    Ordered BMP and CBC with diff. In 3 days.         Leukocytosis:     Ordered chest x-ray a/p lateral STAT     Ordered UA C & S STAT     Ordered follow up CBC with diff.  in 3 days.        Monitor for fevers.        DM type 2 with hyperglycemia; noncompliance with diabetic diet:            DC Lantus 39 units at bedtime            Start Lantus 40 units at bedtime.           Continue Humalog sliding scale.           Monitor Ha1c q 3 months.            Benign essential HTN; Chronic systolic HF:            Continue Amlodipine and hydrochlorothiazide.           Monitor Bps.               Sequela of Cerebrovascular  accident ; previous CVA with right-sided hemiparesis; right sided weakness;   dysphagia 2/2 cva; expressive aphasia; History of acute/subacute stroke:   MRI was performed during a PREVIOUS hospitalization and it showed Acute/subacute tiny infarct in the left anterior brock.   Dr. Jiang from [facility] [place] at that time.  Continue Eliquis 5 mg PO BID.  Pt has baseline dysarthria from a previous CVA, right Hemiparesis,  and expressive aphasia.  She is able to communicate in short sentences.  Patient is also able to nod appropriately to questions.   Continue mechanical soft, thin consistency diet.    Constipation:  Patient was previously on Linzess, nursing reports that her insurance does not cover her Linzess.  Asking for alternative.   Start Miralax 17 gram PO every day.  Continue Senna routinely and lactulose PRN.  Monitor for constipation.         Immobility; Generalized muscle weakness:  Continue at   in long term care.  Not able to ambulate.  Bedbound/wheelchair bound.   Fall prevention strategies.      Problem List Items Addressed This Visit    None  Visit Diagnoses       Leukocytosis, unspecified type    -  Primary    Type 2 diabetes mellitus with hyperglycemia, with long-term current use of insulin (CMS/[facility])        Noncompliance with dietary restriction        Benign essential HTN        Chronic systolic heart failure (CMS/HCC)        Sequela of cerebrovascular accident        Recent cerebrovascular accident        Hemiparesis affecting right side as late effect of stroke (CMS/[facility])        Dysphagia, unspecified type        Expressive aphasia        Constipation, unspecified constipation type        Immobility        Generalized muscle weakness            Maria Del Carmen Garduno, APRN-CNP

## 2023-12-04 ENCOUNTER — NURSING HOME VISIT (OUTPATIENT)
Dept: POST ACUTE CARE | Facility: EXTERNAL LOCATION | Age: 53
End: 2023-12-04
Payer: MEDICAID

## 2023-12-04 DIAGNOSIS — I10 BENIGN ESSENTIAL HTN: ICD-10-CM

## 2023-12-04 DIAGNOSIS — Z91.119 NONCOMPLIANCE WITH DIETARY RESTRICTION: ICD-10-CM

## 2023-12-04 DIAGNOSIS — Z86.73 RECENT CEREBROVASCULAR ACCIDENT: ICD-10-CM

## 2023-12-04 DIAGNOSIS — E11.65 TYPE 2 DIABETES MELLITUS WITH HYPERGLYCEMIA, WITH LONG-TERM CURRENT USE OF INSULIN (MULTI): ICD-10-CM

## 2023-12-04 DIAGNOSIS — R47.01 EXPRESSIVE APHASIA: ICD-10-CM

## 2023-12-04 DIAGNOSIS — I69.351 HEMIPARESIS AFFECTING RIGHT SIDE AS LATE EFFECT OF STROKE (MULTI): ICD-10-CM

## 2023-12-04 DIAGNOSIS — Z74.09 IMMOBILITY: ICD-10-CM

## 2023-12-04 DIAGNOSIS — M62.81 GENERALIZED MUSCLE WEAKNESS: ICD-10-CM

## 2023-12-04 DIAGNOSIS — Z79.4 TYPE 2 DIABETES MELLITUS WITH HYPERGLYCEMIA, WITH LONG-TERM CURRENT USE OF INSULIN (MULTI): ICD-10-CM

## 2023-12-04 DIAGNOSIS — I69.30 SEQUELA OF CEREBROVASCULAR ACCIDENT: ICD-10-CM

## 2023-12-04 DIAGNOSIS — R13.10 DYSPHAGIA, UNSPECIFIED TYPE: ICD-10-CM

## 2023-12-04 DIAGNOSIS — D72.829 LEUKOCYTOSIS, UNSPECIFIED TYPE: Primary | ICD-10-CM

## 2023-12-04 DIAGNOSIS — E03.9 HYPOTHYROIDISM, UNSPECIFIED TYPE: ICD-10-CM

## 2023-12-04 DIAGNOSIS — I50.22 CHRONIC SYSTOLIC HEART FAILURE (MULTI): ICD-10-CM

## 2023-12-04 PROCEDURE — 99309 SBSQ NF CARE MODERATE MDM 30: CPT | Performed by: NURSE PRACTITIONER

## 2023-12-04 NOTE — LETTER
Patient: Janette Martinez  : 1970    Encounter Date: 2023    Name: Janette Martinez    YOB: 1970    Code Status: DNRcc arrest    Chief complaint:  Follow up on leukocytosis; etc.....      HPI:    52-year-old female with past medical history of heart failure with reduced EF,   diabetes, hypertension, hyperlipidemia,        CVA (left MCA  with residual right-sided hemiparesis), GERD, HIV, hypothyroidism, hypertension,   and diabetes.        Patient re-admitted to [facility]. On 10/20/23 after her most recent hospitalization.    Diagnoses as follows:    Leukocytosis:  Patient had leukocytosis during her recent hospitalization, it had resolved, then last week was elevated again.  Chest x-ray was performed, it was negative.  Recent WBC results:  10/23/23 WBC 12.5 H  10/30/23 WBC 9.2  23 WBC 8.2   2023 WBC 8.8        23 WBC 12.3 H        23 WBC 8.5         WBC improved today.        UA C & S was ordered last week, results pending.                     Patient seen today, she is in bed.      Calm, cooperative, pleasant.      Mentation at baseline.       No reports of fevers.     No complaints of cough or SOB.     No chest pain.     No dysuria.              DM type 2 with hyperglycemia; noncompliance with diabetic diet:            Patient's accuchecks are elevated.           Patient is frequently noncompliant with diet.           Eating some cookies today.           On Lantus 40 units at bedtime and Humalog sliding scale.           Recent accuchecks: 264 mg/dL, 288 mg/dL, 323 mg/dL.            No episodes of hypoglycemia reported.           Ha1c trends:           23 Ha1c 8.1 %            10/17/23 Ha1c 8.3 %            2023 hemoglobin A1c 8.9%        Hypothyroidism:     On levothyroxine 200 mcg.    10/26/2023: TSH: 1.081 (range 0.340-5.500).    Pharmacy recommendation to recheck TSH due to dose change of levothyroxine when she was in the hospital in  October, 2023.      Chronic systolic HF; Benign essential HTN :            On Amlodipine and hydrochlorothiazide.            No oxygen desaturations reported.       Recent /72.       No complaints of headaches or dizziness.          Sequela of Cerebrovascular accident;  ; previous CVA with right-sided hemiparesis; right sided weakness;   dysphagia 2/2 cva; expressive aphasia; History of acute/subacute stroke:   MRI was performed during a PREVIOUS hospitalization and it showed Acute/subacute tiny infarct in the left anterior brock.   Dr. Jiang from [facility] [place] neuro/stroke was consulted at that time.  On Eliquis 5 mg PO BID.  Pt has baseline dysarthria from a previous CVA, right Hemiparesis,  and expressive aphasia.  She is able to communicate in short sentences.  Patient is also able to nod appropriately to questions.   On a mechanical soft, thin consistency diet.         Immobility; Generalized muscle weakness:  At  in long term care.  Not able to ambulate.  Very weak.  Bedbound/wheelchair bound.                        Reviewed medical, social, surgical and family history.  Reviewed all current medications and performed medication reconciliation.  Reviewed vital signs AND recent blood work results.  Performed prescription drug management.   Reviewed [facility] Documentation  Discussed patient with nursing.      ROS: 10 point ROS performed; negative unless noted in HPI.       LABS:  BMP: Date: 5/1/2023 Na 137 K 4.3 Cr 1.1 BUN 15 glucose 141 Ca 8.8 GFR 52  CBC: Date: 5/1/2023 WBC 8.4 Hgb 11.1 hematocrit 33.9 platelets 337  Liver function: Date: 5/1/2023 ALT 10 AST 29 alkaline phos.  63 bilirubin 0.4 albumin 3.6 protein 7.7       Ha1c 8.1 % on 5/1/23          BMP: Date: 5/15/2023 Na 139  K 3.4 Cr 1.2 BUN 20 glucose 122 Ca 8.8 GFR 57        CBC: Date: 5/15/2023 WBC 9 Hgb 10.2 hematocrit 30.9 platelets 277       Liver function: 5/15/2023 ALT 8 AST 28 alkaline phos.  57 bilirubin 0.5 albumin 3.5 protein  7.3          BMP: Date: 5/22/2023 Na 141 K 3.1 Cr 1 BUN 11 glucose 227 Ca 8.7 GFR 70        CBC: Date: 5/22/2023 WBC 8.2 Hgb 10 hematocrit 30.5 platelets 372        Liver function: Date: 5/22/2023 ALT 8 AST 22 alkaline phos.  63 bilirubin 0.3 albumin 3.2 protein 7.2     BMP: Date: 6/5/2023 Na 140 K 4.2 Cr 1.1 BUN 14 glucose 79 Ca 1.1 GFR 63  CBC: Date: 6/5/2023 WBC 8.1 Hgb 10.5 hematocrit 32.3 platelets 350  Liver function: Date: 6/5/2023 ALT 7 AST 25 alkaline phos.  59 bilirubin 0.4 albumin 3.3 protein 7.7    BMP: Date: 7/17/2023 Na 140 K 4.1 Cr 0.8 BUN 17 glucose 126 Ca 9.1 GFR 90  CBC: Date: 7/17/2023 WBC 14.1 Hgb 10.2 hematocrit 32.1 platelets 265  Liver function: Date: 7/17/2023 ALT 8 AST 23 alkaline phos.  72 bilirubin 0.5 albumin 3.4 protein 7.4    CBC: Date: 7/19/2023 WBC 10.7 Hgb 9.5 hematocrit 28.8 platelets 257    BMP: Date: 7/24/2023 Na 137 K 4.2 Cr 0.9 BUN 19 glucose 186 Ca 9.1 GFR 79  CBC: Date: 7/24/2023 WBC 11.6 Hgb 10.3 hematocrit 31.4 platelets 305  Liver function: Date: 7/24/2023 ALT 11 AST 27 alkaline phos.  77 bilirubin 0.3 albumin 3.6 protein 7.8    BMP: Date: 7/31/2023 Na 141 K 4.2 Cr 0.8 BUN 17 glucose 138 Ca 8.7 GFR 90  CBC: Date: 7/31/2023 WBC 12.3 Hgb 10.5 hematocrit 32.4 platelets 307  Liver function: Date: 7/31/2023 ALT 14 AST 24 alkaline phos.  81 bilirubin 0.4 albumin 3.5 protein 7.4    BMP: Date: 8/7/2023 Na 141 K 3.9 Cr 1.0 BUN 12 glucose 167 Ca 8.6 GFR 70  CBC: Date: 8/7/2023 WBC 10 Hgb 10.5 hematocrit 32.7 platelets 256  Liver function: Date: 8/7/2023 ALT 9 AST 23 alkaline phos.  76 bilirubin 0.5 albumin 3.3 protein 7    BMP: Date: 8/21/2023 Na 142 K 4.1 Cr 1 BUN 17 glucose 195 Ca 8.5 GFR 70  CBC: Date: 8/21/2023 WBC 10.9 Hgb 9.6 hematocrit 30 platelets 304  Liver function: Date: 8/21/2023 ALT 9 AST 24 alkaline phos.  74 bilirubin 0.4 albumin 3.2 protein 7.1    BMP: Date: 8/31/2023 Na 141 K 3.8 Cr 0.9 BUN 14 glucose 118 Ca 8.2 GFR 79  CBC: Date: 8/31/2023 WBC 12 Hgb 9.6 hematocrit  29.8 platelets 294  Liver function: Date: 8/31/2023 ALT 8 AST 21 alkaline phos.  64 bilirubin 0.4 albumin 3.1 protein 7    BMP: Date: 9/4/2023 Na 141 K 3.9 Cr 1.1 BUN 17 glucose 107 Ca 8.5 GFR 63   CBC: Date: 9/4/2023 WBC 12 Hgb 9.5 hematocrit 29.7 platelets 282  Liver function: Date: 9/4/2023 ALT 7 AST 22 alkaline phos.  64 bilirubin 0.4 albumin 3.3 protein 7.1    BMP: Date: 9/11/2023 Na 142 K 4 Cr 1 BUN 12 glucose 81 Ca 8.2 GFR 70  CBC: Date: 9/11/2023 WBC 8.7 Hgb 9.2 hematocrit 28.3 platelets 305  Liver function: Date: 9/11/2023 ALT 7 AST 22 alkaline phos.  61 bilirubin 0.3 albumin 3 protein 6.8    BMP: Date: 9/18/2023 Na 140 K 4 Cr 1 BUN 15 glucose 150 Ca 8.3 GFR 70  CBC: Date: 9/18/2023 WBC 12.1 Hgb 9.5 hematocrit 29.8 platelets 287  Liver function: Date: 9/18/2023 ALT 7 AST 23 alkaline phos.  62 bilirubin 0.4 albumin 3.4 protein 7.1    10/17/23 Ha1c 8.3 %    BMP: Date: 10/23/2023 Na 141 K 3.6 Cr 1 BUN 22 glucose 65 Ca 8.3 GFR 70  CBC: Date: 10/23/2023 WBC 12.5 Hgb 10.8  hematocrit 33.2 platelets 290  Liver function: Date: 10/23/2023 ALT 9 AST 31 alkaline phos.  66 bilirubin 0.6 albumin 3.6 protein 7.8    10/26/2023: Lipid panel: Cholesterol 111; triglycerides 111; HDL 33; LDL 56; VLDL 22.    10/26/2023: TSH: 1.081 (range 0.340-5.500).    BMP: Date: 10/30/2023 Na 143  K 3.5 Cr 0.9 BUN 11 glucose 171 Ca 8 GFR 65  CBC: Date: 10/30/2023 WBC 9.2 Hgb 10.1 hematocrit 31.1 platelets 280  Liver function: Date: 10/30/2023 ALT 11 AST 27 alkaline phos.  66 bilirubin 0.4 albumin 3.4 protein 7.2    BMP: Date: 11/13/2023 Na 138 K 3.9 Cr 0.9 BUN 11 glucose 308 Ca 8.4 GFR 79  CBC: Date: 11/13/2023 WBC 8.2 Hgb 10.6 hematocrit 33 platelets 295  Liver function: Date: 11/13/2023 ALT 12 AST 37 alkaline phos.  69 bilirubin 0.4 albumin 3.2 protein 7.1    BMP: Date: 11/21/2023 Na 142 K 3.6 Cr 0.9 BUN 14 glucose 200 Ca 8.4 GFR 79.  CBC: Date: 11/21/2023 WBC 8.8 Hgb 10.1 hematocrit 30.4 platelets 290    BMP: Date: 11/27/2023 Na 138 K  3.4 Cr 1 BUN 12 glucose 54 Ca 8.7 GFR 70  CBC: Date: 11/27/2023 WBC 12.3 Hgb 10.6 hematocrit 32.4 platelets 307  Liver function: Date: 11/27/2023 ALT 7 AST 27 alkaline phos.  72 bilirubin 0.5 albumin 3.3 protein 7.7    BMP: Date: 12/4/2023 Na 141 K 4 Cr 1 BUN 12 glucose 209 Ca 8.5 GFR 70  CBC: Date: 12/4/2023 WBC 8.5 Hgb 10.6 hematocrit 32.7 platelets 326  Liver function: Date: 12/4/2023 ALT 8 AST 25 alkaline phos.  64 bilirubin 0.4 albumin 3.3 protein 7.1  12/4/2023 hemoglobin A1c 8.9%          Past Medical History:   Diagnosis Date   • Cervicalgia     Neck pain   • Essential (primary) hypertension 07/21/2013    Hypertension   • Pain in unspecified shoulder     Pain, joint, shoulder   • Personal history of other diseases of the digestive system 07/06/2022    History of ulcerative colitis   • Personal history of other diseases of the digestive system     History of esophageal reflux   • Personal history of other diseases of the nervous system and sense organs     History of cataract   • Personal history of other diseases of the nervous system and sense organs     History of glaucoma   • Personal history of other diseases of the respiratory system     History of bronchitis   • Personal history of other endocrine, nutritional and metabolic disease     History of diabetes mellitus   • Personal history of other endocrine, nutritional and metabolic disease     History of hyperlipidemia   • Personal history of other mental and behavioral disorders     History of depression   • Snoring     Snoring   • Unilateral primary osteoarthritis, unspecified knee 11/02/2015    Arthritis of knee           Past Surgical History:   Procedure Laterality Date   • CHOLECYSTECTOMY  01/08/2016    Cholecystectomy   • COLON SURGERY  01/08/2016    Colon Surgery   • CT ANGIO NECK W AND WO IV CONTRAST  3/19/2019    CT NECK ANGIO W AND WO IV CONTRAST 3/19/2019 [facility] [place] [facility]   • CT HEAD ANGIO W AND WO IV CONTRAST  3/19/2019    CT HEAD ANGIO  W AND WO IV CONTRAST 3/19/2019 [facility] [place] [facility]   • HERNIA REPAIR  01/08/2016    Hernia Repair   • HYSTERECTOMY  01/08/2016    Hysterectomy   • MR HEAD ANGIO WO IV CONTRAST  6/4/2018    MR HEAD ANGIO WO IV CONTRAST 6/4/2018 [facility] CLINICAL LEGACY   • MR HEAD ANGIO WO IV CONTRAST  7/11/2020    MR HEAD ANGIO WO IV CONTRAST 7/11/2020 [facility] CLINICAL LEGACY   • MR HEAD ANGIO WO IV CONTRAST  9/30/2020    MR HEAD ANGIO WO IV CONTRAST 9/30/2020 [facility]GAC[facility]   • MR HEAD ANGIO WO IV CONTRAST  5/22/2022    MR HEAD ANGIO WO IV CONTRAST 5/22/2022 [facility] CLINICAL LEGACY   • MR HEAD ANGIO WO IV CONTRAST  4/19/2023    MR HEAD ANGIO WO IV CONTRAST [facility] MRI   • MR NECK ANGIO WO IV CONTRAST  6/4/2018    MR NECK ANGIO WO IV CONTRAST 6/4/2018 [facility] CLINICAL LEGACY   • MR NECK ANGIO WO IV CONTRAST  7/11/2020    MR NECK ANGIO WO IV CONTRAST 7/11/2020 [facility] CLINICAL LEGACY   • MR NECK ANGIO WO IV CONTRAST  9/30/2020    MR NECK ANGIO WO IV CONTRAST 9/30/2020 [facility]   • MR NECK ANGIO WO IV CONTRAST  5/22/2022    MR NECK ANGIO WO IV CONTRAST 5/22/2022 [facility] CLINICAL LEGACY   • MR NECK ANGIO WO IV CONTRAST  4/19/2023    MR NECK ANGIO WO IV CONTRAST [facility] MRI   • OTHER SURGICAL HISTORY  01/08/2016    Tubal Stabilization   • OTHER SURGICAL HISTORY  09/01/2016    Cervical Surgery (Gyn) Laser Vaporization Of Transformation Zone   • THYROID SURGERY  09/27/2013    Thyroid Surgery            Surgical History  Problems    · History of Cervical Surgery (Gyn) Laser Vaporization Of Transformation Zone   · History of Cholecystectomy   · History of Colon Surgery   · History of Hernia Repair   · History of Hysterectomy   · History of Thyroid Surgery   · History of Tubal Stabilization     Family History  Mother    · Family history of Cancer  Family History    · Family history of Diabetes Mellitus (V18.0)   · Family history of Hypertension (V17.49)     Social History  Problems    · Disabled   · Does not use illicit drugs (V49.89) (Z78.9)   · Former smoker  "(V15.82) (Z87.891)   · Marital History - Single   · No alcohol.   · Denied: History of Secondhand smoke exposure   · Single     Allergies  Medication    · Aspirin TABS   · Contrast Media Ready-Box MISC   · morphine   · Sulfa Drugs        /72   Pulse 84   Temp 36.7 °C (98 °F)   Resp 18   Ht 1.372 m (4' 6\")   Wt 108 kg (238 lb)   SpO2 97%   BMI 57.38 kg/m²      Physical Exam  Vitals and nursing note reviewed.   Constitutional:       Appearance: Normal appearance.   HENT:      Head: Normocephalic.      Right Ear: External ear normal.      Left Ear: External ear normal.      Nose: Nose normal.      Mouth/Throat:      Mouth: Mucous membranes are moist.      Pharynx: Oropharynx is clear.   Eyes:      Extraocular Movements: Extraocular movements intact.      Conjunctiva/sclera: Conjunctivae normal.      Pupils: Pupils are equal, round, and reactive to light.   Cardiovascular:      Rate and Rhythm: Normal rate and regular rhythm.      Pulses: Normal pulses.      Heart sounds: Normal heart sounds.   Pulmonary:      Effort: Pulmonary effort is normal.      Breath sounds: Normal breath sounds.   Abdominal:      General: Bowel sounds are normal.      Palpations: Abdomen is soft.   Musculoskeletal:      Cervical back: Normal range of motion and neck supple.      Comments: Generalized muscle weakness; immobility.    Right sided hemiparesis   Skin:     General: Skin is warm and dry.   Neurological:      Mental Status: She is alert. Mental status is at baseline.      Motor: Weakness present.      Coordination: Coordination abnormal.      Gait: Gait abnormal.      Comments: Right sided weakness--RLE 1/5; RUE 3/5; sensation intact to touch x 4 extremities.     Left sided facial droop.     Generalized muscle weakness   Psychiatric:         Mood and Affect: Mood normal.         Behavior: Behavior normal.          Assessment/Plan   Ordered BMP and CBC with diff., TSH for tomorrow.         Leukocytosis:     Monitor WBC.     " Monitor for fevers.       DM type 2 with hyperglycemia; noncompliance with diabetic diet:            DC Lantus 40 units at bedtime            Start Lantus 43 units at bedtime.           Continue Humalog sliding scale.           Monitor Ha1c q 3 months.    Hypothyroidism:    Continue levothyroxine 200 mcg.    Ordered TSH level for tomorrow.    Pharmacy recommendation to recheck TSH due to dose change of levothyroxine when she was in the hospital in October,             Chronic systolic HF; Benign essential HTN :            Continue Amlodipine and hydrochlorothiazide.           Monitor Bps.               Sequela of Cerebrovascular accident ; previous CVA with right-sided hemiparesis; right sided weakness;   dysphagia 2/2 cva; expressive aphasia; History of acute/subacute stroke:   MRI was performed during a PREVIOUS hospitalization and it showed Acute/subacute tiny infarct in the left anterior brock.   Follow up with Dr. Jiang from [facility] [place] neuro/stroke PRN.  Continue Eliquis 5 mg PO BID.  Pt has baseline dysarthria from a previous CVA, right Hemiparesis,  and expressive aphasia.  Continue mechanical soft, thin consistency diet.          Immobility; Generalized muscle weakness:  Continue at   in long term care.  Not able to ambulate.  Bedbound/wheelchair bound.   Fall prevention strategies.      Problem List Items Addressed This Visit    None  Visit Diagnoses       Leukocytosis, unspecified type    -  Primary    Type 2 diabetes mellitus with hyperglycemia, with long-term current use of insulin (CMS/HCC)        Noncompliance with dietary restriction        Hypothyroidism, unspecified type        Chronic systolic heart failure (CMS/HCC)        Benign essential HTN        Sequela of cerebrovascular accident        Recent cerebrovascular accident        Hemiparesis affecting right side as late effect of stroke (CMS/HCC)        Dysphagia, unspecified type        Expressive aphasia        Immobility        Generalized  muscle weakness            JASON Espitia       Electronically Signed By: JASON Espitia   12/5/23  6:59 PM

## 2023-12-05 VITALS
DIASTOLIC BLOOD PRESSURE: 72 MMHG | BODY MASS INDEX: 55.08 KG/M2 | OXYGEN SATURATION: 97 % | TEMPERATURE: 98 F | HEIGHT: 55 IN | SYSTOLIC BLOOD PRESSURE: 136 MMHG | HEART RATE: 84 BPM | WEIGHT: 238 LBS | RESPIRATION RATE: 18 BRPM

## 2023-12-05 NOTE — PROGRESS NOTES
Name: Janette Martinez    YOB: 1970    Code Status: DNRcc arrest    Chief complaint:  Follow up on leukocytosis; etc.....      HPI:    52-year-old female with past medical history of heart failure with reduced EF,   diabetes, hypertension, hyperlipidemia,        CVA (left MCA 2022 with residual right-sided hemiparesis), GERD, HIV, hypothyroidism, hypertension,   and diabetes.        Patient re-admitted to [facility] On 10/20/23 after her most recent hospitalization.    Diagnoses as follows:    Leukocytosis:  Patient had leukocytosis during her recent hospitalization, it had resolved, then last week was elevated again.  Chest x-ray was performed, it was negative.  Recent WBC results:  10/23/23 WBC 12.5 H  10/30/23 WBC 9.2  11/13/23 WBC 8.2   11/21/2023 WBC 8.8        11/27/23 WBC 12.3 H        12/4/23 WBC 8.5         WBC improved today.        UA C & S was ordered last week, results pending.                     Patient seen today, she is in bed.      Calm, cooperative, pleasant.      Mentation at baseline.       No reports of fevers.     No complaints of cough or SOB.     No chest pain.     No dysuria.              DM type 2 with hyperglycemia; noncompliance with diabetic diet:            Patient's accuchecks are elevated.           Patient is frequently noncompliant with diet.           Eating some cookies today.           On Lantus 40 units at bedtime and Humalog sliding scale.           Recent accuchecks: 264 mg/dL, 288 mg/dL, 323 mg/dL.            No episodes of hypoglycemia reported.           Ha1c trends:           5/1/23 Ha1c 8.1 %            10/17/23 Ha1c 8.3 %            12/4/2023 hemoglobin A1c 8.9%        Hypothyroidism:     On levothyroxine 200 mcg.    10/26/2023: TSH: 1.081 (range 0.340-5.500).    Pharmacy recommendation to recheck TSH due to dose change of levothyroxine when she was in the hospital in October, 2023.      Chronic systolic HF; Benign essential HTN :             On Amlodipine and hydrochlorothiazide.            No oxygen desaturations reported.       Recent /72.       No complaints of headaches or dizziness.          Sequela of Cerebrovascular accident;  ; previous CVA with right-sided hemiparesis; right sided weakness;   dysphagia 2/2 cva; expressive aphasia; History of acute/subacute stroke:   MRI was performed during a PREVIOUS hospitalization and it showed Acute/subacute tiny infarct in the left anterior brock.   Dr. Jiang from [facility] neuro/stroke was consulted at that time.  On Eliquis 5 mg PO BID.  Pt has baseline dysarthria from a previous CVA, right Hemiparesis,  and expressive aphasia.  She is able to communicate in short sentences.  Patient is also able to nod appropriately to questions.   On a mechanical soft, thin consistency diet.         Immobility; Generalized muscle weakness:  At  in long term care.  Not able to ambulate.  Very weak.  Bedbound/wheelchair bound.                        Reviewed medical, social, surgical and family history.  Reviewed all current medications and performed medication reconciliation.  Reviewed vital signs AND recent blood work results.  Performed prescription drug management.   Reviewed [facility] Documentation  Discussed patient with nursing.      ROS: 10 point ROS performed; negative unless noted in HPI.       LABS:  BMP: Date: 5/1/2023 Na 137 K 4.3 Cr 1.1 BUN 15 glucose 141 Ca 8.8 GFR 52  CBC: Date: 5/1/2023 WBC 8.4 Hgb 11.1 hematocrit 33.9 platelets 337  Liver function: Date: 5/1/2023 ALT 10 AST 29 alkaline phos.  63 bilirubin 0.4 albumin 3.6 protein 7.7       Ha1c 8.1 % on 5/1/23          BMP: Date: 5/15/2023 Na 139  K 3.4 Cr 1.2 BUN 20 glucose 122 Ca 8.8 GFR 57        CBC: Date: 5/15/2023 WBC 9 Hgb 10.2 hematocrit 30.9 platelets 277       Liver function: 5/15/2023 ALT 8 AST 28 alkaline phos.  57 bilirubin 0.5 albumin 3.5 protein 7.3          BMP: Date: 5/22/2023 Na 141 K 3.1 Cr 1 BUN 11 glucose 227 Ca 8.7  GFR 70        CBC: Date: 5/22/2023 WBC 8.2 Hgb 10 hematocrit 30.5 platelets 372        Liver function: Date: 5/22/2023 ALT 8 AST 22 alkaline phos.  63 bilirubin 0.3 albumin 3.2 protein 7.2     BMP: Date: 6/5/2023 Na 140 K 4.2 Cr 1.1 BUN 14 glucose 79 Ca 1.1 GFR 63  CBC: Date: 6/5/2023 WBC 8.1 Hgb 10.5 hematocrit 32.3 platelets 350  Liver function: Date: 6/5/2023 ALT 7 AST 25 alkaline phos.  59 bilirubin 0.4 albumin 3.3 protein 7.7    BMP: Date: 7/17/2023 Na 140 K 4.1 Cr 0.8 BUN 17 glucose 126 Ca 9.1 GFR 90  CBC: Date: 7/17/2023 WBC 14.1 Hgb 10.2 hematocrit 32.1 platelets 265  Liver function: Date: 7/17/2023 ALT 8 AST 23 alkaline phos.  72 bilirubin 0.5 albumin 3.4 protein 7.4    CBC: Date: 7/19/2023 WBC 10.7 Hgb 9.5 hematocrit 28.8 platelets 257    BMP: Date: 7/24/2023 Na 137 K 4.2 Cr 0.9 BUN 19 glucose 186 Ca 9.1 GFR 79  CBC: Date: 7/24/2023 WBC 11.6 Hgb 10.3 hematocrit 31.4 platelets 305  Liver function: Date: 7/24/2023 ALT 11 AST 27 alkaline phos.  77 bilirubin 0.3 albumin 3.6 protein 7.8    BMP: Date: 7/31/2023 Na 141 K 4.2 Cr 0.8 BUN 17 glucose 138 Ca 8.7 GFR 90  CBC: Date: 7/31/2023 WBC 12.3 Hgb 10.5 hematocrit 32.4 platelets 307  Liver function: Date: 7/31/2023 ALT 14 AST 24 alkaline phos.  81 bilirubin 0.4 albumin 3.5 protein 7.4    BMP: Date: 8/7/2023 Na 141 K 3.9 Cr 1.0 BUN 12 glucose 167 Ca 8.6 GFR 70  CBC: Date: 8/7/2023 WBC 10 Hgb 10.5 hematocrit 32.7 platelets 256  Liver function: Date: 8/7/2023 ALT 9 AST 23 alkaline phos.  76 bilirubin 0.5 albumin 3.3 protein 7    BMP: Date: 8/21/2023 Na 142 K 4.1 Cr 1 BUN 17 glucose 195 Ca 8.5 GFR 70  CBC: Date: 8/21/2023 WBC 10.9 Hgb 9.6 hematocrit 30 platelets 304  Liver function: Date: 8/21/2023 ALT 9 AST 24 alkaline phos.  74 bilirubin 0.4 albumin 3.2 protein 7.1    BMP: Date: 8/31/2023 Na 141 K 3.8 Cr 0.9 BUN 14 glucose 118 Ca 8.2 GFR 79  CBC: Date: 8/31/2023 WBC 12 Hgb 9.6 hematocrit 29.8 platelets 294  Liver function: Date: 8/31/2023 ALT 8 AST 21 alkaline  phos.  64 bilirubin 0.4 albumin 3.1 protein 7    BMP: Date: 9/4/2023 Na 141 K 3.9 Cr 1.1 BUN 17 glucose 107 Ca 8.5 GFR 63   CBC: Date: 9/4/2023 WBC 12 Hgb 9.5 hematocrit 29.7 platelets 282  Liver function: Date: 9/4/2023 ALT 7 AST 22 alkaline phos.  64 bilirubin 0.4 albumin 3.3 protein 7.1    BMP: Date: 9/11/2023 Na 142 K 4 Cr 1 BUN 12 glucose 81 Ca 8.2 GFR 70  CBC: Date: 9/11/2023 WBC 8.7 Hgb 9.2 hematocrit 28.3 platelets 305  Liver function: Date: 9/11/2023 ALT 7 AST 22 alkaline phos.  61 bilirubin 0.3 albumin 3 protein 6.8    BMP: Date: 9/18/2023 Na 140 K 4 Cr 1 BUN 15 glucose 150 Ca 8.3 GFR 70  CBC: Date: 9/18/2023 WBC 12.1 Hgb 9.5 hematocrit 29.8 platelets 287  Liver function: Date: 9/18/2023 ALT 7 AST 23 alkaline phos.  62 bilirubin 0.4 albumin 3.4 protein 7.1    10/17/23 Ha1c 8.3 %    BMP: Date: 10/23/2023 Na 141 K 3.6 Cr 1 BUN 22 glucose 65 Ca 8.3 GFR 70  CBC: Date: 10/23/2023 WBC 12.5 Hgb 10.8  hematocrit 33.2 platelets 290  Liver function: Date: 10/23/2023 ALT 9 AST 31 alkaline phos.  66 bilirubin 0.6 albumin 3.6 protein 7.8    10/26/2023: Lipid panel: Cholesterol 111; triglycerides 111; HDL 33; LDL 56; VLDL 22.    10/26/2023: TSH: 1.081 (range 0.340-5.500).    BMP: Date: 10/30/2023 Na 143  K 3.5 Cr 0.9 BUN 11 glucose 171 Ca 8 GFR 65  CBC: Date: 10/30/2023 WBC 9.2 Hgb 10.1 hematocrit 31.1 platelets 280  Liver function: Date: 10/30/2023 ALT 11 AST 27 alkaline phos.  66 bilirubin 0.4 albumin 3.4 protein 7.2    BMP: Date: 11/13/2023 Na 138 K 3.9 Cr 0.9 BUN 11 glucose 308 Ca 8.4 GFR 79  CBC: Date: 11/13/2023 WBC 8.2 Hgb 10.6 hematocrit 33 platelets 295  Liver function: Date: 11/13/2023 ALT 12 AST 37 alkaline phos.  69 bilirubin 0.4 albumin 3.2 protein 7.1    BMP: Date: 11/21/2023 Na 142 K 3.6 Cr 0.9 BUN 14 glucose 200 Ca 8.4 GFR 79.  CBC: Date: 11/21/2023 WBC 8.8 Hgb 10.1 hematocrit 30.4 platelets 290    BMP: Date: 11/27/2023 Na 138 K 3.4 Cr 1 BUN 12 glucose 54 Ca 8.7 GFR 70  CBC: Date: 11/27/2023 WBC 12.3 Hgb  10.6 hematocrit 32.4 platelets 307  Liver function: Date: 11/27/2023 ALT 7 AST 27 alkaline phos.  72 bilirubin 0.5 albumin 3.3 protein 7.7    BMP: Date: 12/4/2023 Na 141 K 4 Cr 1 BUN 12 glucose 209 Ca 8.5 GFR 70  CBC: Date: 12/4/2023 WBC 8.5 Hgb 10.6 hematocrit 32.7 platelets 326  Liver function: Date: 12/4/2023 ALT 8 AST 25 alkaline phos.  64 bilirubin 0.4 albumin 3.3 protein 7.1  12/4/2023 hemoglobin A1c 8.9%          Past Medical History:   Diagnosis Date    Cervicalgia     Neck pain    Essential (primary) hypertension 07/21/2013    Hypertension    Pain in unspecified shoulder     Pain, joint, shoulder    Personal history of other diseases of the digestive system 07/06/2022    History of ulcerative colitis    Personal history of other diseases of the digestive system     History of esophageal reflux    Personal history of other diseases of the nervous system and sense organs     History of cataract    Personal history of other diseases of the nervous system and sense organs     History of glaucoma    Personal history of other diseases of the respiratory system     History of bronchitis    Personal history of other endocrine, nutritional and metabolic disease     History of diabetes mellitus    Personal history of other endocrine, nutritional and metabolic disease     History of hyperlipidemia    Personal history of other mental and behavioral disorders     History of depression    Snoring     Snoring    Unilateral primary osteoarthritis, unspecified knee 11/02/2015    Arthritis of knee           Past Surgical History:   Procedure Laterality Date    CHOLECYSTECTOMY  01/08/2016    Cholecystectomy    COLON SURGERY  01/08/2016    Colon Surgery    CT ANGIO NECK W AND WO IV CONTRAST  3/19/2019    CT NECK ANGIO W AND WO IV CONTRAST 3[place]19/2019 [facility]    CT HEAD ANGIO W AND WO IV CONTRAST  3/19/2019    CT HEAD ANGIO W AND WO IV CONTRAST 3/19/2019 [facility]    HERNIA REPAIR  01/08/2016    Hernia  Repair    HYSTERECTOMY  01/08/2016    Hysterectomy    MR HEAD ANGIO WO IV CONTRAST  6/4/2018    MR HEAD ANGIO WO IV CONTRAST 6/4/2018 [facility]GAC[facility]    MR HEAD ANGIO WO IV CONTRAST  7/11/2020    MR HEAD ANGIO WO IV CONTRAST 7/11/2020 [facility]ICAL [facility]GAC[facility]    MR HEAD ANGIO WO IV CONTRAST  9/30/2020    MR HEAD ANGIO WO IV CONTRAST 9/30/2020 [facility]    MR HEAD ANGIO WO IV CONTRAST  5/22/2022    MR HEAD ANGIO WO IV CONTRAST 5/22/2022 [facility] CL[facility]ICAL [facility]GAC[facility]    MR HEAD ANGIO WO IV CONTRAST  4/19/2023    MR HEAD ANGIO WO IV CONTRAST [facility]    MR NECK ANGIO WO IV CONTRAST  6/4/2018    MR NECK ANGIO WO IV CONTRAST 6/4/2018 [facility] CL[facility]ICAL [facility]GAC[facility]    MR NECK ANGIO WO IV CONTRAST  7/11/2020    MR NECK ANGIO WO IV CONTRAST 7/11/2020 [facility]GAC[facility]    MR NECK ANGIO WO IV CONTRAST  9/30/2020    MR NECK ANGIO WO IV CONTRAST 9/30/2020 [facility]    MR NECK ANGIO WO IV CONTRAST  5/22/2022    MR NECK ANGIO WO IV CONTRAST 5/22/2022 [facility] CL[facility]ICAL [facility]GAC[facility]    MR NECK ANGIO WO IV CONTRAST  4/19/2023    MR NECK ANGIO WO IV CONTRAST [facility] MRI    OTHER SURGICAL HISTORY  01/08/2016    Tubal Stabilization    OTHER SURGICAL HISTORY  09/01/2016    Cervical Surgery (Gyn) Laser Vaporization Of Transformation Zone    THYROID SURGERY  09/27/2013    Thyroid Surgery            Surgical History  Problems    · History of Cervical Surgery (Gyn) Laser Vaporization Of Transformation Zone   · History of Cholecystectomy   · History of Colon Surgery   · History of Hernia Repair   · History of Hysterectomy   · History of Thyroid Surgery   · History of Tubal Stabilization     Family History  Mother    · Family history of Cancer  Family History    · Family history of Diabetes Mellitus (V18.0)   · Family history of Hypertension (V17.49)     Social History  Problems    · Disabled   · Does not use illicit drugs (V49.89) (Z78.9)   · Former smoker (V15.82) (Z87.891)   · Marital History - Single   · No alcohol.   · Denied: History of Secondhand smoke  "exposure   · Single     Allergies  Medication    · Aspirin TABS   · Contrast Media Ready-Box MISC   · morphine   · Sulfa Drugs        /72   Pulse 84   Temp 36.7 °C (98 °F)   Resp 18   Ht 1.372 m (4' 6\")   Wt 108 kg (238 lb)   SpO2 97%   BMI 57.38 kg/m²      Physical Exam  Vitals and nursing note reviewed.   Constitutional:       Appearance: Normal appearance.   HENT:      Head: Normocephalic.      Right Ear: External ear normal.      Left Ear: External ear normal.      Nose: Nose normal.      Mouth/Throat:      Mouth: Mucous membranes are moist.      Pharynx: Oropharynx is clear.   Eyes:      Extraocular Movements: Extraocular movements intact.      Conjunctiva/sclera: Conjunctivae normal.      Pupils: Pupils are equal, round, and reactive to light.   Cardiovascular:      Rate and Rhythm: Normal rate and regular rhythm.      Pulses: Normal pulses.      Heart sounds: Normal heart sounds.   Pulmonary:      Effort: Pulmonary effort is normal.      Breath sounds: Normal breath sounds.   Abdominal:      General: Bowel sounds are normal.      Palpations: Abdomen is soft.   Musculoskeletal:      Cervical back: Normal range of motion and neck supple.      Comments: Generalized muscle weakness; immobility.    Right sided hemiparesis   Skin:     General: Skin is warm and dry.   Neurological:      Mental Status: She is alert. Mental status is at baseline.      Motor: Weakness present.      Coordination: Coordination abnormal.      Gait: Gait abnormal.      Comments: Right sided weakness--RLE 1/5; RUE 3/5; sensation intact to touch x 4 extremities.     Left sided facial droop.     Generalized muscle weakness   Psychiatric:         Mood and Affect: Mood normal.         Behavior: Behavior normal.          Assessment/Plan    Ordered BMP and CBC with diff., TSH for tomorrow.         Leukocytosis:     Monitor WBC.     Monitor for fevers.       DM type 2 with hyperglycemia; noncompliance with diabetic diet:            DC " Lantus 40 units at bedtime            Start Lantus 43 units at bedtime.           Continue Humalog sliding scale.           Monitor Ha1c q 3 months.    Hypothyroidism:    Continue levothyroxine 200 mcg.    Ordered TSH level for tomorrow.    Pharmacy recommendation to recheck TSH due to dose change of levothyroxine when she was in the hospital in October,             Chronic systolic HF; Benign essential HTN :            Continue Amlodipine and hydrochlorothiazide.           Monitor Bps.               Sequela of Cerebrovascular accident ; previous CVA with right-sided hemiparesis; right sided weakness;   dysphagia 2/2 cva; expressive aphasia; History of acute/subacute stroke:   MRI was performed during a PREVIOUS hospitalization and it showed Acute/subacute tiny infarct in the left anterior brock.   Follow up with Dr. Jiang from [facility] neuro/stroke PRN.  Continue Eliquis 5 mg PO BID.  Pt has baseline dysarthria from a previous CVA, right Hemiparesis,  and expressive aphasia.  Continue mechanical soft, thin consistency diet.          Immobility; Generalized muscle weakness:  Continue at   in long term care.  Not able to ambulate.  Bedbound/wheelchair bound.   Fall prevention strategies.      Problem List Items Addressed This Visit    None  Visit Diagnoses       Leukocytosis, unspecified type    -  Primary    Type 2 diabetes mellitus with hyperglycemia, with long-term current use of insulin (CMS/HCC)        Noncompliance with dietary restriction        Hypothyroidism, unspecified type        Chronic systolic heart failure (CMS/HCC)        Benign essential HTN        Sequela of cerebrovascular accident        Recent cerebrovascular accident        Hemiparesis affecting right side as late effect of stroke (CMS/HCC)        Dysphagia, unspecified type        Expressive aphasia        Immobility        Generalized muscle weakness            Maria Del Carmen Garduno, APRN-CNP

## 2023-12-14 ENCOUNTER — APPOINTMENT (OUTPATIENT)
Dept: AUDIOLOGY | Facility: CLINIC | Age: 53
End: 2023-12-14
Payer: MEDICARE

## 2023-12-14 ENCOUNTER — APPOINTMENT (OUTPATIENT)
Dept: OTOLARYNGOLOGY | Facility: CLINIC | Age: 53
End: 2023-12-14
Payer: MEDICARE

## 2024-01-02 ENCOUNTER — NURSING HOME VISIT (OUTPATIENT)
Dept: POST ACUTE CARE | Facility: EXTERNAL LOCATION | Age: 54
End: 2024-01-02
Payer: MEDICAID

## 2024-01-02 DIAGNOSIS — M25.511 ACUTE PAIN OF RIGHT SHOULDER: Primary | ICD-10-CM

## 2024-01-02 DIAGNOSIS — R47.01 EXPRESSIVE APHASIA: ICD-10-CM

## 2024-01-02 DIAGNOSIS — D72.829 LEUKOCYTOSIS, UNSPECIFIED TYPE: ICD-10-CM

## 2024-01-02 DIAGNOSIS — M62.838 MUSCLE SPASTICITY: ICD-10-CM

## 2024-01-02 DIAGNOSIS — Z91.119 NONCOMPLIANCE WITH DIETARY RESTRICTION: ICD-10-CM

## 2024-01-02 DIAGNOSIS — M19.90 OSTEOARTHRITIS, UNSPECIFIED OSTEOARTHRITIS TYPE, UNSPECIFIED SITE: ICD-10-CM

## 2024-01-02 DIAGNOSIS — I10 BENIGN ESSENTIAL HTN: ICD-10-CM

## 2024-01-02 DIAGNOSIS — G89.29 OTHER CHRONIC PAIN: ICD-10-CM

## 2024-01-02 DIAGNOSIS — Z86.73 RECENT CEREBROVASCULAR ACCIDENT: ICD-10-CM

## 2024-01-02 DIAGNOSIS — M62.81 GENERALIZED MUSCLE WEAKNESS: ICD-10-CM

## 2024-01-02 DIAGNOSIS — E11.65 TYPE 2 DIABETES MELLITUS WITH HYPERGLYCEMIA, WITH LONG-TERM CURRENT USE OF INSULIN (MULTI): ICD-10-CM

## 2024-01-02 DIAGNOSIS — Z79.4 TYPE 2 DIABETES MELLITUS WITH HYPERGLYCEMIA, WITH LONG-TERM CURRENT USE OF INSULIN (MULTI): ICD-10-CM

## 2024-01-02 DIAGNOSIS — I69.30 SEQUELA OF CEREBROVASCULAR ACCIDENT: ICD-10-CM

## 2024-01-02 DIAGNOSIS — M79.601 RIGHT ARM PAIN: ICD-10-CM

## 2024-01-02 DIAGNOSIS — I69.351 HEMIPARESIS AFFECTING RIGHT SIDE AS LATE EFFECT OF STROKE (MULTI): ICD-10-CM

## 2024-01-02 DIAGNOSIS — Z74.09 IMMOBILITY: ICD-10-CM

## 2024-01-02 DIAGNOSIS — R13.10 DYSPHAGIA, UNSPECIFIED TYPE: ICD-10-CM

## 2024-01-02 DIAGNOSIS — I50.22 CHRONIC SYSTOLIC HEART FAILURE (MULTI): ICD-10-CM

## 2024-01-02 PROCEDURE — 99309 SBSQ NF CARE MODERATE MDM 30: CPT | Performed by: NURSE PRACTITIONER

## 2024-01-02 NOTE — LETTER
Patient: Janette Martinez  : 1970    Encounter Date: 2024    Name: Janette Martinez    YOB: 1970    Code Status: DNRcc arrest    Chief complaint:  Follow up on acute on chronic right shoulder; arm pain; osteoarthritis; etc.....      HPI:    53 year old female with past medical history of heart failure with reduced EF,   diabetes, hypertension, hyperlipidemia,        CVA (left MCA  with residual right-sided hemiparesis), GERD, HIV, hypothyroidism, hypertension,   and diabetes.        Patient [facility] On 10/20/23 after her most recent hospitalization.    Diagnoses as follows:    Acute on chronic right shoulder and arm pain; osteoarthritis; Chronic pain; Muscle spasticity:  Patient has acute on chronic pain in her right shoulder/arm.  Right arm x-rays were ordered in the past.  ###On 23 Humerus right arm x-ray performed.   Results as follows: Mild degenerative changes are present. No acute fracture of dislocation. No osteomyelitis.  ###On 23 radius and ulna x-ray was also performed.  Results as follows: right radius and ulna has normal ossification pattern.  No fracture or dislocation was seen.  The arm pain is chronic.  She also has muscle spasticity in her right arm.   On gabapentin, Voltaren 1% gel, baclofen 10 mg PO Q 8 hours PRN and oxycodone 5 mg PO Q 6 hours PRN.  Patient and staff report that she has increased pain.  Asking for a change in medication to help the pain.  Rates pain a 7, intermittent.  Patient is also followed in house by [facility] Palliative NP.   Patient seen today, she is in bed.  Calm and cooperative.  No complaints of chest pain.  No headaches.  No dizziness.         DM type 2 with hyperglycemia; noncompliance with diabetic diet:        Patient's accuchecks are elevated.       Patient is frequently noncompliant with diet.       On Lantus 43 units at bedtime and Humalog sliding scale.       Recent accuchecks: 290  mg/dL, 280 mg/dL, 256 mg/dL.        No episodes of hypoglycemia reported.        Ha1c trends:       5/1/23 Ha1c 8.1 %        10/17/23 Ha1c 8.3 %       12/4/23 hemoglobin A1c 8.9%        Chronic systolic HF; Benign essential HTN :     Continue  Amlodipine and hydrochlorothiazide.            No oxygen desaturations reported.       Recent /65       No complaints of  chest pain, headaches or dizziness.          Sequela of Cerebrovascular accident; previous CVA with right-sided hemiparesis; right sided weakness;   dysphagia 2/2 cva; expressive aphasia; History of acute/subacute stroke:   MRI was performed during a PREVIOUS hospitalization and it showed Acute/subacute tiny infarct in the left anterior brock.   Dr. Jiang from [facility] [place] was consulted at that time.  On Eliquis 5 mg PO BID.  Pt has baseline dysarthria from a previous CVA, right Hemiparesis,  and expressive aphasia.  She is able to communicate in short sentences.  Patient is also able to nod appropriately to questions.   On a mechanical soft, thin consistency diet.     Leukocytosis:       Patient had leukocytosis during her recent hospitalization, but then increased.       Now WBC WNL.       Recent WBC results:      10/23/23 WBC 12.5 H      10/30/23 WBC 9.2      11/13/23 WBC 8.2       11/21/2023 WBC 8.8      11/27/23 WBC 12.3 H       12/4/23 WBC 8.5        12/26/23 WBC 9.9         Generalized muscle weakness; Immobility:  At  in long term care.  Not able to ambulate.  Very weak.  Bedbound/wheelchair bound.                        Reviewed medical, social, surgical and family history.  Reviewed all current medications and performed medication reconciliation.  Reviewed vital signs AND recent blood work results.  Performed prescription drug management.   Reviewed [facility] Documentation  Discussed patient with nursing and [facility] palliative NP.      ROS: 10 point ROS performed; negative unless noted in HPI.       LABS:  BMP:  Date: 5/1/2023 Na 137 K 4.3 Cr 1.1 BUN 15 glucose 141 Ca 8.8 GFR 52  CBC: Date: 5/1/2023 WBC 8.4 Hgb 11.1 hematocrit 33.9 platelets 337  Liver function: Date: 5/1/2023 ALT 10 AST 29 alkaline phos.  63 bilirubin 0.4 albumin 3.6 protein 7.7       Ha1c 8.1 % on 5/1/23          BMP: Date: 5/15/2023 Na 139  K 3.4 Cr 1.2 BUN 20 glucose 122 Ca 8.8 GFR 57        CBC: Date: 5/15/2023 WBC 9 Hgb 10.2 hematocrit 30.9 platelets 277       Liver function: 5/15/2023 ALT 8 AST 28 alkaline phos.  57 bilirubin 0.5 albumin 3.5 protein 7.3          BMP: Date: 5/22/2023 Na 141 K 3.1 Cr 1 BUN 11 glucose 227 Ca 8.7 GFR 70        CBC: Date: 5/22/2023 WBC 8.2 Hgb 10 hematocrit 30.5 platelets 372        Liver function: Date: 5/22/2023 ALT 8 AST 22 alkaline phos.  63 bilirubin 0.3 albumin 3.2 protein 7.2     BMP: Date: 6/5/2023 Na 140 K 4.2 Cr 1.1 BUN 14 glucose 79 Ca 1.1 GFR 63  CBC: Date: 6/5/2023 WBC 8.1 Hgb 10.5 hematocrit 32.3 platelets 350  Liver function: Date: 6/5/2023 ALT 7 AST 25 alkaline phos.  59 bilirubin 0.4 albumin 3.3 protein 7.7    BMP: Date: 7/17/2023 Na 140 K 4.1 Cr 0.8 BUN 17 glucose 126 Ca 9.1 GFR 90  CBC: Date: 7/17/2023 WBC 14.1 Hgb 10.2 hematocrit 32.1 platelets 265  Liver function: Date: 7/17/2023 ALT 8 AST 23 alkaline phos.  72 bilirubin 0.5 albumin 3.4 protein 7.4    CBC: Date: 7/19/2023 WBC 10.7 Hgb 9.5 hematocrit 28.8 platelets 257    BMP: Date: 7/24/2023 Na 137 K 4.2 Cr 0.9 BUN 19 glucose 186 Ca 9.1 GFR 79  CBC: Date: 7/24/2023 WBC 11.6 Hgb 10.3 hematocrit 31.4 platelets 305  Liver function: Date: 7/24/2023 ALT 11 AST 27 alkaline phos.  77 bilirubin 0.3 albumin 3.6 protein 7.8    BMP: Date: 7/31/2023 Na 141 K 4.2 Cr 0.8 BUN 17 glucose 138 Ca 8.7 GFR 90  CBC: Date: 7/31/2023 WBC 12.3 Hgb 10.5 hematocrit 32.4 platelets 307  Liver function: Date: 7/31/2023 ALT 14 AST 24 alkaline phos.  81 bilirubin 0.4 albumin 3.5 protein 7.4    BMP: Date: 8/7/2023 Na 141 K 3.9 Cr 1.0 BUN 12 glucose 167 Ca 8.6 GFR 70  CBC: Date:  8/7/2023 WBC 10 Hgb 10.5 hematocrit 32.7 platelets 256  Liver function: Date: 8/7/2023 ALT 9 AST 23 alkaline phos.  76 bilirubin 0.5 albumin 3.3 protein 7    BMP: Date: 8/21/2023 Na 142 K 4.1 Cr 1 BUN 17 glucose 195 Ca 8.5 GFR 70  CBC: Date: 8/21/2023 WBC 10.9 Hgb 9.6 hematocrit 30 platelets 304  Liver function: Date: 8/21/2023 ALT 9 AST 24 alkaline phos.  74 bilirubin 0.4 albumin 3.2 protein 7.1    BMP: Date: 8/31/2023 Na 141 K 3.8 Cr 0.9 BUN 14 glucose 118 Ca 8.2 GFR 79  CBC: Date: 8/31/2023 WBC 12 Hgb 9.6 hematocrit 29.8 platelets 294  Liver function: Date: 8/31/2023 ALT 8 AST 21 alkaline phos.  64 bilirubin 0.4 albumin 3.1 protein 7    BMP: Date: 9/4/2023 Na 141 K 3.9 Cr 1.1 BUN 17 glucose 107 Ca 8.5 GFR 63   CBC: Date: 9/4/2023 WBC 12 Hgb 9.5 hematocrit 29.7 platelets 282  Liver function: Date: 9/4/2023 ALT 7 AST 22 alkaline phos.  64 bilirubin 0.4 albumin 3.3 protein 7.1    BMP: Date: 9/11/2023 Na 142 K 4 Cr 1 BUN 12 glucose 81 Ca 8.2 GFR 70  CBC: Date: 9/11/2023 WBC 8.7 Hgb 9.2 hematocrit 28.3 platelets 305  Liver function: Date: 9/11/2023 ALT 7 AST 22 alkaline phos.  61 bilirubin 0.3 albumin 3 protein 6.8    BMP: Date: 9/18/2023 Na 140 K 4 Cr 1 BUN 15 glucose 150 Ca 8.3 GFR 70  CBC: Date: 9/18/2023 WBC 12.1 Hgb 9.5 hematocrit 29.8 platelets 287  Liver function: Date: 9/18/2023 ALT 7 AST 23 alkaline phos.  62 bilirubin 0.4 albumin 3.4 protein 7.1    10/17/23 Ha1c 8.3 %    BMP: Date: 10/23/2023 Na 141 K 3.6 Cr 1 BUN 22 glucose 65 Ca 8.3 GFR 70  CBC: Date: 10/23/2023 WBC 12.5 Hgb 10.8  hematocrit 33.2 platelets 290  Liver function: Date: 10/23/2023 ALT 9 AST 31 alkaline phos.  66 bilirubin 0.6 albumin 3.6 protein 7.8    10/26/2023: Lipid panel: Cholesterol 111; triglycerides 111; HDL 33; LDL 56; VLDL 22.    10/26/2023: TSH: 1.081 (range 0.340-5.500).    BMP: Date: 10/30/2023 Na 143  K 3.5 Cr 0.9 BUN 11 glucose 171 Ca 8 GFR 65  CBC: Date: 10/30/2023 WBC 9.2 Hgb 10.1 hematocrit 31.1 platelets 280  Liver function:  Date: 10/30/2023 ALT 11 AST 27 alkaline phos.  66 bilirubin 0.4 albumin 3.4 protein 7.2    BMP: Date: 11/13/2023 Na 138 K 3.9 Cr 0.9 BUN 11 glucose 308 Ca 8.4 GFR 79  CBC: Date: 11/13/2023 WBC 8.2 Hgb 10.6 hematocrit 33 platelets 295  Liver function: Date: 11/13/2023 ALT 12 AST 37 alkaline phos.  69 bilirubin 0.4 albumin 3.2 protein 7.1    BMP: Date: 11/21/2023 Na 142 K 3.6 Cr 0.9 BUN 14 glucose 200 Ca 8.4 GFR 79.  CBC: Date: 11/21/2023 WBC 8.8 Hgb 10.1 hematocrit 30.4 platelets 290    BMP: Date: 11/27/2023 Na 138 K 3.4 Cr 1 BUN 12 glucose 54 Ca 8.7 GFR 70  CBC: Date: 11/27/2023 WBC 12.3 Hgb 10.6 hematocrit 32.4 platelets 307  Liver function: Date: 11/27/2023 ALT 7 AST 27 alkaline phos.  72 bilirubin 0.5 albumin 3.3 protein 7.7    BMP: Date: 12/4/2023 Na 141 K 4 Cr 1 BUN 12 glucose 209 Ca 8.5 GFR 70  CBC: Date: 12/4/2023 WBC 8.5 Hgb 10.6 hematocrit 32.7 platelets 326  Liver function: Date: 12/4/2023 ALT 8 AST 25 alkaline phos.  64 bilirubin 0.4 albumin 3.3 protein 7.1  12/4/2023 hemoglobin A1c 8.9%    BMP: Date: 12/25/2023 Na 140 K 3.9 Cr 0.9 BUN 17 glucose 172 Ca 8.4 GFR 79  CBC: Date: 12/25/2023 WBC 8.9 Hgb 9.9 hematocrit 30.7 platelets 272  Liver function: Date: 12/25/2023 ALT 8 AST 26 alkaline phos.  67 bilirubin 0.3 albumin 3.2 protein 7    BMP: Date: 12/26/2023 Na 142 K 4.2 Cr 0.9 BUN 15 glucose 168 Ca 8.6 GFR 79  CBC: Date: 12/26/2023 WBC 9.9 Hgb 10.5 hematocrit 32.9 platelets 302        Past Medical History:   Diagnosis Date   • Cervicalgia     Neck pain   • Essential (primary) hypertension 07/21/2013    Hypertension   • Pain in unspecified shoulder     Pain, joint, shoulder   • Personal history of other diseases of the digestive system 07/06/2022    History of ulcerative colitis   • Personal history of other diseases of the digestive system     History of esophageal reflux   • Personal history of other diseases of the nervous system and sense organs     History of cataract   • Personal history of other  diseases of the nervous system and sense organs     History of glaucoma   • Personal history of other diseases of the respiratory system     History of bronchitis   • Personal history of other endocrine, nutritional and metabolic disease     History of diabetes mellitus   • Personal history of other endocrine, nutritional and metabolic disease     History of hyperlipidemia   • Personal history of other mental and behavioral disorders     History of depression   • Snoring     Snoring   • Unilateral primary osteoarthritis, unspecified knee 11/02/2015    Arthritis of knee           Past Surgical History:   Procedure Laterality Date   • CHOLECYSTECTOMY  01/08/2016    Cholecystectomy   • COLON SURGERY  01/08/2016    Colon Surgery   • CT ANGIO NECK W AND WO IV CONTRAST  3/19/2019    CT NECK ANGIO W AND WO IV [place] [facility] [place]   • CT HEAD ANGIO W AND WO IV CONTRAST  3/19/2019    CT HEAD ANGIO W AND WO IV CONTRAST [place] [facility] [place]   • HERNIA REPAIR  01/08/2016    Hernia Repair   • HYSTERECTOMY  01/08/2016    Hysterectomy   • MR HEAD ANGIO WO IV CONTRAST  6/4/2018    MR HEAD ANGIO WO IV CONTRAST 6/4/2018 [facility]   • MR HEAD ANGIO WO IV CONTRAST  7/11/2020    MR HEAD ANGIO WO IV CONTRAST 7/11/2020 [facility]   • MR HEAD ANGIO WO IV CONTRAST  9/30/2020    MR HEAD ANGIO WO IV CONTRAST 9/30/2020 [facility]   • MR HEAD ANGIO WO IV CONTRAST  5/22/2022    MR HEAD ANGIO WO IV CONTRAST 5/22/2022 [facility]   • MR HEAD ANGIO WO IV CONTRAST  4/19/2023    MR HEAD ANGIO WO IV CONTRAST [facility]   • MR NECK ANGIO WO IV CONTRAST  6/4/2018    MR NECK ANGIO WO IV CONTRAST 6/4/2018 [facility]   • MR NECK ANGIO WO IV CONTRAST  7/11/2020    MR NECK ANGIO WO IV CONTRAST 7/11/2020 [facility]   • MR NECK ANGIO WO IV CONTRAST  9/30/2020    MR NECK ANGIO WO IV CONTRAST 9/30/2020 [facility]   • MR NECK ANGIO WO IV CONTRAST  5/22/2022    MR NECK ANGIO WO IV  "CONTRAST 5/22/2022 [facility]   • MR NECK ANGIO WO IV CONTRAST  4/19/2023    MR NECK ANGIO WO IV CONTRAST U MRI   • OTHER SURGICAL HISTORY  01/08/2016    Tubal Stabilization   • OTHER SURGICAL HISTORY  09/01/2016    Cervical Surgery (Gyn) Laser Vaporization Of Transformation Zone   • THYROID SURGERY  09/27/2013    Thyroid Surgery            Surgical History  Problems    · History of Cervical Surgery (Gyn) Laser Vaporization Of Transformation Zone   · History of Cholecystectomy   · History of Colon Surgery   · History of Hernia Repair   · History of Hysterectomy   · History of Thyroid Surgery   · History of Tubal Stabilization     Family History  Mother    · Family history of Cancer  Family History    · Family history of Diabetes Mellitus (V18.0)   · Family history of Hypertension (V17.49)     Social History  Problems    · Disabled   · Does not use illicit drugs (V49.89) (Z78.9)   · Former smoker (V15.82) (Z87.891)   · Marital History - Single   · No alcohol.   · Denied: History of Secondhand smoke exposure   · Single     Allergies  Medication    · Aspirin TABS   · Contrast Media Ready-Box MISC   · morphine   · Sulfa Drugs        /65   Pulse 74   Temp 36.4 °C (97.6 °F)   Resp 18   Ht 1.372 m (4' 6\")   Wt 102 kg (224 lb)   SpO2 98%   BMI 54.01 kg/m²      Physical Exam  Vitals and nursing note reviewed.   Constitutional:       Appearance: Normal appearance.   HENT:      Head: Normocephalic.      Right Ear: External ear normal.      Left Ear: External ear normal.      Nose: Nose normal.      Mouth/Throat:      Mouth: Mucous membranes are moist.      Pharynx: Oropharynx is clear.   Eyes:      Extraocular Movements: Extraocular movements intact.      Conjunctiva/sclera: Conjunctivae normal.      Pupils: Pupils are equal, round, and reactive to light.   Cardiovascular:      Rate and Rhythm: Normal rate and regular rhythm.      Pulses: Normal pulses.      Heart sounds: Normal heart sounds. "   Pulmonary:      Effort: Pulmonary effort is normal.      Breath sounds: Normal breath sounds.   Abdominal:      General: Bowel sounds are normal.      Palpations: Abdomen is soft.   Musculoskeletal:      Cervical back: Normal range of motion and neck supple.      Comments: Generalized muscle weakness; immobility.    Right sided hemiparesis   Skin:     General: Skin is warm and dry.   Neurological:      Mental Status: She is alert. Mental status is at baseline.      Motor: Weakness present.      Coordination: Coordination abnormal.      Gait: Gait abnormal.      Comments: Right sided weakness--RLE 1/5; RUE 3/5; sensation intact to touch x 4 extremities.     Left sided facial droop.     Generalized muscle weakness   Psychiatric:         Mood and Affect: Mood normal.         Behavior: Behavior normal.          Assessment/Plan     Chronic right shoulder and arm pain; osteoarthritis; chronic pain; Muscle spasticity:  Patient has acute on chronic pain in her right shoulder/arm.  Right arm x-rays were ordered/performed in the past.  Start acetaminophen 1000 mg PO Q 8 hours routinely.      Continue gabapentin.      Continue Voltaren 1% gel.      DC baclofen 10 mg PO Q 8 hours PRN.      Start baclofen 10 mg PO BID routinely.      DC oxycodone 5 mg PO Q 6 hours PRN.      Start oxycodone 5 mg PO Q 4 hours PRN.  [facility] palliative NP updated, she will follow up with patient this week.         DM type 2 with hyperglycemia; noncompliance with diabetic diet:        Monitor accuchecks.       Continue Lantus 43 units at bedtime and Humalog sliding scale.       Monitor Ha1c q 3 months.        Chronic systolic HF; Benign essential HTN :     Continue Amlodipine and hydrochlorothiazide.      Monitor oxygen desaturations reported.   Monitor Bps.     Sequela of Cerebrovascular accident; previous CVA with right-sided hemiparesis; right sided weakness;   dysphagia 2/2 cva; expressive aphasia; History of acute/subacute stroke:    MRI was performed during a PREVIOUS hospitalization and it showed Acute/subacute tiny infarct in the left anterior brock.   Dr. Jiang from [facility] [place] was consulted at that time.  Continue Eliquis 5 mg PO BID.  Continue mechanical soft, thin consistency diet.     Leukocytosis:   Monitor WBC.   Monitor for fevers.           Monitor for s/s of infection.         Generalized muscle weakness; Immobility:   Continue at  in long term care.    Bedbound/wheelchair bound.    Fall prevention strategies.        Problem List Items Addressed This Visit    None  Visit Diagnoses       Acute pain of right shoulder    -  Primary    Right arm pain        Other chronic pain        Osteoarthritis, unspecified osteoarthritis type, unspecified site        Muscle spasticity        Type 2 diabetes mellitus with hyperglycemia, with long-term current use of insulin (CMS/HCC)        Noncompliance with dietary restriction        Chronic systolic heart failure (CMS/HCC)        Benign essential HTN        Sequela of cerebrovascular accident        Recent cerebrovascular accident        Hemiparesis affecting right side as late effect of stroke (CMS/HCC)        Dysphagia, unspecified type        Expressive aphasia        Leukocytosis, unspecified type        Generalized muscle weakness        Immobility            JASON Espitia       Electronically Signed By: JASON Espitia   1/3/24  2:25 PM

## 2024-01-03 VITALS
WEIGHT: 224 LBS | RESPIRATION RATE: 18 BRPM | OXYGEN SATURATION: 98 % | BODY MASS INDEX: 51.84 KG/M2 | HEIGHT: 55 IN | TEMPERATURE: 97.6 F | DIASTOLIC BLOOD PRESSURE: 65 MMHG | SYSTOLIC BLOOD PRESSURE: 112 MMHG | HEART RATE: 74 BPM

## 2024-01-03 NOTE — PROGRESS NOTES
Name: Janette Martinez    YOB: 1970    Code Status: DNRcc arrest    Chief complaint:  Follow up on acute on chronic right shoulder; arm pain; osteoarthritis; etc.....      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        Patient re-admitted to [facility]. On 10/20/23 after her most recent hospitalization.    Diagnoses as follows:    Acute on chronic right shoulder and arm pain; osteoarthritis; Chronic pain; Muscle spasticity:  Patient has acute on chronic pain in her right shoulder/arm.  Right arm x-rays were ordered in the past.  ###On 6/5/23 Humerus right arm x-ray performed.   Results as follows: Mild degenerative changes are present. No acute fracture of dislocation. No osteomyelitis.  ###On 6/5/23 radius and ulna x-ray was also performed.  Results as follows: right radius and ulna has normal ossification pattern.  No fracture or dislocation was seen.  The arm pain is chronic.  She also has muscle spasticity in her right arm.   On gabapentin, Voltaren 1% gel, baclofen 10 mg PO Q 8 hours PRN and oxycodone 5 mg PO Q 6 hours PRN.  Patient and staff report that she has increased pain.  Asking for a change in medication to help the pain.  Rates pain a 7, intermittent.  Patient is also followed in house by [facility] Palliative NP.   Patient seen today, she is in bed.  Calm and cooperative.  No complaints of chest pain.  No headaches.  No dizziness.         DM type 2 with hyperglycemia; noncompliance with diabetic diet:        Patient's accuchecks are elevated.       Patient is frequently noncompliant with diet.       On Lantus 43 units at bedtime and Humalog sliding scale.       Recent accuchecks: 290 mg/dL, 280 mg/dL, 256 mg/dL.        No episodes of hypoglycemia reported.         Ha1c trends:       5/1/23 Ha1c 8.1 %        10/17/23 Ha1c 8.3 %       12/4/23 hemoglobin A1c 8.9%        Chronic systolic HF; Benign essential HTN :     Continue  Amlodipine and hydrochlorothiazide.            No oxygen desaturations reported.       Recent /65       No complaints of  chest pain, headaches or dizziness.          Sequela of Cerebrovascular accident; previous CVA with right-sided hemiparesis; right sided weakness;   dysphagia 2/2 cva; expressive aphasia; History of acute/subacute stroke:   MRI was performed during a PREVIOUS hospitalization and it showed Acute/subacute tiny infarct in the left anterior brock.   Dr. Jiang from [facility] [place] consulted at that time.  On Eliquis 5 mg PO BID.  Pt has baseline dysarthria from a previous CVA, right Hemiparesis,  and expressive aphasia.  She is able to communicate in short sentences.  Patient is also able to nod appropriately to questions.   On a mechanical soft, thin consistency diet.     Leukocytosis:       Patient had leukocytosis during her recent hospitalization, but then increased.       Now WBC WNL.       Recent WBC results:      10/23/23 WBC 12.5 H      10/30/23 WBC 9.2      11/13/23 WBC 8.2       11/21/2023 WBC 8.8      11/27/23 WBC 12.3 H       12/4/23 WBC 8.5        12/26/23 WBC 9.9         Generalized muscle weakness; Immobility:  At  in long term care.  Not able to ambulate.  Very weak.  Bedbound/wheelchair bound.                        Reviewed medical, social, surgical and family history.  Reviewed all current medications and performed medication reconciliation.  Reviewed vital signs AND recent blood work results.  Performed prescription drug management.   Reviewed [facility] Documentation  Discussed patient with nursing and [facility] palliative NP.      ROS: 10 point ROS performed; negative unless noted in HPI.       LABS:  BMP: Date: 5/1/2023 Na 137 K 4.3 Cr 1.1 BUN 15 glucose 141 Ca 8.8 GFR 52  CBC: Date:  5/1/2023 WBC 8.4 Hgb 11.1 hematocrit 33.9 platelets 337  Liver function: Date: 5/1/2023 ALT 10 AST 29 alkaline phos.  63 bilirubin 0.4 albumin 3.6 protein 7.7       Ha1c 8.1 % on 5/1/23          BMP: Date: 5/15/2023 Na 139  K 3.4 Cr 1.2 BUN 20 glucose 122 Ca 8.8 GFR 57        CBC: Date: 5/15/2023 WBC 9 Hgb 10.2 hematocrit 30.9 platelets 277       Liver function: 5/15/2023 ALT 8 AST 28 alkaline phos.  57 bilirubin 0.5 albumin 3.5 protein 7.3          BMP: Date: 5/22/2023 Na 141 K 3.1 Cr 1 BUN 11 glucose 227 Ca 8.7 GFR 70        CBC: Date: 5/22/2023 WBC 8.2 Hgb 10 hematocrit 30.5 platelets 372        Liver function: Date: 5/22/2023 ALT 8 AST 22 alkaline phos.  63 bilirubin 0.3 albumin 3.2 protein 7.2     BMP: Date: 6/5/2023 Na 140 K 4.2 Cr 1.1 BUN 14 glucose 79 Ca 1.1 GFR 63  CBC: Date: 6/5/2023 WBC 8.1 Hgb 10.5 hematocrit 32.3 platelets 350  Liver function: Date: 6/5/2023 ALT 7 AST 25 alkaline phos.  59 bilirubin 0.4 albumin 3.3 protein 7.7    BMP: Date: 7/17/2023 Na 140 K 4.1 Cr 0.8 BUN 17 glucose 126 Ca 9.1 GFR 90  CBC: Date: 7/17/2023 WBC 14.1 Hgb 10.2 hematocrit 32.1 platelets 265  Liver function: Date: 7/17/2023 ALT 8 AST 23 alkaline phos.  72 bilirubin 0.5 albumin 3.4 protein 7.4    CBC: Date: 7/19/2023 WBC 10.7 Hgb 9.5 hematocrit 28.8 platelets 257    BMP: Date: 7/24/2023 Na 137 K 4.2 Cr 0.9 BUN 19 glucose 186 Ca 9.1 GFR 79  CBC: Date: 7/24/2023 WBC 11.6 Hgb 10.3 hematocrit 31.4 platelets 305  Liver function: Date: 7/24/2023 ALT 11 AST 27 alkaline phos.  77 bilirubin 0.3 albumin 3.6 protein 7.8    BMP: Date: 7/31/2023 Na 141 K 4.2 Cr 0.8 BUN 17 glucose 138 Ca 8.7 GFR 90  CBC: Date: 7/31/2023 WBC 12.3 Hgb 10.5 hematocrit 32.4 platelets 307  Liver function: Date: 7/31/2023 ALT 14 AST 24 alkaline phos.  81 bilirubin 0.4 albumin 3.5 protein 7.4    BMP: Date: 8/7/2023 Na 141 K 3.9 Cr 1.0 BUN 12 glucose 167 Ca 8.6 GFR 70  CBC: Date: 8/7/2023 WBC 10 Hgb 10.5 hematocrit 32.7 platelets 256  Liver function: Date:  8/7/2023 ALT 9 AST 23 alkaline phos.  76 bilirubin 0.5 albumin 3.3 protein 7    BMP: Date: 8/21/2023 Na 142 K 4.1 Cr 1 BUN 17 glucose 195 Ca 8.5 GFR 70  CBC: Date: 8/21/2023 WBC 10.9 Hgb 9.6 hematocrit 30 platelets 304  Liver function: Date: 8/21/2023 ALT 9 AST 24 alkaline phos.  74 bilirubin 0.4 albumin 3.2 protein 7.1    BMP: Date: 8/31/2023 Na 141 K 3.8 Cr 0.9 BUN 14 glucose 118 Ca 8.2 GFR 79  CBC: Date: 8/31/2023 WBC 12 Hgb 9.6 hematocrit 29.8 platelets 294  Liver function: Date: 8/31/2023 ALT 8 AST 21 alkaline phos.  64 bilirubin 0.4 albumin 3.1 protein 7    BMP: Date: 9/4/2023 Na 141 K 3.9 Cr 1.1 BUN 17 glucose 107 Ca 8.5 GFR 63   CBC: Date: 9/4/2023 WBC 12 Hgb 9.5 hematocrit 29.7 platelets 282  Liver function: Date: 9/4/2023 ALT 7 AST 22 alkaline phos.  64 bilirubin 0.4 albumin 3.3 protein 7.1    BMP: Date: 9/11/2023 Na 142 K 4 Cr 1 BUN 12 glucose 81 Ca 8.2 GFR 70  CBC: Date: 9/11/2023 WBC 8.7 Hgb 9.2 hematocrit 28.3 platelets 305  Liver function: Date: 9/11/2023 ALT 7 AST 22 alkaline phos.  61 bilirubin 0.3 albumin 3 protein 6.8    BMP: Date: 9/18/2023 Na 140 K 4 Cr 1 BUN 15 glucose 150 Ca 8.3 GFR 70  CBC: Date: 9/18/2023 WBC 12.1 Hgb 9.5 hematocrit 29.8 platelets 287  Liver function: Date: 9/18/2023 ALT 7 AST 23 alkaline phos.  62 bilirubin 0.4 albumin 3.4 protein 7.1    10/17/23 Ha1c 8.3 %    BMP: Date: 10/23/2023 Na 141 K 3.6 Cr 1 BUN 22 glucose 65 Ca 8.3 GFR 70  CBC: Date: 10/23/2023 WBC 12.5 Hgb 10.8  hematocrit 33.2 platelets 290  Liver function: Date: 10/23/2023 ALT 9 AST 31 alkaline phos.  66 bilirubin 0.6 albumin 3.6 protein 7.8    10/26/2023: Lipid panel: Cholesterol 111; triglycerides 111; HDL 33; LDL 56; VLDL 22.    10/26/2023: TSH: 1.081 (range 0.340-5.500).    BMP: Date: 10/30/2023 Na 143  K 3.5 Cr 0.9 BUN 11 glucose 171 Ca 8 GFR 65  CBC: Date: 10/30/2023 WBC 9.2 Hgb 10.1 hematocrit 31.1 platelets 280  Liver function: Date: 10/30/2023 ALT 11 AST 27 alkaline phos.  66 bilirubin 0.4 albumin 3.4  protein 7.2    BMP: Date: 11/13/2023 Na 138 K 3.9 Cr 0.9 BUN 11 glucose 308 Ca 8.4 GFR 79  CBC: Date: 11/13/2023 WBC 8.2 Hgb 10.6 hematocrit 33 platelets 295  Liver function: Date: 11/13/2023 ALT 12 AST 37 alkaline phos.  69 bilirubin 0.4 albumin 3.2 protein 7.1    BMP: Date: 11/21/2023 Na 142 K 3.6 Cr 0.9 BUN 14 glucose 200 Ca 8.4 GFR 79.  CBC: Date: 11/21/2023 WBC 8.8 Hgb 10.1 hematocrit 30.4 platelets 290    BMP: Date: 11/27/2023 Na 138 K 3.4 Cr 1 BUN 12 glucose 54 Ca 8.7 GFR 70  CBC: Date: 11/27/2023 WBC 12.3 Hgb 10.6 hematocrit 32.4 platelets 307  Liver function: Date: 11/27/2023 ALT 7 AST 27 alkaline phos.  72 bilirubin 0.5 albumin 3.3 protein 7.7    BMP: Date: 12/4/2023 Na 141 K 4 Cr 1 BUN 12 glucose 209 Ca 8.5 GFR 70  CBC: Date: 12/4/2023 WBC 8.5 Hgb 10.6 hematocrit 32.7 platelets 326  Liver function: Date: 12/4/2023 ALT 8 AST 25 alkaline phos.  64 bilirubin 0.4 albumin 3.3 protein 7.1  12/4/2023 hemoglobin A1c 8.9%    BMP: Date: 12/25/2023 Na 140 K 3.9 Cr 0.9 BUN 17 glucose 172 Ca 8.4 GFR 79  CBC: Date: 12/25/2023 WBC 8.9 Hgb 9.9 hematocrit 30.7 platelets 272  Liver function: Date: 12/25/2023 ALT 8 AST 26 alkaline phos.  67 bilirubin 0.3 albumin 3.2 protein 7    BMP: Date: 12/26/2023 Na 142 K 4.2 Cr 0.9 BUN 15 glucose 168 Ca 8.6 GFR 79  CBC: Date: 12/26/2023 WBC 9.9 Hgb 10.5 hematocrit 32.9 platelets 302        Past Medical History:   Diagnosis Date    Cervicalgia     Neck pain    Essential (primary) hypertension 07/21/2013    Hypertension    Pain in unspecified shoulder     Pain, joint, shoulder    Personal history of other diseases of the digestive system 07/06/2022    History of ulcerative colitis    Personal history of other diseases of the digestive system     History of esophageal reflux    Personal history of other diseases of the nervous system and sense organs     History of cataract    Personal history of other diseases of the nervous system and sense organs     History of glaucoma    Personal  history of other diseases of the respiratory system     History of bronchitis    Personal history of other endocrine, nutritional and metabolic disease     History of diabetes mellitus    Personal history of other endocrine, nutritional and metabolic disease     History of hyperlipidemia    Personal history of other mental and behavioral disorders     History of depression    Snoring     Snoring    Unilateral primary osteoarthritis, unspecified knee 11/02/2015    Arthritis of knee           Past Surgical History:   Procedure Laterality Date    CHOLECYSTECTOMY  01/08/2016    Cholecystectomy    COLON SURGERY  01/08/2016    Colon Surgery    CT ANGIO NECK W AND WO IV CONTRAST  3/19/2019    CT NECK ANGIO W AND WO IV CONTRAST 3[place]19/[place] [facility] [place] [facility]    CT HEAD ANGIO W AND WO IV CONTRAST  3/19/2019    CT HEAD ANGIO W AND WO IV CONTRAST 3/19/2019 [facility] [place] [facility]    HERNIA REPAIR  01/08/2016    Hernia Repair    HYSTERECTOMY  01/08/2016    Hysterectomy    MR HEAD ANGIO WO IV CONTRAST  6/4/2018    MR HEAD ANGIO WO IV CONTRAST 6/4/2018 [facility] CLINICAL LEGACY    MR HEAD ANGIO WO IV CONTRAST  7/11/2020    MR HEAD ANGIO WO IV CONTRAST 7/11/2020 [facility] CLINICAL LEGACY    MR HEAD ANGIO WO IV CONTRAST  9/30/2020    MR HEAD ANGIO WO IV CONTRAST 9/30/2020 [facility]GAC[facility]    MR HEAD ANGIO WO IV CONTRAST  5/22/2022    MR HEAD ANGIO WO IV CONTRAST 5/22/2022 [facility] CLINICAL LEGACY    MR HEAD ANGIO WO IV CONTRAST  4/19/2023    MR HEAD ANGIO WO IV CONTRAST [facility] MRI    MR NECK ANGIO WO IV CONTRAST  6/4/2018    MR NECK ANGIO WO IV CONTRAST 6/4/2018 [facility] CLINICAL LEGACY    MR NECK ANGIO WO IV CONTRAST  7/11/2020    MR NECK ANGIO WO IV CONTRAST 7/11/2020 [facility] CLINICAL LEGACY    MR NECK ANGIO WO IV CONTRAST  9/30/2020    MR NECK ANGIO WO IV CONTRAST 9/30/2020 [facility]    MR NECK ANGIO WO IV CONTRAST  5/22/2022    MR NECK ANGIO WO IV CONTRAST 5/22/2022 [facility] CLINICAL LEGACY    MR NECK ANGIO WO IV CONTRAST  4/19/2023    MR NECK ANGIO WO IV  "CONTRAST [facility] MRI    OTHER SURGICAL HISTORY  01/08/2016    Tubal Stabilization    OTHER SURGICAL HISTORY  09/01/2016    Cervical Surgery (Gyn) Laser Vaporization Of Transformation Zone    THYROID SURGERY  09/27/2013    Thyroid Surgery            Surgical History  Problems    · History of Cervical Surgery (Gyn) Laser Vaporization Of Transformation Zone   · History of Cholecystectomy   · History of Colon Surgery   · History of Hernia Repair   · History of Hysterectomy   · History of Thyroid Surgery   · History of Tubal Stabilization     Family History  Mother    · Family history of Cancer  Family History    · Family history of Diabetes Mellitus (V18.0)   · Family history of Hypertension (V17.49)     Social History  Problems    · Disabled   · Does not use illicit drugs (V49.89) (Z78.9)   · Former smoker (V15.82) (Z87.891)   · Marital History - Single   · No alcohol.   · Denied: History of Secondhand smoke exposure   · Single     Allergies  Medication    · Aspirin TABS   · Contrast Media Ready-Box MISC   · morphine   · Sulfa Drugs        /65   Pulse 74   Temp 36.4 °C (97.6 °F)   Resp 18   Ht 1.372 m (4' 6\")   Wt 102 kg (224 lb)   SpO2 98%   BMI 54.01 kg/m²      Physical Exam  Vitals and nursing note reviewed.   Constitutional:       Appearance: Normal appearance.   HENT:      Head: Normocephalic.      Right Ear: External ear normal.      Left Ear: External ear normal.      Nose: Nose normal.      Mouth/Throat:      Mouth: Mucous membranes are moist.      Pharynx: Oropharynx is clear.   Eyes:      Extraocular Movements: Extraocular movements intact.      Conjunctiva/sclera: Conjunctivae normal.      Pupils: Pupils are equal, round, and reactive to light.   Cardiovascular:      Rate and Rhythm: Normal rate and regular rhythm.      Pulses: Normal pulses.      Heart sounds: Normal heart sounds.   Pulmonary:      Effort: Pulmonary effort is normal.      Breath sounds: Normal breath sounds.   Abdominal:      " General: Bowel sounds are normal.      Palpations: Abdomen is soft.   Musculoskeletal:      Cervical back: Normal range of motion and neck supple.      Comments: Generalized muscle weakness; immobility.    Right sided hemiparesis   Skin:     General: Skin is warm and dry.   Neurological:      Mental Status: She is alert. Mental status is at baseline.      Motor: Weakness present.      Coordination: Coordination abnormal.      Gait: Gait abnormal.      Comments: Right sided weakness--RLE 1/5; RUE 3/5; sensation intact to touch x 4 extremities.     Left sided facial droop.     Generalized muscle weakness   Psychiatric:         Mood and Affect: Mood normal.         Behavior: Behavior normal.          Assessment/Plan      Chronic right shoulder and arm pain; osteoarthritis; chronic pain; Muscle spasticity:  Patient has acute on chronic pain in her right shoulder/arm.  Right arm x-rays were ordered/performed in the past.  Start acetaminophen 1000 mg PO Q 8 hours routinely.      Continue gabapentin.      Continue Voltaren 1% gel.      DC baclofen 10 mg PO Q 8 hours PRN.      Start baclofen 10 mg PO BID routinely.      DC oxycodone 5 mg PO Q 6 hours PRN.      Start oxycodone 5 mg PO Q 4 hours PRN.  [facility] palliative NP updated, she will follow up with patient this week.         DM type 2 with hyperglycemia; noncompliance with diabetic diet:        Monitor accuchecks.       Continue Lantus 43 units at bedtime and Humalog sliding scale.       Monitor Ha1c q 3 months.        Chronic systolic HF; Benign essential HTN :     Continue Amlodipine and hydrochlorothiazide.      Monitor oxygen desaturations reported.   Monitor Bps.     Sequela of Cerebrovascular accident; previous CVA with right-sided hemiparesis; right sided weakness;   dysphagia 2/2 cva; expressive aphasia; History of acute/subacute stroke:   MRI was performed during a PREVIOUS hospitalization and it showed Acute/subacute tiny infarct in the left  anterior brock.   Dr. Jiang [place] [facility] [place] that time.  Continue Eliquis 5 mg PO BID.  Continue mechanical soft, thin consistency diet.     Leukocytosis:   Monitor WBC.   Monitor for fevers.           Monitor for s/s of infection.         Generalized muscle weakness; Immobility:   Continue at  in long term care.    Bedbound/wheelchair bound.    Fall prevention strategies.        Problem List Items Addressed This Visit    None  Visit Diagnoses       Acute pain of right shoulder    -  Primary    Right arm pain        Other chronic pain        Osteoarthritis, unspecified osteoarthritis type, unspecified site        Muscle spasticity        Type 2 diabetes mellitus with hyperglycemia, with long-term current use of insulin (CMS/[facility])        Noncompliance with dietary restriction        Chronic systolic heart failure (CMS/HCC)        Benign essential HTN        Sequela of cerebrovascular accident        Recent cerebrovascular accident        Hemiparesis affecting right side as late effect of stroke (CMS/HCC)        Dysphagia, unspecified type        Expressive aphasia        Leukocytosis, unspecified type        Generalized muscle weakness        Immobility            Maria Del Carmen Garduno, APRN-CNP

## 2024-01-15 ENCOUNTER — NURSING HOME VISIT (OUTPATIENT)
Dept: POST ACUTE CARE | Facility: EXTERNAL LOCATION | Age: 54
End: 2024-01-15
Payer: MEDICARE

## 2024-01-15 DIAGNOSIS — Z74.09 IMMOBILITY: ICD-10-CM

## 2024-01-15 DIAGNOSIS — I10 BENIGN ESSENTIAL HTN: ICD-10-CM

## 2024-01-15 DIAGNOSIS — E11.65 TYPE 2 DIABETES MELLITUS WITH HYPERGLYCEMIA, WITH LONG-TERM CURRENT USE OF INSULIN (MULTI): ICD-10-CM

## 2024-01-15 DIAGNOSIS — M62.81 GENERALIZED MUSCLE WEAKNESS: ICD-10-CM

## 2024-01-15 DIAGNOSIS — G89.29 OTHER CHRONIC PAIN: Primary | ICD-10-CM

## 2024-01-15 DIAGNOSIS — M19.90 OSTEOARTHRITIS, UNSPECIFIED OSTEOARTHRITIS TYPE, UNSPECIFIED SITE: ICD-10-CM

## 2024-01-15 DIAGNOSIS — M62.838 MUSCLE SPASTICITY: ICD-10-CM

## 2024-01-15 DIAGNOSIS — Z79.4 TYPE 2 DIABETES MELLITUS WITH HYPERGLYCEMIA, WITH LONG-TERM CURRENT USE OF INSULIN (MULTI): ICD-10-CM

## 2024-01-15 DIAGNOSIS — I69.351 HEMIPARESIS AFFECTING RIGHT SIDE AS LATE EFFECT OF STROKE (MULTI): ICD-10-CM

## 2024-01-15 DIAGNOSIS — M79.601 RIGHT ARM PAIN: ICD-10-CM

## 2024-01-15 DIAGNOSIS — D72.829 LEUKOCYTOSIS, UNSPECIFIED TYPE: ICD-10-CM

## 2024-01-15 DIAGNOSIS — I69.30 SEQUELA OF CEREBROVASCULAR ACCIDENT: ICD-10-CM

## 2024-01-15 DIAGNOSIS — I50.22 CHRONIC SYSTOLIC HEART FAILURE (MULTI): ICD-10-CM

## 2024-01-15 PROCEDURE — 99309 SBSQ NF CARE MODERATE MDM 30: CPT | Performed by: NURSE PRACTITIONER

## 2024-01-15 NOTE — LETTER
Patient: Janette Martinez  : 1970    Encounter Date: 01/15/2024    Name: Janette Martinez    YOB: 1970    Code Status: DNRcc arrest    Chief complaint:  Follow up on chronic pain; etc.....      HPI:    53 year old female with past medical history of heart failure with reduced EF,   diabetes, hypertension, hyperlipidemia,        CVA (left MCA  with residual right-sided hemiparesis), GERD, HIV, hypothyroidism, hypertension,   and diabetes.        Patient re-admitted to [facility] On 10/20/23 after her most recent hospitalization.    Diagnoses as follows:    Chronic pain; osteoarthritis; Acute on chronic right shoulder and arm pain; Muscle spasticity:  Patient has acute on chronic pain in her right shoulder/arm and muscle spasticity.  Pain slightly improved today.  Patient frustrated because she is not able to move her right arm at all.  Right arm x-rays have been performed.  ###On 23 Humerus right arm x-ray performed.   Results as follows: Mild degenerative changes are present. No acute fracture of dislocation. No osteomyelitis.  ###On 23 radius and ulna x-ray was also performed.  Results as follows: right radius and ulna has normal ossification pattern.  No fracture or dislocation was seen.  The arm pain is chronic.  She also has muscle spasticity in her right arm.   On gabapentin, oxycodone 5 mg PO Q 6 hours PRN, Voltaren 1% gel, baclofen 10 mg PO Q 8 hours PRN and .  Rates pain a 7, intermittent.  Patient is also followed in house by [facility] Palliative NP.   Patient seen today, she is in bed.  Calm and cooperative.  No complaints of chest pain.  No headaches.  No dizziness.    Sequela of Cerebrovascular accident; Hx CVA with right-sided hemiparesis; right sided weakness;   dysphagia 2/2 cva; expressive aphasia; History of acute/subacute stroke:   MRI was performed during a PREVIOUS hospitalization and it showed Acute/subacute tiny infarct in the left anterior  brock.   [place] [facility] [place].  On Eliquis 5 mg PO BID.  Pt has baseline dysarthria from a previous CVA, right Hemiparesis,  and expressive aphasia.  She is able to communicate in short sentences.  Patient is also able to nod appropriately to questions.   On a mechanical soft, thin consistency diet.         DM type 2 with hyperglycemia; noncompliance with diabetic diet:        Patient's accuchecks are elevated.       Patient is frequently noncompliant with diet.       On Lantus 43 units at bedtime and Humalog sliding scale.       Recent accuchecks: 205  mg/dL, 273 mg/dL,  219 mg/dL.        No episodes of hypoglycemia reported.        Ha1c trends:       5/1/23 Ha1c 8.1 %        10/17/23 Ha1c 8.3 %       12/4/23 hemoglobin A1c 8.9%        Chronic systolic HF; Benign essential HTN :     Continue  Amlodipine and hydrochlorothiazide.            No oxygen desaturations reported.       Recent /71       No complaints of  chest pain, headaches or dizziness.             Leukocytosis:       Patient had leukocytosis during her recent hospitalization, but then increased.       Now WBC WNL.       Recent WBC results:      10/23/23 WBC 12      11/13/23 WBC 8.2       11/21/2023 WBC 8.8      11/27/23 WBC 12.3 H       12/4/23 WBC 8.5        12/26/23 WBC 9.9         1/15/24 WBC 8.3          Generalized muscle weakness; Immobility:  At  in long term care.  Not able to ambulate.  Very weak.  Bedbound/wheelchair bound.                              Reviewed medical, social, surgical and family history.  Reviewed all current medications and performed medication reconciliation.  Reviewed vital signs AND recent blood work results.  Performed prescription drug management.   Reviewed [facility] Documentation  Discussed patient with nursing and [facility] palliative NP.      ROS: 10 point ROS performed; negative unless noted in HPI.       LABS:  BMP: Date: 5/1/2023 Na 137 K 4.3 Cr  1.1 BUN 15 glucose 141 Ca 8.8 GFR 52  CBC: Date: 5/1/2023 WBC 8.4 Hgb 11.1 hematocrit 33.9 platelets 337  Liver function: Date: 5/1/2023 ALT 10 AST 29 alkaline phos.  63 bilirubin 0.4 albumin 3.6 protein 7.7       Ha1c 8.1 % on 5/1/23          BMP: Date: 5/15/2023 Na 139  K 3.4 Cr 1.2 BUN 20 glucose 122 Ca 8.8 GFR 57        CBC: Date: 5/15/2023 WBC 9 Hgb 10.2 hematocrit 30.9 platelets 277       Liver function: 5/15/2023 ALT 8 AST 28 alkaline phos.  57 bilirubin 0.5 albumin 3.5 protein 7.3          BMP: Date: 5/22/2023 Na 141 K 3.1 Cr 1 BUN 11 glucose 227 Ca 8.7 GFR 70        CBC: Date: 5/22/2023 WBC 8.2 Hgb 10 hematocrit 30.5 platelets 372        Liver function: Date: 5/22/2023 ALT 8 AST 22 alkaline phos.  63 bilirubin 0.3 albumin 3.2 protein 7.2     BMP: Date: 6/5/2023 Na 140 K 4.2 Cr 1.1 BUN 14 glucose 79 Ca 1.1 GFR 63  CBC: Date: 6/5/2023 WBC 8.1 Hgb 10.5 hematocrit 32.3 platelets 350  Liver function: Date: 6/5/2023 ALT 7 AST 25 alkaline phos.  59 bilirubin 0.4 albumin 3.3 protein 7.7    BMP: Date: 7/17/2023 Na 140 K 4.1 Cr 0.8 BUN 17 glucose 126 Ca 9.1 GFR 90  CBC: Date: 7/17/2023 WBC 14.1 Hgb 10.2 hematocrit 32.1 platelets 265  Liver function: Date: 7/17/2023 ALT 8 AST 23 alkaline phos.  72 bilirubin 0.5 albumin 3.4 protein 7.4    CBC: Date: 7/19/2023 WBC 10.7 Hgb 9.5 hematocrit 28.8 platelets 257    BMP: Date: 7/24/2023 Na 137 K 4.2 Cr 0.9 BUN 19 glucose 186 Ca 9.1 GFR 79  CBC: Date: 7/24/2023 WBC 11.6 Hgb 10.3 hematocrit 31.4 platelets 305  Liver function: Date: 7/24/2023 ALT 11 AST 27 alkaline phos.  77 bilirubin 0.3 albumin 3.6 protein 7.8    BMP: Date: 7/31/2023 Na 141 K 4.2 Cr 0.8 BUN 17 glucose 138 Ca 8.7 GFR 90  CBC: Date: 7/31/2023 WBC 12.3 Hgb 10.5 hematocrit 32.4 platelets 307  Liver function: Date: 7/31/2023 ALT 14 AST 24 alkaline phos.  81 bilirubin 0.4 albumin 3.5 protein 7.4    BMP: Date: 8/7/2023 Na 141 K 3.9 Cr 1.0 BUN 12 glucose 167 Ca 8.6 GFR 70  CBC: Date: 8/7/2023 WBC 10 Hgb 10.5 hematocrit  32.7 platelets 256  Liver function: Date: 8/7/2023 ALT 9 AST 23 alkaline phos.  76 bilirubin 0.5 albumin 3.3 protein 7    BMP: Date: 8/21/2023 Na 142 K 4.1 Cr 1 BUN 17 glucose 195 Ca 8.5 GFR 70  CBC: Date: 8/21/2023 WBC 10.9 Hgb 9.6 hematocrit 30 platelets 304  Liver function: Date: 8/21/2023 ALT 9 AST 24 alkaline phos.  74 bilirubin 0.4 albumin 3.2 protein 7.1    BMP: Date: 8/31/2023 Na 141 K 3.8 Cr 0.9 BUN 14 glucose 118 Ca 8.2 GFR 79  CBC: Date: 8/31/2023 WBC 12 Hgb 9.6 hematocrit 29.8 platelets 294  Liver function: Date: 8/31/2023 ALT 8 AST 21 alkaline phos.  64 bilirubin 0.4 albumin 3.1 protein 7    BMP: Date: 9/4/2023 Na 141 K 3.9 Cr 1.1 BUN 17 glucose 107 Ca 8.5 GFR 63   CBC: Date: 9/4/2023 WBC 12 Hgb 9.5 hematocrit 29.7 platelets 282  Liver function: Date: 9/4/2023 ALT 7 AST 22 alkaline phos.  64 bilirubin 0.4 albumin 3.3 protein 7.1    BMP: Date: 9/11/2023 Na 142 K 4 Cr 1 BUN 12 glucose 81 Ca 8.2 GFR 70  CBC: Date: 9/11/2023 WBC 8.7 Hgb 9.2 hematocrit 28.3 platelets 305  Liver function: Date: 9/11/2023 ALT 7 AST 22 alkaline phos.  61 bilirubin 0.3 albumin 3 protein 6.8    BMP: Date: 9/18/2023 Na 140 K 4 Cr 1 BUN 15 glucose 150 Ca 8.3 GFR 70  CBC: Date: 9/18/2023 WBC 12.1 Hgb 9.5 hematocrit 29.8 platelets 287  Liver function: Date: 9/18/2023 ALT 7 AST 23 alkaline phos.  62 bilirubin 0.4 albumin 3.4 protein 7.1    10/17/23 Ha1c 8.3 %    BMP: Date: 10/23/2023 Na 141 K 3.6 Cr 1 BUN 22 glucose 65 Ca 8.3 GFR 70  CBC: Date: 10/23/2023 WBC 12.5 Hgb 10.8  hematocrit 33.2 platelets 290  Liver function: Date: 10/23/2023 ALT 9 AST 31 alkaline phos.  66 bilirubin 0.6 albumin 3.6 protein 7.8    10/26/2023: Lipid panel: Cholesterol 111; triglycerides 111; HDL 33; LDL 56; VLDL 22.    10/26/2023: TSH: 1.081 (range 0.340-5.500).    BMP: Date: 10/30/2023 Na 143  K 3.5 Cr 0.9 BUN 11 glucose 171 Ca 8 GFR 65  CBC: Date: 10/30/2023 WBC 9.2 Hgb 10.1 hematocrit 31.1 platelets 280  Liver function: Date: 10/30/2023 ALT 11 AST 27  alkaline phos.  66 bilirubin 0.4 albumin 3.4 protein 7.2    BMP: Date: 11/13/2023 Na 138 K 3.9 Cr 0.9 BUN 11 glucose 308 Ca 8.4 GFR 79  CBC: Date: 11/13/2023 WBC 8.2 Hgb 10.6 hematocrit 33 platelets 295  Liver function: Date: 11/13/2023 ALT 12 AST 37 alkaline phos.  69 bilirubin 0.4 albumin 3.2 protein 7.1    BMP: Date: 11/21/2023 Na 142 K 3.6 Cr 0.9 BUN 14 glucose 200 Ca 8.4 GFR 79.  CBC: Date: 11/21/2023 WBC 8.8 Hgb 10.1 hematocrit 30.4 platelets 290    BMP: Date: 11/27/2023 Na 138 K 3.4 Cr 1 BUN 12 glucose 54 Ca 8.7 GFR 70  CBC: Date: 11/27/2023 WBC 12.3 Hgb 10.6 hematocrit 32.4 platelets 307  Liver function: Date: 11/27/2023 ALT 7 AST 27 alkaline phos.  72 bilirubin 0.5 albumin 3.3 protein 7.7    BMP: Date: 12/4/2023 Na 141 K 4 Cr 1 BUN 12 glucose 209 Ca 8.5 GFR 70  CBC: Date: 12/4/2023 WBC 8.5 Hgb 10.6 hematocrit 32.7 platelets 326  Liver function: Date: 12/4/2023 ALT 8 AST 25 alkaline phos.  64 bilirubin 0.4 albumin 3.3 protein 7.1  12/4/2023 hemoglobin A1c 8.9%    BMP: Date: 12/25/2023 Na 140 K 3.9 Cr 0.9 BUN 17 glucose 172 Ca 8.4 GFR 79  CBC: Date: 12/25/2023 WBC 8.9 Hgb 9.9 hematocrit 30.7 platelets 272  Liver function: Date: 12/25/2023 ALT 8 AST 26 alkaline phos.  67 bilirubin 0.3 albumin 3.2 protein 7    BMP: Date: 12/26/2023 Na 142 K 4.2 Cr 0.9 BUN 15 glucose 168 Ca 8.6 GFR 79  CBC: Date: 12/26/2023 WBC 9.9 Hgb 10.5 hematocrit 32.9 platelets 302    BMP: Date: 1/15/2024 Na 139 K 3.9 Cr 1 BUN 14 glucose 206 Ca 8.5 GFR 70  CBC: Date: 1/15/2024 WBC 8.3 Hgb 9.8 hematocrit 29.8 platelets 244  Liver function: Date: 1/15/2024 ALT 7 AST 22 alkaline phos.  62 bilirubin 0.3 albumin 3.3 protein 6.8          Past Medical History:   Diagnosis Date   • Cervicalgia     Neck pain   • Essential (primary) hypertension 07/21/2013    Hypertension   • Pain in unspecified shoulder     Pain, joint, shoulder   • Personal history of other diseases of the digestive system 07/06/2022    History of ulcerative colitis   • Personal  history of other diseases of the digestive system     History of esophageal reflux   • Personal history of other diseases of the nervous system and sense organs     History of cataract   • Personal history of other diseases of the nervous system and sense organs     History of glaucoma   • Personal history of other diseases of the respiratory system     History of bronchitis   • Personal history of other endocrine, nutritional and metabolic disease     History of diabetes mellitus   • Personal history of other endocrine, nutritional and metabolic disease     History of hyperlipidemia   • Personal history of other mental and behavioral disorders     History of depression   • Snoring     Snoring   • Unilateral primary osteoarthritis, unspecified knee 11/02/2015    Arthritis of knee           Past Surgical History:   Procedure Laterality Date   • CHOLECYSTECTOMY  01/08/2016    Cholecystectomy   • COLON SURGERY  01/08/2016    Colon Surgery   • CT ANGIO NECK W AND WO IV CONTRAST  3/19/2019    CT NECK ANGIO W AND WO IV CONTRAST 3/19/2019 [facility] [place]   • CT HEAD ANGIO W AND WO IV CONTRAST  3/19/2019    CT HEAD ANGIO W AND WO IV CONTRAST 3/19/2019 [facility] [place]GENCY LEGAC[facility]   • HERNIA REPAIR  01/08/2016    Hernia Repair   • HYSTERECTOMY  01/08/2016    Hysterectomy   • MR HEAD ANGIO WO IV CONTRAST  6/4/2018    MR HEAD ANGIO WO IV CONTRAST 6/4/2018 [facility] CLINICAL LEGACY   • MR HEAD ANGIO WO IV CONTRAST  7/11/2020    MR HEAD ANGIO WO IV CONTRAST 7/11/2020 [facility] CLINICAL LEGACY   • MR HEAD ANGIO WO IV CONTRAST  9/30/2020    MR HEAD ANGIO WO IV CONTRAST 9/30/2020 [facility] AIB LEGACY   • MR HEAD ANGIO WO IV CONTRAST  5/22/2022    MR HEAD ANGIO WO IV CONTRAST 5/22/2022 [facility] CLINICAL LEGACY   • MR HEAD ANGIO WO IV CONTRAST  4/19/2023    MR HEAD ANGIO WO IV CONTRAST [facility] MRI   • MR NECK ANGIO WO IV CONTRAST  6/4/2018    MR NECK ANGIO WO IV CONTRAST 6/4/2018 [facility] CLINICAL LEGACY   • MR NECK ANGIO WO IV CONTRAST  7/11/2020    MR NECK ANGIO  "WO IV CONTRAST 7/11/2020 [facility] CLINICAL LEGACY   • MR NECK ANGIO WO IV CONTRAST  9/30/2020    MR NECK ANGIO WO IV CONTRAST 9/30/2020 [facility] AIB LEGACY   • MR NECK ANGIO WO IV CONTRAST  5/22/2022    MR NECK ANGIO WO IV CONTRAST 5/22/2022 [facility] CLINICAL LEGACY   • MR NECK ANGIO WO IV CONTRAST  4/19/2023    MR NECK ANGIO WO IV CONTRAST [facility] MRI   • OTHER SURGICAL HISTORY  01/08/2016    Tubal Stabilization   • OTHER SURGICAL HISTORY  09/01/2016    Cervical Surgery (Gyn) Laser Vaporization Of Transformation Zone   • THYROID SURGERY  09/27/2013    Thyroid Surgery            Surgical History  Problems    · History of Cervical Surgery (Gyn) Laser Vaporization Of Transformation Zone   · History of Cholecystectomy   · History of Colon Surgery   · History of Hernia Repair   · History of Hysterectomy   · History of Thyroid Surgery   · History of Tubal Stabilization     Family History  Mother    · Family history of Cancer  Family History    · Family history of Diabetes Mellitus (V18.0)   · Family history of Hypertension (V17.49)     Social History  Problems    · Disabled   · Does not use illicit drugs (V49.89) (Z78.9)   · Former smoker (V15.82) (Z87.891)   · Marital History - Single   · No alcohol.   · Denied: History of Secondhand smoke exposure   · Single     Allergies  Medication    · Aspirin TABS   · Contrast Media Ready-Box MISC   · morphine   · Sulfa Drugs        /71   Pulse 70   Temp 36.4 °C (97.6 °F)   Resp 18   Ht 1.372 m (4' 6\")   Wt 102 kg (224 lb 12.8 oz)   SpO2 98%   BMI 54.20 kg/m²      Physical Exam  Vitals and nursing note reviewed.   Constitutional:       Appearance: Normal appearance.   HENT:      Head: Normocephalic.      Right Ear: External ear normal.      Left Ear: External ear normal.      Nose: Nose normal.      Mouth/Throat:      Mouth: Mucous membranes are moist.      Pharynx: Oropharynx is clear.   Eyes:      Extraocular Movements: Extraocular movements intact.      Conjunctiva/sclera: " Conjunctivae normal.      Pupils: Pupils are equal, round, and reactive to light.   Cardiovascular:      Rate and Rhythm: Normal rate and regular rhythm.      Pulses: Normal pulses.      Heart sounds: Normal heart sounds.   Pulmonary:      Effort: Pulmonary effort is normal.      Breath sounds: Normal breath sounds.   Abdominal:      General: Bowel sounds are normal.      Palpations: Abdomen is soft.   Musculoskeletal:      Cervical back: Normal range of motion and neck supple.      Comments: Generalized muscle weakness; immobility.    Right sided hemiparesis   Skin:     General: Skin is warm and dry.   Neurological:      Mental Status: She is alert. Mental status is at baseline.      Motor: Weakness present.      Coordination: Coordination abnormal.      Gait: Gait abnormal.      Comments: Right sided weakness--RLE 1/5; RUE 3/5; sensation intact to touch x 4 extremities.     Left sided facial droop.     Generalized muscle weakness   Psychiatric:         Mood and Affect: Mood normal.         Behavior: Behavior normal.          Assessment/Plan     Chronic pain; osteoarthritis; Acute on chronic right shoulder and arm pain; Muscle spasticity:  Patient has acute on chronic pain in her right shoulder/arm and muscle spasticity.  Right arm x-rays have been performed.  Continue gabapentin, oxycodone 5 mg PO Q 6 hours PRN, Voltaren 1% gel, baclofen 10 mg PO Q 8 hours PRN and .  PT/OT to evaluate patient and treat if indicated.    Sequela of Cerebrovascular accident; Hx CVA with right-sided hemiparesis; right sided weakness;   dysphagia 2/2 cva; expressive aphasia; History of acute/subacute stroke:   Follow up with Dr. Jiang from [facility] [place] neuro/stroke was consulted at that time.  Continue Eliquis 5 mg PO BID.               Continue mechanical soft, thin consistency diet.         DM type 2 with hyperglycemia; noncompliance with diabetic diet:         Continue Lantus 43 units at bedtime and Humalog sliding scale.        Monitor accuchecks.       Monitor Ha1c Q 3 months.                Chronic systolic HF; Benign essential HTN :     Continue Amlodipine and hydrochlorothiazide.     Monitor Bps.      Monitor oxygen saturations.                 Leukocytosis:          Improved.          Monitor WBC.              Generalized muscle weakness; Immobility:  Continue HP in long term care.  Not able to ambulate.  Very weak.  Bedbound/wheelchair bound.   Requires full supportive care.      Problem List Items Addressed This Visit    None  Visit Diagnoses       Other chronic pain    -  Primary    Osteoarthritis, unspecified osteoarthritis type, unspecified site        Muscle spasticity        Right arm pain        Sequela of cerebrovascular accident        Hemiparesis affecting right side as late effect of stroke (CMS/[facility])        Type 2 diabetes mellitus with hyperglycemia, with long-term current use of insulin (CMS/[facility])        Chronic systolic heart failure (CMS/HCC)        Benign essential HTN        Leukocytosis, unspecified type        Generalized muscle weakness        Immobility            JASON Esptiia       Electronically Signed By: JASON Espitia   1/19/24  8:05 PM

## 2024-01-19 VITALS
BODY MASS INDEX: 52.03 KG/M2 | DIASTOLIC BLOOD PRESSURE: 71 MMHG | HEART RATE: 70 BPM | OXYGEN SATURATION: 98 % | HEIGHT: 55 IN | RESPIRATION RATE: 18 BRPM | TEMPERATURE: 97.6 F | SYSTOLIC BLOOD PRESSURE: 132 MMHG | WEIGHT: 224.8 LBS

## 2024-01-20 NOTE — PROGRESS NOTES
Name: Janette Martinez    YOB: 1970    Code Status: DNRcc arrest    Chief complaint:  Follow up on chronic pain; etc.....      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        Patient re-admitted to [facility] On 10/20/23 after her most recent hospitalization.    Diagnoses as follows:    Chronic pain; osteoarthritis; Acute on chronic right shoulder and arm pain; Muscle spasticity:  Patient has acute on chronic pain in her right shoulder/arm and muscle spasticity.  Pain slightly improved today.  Patient frustrated because she is not able to move her right arm at all.  Right arm x-rays have been performed.  ###On 6/5/23 Humerus right arm x-ray performed.   Results as follows: Mild degenerative changes are present. No acute fracture of dislocation. No osteomyelitis.  ###On 6/5/23 radius and ulna x-ray was also performed.  Results as follows: right radius and ulna has normal ossification pattern.  No fracture or dislocation was seen.  The arm pain is chronic.  She also has muscle spasticity in her right arm.   On gabapentin, oxycodone 5 mg PO Q 6 hours PRN, Voltaren 1% gel, baclofen 10 mg PO Q 8 hours PRN and .  Rates pain a 7, intermittent.  Patient is also followed in house by [facility] Palliative NP.   Patient seen today, she is in bed.  Calm and cooperative.  No complaints of chest pain.  No headaches.  No dizziness.    Sequela of Cerebrovascular accident; Hx CVA with right-sided hemiparesis; right sided weakness;   dysphagia 2/2 cva; expressive aphasia; History of acute/subacute stroke:   MRI was performed during a PREVIOUS hospitalization and it showed Acute/subacute tiny infarct in the left anterior brock.   [place] [facility] [place].  On Eliquis 5 mg PO BID.  Pt has baseline dysarthria from a previous CVA, right Hemiparesis,  and expressive aphasia.  She is able to communicate in short sentences.  Patient is also able to nod appropriately to questions.   On a mechanical soft, thin consistency diet.         DM type 2 with hyperglycemia; noncompliance with diabetic diet:        Patient's accuchecks are elevated.       Patient is frequently noncompliant with diet.       On Lantus 43 units at bedtime and Humalog sliding scale.       Recent accuchecks: 205  mg/dL, 273 mg/dL,  219 mg/dL.        No episodes of hypoglycemia reported.        Ha1c trends:       5/1/23 Ha1c 8.1 %        10/17/23 Ha1c 8.3 %       12/4/23 hemoglobin A1c 8.9%        Chronic systolic HF; Benign essential HTN :     Continue  Amlodipine and hydrochlorothiazide.            No oxygen desaturations reported.       Recent /71       No complaints of  chest pain, headaches or dizziness.             Leukocytosis:       Patient had leukocytosis during her recent hospitalization, but then increased.       Now WBC WNL.       Recent WBC results:      10/23/23 WBC 12      11/13/23 WBC 8.2       11/21/2023 WBC 8.8      11/27/23 WBC 12.3 H       12/4/23 WBC 8.5        12/26/23 WBC 9.9         1/15/24 WBC 8.3          Generalized muscle weakness; Immobility:  At  in long term care.  Not able to ambulate.  Very weak.  Bedbound/wheelchair bound.                              Reviewed medical, social, surgical and family history.  Reviewed all current medications and performed medication reconciliation.  Reviewed vital signs AND recent blood work results.  Performed prescription drug management.   Reviewed [facility] Documentation  Discussed patient with nursing and [facility] palliative NP.      ROS: 10 point ROS performed; negative unless noted in HPI.       LABS:  BMP: Date: 5/1/2023 Na 137 K 4.3 Cr 1.1 BUN 15 glucose 141 Ca 8.8 GFR 52  CBC: Date: 5/1/2023 WBC 8.4  Hgb 11.1 hematocrit 33.9 platelets 337  Liver function: Date: 5/1/2023 ALT 10 AST 29 alkaline phos.  63 bilirubin 0.4 albumin 3.6 protein 7.7       Ha1c 8.1 % on 5/1/23          BMP: Date: 5/15/2023 Na 139  K 3.4 Cr 1.2 BUN 20 glucose 122 Ca 8.8 GFR 57        CBC: Date: 5/15/2023 WBC 9 Hgb 10.2 hematocrit 30.9 platelets 277       Liver function: 5/15/2023 ALT 8 AST 28 alkaline phos.  57 bilirubin 0.5 albumin 3.5 protein 7.3          BMP: Date: 5/22/2023 Na 141 K 3.1 Cr 1 BUN 11 glucose 227 Ca 8.7 GFR 70        CBC: Date: 5/22/2023 WBC 8.2 Hgb 10 hematocrit 30.5 platelets 372        Liver function: Date: 5/22/2023 ALT 8 AST 22 alkaline phos.  63 bilirubin 0.3 albumin 3.2 protein 7.2     BMP: Date: 6/5/2023 Na 140 K 4.2 Cr 1.1 BUN 14 glucose 79 Ca 1.1 GFR 63  CBC: Date: 6/5/2023 WBC 8.1 Hgb 10.5 hematocrit 32.3 platelets 350  Liver function: Date: 6/5/2023 ALT 7 AST 25 alkaline phos.  59 bilirubin 0.4 albumin 3.3 protein 7.7    BMP: Date: 7/17/2023 Na 140 K 4.1 Cr 0.8 BUN 17 glucose 126 Ca 9.1 GFR 90  CBC: Date: 7/17/2023 WBC 14.1 Hgb 10.2 hematocrit 32.1 platelets 265  Liver function: Date: 7/17/2023 ALT 8 AST 23 alkaline phos.  72 bilirubin 0.5 albumin 3.4 protein 7.4    CBC: Date: 7/19/2023 WBC 10.7 Hgb 9.5 hematocrit 28.8 platelets 257    BMP: Date: 7/24/2023 Na 137 K 4.2 Cr 0.9 BUN 19 glucose 186 Ca 9.1 GFR 79  CBC: Date: 7/24/2023 WBC 11.6 Hgb 10.3 hematocrit 31.4 platelets 305  Liver function: Date: 7/24/2023 ALT 11 AST 27 alkaline phos.  77 bilirubin 0.3 albumin 3.6 protein 7.8    BMP: Date: 7/31/2023 Na 141 K 4.2 Cr 0.8 BUN 17 glucose 138 Ca 8.7 GFR 90  CBC: Date: 7/31/2023 WBC 12.3 Hgb 10.5 hematocrit 32.4 platelets 307  Liver function: Date: 7/31/2023 ALT 14 AST 24 alkaline phos.  81 bilirubin 0.4 albumin 3.5 protein 7.4    BMP: Date: 8/7/2023 Na 141 K 3.9 Cr 1.0 BUN 12 glucose 167 Ca 8.6 GFR 70  CBC: Date: 8/7/2023 WBC 10 Hgb 10.5 hematocrit 32.7 platelets 256  Liver function: Date: 8/7/2023 ALT 9 AST 23  alkaline phos.  76 bilirubin 0.5 albumin 3.3 protein 7    BMP: Date: 8/21/2023 Na 142 K 4.1 Cr 1 BUN 17 glucose 195 Ca 8.5 GFR 70  CBC: Date: 8/21/2023 WBC 10.9 Hgb 9.6 hematocrit 30 platelets 304  Liver function: Date: 8/21/2023 ALT 9 AST 24 alkaline phos.  74 bilirubin 0.4 albumin 3.2 protein 7.1    BMP: Date: 8/31/2023 Na 141 K 3.8 Cr 0.9 BUN 14 glucose 118 Ca 8.2 GFR 79  CBC: Date: 8/31/2023 WBC 12 Hgb 9.6 hematocrit 29.8 platelets 294  Liver function: Date: 8/31/2023 ALT 8 AST 21 alkaline phos.  64 bilirubin 0.4 albumin 3.1 protein 7    BMP: Date: 9/4/2023 Na 141 K 3.9 Cr 1.1 BUN 17 glucose 107 Ca 8.5 GFR 63   CBC: Date: 9/4/2023 WBC 12 Hgb 9.5 hematocrit 29.7 platelets 282  Liver function: Date: 9/4/2023 ALT 7 AST 22 alkaline phos.  64 bilirubin 0.4 albumin 3.3 protein 7.1    BMP: Date: 9/11/2023 Na 142 K 4 Cr 1 BUN 12 glucose 81 Ca 8.2 GFR 70  CBC: Date: 9/11/2023 WBC 8.7 Hgb 9.2 hematocrit 28.3 platelets 305  Liver function: Date: 9/11/2023 ALT 7 AST 22 alkaline phos.  61 bilirubin 0.3 albumin 3 protein 6.8    BMP: Date: 9/18/2023 Na 140 K 4 Cr 1 BUN 15 glucose 150 Ca 8.3 GFR 70  CBC: Date: 9/18/2023 WBC 12.1 Hgb 9.5 hematocrit 29.8 platelets 287  Liver function: Date: 9/18/2023 ALT 7 AST 23 alkaline phos.  62 bilirubin 0.4 albumin 3.4 protein 7.1    10/17/23 Ha1c 8.3 %    BMP: Date: 10/23/2023 Na 141 K 3.6 Cr 1 BUN 22 glucose 65 Ca 8.3 GFR 70  CBC: Date: 10/23/2023 WBC 12.5 Hgb 10.8  hematocrit 33.2 platelets 290  Liver function: Date: 10/23/2023 ALT 9 AST 31 alkaline phos.  66 bilirubin 0.6 albumin 3.6 protein 7.8    10/26/2023: Lipid panel: Cholesterol 111; triglycerides 111; HDL 33; LDL 56; VLDL 22.    10/26/2023: TSH: 1.081 (range 0.340-5.500).    BMP: Date: 10/30/2023 Na 143  K 3.5 Cr 0.9 BUN 11 glucose 171 Ca 8 GFR 65  CBC: Date: 10/30/2023 WBC 9.2 Hgb 10.1 hematocrit 31.1 platelets 280  Liver function: Date: 10/30/2023 ALT 11 AST 27 alkaline phos.  66 bilirubin 0.4 albumin 3.4 protein 7.2    BMP:  Date: 11/13/2023 Na 138 K 3.9 Cr 0.9 BUN 11 glucose 308 Ca 8.4 GFR 79  CBC: Date: 11/13/2023 WBC 8.2 Hgb 10.6 hematocrit 33 platelets 295  Liver function: Date: 11/13/2023 ALT 12 AST 37 alkaline phos.  69 bilirubin 0.4 albumin 3.2 protein 7.1    BMP: Date: 11/21/2023 Na 142 K 3.6 Cr 0.9 BUN 14 glucose 200 Ca 8.4 GFR 79.  CBC: Date: 11/21/2023 WBC 8.8 Hgb 10.1 hematocrit 30.4 platelets 290    BMP: Date: 11/27/2023 Na 138 K 3.4 Cr 1 BUN 12 glucose 54 Ca 8.7 GFR 70  CBC: Date: 11/27/2023 WBC 12.3 Hgb 10.6 hematocrit 32.4 platelets 307  Liver function: Date: 11/27/2023 ALT 7 AST 27 alkaline phos.  72 bilirubin 0.5 albumin 3.3 protein 7.7    BMP: Date: 12/4/2023 Na 141 K 4 Cr 1 BUN 12 glucose 209 Ca 8.5 GFR 70  CBC: Date: 12/4/2023 WBC 8.5 Hgb 10.6 hematocrit 32.7 platelets 326  Liver function: Date: 12/4/2023 ALT 8 AST 25 alkaline phos.  64 bilirubin 0.4 albumin 3.3 protein 7.1  12/4/2023 hemoglobin A1c 8.9%    BMP: Date: 12/25/2023 Na 140 K 3.9 Cr 0.9 BUN 17 glucose 172 Ca 8.4 GFR 79  CBC: Date: 12/25/2023 WBC 8.9 Hgb 9.9 hematocrit 30.7 platelets 272  Liver function: Date: 12/25/2023 ALT 8 AST 26 alkaline phos.  67 bilirubin 0.3 albumin 3.2 protein 7    BMP: Date: 12/26/2023 Na 142 K 4.2 Cr 0.9 BUN 15 glucose 168 Ca 8.6 GFR 79  CBC: Date: 12/26/2023 WBC 9.9 Hgb 10.5 hematocrit 32.9 platelets 302    BMP: Date: 1/15/2024 Na 139 K 3.9 Cr 1 BUN 14 glucose 206 Ca 8.5 GFR 70  CBC: Date: 1/15/2024 WBC 8.3 Hgb 9.8 hematocrit 29.8 platelets 244  Liver function: Date: 1/15/2024 ALT 7 AST 22 alkaline phos.  62 bilirubin 0.3 albumin 3.3 protein 6.8          Past Medical History:   Diagnosis Date    Cervicalgia     Neck pain    Essential (primary) hypertension 07/21/2013    Hypertension    Pain in unspecified shoulder     Pain, joint, shoulder    Personal history of other diseases of the digestive system 07/06/2022    History of ulcerative colitis    Personal history of other diseases of the digestive system     History of  esophageal reflux    Personal history of other diseases of the nervous system and sense organs     History of cataract    Personal history of other diseases of the nervous system and sense organs     History of glaucoma    Personal history of other diseases of the respiratory system     History of bronchitis    Personal history of other endocrine, nutritional and metabolic disease     History of diabetes mellitus    Personal history of other endocrine, nutritional and metabolic disease     History of hyperlipidemia    Personal history of other mental and behavioral disorders     History of depression    Snoring     Snoring    Unilateral primary osteoarthritis, unspecified knee 11/02/2015    Arthritis of knee           Past Surgical History:   Procedure Laterality Date    CHOLECYSTECTOMY  01/08/2016    Cholecystectomy    COLON SURGERY  01/08/2016    Colon Surgery    CT ANGIO NECK W AND WO IV CONTRAST  3/19/2019    CT NECK ANGIO W AND WO IV CONTRAST 3/19/2019 [facility] [place] [facility]    CT HEAD ANGIO W AND WO IV CONTRAST  3/19/2019    CT HEAD ANGIO W AND WO IV CONTRAST 3/19/2019 [facility] [place] [facility]Y [facility]    HERNIA REPAIR  01/08/2016    Hernia Repair    HYSTERECTOMY  01/08/2016    Hysterectomy    MR HEAD ANGIO WO IV CONTRAST  6/4/2018    MR HEAD ANGIO WO IV CONTRAST 6/4/2018 [facility] CLINICAL LEGACY    MR HEAD ANGIO WO IV CONTRAST  7/11/2020    MR HEAD ANGIO WO IV CONTRAST 7/11/2020 [facility] CLINICAL LEGACY    MR HEAD ANGIO WO IV CONTRAST  9/30/2020    MR HEAD ANGIO WO IV CONTRAST 9/30/2020 [facility] LEGACY    MR HEAD ANGIO WO IV CONTRAST  5/22/2022    MR HEAD ANGIO WO IV CONTRAST 5/22/2022 [facility] CLINICAL LEGACY    MR HEAD ANGIO WO IV CONTRAST  4/19/2023    MR HEAD ANGIO WO IV CONTRAST [facility] MRI    MR NECK ANGIO WO IV CONTRAST  6/4/2018    MR NECK ANGIO WO IV CONTRAST 6/4/2018 [facility] CLINICAL LEGACY    MR NECK ANGIO WO IV CONTRAST  7/11/2020    MR NECK ANGIO WO IV CONTRAST 7/11/2020 [facility] CLINICAL LEGACY    MR NECK ANGIO WO IV CONTRAST   "9/30/2020    MR NECK ANGIO WO IV CONTRAST 9/30/2020 [facility] LEGAC[facility]    MR NECK ANGIO WO IV CONTRAST  5/22/2022    MR NECK ANGIO WO IV CONTRAST 5/22/2022 [facility] CLINICAL LEGACY    MR NECK ANGIO WO IV CONTRAST  4/19/2023    MR NECK ANGIO WO IV CONTRAST [facility] MRI    OTHER SURGICAL HISTORY  01/08/2016    Tubal Stabilization    OTHER SURGICAL HISTORY  09/01/2016    Cervical Surgery (Gyn) Laser Vaporization Of Transformation Zone    THYROID SURGERY  09/27/2013    Thyroid Surgery            Surgical History  Problems    · History of Cervical Surgery (Gyn) Laser Vaporization Of Transformation Zone   · History of Cholecystectomy   · History of Colon Surgery   · History of Hernia Repair   · History of Hysterectomy   · History of Thyroid Surgery   · History of Tubal Stabilization     Family History  Mother    · Family history of Cancer  Family History    · Family history of Diabetes Mellitus (V18.0)   · Family history of Hypertension (V17.49)     Social History  Problems    · Disabled   · Does not use illicit drugs (V49.89) (Z78.9)   · Former smoker (V15.82) (Z87.891)   · Marital History - Single   · No alcohol.   · Denied: History of Secondhand smoke exposure   · Single     Allergies  Medication    · Aspirin TABS   · Contrast Media Ready-Box MISC   · morphine   · Sulfa Drugs        /71   Pulse 70   Temp 36.4 °C (97.6 °F)   Resp 18   Ht 1.372 m (4' 6\")   Wt 102 kg (224 lb 12.8 oz)   SpO2 98%   BMI 54.20 kg/m²      Physical Exam  Vitals and nursing note reviewed.   Constitutional:       Appearance: Normal appearance.   HENT:      Head: Normocephalic.      Right Ear: External ear normal.      Left Ear: External ear normal.      Nose: Nose normal.      Mouth/Throat:      Mouth: Mucous membranes are moist.      Pharynx: Oropharynx is clear.   Eyes:      Extraocular Movements: Extraocular movements intact.      Conjunctiva/sclera: Conjunctivae normal.      Pupils: Pupils are equal, round, and reactive to light. "   Cardiovascular:      Rate and Rhythm: Normal rate and regular rhythm.      Pulses: Normal pulses.      Heart sounds: Normal heart sounds.   Pulmonary:      Effort: Pulmonary effort is normal.      Breath sounds: Normal breath sounds.   Abdominal:      General: Bowel sounds are normal.      Palpations: Abdomen is soft.   Musculoskeletal:      Cervical back: Normal range of motion and neck supple.      Comments: Generalized muscle weakness; immobility.    Right sided hemiparesis   Skin:     General: Skin is warm and dry.   Neurological:      Mental Status: She is alert. Mental status is at baseline.      Motor: Weakness present.      Coordination: Coordination abnormal.      Gait: Gait abnormal.      Comments: Right sided weakness--RLE 1/5; RUE 3/5; sensation intact to touch x 4 extremities.     Left sided facial droop.     Generalized muscle weakness   Psychiatric:         Mood and Affect: Mood normal.         Behavior: Behavior normal.          Assessment/Plan      Chronic pain; osteoarthritis; Acute on chronic right shoulder and arm pain; Muscle spasticity:  Patient has acute on chronic pain in her right shoulder/arm and muscle spasticity.  Right arm x-rays have been performed.  Continue gabapentin, oxycodone 5 mg PO Q 6 hours PRN, Voltaren 1% gel, baclofen 10 mg PO Q 8 hours PRN and .  PT/OT to evaluate patient and treat if indicated.    Sequela of Cerebrovascular accident; Hx CVA with right-sided hemiparesis; right sided weakness;   dysphagia 2/2 cva; expressive aphasia; History of acute/subacute stroke:   [place] [facility].  Continue Eliquis 5 mg PO BID.               Continue mechanical soft, thin consistency diet.         DM type 2 with hyperglycemia; noncompliance with diabetic diet:         Continue Lantus 43 units at bedtime and Humalog sliding scale.       Monitor accuchecks.       Monitor Ha1c Q 3 months.                Chronic systolic HF;  Benign essential HTN :     Continue Amlodipine and hydrochlorothiazide.     Monitor Bps.      Monitor oxygen saturations.                 Leukocytosis:          Improved.          Monitor WBC.              Generalized muscle weakness; Immobility:  Continue HP in long term care.  Not able to ambulate.  Very weak.  Bedbound/wheelchair bound.   Requires full supportive care.      Problem List Items Addressed This Visit    None  Visit Diagnoses       Other chronic pain    -  Primary    Osteoarthritis, unspecified osteoarthritis type, unspecified site        Muscle spasticity        Right arm pain        Sequela of cerebrovascular accident        Hemiparesis affecting right side as late effect of stroke (CMS/[facility])        Type 2 diabetes mellitus with hyperglycemia, with long-term current use of insulin (CMS/[facility])        Chronic systolic heart failure (CMS/[facility])        Benign essential HTN        Leukocytosis, unspecified type        Generalized muscle weakness        Immobility            Maria Del Carmen Garduno, APRN-CNP

## 2024-02-05 ENCOUNTER — NURSING HOME VISIT (OUTPATIENT)
Dept: POST ACUTE CARE | Facility: EXTERNAL LOCATION | Age: 54
End: 2024-02-05
Payer: MEDICARE

## 2024-02-05 DIAGNOSIS — R47.01 EXPRESSIVE APHASIA: ICD-10-CM

## 2024-02-05 DIAGNOSIS — G89.29 OTHER CHRONIC PAIN: ICD-10-CM

## 2024-02-05 DIAGNOSIS — Z74.09 IMMOBILITY: ICD-10-CM

## 2024-02-05 DIAGNOSIS — E78.2 MIXED HYPERLIPIDEMIA: Primary | ICD-10-CM

## 2024-02-05 DIAGNOSIS — Z79.4 TYPE 2 DIABETES MELLITUS WITH HYPERGLYCEMIA, WITH LONG-TERM CURRENT USE OF INSULIN (MULTI): ICD-10-CM

## 2024-02-05 DIAGNOSIS — I10 BENIGN ESSENTIAL HTN: ICD-10-CM

## 2024-02-05 DIAGNOSIS — M19.90 OSTEOARTHRITIS, UNSPECIFIED OSTEOARTHRITIS TYPE, UNSPECIFIED SITE: ICD-10-CM

## 2024-02-05 DIAGNOSIS — M62.81 GENERALIZED MUSCLE WEAKNESS: ICD-10-CM

## 2024-02-05 DIAGNOSIS — F32.9 MAJOR DEPRESSIVE DISORDER WITH CURRENT ACTIVE EPISODE, UNSPECIFIED DEPRESSION EPISODE SEVERITY, UNSPECIFIED WHETHER RECURRENT: ICD-10-CM

## 2024-02-05 DIAGNOSIS — D72.829 LEUKOCYTOSIS, UNSPECIFIED TYPE: ICD-10-CM

## 2024-02-05 DIAGNOSIS — M62.838 MUSCLE SPASTICITY: ICD-10-CM

## 2024-02-05 DIAGNOSIS — I69.30 SEQUELA OF CEREBROVASCULAR ACCIDENT: ICD-10-CM

## 2024-02-05 DIAGNOSIS — I69.351 HEMIPARESIS AFFECTING RIGHT SIDE AS LATE EFFECT OF STROKE (MULTI): ICD-10-CM

## 2024-02-05 DIAGNOSIS — I50.22 CHRONIC SYSTOLIC HEART FAILURE (MULTI): ICD-10-CM

## 2024-02-05 DIAGNOSIS — E11.65 TYPE 2 DIABETES MELLITUS WITH HYPERGLYCEMIA, WITH LONG-TERM CURRENT USE OF INSULIN (MULTI): ICD-10-CM

## 2024-02-05 DIAGNOSIS — M79.601 RIGHT ARM PAIN: ICD-10-CM

## 2024-02-05 DIAGNOSIS — R13.10 DYSPHAGIA, UNSPECIFIED TYPE: ICD-10-CM

## 2024-02-05 PROCEDURE — 99309 SBSQ NF CARE MODERATE MDM 30: CPT | Performed by: NURSE PRACTITIONER

## 2024-02-05 NOTE — LETTER
Patient: Janette Martinez  : 1970    Encounter Date: 2024    Name: Janette Martinez    YOB: 1970    Code Status: DNRcc arrest    Chief complaint:  Follow up on mixed hyperlipidemia;  etc.....      HPI:    53 year old female with past medical history of heart failure with reduced EF,   diabetes, hypertension, hyperlipidemia,        CVA (left MCA  with residual right-sided hemiparesis), GERD, HIV, hypothyroidism, hypertension,   and diabetes.        Patient re-admitted to [facility] On 10/20/23 after her most recent hospitalization.    Diagnoses as follows:    Mixed Hyperlipidemia:  On atorvastatin.   2024: Lipid panel: Cholesterol 119; triglycerides 174 H; HDL 32; LDL 52; VLDL 35.  Triglycerides were elevated.  Atorvastatin was recently increased from 40 mg to 50 mg after recent lipid panel.  Patient seen today, she is in bed.  Calm and cooperative.  No complaints of chest pain.  No headaches.  No dizziness.          Chronic pain; osteoarthritis; Muscle spasticity; Acute on chronic right shoulder and arm pain:  Patient has acute on chronic pain in her right shoulder/arm and muscle spasticity.  Patient is frustrated because she is not able to move her right arm at all.  Right arm x-rays have been performed in the past.  ###On 23 Humerus right arm x-ray performed.   Results as follows: Mild degenerative changes are present. No acute fracture of dislocation. No osteomyelitis.  ###On 23 radius and ulna x-ray was also performed.  Results as follows: right radius and ulna has normal ossification pattern.  No fracture or dislocation was seen.  The arm pain is chronic.  She also has muscle spasticity in her right arm.   On gabapentin, oxycodone 5 mg PO Q 4 hours PRN, Voltaren 1% gel, baclofen 10 mg PO Q 8 hours PRN and .  Rates pain a 6-7, intermittent.  Reminded patient that she only receives the oxycodone 5 mg if she asks the nursing staff for it because it is a PRN  medication.  Patient is also followed in house by [facility] Palliative NP.     Major depressive disorder:   On Sertraline.   Also followed by in house psych NP.  Her Sertraline was recently increased from 100 mg PO every day to 125 mg PO every day.  Patient has a good appetite, no recent weight loss reported.    DM type 2 with hyperglycemia; noncompliance with diabetic diet:        Patient's accuchecks are elevated.       Patient is frequently noncompliant with diet.       On Lantus 43 units at bedtime and Humalog sliding scale.       Recent accuchecks: 250 mg/dL, 248 mg/dL, 192 mg/dL.        No episodes of hypoglycemia reported.        Ha1c trends:       5/1/23 Ha1c 8.1 %        10/17/23 Ha1c 8.3 %       12/4/23 hemoglobin A1c 8.9%          Sequela of Cerebrovascular accident; Hx CVA with right-sided hemiparesis; right sided weakness;   dysphagia 2/2 cva; expressive aphasia; History of acute/subacute stroke:   MRI was performed during a PREVIOUS hospitalization and it showed Acute/subacute tiny infarct in the left anterior brock.   [place] [facility] [place].  On Eliquis 5 mg PO BID.  Pt has baseline dysarthria from a previous CVA, right Hemiparesis,  and expressive aphasia.  She is able to communicate in short sentences.  Patient is also able to nod appropriately to questions.   On a mechanical soft, thin consistency diet.               Benign essential HTN; Chronic systolic HF:     Continue  Amlodipine and hydrochlorothiazide.            No oxygen desaturations reported.       Recent /72       No complaints of  chest pain, headaches or dizziness.             Leukocytosis:       Recent WBC results:      11/21/2023 WBC 8.8      11/27/23 WBC 12.3 H       12/4/23 WBC 8.5        12/26/23 WBC 9.9         1/15/24 WBC 8.3          2/5/24 WBC 10.9 H        Only slightly elevated today, normal high end is 10.8.          Immobility; Generalized muscle weakness:  At  in  long term care.  Not able to ambulate.  Very weak.  Bedbound/wheelchair bound.                              Reviewed medical, social, surgical and family history.  Reviewed all current medications and performed medication reconciliation.  Reviewed vital signs AND recent blood work results.  Performed prescription drug management.   Reviewed [facility] Documentation  Discussed patient with nursing and [facility] palliative NP.      ROS: 10 point ROS performed; negative unless noted in HPI.       LABS:  BMP: Date: 5/1/2023 Na 137 K 4.3 Cr 1.1 BUN 15 glucose 141 Ca 8.8 GFR 52  CBC: Date: 5/1/2023 WBC 8.4 Hgb 11.1 hematocrit 33.9 platelets 337  Liver function: Date: 5/1/2023 ALT 10 AST 29 alkaline phos.  63 bilirubin 0.4 albumin 3.6 protein 7.7       Ha1c 8.1 % on 5/1/23          BMP: Date: 5/15/2023 Na 139  K 3.4 Cr 1.2 BUN 20 glucose 122 Ca 8.8 GFR 57        CBC: Date: 5/15/2023 WBC 9 Hgb 10.2 hematocrit 30.9 platelets 277       Liver function: 5/15/2023 ALT 8 AST 28 alkaline phos.  57 bilirubin 0.5 albumin 3.5 protein 7.3          BMP: Date: 5/22/2023 Na 141 K 3.1 Cr 1 BUN 11 glucose 227 Ca 8.7 GFR 70        CBC: Date: 5/22/2023 WBC 8.2 Hgb 10 hematocrit 30.5 platelets 372        Liver function: Date: 5/22/2023 ALT 8 AST 22 alkaline phos.  63 bilirubin 0.3 albumin 3.2 protein 7.2     BMP: Date: 6/5/2023 Na 140 K 4.2 Cr 1.1 BUN 14 glucose 79 Ca 1.1 GFR 63  CBC: Date: 6/5/2023 WBC 8.1 Hgb 10.5 hematocrit 32.3 platelets 350  Liver function: Date: 6/5/2023 ALT 7 AST 25 alkaline phos.  59 bilirubin 0.4 albumin 3.3 protein 7.7    BMP: Date: 7/17/2023 Na 140 K 4.1 Cr 0.8 BUN 17 glucose 126 Ca 9.1 GFR 90  CBC: Date: 7/17/2023 WBC 14.1 Hgb 10.2 hematocrit 32.1 platelets 265  Liver function: Date: 7/17/2023 ALT 8 AST 23 alkaline phos.  72 bilirubin 0.5 albumin 3.4 protein 7.4    CBC: Date: 7/19/2023 WBC 10.7 Hgb 9.5 hematocrit 28.8 platelets 257    BMP: Date: 7/24/2023 Na 137 K 4.2 Cr 0.9 BUN 19 glucose 186 Ca  9.1 GFR 79  CBC: Date: 7/24/2023 WBC 11.6 Hgb 10.3 hematocrit 31.4 platelets 305  Liver function: Date: 7/24/2023 ALT 11 AST 27 alkaline phos.  77 bilirubin 0.3 albumin 3.6 protein 7.8    BMP: Date: 7/31/2023 Na 141 K 4.2 Cr 0.8 BUN 17 glucose 138 Ca 8.7 GFR 90  CBC: Date: 7/31/2023 WBC 12.3 Hgb 10.5 hematocrit 32.4 platelets 307  Liver function: Date: 7/31/2023 ALT 14 AST 24 alkaline phos.  81 bilirubin 0.4 albumin 3.5 protein 7.4    BMP: Date: 8/7/2023 Na 141 K 3.9 Cr 1.0 BUN 12 glucose 167 Ca 8.6 GFR 70  CBC: Date: 8/7/2023 WBC 10 Hgb 10.5 hematocrit 32.7 platelets 256  Liver function: Date: 8/7/2023 ALT 9 AST 23 alkaline phos.  76 bilirubin 0.5 albumin 3.3 protein 7    BMP: Date: 8/21/2023 Na 142 K 4.1 Cr 1 BUN 17 glucose 195 Ca 8.5 GFR 70  CBC: Date: 8/21/2023 WBC 10.9 Hgb 9.6 hematocrit 30 platelets 304  Liver function: Date: 8/21/2023 ALT 9 AST 24 alkaline phos.  74 bilirubin 0.4 albumin 3.2 protein 7.1    BMP: Date: 8/31/2023 Na 141 K 3.8 Cr 0.9 BUN 14 glucose 118 Ca 8.2 GFR 79  CBC: Date: 8/31/2023 WBC 12 Hgb 9.6 hematocrit 29.8 platelets 294  Liver function: Date: 8/31/2023 ALT 8 AST 21 alkaline phos.  64 bilirubin 0.4 albumin 3.1 protein 7    BMP: Date: 9/4/2023 Na 141 K 3.9 Cr 1.1 BUN 17 glucose 107 Ca 8.5 GFR 63   CBC: Date: 9/4/2023 WBC 12 Hgb 9.5 hematocrit 29.7 platelets 282  Liver function: Date: 9/4/2023 ALT 7 AST 22 alkaline phos.  64 bilirubin 0.4 albumin 3.3 protein 7.1    BMP: Date: 9/11/2023 Na 142 K 4 Cr 1 BUN 12 glucose 81 Ca 8.2 GFR 70  CBC: Date: 9/11/2023 WBC 8.7 Hgb 9.2 hematocrit 28.3 platelets 305  Liver function: Date: 9/11/2023 ALT 7 AST 22 alkaline phos.  61 bilirubin 0.3 albumin 3 protein 6.8    BMP: Date: 9/18/2023 Na 140 K 4 Cr 1 BUN 15 glucose 150 Ca 8.3 GFR 70  CBC: Date: 9/18/2023 WBC 12.1 Hgb 9.5 hematocrit 29.8 platelets 287  Liver function: Date: 9/18/2023 ALT 7 AST 23 alkaline phos.  62 bilirubin 0.4 albumin 3.4 protein 7.1    10/17/23 Ha1c 8.3 %    BMP: Date: 10/23/2023  Na 141 K 3.6 Cr 1 BUN 22 glucose 65 Ca 8.3 GFR 70  CBC: Date: 10/23/2023 WBC 12.5 Hgb 10.8  hematocrit 33.2 platelets 290  Liver function: Date: 10/23/2023 ALT 9 AST 31 alkaline phos.  66 bilirubin 0.6 albumin 3.6 protein 7.8    10/26/2023: Lipid panel: Cholesterol 111; triglycerides 111; HDL 33; LDL 56; VLDL 22.    10/26/2023: TSH: 1.081 (range 0.340-5.500).    BMP: Date: 10/30/2023 Na 143  K 3.5 Cr 0.9 BUN 11 glucose 171 Ca 8 GFR 65  CBC: Date: 10/30/2023 WBC 9.2 Hgb 10.1 hematocrit 31.1 platelets 280  Liver function: Date: 10/30/2023 ALT 11 AST 27 alkaline phos.  66 bilirubin 0.4 albumin 3.4 protein 7.2    BMP: Date: 11/13/2023 Na 138 K 3.9 Cr 0.9 BUN 11 glucose 308 Ca 8.4 GFR 79  CBC: Date: 11/13/2023 WBC 8.2 Hgb 10.6 hematocrit 33 platelets 295  Liver function: Date: 11/13/2023 ALT 12 AST 37 alkaline phos.  69 bilirubin 0.4 albumin 3.2 protein 7.1    BMP: Date: 11/21/2023 Na 142 K 3.6 Cr 0.9 BUN 14 glucose 200 Ca 8.4 GFR 79.  CBC: Date: 11/21/2023 WBC 8.8 Hgb 10.1 hematocrit 30.4 platelets 290    BMP: Date: 11/27/2023 Na 138 K 3.4 Cr 1 BUN 12 glucose 54 Ca 8.7 GFR 70  CBC: Date: 11/27/2023 WBC 12.3 Hgb 10.6 hematocrit 32.4 platelets 307  Liver function: Date: 11/27/2023 ALT 7 AST 27 alkaline phos.  72 bilirubin 0.5 albumin 3.3 protein 7.7    BMP: Date: 12/4/2023 Na 141 K 4 Cr 1 BUN 12 glucose 209 Ca 8.5 GFR 70  CBC: Date: 12/4/2023 WBC 8.5 Hgb 10.6 hematocrit 32.7 platelets 326  Liver function: Date: 12/4/2023 ALT 8 AST 25 alkaline phos.  64 bilirubin 0.4 albumin 3.3 protein 7.1  12/4/2023 hemoglobin A1c 8.9%    BMP: Date: 12/25/2023 Na 140 K 3.9 Cr 0.9 BUN 17 glucose 172 Ca 8.4 GFR 79  CBC: Date: 12/25/2023 WBC 8.9 Hgb 9.9 hematocrit 30.7 platelets 272  Liver function: Date: 12/25/2023 ALT 8 AST 26 alkaline phos.  67 bilirubin 0.3 albumin 3.2 protein 7    BMP: Date: 12/26/2023 Na 142 K 4.2 Cr 0.9 BUN 15 glucose 168 Ca 8.6 GFR 79  CBC: Date: 12/26/2023 WBC 9.9 Hgb 10.5 hematocrit 32.9 platelets 302    BMP:  Date: 1/15/2024 Na 139 K 3.9 Cr 1 BUN 14 glucose 206 Ca 8.5 GFR 70  CBC: Date: 1/15/2024 WBC 8.3 Hgb 9.8 hematocrit 29.8 platelets 244  Liver function: Date: 1/15/2024 ALT 7 AST 22 alkaline phos.  62 bilirubin 0.3 albumin 3.3 protein 6.8    1/16/2024: Lipid panel: Cholesterol 119; triglycerides 174; HDL 32; LDL 52; VLDL 35.    BMP: Date: 2/5/2024 Na 141 K 4.2 Cr 1 BUN 22 glucose 150 Ca 8.3 GFR 70  CBC: Date: 2/5/2024 WBC 10.9 Hgb 10.2 hematocrit 31.1 platelets 319  Liver function: Date: 2/5/2024 ALT 7 AST 29 alkaline phos.  77 bilirubin 0.6 albumin 3.4 protein 7.4            Past Medical History:   Diagnosis Date   • Cervicalgia     Neck pain   • Essential (primary) hypertension 07/21/2013    Hypertension   • Pain in unspecified shoulder     Pain, joint, shoulder   • Personal history of other diseases of the digestive system 07/06/2022    History of ulcerative colitis   • Personal history of other diseases of the digestive system     History of esophageal reflux   • Personal history of other diseases of the nervous system and sense organs     History of cataract   • Personal history of other diseases of the nervous system and sense organs     History of glaucoma   • Personal history of other diseases of the respiratory system     History of bronchitis   • Personal history of other endocrine, nutritional and metabolic disease     History of diabetes mellitus   • Personal history of other endocrine, nutritional and metabolic disease     History of hyperlipidemia   • Personal history of other mental and behavioral disorders     History of depression   • Snoring     Snoring   • Unilateral primary osteoarthritis, unspecified knee 11/02/2015    Arthritis of knee           Past Surgical History:   Procedure Laterality Date   • CHOLECYSTECTOMY  01/08/2016    Cholecystectomy   • COLON SURGERY  01/08/2016    Colon Surgery   • CT ANGIO NECK W AND WO IV CONTRAST  3/19/2019    CT NECK ANGIO W AND WO IV CONTRAST 3/19/2019 [facility]  EMERGENCY LEGACY   • CT HEAD ANGIO W AND WO IV CONTRAST  3/19/2019    CT HEAD ANGIO W AND WO IV CONTRAST 3/19/2019 [facility] EMERGENCY LEGACY   • HERNIA REPAIR  01/08/2016    Hernia Repair   • HYSTERECTOMY  01/08/2016    Hysterectomy   • MR HEAD ANGIO WO IV CONTRAST  6/4/2018    MR HEAD ANGIO WO IV CONTRAST 6/4/2018 [facility] CLINICAL LEGACY   • MR HEAD ANGIO WO IV CONTRAST  7/11/2020    MR HEAD ANGIO WO IV CONTRAST 7/11/2020 [facility] CLINICAL LEGACY   • MR HEAD ANGIO WO IV CONTRAST  9/30/2020    MR HEAD ANGIO WO IV CONTRAST 9/30/2020 [facility]IB LEGACY   • MR HEAD ANGIO WO IV CONTRAST  5/22/2022    MR HEAD ANGIO WO IV CONTRAST 5/22/2022 [facility] CLINICAL LEGACY   • MR HEAD ANGIO WO IV CONTRAST  4/19/2023    MR HEAD ANGIO WO IV CONTRAST [facility] MRI   • MR NECK ANGIO WO IV CONTRAST  6/4/2018    MR NECK ANGIO WO IV CONTRAST 6/4/2018 [facility] CLINICAL LEGACY   • MR NECK ANGIO WO IV CONTRAST  7/11/2020    MR NECK ANGIO WO IV CONTRAST 7/11/2020 [facility] CLINICAL LEGACY   • MR NECK ANGIO WO IV CONTRAST  9/30/2020    MR NECK ANGIO WO IV CONTRAST 9/30/2020 [facility]   • MR NECK ANGIO WO IV CONTRAST  5/22/2022    MR NECK ANGIO WO IV CONTRAST 5/22/2022 [facility] CLINICAL LEGACY   • MR NECK ANGIO WO IV CONTRAST  4/19/2023    MR NECK ANGIO WO IV CONTRAST [facility] MRI   • OTHER SURGICAL HISTORY  01/08/2016    Tubal Stabilization   • OTHER SURGICAL HISTORY  09/01/2016    Cervical Surgery (Gyn) Laser Vaporization Of Transformation Zone   • THYROID SURGERY  09/27/2013    Thyroid Surgery            Surgical History  Problems    · History of Cervical Surgery (Gyn) Laser Vaporization Of Transformation Zone   · History of Cholecystectomy   · History of Colon Surgery   · History of Hernia Repair   · History of Hysterectomy   · History of Thyroid Surgery   · History of Tubal Stabilization     Family History  Mother    · Family history of Cancer  Family History    · Family history of Diabetes Mellitus (V18.0)   · Family history of Hypertension (V17.49)     Social  "History  Problems    · Disabled   · Does not use illicit drugs (V49.89) (Z78.9)   · Former smoker (V15.82) (Z87.891)   · Marital History - Single   · No alcohol.   · Denied: History of Secondhand smoke exposure   · Single     Allergies  Medication    · Aspirin TABS   · Contrast Media Ready-Box MISC   · morphine   · Sulfa Drugs        /72   Pulse 76   Temp 36.3 °C (97.3 °F)   Resp 18   Ht 1.372 m (4' 6\")   Wt 102 kg (224 lb 12.8 oz)   SpO2 96%   BMI 54.20 kg/m²      Physical Exam  Vitals and nursing note reviewed.   Constitutional:       Appearance: Normal appearance.   HENT:      Head: Normocephalic.      Right Ear: External ear normal.      Left Ear: External ear normal.      Nose: Nose normal.      Mouth/Throat:      Mouth: Mucous membranes are moist.      Pharynx: Oropharynx is clear.   Eyes:      Extraocular Movements: Extraocular movements intact.      Conjunctiva/sclera: Conjunctivae normal.      Pupils: Pupils are equal, round, and reactive to light.   Cardiovascular:      Rate and Rhythm: Normal rate and regular rhythm.      Pulses: Normal pulses.      Heart sounds: Normal heart sounds.   Pulmonary:      Effort: Pulmonary effort is normal.      Breath sounds: Normal breath sounds.   Abdominal:      General: Bowel sounds are normal.      Palpations: Abdomen is soft.   Musculoskeletal:      Cervical back: Normal range of motion and neck supple.      Comments: Generalized muscle weakness; immobility.    Right sided hemiparesis   Skin:     General: Skin is warm and dry.   Neurological:      Mental Status: She is alert. Mental status is at baseline.      Motor: Weakness present.      Coordination: Coordination abnormal.      Gait: Gait abnormal.      Comments: Right sided weakness--RLE 1/5; RUE 3/5; sensation intact to touch x 4 extremities.     Left sided facial droop.     Generalized muscle weakness   Psychiatric:         Mood and Affect: Mood normal.         Behavior: Behavior normal.      "     Assessment/Plan     Mixed Hyperlipidemia:  Continue atorvastatin.   Atorvastatin was recently increased from 40 mg to 50 mg after recent lipid panel.  Recheck lipid panel in 4 weeks.          Chronic pain; osteoarthritis; Muscle spasticity; Acute on chronic right shoulder and arm pain:  Patient has acute on chronic pain in her right shoulder/arm and muscle spasticity.  Continue  gabapentin, oxycodone 5 mg PO Q 4 hours PRN, Voltaren 1% gel, baclofen 10 mg PO Q 8 hours PRN and .          Follow up with in- house [facility] Palliative NP.     Major depressive disorder:   Continue Sertraline.   Follow up with in house psych NP.    DM type 2 with hyperglycemia; noncompliance with diabetic diet:         Continue Lantus 43 units at bedtime and Humalog sliding scale.        Monitor accuchecks.       Monitor Ha1c q 3 months.    Sequela of Cerebrovascular accident; Hx CVA with right-sided hemiparesis; right sided weakness;   dysphagia 2/2 cva; expressive aphasia; History of acute/subacute stroke:   MRI was performed during a PREVIOUS hospitalization and it showed Acute/subacute tiny infarct in the left anterior brock.   Dr. Jiang [place] [facility] [place] at that time.  Continue Eliquis 5 mg PO BID.  Continue mechanical soft, thin consistency diet.         Benign essential HTN; Chronic systolic HF:     Continue Amlodipine and hydrochlorothiazide.      Monitor Bps.             Leukocytosis:  Monitor WBC.  Monitor for s/s of infection.         Immobility; Generalized muscle weakness:  Continue at  in long term care.  Not able to ambulate.  Bedbound/wheelchair bound.   Fall prevention strategies.         Problem List Items Addressed This Visit    None  Visit Diagnoses       Mixed hyperlipidemia    -  Primary    Other chronic pain        Osteoarthritis, unspecified osteoarthritis type, unspecified site        Muscle spasticity        Right arm pain        Major depressive disorder with current active  episode, unspecified depression episode severity, unspecified whether recurrent        Type 2 diabetes mellitus with hyperglycemia, with long-term current use of insulin (CMS/[facility])        Sequela of cerebrovascular accident        Hemiparesis affecting right side as late effect of stroke (CMS/[facility])        Dysphagia, unspecified type        Expressive aphasia        Benign essential HTN        Chronic systolic heart failure (CMS/HCC)        Leukocytosis, unspecified type        Immobility        Generalized muscle weakness            JASON Espitia       Electronically Signed By: JASON Espitia   2/7/24  4:05 PM

## 2024-02-06 VITALS
HEART RATE: 76 BPM | BODY MASS INDEX: 52.03 KG/M2 | SYSTOLIC BLOOD PRESSURE: 136 MMHG | HEIGHT: 55 IN | RESPIRATION RATE: 18 BRPM | OXYGEN SATURATION: 96 % | DIASTOLIC BLOOD PRESSURE: 72 MMHG | WEIGHT: 224.8 LBS | TEMPERATURE: 97.3 F

## 2024-02-06 NOTE — PROGRESS NOTES
Name: Janette Martinez    YOB: 1970    Code Status: DNRcc arrest    Chief complaint:  Follow up on mixed hyperlipidemia;  etc.....      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        Patient re-admitted to [facility]. On 10/20/23 after her most recent hospitalization.    Diagnoses as follows:    Mixed Hyperlipidemia:  On atorvastatin.   1/16/2024: Lipid panel: Cholesterol 119; triglycerides 174 H; HDL 32; LDL 52; VLDL 35.  Triglycerides were elevated.  Atorvastatin was recently increased from 40 mg to 50 mg after recent lipid panel.  Patient seen today, she is in bed.  Calm and cooperative.  No complaints of chest pain.  No headaches.  No dizziness.          Chronic pain; osteoarthritis; Muscle spasticity; Acute on chronic right shoulder and arm pain:  Patient has acute on chronic pain in her right shoulder/arm and muscle spasticity.  Patient is frustrated because she is not able to move her right arm at all.  Right arm x-rays have been performed in the past.  ###On 6/5/23 Humerus right arm x-ray performed.   Results as follows: Mild degenerative changes are present. No acute fracture of dislocation. No osteomyelitis.  ###On 6/5/23 radius and ulna x-ray was also performed.  Results as follows: right radius and ulna has normal ossification pattern.  No fracture or dislocation was seen.  The arm pain is chronic.  She also has muscle spasticity in her right arm.   On gabapentin, oxycodone 5 mg PO Q 4 hours PRN, Voltaren 1% gel, baclofen 10 mg PO Q 8 hours PRN and .  Rates pain a 6-7, intermittent.  Reminded patient that she only receives the oxycodone 5 mg if she asks the nursing staff for it because it is a PRN medication.  Patient is also followed in house by [facility]  Palliative NP.     Major depressive disorder:   On Sertraline.   Also followed by in house psych NP.  Her Sertraline was recently increased from 100 mg PO every day to 125 mg PO every day.  Patient has a good appetite, no recent weight loss reported.    DM type 2 with hyperglycemia; noncompliance with diabetic diet:        Patient's accuchecks are elevated.       Patient is frequently noncompliant with diet.       On Lantus 43 units at bedtime and Humalog sliding scale.       Recent accuchecks: 250 mg/dL, 248 mg/dL, 192 mg/dL.        No episodes of hypoglycemia reported.        Ha1c trends:       5/1/23 Ha1c 8.1 %        10/17/23 Ha1c 8.3 %       12/4/23 hemoglobin A1c 8.9%          Sequela of Cerebrovascular accident; Hx CVA with right-sided hemiparesis; right sided weakness;   dysphagia 2/2 cva; expressive aphasia; History of acute/subacute stroke:   MRI was performed during a PREVIOUS hospitalization and it showed Acute/subacute tiny infarct in the left anterior brock.   Dr. Jiang from [facility] [place] neuro/stroke was consulted at that time.  On Eliquis 5 mg PO BID.  Pt has baseline dysarthria from a previous CVA, right Hemiparesis,  and expressive aphasia.  She is able to communicate in short sentences.  Patient is also able to nod appropriately to questions.   On a mechanical soft, thin consistency diet.               Benign essential HTN; Chronic systolic HF:     Continue  Amlodipine and hydrochlorothiazide.            No oxygen desaturations reported.       Recent /72       No complaints of  chest pain, headaches or dizziness.             Leukocytosis:       Recent WBC results:      11/21/2023 WBC 8.8      11/27/23 WBC 12.3 H       12/4/23 WBC 8.5        12/26/23 WBC 9.9         1/15/24 WBC 8.3          2/5/24 WBC 10.9 H        Only slightly elevated today, normal high end is 10.8.          Immobility; Generalized muscle weakness:  At  in long term care.  Not able to ambulate.  Very  weak.  Bedbound/wheelchair bound.                              Reviewed medical, social, surgical and family history.  Reviewed all current medications and performed medication reconciliation.  Reviewed vital signs AND recent blood work results.  Performed prescription drug management.   Reviewed [facility] Documentation  Discussed patient with nursing and [facility] palliative NP.      ROS: 10 point ROS performed; negative unless noted in HPI.       LABS:  BMP: Date: 5/1/2023 Na 137 K 4.3 Cr 1.1 BUN 15 glucose 141 Ca 8.8 GFR 52  CBC: Date: 5/1/2023 WBC 8.4 Hgb 11.1 hematocrit 33.9 platelets 337  Liver function: Date: 5/1/2023 ALT 10 AST 29 alkaline phos.  63 bilirubin 0.4 albumin 3.6 protein 7.7       Ha1c 8.1 % on 5/1/23          BMP: Date: 5/15/2023 Na 139  K 3.4 Cr 1.2 BUN 20 glucose 122 Ca 8.8 GFR 57        CBC: Date: 5/15/2023 WBC 9 Hgb 10.2 hematocrit 30.9 platelets 277       Liver function: 5/15/2023 ALT 8 AST 28 alkaline phos.  57 bilirubin 0.5 albumin 3.5 protein 7.3          BMP: Date: 5/22/2023 Na 141 K 3.1 Cr 1 BUN 11 glucose 227 Ca 8.7 GFR 70        CBC: Date: 5/22/2023 WBC 8.2 Hgb 10 hematocrit 30.5 platelets 372        Liver function: Date: 5/22/2023 ALT 8 AST 22 alkaline phos.  63 bilirubin 0.3 albumin 3.2 protein 7.2     BMP: Date: 6/5/2023 Na 140 K 4.2 Cr 1.1 BUN 14 glucose 79 Ca 1.1 GFR 63  CBC: Date: 6/5/2023 WBC 8.1 Hgb 10.5 hematocrit 32.3 platelets 350  Liver function: Date: 6/5/2023 ALT 7 AST 25 alkaline phos.  59 bilirubin 0.4 albumin 3.3 protein 7.7    BMP: Date: 7/17/2023 Na 140 K 4.1 Cr 0.8 BUN 17 glucose 126 Ca 9.1 GFR 90  CBC: Date: 7/17/2023 WBC 14.1 Hgb 10.2 hematocrit 32.1 platelets 265  Liver function: Date: 7/17/2023 ALT 8 AST 23 alkaline phos.  72 bilirubin 0.5 albumin 3.4 protein 7.4    CBC: Date: 7/19/2023 WBC 10.7 Hgb 9.5 hematocrit 28.8 platelets 257    BMP: Date: 7/24/2023 Na 137 K 4.2 Cr 0.9 BUN 19 glucose 186 Ca 9.1 GFR 79  CBC: Date: 7/24/2023 WBC 11.6 Hgb  10.3 hematocrit 31.4 platelets 305  Liver function: Date: 7/24/2023 ALT 11 AST 27 alkaline phos.  77 bilirubin 0.3 albumin 3.6 protein 7.8    BMP: Date: 7/31/2023 Na 141 K 4.2 Cr 0.8 BUN 17 glucose 138 Ca 8.7 GFR 90  CBC: Date: 7/31/2023 WBC 12.3 Hgb 10.5 hematocrit 32.4 platelets 307  Liver function: Date: 7/31/2023 ALT 14 AST 24 alkaline phos.  81 bilirubin 0.4 albumin 3.5 protein 7.4    BMP: Date: 8/7/2023 Na 141 K 3.9 Cr 1.0 BUN 12 glucose 167 Ca 8.6 GFR 70  CBC: Date: 8/7/2023 WBC 10 Hgb 10.5 hematocrit 32.7 platelets 256  Liver function: Date: 8/7/2023 ALT 9 AST 23 alkaline phos.  76 bilirubin 0.5 albumin 3.3 protein 7    BMP: Date: 8/21/2023 Na 142 K 4.1 Cr 1 BUN 17 glucose 195 Ca 8.5 GFR 70  CBC: Date: 8/21/2023 WBC 10.9 Hgb 9.6 hematocrit 30 platelets 304  Liver function: Date: 8/21/2023 ALT 9 AST 24 alkaline phos.  74 bilirubin 0.4 albumin 3.2 protein 7.1    BMP: Date: 8/31/2023 Na 141 K 3.8 Cr 0.9 BUN 14 glucose 118 Ca 8.2 GFR 79  CBC: Date: 8/31/2023 WBC 12 Hgb 9.6 hematocrit 29.8 platelets 294  Liver function: Date: 8/31/2023 ALT 8 AST 21 alkaline phos.  64 bilirubin 0.4 albumin 3.1 protein 7    BMP: Date: 9/4/2023 Na 141 K 3.9 Cr 1.1 BUN 17 glucose 107 Ca 8.5 GFR 63   CBC: Date: 9/4/2023 WBC 12 Hgb 9.5 hematocrit 29.7 platelets 282  Liver function: Date: 9/4/2023 ALT 7 AST 22 alkaline phos.  64 bilirubin 0.4 albumin 3.3 protein 7.1    BMP: Date: 9/11/2023 Na 142 K 4 Cr 1 BUN 12 glucose 81 Ca 8.2 GFR 70  CBC: Date: 9/11/2023 WBC 8.7 Hgb 9.2 hematocrit 28.3 platelets 305  Liver function: Date: 9/11/2023 ALT 7 AST 22 alkaline phos.  61 bilirubin 0.3 albumin 3 protein 6.8    BMP: Date: 9/18/2023 Na 140 K 4 Cr 1 BUN 15 glucose 150 Ca 8.3 GFR 70  CBC: Date: 9/18/2023 WBC 12.1 Hgb 9.5 hematocrit 29.8 platelets 287  Liver function: Date: 9/18/2023 ALT 7 AST 23 alkaline phos.  62 bilirubin 0.4 albumin 3.4 protein 7.1    10/17/23 Ha1c 8.3 %    BMP: Date: 10/23/2023 Na 141 K 3.6 Cr 1 BUN 22 glucose 65 Ca 8.3 GFR  70  CBC: Date: 10/23/2023 WBC 12.5 Hgb 10.8  hematocrit 33.2 platelets 290  Liver function: Date: 10/23/2023 ALT 9 AST 31 alkaline phos.  66 bilirubin 0.6 albumin 3.6 protein 7.8    10/26/2023: Lipid panel: Cholesterol 111; triglycerides 111; HDL 33; LDL 56; VLDL 22.    10/26/2023: TSH: 1.081 (range 0.340-5.500).    BMP: Date: 10/30/2023 Na 143  K 3.5 Cr 0.9 BUN 11 glucose 171 Ca 8 GFR 65  CBC: Date: 10/30/2023 WBC 9.2 Hgb 10.1 hematocrit 31.1 platelets 280  Liver function: Date: 10/30/2023 ALT 11 AST 27 alkaline phos.  66 bilirubin 0.4 albumin 3.4 protein 7.2    BMP: Date: 11/13/2023 Na 138 K 3.9 Cr 0.9 BUN 11 glucose 308 Ca 8.4 GFR 79  CBC: Date: 11/13/2023 WBC 8.2 Hgb 10.6 hematocrit 33 platelets 295  Liver function: Date: 11/13/2023 ALT 12 AST 37 alkaline phos.  69 bilirubin 0.4 albumin 3.2 protein 7.1    BMP: Date: 11/21/2023 Na 142 K 3.6 Cr 0.9 BUN 14 glucose 200 Ca 8.4 GFR 79.  CBC: Date: 11/21/2023 WBC 8.8 Hgb 10.1 hematocrit 30.4 platelets 290    BMP: Date: 11/27/2023 Na 138 K 3.4 Cr 1 BUN 12 glucose 54 Ca 8.7 GFR 70  CBC: Date: 11/27/2023 WBC 12.3 Hgb 10.6 hematocrit 32.4 platelets 307  Liver function: Date: 11/27/2023 ALT 7 AST 27 alkaline phos.  72 bilirubin 0.5 albumin 3.3 protein 7.7    BMP: Date: 12/4/2023 Na 141 K 4 Cr 1 BUN 12 glucose 209 Ca 8.5 GFR 70  CBC: Date: 12/4/2023 WBC 8.5 Hgb 10.6 hematocrit 32.7 platelets 326  Liver function: Date: 12/4/2023 ALT 8 AST 25 alkaline phos.  64 bilirubin 0.4 albumin 3.3 protein 7.1  12/4/2023 hemoglobin A1c 8.9%    BMP: Date: 12/25/2023 Na 140 K 3.9 Cr 0.9 BUN 17 glucose 172 Ca 8.4 GFR 79  CBC: Date: 12/25/2023 WBC 8.9 Hgb 9.9 hematocrit 30.7 platelets 272  Liver function: Date: 12/25/2023 ALT 8 AST 26 alkaline phos.  67 bilirubin 0.3 albumin 3.2 protein 7    BMP: Date: 12/26/2023 Na 142 K 4.2 Cr 0.9 BUN 15 glucose 168 Ca 8.6 GFR 79  CBC: Date: 12/26/2023 WBC 9.9 Hgb 10.5 hematocrit 32.9 platelets 302    BMP: Date: 1/15/2024 Na 139 K 3.9 Cr 1 BUN 14 glucose  206 Ca 8.5 GFR 70  CBC: Date: 1/15/2024 WBC 8.3 Hgb 9.8 hematocrit 29.8 platelets 244  Liver function: Date: 1/15/2024 ALT 7 AST 22 alkaline phos.  62 bilirubin 0.3 albumin 3.3 protein 6.8    1/16/2024: Lipid panel: Cholesterol 119; triglycerides 174; HDL 32; LDL 52; VLDL 35.    BMP: Date: 2/5/2024 Na 141 K 4.2 Cr 1 BUN 22 glucose 150 Ca 8.3 GFR 70  CBC: Date: 2/5/2024 WBC 10.9 Hgb 10.2 hematocrit 31.1 platelets 319  Liver function: Date: 2/5/2024 ALT 7 AST 29 alkaline phos.  77 bilirubin 0.6 albumin 3.4 protein 7.4            Past Medical History:   Diagnosis Date    Cervicalgia     Neck pain    Essential (primary) hypertension 07/21/2013    Hypertension    Pain in unspecified shoulder     Pain, joint, shoulder    Personal history of other diseases of the digestive system 07/06/2022    History of ulcerative colitis    Personal history of other diseases of the digestive system     History of esophageal reflux    Personal history of other diseases of the nervous system and sense organs     History of cataract    Personal history of other diseases of the nervous system and sense organs     History of glaucoma    Personal history of other diseases of the respiratory system     History of bronchitis    Personal history of other endocrine, nutritional and metabolic disease     History of diabetes mellitus    Personal history of other endocrine, nutritional and metabolic disease     History of hyperlipidemia    Personal history of other mental and behavioral disorders     History of depression    Snoring     Snoring    Unilateral primary osteoarthritis, unspecified knee 11/02/2015    Arthritis of knee           Past Surgical History:   Procedure Laterality Date    CHOLECYSTECTOMY  01/08/2016    Cholecystectomy    COLON SURGERY  01/08/2016    Colon Surgery    CT ANGIO NECK W AND WO IV CONTRAST  3/19/2019    CT NECK ANGIO W AND WO IV CONTRAST 3/19/2019 [facility] EMERGENCY LEGACY    CT HEAD ANGIO W AND WO IV CONTRAST  3/19/2019     CT HEAD ANGIO W AND WO IV CONTRAST 3/19/2019 [facility] EMERGENCY LEGACY    HERNIA REPAIR  01/08/2016    Hernia Repair    HYSTERECTOMY  01/08/2016    Hysterectomy    MR HEAD ANGIO WO IV CONTRAST  6/4/2018    MR HEAD ANGIO WO IV CONTRAST 6/4/2018 [facility] CLINICAL LEGACY    MR HEAD ANGIO WO IV CONTRAST  7/11/2020    MR HEAD ANGIO WO IV CONTRAST 7/11/2020 [facility] CLINICAL LEGACY    MR HEAD ANGIO WO IV CONTRAST  9/30/2020    MR HEAD ANGIO WO IV CONTRAST 9/30/2020 [facility] AIB LEGACY    MR HEAD ANGIO WO IV CONTRAST  5/22/2022    MR HEAD ANGIO WO IV CONTRAST 5/22/2022 [facility] CLINICAL LEGACY    MR HEAD ANGIO WO IV CONTRAST  4/19/2023    MR HEAD ANGIO WO IV CONTRAST [facility] MRI    MR NECK ANGIO WO IV CONTRAST  6/4/2018    MR NECK ANGIO WO IV CONTRAST 6/4/2018 [facility] CLINICAL LEGACY    MR NECK ANGIO WO IV CONTRAST  7/11/2020    MR NECK ANGIO WO IV CONTRAST 7/11/2020 [facility] CLINICAL LEGACY    MR NECK ANGIO WO IV CONTRAST  9/30/2020    MR NECK ANGIO WO IV CONTRAST 9/30/2020 [facility] LEGACY    MR NECK ANGIO WO IV CONTRAST  5/22/2022    MR NECK ANGIO WO IV CONTRAST 5/22/2022 [facility] CLINICAL LEGACY    MR NECK ANGIO WO IV CONTRAST  4/19/2023    MR NECK ANGIO WO IV CONTRAST [facility] MRI    OTHER SURGICAL HISTORY  01/08/2016    Tubal Stabilization    OTHER SURGICAL HISTORY  09/01/2016    Cervical Surgery (Gyn) Laser Vaporization Of Transformation Zone    THYROID SURGERY  09/27/2013    Thyroid Surgery            Surgical History  Problems    · History of Cervical Surgery (Gyn) Laser Vaporization Of Transformation Zone   · History of Cholecystectomy   · History of Colon Surgery   · History of Hernia Repair   · History of Hysterectomy   · History of Thyroid Surgery   · History of Tubal Stabilization     Family History  Mother    · Family history of Cancer  Family History    · Family history of Diabetes Mellitus (V18.0)   · Family history of Hypertension (V17.49)     Social History  Problems    · Disabled   · Does not use illicit drugs (V49.89) (Z78.9)   · Former smoker  "(V15.82) (Z87.891)   · Marital History - Single   · No alcohol.   · Denied: History of Secondhand smoke exposure   · Single     Allergies  Medication    · Aspirin TABS   · Contrast Media Ready-Box MISC   · morphine   · Sulfa Drugs        /72   Pulse 76   Temp 36.3 °C (97.3 °F)   Resp 18   Ht 1.372 m (4' 6\")   Wt 102 kg (224 lb 12.8 oz)   SpO2 96%   BMI 54.20 kg/m²      Physical Exam  Vitals and nursing note reviewed.   Constitutional:       Appearance: Normal appearance.   HENT:      Head: Normocephalic.      Right Ear: External ear normal.      Left Ear: External ear normal.      Nose: Nose normal.      Mouth/Throat:      Mouth: Mucous membranes are moist.      Pharynx: Oropharynx is clear.   Eyes:      Extraocular Movements: Extraocular movements intact.      Conjunctiva/sclera: Conjunctivae normal.      Pupils: Pupils are equal, round, and reactive to light.   Cardiovascular:      Rate and Rhythm: Normal rate and regular rhythm.      Pulses: Normal pulses.      Heart sounds: Normal heart sounds.   Pulmonary:      Effort: Pulmonary effort is normal.      Breath sounds: Normal breath sounds.   Abdominal:      General: Bowel sounds are normal.      Palpations: Abdomen is soft.   Musculoskeletal:      Cervical back: Normal range of motion and neck supple.      Comments: Generalized muscle weakness; immobility.    Right sided hemiparesis   Skin:     General: Skin is warm and dry.   Neurological:      Mental Status: She is alert. Mental status is at baseline.      Motor: Weakness present.      Coordination: Coordination abnormal.      Gait: Gait abnormal.      Comments: Right sided weakness--RLE 1/5; RUE 3/5; sensation intact to touch x 4 extremities.     Left sided facial droop.     Generalized muscle weakness   Psychiatric:         Mood and Affect: Mood normal.         Behavior: Behavior normal.          Assessment/Plan      Mixed Hyperlipidemia:  Continue atorvastatin.   Atorvastatin was recently " increased from 40 mg to 50 mg after recent lipid panel.  Recheck lipid panel in 4 weeks.          Chronic pain; osteoarthritis; Muscle spasticity; Acute on chronic right shoulder and arm pain:  Patient has acute on chronic pain in her right shoulder/arm and muscle spasticity.  Continue  gabapentin, oxycodone 5 mg PO Q 4 hours PRN, Voltaren 1% gel, baclofen 10 mg PO Q 8 hours PRN and .          Follow up with in- house [facility] Palliative NP.     Major depressive disorder:   Continue Sertraline.   Follow up with in house psych NP.    DM type 2 with hyperglycemia; noncompliance with diabetic diet:         Continue Lantus 43 units at bedtime and Humalog sliding scale.        Monitor accuchecks.       Monitor Ha1c q 3 months.    Sequela of Cerebrovascular accident; Hx CVA with right-sided hemiparesis; right sided weakness;   dysphagia 2/2 cva; expressive aphasia; History of acute/subacute stroke:   MRI was performed during a PREVIOUS hospitalization and it showed Acute/subacute tiny infarct in the left anterior brock.   Dr. Jiang from [facility] neuro/stroke was consulted at that time.  Continue Eliquis 5 mg PO BID.  Continue mechanical soft, thin consistency diet.         Benign essential HTN; Chronic systolic HF:     Continue Amlodipine and hydrochlorothiazide.      Monitor Bps.             Leukocytosis:  Monitor WBC.  Monitor for s/s of infection.         Immobility; Generalized muscle weakness:  Continue at  in long term care.  Not able to ambulate.  Bedbound/wheelchair bound.   Fall prevention strategies.         Problem List Items Addressed This Visit    None  Visit Diagnoses       Mixed hyperlipidemia    -  Primary    Other chronic pain        Osteoarthritis, unspecified osteoarthritis type, unspecified site        Muscle spasticity        Right arm pain        Major depressive disorder with current active episode, unspecified depression episode severity, unspecified whether recurrent        Type 2  diabetes mellitus with hyperglycemia, with long-term current use of insulin (CMS/HCC)        Sequela of cerebrovascular accident        Hemiparesis affecting right side as late effect of stroke (CMS/HCC)        Dysphagia, unspecified type        Expressive aphasia        Benign essential HTN        Chronic systolic heart failure (CMS/HCC)        Leukocytosis, unspecified type        Immobility        Generalized muscle weakness            Maria Del Carmen Garduno, APRN-CNP

## 2024-03-11 ENCOUNTER — NURSING HOME VISIT (OUTPATIENT)
Dept: POST ACUTE CARE | Facility: EXTERNAL LOCATION | Age: 54
End: 2024-03-11
Payer: MEDICARE

## 2024-03-11 DIAGNOSIS — I10 BENIGN ESSENTIAL HTN: ICD-10-CM

## 2024-03-11 DIAGNOSIS — Z79.4 TYPE 2 DIABETES MELLITUS WITH HYPERGLYCEMIA, WITH LONG-TERM CURRENT USE OF INSULIN (MULTI): ICD-10-CM

## 2024-03-11 DIAGNOSIS — R09.81 NASAL CONGESTION: ICD-10-CM

## 2024-03-11 DIAGNOSIS — J30.9 ALLERGIC RHINITIS, UNSPECIFIED SEASONALITY, UNSPECIFIED TRIGGER: Primary | ICD-10-CM

## 2024-03-11 DIAGNOSIS — D72.829 LEUKOCYTOSIS, UNSPECIFIED TYPE: ICD-10-CM

## 2024-03-11 DIAGNOSIS — Z74.09 IMMOBILITY: ICD-10-CM

## 2024-03-11 DIAGNOSIS — B37.0 ORAL CANDIDIASIS: ICD-10-CM

## 2024-03-11 DIAGNOSIS — I50.22 CHRONIC SYSTOLIC HEART FAILURE (MULTI): ICD-10-CM

## 2024-03-11 DIAGNOSIS — E11.65 TYPE 2 DIABETES MELLITUS WITH HYPERGLYCEMIA, WITH LONG-TERM CURRENT USE OF INSULIN (MULTI): ICD-10-CM

## 2024-03-11 DIAGNOSIS — M62.81 GENERALIZED MUSCLE WEAKNESS: ICD-10-CM

## 2024-03-11 DIAGNOSIS — F32.9 MAJOR DEPRESSIVE DISORDER WITH CURRENT ACTIVE EPISODE, UNSPECIFIED DEPRESSION EPISODE SEVERITY, UNSPECIFIED WHETHER RECURRENT: ICD-10-CM

## 2024-03-11 DIAGNOSIS — R13.10 DYSPHAGIA, UNSPECIFIED TYPE: ICD-10-CM

## 2024-03-11 DIAGNOSIS — R47.01 EXPRESSIVE APHASIA: ICD-10-CM

## 2024-03-11 DIAGNOSIS — I69.30 SEQUELA OF CEREBROVASCULAR ACCIDENT: ICD-10-CM

## 2024-03-11 DIAGNOSIS — I69.351 HEMIPARESIS AFFECTING RIGHT SIDE AS LATE EFFECT OF STROKE (MULTI): ICD-10-CM

## 2024-03-11 PROCEDURE — 99309 SBSQ NF CARE MODERATE MDM 30: CPT | Performed by: NURSE PRACTITIONER

## 2024-03-11 NOTE — LETTER
Patient: Janette Martinez  : 1970    Encounter Date: 2024    Name: Janette Martinez    YOB: 1970    Code Status: DNRcc arrest    Chief complaint:  Follow up allergic rhinitis;  etc.....      HPI:    53 year old female with past medical history of heart failure with reduced EF,   diabetes, hypertension, hyperlipidemia,        CVA (left MCA  with residual right-sided hemiparesis), GERD, HIV, hypothyroidism, hypertension,   and diabetes.        Patient re-admitted to [facility]. On 10/20/23 after her most recent hospitalization.    Diagnoses as follows:    Allergic rhinitis; nasal congestion:  Patient states that she has had runny nose and nasal congestion.  It started a few days ago.  Denies cough.  No fevers reported.   Patient seen today, she is in bed.  Calm and cooperative.  No complaints of chest pain.  No headaches.  No dizziness.    Oral candidiasis:  Patient states her tongue is bothering her also.  It is not painful, just uncomfortable.  No difficulty swallowing.    DM type 2 with hyperglycemia; noncompliance with diabetic diet:        Patient is frequently noncompliant with diet.       On Lantus 43 units at bedtime and Humalog sliding scale.       Recent accuchecks: 197 mg/dL, 132 mg/dL, 169 mg/dL.        No episodes of hypoglycemia reported.        Ha1c trends:       23 Ha1c 8.1 %        10/17/23 Ha1c 8.3 %       23 hemoglobin A1c 8.9%     Benign essential HTN; Chronic systolic HF:     On Amlodipine and hydrochlorothiazide.            No oxygen desaturations reported.       Recent /72.       No complaints of  chest pain, headaches or dizziness.    Sequela of Cerebrovascular accident; Hx CVA with right-sided hemiparesis; right sided weakness;   dysphagia 2/2 cva; expressive aphasia; History of acute/subacute stroke:   MRI was performed during a PREVIOUS hospitalization and it showed Acute/subacute tiny infarct in the left anterior brock.     Apolinar from [facility] neuro/stroke was consulted at that time.  On Eliquis 5 mg PO BID.  Pt has baseline dysarthria from a previous CVA, right Hemiparesis,  and expressive aphasia.  She is able to communicate in short sentences.  Patient is also able to nod appropriately to questions.   On a mechanical soft, thin consistency diet.     Leukocytosis:       Recent WBC results:       12/4/23 WBC 8.5        12/26/23 WBC 9.9         1/15/24 WBC 8.3          2/5/24 WBC 10.9 H         2/26/24 WBC 8         Only slightly elevated today, normal high end is 10.8.        No fevers.         No cough.        No dysuria.     Major depressive disorder:   On Sertraline.   Also followed by in house psych NP.  On Sertraline  125 mg PO every day.  Patient has a good appetite, no recent weight loss reported.             Generalized muscle weakness; Immobility:  At  in long term care.  Not able to ambulate.  Very weak.  Bedbound/wheelchair bound.                              Reviewed medical, social, surgical and family history.  Reviewed all current medications and performed medication reconciliation.  Reviewed vital signs AND recent blood work results.  Performed prescription drug management.   Reviewed [facility] Documentation  Discussed patient with nursing and [facility] palliative NP.      ROS: 10 point ROS performed; negative unless noted in HPI.       LABS:  BMP: Date: 5/1/2023 Na 137 K 4.3 Cr 1.1 BUN 15 glucose 141 Ca 8.8 GFR 52  CBC: Date: 5/1/2023 WBC 8.4 Hgb 11.1 hematocrit 33.9 platelets 337  Liver function: Date: 5/1/2023 ALT 10 AST 29 alkaline phos.  63 bilirubin 0.4 albumin 3.6 protein 7.7       Ha1c 8.1 % on 5/1/23          BMP: Date: 5/15/2023 Na 139  K 3.4 Cr 1.2 BUN 20 glucose 122 Ca 8.8 GFR 57        CBC: Date: 5/15/2023 WBC 9 Hgb 10.2 hematocrit 30.9 platelets 277       Liver function: 5/15/2023 ALT 8 AST 28 alkaline phos.  57 bilirubin 0.5 albumin 3.5 protein 7.3          BMP: Date: 5/22/2023 Na 141  K 3.1 Cr 1 BUN 11 glucose 227 Ca 8.7 GFR 70        CBC: Date: 5/22/2023 WBC 8.2 Hgb 10 hematocrit 30.5 platelets 372        Liver function: Date: 5/22/2023 ALT 8 AST 22 alkaline phos.  63 bilirubin 0.3 albumin 3.2 protein 7.2     BMP: Date: 6/5/2023 Na 140 K 4.2 Cr 1.1 BUN 14 glucose 79 Ca 1.1 GFR 63  CBC: Date: 6/5/2023 WBC 8.1 Hgb 10.5 hematocrit 32.3 platelets 350  Liver function: Date: 6/5/2023 ALT 7 AST 25 alkaline phos.  59 bilirubin 0.4 albumin 3.3 protein 7.7    BMP: Date: 7/17/2023 Na 140 K 4.1 Cr 0.8 BUN 17 glucose 126 Ca 9.1 GFR 90  CBC: Date: 7/17/2023 WBC 14.1 Hgb 10.2 hematocrit 32.1 platelets 265  Liver function: Date: 7/17/2023 ALT 8 AST 23 alkaline phos.  72 bilirubin 0.5 albumin 3.4 protein 7.4    CBC: Date: 7/19/2023 WBC 10.7 Hgb 9.5 hematocrit 28.8 platelets 257    BMP: Date: 7/24/2023 Na 137 K 4.2 Cr 0.9 BUN 19 glucose 186 Ca 9.1 GFR 79  CBC: Date: 7/24/2023 WBC 11.6 Hgb 10.3 hematocrit 31.4 platelets 305  Liver function: Date: 7/24/2023 ALT 11 AST 27 alkaline phos.  77 bilirubin 0.3 albumin 3.6 protein 7.8    BMP: Date: 7/31/2023 Na 141 K 4.2 Cr 0.8 BUN 17 glucose 138 Ca 8.7 GFR 90  CBC: Date: 7/31/2023 WBC 12.3 Hgb 10.5 hematocrit 32.4 platelets 307  Liver function: Date: 7/31/2023 ALT 14 AST 24 alkaline phos.  81 bilirubin 0.4 albumin 3.5 protein 7.4    BMP: Date: 8/7/2023 Na 141 K 3.9 Cr 1.0 BUN 12 glucose 167 Ca 8.6 GFR 70  CBC: Date: 8/7/2023 WBC 10 Hgb 10.5 hematocrit 32.7 platelets 256  Liver function: Date: 8/7/2023 ALT 9 AST 23 alkaline phos.  76 bilirubin 0.5 albumin 3.3 protein 7    BMP: Date: 8/21/2023 Na 142 K 4.1 Cr 1 BUN 17 glucose 195 Ca 8.5 GFR 70  CBC: Date: 8/21/2023 WBC 10.9 Hgb 9.6 hematocrit 30 platelets 304  Liver function: Date: 8/21/2023 ALT 9 AST 24 alkaline phos.  74 bilirubin 0.4 albumin 3.2 protein 7.1    BMP: Date: 8/31/2023 Na 141 K 3.8 Cr 0.9 BUN 14 glucose 118 Ca 8.2 GFR 79  CBC: Date: 8/31/2023 WBC 12 Hgb 9.6 hematocrit 29.8 platelets 294  Liver function: Date:  8/31/2023 ALT 8 AST 21 alkaline phos.  64 bilirubin 0.4 albumin 3.1 protein 7    BMP: Date: 9/4/2023 Na 141 K 3.9 Cr 1.1 BUN 17 glucose 107 Ca 8.5 GFR 63   CBC: Date: 9/4/2023 WBC 12 Hgb 9.5 hematocrit 29.7 platelets 282  Liver function: Date: 9/4/2023 ALT 7 AST 22 alkaline phos.  64 bilirubin 0.4 albumin 3.3 protein 7.1    BMP: Date: 9/11/2023 Na 142 K 4 Cr 1 BUN 12 glucose 81 Ca 8.2 GFR 70  CBC: Date: 9/11/2023 WBC 8.7 Hgb 9.2 hematocrit 28.3 platelets 305  Liver function: Date: 9/11/2023 ALT 7 AST 22 alkaline phos.  61 bilirubin 0.3 albumin 3 protein 6.8    BMP: Date: 9/18/2023 Na 140 K 4 Cr 1 BUN 15 glucose 150 Ca 8.3 GFR 70  CBC: Date: 9/18/2023 WBC 12.1 Hgb 9.5 hematocrit 29.8 platelets 287  Liver function: Date: 9/18/2023 ALT 7 AST 23 alkaline phos.  62 bilirubin 0.4 albumin 3.4 protein 7.1    10/17/23 Ha1c 8.3 %    BMP: Date: 10/23/2023 Na 141 K 3.6 Cr 1 BUN 22 glucose 65 Ca 8.3 GFR 70  CBC: Date: 10/23/2023 WBC 12.5 Hgb 10.8  hematocrit 33.2 platelets 290  Liver function: Date: 10/23/2023 ALT 9 AST 31 alkaline phos.  66 bilirubin 0.6 albumin 3.6 protein 7.8    10/26/2023: Lipid panel: Cholesterol 111; triglycerides 111; HDL 33; LDL 56; VLDL 22.    10/26/2023: TSH: 1.081 (range 0.340-5.500).    BMP: Date: 10/30/2023 Na 143  K 3.5 Cr 0.9 BUN 11 glucose 171 Ca 8 GFR 65  CBC: Date: 10/30/2023 WBC 9.2 Hgb 10.1 hematocrit 31.1 platelets 280  Liver function: Date: 10/30/2023 ALT 11 AST 27 alkaline phos.  66 bilirubin 0.4 albumin 3.4 protein 7.2    BMP: Date: 11/13/2023 Na 138 K 3.9 Cr 0.9 BUN 11 glucose 308 Ca 8.4 GFR 79  CBC: Date: 11/13/2023 WBC 8.2 Hgb 10.6 hematocrit 33 platelets 295  Liver function: Date: 11/13/2023 ALT 12 AST 37 alkaline phos.  69 bilirubin 0.4 albumin 3.2 protein 7.1    BMP: Date: 11/21/2023 Na 142 K 3.6 Cr 0.9 BUN 14 glucose 200 Ca 8.4 GFR 79.  CBC: Date: 11/21/2023 WBC 8.8 Hgb 10.1 hematocrit 30.4 platelets 290    BMP: Date: 11/27/2023 Na 138 K 3.4 Cr 1 BUN 12 glucose 54 Ca 8.7 GFR  70  CBC: Date: 11/27/2023 WBC 12.3 Hgb 10.6 hematocrit 32.4 platelets 307  Liver function: Date: 11/27/2023 ALT 7 AST 27 alkaline phos.  72 bilirubin 0.5 albumin 3.3 protein 7.7    BMP: Date: 12/4/2023 Na 141 K 4 Cr 1 BUN 12 glucose 209 Ca 8.5 GFR 70  CBC: Date: 12/4/2023 WBC 8.5 Hgb 10.6 hematocrit 32.7 platelets 326  Liver function: Date: 12/4/2023 ALT 8 AST 25 alkaline phos.  64 bilirubin 0.4 albumin 3.3 protein 7.1  12/4/2023 hemoglobin A1c 8.9%    BMP: Date: 12/25/2023 Na 140 K 3.9 Cr 0.9 BUN 17 glucose 172 Ca 8.4 GFR 79  CBC: Date: 12/25/2023 WBC 8.9 Hgb 9.9 hematocrit 30.7 platelets 272  Liver function: Date: 12/25/2023 ALT 8 AST 26 alkaline phos.  67 bilirubin 0.3 albumin 3.2 protein 7    BMP: Date: 12/26/2023 Na 142 K 4.2 Cr 0.9 BUN 15 glucose 168 Ca 8.6 GFR 79  CBC: Date: 12/26/2023 WBC 9.9 Hgb 10.5 hematocrit 32.9 platelets 302    BMP: Date: 1/15/2024 Na 139 K 3.9 Cr 1 BUN 14 glucose 206 Ca 8.5 GFR 70  CBC: Date: 1/15/2024 WBC 8.3 Hgb 9.8 hematocrit 29.8 platelets 244  Liver function: Date: 1/15/2024 ALT 7 AST 22 alkaline phos.  62 bilirubin 0.3 albumin 3.3 protein 6.8    1/16/2024: Lipid panel: Cholesterol 119; triglycerides 174; HDL 32; LDL 52; VLDL 35.    BMP: Date: 2/5/2024 Na 141 K 4.2 Cr 1 BUN 22 glucose 150 Ca 8.3 GFR 70  CBC: Date: 2/5/2024 WBC 10.9 Hgb 10.2 hematocrit 31.1 platelets 319  Liver function: Date: 2/5/2024 ALT 7 AST 29 alkaline phos.  77 bilirubin 0.6 albumin 3.4 protein 7.4    BMP: Date: 2/26/2024 Na 143 K 3.6 Cr 1.3 BUN 23 glucose 152 Ca 8.5 GFR 51  CBC: Date: 2/26/2024 WBC 8 Hgb 11.1 hematocrit 33.6 platelets 318  Liver function: Date: 2/26/2024 ALT 11 AST 48 alkaline phos.  67 bilirubin 0.5 albumin 3.5 protein 7.8        Past Medical History:   Diagnosis Date   • Cervicalgia     Neck pain   • Essential (primary) hypertension 07/21/2013    Hypertension   • Pain in unspecified shoulder     Pain, joint, shoulder   • Personal history of other diseases of the digestive system  07/06/2022    History of ulcerative colitis   • Personal history of other diseases of the digestive system     History of esophageal reflux   • Personal history of other diseases of the nervous system and sense organs     History of cataract   • Personal history of other diseases of the nervous system and sense organs     History of glaucoma   • Personal history of other diseases of the respiratory system     History of bronchitis   • Personal history of other endocrine, nutritional and metabolic disease     History of diabetes mellitus   • Personal history of other endocrine, nutritional and metabolic disease     History of hyperlipidemia   • Personal history of other mental and behavioral disorders     History of depression   • Snoring     Snoring   • Unilateral primary osteoarthritis, unspecified knee 11/02/2015    Arthritis of knee           Past Surgical History:   Procedure Laterality Date   • CHOLECYSTECTOMY  01/08/2016    Cholecystectomy   • COLON SURGERY  01/08/2016    Colon Surgery   • CT ANGIO NECK W AND WO IV CONTRAST  3/19/2019    CT NECK ANGIO W AND WO IV CONTRAST 3/19/2019 [facility] [place] [facility]   • CT HEAD ANGIO W AND WO IV CONTRAST  3/19/2019    CT HEAD ANGIO W AND WO IV CONTRAST 3/19/2019 [facility] [place] [facility]   • HERNIA REPAIR  01/08/2016    Hernia Repair   • HYSTERECTOMY  01/08/2016    Hysterectomy   • MR HEAD ANGIO WO IV CONTRAST  6/4/2018    MR HEAD ANGIO WO IV CONTRAST 6/4/2018 [facility] CLINICAL LEGACY   • MR HEAD ANGIO WO IV CONTRAST  7/11/2020    MR HEAD ANGIO WO IV CONTRAST 7/11/2020 [facility] CLINICAL LEGAC[facility]   • MR HEAD ANGIO WO IV CONTRAST  9/30/2020    MR HEAD ANGIO WO IV CONTRAST 9/30/2020 [facility] AIB LEGACY   • MR HEAD ANGIO WO IV CONTRAST  5/22/2022    MR HEAD ANGIO WO IV CONTRAST 5/22/2022 [facility] CLINICAL LEGACY   • MR HEAD ANGIO WO IV CONTRAST  4/19/2023    MR HEAD ANGIO WO IV CONTRAST [facility] MRI   • MR NECK ANGIO WO IV CONTRAST  6/4/2018    MR NECK ANGIO WO IV CONTRAST 6/4/2018 [facility] CLINICAL LEGACY   •  "MR NECK ANGIO WO IV CONTRAST  7/11/2020    MR NECK ANGIO WO IV CONTRAST 7/11/2020 [facility]   • MR NECK ANGIO WO IV CONTRAST  9/30/2020    MR NECK ANGIO WO IV CONTRAST 9/30/2020 [facility] AIB LEGACY   • MR NECK ANGIO WO IV CONTRAST  5/22/2022    MR NECK ANGIO WO IV CONTRAST 5/22/2022 [facility]   • MR NECK ANGIO WO IV CONTRAST  4/19/2023    MR NECK ANGIO WO IV CONTRAST [facility] MRI   • OTHER SURGICAL HISTORY  01/08/2016    Tubal Stabilization   • OTHER SURGICAL HISTORY  09/01/2016    Cervical Surgery (Gyn) Laser Vaporization Of Transformation Zone   • THYROID SURGERY  09/27/2013    Thyroid Surgery            Surgical History  Problems    · History of Cervical Surgery (Gyn) Laser Vaporization Of Transformation Zone   · History of Cholecystectomy   · History of Colon Surgery   · History of Hernia Repair   · History of Hysterectomy   · History of Thyroid Surgery   · History of Tubal Stabilization     Family History  Mother    · Family history of Cancer  Family History    · Family history of Diabetes Mellitus (V18.0)   · Family history of Hypertension (V17.49)     Social History  Problems    · Disabled   · Does not use illicit drugs (V49.89) (Z78.9)   · Former smoker (V15.82) (Z87.891)   · Marital History - Single   · No alcohol.   · Denied: History of Secondhand smoke exposure   · Single     Allergies  Medication    · Aspirin TABS   · Contrast Media Ready-Box MISC   · morphine   · Sulfa Drugs        /72   Pulse 76   Temp 36.7 °C (98.1 °F)   Resp 18   Ht 1.372 m (4' 6\")   Wt 94.9 kg (209 lb 3.2 oz)   SpO2 97%   BMI 50.44 kg/m²      Physical Exam  Vitals and nursing note reviewed.   Constitutional:       General: She is awake.      Appearance: Normal appearance. She is ill-appearing.   HENT:      Head: Normocephalic.      Comments: White patches on tongue.     Right Ear: External ear normal.      Left Ear: External ear normal.      Nose: Nose normal.      Mouth/Throat:      Mouth: Mucous membranes " are moist.      Pharynx: Oropharynx is clear.   Eyes:      Extraocular Movements: Extraocular movements intact.      Conjunctiva/sclera: Conjunctivae normal.      Pupils: Pupils are equal, round, and reactive to light.   Cardiovascular:      Rate and Rhythm: Normal rate and regular rhythm.      Pulses: Normal pulses.      Heart sounds: Normal heart sounds.   Pulmonary:      Effort: Pulmonary effort is normal.      Breath sounds: Normal breath sounds.   Abdominal:      General: Bowel sounds are normal.      Palpations: Abdomen is soft.   Musculoskeletal:      Cervical back: Normal range of motion and neck supple.      Comments: Generalized muscle weakness; immobility.    Right sided hemiparesis   Skin:     General: Skin is warm and dry.   Neurological:      Mental Status: She is alert. Mental status is at baseline.      Motor: Weakness present.      Coordination: Coordination abnormal.      Gait: Gait abnormal.      Comments: Right sided weakness--RLE 1/5; RUE 3/5; sensation intact to touch x 4 extremities.     Left sided facial droop.     Generalized muscle weakness   Psychiatric:         Mood and Affect: Mood normal.         Behavior: Behavior normal. Behavior is cooperative.          Assessment/Plan   Ordered CMP and CBC with diff. For tomorrow.    Allergic rhinitis; nasal congestion:      Start Flonase nasal spray: 2 sprays in both nostrils QAM routinely for 2 weeks then PRN.    Oral candidiasis:      Start Nystatin 100,000 units per ml: 6 ml PO QID for 14 days.          (Swish & spit).    DM type 2 with hyperglycemia; noncompliance with diabetic diet:       Continue Lantus 43 units at bedtime and Humalog sliding scale.       Monitor accuchecks.       Monitor Ha1c Q 3 months.     Benign essential HTN; Chronic systolic HF:     Continue Amlodipine and hydrochlorothiazide.     Monitor Bps.     Monitor weights.    Sequela of Cerebrovascular accident; Hx CVA with right-sided hemiparesis; right sided weakness;    dysphagia 2/2 cva; expressive aphasia; History of acute/subacute stroke:   Staff to provide full supportive care.  Follow up with   Dr. Jinag neuro/stroke PRN.  Continue Eliquis 5 mg PO BID.  Continue mechanical soft, thin consistency diet.               Leukocytosis:      Monitor Bps.         Major depressive disorder:   Continue  Sertraline.   Follow up with in house psych NP.             Generalized muscle weakness; Immobility:  Continue at  in long term care.  Fall prevention strategies.       Problem List Items Addressed This Visit    None  Visit Diagnoses       Allergic rhinitis, unspecified seasonality, unspecified trigger    -  Primary    Nasal congestion        Oral candidiasis        Type 2 diabetes mellitus with hyperglycemia, with long-term current use of insulin (CMS/HCC)        Benign essential HTN        Chronic systolic heart failure (CMS/HCC)        Sequela of cerebrovascular accident        Hemiparesis affecting right side as late effect of stroke (CMS/HCC)        Dysphagia, unspecified type        Expressive aphasia        Leukocytosis, unspecified type        Major depressive disorder with current active episode, unspecified depression episode severity, unspecified whether recurrent        Generalized muscle weakness        Immobility            JASON Espitia       Electronically Signed By: JASON Espitia   3/14/24  6:26 PM

## 2024-03-14 VITALS
WEIGHT: 209.2 LBS | TEMPERATURE: 98.1 F | OXYGEN SATURATION: 97 % | BODY MASS INDEX: 48.41 KG/M2 | SYSTOLIC BLOOD PRESSURE: 132 MMHG | DIASTOLIC BLOOD PRESSURE: 72 MMHG | RESPIRATION RATE: 18 BRPM | HEIGHT: 55 IN | HEART RATE: 76 BPM

## 2024-03-14 NOTE — PROGRESS NOTES
Name: Janette Martinez    YOB: 1970    Code Status: DNRcc arrest    Chief complaint:  Follow up allergic rhinitis;  etc.....      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        Patient re-admitted to [facility]. On 10/20/23 after her most recent hospitalization.    Diagnoses as follows:    Allergic rhinitis; nasal congestion:  Patient states that she has had runny nose and nasal congestion.  It started a few days ago.  Denies cough.  No fevers reported.   Patient seen today, she is in bed.  Calm and cooperative.  No complaints of chest pain.  No headaches.  No dizziness.    Oral candidiasis:  Patient states her tongue is bothering her also.  It is not painful, just uncomfortable.  No difficulty swallowing.    DM type 2 with hyperglycemia; noncompliance with diabetic diet:        Patient is frequently noncompliant with diet.       On Lantus 43 units at bedtime and Humalog sliding scale.       Recent accuchecks: 197 mg/dL, 132 mg/dL, 169 mg/dL.        No episodes of hypoglycemia reported.        Ha1c trends:       5/1/23 Ha1c 8.1 %        10/17/23 Ha1c 8.3 %       12/4/23 hemoglobin A1c 8.9%     Benign essential HTN; Chronic systolic HF:     On Amlodipine and hydrochlorothiazide.            No oxygen desaturations reported.       Recent /72.       No complaints of  chest pain, headaches or dizziness.    Sequela of Cerebrovascular accident; Hx CVA with right-sided hemiparesis; right sided weakness;   dysphagia 2/2 cva; expressive aphasia; History of acute/subacute stroke:   MRI was performed during a PREVIOUS hospitalization and it showed Acute/subacute tiny infarct in the left anterior brock.   Dr. Jiang from [facility] neuro/stroke was consulted at that time.  On Eliquis 5 mg PO  BID.  Pt has baseline dysarthria from a previous CVA, right Hemiparesis,  and expressive aphasia.  She is able to communicate in short sentences.  Patient is also able to nod appropriately to questions.   On a mechanical soft, thin consistency diet.     Leukocytosis:       Recent WBC results:       12/4/23 WBC 8.5        12/26/23 WBC 9.9         1/15/24 WBC 8.3          2/5/24 WBC 10.9 H         2/26/24 WBC 8         Only slightly elevated today, normal high end is 10.8.        No fevers.         No cough.        No dysuria.     Major depressive disorder:   On Sertraline.   Also followed by in house psych NP.  On Sertraline  125 mg PO every day.  Patient has a good appetite, no recent weight loss reported.             Generalized muscle weakness; Immobility:  At  in long term care.  Not able to ambulate.  Very weak.  Bedbound/wheelchair bound.                              Reviewed medical, social, surgical and family history.  Reviewed all current medications and performed medication reconciliation.  Reviewed vital signs AND recent blood work results.  Performed prescription drug management.   Reviewed [facility] Documentation  Discussed patient with nursing and [facility] palliative NP.      ROS: 10 point ROS performed; negative unless noted in HPI.       LABS:  BMP: Date: 5/1/2023 Na 137 K 4.3 Cr 1.1 BUN 15 glucose 141 Ca 8.8 GFR 52  CBC: Date: 5/1/2023 WBC 8.4 Hgb 11.1 hematocrit 33.9 platelets 337  Liver function: Date: 5/1/2023 ALT 10 AST 29 alkaline phos.  63 bilirubin 0.4 albumin 3.6 protein 7.7       Ha1c 8.1 % on 5/1/23          BMP: Date: 5/15/2023 Na 139  K 3.4 Cr 1.2 BUN 20 glucose 122 Ca 8.8 GFR 57        CBC: Date: 5/15/2023 WBC 9 Hgb 10.2 hematocrit 30.9 platelets 277       Liver function: 5/15/2023 ALT 8 AST 28 alkaline phos.  57 bilirubin 0.5 albumin 3.5 protein 7.3          BMP: Date: 5/22/2023 Na 141 K 3.1 Cr 1 BUN 11 glucose 227 Ca 8.7 GFR 70        CBC: Date: 5/22/2023 WBC 8.2  Hgb 10 hematocrit 30.5 platelets 372        Liver function: Date: 5/22/2023 ALT 8 AST 22 alkaline phos.  63 bilirubin 0.3 albumin 3.2 protein 7.2     BMP: Date: 6/5/2023 Na 140 K 4.2 Cr 1.1 BUN 14 glucose 79 Ca 1.1 GFR 63  CBC: Date: 6/5/2023 WBC 8.1 Hgb 10.5 hematocrit 32.3 platelets 350  Liver function: Date: 6/5/2023 ALT 7 AST 25 alkaline phos.  59 bilirubin 0.4 albumin 3.3 protein 7.7    BMP: Date: 7/17/2023 Na 140 K 4.1 Cr 0.8 BUN 17 glucose 126 Ca 9.1 GFR 90  CBC: Date: 7/17/2023 WBC 14.1 Hgb 10.2 hematocrit 32.1 platelets 265  Liver function: Date: 7/17/2023 ALT 8 AST 23 alkaline phos.  72 bilirubin 0.5 albumin 3.4 protein 7.4    CBC: Date: 7/19/2023 WBC 10.7 Hgb 9.5 hematocrit 28.8 platelets 257    BMP: Date: 7/24/2023 Na 137 K 4.2 Cr 0.9 BUN 19 glucose 186 Ca 9.1 GFR 79  CBC: Date: 7/24/2023 WBC 11.6 Hgb 10.3 hematocrit 31.4 platelets 305  Liver function: Date: 7/24/2023 ALT 11 AST 27 alkaline phos.  77 bilirubin 0.3 albumin 3.6 protein 7.8    BMP: Date: 7/31/2023 Na 141 K 4.2 Cr 0.8 BUN 17 glucose 138 Ca 8.7 GFR 90  CBC: Date: 7/31/2023 WBC 12.3 Hgb 10.5 hematocrit 32.4 platelets 307  Liver function: Date: 7/31/2023 ALT 14 AST 24 alkaline phos.  81 bilirubin 0.4 albumin 3.5 protein 7.4    BMP: Date: 8/7/2023 Na 141 K 3.9 Cr 1.0 BUN 12 glucose 167 Ca 8.6 GFR 70  CBC: Date: 8/7/2023 WBC 10 Hgb 10.5 hematocrit 32.7 platelets 256  Liver function: Date: 8/7/2023 ALT 9 AST 23 alkaline phos.  76 bilirubin 0.5 albumin 3.3 protein 7    BMP: Date: 8/21/2023 Na 142 K 4.1 Cr 1 BUN 17 glucose 195 Ca 8.5 GFR 70  CBC: Date: 8/21/2023 WBC 10.9 Hgb 9.6 hematocrit 30 platelets 304  Liver function: Date: 8/21/2023 ALT 9 AST 24 alkaline phos.  74 bilirubin 0.4 albumin 3.2 protein 7.1    BMP: Date: 8/31/2023 Na 141 K 3.8 Cr 0.9 BUN 14 glucose 118 Ca 8.2 GFR 79  CBC: Date: 8/31/2023 WBC 12 Hgb 9.6 hematocrit 29.8 platelets 294  Liver function: Date: 8/31/2023 ALT 8 AST 21 alkaline phos.  64 bilirubin 0.4 albumin 3.1 protein  7    BMP: Date: 9/4/2023 Na 141 K 3.9 Cr 1.1 BUN 17 glucose 107 Ca 8.5 GFR 63   CBC: Date: 9/4/2023 WBC 12 Hgb 9.5 hematocrit 29.7 platelets 282  Liver function: Date: 9/4/2023 ALT 7 AST 22 alkaline phos.  64 bilirubin 0.4 albumin 3.3 protein 7.1    BMP: Date: 9/11/2023 Na 142 K 4 Cr 1 BUN 12 glucose 81 Ca 8.2 GFR 70  CBC: Date: 9/11/2023 WBC 8.7 Hgb 9.2 hematocrit 28.3 platelets 305  Liver function: Date: 9/11/2023 ALT 7 AST 22 alkaline phos.  61 bilirubin 0.3 albumin 3 protein 6.8    BMP: Date: 9/18/2023 Na 140 K 4 Cr 1 BUN 15 glucose 150 Ca 8.3 GFR 70  CBC: Date: 9/18/2023 WBC 12.1 Hgb 9.5 hematocrit 29.8 platelets 287  Liver function: Date: 9/18/2023 ALT 7 AST 23 alkaline phos.  62 bilirubin 0.4 albumin 3.4 protein 7.1    10/17/23 Ha1c 8.3 %    BMP: Date: 10/23/2023 Na 141 K 3.6 Cr 1 BUN 22 glucose 65 Ca 8.3 GFR 70  CBC: Date: 10/23/2023 WBC 12.5 Hgb 10.8  hematocrit 33.2 platelets 290  Liver function: Date: 10/23/2023 ALT 9 AST 31 alkaline phos.  66 bilirubin 0.6 albumin 3.6 protein 7.8    10/26/2023: Lipid panel: Cholesterol 111; triglycerides 111; HDL 33; LDL 56; VLDL 22.    10/26/2023: TSH: 1.081 (range 0.340-5.500).    BMP: Date: 10/30/2023 Na 143  K 3.5 Cr 0.9 BUN 11 glucose 171 Ca 8 GFR 65  CBC: Date: 10/30/2023 WBC 9.2 Hgb 10.1 hematocrit 31.1 platelets 280  Liver function: Date: 10/30/2023 ALT 11 AST 27 alkaline phos.  66 bilirubin 0.4 albumin 3.4 protein 7.2    BMP: Date: 11/13/2023 Na 138 K 3.9 Cr 0.9 BUN 11 glucose 308 Ca 8.4 GFR 79  CBC: Date: 11/13/2023 WBC 8.2 Hgb 10.6 hematocrit 33 platelets 295  Liver function: Date: 11/13/2023 ALT 12 AST 37 alkaline phos.  69 bilirubin 0.4 albumin 3.2 protein 7.1    BMP: Date: 11/21/2023 Na 142 K 3.6 Cr 0.9 BUN 14 glucose 200 Ca 8.4 GFR 79.  CBC: Date: 11/21/2023 WBC 8.8 Hgb 10.1 hematocrit 30.4 platelets 290    BMP: Date: 11/27/2023 Na 138 K 3.4 Cr 1 BUN 12 glucose 54 Ca 8.7 GFR 70  CBC: Date: 11/27/2023 WBC 12.3 Hgb 10.6 hematocrit 32.4 platelets 307  Liver  function: Date: 11/27/2023 ALT 7 AST 27 alkaline phos.  72 bilirubin 0.5 albumin 3.3 protein 7.7    BMP: Date: 12/4/2023 Na 141 K 4 Cr 1 BUN 12 glucose 209 Ca 8.5 GFR 70  CBC: Date: 12/4/2023 WBC 8.5 Hgb 10.6 hematocrit 32.7 platelets 326  Liver function: Date: 12/4/2023 ALT 8 AST 25 alkaline phos.  64 bilirubin 0.4 albumin 3.3 protein 7.1  12/4/2023 hemoglobin A1c 8.9%    BMP: Date: 12/25/2023 Na 140 K 3.9 Cr 0.9 BUN 17 glucose 172 Ca 8.4 GFR 79  CBC: Date: 12/25/2023 WBC 8.9 Hgb 9.9 hematocrit 30.7 platelets 272  Liver function: Date: 12/25/2023 ALT 8 AST 26 alkaline phos.  67 bilirubin 0.3 albumin 3.2 protein 7    BMP: Date: 12/26/2023 Na 142 K 4.2 Cr 0.9 BUN 15 glucose 168 Ca 8.6 GFR 79  CBC: Date: 12/26/2023 WBC 9.9 Hgb 10.5 hematocrit 32.9 platelets 302    BMP: Date: 1/15/2024 Na 139 K 3.9 Cr 1 BUN 14 glucose 206 Ca 8.5 GFR 70  CBC: Date: 1/15/2024 WBC 8.3 Hgb 9.8 hematocrit 29.8 platelets 244  Liver function: Date: 1/15/2024 ALT 7 AST 22 alkaline phos.  62 bilirubin 0.3 albumin 3.3 protein 6.8    1/16/2024: Lipid panel: Cholesterol 119; triglycerides 174; HDL 32; LDL 52; VLDL 35.    BMP: Date: 2/5/2024 Na 141 K 4.2 Cr 1 BUN 22 glucose 150 Ca 8.3 GFR 70  CBC: Date: 2/5/2024 WBC 10.9 Hgb 10.2 hematocrit 31.1 platelets 319  Liver function: Date: 2/5/2024 ALT 7 AST 29 alkaline phos.  77 bilirubin 0.6 albumin 3.4 protein 7.4    BMP: Date: 2/26/2024 Na 143 K 3.6 Cr 1.3 BUN 23 glucose 152 Ca 8.5 GFR 51  CBC: Date: 2/26/2024 WBC 8 Hgb 11.1 hematocrit 33.6 platelets 318  Liver function: Date: 2/26/2024 ALT 11 AST 48 alkaline phos.  67 bilirubin 0.5 albumin 3.5 protein 7.8        Past Medical History:   Diagnosis Date    Cervicalgia     Neck pain    Essential (primary) hypertension 07/21/2013    Hypertension    Pain in unspecified shoulder     Pain, joint, shoulder    Personal history of other diseases of the digestive system 07/06/2022    History of ulcerative colitis    Personal history of other diseases of the  digestive system     History of esophageal reflux    Personal history of other diseases of the nervous system and sense organs     History of cataract    Personal history of other diseases of the nervous system and sense organs     History of glaucoma    Personal history of other diseases of the respiratory system     History of bronchitis    Personal history of other endocrine, nutritional and metabolic disease     History of diabetes mellitus    Personal history of other endocrine, nutritional and metabolic disease     History of hyperlipidemia    Personal history of other mental and behavioral disorders     History of depression    Snoring     Snoring    Unilateral primary osteoarthritis, unspecified knee 11/02/2015    Arthritis of knee           Past Surgical History:   Procedure Laterality Date    CHOLECYSTECTOMY  01/08/2016    Cholecystectomy    COLON SURGERY  01/08/2016    Colon Surgery    CT ANGIO NECK W AND WO IV CONTRAST  3/19/2019    CT NECK ANGIO W AND WO IV CONTRAST 3/19/2019 [facility] EMERGENCY LEGACY    CT HEAD ANGIO W AND WO IV CONTRAST  3/19/2019    CT HEAD ANGIO W AND WO IV CONTRAST 3/19/2019 [facility] EMERGENCY LEGACY    HERNIA REPAIR  01/08/2016    Hernia Repair    HYSTERECTOMY  01/08/2016    Hysterectomy    MR HEAD ANGIO WO IV CONTRAST  6/4/2018    MR HEAD ANGIO WO IV CONTRAST 6/4/2018 [facility] CLINICAL LEGACY    MR HEAD ANGIO WO IV CONTRAST  7/11/2020    MR HEAD ANGIO WO IV CONTRAST 7/11/2020 [facility] CLINICAL LEGACY    MR HEAD ANGIO WO IV CONTRAST  9/30/2020    MR HEAD ANGIO WO IV CONTRAST 9/30/2020 [facility]IB LEGACY    MR HEAD ANGIO WO IV CONTRAST  5/22/2022    MR HEAD ANGIO WO IV CONTRAST 5/22/2022 [facility] CLINICAL LEGACY    MR HEAD ANGIO WO IV CONTRAST  4/19/2023    MR HEAD ANGIO WO IV CONTRAST [facility] MRI    MR NECK ANGIO WO IV CONTRAST  6/4/2018    MR NECK ANGIO WO IV CONTRAST 6/4/2018 [facility] CLINICAL LEGACY    MR NECK ANGIO WO IV CONTRAST  7/11/2020    MR NECK ANGIO WO IV CONTRAST 7/11/2020 [facility] CLINICAL LEGACY    MR  "NECK ANGIO WO IV CONTRAST  9/30/2020    MR NECK ANGIO WO IV CONTRAST 9/30/2020 [facility] LEGACY    MR NECK ANGIO WO IV CONTRAST  5/22/2022    MR NECK ANGIO WO IV CONTRAST 5/22/2022 [facility] CLINICAL LEGACY    MR NECK ANGIO WO IV CONTRAST  4/19/2023    MR NECK ANGIO WO IV CONTRAST [facility] MRI    OTHER SURGICAL HISTORY  01/08/2016    Tubal Stabilization    OTHER SURGICAL HISTORY  09/01/2016    Cervical Surgery (Gyn) Laser Vaporization Of Transformation Zone    THYROID SURGERY  09/27/2013    Thyroid Surgery            Surgical History  Problems    · History of Cervical Surgery (Gyn) Laser Vaporization Of Transformation Zone   · History of Cholecystectomy   · History of Colon Surgery   · History of Hernia Repair   · History of Hysterectomy   · History of Thyroid Surgery   · History of Tubal Stabilization     Family History  Mother    · Family history of Cancer  Family History    · Family history of Diabetes Mellitus (V18.0)   · Family history of Hypertension (V17.49)     Social History  Problems    · Disabled   · Does not use illicit drugs (V49.89) (Z78.9)   · Former smoker (V15.82) (Z87.891)   · Marital History - Single   · No alcohol.   · Denied: History of Secondhand smoke exposure   · Single     Allergies  Medication    · Aspirin TABS   · Contrast Media Ready-Box MISC   · morphine   · Sulfa Drugs        /72   Pulse 76   Temp 36.7 °C (98.1 °F)   Resp 18   Ht 1.372 m (4' 6\")   Wt 94.9 kg (209 lb 3.2 oz)   SpO2 97%   BMI 50.44 kg/m²      Physical Exam  Vitals and nursing note reviewed.   Constitutional:       General: She is awake.      Appearance: Normal appearance. She is ill-appearing.   HENT:      Head: Normocephalic.      Comments: White patches on tongue.     Right Ear: External ear normal.      Left Ear: External ear normal.      Nose: Nose normal.      Mouth/Throat:      Mouth: Mucous membranes are moist.      Pharynx: Oropharynx is clear.   Eyes:      Extraocular Movements: Extraocular movements intact.     "  Conjunctiva/sclera: Conjunctivae normal.      Pupils: Pupils are equal, round, and reactive to light.   Cardiovascular:      Rate and Rhythm: Normal rate and regular rhythm.      Pulses: Normal pulses.      Heart sounds: Normal heart sounds.   Pulmonary:      Effort: Pulmonary effort is normal.      Breath sounds: Normal breath sounds.   Abdominal:      General: Bowel sounds are normal.      Palpations: Abdomen is soft.   Musculoskeletal:      Cervical back: Normal range of motion and neck supple.      Comments: Generalized muscle weakness; immobility.    Right sided hemiparesis   Skin:     General: Skin is warm and dry.   Neurological:      Mental Status: She is alert. Mental status is at baseline.      Motor: Weakness present.      Coordination: Coordination abnormal.      Gait: Gait abnormal.      Comments: Right sided weakness--RLE 1/5; RUE 3/5; sensation intact to touch x 4 extremities.     Left sided facial droop.     Generalized muscle weakness   Psychiatric:         Mood and Affect: Mood normal.         Behavior: Behavior normal. Behavior is cooperative.          Assessment/Plan    Ordered CMP and CBC with diff. For tomorrow.    Allergic rhinitis; nasal congestion:      Start Flonase nasal spray: 2 sprays in both nostrils QAM routinely for 2 weeks then PRN.    Oral candidiasis:      Start Nystatin 100,000 units per ml: 6 ml PO QID for 14 days.          (Swish & spit).    DM type 2 with hyperglycemia; noncompliance with diabetic diet:       Continue Lantus 43 units at bedtime and Humalog sliding scale.       Monitor accuchecks.       Monitor Ha1c Q 3 months.     Benign essential HTN; Chronic systolic HF:     Continue Amlodipine and hydrochlorothiazide.     Monitor Bps.     Monitor weights.    Sequela of Cerebrovascular accident; Hx CVA with right-sided hemiparesis; right sided weakness;   dysphagia 2/2 cva; expressive aphasia; History of acute/subacute stroke:   Staff to provide full supportive care.  Follow  up with   Dr. Jiang neuro/stroke PRN.  Continue Eliquis 5 mg PO BID.  Continue mechanical soft, thin consistency diet.               Leukocytosis:      Monitor Bps.         Major depressive disorder:   Continue  Sertraline.   Follow up with in house psych NP.             Generalized muscle weakness; Immobility:  Continue at  in long term care.  Fall prevention strategies.       Problem List Items Addressed This Visit    None  Visit Diagnoses       Allergic rhinitis, unspecified seasonality, unspecified trigger    -  Primary    Nasal congestion        Oral candidiasis        Type 2 diabetes mellitus with hyperglycemia, with long-term current use of insulin (CMS/[facility])        Benign essential HTN        Chronic systolic heart failure (CMS/HCC)        Sequela of cerebrovascular accident        Hemiparesis affecting right side as late effect of stroke (CMS/HCC)        Dysphagia, unspecified type        Expressive aphasia        Leukocytosis, unspecified type        Major depressive disorder with current active episode, unspecified depression episode severity, unspecified whether recurrent        Generalized muscle weakness        Immobility            Maria Del Carmen Garduno, APRN-CNP

## 2024-04-09 ENCOUNTER — NURSING HOME VISIT (OUTPATIENT)
Dept: POST ACUTE CARE | Facility: EXTERNAL LOCATION | Age: 54
End: 2024-04-09
Payer: MEDICARE

## 2024-04-09 DIAGNOSIS — Z91.119 NONCOMPLIANCE WITH DIETARY RESTRICTION: ICD-10-CM

## 2024-04-09 DIAGNOSIS — I69.30 SEQUELA OF CEREBROVASCULAR ACCIDENT: ICD-10-CM

## 2024-04-09 DIAGNOSIS — J30.9 ALLERGIC RHINITIS, UNSPECIFIED SEASONALITY, UNSPECIFIED TRIGGER: ICD-10-CM

## 2024-04-09 DIAGNOSIS — F32.9 MAJOR DEPRESSIVE DISORDER WITH CURRENT ACTIVE EPISODE, UNSPECIFIED DEPRESSION EPISODE SEVERITY, UNSPECIFIED WHETHER RECURRENT: ICD-10-CM

## 2024-04-09 DIAGNOSIS — Z74.09 IMMOBILITY: ICD-10-CM

## 2024-04-09 DIAGNOSIS — I50.22 CHRONIC SYSTOLIC HEART FAILURE (MULTI): ICD-10-CM

## 2024-04-09 DIAGNOSIS — Z79.4 TYPE 2 DIABETES MELLITUS WITH HYPERGLYCEMIA, WITH LONG-TERM CURRENT USE OF INSULIN (MULTI): Primary | ICD-10-CM

## 2024-04-09 DIAGNOSIS — I10 BENIGN ESSENTIAL HTN: ICD-10-CM

## 2024-04-09 DIAGNOSIS — M62.81 GENERALIZED MUSCLE WEAKNESS: ICD-10-CM

## 2024-04-09 DIAGNOSIS — B37.0 ORAL CANDIDIASIS: ICD-10-CM

## 2024-04-09 DIAGNOSIS — R09.81 NASAL CONGESTION: ICD-10-CM

## 2024-04-09 DIAGNOSIS — I69.351 HEMIPARESIS AFFECTING RIGHT SIDE AS LATE EFFECT OF STROKE (MULTI): ICD-10-CM

## 2024-04-09 DIAGNOSIS — R47.01 EXPRESSIVE APHASIA: ICD-10-CM

## 2024-04-09 DIAGNOSIS — E11.65 TYPE 2 DIABETES MELLITUS WITH HYPERGLYCEMIA, WITH LONG-TERM CURRENT USE OF INSULIN (MULTI): Primary | ICD-10-CM

## 2024-04-09 DIAGNOSIS — R13.10 DYSPHAGIA, UNSPECIFIED TYPE: ICD-10-CM

## 2024-04-09 DIAGNOSIS — E78.2 MIXED HYPERLIPIDEMIA: ICD-10-CM

## 2024-04-09 PROCEDURE — 99309 SBSQ NF CARE MODERATE MDM 30: CPT | Performed by: NURSE PRACTITIONER

## 2024-04-09 NOTE — LETTER
Patient: Janette Martinez  : 1970    Encounter Date: 2024    Name: Janette Martinez    YOB: 1970    Code Status: DNRcc arrest    Chief complaint:  Follow up DM type 2;  etc.....      HPI:    53 year old female with past medical history of heart failure with reduced EF,   diabetes, hypertension, hyperlipidemia,        CVA (left MCA  with residual right-sided hemiparesis), GERD, HIV, hypothyroidism, hypertension,   and diabetes.        Patient re-admitted to [facility]. On 10/20/23 after her most recent hospitalization.    Diagnoses as follows:    DM type 2 with hyperglycemia; noncompliance with diabetic diet:        Patient is frequently noncompliant with diet.       On Lantus 43 units at bedtime and Humalog sliding scale.       Recent accuchecks: 236 mg/dL, 136 mg/dL, 180 mg/dL.        No episodes of hypoglycemia reported.        Ha1c trends:       23 Ha1c 8.1 %        10/17/23 Ha1c 8.3 %       23 hemoglobin A1c 8.9%     Patient seen today, she is in bed.    Calm and cooperative.    No complaints of chest pain.    No headaches.    No dizziness.     Chronic systolic HF; Benign essential HTN :     On Amlodipine and hydrochlorothiazide.            No oxygen desaturations reported.       Recent /60       No complaints of  chest pain, headaches or dizziness.    Hyperlipidemia:  On atorvastatin 50 mg PO every day.  2024: Lipid panel: Cholesterol 119; triglycerides 174; HDL 32; LDL 52; VLDL 35.              Allergic rhinitis; nasal congestion:  Last month patient complained of runny nose and nasal congestion.  She was started on Flonase nasal spray.  Her symptoms are improved today.  No complaints of headaches or dizziness.           Oral candidiasis:       Improved.       Treated with nystatin.       No complaints of any mouth pain today.               Sequela of Cerebrovascular accident; Hx CVA with right-sided hemiparesis; right sided weakness;   dysphagia  2/2 cva; expressive aphasia; History of acute/subacute stroke:   MRI was performed during a PREVIOUS hospitalization and it showed Acute/subacute tiny infarct in the left anterior brock.   [place] [facility] [place].  On Eliquis 5 mg PO BID.  Pt has baseline dysarthria from a previous CVA, right Hemiparesis,  and expressive aphasia.  She is able to communicate in short sentences.  Patient is also able to nod appropriately to questions.   On a mechanical soft, thin consistency diet.             Major depressive disorder:   On Sertraline.   Also followed by in house psych NP.  On Sertraline  125 mg PO every day.  Patient has a good appetite, no recent weight loss reported.             Generalized muscle weakness; Immobility:  At  in long term care.  Not able to ambulate.  Very weak.  Bedbound/wheelchair bound.                              Reviewed medical, social, surgical and family history.  Reviewed all current medications and performed medication reconciliation.  Reviewed vital signs AND recent blood work results.  Performed prescription drug management.   Reviewed [facility] Documentation  Discussed patient with nursing and [facility] palliative NP.                  ROS: 10 point ROS performed; negative unless noted in HPI.       LABS:  BMP: Date: 5/1/2023 Na 137 K 4.3 Cr 1.1 BUN 15 glucose 141 Ca 8.8 GFR 52  CBC: Date: 5/1/2023 WBC 8.4 Hgb 11.1 hematocrit 33.9 platelets 337  Liver function: Date: 5/1/2023 ALT 10 AST 29 alkaline phos.  63 bilirubin 0.4 albumin 3.6 protein 7.7       Ha1c 8.1 % on 5/1/23          BMP: Date: 5/15/2023 Na 139  K 3.4 Cr 1.2 BUN 20 glucose 122 Ca 8.8 GFR 57        CBC: Date: 5/15/2023 WBC 9 Hgb 10.2 hematocrit 30.9 platelets 277       Liver function: 5/15/2023 ALT 8 AST 28 alkaline phos.  57 bilirubin 0.5 albumin 3.5 protein 7.3          BMP: Date: 5/22/2023 Na 141 K 3.1 Cr 1 BUN 11 glucose 227 Ca 8.7 GFR 70        CBC: Date:  5/22/2023 WBC 8.2 Hgb 10 hematocrit 30.5 platelets 372        Liver function: Date: 5/22/2023 ALT 8 AST 22 alkaline phos.  63 bilirubin 0.3 albumin 3.2 protein 7.2     BMP: Date: 6/5/2023 Na 140 K 4.2 Cr 1.1 BUN 14 glucose 79 Ca 1.1 GFR 63  CBC: Date: 6/5/2023 WBC 8.1 Hgb 10.5 hematocrit 32.3 platelets 350  Liver function: Date: 6/5/2023 ALT 7 AST 25 alkaline phos.  59 bilirubin 0.4 albumin 3.3 protein 7.7    BMP: Date: 7/17/2023 Na 140 K 4.1 Cr 0.8 BUN 17 glucose 126 Ca 9.1 GFR 90  CBC: Date: 7/17/2023 WBC 14.1 Hgb 10.2 hematocrit 32.1 platelets 265  Liver function: Date: 7/17/2023 ALT 8 AST 23 alkaline phos.  72 bilirubin 0.5 albumin 3.4 protein 7.4    CBC: Date: 7/19/2023 WBC 10.7 Hgb 9.5 hematocrit 28.8 platelets 257    BMP: Date: 7/24/2023 Na 137 K 4.2 Cr 0.9 BUN 19 glucose 186 Ca 9.1 GFR 79  CBC: Date: 7/24/2023 WBC 11.6 Hgb 10.3 hematocrit 31.4 platelets 305  Liver function: Date: 7/24/2023 ALT 11 AST 27 alkaline phos.  77 bilirubin 0.3 albumin 3.6 protein 7.8    BMP: Date: 7/31/2023 Na 141 K 4.2 Cr 0.8 BUN 17 glucose 138 Ca 8.7 GFR 90  CBC: Date: 7/31/2023 WBC 12.3 Hgb 10.5 hematocrit 32.4 platelets 307  Liver function: Date: 7/31/2023 ALT 14 AST 24 alkaline phos.  81 bilirubin 0.4 albumin 3.5 protein 7.4    BMP: Date: 8/7/2023 Na 141 K 3.9 Cr 1.0 BUN 12 glucose 167 Ca 8.6 GFR 70  CBC: Date: 8/7/2023 WBC 10 Hgb 10.5 hematocrit 32.7 platelets 256  Liver function: Date: 8/7/2023 ALT 9 AST 23 alkaline phos.  76 bilirubin 0.5 albumin 3.3 protein 7    BMP: Date: 8/21/2023 Na 142 K 4.1 Cr 1 BUN 17 glucose 195 Ca 8.5 GFR 70  CBC: Date: 8/21/2023 WBC 10.9 Hgb 9.6 hematocrit 30 platelets 304  Liver function: Date: 8/21/2023 ALT 9 AST 24 alkaline phos.  74 bilirubin 0.4 albumin 3.2 protein 7.1    BMP: Date: 8/31/2023 Na 141 K 3.8 Cr 0.9 BUN 14 glucose 118 Ca 8.2 GFR 79  CBC: Date: 8/31/2023 WBC 12 Hgb 9.6 hematocrit 29.8 platelets 294  Liver function: Date: 8/31/2023 ALT 8 AST 21 alkaline phos.  64 bilirubin 0.4  albumin 3.1 protein 7    BMP: Date: 9/4/2023 Na 141 K 3.9 Cr 1.1 BUN 17 glucose 107 Ca 8.5 GFR 63   CBC: Date: 9/4/2023 WBC 12 Hgb 9.5 hematocrit 29.7 platelets 282  Liver function: Date: 9/4/2023 ALT 7 AST 22 alkaline phos.  64 bilirubin 0.4 albumin 3.3 protein 7.1    BMP: Date: 9/11/2023 Na 142 K 4 Cr 1 BUN 12 glucose 81 Ca 8.2 GFR 70  CBC: Date: 9/11/2023 WBC 8.7 Hgb 9.2 hematocrit 28.3 platelets 305  Liver function: Date: 9/11/2023 ALT 7 AST 22 alkaline phos.  61 bilirubin 0.3 albumin 3 protein 6.8    BMP: Date: 9/18/2023 Na 140 K 4 Cr 1 BUN 15 glucose 150 Ca 8.3 GFR 70  CBC: Date: 9/18/2023 WBC 12.1 Hgb 9.5 hematocrit 29.8 platelets 287  Liver function: Date: 9/18/2023 ALT 7 AST 23 alkaline phos.  62 bilirubin 0.4 albumin 3.4 protein 7.1    10/17/23 Ha1c 8.3 %    BMP: Date: 10/23/2023 Na 141 K 3.6 Cr 1 BUN 22 glucose 65 Ca 8.3 GFR 70  CBC: Date: 10/23/2023 WBC 12.5 Hgb 10.8  hematocrit 33.2 platelets 290  Liver function: Date: 10/23/2023 ALT 9 AST 31 alkaline phos.  66 bilirubin 0.6 albumin 3.6 protein 7.8    10/26/2023: Lipid panel: Cholesterol 111; triglycerides 111; HDL 33; LDL 56; VLDL 22.    10/26/2023: TSH: 1.081 (range 0.340-5.500).    BMP: Date: 10/30/2023 Na 143  K 3.5 Cr 0.9 BUN 11 glucose 171 Ca 8 GFR 65  CBC: Date: 10/30/2023 WBC 9.2 Hgb 10.1 hematocrit 31.1 platelets 280  Liver function: Date: 10/30/2023 ALT 11 AST 27 alkaline phos.  66 bilirubin 0.4 albumin 3.4 protein 7.2    BMP: Date: 11/13/2023 Na 138 K 3.9 Cr 0.9 BUN 11 glucose 308 Ca 8.4 GFR 79  CBC: Date: 11/13/2023 WBC 8.2 Hgb 10.6 hematocrit 33 platelets 295  Liver function: Date: 11/13/2023 ALT 12 AST 37 alkaline phos.  69 bilirubin 0.4 albumin 3.2 protein 7.1    BMP: Date: 11/21/2023 Na 142 K 3.6 Cr 0.9 BUN 14 glucose 200 Ca 8.4 GFR 79.  CBC: Date: 11/21/2023 WBC 8.8 Hgb 10.1 hematocrit 30.4 platelets 290    BMP: Date: 11/27/2023 Na 138 K 3.4 Cr 1 BUN 12 glucose 54 Ca 8.7 GFR 70  CBC: Date: 11/27/2023 WBC 12.3 Hgb 10.6 hematocrit 32.4  platelets 307  Liver function: Date: 11/27/2023 ALT 7 AST 27 alkaline phos.  72 bilirubin 0.5 albumin 3.3 protein 7.7    BMP: Date: 12/4/2023 Na 141 K 4 Cr 1 BUN 12 glucose 209 Ca 8.5 GFR 70  CBC: Date: 12/4/2023 WBC 8.5 Hgb 10.6 hematocrit 32.7 platelets 326  Liver function: Date: 12/4/2023 ALT 8 AST 25 alkaline phos.  64 bilirubin 0.4 albumin 3.3 protein 7.1  12/4/2023 hemoglobin A1c 8.9%    BMP: Date: 12/25/2023 Na 140 K 3.9 Cr 0.9 BUN 17 glucose 172 Ca 8.4 GFR 79  CBC: Date: 12/25/2023 WBC 8.9 Hgb 9.9 hematocrit 30.7 platelets 272  Liver function: Date: 12/25/2023 ALT 8 AST 26 alkaline phos.  67 bilirubin 0.3 albumin 3.2 protein 7    BMP: Date: 12/26/2023 Na 142 K 4.2 Cr 0.9 BUN 15 glucose 168 Ca 8.6 GFR 79  CBC: Date: 12/26/2023 WBC 9.9 Hgb 10.5 hematocrit 32.9 platelets 302    BMP: Date: 1/15/2024 Na 139 K 3.9 Cr 1 BUN 14 glucose 206 Ca 8.5 GFR 70  CBC: Date: 1/15/2024 WBC 8.3 Hgb 9.8 hematocrit 29.8 platelets 244  Liver function: Date: 1/15/2024 ALT 7 AST 22 alkaline phos.  62 bilirubin 0.3 albumin 3.3 protein 6.8    1/16/2024: Lipid panel: Cholesterol 119; triglycerides 174; HDL 32; LDL 52; VLDL 35.    BMP: Date: 2/5/2024 Na 141 K 4.2 Cr 1 BUN 22 glucose 150 Ca 8.3 GFR 70  CBC: Date: 2/5/2024 WBC 10.9 Hgb 10.2 hematocrit 31.1 platelets 319  Liver function: Date: 2/5/2024 ALT 7 AST 29 alkaline phos.  77 bilirubin 0.6 albumin 3.4 protein 7.4    BMP: Date: 2/26/2024 Na 143 K 3.6 Cr 1.3 BUN 23 glucose 152 Ca 8.5 GFR 51  CBC: Date: 2/26/2024 WBC 8 Hgb 11.1 hematocrit 33.6 platelets 318  Liver function: Date: 2/26/2024 ALT 11 AST 48 alkaline phos.  67 bilirubin 0.5 albumin 3.5 protein 7.8    BMP: Date: 3/11/2024 Na 142 K 3.8 Cr 0.9 BUN 16 glucose 97 Ca 8.7 GFR 79  CBC: Date: 3/11/2024 WBC 9 Hgb 10.5 hematocrit 32.3 platelets 302  Liver function: Date: 3/11/2024  ALT 10 AST 39 alkaline phos.  64 bilirubin 0.4 albumin 3.1 protein 7.1    BMP: Date: 3/18/2024 Na 143 K 4.4 Cr 0.9 BUN 15 glucose 111 Ca 8.7 GFR 79  CBC:  Date: 3/18/2024 WBC 9.1 Hgb 10.8 hematocrit 32.6 platelets 294  Liver function: Date: 3/18/2024 ALT 13 AST 49 alkaline phos.  68 bilirubin 0.6 albumin 3.2 protein 7.4    BMP: Date: 3/25/2024 Na 140 K 3.7 Cr 1 BUN 17 glucose 225 Ca 8.7 GFR 70  CBC: Date: 3/25/2024  WBC 8.6 Hgb 10.7 hematocrit 32 platelets 304  Liver function: Date: 3/25/2024 ALT 15 AST 56 alkaline phos.  53 bilirubin 0.5 albumin 3.3 protein 7.1    BMP: Date: 4/1/2024 Na 143 K 3.5 Cr 1 BUN 19 glucose 151 Ca 8.7 GFR 70  CBC: Date: 4/1/2024 WBC 8.4 Hgb 10.2 hematocrit 30.8 platelets 254  Liver function: Date: 4/1/2024 ALT 12 AST 32 alkaline phos.  56 bilirubin 0.4 albumin 3.3 protein 6.8                Past Medical History:   Diagnosis Date   • Cervicalgia     Neck pain   • Essential (primary) hypertension 07/21/2013    Hypertension   • Pain in unspecified shoulder     Pain, joint, shoulder   • Personal history of other diseases of the digestive system 07/06/2022    History of ulcerative colitis   • Personal history of other diseases of the digestive system     History of esophageal reflux   • Personal history of other diseases of the nervous system and sense organs     History of cataract   • Personal history of other diseases of the nervous system and sense organs     History of glaucoma   • Personal history of other diseases of the respiratory system     History of bronchitis   • Personal history of other endocrine, nutritional and metabolic disease     History of diabetes mellitus   • Personal history of other endocrine, nutritional and metabolic disease     History of hyperlipidemia   • Personal history of other mental and behavioral disorders     History of depression   • Snoring     Snoring   • Unilateral primary osteoarthritis, unspecified knee 11/02/2015    Arthritis of knee           Past Surgical History:   Procedure Laterality Date   • CHOLECYSTECTOMY  01/08/2016    Cholecystectomy   • COLON SURGERY  01/08/2016    Colon Surgery   • CT ANGIO  NECK W AND WO IV CONTRAST  3/19/2019    CT NECK ANGIO W AND WO IV CONTRAST [place] [facility] [place]   • CT HEAD ANGIO W AND WO IV CONTRAST  3/19/2019    CT HEAD ANGIO W AND WO IV CONTRAST [place] [facility] [place] [facility]   • HERNIA REPAIR  01/08/2016    Hernia Repair   • HYSTERECTOMY  01/08/2016    Hysterectomy   • MR HEAD ANGIO WO IV CONTRAST  6/4/2018    MR HEAD ANGIO WO IV CONTRAST 6/4/2018 [facility]   • MR HEAD ANGIO WO IV CONTRAST  7/11/2020    MR HEAD ANGIO WO IV CONTRAST 7/11/2020 [facility]   • MR HEAD ANGIO WO IV CONTRAST  9/30/2020    MR HEAD ANGIO WO IV CONTRAST 9/30/2020 [facility]   • MR HEAD ANGIO WO IV CONTRAST  5/22/2022    MR HEAD ANGIO WO IV CONTRAST 5/22/2022 [facility]   • MR HEAD ANGIO WO IV CONTRAST  4/19/2023    MR HEAD ANGIO WO IV CONTRAST [facility] MRI   • MR NECK ANGIO WO IV CONTRAST  6/4/2018    MR NECK ANGIO WO IV CONTRAST 6/4/2018 [facility]   • MR NECK ANGIO WO IV CONTRAST  7/11/2020    MR NECK ANGIO WO IV CONTRAST 7/11/2020 [facility]   • MR NECK ANGIO WO IV CONTRAST  9/30/2020    MR NECK ANGIO WO IV CONTRAST 9/30/2020 [facility]   • MR NECK ANGIO WO IV CONTRAST  5/22/2022    MR NECK ANGIO WO IV CONTRAST 5/22/2022 [facility]   • MR NECK ANGIO WO IV CONTRAST  4/19/2023    MR NECK ANGIO WO IV CONTRAST [facility] MRI   • OTHER SURGICAL HISTORY  01/08/2016    Tubal Stabilization   • OTHER SURGICAL HISTORY  09/01/2016    Cervical Surgery (Gyn) Laser Vaporization Of Transformation Zone   • THYROID SURGERY  09/27/2013    Thyroid Surgery            Surgical History  Problems    · History of Cervical Surgery (Gyn) Laser Vaporization Of Transformation Zone   · History of Cholecystectomy   · History of Colon Surgery   · History of Hernia Repair   · History of Hysterectomy   · History of Thyroid Surgery   · History of Tubal Stabilization     Family History  Mother    · Family history of Cancer  Family History    · Family history of  "Diabetes Mellitus (V18.0)   · Family history of Hypertension (V17.49)     Social History  Problems    · Disabled   · Does not use illicit drugs (V49.89) (Z78.9)   · Former smoker (V15.82) (Z87.891)   · Marital History - Single   · No alcohol.   · Denied: History of Secondhand smoke exposure   · Single     Allergies  Medication    · Aspirin TABS   · Contrast Media Ready-Box MISC   · morphine   · Sulfa Drugs        /60   Pulse 74   Resp 18   Ht 1.372 m (4' 6\")   Wt 93.3 kg (205 lb 9.6 oz)   SpO2 98%   BMI 49.57 kg/m²      Physical Exam  Vitals and nursing note reviewed.   Constitutional:       General: She is awake.      Appearance: Normal appearance. She is ill-appearing.   HENT:      Head: Normocephalic.      Comments: White patches on tongue.     Right Ear: External ear normal.      Left Ear: External ear normal.      Nose: Nose normal.      Mouth/Throat:      Mouth: Mucous membranes are moist.      Pharynx: Oropharynx is clear.   Eyes:      Extraocular Movements: Extraocular movements intact.      Conjunctiva/sclera: Conjunctivae normal.      Pupils: Pupils are equal, round, and reactive to light.   Cardiovascular:      Rate and Rhythm: Normal rate and regular rhythm.      Pulses: Normal pulses.      Heart sounds: Normal heart sounds.   Pulmonary:      Effort: Pulmonary effort is normal.      Breath sounds: Normal breath sounds.   Abdominal:      General: Bowel sounds are normal.      Palpations: Abdomen is soft.   Musculoskeletal:      Cervical back: Normal range of motion and neck supple.      Comments: Generalized muscle weakness; immobility.    Right sided hemiparesis   Skin:     General: Skin is warm and dry.   Neurological:      Mental Status: She is alert. Mental status is at baseline.      Motor: Weakness present.      Coordination: Coordination abnormal.      Gait: Gait abnormal.      Comments: Right sided weakness--RLE 1/5; RUE 3/5; sensation intact to touch x 4 extremities.     Left sided " facial droop.     Generalized muscle weakness   Psychiatric:         Mood and Affect: Mood normal.         Behavior: Behavior normal. Behavior is cooperative.          Assessment/Plan   Ordered CMP and CBC with diff., Ha1c and Lipid panel for Monday.    DM type 2 with hyperglycemia; noncompliance with diabetic diet:       Continue Lantus 43 units at bedtime and Humalog sliding scale.      Monitor accuchecks.       Monitor Ha1c Q 3 months.       Ordered Ha1c for Monday.       Discussed importance of being compliant with diabetic diet.     Chronic systolic HF; Benign essential HTN :     Continue Amlodipine and hydrochlorothiazide.      Monitor Bps.    Hyperlipidemia:  Continue atorvastatin 50 mg PO every day.  Monitor lipid panel.              Allergic rhinitis; nasal congestion  Continue Flonase nasal spray as ordered.            Oral candidiasis:       Monitor for recurrent oral candidiasis.             Sequela of Cerebrovascular accident; Hx CVA with right-sided hemiparesis; right sided weakness;   dysphagia 2/2 cva; expressive aphasia; History of acute/subacute stroke:   MRI was performed during a PREVIOUS hospitalization and it showed Acute/subacute tiny infarct in the left anterior brock.   Dr. Jiang from [facility] [place] was consulted at that time.  Follow up with Dr. Jiang as ordered.  Continue Eliquis 5 mg PO BID.  Continue mechanical soft, thin consistency diet.             Major depressive disorder:   Continue Sertraline.   Follow up with in house psych NP.  Continue Sertraline 125 mg PO every day.             Generalized muscle weakness; Immobility:  Continue at  in long term care.  Bedbound/wheelchair bound.   Fall prevention strategies.        Problem List Items Addressed This Visit    None  Visit Diagnoses       Type 2 diabetes mellitus with hyperglycemia, with long-term current use of insulin (CMS/[facility])    -  Primary    Noncompliance with dietary restriction        Chronic systolic heart  failure (CMS/[facility])        Benign essential HTN        Mixed hyperlipidemia        Allergic rhinitis, unspecified seasonality, unspecified trigger        Nasal congestion        Oral candidiasis        Sequela of cerebrovascular accident        Hemiparesis affecting right side as late effect of stroke (CMS/[facility])        Expressive aphasia        Dysphagia, unspecified type        Major depressive disorder with current active episode, unspecified depression episode severity, unspecified whether recurrent        Generalized muscle weakness        Immobility              JASON Espitia       Electronically Signed By: JASON Espitia   4/10/24  6:50 PM

## 2024-04-10 VITALS
BODY MASS INDEX: 47.58 KG/M2 | DIASTOLIC BLOOD PRESSURE: 60 MMHG | OXYGEN SATURATION: 98 % | RESPIRATION RATE: 18 BRPM | WEIGHT: 205.6 LBS | SYSTOLIC BLOOD PRESSURE: 133 MMHG | HEIGHT: 55 IN | HEART RATE: 74 BPM

## 2024-04-10 NOTE — PROGRESS NOTES
Name: Janette Martinez    YOB: 1970    Code Status: DNRcc arrest    Chief complaint:  Follow up DM type 2;  etc.....      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        Patient re-admitted to [facility]. On 10/20/23 after her most recent hospitalization.    Diagnoses as follows:    DM type 2 with hyperglycemia; noncompliance with diabetic diet:        Patient is frequently noncompliant with diet.       On Lantus 43 units at bedtime and Humalog sliding scale.       Recent accuchecks: 236 mg/dL, 136 mg/dL, 180 mg/dL.        No episodes of hypoglycemia reported.        Ha1c trends:       5/1/23 Ha1c 8.1 %        10/17/23 Ha1c 8.3 %       12/4/23 hemoglobin A1c 8.9%     Patient seen today, she is in bed.    Calm and cooperative.    No complaints of chest pain.    No headaches.    No dizziness.     Chronic systolic HF; Benign essential HTN :     On Amlodipine and hydrochlorothiazide.            No oxygen desaturations reported.       Recent /60       No complaints of  chest pain, headaches or dizziness.    Hyperlipidemia:  On atorvastatin 50 mg PO every day.  1/16/2024: Lipid panel: Cholesterol 119; triglycerides 174; HDL 32; LDL 52; VLDL 35.              Allergic rhinitis; nasal congestion:  Last month patient complained of runny nose and nasal congestion.  She was started on Flonase nasal spray.  Her symptoms are improved today.  No complaints of headaches or dizziness.           Oral candidiasis:       Improved.       Treated with nystatin.       No complaints of any mouth pain today.               Sequela of Cerebrovascular accident; Hx CVA with right-sided hemiparesis; right sided weakness;   dysphagia 2/2 cva; expressive aphasia; History of acute/subacute stroke:   MRI was  performed during a PREVIOUS hospitalization and it showed Acute/subacute tiny infarct in the left anterior brock.   Dr. Jiang from [facility] neuro/stroke was consulted at that time.  On Eliquis 5 mg PO BID.  Pt has baseline dysarthria from a previous CVA, right Hemiparesis,  and expressive aphasia.  She is able to communicate in short sentences.  Patient is also able to nod appropriately to questions.   On a mechanical soft, thin consistency diet.             Major depressive disorder:   On Sertraline.   Also followed by in house psych NP.  On Sertraline  125 mg PO every day.  Patient has a good appetite, no recent weight loss reported.             Generalized muscle weakness; Immobility:  At  in long term care.  Not able to ambulate.  Very weak.  Bedbound/wheelchair bound.                              Reviewed medical, social, surgical and family history.  Reviewed all current medications and performed medication reconciliation.  Reviewed vital signs AND recent blood work results.  Performed prescription drug management.   Reviewed [facility] Documentation  Discussed patient with nursing and [facility] palliative NP.                  ROS: 10 point ROS performed; negative unless noted in HPI.       LABS:  BMP: Date: 5/1/2023 Na 137 K 4.3 Cr 1.1 BUN 15 glucose 141 Ca 8.8 GFR 52  CBC: Date: 5/1/2023 WBC 8.4 Hgb 11.1 hematocrit 33.9 platelets 337  Liver function: Date: 5/1/2023 ALT 10 AST 29 alkaline phos.  63 bilirubin 0.4 albumin 3.6 protein 7.7       Ha1c 8.1 % on 5/1/23          BMP: Date: 5/15/2023 Na 139  K 3.4 Cr 1.2 BUN 20 glucose 122 Ca 8.8 GFR 57        CBC: Date: 5/15/2023 WBC 9 Hgb 10.2 hematocrit 30.9 platelets 277       Liver function: 5/15/2023 ALT 8 AST 28 alkaline phos.  57 bilirubin 0.5 albumin 3.5 protein 7.3          BMP: Date: 5/22/2023 Na 141 K 3.1 Cr 1 BUN 11 glucose 227 Ca 8.7 GFR 70        CBC: Date: 5/22/2023 WBC 8.2 Hgb 10 hematocrit 30.5 platelets 372        Liver function:  Date: 5/22/2023 ALT 8 AST 22 alkaline phos.  63 bilirubin 0.3 albumin 3.2 protein 7.2     BMP: Date: 6/5/2023 Na 140 K 4.2 Cr 1.1 BUN 14 glucose 79 Ca 1.1 GFR 63  CBC: Date: 6/5/2023 WBC 8.1 Hgb 10.5 hematocrit 32.3 platelets 350  Liver function: Date: 6/5/2023 ALT 7 AST 25 alkaline phos.  59 bilirubin 0.4 albumin 3.3 protein 7.7    BMP: Date: 7/17/2023 Na 140 K 4.1 Cr 0.8 BUN 17 glucose 126 Ca 9.1 GFR 90  CBC: Date: 7/17/2023 WBC 14.1 Hgb 10.2 hematocrit 32.1 platelets 265  Liver function: Date: 7/17/2023 ALT 8 AST 23 alkaline phos.  72 bilirubin 0.5 albumin 3.4 protein 7.4    CBC: Date: 7/19/2023 WBC 10.7 Hgb 9.5 hematocrit 28.8 platelets 257    BMP: Date: 7/24/2023 Na 137 K 4.2 Cr 0.9 BUN 19 glucose 186 Ca 9.1 GFR 79  CBC: Date: 7/24/2023 WBC 11.6 Hgb 10.3 hematocrit 31.4 platelets 305  Liver function: Date: 7/24/2023 ALT 11 AST 27 alkaline phos.  77 bilirubin 0.3 albumin 3.6 protein 7.8    BMP: Date: 7/31/2023 Na 141 K 4.2 Cr 0.8 BUN 17 glucose 138 Ca 8.7 GFR 90  CBC: Date: 7/31/2023 WBC 12.3 Hgb 10.5 hematocrit 32.4 platelets 307  Liver function: Date: 7/31/2023 ALT 14 AST 24 alkaline phos.  81 bilirubin 0.4 albumin 3.5 protein 7.4    BMP: Date: 8/7/2023 Na 141 K 3.9 Cr 1.0 BUN 12 glucose 167 Ca 8.6 GFR 70  CBC: Date: 8/7/2023 WBC 10 Hgb 10.5 hematocrit 32.7 platelets 256  Liver function: Date: 8/7/2023 ALT 9 AST 23 alkaline phos.  76 bilirubin 0.5 albumin 3.3 protein 7    BMP: Date: 8/21/2023 Na 142 K 4.1 Cr 1 BUN 17 glucose 195 Ca 8.5 GFR 70  CBC: Date: 8/21/2023 WBC 10.9 Hgb 9.6 hematocrit 30 platelets 304  Liver function: Date: 8/21/2023 ALT 9 AST 24 alkaline phos.  74 bilirubin 0.4 albumin 3.2 protein 7.1    BMP: Date: 8/31/2023 Na 141 K 3.8 Cr 0.9 BUN 14 glucose 118 Ca 8.2 GFR 79  CBC: Date: 8/31/2023 WBC 12 Hgb 9.6 hematocrit 29.8 platelets 294  Liver function: Date: 8/31/2023 ALT 8 AST 21 alkaline phos.  64 bilirubin 0.4 albumin 3.1 protein 7    BMP: Date: 9/4/2023 Na 141 K 3.9 Cr 1.1 BUN 17 glucose 107  Ca 8.5 GFR 63   CBC: Date: 9/4/2023 WBC 12 Hgb 9.5 hematocrit 29.7 platelets 282  Liver function: Date: 9/4/2023 ALT 7 AST 22 alkaline phos.  64 bilirubin 0.4 albumin 3.3 protein 7.1    BMP: Date: 9/11/2023 Na 142 K 4 Cr 1 BUN 12 glucose 81 Ca 8.2 GFR 70  CBC: Date: 9/11/2023 WBC 8.7 Hgb 9.2 hematocrit 28.3 platelets 305  Liver function: Date: 9/11/2023 ALT 7 AST 22 alkaline phos.  61 bilirubin 0.3 albumin 3 protein 6.8    BMP: Date: 9/18/2023 Na 140 K 4 Cr 1 BUN 15 glucose 150 Ca 8.3 GFR 70  CBC: Date: 9/18/2023 WBC 12.1 Hgb 9.5 hematocrit 29.8 platelets 287  Liver function: Date: 9/18/2023 ALT 7 AST 23 alkaline phos.  62 bilirubin 0.4 albumin 3.4 protein 7.1    10/17/23 Ha1c 8.3 %    BMP: Date: 10/23/2023 Na 141 K 3.6 Cr 1 BUN 22 glucose 65 Ca 8.3 GFR 70  CBC: Date: 10/23/2023 WBC 12.5 Hgb 10.8  hematocrit 33.2 platelets 290  Liver function: Date: 10/23/2023 ALT 9 AST 31 alkaline phos.  66 bilirubin 0.6 albumin 3.6 protein 7.8    10/26/2023: Lipid panel: Cholesterol 111; triglycerides 111; HDL 33; LDL 56; VLDL 22.    10/26/2023: TSH: 1.081 (range 0.340-5.500).    BMP: Date: 10/30/2023 Na 143  K 3.5 Cr 0.9 BUN 11 glucose 171 Ca 8 GFR 65  CBC: Date: 10/30/2023 WBC 9.2 Hgb 10.1 hematocrit 31.1 platelets 280  Liver function: Date: 10/30/2023 ALT 11 AST 27 alkaline phos.  66 bilirubin 0.4 albumin 3.4 protein 7.2    BMP: Date: 11/13/2023 Na 138 K 3.9 Cr 0.9 BUN 11 glucose 308 Ca 8.4 GFR 79  CBC: Date: 11/13/2023 WBC 8.2 Hgb 10.6 hematocrit 33 platelets 295  Liver function: Date: 11/13/2023 ALT 12 AST 37 alkaline phos.  69 bilirubin 0.4 albumin 3.2 protein 7.1    BMP: Date: 11/21/2023 Na 142 K 3.6 Cr 0.9 BUN 14 glucose 200 Ca 8.4 GFR 79.  CBC: Date: 11/21/2023 WBC 8.8 Hgb 10.1 hematocrit 30.4 platelets 290    BMP: Date: 11/27/2023 Na 138 K 3.4 Cr 1 BUN 12 glucose 54 Ca 8.7 GFR 70  CBC: Date: 11/27/2023 WBC 12.3 Hgb 10.6 hematocrit 32.4 platelets 307  Liver function: Date: 11/27/2023 ALT 7 AST 27 alkaline phos.  72  bilirubin 0.5 albumin 3.3 protein 7.7    BMP: Date: 12/4/2023 Na 141 K 4 Cr 1 BUN 12 glucose 209 Ca 8.5 GFR 70  CBC: Date: 12/4/2023 WBC 8.5 Hgb 10.6 hematocrit 32.7 platelets 326  Liver function: Date: 12/4/2023 ALT 8 AST 25 alkaline phos.  64 bilirubin 0.4 albumin 3.3 protein 7.1  12/4/2023 hemoglobin A1c 8.9%    BMP: Date: 12/25/2023 Na 140 K 3.9 Cr 0.9 BUN 17 glucose 172 Ca 8.4 GFR 79  CBC: Date: 12/25/2023 WBC 8.9 Hgb 9.9 hematocrit 30.7 platelets 272  Liver function: Date: 12/25/2023 ALT 8 AST 26 alkaline phos.  67 bilirubin 0.3 albumin 3.2 protein 7    BMP: Date: 12/26/2023 Na 142 K 4.2 Cr 0.9 BUN 15 glucose 168 Ca 8.6 GFR 79  CBC: Date: 12/26/2023 WBC 9.9 Hgb 10.5 hematocrit 32.9 platelets 302    BMP: Date: 1/15/2024 Na 139 K 3.9 Cr 1 BUN 14 glucose 206 Ca 8.5 GFR 70  CBC: Date: 1/15/2024 WBC 8.3 Hgb 9.8 hematocrit 29.8 platelets 244  Liver function: Date: 1/15/2024 ALT 7 AST 22 alkaline phos.  62 bilirubin 0.3 albumin 3.3 protein 6.8    1/16/2024: Lipid panel: Cholesterol 119; triglycerides 174; HDL 32; LDL 52; VLDL 35.    BMP: Date: 2/5/2024 Na 141 K 4.2 Cr 1 BUN 22 glucose 150 Ca 8.3 GFR 70  CBC: Date: 2/5/2024 WBC 10.9 Hgb 10.2 hematocrit 31.1 platelets 319  Liver function: Date: 2/5/2024 ALT 7 AST 29 alkaline phos.  77 bilirubin 0.6 albumin 3.4 protein 7.4    BMP: Date: 2/26/2024 Na 143 K 3.6 Cr 1.3 BUN 23 glucose 152 Ca 8.5 GFR 51  CBC: Date: 2/26/2024 WBC 8 Hgb 11.1 hematocrit 33.6 platelets 318  Liver function: Date: 2/26/2024 ALT 11 AST 48 alkaline phos.  67 bilirubin 0.5 albumin 3.5 protein 7.8    BMP: Date: 3/11/2024 Na 142 K 3.8 Cr 0.9 BUN 16 glucose 97 Ca 8.7 GFR 79  CBC: Date: 3/11/2024 WBC 9 Hgb 10.5 hematocrit 32.3 platelets 302  Liver function: Date: 3/11/2024  ALT 10 AST 39 alkaline phos.  64 bilirubin 0.4 albumin 3.1 protein 7.1    BMP: Date: 3/18/2024 Na 143 K 4.4 Cr 0.9 BUN 15 glucose 111 Ca 8.7 GFR 79  CBC: Date: 3/18/2024 WBC 9.1 Hgb 10.8 hematocrit 32.6 platelets 294  Liver function:  Date: 3/18/2024 ALT 13 AST 49 alkaline phos.  68 bilirubin 0.6 albumin 3.2 protein 7.4    BMP: Date: 3/25/2024 Na 140 K 3.7 Cr 1 BUN 17 glucose 225 Ca 8.7 GFR 70  CBC: Date: 3/25/2024  WBC 8.6 Hgb 10.7 hematocrit 32 platelets 304  Liver function: Date: 3/25/2024 ALT 15 AST 56 alkaline phos.  53 bilirubin 0.5 albumin 3.3 protein 7.1    BMP: Date: 4/1/2024 Na 143 K 3.5 Cr 1 BUN 19 glucose 151 Ca 8.7 GFR 70  CBC: Date: 4/1/2024 WBC 8.4 Hgb 10.2 hematocrit 30.8 platelets 254  Liver function: Date: 4/1/2024 ALT 12 AST 32 alkaline phos.  56 bilirubin 0.4 albumin 3.3 protein 6.8                Past Medical History:   Diagnosis Date    Cervicalgia     Neck pain    Essential (primary) hypertension 07/21/2013    Hypertension    Pain in unspecified shoulder     Pain, joint, shoulder    Personal history of other diseases of the digestive system 07/06/2022    History of ulcerative colitis    Personal history of other diseases of the digestive system     History of esophageal reflux    Personal history of other diseases of the nervous system and sense organs     History of cataract    Personal history of other diseases of the nervous system and sense organs     History of glaucoma    Personal history of other diseases of the respiratory system     History of bronchitis    Personal history of other endocrine, nutritional and metabolic disease     History of diabetes mellitus    Personal history of other endocrine, nutritional and metabolic disease     History of hyperlipidemia    Personal history of other mental and behavioral disorders     History of depression    Snoring     Snoring    Unilateral primary osteoarthritis, unspecified knee 11/02/2015    Arthritis of knee           Past Surgical History:   Procedure Laterality Date    CHOLECYSTECTOMY  01/08/2016    Cholecystectomy    COLON SURGERY  01/08/2016    Colon Surgery    CT ANGIO NECK W AND WO IV CONTRAST  3/19/2019    CT NECK ANGIO W AND WO IV CONTRAST 3/19/2019 [facility]  EMERGENCY LEGACY    CT HEAD ANGIO W AND WO IV CONTRAST  3/19/2019    CT HEAD ANGIO W AND WO IV CONTRAST 3/19/2019 [facility] EMERGENCY LEGACY    HERNIA REPAIR  01/08/2016    Hernia Repair    HYSTERECTOMY  01/08/2016    Hysterectomy    MR HEAD ANGIO WO IV CONTRAST  6/4/2018    MR HEAD ANGIO WO IV CONTRAST 6/4/2018 [facility] CLINICAL LEGACY    MR HEAD ANGIO WO IV CONTRAST  7/11/2020    MR HEAD ANGIO WO IV CONTRAST 7/11/2020 [facility] CLINICAL LEGACY    MR HEAD ANGIO WO IV CONTRAST  9/30/2020    MR HEAD ANGIO WO IV CONTRAST 9/30/2020 [facility] AIB LEGACY    MR HEAD ANGIO WO IV CONTRAST  5/22/2022    MR HEAD ANGIO WO IV CONTRAST 5/22/2022 [facility] CLINICAL LEGACY    MR HEAD ANGIO WO IV CONTRAST  4/19/2023    MR HEAD ANGIO WO IV CONTRAST [facility] MRI    MR NECK ANGIO WO IV CONTRAST  6/4/2018    MR NECK ANGIO WO IV CONTRAST 6/4/2018 [facility] CLINICAL LEGACY    MR NECK ANGIO WO IV CONTRAST  7/11/2020    MR NECK ANGIO WO IV CONTRAST 7/11/2020 [facility] CLINICAL LEGACY    MR NECK ANGIO WO IV CONTRAST  9/30/2020    MR NECK ANGIO WO IV CONTRAST 9/30/2020 [facility]IB LEGACY    MR NECK ANGIO WO IV CONTRAST  5/22/2022    MR NECK ANGIO WO IV CONTRAST 5/22/2022 [facility] CLINICAL LEGACY    MR NECK ANGIO WO IV CONTRAST  4/19/2023    MR NECK ANGIO WO IV CONTRAST [facility] MRI    OTHER SURGICAL HISTORY  01/08/2016    Tubal Stabilization    OTHER SURGICAL HISTORY  09/01/2016    Cervical Surgery (Gyn) Laser Vaporization Of Transformation Zone    THYROID SURGERY  09/27/2013    Thyroid Surgery            Surgical History  Problems    · History of Cervical Surgery (Gyn) Laser Vaporization Of Transformation Zone   · History of Cholecystectomy   · History of Colon Surgery   · History of Hernia Repair   · History of Hysterectomy   · History of Thyroid Surgery   · History of Tubal Stabilization     Family History  Mother    · Family history of Cancer  Family History    · Family history of Diabetes Mellitus (V18.0)   · Family history of Hypertension (V17.49)     Social History  Problems    ·  "Disabled   · Does not use illicit drugs (V49.89) (Z78.9)   · Former smoker (V15.82) (Z87.891)   · Marital History - Single   · No alcohol.   · Denied: History of Secondhand smoke exposure   · Single     Allergies  Medication    · Aspirin TABS   · Contrast Media Ready-Box MISC   · morphine   · Sulfa Drugs        /60   Pulse 74   Resp 18   Ht 1.372 m (4' 6\")   Wt 93.3 kg (205 lb 9.6 oz)   SpO2 98%   BMI 49.57 kg/m²      Physical Exam  Vitals and nursing note reviewed.   Constitutional:       General: She is awake.      Appearance: Normal appearance. She is ill-appearing.   HENT:      Head: Normocephalic.      Comments: White patches on tongue.     Right Ear: External ear normal.      Left Ear: External ear normal.      Nose: Nose normal.      Mouth/Throat:      Mouth: Mucous membranes are moist.      Pharynx: Oropharynx is clear.   Eyes:      Extraocular Movements: Extraocular movements intact.      Conjunctiva/sclera: Conjunctivae normal.      Pupils: Pupils are equal, round, and reactive to light.   Cardiovascular:      Rate and Rhythm: Normal rate and regular rhythm.      Pulses: Normal pulses.      Heart sounds: Normal heart sounds.   Pulmonary:      Effort: Pulmonary effort is normal.      Breath sounds: Normal breath sounds.   Abdominal:      General: Bowel sounds are normal.      Palpations: Abdomen is soft.   Musculoskeletal:      Cervical back: Normal range of motion and neck supple.      Comments: Generalized muscle weakness; immobility.    Right sided hemiparesis   Skin:     General: Skin is warm and dry.   Neurological:      Mental Status: She is alert. Mental status is at baseline.      Motor: Weakness present.      Coordination: Coordination abnormal.      Gait: Gait abnormal.      Comments: Right sided weakness--RLE 1/5; RUE 3/5; sensation intact to touch x 4 extremities.     Left sided facial droop.     Generalized muscle weakness   Psychiatric:         Mood and Affect: Mood normal.         " Behavior: Behavior normal. Behavior is cooperative.          Assessment/Plan    Ordered CMP and CBC with diff., Ha1c and Lipid panel for Monday.    DM type 2 with hyperglycemia; noncompliance with diabetic diet:       Continue Lantus 43 units at bedtime and Humalog sliding scale.      Monitor accuchecks.       Monitor Ha1c Q 3 months.       Ordered Ha1c for Monday.       Discussed importance of being compliant with diabetic diet.     Chronic systolic HF; Benign essential HTN :     Continue Amlodipine and hydrochlorothiazide.      Monitor Bps.    Hyperlipidemia:  Continue atorvastatin 50 mg PO every day.  Monitor lipid panel.              Allergic rhinitis; nasal congestion  Continue Flonase nasal spray as ordered.            Oral candidiasis:       Monitor for recurrent oral candidiasis.             Sequela of Cerebrovascular accident; Hx CVA with right-sided hemiparesis; right sided weakness;   dysphagia 2/2 cva; expressive aphasia; History of acute/subacute stroke:   MRI was performed during a PREVIOUS hospitalization and it showed Acute/subacute tiny infarct in the left anterior brock.   Dr. Jiang from [facility] neuro/stroke was consulted at that time.  Follow up with Dr. Jiang as ordered.  Continue Eliquis 5 mg PO BID.  Continue mechanical soft, thin consistency diet.             Major depressive disorder:   Continue Sertraline.   Follow up with in house psych NP.  Continue Sertraline 125 mg PO every day.             Generalized muscle weakness; Immobility:  Continue at  in long term care.  Bedbound/wheelchair bound.   Fall prevention strategies.        Problem List Items Addressed This Visit    None  Visit Diagnoses       Type 2 diabetes mellitus with hyperglycemia, with long-term current use of insulin (CMS/HCC)    -  Primary    Noncompliance with dietary restriction        Chronic systolic heart failure (CMS/HCC)        Benign essential HTN        Mixed hyperlipidemia        Allergic rhinitis,  unspecified seasonality, unspecified trigger        Nasal congestion        Oral candidiasis        Sequela of cerebrovascular accident        Hemiparesis affecting right side as late effect of stroke (CMS/[facility])        Expressive aphasia        Dysphagia, unspecified type        Major depressive disorder with current active episode, unspecified depression episode severity, unspecified whether recurrent        Generalized muscle weakness        Immobility              Maria Del Carmen Garduno, APRN-CNP

## 2024-04-15 ENCOUNTER — NURSING HOME VISIT (OUTPATIENT)
Dept: POST ACUTE CARE | Facility: EXTERNAL LOCATION | Age: 54
End: 2024-04-15
Payer: MEDICARE

## 2024-04-15 DIAGNOSIS — N95.0 POSTMENOPAUSAL VAGINAL BLEEDING: Primary | ICD-10-CM

## 2024-04-15 DIAGNOSIS — M62.81 GENERALIZED MUSCLE WEAKNESS: ICD-10-CM

## 2024-04-15 DIAGNOSIS — I50.22 CHRONIC SYSTOLIC HEART FAILURE (MULTI): ICD-10-CM

## 2024-04-15 DIAGNOSIS — I10 BENIGN ESSENTIAL HTN: ICD-10-CM

## 2024-04-15 DIAGNOSIS — E11.65 TYPE 2 DIABETES MELLITUS WITH HYPERGLYCEMIA, WITH LONG-TERM CURRENT USE OF INSULIN (MULTI): ICD-10-CM

## 2024-04-15 DIAGNOSIS — E78.2 MIXED HYPERLIPIDEMIA: ICD-10-CM

## 2024-04-15 DIAGNOSIS — F32.9 MAJOR DEPRESSIVE DISORDER WITH CURRENT ACTIVE EPISODE, UNSPECIFIED DEPRESSION EPISODE SEVERITY, UNSPECIFIED WHETHER RECURRENT: ICD-10-CM

## 2024-04-15 DIAGNOSIS — R09.81 NASAL CONGESTION: ICD-10-CM

## 2024-04-15 DIAGNOSIS — Z79.4 TYPE 2 DIABETES MELLITUS WITH HYPERGLYCEMIA, WITH LONG-TERM CURRENT USE OF INSULIN (MULTI): ICD-10-CM

## 2024-04-15 DIAGNOSIS — Z74.09 IMMOBILITY: ICD-10-CM

## 2024-04-15 DIAGNOSIS — Z91.119 NONCOMPLIANCE WITH DIETARY RESTRICTION: ICD-10-CM

## 2024-04-15 DIAGNOSIS — J30.9 ALLERGIC RHINITIS, UNSPECIFIED SEASONALITY, UNSPECIFIED TRIGGER: ICD-10-CM

## 2024-04-15 DIAGNOSIS — I69.351 HEMIPARESIS AFFECTING RIGHT SIDE AS LATE EFFECT OF STROKE (MULTI): ICD-10-CM

## 2024-04-15 DIAGNOSIS — I69.30 SEQUELA OF CEREBROVASCULAR ACCIDENT: ICD-10-CM

## 2024-04-15 PROCEDURE — 99309 SBSQ NF CARE MODERATE MDM 30: CPT | Performed by: NURSE PRACTITIONER

## 2024-04-15 NOTE — LETTER
Patient: Janette Martinez  : 1970    Encounter Date: 04/15/2024    Name: Janette Martinez    YOB: 1970    Code Status: DNRcc arrest    Chief complaint:  Post Menopausal Vaginal bleeding;  etc.....      HPI:    53 year old female with past medical history of heart failure with reduced EF,   diabetes, hypertension, hyperlipidemia,        CVA (left MCA  with residual right-sided hemiparesis), GERD, HIV, hypothyroidism, hypertension,   and diabetes.        Patient re-admitted to [facility]. On 10/20/23 after her most recent hospitalization.    Diagnoses as follows:    Post Menopausal Vaginal Bleeding:  Nursing reported that patient had some dark blood noted in her diaper this morning.  She does not remember if she has had post menopausal vaginal bleeding before.   It is not stool.  Blood appears to be coming from the vagina.  It has subsided, her diaper was changed.  Staff monitoring for continued bleeding.   Patient is not having any abdominal pain or cramping.  Patient not sure if she is having dysuria.  No costovertebral angle pain.  No nausea or vomiting.       Patient seen today, she is in bed.       Calm and cooperative.        DM type 2 with hyperglycemia; noncompliance with diabetic diet:        Patient is frequently noncompliant with diet.       On Lantus 43 units at bedtime and Humalog sliding scale.       Recent accuchecks: 179 mg/dL,  mg/dL, mg/dL.        No episodes of hypoglycemia reported.        Ha1c trends:       23 Ha1c 8.1 %        10/17/23 Ha1c 8.3 %       23 hemoglobin A1c 8.9%     No complaints of chest pain.    No headaches.    No dizziness.     Benign essential HTN; Chronic systolic HF :     On Amlodipine and hydrochlorothiazide.            No oxygen desaturations reported.       Recent /71       No complaints of  chest pain, headaches or dizziness.    Hyperlipidemia:  On atorvastatin 50 mg PO every day.  2024: Lipid panel: Cholesterol 119;  triglycerides 174; HDL 32; LDL 52; VLDL 35.              Allergic rhinitis; nasal congestion:  Last month patient complained of runny nose and nasal congestion.  She was started on Flonase nasal spray.  Her symptoms are improved today.  No complaints of headaches or dizziness.                     Sequela of Cerebrovascular accident; Hx CVA with right-sided hemiparesis; right sided weakness;   dysphagia 2/2 cva; expressive aphasia; History of acute/subacute stroke:   MRI was performed during a PREVIOUS hospitalization and it showed Acute/subacute tiny infarct in the left anterior brock.   Dr. Jiang from [facility] neuro/stroke was consulted at that time.  On Eliquis 5 mg PO BID.  Pt has baseline dysarthria from a previous CVA, right Hemiparesis,  and expressive aphasia.  She is able to communicate in short sentences.  Patient is also able to nod appropriately to questions.   On a mechanical soft, thin consistency diet.             Major depressive disorder:   On Sertraline.   Also followed by in house psych NP.  On Sertraline  125 mg PO every day.  Patient has a good appetite, no recent weight loss reported.             Generalized muscle weakness; Immobility:  At  in long term care.  Not able to ambulate.  Very weak.  Bedbound/wheelchair bound.                              Reviewed medical, social, surgical and family history.  Reviewed all current medications and performed medication reconciliation.  Reviewed vital signs AND recent blood work results.  Performed prescription drug management.   Reviewed [facility] Documentation  Discussed patient with nursing and [facility] palliative NP.                  ROS: 10 point ROS performed; negative unless noted in HPI.       LABS:  BMP: Date: 5/1/2023 Na 137 K 4.3 Cr 1.1 BUN 15 glucose 141 Ca 8.8 GFR 52  CBC: Date: 5/1/2023 WBC 8.4 Hgb 11.1 hematocrit 33.9 platelets 337  Liver function: Date: 5/1/2023 ALT 10 AST 29 alkaline phos.  63 bilirubin 0.4 albumin  3.6 protein 7.7       Ha1c 8.1 % on 5/1/23          BMP: Date: 5/15/2023 Na 139  K 3.4 Cr 1.2 BUN 20 glucose 122 Ca 8.8 GFR 57        CBC: Date: 5/15/2023 WBC 9 Hgb 10.2 hematocrit 30.9 platelets 277       Liver function: 5/15/2023 ALT 8 AST 28 alkaline phos.  57 bilirubin 0.5 albumin 3.5 protein 7.3          BMP: Date: 5/22/2023 Na 141 K 3.1 Cr 1 BUN 11 glucose 227 Ca 8.7 GFR 70        CBC: Date: 5/22/2023 WBC 8.2 Hgb 10 hematocrit 30.5 platelets 372        Liver function: Date: 5/22/2023 ALT 8 AST 22 alkaline phos.  63 bilirubin 0.3 albumin 3.2 protein 7.2     BMP: Date: 6/5/2023 Na 140 K 4.2 Cr 1.1 BUN 14 glucose 79 Ca 1.1 GFR 63  CBC: Date: 6/5/2023 WBC 8.1 Hgb 10.5 hematocrit 32.3 platelets 350  Liver function: Date: 6/5/2023 ALT 7 AST 25 alkaline phos.  59 bilirubin 0.4 albumin 3.3 protein 7.7    BMP: Date: 7/17/2023 Na 140 K 4.1 Cr 0.8 BUN 17 glucose 126 Ca 9.1 GFR 90  CBC: Date: 7/17/2023 WBC 14.1 Hgb 10.2 hematocrit 32.1 platelets 265  Liver function: Date: 7/17/2023 ALT 8 AST 23 alkaline phos.  72 bilirubin 0.5 albumin 3.4 protein 7.4    CBC: Date: 7/19/2023 WBC 10.7 Hgb 9.5 hematocrit 28.8 platelets 257    BMP: Date: 7/24/2023 Na 137 K 4.2 Cr 0.9 BUN 19 glucose 186 Ca 9.1 GFR 79  CBC: Date: 7/24/2023 WBC 11.6 Hgb 10.3 hematocrit 31.4 platelets 305  Liver function: Date: 7/24/2023 ALT 11 AST 27 alkaline phos.  77 bilirubin 0.3 albumin 3.6 protein 7.8    BMP: Date: 7/31/2023 Na 141 K 4.2 Cr 0.8 BUN 17 glucose 138 Ca 8.7 GFR 90  CBC: Date: 7/31/2023 WBC 12.3 Hgb 10.5 hematocrit 32.4 platelets 307  Liver function: Date: 7/31/2023 ALT 14 AST 24 alkaline phos.  81 bilirubin 0.4 albumin 3.5 protein 7.4    BMP: Date: 8/7/2023 Na 141 K 3.9 Cr 1.0 BUN 12 glucose 167 Ca 8.6 GFR 70  CBC: Date: 8/7/2023 WBC 10 Hgb 10.5 hematocrit 32.7 platelets 256  Liver function: Date: 8/7/2023 ALT 9 AST 23 alkaline phos.  76 bilirubin 0.5 albumin 3.3 protein 7    BMP: Date: 8/21/2023 Na 142 K 4.1 Cr 1 BUN 17 glucose 195 Ca 8.5 GFR  70  CBC: Date: 8/21/2023 WBC 10.9 Hgb 9.6 hematocrit 30 platelets 304  Liver function: Date: 8/21/2023 ALT 9 AST 24 alkaline phos.  74 bilirubin 0.4 albumin 3.2 protein 7.1    BMP: Date: 8/31/2023 Na 141 K 3.8 Cr 0.9 BUN 14 glucose 118 Ca 8.2 GFR 79  CBC: Date: 8/31/2023 WBC 12 Hgb 9.6 hematocrit 29.8 platelets 294  Liver function: Date: 8/31/2023 ALT 8 AST 21 alkaline phos.  64 bilirubin 0.4 albumin 3.1 protein 7    BMP: Date: 9/4/2023 Na 141 K 3.9 Cr 1.1 BUN 17 glucose 107 Ca 8.5 GFR 63   CBC: Date: 9/4/2023 WBC 12 Hgb 9.5 hematocrit 29.7 platelets 282  Liver function: Date: 9/4/2023 ALT 7 AST 22 alkaline phos.  64 bilirubin 0.4 albumin 3.3 protein 7.1    BMP: Date: 9/11/2023 Na 142 K 4 Cr 1 BUN 12 glucose 81 Ca 8.2 GFR 70  CBC: Date: 9/11/2023 WBC 8.7 Hgb 9.2 hematocrit 28.3 platelets 305  Liver function: Date: 9/11/2023 ALT 7 AST 22 alkaline phos.  61 bilirubin 0.3 albumin 3 protein 6.8    BMP: Date: 9/18/2023 Na 140 K 4 Cr 1 BUN 15 glucose 150 Ca 8.3 GFR 70  CBC: Date: 9/18/2023 WBC 12.1 Hgb 9.5 hematocrit 29.8 platelets 287  Liver function: Date: 9/18/2023 ALT 7 AST 23 alkaline phos.  62 bilirubin 0.4 albumin 3.4 protein 7.1    10/17/23 Ha1c 8.3 %    BMP: Date: 10/23/2023 Na 141 K 3.6 Cr 1 BUN 22 glucose 65 Ca 8.3 GFR 70  CBC: Date: 10/23/2023 WBC 12.5 Hgb 10.8  hematocrit 33.2 platelets 290  Liver function: Date: 10/23/2023 ALT 9 AST 31 alkaline phos.  66 bilirubin 0.6 albumin 3.6 protein 7.8    10/26/2023: Lipid panel: Cholesterol 111; triglycerides 111; HDL 33; LDL 56; VLDL 22.    10/26/2023: TSH: 1.081 (range 0.340-5.500).    BMP: Date: 10/30/2023 Na 143  K 3.5 Cr 0.9 BUN 11 glucose 171 Ca 8 GFR 65  CBC: Date: 10/30/2023 WBC 9.2 Hgb 10.1 hematocrit 31.1 platelets 280  Liver function: Date: 10/30/2023 ALT 11 AST 27 alkaline phos.  66 bilirubin 0.4 albumin 3.4 protein 7.2    BMP: Date: 11/13/2023 Na 138 K 3.9 Cr 0.9 BUN 11 glucose 308 Ca 8.4 GFR 79  CBC: Date: 11/13/2023 WBC 8.2 Hgb 10.6 hematocrit 33  platelets 295  Liver function: Date: 11/13/2023 ALT 12 AST 37 alkaline phos.  69 bilirubin 0.4 albumin 3.2 protein 7.1    BMP: Date: 11/21/2023 Na 142 K 3.6 Cr 0.9 BUN 14 glucose 200 Ca 8.4 GFR 79.  CBC: Date: 11/21/2023 WBC 8.8 Hgb 10.1 hematocrit 30.4 platelets 290    BMP: Date: 11/27/2023 Na 138 K 3.4 Cr 1 BUN 12 glucose 54 Ca 8.7 GFR 70  CBC: Date: 11/27/2023 WBC 12.3 Hgb 10.6 hematocrit 32.4 platelets 307  Liver function: Date: 11/27/2023 ALT 7 AST 27 alkaline phos.  72 bilirubin 0.5 albumin 3.3 protein 7.7    BMP: Date: 12/4/2023 Na 141 K 4 Cr 1 BUN 12 glucose 209 Ca 8.5 GFR 70  CBC: Date: 12/4/2023 WBC 8.5 Hgb 10.6 hematocrit 32.7 platelets 326  Liver function: Date: 12/4/2023 ALT 8 AST 25 alkaline phos.  64 bilirubin 0.4 albumin 3.3 protein 7.1  12/4/2023 hemoglobin A1c 8.9%    BMP: Date: 12/25/2023 Na 140 K 3.9 Cr 0.9 BUN 17 glucose 172 Ca 8.4 GFR 79  CBC: Date: 12/25/2023 WBC 8.9 Hgb 9.9 hematocrit 30.7 platelets 272  Liver function: Date: 12/25/2023 ALT 8 AST 26 alkaline phos.  67 bilirubin 0.3 albumin 3.2 protein 7    BMP: Date: 12/26/2023 Na 142 K 4.2 Cr 0.9 BUN 15 glucose 168 Ca 8.6 GFR 79  CBC: Date: 12/26/2023 WBC 9.9 Hgb 10.5 hematocrit 32.9 platelets 302    BMP: Date: 1/15/2024 Na 139 K 3.9 Cr 1 BUN 14 glucose 206 Ca 8.5 GFR 70  CBC: Date: 1/15/2024 WBC 8.3 Hgb 9.8 hematocrit 29.8 platelets 244  Liver function: Date: 1/15/2024 ALT 7 AST 22 alkaline phos.  62 bilirubin 0.3 albumin 3.3 protein 6.8    1/16/2024: Lipid panel: Cholesterol 119; triglycerides 174; HDL 32; LDL 52; VLDL 35.    BMP: Date: 2/5/2024 Na 141 K 4.2 Cr 1 BUN 22 glucose 150 Ca 8.3 GFR 70  CBC: Date: 2/5/2024 WBC 10.9 Hgb 10.2 hematocrit 31.1 platelets 319  Liver function: Date: 2/5/2024 ALT 7 AST 29 alkaline phos.  77 bilirubin 0.6 albumin 3.4 protein 7.4    BMP: Date: 2/26/2024 Na 143 K 3.6 Cr 1.3 BUN 23 glucose 152 Ca 8.5 GFR 51  CBC: Date: 2/26/2024 WBC 8 Hgb 11.1 hematocrit 33.6 platelets 318  Liver function: Date: 2/26/2024  ALT 11 AST 48 alkaline phos.  67 bilirubin 0.5 albumin 3.5 protein 7.8    BMP: Date: 3/11/2024 Na 142 K 3.8 Cr 0.9 BUN 16 glucose 97 Ca 8.7 GFR 79  CBC: Date: 3/11/2024 WBC 9 Hgb 10.5 hematocrit 32.3 platelets 302  Liver function: Date: 3/11/2024  ALT 10 AST 39 alkaline phos.  64 bilirubin 0.4 albumin 3.1 protein 7.1    BMP: Date: 3/18/2024 Na 143 K 4.4 Cr 0.9 BUN 15 glucose 111 Ca 8.7 GFR 79  CBC: Date: 3/18/2024 WBC 9.1 Hgb 10.8 hematocrit 32.6 platelets 294  Liver function: Date: 3/18/2024 ALT 13 AST 49 alkaline phos.  68 bilirubin 0.6 albumin 3.2 protein 7.4    BMP: Date: 3/25/2024 Na 140 K 3.7 Cr 1 BUN 17 glucose 225 Ca 8.7 GFR 70  CBC: Date: 3/25/2024  WBC 8.6 Hgb 10.7 hematocrit 32 platelets 304  Liver function: Date: 3/25/2024 ALT 15 AST 56 alkaline phos.  53 bilirubin 0.5 albumin 3.3 protein 7.1    BMP: Date: 4/1/2024 Na 143 K 3.5 Cr 1 BUN 19 glucose 151 Ca 8.7 GFR 70  CBC: Date: 4/1/2024 WBC 8.4 Hgb 10.2 hematocrit 30.8 platelets 254  Liver function: Date: 4/1/2024 ALT 12 AST 32 alkaline phos.  56 bilirubin 0.4 albumin 3.3 protein 6.8                Past Medical History:   Diagnosis Date   • Cervicalgia     Neck pain   • Essential (primary) hypertension 07/21/2013    Hypertension   • Pain in unspecified shoulder     Pain, joint, shoulder   • Personal history of other diseases of the digestive system 07/06/2022    History of ulcerative colitis   • Personal history of other diseases of the digestive system     History of esophageal reflux   • Personal history of other diseases of the nervous system and sense organs     History of cataract   • Personal history of other diseases of the nervous system and sense organs     History of glaucoma   • Personal history of other diseases of the respiratory system     History of bronchitis   • Personal history of other endocrine, nutritional and metabolic disease     History of diabetes mellitus   • Personal history of other endocrine, nutritional and metabolic disease      History of hyperlipidemia   • Personal history of other mental and behavioral disorders     History of depression   • Snoring     Snoring   • Unilateral primary osteoarthritis, unspecified knee 11/02/2015    Arthritis of knee           Past Surgical History:   Procedure Laterality Date   • CHOLECYSTECTOMY  01/08/2016    Cholecystectomy   • COLON SURGERY  01/08/2016    Colon Surgery   • CT ANGIO NECK W AND WO IV CONTRAST  3/19/2019    CT NECK ANGIO W AND WO IV CONTRAST 3[place]19/2019 [facility]   • CT HEAD ANGIO W AND WO IV CONTRAST  3/19/2019    CT HEAD ANGIO W AND WO IV CONTRAST 3/19/2019 [facility]   • HERNIA REPAIR  01/08/2016    Hernia Repair   • HYSTERECTOMY  01/08/2016    Hysterectomy   • MR HEAD ANGIO WO IV CONTRAST  6/4/2018    MR HEAD ANGIO WO IV CONTRAST 6/4/2018 [facility]   • MR HEAD ANGIO WO IV CONTRAST  7/11/2020    MR HEAD ANGIO WO IV CONTRAST 7/11/2020 [facility]   • MR HEAD ANGIO WO IV CONTRAST  9/30/2020    MR HEAD ANGIO WO IV CONTRAST 9/30/2020 [facility]   • MR HEAD ANGIO WO IV CONTRAST  5/22/2022    MR HEAD ANGIO WO IV CONTRAST 5/22/2022 [facility]   • MR HEAD ANGIO WO IV CONTRAST  4/19/2023    MR HEAD ANGIO WO IV CONTRAST U MRI   • MR NECK ANGIO WO IV CONTRAST  6/4/2018    MR NECK ANGIO WO IV CONTRAST 6/4/2018 [facility]   • MR NECK ANGIO WO IV CONTRAST  7/11/2020    MR NECK ANGIO WO IV CONTRAST 7/11/2020 [facility]   • MR NECK ANGIO WO IV CONTRAST  9/30/2020    MR NECK ANGIO WO IV CONTRAST 9/30/2020 [facility]   • MR NECK ANGIO WO IV CONTRAST  5/22/2022    MR NECK ANGIO WO IV CONTRAST 5/22/2022 [facility]   • MR NECK ANGIO WO IV CONTRAST  4/19/2023    MR NECK ANGIO WO IV CONTRAST U MRI   • OTHER SURGICAL HISTORY  01/08/2016    Tubal Stabilization   • OTHER SURGICAL HISTORY  09/01/2016    Cervical Surgery (Gyn) Laser Vaporization Of Transformation Zone   • THYROID SURGERY  09/27/2013    Thyroid Surgery  "           Surgical History  Problems    · History of Cervical Surgery (Gyn) Laser Vaporization Of Transformation Zone   · History of Cholecystectomy   · History of Colon Surgery   · History of Hernia Repair   · History of Hysterectomy   · History of Thyroid Surgery   · History of Tubal Stabilization     Family History  Mother    · Family history of Cancer  Family History    · Family history of Diabetes Mellitus (V18.0)   · Family history of Hypertension (V17.49)     Social History  Problems    · Disabled   · Does not use illicit drugs (V49.89) (Z78.9)   · Former smoker (V15.82) (Z87.891)   · Marital History - Single   · No alcohol.   · Denied: History of Secondhand smoke exposure   · Single     Allergies  Medication    · Aspirin TABS   · Contrast Media Ready-Box MISC   · morphine   · Sulfa Drugs        /71   Pulse 82   Temp 36.7 °C (98 °F)   Resp 18   Ht 1.372 m (4' 6\")   Wt 93.3 kg (205 lb 9.6 oz)   SpO2 98%   BMI 49.57 kg/m²      Physical Exam  Vitals and nursing note reviewed.   Constitutional:       General: She is awake.      Appearance: Normal appearance. She is ill-appearing.   HENT:      Head: Normocephalic.      Comments: White patches on tongue.     Right Ear: External ear normal.      Left Ear: External ear normal.      Nose: Nose normal.      Mouth/Throat:      Mouth: Mucous membranes are moist.      Pharynx: Oropharynx is clear.   Eyes:      Extraocular Movements: Extraocular movements intact.      Conjunctiva/sclera: Conjunctivae normal.      Pupils: Pupils are equal, round, and reactive to light.   Cardiovascular:      Rate and Rhythm: Normal rate and regular rhythm.      Pulses: Normal pulses.      Heart sounds: Normal heart sounds.   Pulmonary:      Effort: Pulmonary effort is normal.      Breath sounds: Normal breath sounds.   Abdominal:      General: Bowel sounds are normal.      Palpations: Abdomen is soft.   Musculoskeletal:      Cervical back: Normal range of motion and neck " supple.      Comments: Generalized muscle weakness; immobility.    Right sided hemiparesis   Skin:     General: Skin is warm and dry.   Neurological:      Mental Status: She is alert. Mental status is at baseline.      Motor: Weakness present.      Coordination: Coordination abnormal.      Gait: Gait abnormal.      Comments: Right sided weakness--RLE 1/5; RUE 3/5; sensation intact to touch x 4 extremities.     Left sided facial droop.     Generalized muscle weakness   Psychiatric:         Mood and Affect: Mood normal.         Behavior: Behavior normal. Behavior is cooperative.          Assessment/Plan    Post Menopausal Vaginal Bleeding:  Monitor for bleeding.  Ordered BMP and CBC with diff. STAT  Ordered UA C & S STAT  Ordered  OB/GYN appt. To be scheduled ASAP For exam for post-menopausal bleeding from vagina        DM type 2 with hyperglycemia; noncompliance with diabetic diet:        Continue Lantus 43 units at bedtime and Humalog sliding scale.       Monitor accuchecks.       Monitor Ha1c Q 3 months.      Benign essential HTN; Chronic systolic HF :  Continue  Amlodipine and hydrochlorothiazide.  Monitor oxygen saturations.     Hyperlipidemia:  Continue atorvastatin 50 mg PO every day.              Monitor lipid panel.               Allergic rhinitis; nasal congestion:  Continue Flonase nasal spray.             Sequela of Cerebrovascular accident; Hx CVA with right-sided hemiparesis; right sided weakness;   dysphagia 2/2 cva; expressive aphasia; History of acute/subacute stroke:   MRI was performed during a PREVIOUS hospitalization and it showed Acute/subacute tiny infarct in the left anterior brock.   Follow up with Neurology Dr. Jiang from [facility] [place] neuro.  Continue Eliquis 5 mg PO BID.             Major depressive disorder:   Continue Sertraline.   Follow up with in house psych NP.        Monitor weights.              Generalized muscle weakness; Immobility:  Continue at  in long term care.  Not able to  ambulate.  Very weak.  Bedbound/wheelchair bound.   Fall prevention strategies.       Problem List Items Addressed This Visit    None  Visit Diagnoses       Postmenopausal vaginal bleeding    -  Primary    Type 2 diabetes mellitus with hyperglycemia, with long-term current use of insulin (Multi)        Noncompliance with dietary restriction        Benign essential HTN        Chronic systolic heart failure (Multi)        Mixed hyperlipidemia        Allergic rhinitis, unspecified seasonality, unspecified trigger        Nasal congestion        Sequela of cerebrovascular accident        Hemiparesis affecting right side as late effect of stroke (Multi)        Major depressive disorder with current active episode, unspecified depression episode severity, unspecified whether recurrent        Generalized muscle weakness        Immobility                JASON Espitia       Electronically Signed By: JASON Espitia   4/18/24 11:28 PM

## 2024-04-17 ENCOUNTER — APPOINTMENT (OUTPATIENT)
Dept: OBSTETRICS AND GYNECOLOGY | Facility: CLINIC | Age: 54
End: 2024-04-17
Payer: MEDICARE

## 2024-04-18 VITALS
WEIGHT: 205.6 LBS | DIASTOLIC BLOOD PRESSURE: 71 MMHG | TEMPERATURE: 98 F | SYSTOLIC BLOOD PRESSURE: 124 MMHG | RESPIRATION RATE: 18 BRPM | HEART RATE: 82 BPM | BODY MASS INDEX: 47.58 KG/M2 | HEIGHT: 55 IN | OXYGEN SATURATION: 98 %

## 2024-04-19 NOTE — PROGRESS NOTES
Name: Janette Martinez    YOB: 1970    Code Status: DNRcc arrest    Chief complaint:  Post Menopausal Vaginal bleeding;  etc.....      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        Patient re-admitted to [facility] On 10/20/23 after her most recent hospitalization.    Diagnoses as follows:    Post Menopausal Vaginal Bleeding:  Nursing reported that patient had some dark blood noted in her diaper this morning.  She does not remember if she has had post menopausal vaginal bleeding before.   It is not stool.  Blood appears to be coming from the vagina.  It has subsided, her diaper was changed.  Staff monitoring for continued bleeding.   Patient is not having any abdominal pain or cramping.  Patient not sure if she is having dysuria.  No costovertebral angle pain.  No nausea or vomiting.       Patient seen today, she is in bed.       Calm and cooperative.        DM type 2 with hyperglycemia; noncompliance with diabetic diet:        Patient is frequently noncompliant with diet.       On Lantus 43 units at bedtime and Humalog sliding scale.       Recent accuchecks: 179 mg/dL,  mg/dL, mg/dL.        No episodes of hypoglycemia reported.        Ha1c trends:       5/1/23 Ha1c 8.1 %        10/17/23 Ha1c 8.3 %       12/4/23 hemoglobin A1c 8.9%     No complaints of chest pain.    No headaches.    No dizziness.     Benign essential HTN; Chronic systolic HF :     On Amlodipine and hydrochlorothiazide.            No oxygen desaturations reported.       Recent /71       No complaints of  chest pain, headaches or dizziness.    Hyperlipidemia:  On atorvastatin 50 mg PO every day.  1/16/2024: Lipid panel: Cholesterol 119; triglycerides 174; HDL 32; LDL 52; VLDL 35.              Allergic  rhinitis; nasal congestion:  Last month patient complained of runny nose and nasal congestion.  She was started on Flonase nasal spray.  Her symptoms are improved today.  No complaints of headaches or dizziness.                     Sequela of Cerebrovascular accident; Hx CVA with right-sided hemiparesis; right sided weakness;   dysphagia 2/2 cva; expressive aphasia; History of acute/subacute stroke:   MRI was performed during a PREVIOUS hospitalization and it showed Acute/subacute tiny infarct in the left anterior brock.   Dr. Jiang from [facility] [place] was consulted at that time.  On Eliquis 5 mg PO BID.  Pt has baseline dysarthria from a previous CVA, right Hemiparesis,  and expressive aphasia.  She is able to communicate in short sentences.  Patient is also able to nod appropriately to questions.   On a mechanical soft, thin consistency diet.             Major depressive disorder:   On Sertraline.   Also followed by in house psych NP.  On Sertraline  125 mg PO every day.  Patient has a good appetite, no recent weight loss reported.             Generalized muscle weakness; Immobility:  At  in long term care.  Not able to ambulate.  Very weak.  Bedbound/wheelchair bound.                              Reviewed medical, social, surgical and family history.  Reviewed all current medications and performed medication reconciliation.  Reviewed vital signs AND recent blood work results.  Performed prescription drug management.   Reviewed [facility] Documentation  Discussed patient with nursing and [facility] palliative NP.                  ROS: 10 point ROS performed; negative unless noted in HPI.       LABS:  BMP: Date: 5/1/2023 Na 137 K 4.3 Cr 1.1 BUN 15 glucose 141 Ca 8.8 GFR 52  CBC: Date: 5/1/2023 WBC 8.4 Hgb 11.1 hematocrit 33.9 platelets 337  Liver function: Date: 5/1/2023 ALT 10 AST 29 alkaline phos.  63 bilirubin 0.4 albumin 3.6 protein 7.7       Ha1c 8.1 % on 5/1/23          BMP: Date:  5/15/2023 Na 139  K 3.4 Cr 1.2 BUN 20 glucose 122 Ca 8.8 GFR 57        CBC: Date: 5/15/2023 WBC 9 Hgb 10.2 hematocrit 30.9 platelets 277       Liver function: 5/15/2023 ALT 8 AST 28 alkaline phos.  57 bilirubin 0.5 albumin 3.5 protein 7.3          BMP: Date: 5/22/2023 Na 141 K 3.1 Cr 1 BUN 11 glucose 227 Ca 8.7 GFR 70        CBC: Date: 5/22/2023 WBC 8.2 Hgb 10 hematocrit 30.5 platelets 372        Liver function: Date: 5/22/2023 ALT 8 AST 22 alkaline phos.  63 bilirubin 0.3 albumin 3.2 protein 7.2     BMP: Date: 6/5/2023 Na 140 K 4.2 Cr 1.1 BUN 14 glucose 79 Ca 1.1 GFR 63  CBC: Date: 6/5/2023 WBC 8.1 Hgb 10.5 hematocrit 32.3 platelets 350  Liver function: Date: 6/5/2023 ALT 7 AST 25 alkaline phos.  59 bilirubin 0.4 albumin 3.3 protein 7.7    BMP: Date: 7/17/2023 Na 140 K 4.1 Cr 0.8 BUN 17 glucose 126 Ca 9.1 GFR 90  CBC: Date: 7/17/2023 WBC 14.1 Hgb 10.2 hematocrit 32.1 platelets 265  Liver function: Date: 7/17/2023 ALT 8 AST 23 alkaline phos.  72 bilirubin 0.5 albumin 3.4 protein 7.4    CBC: Date: 7/19/2023 WBC 10.7 Hgb 9.5 hematocrit 28.8 platelets 257    BMP: Date: 7/24/2023 Na 137 K 4.2 Cr 0.9 BUN 19 glucose 186 Ca 9.1 GFR 79  CBC: Date: 7/24/2023 WBC 11.6 Hgb 10.3 hematocrit 31.4 platelets 305  Liver function: Date: 7/24/2023 ALT 11 AST 27 alkaline phos.  77 bilirubin 0.3 albumin 3.6 protein 7.8    BMP: Date: 7/31/2023 Na 141 K 4.2 Cr 0.8 BUN 17 glucose 138 Ca 8.7 GFR 90  CBC: Date: 7/31/2023 WBC 12.3 Hgb 10.5 hematocrit 32.4 platelets 307  Liver function: Date: 7/31/2023 ALT 14 AST 24 alkaline phos.  81 bilirubin 0.4 albumin 3.5 protein 7.4    BMP: Date: 8/7/2023 Na 141 K 3.9 Cr 1.0 BUN 12 glucose 167 Ca 8.6 GFR 70  CBC: Date: 8/7/2023 WBC 10 Hgb 10.5 hematocrit 32.7 platelets 256  Liver function: Date: 8/7/2023 ALT 9 AST 23 alkaline phos.  76 bilirubin 0.5 albumin 3.3 protein 7    BMP: Date: 8/21/2023 Na 142 K 4.1 Cr 1 BUN 17 glucose 195 Ca 8.5 GFR 70  CBC: Date: 8/21/2023 WBC 10.9 Hgb 9.6 hematocrit 30  platelets 304  Liver function: Date: 8/21/2023 ALT 9 AST 24 alkaline phos.  74 bilirubin 0.4 albumin 3.2 protein 7.1    BMP: Date: 8/31/2023 Na 141 K 3.8 Cr 0.9 BUN 14 glucose 118 Ca 8.2 GFR 79  CBC: Date: 8/31/2023 WBC 12 Hgb 9.6 hematocrit 29.8 platelets 294  Liver function: Date: 8/31/2023 ALT 8 AST 21 alkaline phos.  64 bilirubin 0.4 albumin 3.1 protein 7    BMP: Date: 9/4/2023 Na 141 K 3.9 Cr 1.1 BUN 17 glucose 107 Ca 8.5 GFR 63   CBC: Date: 9/4/2023 WBC 12 Hgb 9.5 hematocrit 29.7 platelets 282  Liver function: Date: 9/4/2023 ALT 7 AST 22 alkaline phos.  64 bilirubin 0.4 albumin 3.3 protein 7.1    BMP: Date: 9/11/2023 Na 142 K 4 Cr 1 BUN 12 glucose 81 Ca 8.2 GFR 70  CBC: Date: 9/11/2023 WBC 8.7 Hgb 9.2 hematocrit 28.3 platelets 305  Liver function: Date: 9/11/2023 ALT 7 AST 22 alkaline phos.  61 bilirubin 0.3 albumin 3 protein 6.8    BMP: Date: 9/18/2023 Na 140 K 4 Cr 1 BUN 15 glucose 150 Ca 8.3 GFR 70  CBC: Date: 9/18/2023 WBC 12.1 Hgb 9.5 hematocrit 29.8 platelets 287  Liver function: Date: 9/18/2023 ALT 7 AST 23 alkaline phos.  62 bilirubin 0.4 albumin 3.4 protein 7.1    10/17/23 Ha1c 8.3 %    BMP: Date: 10/23/2023 Na 141 K 3.6 Cr 1 BUN 22 glucose 65 Ca 8.3 GFR 70  CBC: Date: 10/23/2023 WBC 12.5 Hgb 10.8  hematocrit 33.2 platelets 290  Liver function: Date: 10/23/2023 ALT 9 AST 31 alkaline phos.  66 bilirubin 0.6 albumin 3.6 protein 7.8    10/26/2023: Lipid panel: Cholesterol 111; triglycerides 111; HDL 33; LDL 56; VLDL 22.    10/26/2023: TSH: 1.081 (range 0.340-5.500).    BMP: Date: 10/30/2023 Na 143  K 3.5 Cr 0.9 BUN 11 glucose 171 Ca 8 GFR 65  CBC: Date: 10/30/2023 WBC 9.2 Hgb 10.1 hematocrit 31.1 platelets 280  Liver function: Date: 10/30/2023 ALT 11 AST 27 alkaline phos.  66 bilirubin 0.4 albumin 3.4 protein 7.2    BMP: Date: 11/13/2023 Na 138 K 3.9 Cr 0.9 BUN 11 glucose 308 Ca 8.4 GFR 79  CBC: Date: 11/13/2023 WBC 8.2 Hgb 10.6 hematocrit 33 platelets 295  Liver function: Date: 11/13/2023 ALT 12 AST 37  alkaline phos.  69 bilirubin 0.4 albumin 3.2 protein 7.1    BMP: Date: 11/21/2023 Na 142 K 3.6 Cr 0.9 BUN 14 glucose 200 Ca 8.4 GFR 79.  CBC: Date: 11/21/2023 WBC 8.8 Hgb 10.1 hematocrit 30.4 platelets 290    BMP: Date: 11/27/2023 Na 138 K 3.4 Cr 1 BUN 12 glucose 54 Ca 8.7 GFR 70  CBC: Date: 11/27/2023 WBC 12.3 Hgb 10.6 hematocrit 32.4 platelets 307  Liver function: Date: 11/27/2023 ALT 7 AST 27 alkaline phos.  72 bilirubin 0.5 albumin 3.3 protein 7.7    BMP: Date: 12/4/2023 Na 141 K 4 Cr 1 BUN 12 glucose 209 Ca 8.5 GFR 70  CBC: Date: 12/4/2023 WBC 8.5 Hgb 10.6 hematocrit 32.7 platelets 326  Liver function: Date: 12/4/2023 ALT 8 AST 25 alkaline phos.  64 bilirubin 0.4 albumin 3.3 protein 7.1  12/4/2023 hemoglobin A1c 8.9%    BMP: Date: 12/25/2023 Na 140 K 3.9 Cr 0.9 BUN 17 glucose 172 Ca 8.4 GFR 79  CBC: Date: 12/25/2023 WBC 8.9 Hgb 9.9 hematocrit 30.7 platelets 272  Liver function: Date: 12/25/2023 ALT 8 AST 26 alkaline phos.  67 bilirubin 0.3 albumin 3.2 protein 7    BMP: Date: 12/26/2023 Na 142 K 4.2 Cr 0.9 BUN 15 glucose 168 Ca 8.6 GFR 79  CBC: Date: 12/26/2023 WBC 9.9 Hgb 10.5 hematocrit 32.9 platelets 302    BMP: Date: 1/15/2024 Na 139 K 3.9 Cr 1 BUN 14 glucose 206 Ca 8.5 GFR 70  CBC: Date: 1/15/2024 WBC 8.3 Hgb 9.8 hematocrit 29.8 platelets 244  Liver function: Date: 1/15/2024 ALT 7 AST 22 alkaline phos.  62 bilirubin 0.3 albumin 3.3 protein 6.8    1/16/2024: Lipid panel: Cholesterol 119; triglycerides 174; HDL 32; LDL 52; VLDL 35.    BMP: Date: 2/5/2024 Na 141 K 4.2 Cr 1 BUN 22 glucose 150 Ca 8.3 GFR 70  CBC: Date: 2/5/2024 WBC 10.9 Hgb 10.2 hematocrit 31.1 platelets 319  Liver function: Date: 2/5/2024 ALT 7 AST 29 alkaline phos.  77 bilirubin 0.6 albumin 3.4 protein 7.4    BMP: Date: 2/26/2024 Na 143 K 3.6 Cr 1.3 BUN 23 glucose 152 Ca 8.5 GFR 51  CBC: Date: 2/26/2024 WBC 8 Hgb 11.1 hematocrit 33.6 platelets 318  Liver function: Date: 2/26/2024 ALT 11 AST 48 alkaline phos.  67 bilirubin 0.5 albumin 3.5  protein 7.8    BMP: Date: 3/11/2024 Na 142 K 3.8 Cr 0.9 BUN 16 glucose 97 Ca 8.7 GFR 79  CBC: Date: 3/11/2024 WBC 9 Hgb 10.5 hematocrit 32.3 platelets 302  Liver function: Date: 3/11/2024  ALT 10 AST 39 alkaline phos.  64 bilirubin 0.4 albumin 3.1 protein 7.1    BMP: Date: 3/18/2024 Na 143 K 4.4 Cr 0.9 BUN 15 glucose 111 Ca 8.7 GFR 79  CBC: Date: 3/18/2024 WBC 9.1 Hgb 10.8 hematocrit 32.6 platelets 294  Liver function: Date: 3/18/2024 ALT 13 AST 49 alkaline phos.  68 bilirubin 0.6 albumin 3.2 protein 7.4    BMP: Date: 3/25/2024 Na 140 K 3.7 Cr 1 BUN 17 glucose 225 Ca 8.7 GFR 70  CBC: Date: 3/25/2024  WBC 8.6 Hgb 10.7 hematocrit 32 platelets 304  Liver function: Date: 3/25/2024 ALT 15 AST 56 alkaline phos.  53 bilirubin 0.5 albumin 3.3 protein 7.1    BMP: Date: 4/1/2024 Na 143 K 3.5 Cr 1 BUN 19 glucose 151 Ca 8.7 GFR 70  CBC: Date: 4/1/2024 WBC 8.4 Hgb 10.2 hematocrit 30.8 platelets 254  Liver function: Date: 4/1/2024 ALT 12 AST 32 alkaline phos.  56 bilirubin 0.4 albumin 3.3 protein 6.8                Past Medical History:   Diagnosis Date    Cervicalgia     Neck pain    Essential (primary) hypertension 07/21/2013    Hypertension    Pain in unspecified shoulder     Pain, joint, shoulder    Personal history of other diseases of the digestive system 07/06/2022    History of ulcerative colitis    Personal history of other diseases of the digestive system     History of esophageal reflux    Personal history of other diseases of the nervous system and sense organs     History of cataract    Personal history of other diseases of the nervous system and sense organs     History of glaucoma    Personal history of other diseases of the respiratory system     History of bronchitis    Personal history of other endocrine, nutritional and metabolic disease     History of diabetes mellitus    Personal history of other endocrine, nutritional and metabolic disease     History of hyperlipidemia    Personal history of other mental and  behavioral disorders     History of depression    Snoring     Snoring    Unilateral primary osteoarthritis, unspecified knee 11/02/2015    Arthritis of knee           Past Surgical History:   Procedure Laterality Date    CHOLECYSTECTOMY  01/08/2016    Cholecystectomy    COLON SURGERY  01/08/2016    Colon Surgery    CT ANGIO NECK W AND WO IV CONTRAST  3/19/2019    CT NECK ANGIO W AND WO IV CONTRAST 3/19/2019 [facility] [place] [facility]GENCY LEGACY    CT HEAD ANGIO W AND WO IV CONTRAST  3/19/2019    CT HEAD ANGIO W AND WO IV CONTRAST 3/19/2019 [facility] [place] EMERGENCY LEGACY    HERNIA REPAIR  01/08/2016    Hernia Repair    HYSTERECTOMY  01/08/2016    Hysterectomy    MR HEAD ANGIO WO IV CONTRAST  6/4/2018    MR HEAD ANGIO WO IV CONTRAST 6/4/2018 [facility] CLINICAL LEGACY    MR HEAD ANGIO WO IV CONTRAST  7/11/2020    MR HEAD ANGIO WO IV CONTRAST 7/11/2020 [facility] CLINICAL LEGACY    MR HEAD ANGIO WO IV CONTRAST  9/30/2020    MR HEAD ANGIO WO IV CONTRAST 9/30/2020 [facility]IB LEGACY    MR HEAD ANGIO WO IV CONTRAST  5/22/2022    MR HEAD ANGIO WO IV CONTRAST 5/22/2022 [facility] CLINICAL LEGACY    MR HEAD ANGIO WO IV CONTRAST  4/19/2023    MR HEAD ANGIO WO IV CONTRAST [facility] MRI    MR NECK ANGIO WO IV CONTRAST  6/4/2018    MR NECK ANGIO WO IV CONTRAST 6/4/2018 [facility] CLINICAL LEGACY    MR NECK ANGIO WO IV CONTRAST  7/11/2020    MR NECK ANGIO WO IV CONTRAST 7/11/2020 [facility] CLINICAL LEGACY    MR NECK ANGIO WO IV CONTRAST  9/30/2020    MR NECK ANGIO WO IV CONTRAST 9/30/2020 [facility]IB LEGACY    MR NECK ANGIO WO IV CONTRAST  5/22/2022    MR NECK ANGIO WO IV CONTRAST 5/22/2022 [facility] CLINICAL LEGACY    MR NECK ANGIO WO IV CONTRAST  4/19/2023    MR NECK ANGIO WO IV CONTRAST [facility] MRI    OTHER SURGICAL HISTORY  01/08/2016    Tubal Stabilization    OTHER SURGICAL HISTORY  09/01/2016    Cervical Surgery (Gyn) Laser Vaporization Of Transformation Zone    THYROID SURGERY  09/27/2013    Thyroid Surgery            Surgical History  Problems    · History of Cervical Surgery (Gyn) Laser  "Vaporization Of Transformation Zone   · History of Cholecystectomy   · History of Colon Surgery   · History of Hernia Repair   · History of Hysterectomy   · History of Thyroid Surgery   · History of Tubal Stabilization     Family History  Mother    · Family history of Cancer  Family History    · Family history of Diabetes Mellitus (V18.0)   · Family history of Hypertension (V17.49)     Social History  Problems    · Disabled   · Does not use illicit drugs (V49.89) (Z78.9)   · Former smoker (V15.82) (Z87.891)   · Marital History - Single   · No alcohol.   · Denied: History of Secondhand smoke exposure   · Single     Allergies  Medication    · Aspirin TABS   · Contrast Media Ready-Box MISC   · morphine   · Sulfa Drugs        /71   Pulse 82   Temp 36.7 °C (98 °F)   Resp 18   Ht 1.372 m (4' 6\")   Wt 93.3 kg (205 lb 9.6 oz)   SpO2 98%   BMI 49.57 kg/m²      Physical Exam  Vitals and nursing note reviewed.   Constitutional:       General: She is awake.      Appearance: Normal appearance. She is ill-appearing.   HENT:      Head: Normocephalic.      Comments: White patches on tongue.     Right Ear: External ear normal.      Left Ear: External ear normal.      Nose: Nose normal.      Mouth/Throat:      Mouth: Mucous membranes are moist.      Pharynx: Oropharynx is clear.   Eyes:      Extraocular Movements: Extraocular movements intact.      Conjunctiva/sclera: Conjunctivae normal.      Pupils: Pupils are equal, round, and reactive to light.   Cardiovascular:      Rate and Rhythm: Normal rate and regular rhythm.      Pulses: Normal pulses.      Heart sounds: Normal heart sounds.   Pulmonary:      Effort: Pulmonary effort is normal.      Breath sounds: Normal breath sounds.   Abdominal:      General: Bowel sounds are normal.      Palpations: Abdomen is soft.   Musculoskeletal:      Cervical back: Normal range of motion and neck supple.      Comments: Generalized muscle weakness; immobility.    Right sided " hemiparesis   Skin:     General: Skin is warm and dry.   Neurological:      Mental Status: She is alert. Mental status is at baseline.      Motor: Weakness present.      Coordination: Coordination abnormal.      Gait: Gait abnormal.      Comments: Right sided weakness--RLE 1/5; RUE 3/5; sensation intact to touch x 4 extremities.     Left sided facial droop.     Generalized muscle weakness   Psychiatric:         Mood and Affect: Mood normal.         Behavior: Behavior normal. Behavior is cooperative.          Assessment/Plan     Post Menopausal Vaginal Bleeding:  Monitor for bleeding.  Ordered BMP and CBC with diff. STAT  Ordered UA C & S STAT  Ordered  OB/GYN appt. To be scheduled ASAP For exam for post-menopausal bleeding from vagina        DM type 2 with hyperglycemia; noncompliance with diabetic diet:        Continue Lantus 43 units at bedtime and Humalog sliding scale.       Monitor accuchecks.       Monitor Ha1c Q 3 months.      Benign essential HTN; Chronic systolic HF :  Continue  Amlodipine and hydrochlorothiazide.  Monitor oxygen saturations.     Hyperlipidemia:  Continue atorvastatin 50 mg PO every day.              Monitor lipid panel.               Allergic rhinitis; nasal congestion:  Continue Flonase nasal spray.             Sequela of Cerebrovascular accident; Hx CVA with right-sided hemiparesis; right sided weakness;   dysphagia 2/2 cva; expressive aphasia; History of acute/subacute stroke:   MRI was performed during a PREVIOUS hospitalization and it showed Acute/subacute tiny infarct in the left anterior brock.   Follow up with Neurology Dr. Jiang from [facility] [place].  Continue Eliquis 5 mg PO BID.             Major depressive disorder:   Continue Sertraline.   Follow up with in house psych NP.        Monitor weights.              Generalized muscle weakness; Immobility:  Continue at  in long term care.  Not able to ambulate.  Very weak.  Bedbound/wheelchair bound.   Fall prevention strategies.        Problem List Items Addressed This Visit    None  Visit Diagnoses       Postmenopausal vaginal bleeding    -  Primary    Type 2 diabetes mellitus with hyperglycemia, with long-term current use of insulin (Multi)        Noncompliance with dietary restriction        Benign essential HTN        Chronic systolic heart failure (Multi)        Mixed hyperlipidemia        Allergic rhinitis, unspecified seasonality, unspecified trigger        Nasal congestion        Sequela of cerebrovascular accident        Hemiparesis affecting right side as late effect of stroke (Multi)        Major depressive disorder with current active episode, unspecified depression episode severity, unspecified whether recurrent        Generalized muscle weakness        Immobility                Maria Del Carmen Garduno, APRN-CNP

## 2024-04-22 ENCOUNTER — APPOINTMENT (OUTPATIENT)
Dept: RADIOLOGY | Facility: HOSPITAL | Age: 54
DRG: 689 | End: 2024-04-22
Payer: MEDICARE

## 2024-04-22 ENCOUNTER — NURSING HOME VISIT (OUTPATIENT)
Dept: POST ACUTE CARE | Facility: EXTERNAL LOCATION | Age: 54
End: 2024-04-22

## 2024-04-22 ENCOUNTER — HOSPITAL ENCOUNTER (INPATIENT)
Facility: HOSPITAL | Age: 54
LOS: 8 days | Discharge: NURSING, REHAB OR LONG-TERM CARE | DRG: 689 | End: 2024-05-02
Attending: GENERAL PRACTICE | Admitting: HOSPITALIST
Payer: MEDICARE

## 2024-04-22 DIAGNOSIS — I69.30 SEQUELA OF CEREBROVASCULAR ACCIDENT: ICD-10-CM

## 2024-04-22 DIAGNOSIS — R11.10 VOMITING, UNSPECIFIED VOMITING TYPE, UNSPECIFIED WHETHER NAUSEA PRESENT: ICD-10-CM

## 2024-04-22 DIAGNOSIS — I50.22 CHRONIC SYSTOLIC HEART FAILURE (MULTI): ICD-10-CM

## 2024-04-22 DIAGNOSIS — G93.40 ENCEPHALOPATHY: Primary | ICD-10-CM

## 2024-04-22 DIAGNOSIS — F32.9 MAJOR DEPRESSIVE DISORDER WITH CURRENT ACTIVE EPISODE, UNSPECIFIED DEPRESSION EPISODE SEVERITY, UNSPECIFIED WHETHER RECURRENT: ICD-10-CM

## 2024-04-22 DIAGNOSIS — N95.0 POSTMENOPAUSAL VAGINAL BLEEDING: ICD-10-CM

## 2024-04-22 DIAGNOSIS — R13.10 DYSPHAGIA, UNSPECIFIED TYPE: ICD-10-CM

## 2024-04-22 DIAGNOSIS — I69.351 HEMIPARESIS AFFECTING RIGHT SIDE AS LATE EFFECT OF STROKE (MULTI): ICD-10-CM

## 2024-04-22 DIAGNOSIS — Z79.4 TYPE 2 DIABETES MELLITUS WITH HYPERGLYCEMIA, WITH LONG-TERM CURRENT USE OF INSULIN (MULTI): ICD-10-CM

## 2024-04-22 DIAGNOSIS — E11.65 TYPE 2 DIABETES MELLITUS WITH HYPERGLYCEMIA, WITH LONG-TERM CURRENT USE OF INSULIN (MULTI): ICD-10-CM

## 2024-04-22 DIAGNOSIS — M62.81 GENERALIZED MUSCLE WEAKNESS: ICD-10-CM

## 2024-04-22 DIAGNOSIS — Z91.119 NONCOMPLIANCE WITH DIETARY RESTRICTION: ICD-10-CM

## 2024-04-22 DIAGNOSIS — I10 BENIGN ESSENTIAL HTN: ICD-10-CM

## 2024-04-22 DIAGNOSIS — R47.01 EXPRESSIVE APHASIA: ICD-10-CM

## 2024-04-22 DIAGNOSIS — R06.02 SOB (SHORTNESS OF BREATH): ICD-10-CM

## 2024-04-22 DIAGNOSIS — Z74.09 IMMOBILITY: ICD-10-CM

## 2024-04-22 DIAGNOSIS — R41.82 ALTERED MENTAL STATUS, UNSPECIFIED ALTERED MENTAL STATUS TYPE: Primary | ICD-10-CM

## 2024-04-22 PROBLEM — R13.19 ESOPHAGEAL DYSPHAGIA: Status: ACTIVE | Noted: 2024-04-22

## 2024-04-22 PROBLEM — N18.31 STAGE 3A CHRONIC KIDNEY DISEASE (MULTI): Status: ACTIVE | Noted: 2024-04-22

## 2024-04-22 PROBLEM — I26.99 ACUTE PULMONARY EMBOLISM (MULTI): Status: ACTIVE | Noted: 2024-04-22

## 2024-04-22 PROBLEM — I69.320 APHASIA AS LATE EFFECT OF CEREBROVASCULAR ACCIDENT: Status: ACTIVE | Noted: 2023-04-25

## 2024-04-22 LAB
ALBUMIN SERPL BCP-MCNC: 3.6 G/DL (ref 3.4–5)
ALP SERPL-CCNC: 61 U/L (ref 33–110)
ALT SERPL W P-5'-P-CCNC: 14 U/L (ref 7–45)
ANION GAP SERPL CALC-SCNC: 14 MMOL/L (ref 10–20)
APPEARANCE UR: CLEAR
AST SERPL W P-5'-P-CCNC: 49 U/L (ref 9–39)
BACTERIA #/AREA URNS AUTO: ABNORMAL /HPF
BASOPHILS # BLD AUTO: 0.06 X10*3/UL (ref 0–0.1)
BASOPHILS NFR BLD AUTO: 0.5 %
BILIRUB SERPL-MCNC: 0.5 MG/DL (ref 0–1.2)
BILIRUB UR STRIP.AUTO-MCNC: NEGATIVE MG/DL
BUN SERPL-MCNC: 21 MG/DL (ref 6–23)
CALCIUM SERPL-MCNC: 8.6 MG/DL (ref 8.6–10.3)
CARDIAC TROPONIN I PNL SERPL HS: 5 NG/L (ref 0–13)
CHLORIDE SERPL-SCNC: 103 MMOL/L (ref 98–107)
CO2 SERPL-SCNC: 27 MMOL/L (ref 21–32)
COLOR UR: ABNORMAL
CREAT SERPL-MCNC: 1.01 MG/DL (ref 0.5–1.05)
EGFRCR SERPLBLD CKD-EPI 2021: 66 ML/MIN/1.73M*2
EOSINOPHIL # BLD AUTO: 0.18 X10*3/UL (ref 0–0.7)
EOSINOPHIL NFR BLD AUTO: 1.4 %
ERYTHROCYTE [DISTWIDTH] IN BLOOD BY AUTOMATED COUNT: 16.7 % (ref 11.5–14.5)
GLUCOSE SERPL-MCNC: 224 MG/DL (ref 74–99)
GLUCOSE UR STRIP.AUTO-MCNC: NORMAL MG/DL
HCT VFR BLD AUTO: 32.4 % (ref 36–46)
HGB BLD-MCNC: 10.7 G/DL (ref 12–16)
HYALINE CASTS #/AREA URNS AUTO: ABNORMAL /LPF
IMM GRANULOCYTES # BLD AUTO: 0.05 X10*3/UL (ref 0–0.7)
IMM GRANULOCYTES NFR BLD AUTO: 0.4 % (ref 0–0.9)
INR PPP: 1.8 (ref 0.9–1.1)
KETONES UR STRIP.AUTO-MCNC: ABNORMAL MG/DL
LEUKOCYTE ESTERASE UR QL STRIP.AUTO: ABNORMAL
LYMPHOCYTES # BLD AUTO: 3.39 X10*3/UL (ref 1.2–4.8)
LYMPHOCYTES NFR BLD AUTO: 25.9 %
MAGNESIUM SERPL-MCNC: 2.2 MG/DL (ref 1.6–2.4)
MCH RBC QN AUTO: 30.8 PG (ref 26–34)
MCHC RBC AUTO-ENTMCNC: 33 G/DL (ref 32–36)
MCV RBC AUTO: 93 FL (ref 80–100)
MONOCYTES # BLD AUTO: 0.91 X10*3/UL (ref 0.1–1)
MONOCYTES NFR BLD AUTO: 7 %
MUCOUS THREADS #/AREA URNS AUTO: ABNORMAL /LPF
NEUTROPHILS # BLD AUTO: 8.49 X10*3/UL (ref 1.2–7.7)
NEUTROPHILS NFR BLD AUTO: 64.8 %
NITRITE UR QL STRIP.AUTO: NEGATIVE
NRBC BLD-RTO: 0 /100 WBCS (ref 0–0)
PH UR STRIP.AUTO: 5 [PH]
PLATELET # BLD AUTO: 347 X10*3/UL (ref 150–450)
POTASSIUM SERPL-SCNC: 3.6 MMOL/L (ref 3.5–5.3)
PROT SERPL-MCNC: 7.9 G/DL (ref 6.4–8.2)
PROT UR STRIP.AUTO-MCNC: ABNORMAL MG/DL
PROTHROMBIN TIME: 20.4 SECONDS (ref 9.8–12.8)
RBC # BLD AUTO: 3.47 X10*6/UL (ref 4–5.2)
RBC # UR STRIP.AUTO: NEGATIVE /UL
RBC #/AREA URNS AUTO: ABNORMAL /HPF
SODIUM SERPL-SCNC: 140 MMOL/L (ref 136–145)
SP GR UR STRIP.AUTO: 1.02
SQUAMOUS #/AREA URNS AUTO: ABNORMAL /HPF
T4 FREE SERPL-MCNC: 1.62 NG/DL (ref 0.61–1.12)
TSH SERPL-ACNC: 0.02 MIU/L (ref 0.44–3.98)
UROBILINOGEN UR STRIP.AUTO-MCNC: ABNORMAL MG/DL
WBC # BLD AUTO: 13.1 X10*3/UL (ref 4.4–11.3)
WBC #/AREA URNS AUTO: ABNORMAL /HPF

## 2024-04-22 PROCEDURE — 70551 MRI BRAIN STEM W/O DYE: CPT | Performed by: RADIOLOGY

## 2024-04-22 PROCEDURE — 70547 MR ANGIOGRAPHY NECK W/O DYE: CPT | Performed by: RADIOLOGY

## 2024-04-22 PROCEDURE — 99310 SBSQ NF CARE HIGH MDM 45: CPT | Performed by: NURSE PRACTITIONER

## 2024-04-22 PROCEDURE — 71045 X-RAY EXAM CHEST 1 VIEW: CPT

## 2024-04-22 PROCEDURE — 85610 PROTHROMBIN TIME: CPT | Performed by: GENERAL PRACTICE

## 2024-04-22 PROCEDURE — 36415 COLL VENOUS BLD VENIPUNCTURE: CPT | Performed by: GENERAL PRACTICE

## 2024-04-22 PROCEDURE — 70450 CT HEAD/BRAIN W/O DYE: CPT | Performed by: RADIOLOGY

## 2024-04-22 PROCEDURE — 2500000004 HC RX 250 GENERAL PHARMACY W/ HCPCS (ALT 636 FOR OP/ED): Performed by: GENERAL PRACTICE

## 2024-04-22 PROCEDURE — 70544 MR ANGIOGRAPHY HEAD W/O DYE: CPT

## 2024-04-22 PROCEDURE — 71045 X-RAY EXAM CHEST 1 VIEW: CPT | Mod: FOREIGN READ | Performed by: RADIOLOGY

## 2024-04-22 PROCEDURE — 83735 ASSAY OF MAGNESIUM: CPT | Performed by: GENERAL PRACTICE

## 2024-04-22 PROCEDURE — 70450 CT HEAD/BRAIN W/O DYE: CPT

## 2024-04-22 PROCEDURE — 84484 ASSAY OF TROPONIN QUANT: CPT | Performed by: GENERAL PRACTICE

## 2024-04-22 PROCEDURE — 80053 COMPREHEN METABOLIC PANEL: CPT | Performed by: GENERAL PRACTICE

## 2024-04-22 PROCEDURE — G0378 HOSPITAL OBSERVATION PER HR: HCPCS

## 2024-04-22 PROCEDURE — 87086 URINE CULTURE/COLONY COUNT: CPT | Mod: AHULAB | Performed by: GENERAL PRACTICE

## 2024-04-22 PROCEDURE — 70551 MRI BRAIN STEM W/O DYE: CPT

## 2024-04-22 PROCEDURE — 70544 MR ANGIOGRAPHY HEAD W/O DYE: CPT | Performed by: RADIOLOGY

## 2024-04-22 PROCEDURE — 84443 ASSAY THYROID STIM HORMONE: CPT | Performed by: PHYSICIAN ASSISTANT

## 2024-04-22 PROCEDURE — 81001 URINALYSIS AUTO W/SCOPE: CPT | Performed by: GENERAL PRACTICE

## 2024-04-22 PROCEDURE — 70547 MR ANGIOGRAPHY NECK W/O DYE: CPT

## 2024-04-22 PROCEDURE — 85025 COMPLETE CBC W/AUTO DIFF WBC: CPT | Performed by: GENERAL PRACTICE

## 2024-04-22 PROCEDURE — 99285 EMERGENCY DEPT VISIT HI MDM: CPT | Mod: 25

## 2024-04-22 PROCEDURE — 84439 ASSAY OF FREE THYROXINE: CPT | Performed by: PHYSICIAN ASSISTANT

## 2024-04-22 RX ORDER — ACETAMINOPHEN 325 MG/1
950 TABLET ORAL EVERY 6 HOURS PRN
Status: DISCONTINUED | OUTPATIENT
Start: 2024-04-22 | End: 2024-05-03 | Stop reason: HOSPADM

## 2024-04-22 RX ORDER — TALC
3 POWDER (GRAM) TOPICAL
Status: DISCONTINUED | OUTPATIENT
Start: 2024-04-23 | End: 2024-04-23

## 2024-04-22 RX ORDER — PANTOPRAZOLE SODIUM 40 MG/10ML
40 INJECTION, POWDER, LYOPHILIZED, FOR SOLUTION INTRAVENOUS
Status: DISCONTINUED | OUTPATIENT
Start: 2024-04-23 | End: 2024-04-25

## 2024-04-22 RX ORDER — PANTOPRAZOLE SODIUM 40 MG/1
40 TABLET, DELAYED RELEASE ORAL
Status: DISCONTINUED | OUTPATIENT
Start: 2024-04-23 | End: 2024-05-03 | Stop reason: HOSPADM

## 2024-04-22 RX ORDER — DOCUSATE SODIUM 100 MG/1
100 CAPSULE, LIQUID FILLED ORAL 2 TIMES DAILY
Status: DISCONTINUED | OUTPATIENT
Start: 2024-04-22 | End: 2024-05-03 | Stop reason: HOSPADM

## 2024-04-22 RX ORDER — CEFTRIAXONE 1 G/50ML
1 INJECTION, SOLUTION INTRAVENOUS ONCE
Status: COMPLETED | OUTPATIENT
Start: 2024-04-22 | End: 2024-04-23

## 2024-04-22 RX ADMIN — CEFTRIAXONE SODIUM 1 G: 1 INJECTION, SOLUTION INTRAVENOUS at 23:36

## 2024-04-22 ASSESSMENT — COLUMBIA-SUICIDE SEVERITY RATING SCALE - C-SSRS
2. HAVE YOU ACTUALLY HAD ANY THOUGHTS OF KILLING YOURSELF?: NO
6. HAVE YOU EVER DONE ANYTHING, STARTED TO DO ANYTHING, OR PREPARED TO DO ANYTHING TO END YOUR LIFE?: NO
1. IN THE PAST MONTH, HAVE YOU WISHED YOU WERE DEAD OR WISHED YOU COULD GO TO SLEEP AND NOT WAKE UP?: NO

## 2024-04-22 NOTE — ED NOTES
Sibling number on file out of service. Facility called for mri screen, waiting on call back     Aishwarya Jo RN  04/22/24 [id number]

## 2024-04-22 NOTE — LETTER
Patient: Janette Martinez  : 1970    Encounter Date: 2024    Name: Janette Martinez    YOB: 1970    Code Status: DNRcc arrest    Chief complaint:  Altered mental status;  etc.....      HPI:    53 year old female with past medical history of heart failure with reduced EF,   diabetes, hypertension, hyperlipidemia,        CVA (left MCA  with residual right-sided hemiparesis), GERD, HIV, hypothyroidism, hypertension,   and diabetes.        Patient re-admitted to [facility]. On 10/20/23 after her most recent hospitalization.    Diagnoses as follows:    Altered mental status; vomiting:  Nursing reports that patient vomited earlier and now has developed a change in mental status.  Patient typically is alert, talks, follows commands, answers questions.  Patient seen today she is in bed.   Today patient is not answering any questions and she already has a left sided -facial droop, but today it appears more pronounced.  She is not responding, however her eyes are open.  She is not following any commands either.   Patient also vomited earlier.       Sequela of Cerebrovascular accident; Hx CVA with right-sided hemiparesis; right sided weakness;   dysphagia 2/2 cva; expressive aphasia; History of acute/subacute stroke:   MRI was performed during a PREVIOUS hospitalization and it showed Acute/subacute tiny infarct in the left anterior brock.   Dr. Jiang from [facility] neuro/stroke was consulted at that time.  Currently on Eliquis 5 mg PO BID.  Pt has baseline dysarthria from a previous CVA, right Hemiparesis,  and expressive aphasia.  At her usual baseline she is able to communicate in short sentences and she is able to nod appropriately to questions.   She is on a mechanical soft, thin consistency diet.                        Chronic systolic HF; Benign essential HTN :     On Amlodipine and hydrochlorothiazide.            No oxygen desaturations reported.       Recent /62       No  recent complaints of  chest pain, headaches or dizziness.         Post Menopausal Vaginal Bleeding:  Last week nursing reported that patient had increased intermittent vaginal bleeding.       Staff monitoring for continued bleeding.        Patient was sent to a  OB/GYN appt. Last week for evaluation.       However she was not able to be examined because she was not in a stretcher.      The appt. Needs to be rescheduled and she   Patient is not having any abdominal pain or cramping.        DM type 2 with hyperglycemia; noncompliance with diabetic diet:        Patient is frequently noncompliant with diet.       On Lantus 43 units at bedtime and Humalog sliding scale.       Recent accuchecks: 164 mg/dL, 196 mg/dL, 158 mg/dL.        No episodes of hypoglycemia reported.        Ha1c trends:       5/1/23 Ha1c 8.1 %        10/17/23 Ha1c 8.3 %       12/4/23 hemoglobin A1c 8.9%     No complaints of chest pain.    No headaches.    No dizziness.      Major depressive disorder:   On Sertraline.   Also followed by in house psych NP.  On Sertraline  125 mg PO every day.  Patient generally  has a good appetite, no recent weight loss reported.              Immobility; Generalized muscle weakness;  At  in long term care.  Not able to ambulate.  Very weak.  Bedbound/wheelchair bound.                              Reviewed medical, social, surgical and family history.  Reviewed all current medications and performed medication reconciliation.  Reviewed vital signs AND recent blood work results.  Performed prescription drug management.   Reviewed [facility] Documentation  Discussed patient with nursing and paramedics.  Spent greater than 50 minutes on visit.                   ROS: 10 point ROS performed; negative unless noted in HPI.       LABS:  BMP: Date: 5/1/2023 Na 137 K 4.3 Cr 1.1 BUN 15 glucose 141 Ca 8.8 GFR 52  CBC: Date: 5/1/2023 WBC 8.4 Hgb 11.1 hematocrit 33.9 platelets 337  Liver function: Date: 5/1/2023 ALT 10 AST  29 alkaline phos.  63 bilirubin 0.4 albumin 3.6 protein 7.7       Ha1c 8.1 % on 5/1/23          BMP: Date: 5/15/2023 Na 139  K 3.4 Cr 1.2 BUN 20 glucose 122 Ca 8.8 GFR 57        CBC: Date: 5/15/2023 WBC 9 Hgb 10.2 hematocrit 30.9 platelets 277       Liver function: 5/15/2023 ALT 8 AST 28 alkaline phos.  57 bilirubin 0.5 albumin 3.5 protein 7.3          BMP: Date: 5/22/2023 Na 141 K 3.1 Cr 1 BUN 11 glucose 227 Ca 8.7 GFR 70        CBC: Date: 5/22/2023 WBC 8.2 Hgb 10 hematocrit 30.5 platelets 372        Liver function: Date: 5/22/2023 ALT 8 AST 22 alkaline phos.  63 bilirubin 0.3 albumin 3.2 protein 7.2     BMP: Date: 6/5/2023 Na 140 K 4.2 Cr 1.1 BUN 14 glucose 79 Ca 1.1 GFR 63  CBC: Date: 6/5/2023 WBC 8.1 Hgb 10.5 hematocrit 32.3 platelets 350  Liver function: Date: 6/5/2023 ALT 7 AST 25 alkaline phos.  59 bilirubin 0.4 albumin 3.3 protein 7.7    BMP: Date: 7/17/2023 Na 140 K 4.1 Cr 0.8 BUN 17 glucose 126 Ca 9.1 GFR 90  CBC: Date: 7/17/2023 WBC 14.1 Hgb 10.2 hematocrit 32.1 platelets 265  Liver function: Date: 7/17/2023 ALT 8 AST 23 alkaline phos.  72 bilirubin 0.5 albumin 3.4 protein 7.4    CBC: Date: 7/19/2023 WBC 10.7 Hgb 9.5 hematocrit 28.8 platelets 257    BMP: Date: 7/24/2023 Na 137 K 4.2 Cr 0.9 BUN 19 glucose 186 Ca 9.1 GFR 79  CBC: Date: 7/24/2023 WBC 11.6 Hgb 10.3 hematocrit 31.4 platelets 305  Liver function: Date: 7/24/2023 ALT 11 AST 27 alkaline phos.  77 bilirubin 0.3 albumin 3.6 protein 7.8    BMP: Date: 7/31/2023 Na 141 K 4.2 Cr 0.8 BUN 17 glucose 138 Ca 8.7 GFR 90  CBC: Date: 7/31/2023 WBC 12.3 Hgb 10.5 hematocrit 32.4 platelets 307  Liver function: Date: 7/31/2023 ALT 14 AST 24 alkaline phos.  81 bilirubin 0.4 albumin 3.5 protein 7.4    BMP: Date: 8/7/2023 Na 141 K 3.9 Cr 1.0 BUN 12 glucose 167 Ca 8.6 GFR 70  CBC: Date: 8/7/2023 WBC 10 Hgb 10.5 hematocrit 32.7 platelets 256  Liver function: Date: 8/7/2023 ALT 9 AST 23 alkaline phos.  76 bilirubin 0.5 albumin 3.3 protein 7    BMP: Date: 8/21/2023 Na  142 K 4.1 Cr 1 BUN 17 glucose 195 Ca 8.5 GFR 70  CBC: Date: 8/21/2023 WBC 10.9 Hgb 9.6 hematocrit 30 platelets 304  Liver function: Date: 8/21/2023 ALT 9 AST 24 alkaline phos.  74 bilirubin 0.4 albumin 3.2 protein 7.1    BMP: Date: 8/31/2023 Na 141 K 3.8 Cr 0.9 BUN 14 glucose 118 Ca 8.2 GFR 79  CBC: Date: 8/31/2023 WBC 12 Hgb 9.6 hematocrit 29.8 platelets 294  Liver function: Date: 8/31/2023 ALT 8 AST 21 alkaline phos.  64 bilirubin 0.4 albumin 3.1 protein 7    BMP: Date: 9/4/2023 Na 141 K 3.9 Cr 1.1 BUN 17 glucose 107 Ca 8.5 GFR 63   CBC: Date: 9/4/2023 WBC 12 Hgb 9.5 hematocrit 29.7 platelets 282  Liver function: Date: 9/4/2023 ALT 7 AST 22 alkaline phos.  64 bilirubin 0.4 albumin 3.3 protein 7.1    BMP: Date: 9/11/2023 Na 142 K 4 Cr 1 BUN 12 glucose 81 Ca 8.2 GFR 70  CBC: Date: 9/11/2023 WBC 8.7 Hgb 9.2 hematocrit 28.3 platelets 305  Liver function: Date: 9/11/2023 ALT 7 AST 22 alkaline phos.  61 bilirubin 0.3 albumin 3 protein 6.8    BMP: Date: 9/18/2023 Na 140 K 4 Cr 1 BUN 15 glucose 150 Ca 8.3 GFR 70  CBC: Date: 9/18/2023 WBC 12.1 Hgb 9.5 hematocrit 29.8 platelets 287  Liver function: Date: 9/18/2023 ALT 7 AST 23 alkaline phos.  62 bilirubin 0.4 albumin 3.4 protein 7.1    10/17/23 Ha1c 8.3 %    BMP: Date: 10/23/2023 Na 141 K 3.6 Cr 1 BUN 22 glucose 65 Ca 8.3 GFR 70  CBC: Date: 10/23/2023 WBC 12.5 Hgb 10.8  hematocrit 33.2 platelets 290  Liver function: Date: 10/23/2023 ALT 9 AST 31 alkaline phos.  66 bilirubin 0.6 albumin 3.6 protein 7.8    10/26/2023: Lipid panel: Cholesterol 111; triglycerides 111; HDL 33; LDL 56; VLDL 22.    10/26/2023: TSH: 1.081 (range 0.340-5.500).    BMP: Date: 10/30/2023 Na 143  K 3.5 Cr 0.9 BUN 11 glucose 171 Ca 8 GFR 65  CBC: Date: 10/30/2023 WBC 9.2 Hgb 10.1 hematocrit 31.1 platelets 280  Liver function: Date: 10/30/2023 ALT 11 AST 27 alkaline phos.  66 bilirubin 0.4 albumin 3.4 protein 7.2    BMP: Date: 11/13/2023 Na 138 K 3.9 Cr 0.9 BUN 11 glucose 308 Ca 8.4 GFR 79  CBC: Date:  11/13/2023 WBC 8.2 Hgb 10.6 hematocrit 33 platelets 295  Liver function: Date: 11/13/2023 ALT 12 AST 37 alkaline phos.  69 bilirubin 0.4 albumin 3.2 protein 7.1    BMP: Date: 11/21/2023 Na 142 K 3.6 Cr 0.9 BUN 14 glucose 200 Ca 8.4 GFR 79.  CBC: Date: 11/21/2023 WBC 8.8 Hgb 10.1 hematocrit 30.4 platelets 290    BMP: Date: 11/27/2023 Na 138 K 3.4 Cr 1 BUN 12 glucose 54 Ca 8.7 GFR 70  CBC: Date: 11/27/2023 WBC 12.3 Hgb 10.6 hematocrit 32.4 platelets 307  Liver function: Date: 11/27/2023 ALT 7 AST 27 alkaline phos.  72 bilirubin 0.5 albumin 3.3 protein 7.7    BMP: Date: 12/4/2023 Na 141 K 4 Cr 1 BUN 12 glucose 209 Ca 8.5 GFR 70  CBC: Date: 12/4/2023 WBC 8.5 Hgb 10.6 hematocrit 32.7 platelets 326  Liver function: Date: 12/4/2023 ALT 8 AST 25 alkaline phos.  64 bilirubin 0.4 albumin 3.3 protein 7.1  12/4/2023 hemoglobin A1c 8.9%    BMP: Date: 12/25/2023 Na 140 K 3.9 Cr 0.9 BUN 17 glucose 172 Ca 8.4 GFR 79  CBC: Date: 12/25/2023 WBC 8.9 Hgb 9.9 hematocrit 30.7 platelets 272  Liver function: Date: 12/25/2023 ALT 8 AST 26 alkaline phos.  67 bilirubin 0.3 albumin 3.2 protein 7    BMP: Date: 12/26/2023 Na 142 K 4.2 Cr 0.9 BUN 15 glucose 168 Ca 8.6 GFR 79  CBC: Date: 12/26/2023 WBC 9.9 Hgb 10.5 hematocrit 32.9 platelets 302    BMP: Date: 1/15/2024 Na 139 K 3.9 Cr 1 BUN 14 glucose 206 Ca 8.5 GFR 70  CBC: Date: 1/15/2024 WBC 8.3 Hgb 9.8 hematocrit 29.8 platelets 244  Liver function: Date: 1/15/2024 ALT 7 AST 22 alkaline phos.  62 bilirubin 0.3 albumin 3.3 protein 6.8    1/16/2024: Lipid panel: Cholesterol 119; triglycerides 174; HDL 32; LDL 52; VLDL 35.    BMP: Date: 2/5/2024 Na 141 K 4.2 Cr 1 BUN 22 glucose 150 Ca 8.3 GFR 70  CBC: Date: 2/5/2024 WBC 10.9 Hgb 10.2 hematocrit 31.1 platelets 319  Liver function: Date: 2/5/2024 ALT 7 AST 29 alkaline phos.  77 bilirubin 0.6 albumin 3.4 protein 7.4    BMP: Date: 2/26/2024 Na 143 K 3.6 Cr 1.3 BUN 23 glucose 152 Ca 8.5 GFR 51  CBC: Date: 2/26/2024 WBC 8 Hgb 11.1 hematocrit 33.6  platelets 318  Liver function: Date: 2/26/2024 ALT 11 AST 48 alkaline phos.  67 bilirubin 0.5 albumin 3.5 protein 7.8    BMP: Date: 3/11/2024 Na 142 K 3.8 Cr 0.9 BUN 16 glucose 97 Ca 8.7 GFR 79  CBC: Date: 3/11/2024 WBC 9 Hgb 10.5 hematocrit 32.3 platelets 302  Liver function: Date: 3/11/2024  ALT 10 AST 39 alkaline phos.  64 bilirubin 0.4 albumin 3.1 protein 7.1    BMP: Date: 3/18/2024 Na 143 K 4.4 Cr 0.9 BUN 15 glucose 111 Ca 8.7 GFR 79  CBC: Date: 3/18/2024 WBC 9.1 Hgb 10.8 hematocrit 32.6 platelets 294  Liver function: Date: 3/18/2024 ALT 13 AST 49 alkaline phos.  68 bilirubin 0.6 albumin 3.2 protein 7.4    BMP: Date: 3/25/2024 Na 140 K 3.7 Cr 1 BUN 17 glucose 225 Ca 8.7 GFR 70  CBC: Date: 3/25/2024  WBC 8.6 Hgb 10.7 hematocrit 32 platelets 304  Liver function: Date: 3/25/2024 ALT 15 AST 56 alkaline phos.  53 bilirubin 0.5 albumin 3.3 protein 7.1    BMP: Date: 4/1/2024 Na 143 K 3.5 Cr 1 BUN 19 glucose 151 Ca 8.7 GFR 70  CBC: Date: 4/1/2024 WBC 8.4 Hgb 10.2 hematocrit 30.8 platelets 254  Liver function: Date: 4/1/2024 ALT 12 AST 32 alkaline phos.  56 bilirubin 0.4 albumin 3.3 protein 6.8                Past Medical History:   Diagnosis Date   • Acute pulmonary embolism (Multi) 04/22/2024   • Aphasia as late effect of cerebrovascular accident 04/25/2023   • Essential hypertension 06/25/2010   • Gastroesophageal reflux disease 03/10/2009   • Hemiplegia of nondominant side, late effect of cerebrovascular disease (Multi) 09/03/2008   • Hemorrhagic stroke (Multi) 06/25/2010   • HIV infection (Multi) 02/21/2007   • Hypothyroidism 10/10/2002    Formatting of this note might be different from the original.   S/p thyroidectomy 2002   Patholgy c/w Penny   • Mixed hyperlipidemia 06/25/2010   • Stage 3a chronic kidney disease (Multi) 04/22/2024   • T2DM (type 2 diabetes mellitus) (Multi) 11/14/2012           Past Surgical History:   Procedure Laterality Date   • CHOLECYSTECTOMY  01/08/2016    Cholecystectomy   • COLON  SURGERY  01/08/2016    Colon Surgery   • CT ANGIO NECK  3/19/2019    CT NECK ANGIO W AND WO IV CONTRAST 3/19/2019 [facility]   • CT HEAD ANGIO W AND WO IV CONTRAST  3/19/2019    CT HEAD ANGIO W AND WO IV CONTRAST 3/19/2019 [facility]   • HERNIA REPAIR  01/08/2016    Hernia Repair   • HYSTERECTOMY  01/08/2016    Hysterectomy   • MR HEAD ANGIO WO IV CONTRAST  6/4/2018    MR HEAD ANGIO WO IV CONTRAST 6/4/2018 [facility] CLINICAL LEGAC[facility]   • MR HEAD ANGIO WO IV CONTRAST  7/11/2020    MR HEAD ANGIO WO IV CONTRAST 7/11/2020 [facility] CLINICAL LEGACY   • MR HEAD ANGIO WO IV CONTRAST  9/30/2020    MR HEAD ANGIO WO IV CONTRAST 9/30/2020 [facility]   • MR HEAD ANGIO WO IV CONTRAST  5/22/2022    MR HEAD ANGIO WO IV CONTRAST 5/22/2022 [facility] CLINICAL LEGACY   • MR HEAD ANGIO WO IV CONTRAST  4/19/2023    MR HEAD ANGIO WO IV CONTRAST [facility]   • MR NECK ANGIO WO IV CONTRAST  6/4/2018    MR NECK ANGIO WO IV CONTRAST 6/4/2018 [facility] CLINICAL LEGACY   • MR NECK ANGIO WO IV CONTRAST  7/11/2020    MR NECK ANGIO WO IV CONTRAST 7/11/2020 [facility]GACY   • MR NECK ANGIO WO IV CONTRAST  9/30/2020    MR NECK ANGIO WO IV CONTRAST 9/30/2020 [facility]   • MR NECK ANGIO WO IV CONTRAST  5/22/2022    MR NECK ANGIO WO IV CONTRAST 5/22/2022 [facility] CL[facility]ICAL LEGACY   • MR NECK ANGIO WO IV CONTRAST  4/19/2023    MR NECK ANGIO WO IV CONTRAST [facility]   • OTHER SURGICAL HISTORY  01/08/2016    Tubal Stabilization   • OTHER SURGICAL HISTORY  09/01/2016    Cervical Surgery (Gyn) Laser Vaporization Of Transformation Zone   • THYROID SURGERY  09/27/2013    Thyroid Surgery            Surgical History  Problems    · History of Cervical Surgery (Gyn) Laser Vaporization Of Transformation Zone   · History of Cholecystectomy   · History of Colon Surgery   · History of Hernia Repair   · History of Hysterectomy   · History of Thyroid Surgery   · History of Tubal Stabilization     Family History  Mother    · Family history of Cancer  Family History  "   · Family history of Diabetes Mellitus (V18.0)   · Family history of Hypertension (V17.49)     Social History  Problems    · Disabled   · Does not use illicit drugs (V49.89) (Z78.9)   · Former smoker (V15.82) (Z87.891)   · Marital History - Single   · No alcohol.   · Denied: History of Secondhand smoke exposure   · Single     Allergies  Medication    · Aspirin TABS   · Contrast Media Ready-Box MISC   · morphine   · Sulfa Drugs        /62   Pulse 66   Temp 35.8 °C (96.4 °F)   Resp 16   Ht 1.372 m (4' 6\")   Wt 93.3 kg (205 lb 9.6 oz)   SpO2 98%   BMI 49.57 kg/m²      Physical Exam  Vitals and nursing note reviewed.   Constitutional:       General: She is awake.      Appearance: Normal appearance. She is ill-appearing.   HENT:      Head: Normocephalic.      Comments: White patches on tongue.     Right Ear: External ear normal.      Left Ear: External ear normal.      Nose: Nose normal.      Mouth/Throat:      Mouth: Mucous membranes are moist.      Pharynx: Oropharynx is clear.   Eyes:      Extraocular Movements: Extraocular movements intact.      Conjunctiva/sclera: Conjunctivae normal.      Pupils: Pupils are equal, round, and reactive to light.   Cardiovascular:      Rate and Rhythm: Normal rate and regular rhythm.      Pulses: Normal pulses.      Heart sounds: Normal heart sounds.   Pulmonary:      Effort: Pulmonary effort is normal.      Breath sounds: Normal breath sounds.   Abdominal:      General: Bowel sounds are normal.      Palpations: Abdomen is soft.   Musculoskeletal:      Cervical back: Normal range of motion and neck supple.      Comments: Generalized muscle weakness; immobility.    Right sided hemiparesis   Skin:     General: Skin is warm and dry.   Neurological:      Mental Status: She is alert. Mental status is at baseline.      Motor: Weakness present.      Coordination: Coordination abnormal.      Gait: Gait abnormal.      Comments: Right sided weakness--RLE 1/5; RUE 3/5; sensation " intact to touch x 4 extremities.     Left sided facial droop--worse than usual baseline.    Generalized muscle weakness   Psychiatric:         Mood and Affect: Mood normal.         Behavior: Behavior normal. Behavior is cooperative.          Assessment/Plan    Ordered vital signs STAT.  Ordered that patient be sent to [place] [id number] STAT      Altered mental status; vomiting:  Nursing reports that patient vomited earlier and now has developed a change in mental status.  Patient typically is alert, talks, follows commands, answers questions.  Today patient is not answering any questions and she already has a left sided -facial droop, but today it appears more pronounced.  She is not responding, however her eyes are open.  She is not following any commands either.   Ordered vital signs STAT.  Ordered that patient be sent to [place] [id number] STAT            Sequela of Cerebrovascular accident; Hx CVA with right-sided hemiparesis; right sided weakness;   dysphagia 2/2 cva; expressive aphasia; History of acute/subacute stroke:   MRI was performed during a PREVIOUS hospitalization and it showed Acute/subacute tiny infarct in the left anterior brock.   Dr. Jiang from [facility] neuro/stroke was consulted at that time.  Continue Eliquis 5 mg PO BID.  Continue mechanical soft, thin consistency diet.                        Chronic systolic HF; Benign essential HTN :           Continue Amlodipine and hydrochlorothiazide.            Monitor Bps.                        Post Menopausal Vaginal Bleeding:                      Staff monitoring for continued bleeding.                       Patient was sent to a  OB/GYN appt. Last week for evaluation.                      Hopefully patient will be evaluated in the hospital for her vaginal bleeding.                       Otherwise her appt. Needs to be rescheduled w/  OB/GYN appt. & she needs to go in stretcher.                              DM type 2 with hyperglycemia; noncompliance with  diabetic diet:       Continue Lantus 43 units at bedtime and Humalog sliding scale.       Monitor accuchecks.          Major depressive disorder:   Continue Sertraline.   Follow up with in house psych NP.              Immobility; Generalized muscle weakness;            Continue at  in long term care.        Problem List Items Addressed This Visit          Cardiac and Vasculature    Chronic systolic heart failure (Multi) (Chronic)       Endocrine/Metabolic    T2DM (type 2 diabetes mellitus) (Multi) (Chronic)     Other Visit Diagnoses       Altered mental status, unspecified altered mental status type    -  Primary    Vomiting, unspecified vomiting type, unspecified whether nausea present        Sequela of cerebrovascular accident        Hemiparesis affecting right side as late effect of stroke (Multi)        Dysphagia, unspecified type        Expressive aphasia        Benign essential HTN        Postmenopausal vaginal bleeding        Noncompliance with dietary restriction        Major depressive disorder with current active episode, unspecified depression episode severity, unspecified whether recurrent        Immobility        Generalized muscle weakness                  JASON Espitia       Electronically Signed By: JASON Espitia   4/26/24  5:06 PM

## 2024-04-22 NOTE — ED PROVIDER NOTES
HPI   No chief complaint on file.      HPI: 53-year-old female with a history of CVA and HIV presents for altered mental status.  Last time known well was 8 AM.  Nursing staff at her facility noted that she is nonverbal, somnolent and exhibiting a worsening left-sided facial droop.  The patient is only responsive to painful stimuli.  She normally speaks and interacts with staff.  History is being provided by EMS      Limitations to history: Somnolence  Independent Historians: EMS  External Records Reviewed: HIE, outpatient notes, inpatient notes  ------------------------------------------------------------------------------------------------------------------------------------------  ROS: a ten point review of systems was performed and was negative except as per HPI.  ------------------------------------------------------------------------------------------------------------------------------------------  PMH / PSH: as per HPI, otherwise reviewed in EMR  MEDS: as per HPI, otherwise reviewed in EMR  ALLERGIES: as per HPI, otherwise reviewed in EMR  SocH:  as per HPI, otherwise reviewed in EMR  FH:  as per HPI, otherwise reviewed in EMR  ------------------------------------------------------------------------------------------------------------------------------------------  Physical Exam:  VS: As documented in the triage note and EMR flowsheet from this visit was reviewed  General: Very somnolent appearing female   Eyes:  Extraocular movements grossly intact. No scleral icterus. No discharge  HEENT:  Normocephalic.  Atraumatic  Neck: Moves neck freely. No gross masses  CV: Regular rhythm. No murmurs, rubs or gallops   Resp: Clear to auscultation bilaterally. No respiratory distress.    GI: Soft, no masses, nontender. No rebound tenderness or guarding  MSK: Symmetric muscle bulk. No deformities. No lower extremity edema.    Skin: Warm, dry, intact.   Neuro: Will not follow commands.  Nonverbal.  Exhibiting left-sided  facial droop.  Does not hold limbs up against gravity. NIHSS of 29  Psych: Somnolent and nonverbal ------------------------------------------------------------------------------------------------------------------------------------------  Hospital Course / Medical Decision Making:  Independent Interpretations: CT head, chest xray, MRI brain, MRA head and neck  EKG as interpreted by me: Normal sinus rhythm at 84 bpm with no signs of acute ischemia    MDM: 53-year-old female with a history of CVA and HIV presents for altered mental status.  Last time known well was 8 AM and she is arriving to the ED at approximately 1:30 PM which puts her out of the window for possible TNK.  She is allergic to IV contrast.  On presentation she has an NIH stroke scale of 29. She is VAN positive. Urinalysis is positive for UTI.  She was given Rocephin.  I spoke to Dr. Morse from neurology at [facility] who stated that he does not feel that this is consistent with stroke and most likely is an encephalopathy.  No infarct seen on MRI brain.  No major vessel cutoff on MRA of the head and neck.  Later in the patient's ED stay she did start moving her left upper extremity.  She will mouth words very quietly and does respond to voice.  The patient was admitted to the medicine service for further evaluation.    Discussion of Management with Other Providers:   I discussed the patient/results with: Emergency medicine team    Final diagnosis and disposition as below.    Labs Reviewed  URINE CULTURE - Abnormal     Urine Culture                   (*)               CBC WITH AUTO DIFFERENTIAL - Abnormal     WBC                           13.1 (*)               nRBC                          0.0                    RBC                           3.47 (*)               Hemoglobin                    10.7 (*)               Hematocrit                    32.4 (*)               MCV                           93                     MCH                            30.8                   MCHC                          33.0                   RDW                           16.7 (*)               Platelets                     347                    Neutrophils %                 64.8                   Immature Granulocytes %, Automated   0.4                    Lymphocytes %                 25.9                   Monocytes %                   7.0                    Eosinophils %                 1.4                    Basophils %                   0.5                    Neutrophils Absolute          8.49 (*)               Immature Granulocytes Absolute, Au*   0.05                   Lymphocytes Absolute          3.39                   Monocytes Absolute            0.91                   Eosinophils Absolute          0.18                   Basophils Absolute            0.06                COMPREHENSIVE METABOLIC PANEL - Abnormal     Glucose                       224 (*)                Sodium                        140                    Potassium                     3.6                    Chloride                      103                    Bicarbonate                   27                     Anion Gap                     14                     Urea Nitrogen                 21                     Creatinine                    1.01                   eGFR                          66                     Calcium                       8.6                    Albumin                       3.6                    Alkaline Phosphatase          61                     Total Protein                 7.9                    AST                           49 (*)                 Bilirubin, Total              0.5                    ALT                           14                  PROTIME-INR - Abnormal     Protime                       20.4 (*)               INR                           1.8 (*)             URINALYSIS WITH REFLEX CULTURE AND MICROSCOPIC - Abnormal     Color, Urine                                          Appearance, Urine             Clear                  Specific Gravity, Urine       1.018                  pH, Urine                     5.0                    Protein, Urine                20 (TRACE)                Glucose, Urine                Normal                 Blood, Urine                  NEGATIVE                Ketones, Urine                TRACE (*)               Bilirubin, Urine              NEGATIVE                Urobilinogen, Urine           2 (1+) (*)               Nitrite, Urine                NEGATIVE                Leukocyte Esterase, Urine     25 Prabhu/µL (*)            MICROSCOPIC ONLY, URINE - Abnormal     WBC, Urine                    1-5                    RBC, Urine                    1-2                    Squamous Epithelial Cells, Urine                          Bacteria, Urine               1+ (*)                 Mucus, Urine                  FEW                    Hyaline Casts, Urine          2+ (*)              COMPREHENSIVE METABOLIC PANEL - Abnormal     Glucose                       197 (*)                Sodium                        143                    Potassium                     3.2 (*)                Chloride                      104                    Bicarbonate                   29                     Anion Gap                     13                     Urea Nitrogen                 22                     Creatinine                    0.83                   eGFR                          83                     Calcium                       8.6                    Albumin                       3.5                    Alkaline Phosphatase          61                     Total Protein                 7.8                    AST                           42 (*)                 Bilirubin, Total              0.4                    ALT                           13                  CBC WITH AUTO DIFFERENTIAL - Abnormal     WBC                           10.9                    nRBC                          0.0                    RBC                           3.53 (*)               Hemoglobin                    10.7 (*)               Hematocrit                    32.9 (*)               MCV                           93                     MCH                           30.3                   MCHC                          32.5                   RDW                           16.8 (*)               Platelets                     322                    Neutrophils %                 59.2                   Immature Granulocytes %, Automated   0.3                    Lymphocytes %                 28.3                   Monocytes %                   9.1                    Eosinophils %                 2.5                    Basophils %                   0.6                    Neutrophils Absolute          6.43                   Immature Granulocytes Absolute, Au*   0.03                   Lymphocytes Absolute          3.07                   Monocytes Absolute            0.99                   Eosinophils Absolute          0.27                   Basophils Absolute            0.06                TSH WITH REFLEX TO FREE T4 IF ABNORMAL - Abnormal     Thyroid Stimulating Hormone   0.02 (*)                   Narrative: TSH testing is performed using different testing methodology at [facility] than at other [facility]. Direct result comparisons should only be made within the same method.                    THYROXINE, FREE - Abnormal     Thyroxine, Free               1.62 (*)                   Narrative: Thyroxine Free testing is performed using different testing methodology at [facility] than at other [facility]. Direct result comparisons should only be made within the same method.                                    Biotin can cause falsely elevated free T4 results. Patients taking a Biotin dose of up to 10 mg/day should refrain from taking Biotin for 24  hours before sample collection. Patient taking a Biotin dose of >10 mg/day should consult with their physician or the laboratory before the blood draw.  HEMOGLOBIN A1C - Abnormal     Hemoglobin A1C                7.6 (*)                Estimated Average Glucose     171                        Narrative: Diagnosis of Diabetes-Adults                  Non-Diabetic: < or = 5.6%                  Increased risk for developing diabetes: 5.7-6.4%                  Diagnostic of diabetes: > or = 6.5%                                    Monitoring of Diabetes                  Age (y)....................... Therapeutic Goal (%)                  Adults: >18.........................<7.0                  Pediatrics: 13-18...................<7.5                  Pediatrics: 7-12....................<8.0                  Pediatrics: 0-6..................... 7.5-8.5                                    American Diabetes Association. Diabetes Care 33(S1), Jan 2010                                      IRON AND TIBC - Abnormal     Iron                          20 (*)                 UIBC                          241                    TIBC                          261                    % Saturation                  8 (*)               CBC WITH AUTO DIFFERENTIAL - Abnormal     WBC                           11.3                   nRBC                          0.0                    RBC                           3.29 (*)               Hemoglobin                    10.0 (*)               Hematocrit                    30.8 (*)               MCV                           94                     MCH                           30.4                   MCHC                          32.5                   RDW                           16.5 (*)               Platelets                     333                    Neutrophils %                 64.6                   Immature Granulocytes %, Automated   0.4                    Lymphocytes %                 23.6                    Monocytes %                   8.9                    Eosinophils %                 2.1                    Basophils %                   0.4                    Neutrophils Absolute          7.31                   Immature Granulocytes Absolute, Au*   0.04                   Lymphocytes Absolute          2.67                   Monocytes Absolute            1.01 (*)               Eosinophils Absolute          0.24                   Basophils Absolute            0.05                RENAL FUNCTION PANEL - Abnormal     Glucose                       214 (*)                Sodium                        140                    Potassium                     3.1 (*)                Chloride                      101                    Bicarbonate                   29                     Anion Gap                     13                     Urea Nitrogen                 23                     Creatinine                    1.00                   eGFR                          67                     Calcium                       8.8                    Phosphorus                    3.1                    Albumin                       3.5                 POCT GLUCOSE - Abnormal     POCT Glucose                  194 (*)             POCT GLUCOSE - Abnormal     POCT Glucose                  238 (*)             POCT GLUCOSE - Abnormal     POCT Glucose                  214 (*)             POCT GLUCOSE - Abnormal     POCT Glucose                  186 (*)             POCT GLUCOSE - Abnormal     POCT Glucose                  193 (*)             MAGNESIUM - Normal     Magnesium                     2.20                TROPONIN I, HIGH SENSITIVITY - Normal     Troponin I, High Sensitivity   5                          Narrative: Less than 99th percentile of normal range cutoff-                  Female and children under 18 years old <14 ng/L; Male <21 ng/L: Negative                  Repeat testing should be performed if clinically  indicated.                                     Female and children under 18 years old 14-50 ng/L; Male 21-50 ng/L:                  Consistent with possible cardiac damage and possible increased clinical                   risk. Serial measurements may help to assess extent of myocardial damage.                                     >50 ng/L: Consistent with cardiac damage, increased clinical risk and                  myocardial infarction. Serial measurements may help assess extent of                   myocardial damage.                                      NOTE: Children less than 1 year old may have higher baseline troponin                   levels and results should be interpreted in conjunction with the overall                   clinical context.                                     NOTE: Troponin I testing is performed using a different                   testing methodology at [facility] than at other                   [facility]. Direct result comparisons should only                   be made within the same method.  AMMONIA - Normal     Ammonia                       21                  PROCALCITONIN - Normal     Procalcitonin                 0.02                       Narrative: Procalcitonin (PCT) results measured serially can                  aid in decision-making for antibiotic discontinuation in                  patients with suspected or confirmed sepsis in conjunction                  with additional clinical information. Antibiotic                  discontinuation may be considered with a change in PCT of                  >80% from the peak result or when PCT falls below 0.50 ng/mL.                                    Procalcitonin results should not be used in isolation but                  should be interpreted in conjunction with additional clinical                  and laboratory findings. Procalcitonin results should not be                  used to guide the initiation of  antibiotic therapy.                                    Falsely low PCT values in the presence of bacterial infection                  may occur in early infection, with atypical pathogens,                  localized infections, and subacute infectious endocarditis.                                    Falsely elevated results outside of severe bacterial                  infection/sepsis may be seen in patients with renal failure                  or insufficiency, severe trauma or burns, recent major                  abdominal/cardiac surgery, acute multi-organ failure, rarely                  in patients with medullary thyroid carcinoma and rare                  neuroendocrine tumors, and non-specific interfering antibodies                  (heterophile antibodies, rheumatoid factor, human anti-mouse                  antibodies (HAMA), etc).                                    Performance of the PCT test in pediatric patients (<17yo),                  pregnant women, immunocompromised patients, and patients on                  immunomodulatory medications has not been evaluated.  B-TYPE NATRIURETIC PEPTIDE - Normal     BNP                           10                         Narrative:    <100 pg/mL - Heart failure unlikely                  100-299 pg/mL - Intermediate probability of acute heart                                  failure exacerbation. Correlate with clinical                                  context and patient history.                    >=300 pg/mL - Heart Failure likely. Correlate with clinical                                  context and patient history.                                    BNP testing is performed using different testing methodology at [facility] than at other [facility]. Direct result comparisons should only be made within the same method.                     FERRITIN - Normal     Ferritin                      140                 MAGNESIUM - Normal     Magnesium                      2.10                LEGIONELLA ANTIGEN, URINE  STREPTOCOCCUS PNEUMONIAE ANTIGEN, URINE  URINALYSIS WITH REFLEX CULTURE AND MICROSCOPIC         Narrative: The following orders were created for panel order Urinalysis with Reflex Culture and Microscopic.                  Procedure                               Abnormality         Status                                     ---------                               -----------         ------                                     Urinalysis with Reflex C...[[id number]]  Abnormal            Final result                               Extra Urine Gray Tube[[id number]]                                                                                         Please view results for these tests on the individual orders.  HIV RNA, QUANTITATIVE, PCR  POCT GLUCOSE  POCT GLUCOSE  POCT GLUCOSE  POCT GLUCOSE  POCT GLUCOSE  POCT GLUCOSE  CD4/8 PANEL         MR brain wo IV contrast   Final Result    MRI Brain:          No evidence of acute infarct, intracranial mass effect or midline    shift.          Regions of encephalomalacia, remote hemosiderin deposition and    nonspecific white matter changes similar to prior imaging.          MRA:          No evidence of major vessel cutoff or significant stenosis on MRA    head and neck.          MACRO:    None          Signed by: Nelida Stahl 4/22/2024 9:01 PM    Dictation workstation:   [id number]     MR angio head wo IV contrast   Final Result    MRI Brain:          No evidence of acute infarct, intracranial mass effect or midline    shift.          Regions of encephalomalacia, remote hemosiderin deposition and    nonspecific white matter changes similar to prior imaging.          MRA:          No evidence of major vessel cutoff or significant stenosis on MRA    head and neck.          MACRO:    None          Signed by: Nelida Stahl 4/22/2024 9:01 PM    Dictation workstation:   [id number]     MR angio neck wo IV contrast    Final Result    MRI Brain:          No evidence of acute infarct, intracranial mass effect or midline    shift.          Regions of encephalomalacia, remote hemosiderin deposition and    nonspecific white matter changes similar to prior imaging.          MRA:          No evidence of major vessel cutoff or significant stenosis on MRA    head and neck.          MACRO:    None          Signed by: Nelida Stahl 4/22/2024 9:01 PM    Dictation workstation:   [id number]     XR chest 1 view   Final Result    Minimal infiltrate or atelectasis left lung base.    Signed by Carl Rodriges MD     CT head wo IV contrast   Final Result    No evidence of acute cortical infarct or intracranial hemorrhage.                      MACRO:    None                      Signed by: Javed Mcclure 4/22/2024 2:28 PM    Dictation workstation:   [id number]                               No data recorded                   Patient History   Past Medical History:   Diagnosis Date    Cervicalgia     Neck pain    Essential (primary) hypertension 07/21/2013    Hypertension    Pain in unspecified shoulder     Pain, joint, shoulder    Personal history of other diseases of the digestive system 07/06/2022    History of ulcerative colitis    Personal history of other diseases of the digestive system     History of esophageal reflux    Personal history of other diseases of the nervous system and sense organs     History of cataract    Personal history of other diseases of the nervous system and sense organs     History of glaucoma    Personal history of other diseases of the respiratory system     History of bronchitis    Personal history of other endocrine, nutritional and metabolic disease     History of diabetes mellitus    Personal history of other endocrine, nutritional and metabolic disease     History of hyperlipidemia    Personal history of other mental and behavioral disorders     History of depression    Snoring     Snoring    Unilateral primary  osteoarthritis, unspecified knee 11/02/2015    Arthritis of knee     Past Surgical History:   Procedure Laterality Date    CHOLECYSTECTOMY  01/08/2016    Cholecystectomy    COLON SURGERY  01/08/2016    Colon Surgery    CT ANGIO NECK W AND WO IV CONTRAST  3/19/2019    CT NECK ANGIO W AND WO IV CONTRAST 3/19/2019 [facility] EMERGENCY LEGACY    CT HEAD ANGIO W AND WO IV CONTRAST  3/19/2019    CT HEAD ANGIO W AND WO IV CONTRAST 3/19/2019 [facility] EMERGENCY LEGACY    HERNIA REPAIR  01/08/2016    Hernia Repair    HYSTERECTOMY  01/08/2016    Hysterectomy    MR HEAD ANGIO WO IV CONTRAST  6/4/2018    MR HEAD ANGIO WO IV CONTRAST 6/4/2018 [facility] CLINICAL LEGACY    MR HEAD ANGIO WO IV CONTRAST  7/11/2020    MR HEAD ANGIO WO IV CONTRAST 7/11/2020 [facility] CLINICAL LEGACY    MR HEAD ANGIO WO IV CONTRAST  9/30/2020    MR HEAD ANGIO WO IV CONTRAST 9/30/2020 [facility]IB LEGACY    MR HEAD ANGIO WO IV CONTRAST  5/22/2022    MR HEAD ANGIO WO IV CONTRAST 5/22/2022 [facility] CLINICAL LEGACY    MR HEAD ANGIO WO IV CONTRAST  4/19/2023    MR HEAD ANGIO WO IV CONTRAST [facility] MRI    MR NECK ANGIO WO IV CONTRAST  6/4/2018    MR NECK ANGIO WO IV CONTRAST 6/4/2018 [facility] CLINICAL LEGACY    MR NECK ANGIO WO IV CONTRAST  7/11/2020    MR NECK ANGIO WO IV CONTRAST 7/11/2020 [facility] CLINICAL LEGACY    MR NECK ANGIO WO IV CONTRAST  9/30/2020    MR NECK ANGIO WO IV CONTRAST 9/30/2020 [facility] LEGACY    MR NECK ANGIO WO IV CONTRAST  5/22/2022    MR NECK ANGIO WO IV CONTRAST 5/22/2022 [facility] CLINICAL LEGACY    MR NECK ANGIO WO IV CONTRAST  4/19/2023    MR NECK ANGIO WO IV CONTRAST [facility] MRI    OTHER SURGICAL HISTORY  01/08/2016    Tubal Stabilization    OTHER SURGICAL HISTORY  09/01/2016    Cervical Surgery (Gyn) Laser Vaporization Of Transformation Zone    THYROID SURGERY  09/27/2013    Thyroid Surgery     No family history on file.  Social History     Tobacco Use    Smoking status: Not on file    Smokeless tobacco: Not on file   Substance Use Topics    Alcohol use: Not on file    Drug use:  Not on file       Physical Exam   ED Triage Vitals   Temp Pulse Resp BP   -- -- -- --      SpO2 Temp src Heart Rate Source Patient Position   -- -- -- --      BP Location FiO2 (%)     -- --       Physical Exam    ED Course & MDM   Diagnoses as of 04/24/24 1019   Encephalopathy       Medical Decision Making      Procedure  Procedures     Jb Hernández,   04/24/24 1026

## 2024-04-23 ENCOUNTER — APPOINTMENT (OUTPATIENT)
Dept: CARDIOLOGY | Facility: HOSPITAL | Age: 54
DRG: 689 | End: 2024-04-23
Payer: MEDICARE

## 2024-04-23 PROBLEM — N18.31 STAGE 3A CHRONIC KIDNEY DISEASE (MULTI): Chronic | Status: ACTIVE | Noted: 2024-04-22

## 2024-04-23 PROBLEM — I50.22 CHRONIC SYSTOLIC HEART FAILURE (MULTI): Chronic | Status: ACTIVE | Noted: 2024-04-22

## 2024-04-23 PROBLEM — R13.19 ESOPHAGEAL DYSPHAGIA: Chronic | Status: ACTIVE | Noted: 2024-04-22

## 2024-04-23 LAB
ALBUMIN SERPL BCP-MCNC: 3.5 G/DL (ref 3.4–5)
ALP SERPL-CCNC: 61 U/L (ref 33–110)
ALT SERPL W P-5'-P-CCNC: 13 U/L (ref 7–45)
AMMONIA PLAS-SCNC: 21 UMOL/L (ref 16–53)
ANION GAP SERPL CALC-SCNC: 13 MMOL/L (ref 10–20)
AORTIC VALVE MEAN GRADIENT: 1.7 MMHG
AORTIC VALVE PEAK VELOCITY: 0.85 M/S
AST SERPL W P-5'-P-CCNC: 42 U/L (ref 9–39)
AV PEAK GRADIENT: 2.9 MMHG
AVA (PEAK VEL): 3.13 CM2
AVA (VTI): 3.52 CM2
BASOPHILS # BLD AUTO: 0.06 X10*3/UL (ref 0–0.1)
BASOPHILS NFR BLD AUTO: 0.6 %
BILIRUB SERPL-MCNC: 0.4 MG/DL (ref 0–1.2)
BNP SERPL-MCNC: 10 PG/ML (ref 0–99)
BUN SERPL-MCNC: 22 MG/DL (ref 6–23)
CALCIUM SERPL-MCNC: 8.6 MG/DL (ref 8.6–10.3)
CHLORIDE SERPL-SCNC: 104 MMOL/L (ref 98–107)
CO2 SERPL-SCNC: 29 MMOL/L (ref 21–32)
CREAT SERPL-MCNC: 0.83 MG/DL (ref 0.5–1.05)
EGFRCR SERPLBLD CKD-EPI 2021: 83 ML/MIN/1.73M*2
EJECTION FRACTION APICAL 4 CHAMBER: 68.9
EOSINOPHIL # BLD AUTO: 0.27 X10*3/UL (ref 0–0.7)
EOSINOPHIL NFR BLD AUTO: 2.5 %
ERYTHROCYTE [DISTWIDTH] IN BLOOD BY AUTOMATED COUNT: 16.8 % (ref 11.5–14.5)
EST. AVERAGE GLUCOSE BLD GHB EST-MCNC: 171 MG/DL
FERRITIN SERPL-MCNC: 140 NG/ML (ref 8–150)
GLUCOSE BLD MANUAL STRIP-MCNC: 186 MG/DL (ref 74–99)
GLUCOSE BLD MANUAL STRIP-MCNC: 194 MG/DL (ref 74–99)
GLUCOSE BLD MANUAL STRIP-MCNC: 214 MG/DL (ref 74–99)
GLUCOSE BLD MANUAL STRIP-MCNC: 238 MG/DL (ref 74–99)
GLUCOSE SERPL-MCNC: 197 MG/DL (ref 74–99)
HBA1C MFR BLD: 7.6 %
HCT VFR BLD AUTO: 32.9 % (ref 36–46)
HGB BLD-MCNC: 10.7 G/DL (ref 12–16)
IMM GRANULOCYTES # BLD AUTO: 0.03 X10*3/UL (ref 0–0.7)
IMM GRANULOCYTES NFR BLD AUTO: 0.3 % (ref 0–0.9)
IRON SATN MFR SERPL: 8 % (ref 25–45)
IRON SERPL-MCNC: 20 UG/DL (ref 35–150)
LEFT ATRIUM VOLUME AREA LENGTH INDEX BSA: 13.7 ML/M2
LEFT VENTRICLE INTERNAL DIMENSION DIASTOLE: 4.62 CM (ref 3.5–6)
LEFT VENTRICULAR OUTFLOW TRACT DIAMETER: 1.99 CM
LYMPHOCYTES # BLD AUTO: 3.07 X10*3/UL (ref 1.2–4.8)
LYMPHOCYTES NFR BLD AUTO: 28.3 %
MCH RBC QN AUTO: 30.3 PG (ref 26–34)
MCHC RBC AUTO-ENTMCNC: 32.5 G/DL (ref 32–36)
MCV RBC AUTO: 93 FL (ref 80–100)
MITRAL VALVE E/A RATIO: 0.81
MITRAL VALVE E/E' RATIO: 9.15
MONOCYTES # BLD AUTO: 0.99 X10*3/UL (ref 0.1–1)
MONOCYTES NFR BLD AUTO: 9.1 %
NEUTROPHILS # BLD AUTO: 6.43 X10*3/UL (ref 1.2–7.7)
NEUTROPHILS NFR BLD AUTO: 59.2 %
NRBC BLD-RTO: 0 /100 WBCS (ref 0–0)
PLATELET # BLD AUTO: 322 X10*3/UL (ref 150–450)
POTASSIUM SERPL-SCNC: 3.2 MMOL/L (ref 3.5–5.3)
PROCALCITONIN SERPL-MCNC: 0.02 NG/ML
PROT SERPL-MCNC: 7.8 G/DL (ref 6.4–8.2)
RBC # BLD AUTO: 3.53 X10*6/UL (ref 4–5.2)
RIGHT VENTRICLE PEAK SYSTOLIC PRESSURE: 26.9 MMHG
SODIUM SERPL-SCNC: 143 MMOL/L (ref 136–145)
TIBC SERPL-MCNC: 261 UG/DL (ref 240–445)
TRICUSPID ANNULAR PLANE SYSTOLIC EXCURSION: 3 CM
UIBC SERPL-MCNC: 241 UG/DL (ref 110–370)
WBC # BLD AUTO: 10.9 X10*3/UL (ref 4.4–11.3)

## 2024-04-23 PROCEDURE — 84145 PROCALCITONIN (PCT): CPT | Mod: AHULAB | Performed by: INTERNAL MEDICINE

## 2024-04-23 PROCEDURE — 36415 COLL VENOUS BLD VENIPUNCTURE: CPT | Performed by: INTERNAL MEDICINE

## 2024-04-23 PROCEDURE — G0378 HOSPITAL OBSERVATION PER HR: HCPCS

## 2024-04-23 PROCEDURE — 2500000004 HC RX 250 GENERAL PHARMACY W/ HCPCS (ALT 636 FOR OP/ED): Performed by: INTERNAL MEDICINE

## 2024-04-23 PROCEDURE — 2500000001 HC RX 250 WO HCPCS SELF ADMINISTERED DRUGS (ALT 637 FOR MEDICARE OP): Performed by: PHYSICIAN ASSISTANT

## 2024-04-23 PROCEDURE — 82947 ASSAY GLUCOSE BLOOD QUANT: CPT

## 2024-04-23 PROCEDURE — 2500000002 HC RX 250 W HCPCS SELF ADMINISTERED DRUGS (ALT 637 FOR MEDICARE OP, ALT 636 FOR OP/ED): Performed by: PHYSICIAN ASSISTANT

## 2024-04-23 PROCEDURE — 82140 ASSAY OF AMMONIA: CPT | Performed by: PHYSICIAN ASSISTANT

## 2024-04-23 PROCEDURE — 85025 COMPLETE CBC W/AUTO DIFF WBC: CPT | Performed by: PHYSICIAN ASSISTANT

## 2024-04-23 PROCEDURE — 93306 TTE W/DOPPLER COMPLETE: CPT | Performed by: INTERNAL MEDICINE

## 2024-04-23 PROCEDURE — 83550 IRON BINDING TEST: CPT | Mod: 91 | Performed by: INTERNAL MEDICINE

## 2024-04-23 PROCEDURE — 80053 COMPREHEN METABOLIC PANEL: CPT | Performed by: PHYSICIAN ASSISTANT

## 2024-04-23 PROCEDURE — 82728 ASSAY OF FERRITIN: CPT | Mod: AHULAB | Performed by: INTERNAL MEDICINE

## 2024-04-23 PROCEDURE — 2500000001 HC RX 250 WO HCPCS SELF ADMINISTERED DRUGS (ALT 637 FOR MEDICARE OP): Performed by: INTERNAL MEDICINE

## 2024-04-23 PROCEDURE — 99233 SBSQ HOSP IP/OBS HIGH 50: CPT | Performed by: INTERNAL MEDICINE

## 2024-04-23 PROCEDURE — 93306 TTE W/DOPPLER COMPLETE: CPT

## 2024-04-23 PROCEDURE — 83036 HEMOGLOBIN GLYCOSYLATED A1C: CPT | Mod: AHULAB | Performed by: PHYSICIAN ASSISTANT

## 2024-04-23 PROCEDURE — 2500000006 HC RX 250 W HCPCS SELF ADMINISTERED DRUGS (ALT 637 FOR ALL PAYERS): Mod: MUE | Performed by: INTERNAL MEDICINE

## 2024-04-23 PROCEDURE — 2500000004 HC RX 250 GENERAL PHARMACY W/ HCPCS (ALT 636 FOR OP/ED): Performed by: PHYSICIAN ASSISTANT

## 2024-04-23 PROCEDURE — 88185 FLOWCYTOMETRY/TC ADD-ON: CPT | Mod: TC,AHULAB | Performed by: HOSPITALIST

## 2024-04-23 PROCEDURE — 2500000005 HC RX 250 GENERAL PHARMACY W/O HCPCS: Performed by: INTERNAL MEDICINE

## 2024-04-23 PROCEDURE — C9113 INJ PANTOPRAZOLE SODIUM, VIA: HCPCS | Performed by: PHYSICIAN ASSISTANT

## 2024-04-23 PROCEDURE — 83880 ASSAY OF NATRIURETIC PEPTIDE: CPT | Performed by: INTERNAL MEDICINE

## 2024-04-23 PROCEDURE — 2500000006 HC RX 250 W HCPCS SELF ADMINISTERED DRUGS (ALT 637 FOR ALL PAYERS): Performed by: INTERNAL MEDICINE

## 2024-04-23 PROCEDURE — 36415 COLL VENOUS BLD VENIPUNCTURE: CPT | Performed by: PHYSICIAN ASSISTANT

## 2024-04-23 PROCEDURE — 82947 ASSAY GLUCOSE BLOOD QUANT: CPT | Mod: 91

## 2024-04-23 RX ORDER — FUROSEMIDE 20 MG/1
20 TABLET ORAL DAILY
Status: DISCONTINUED | OUTPATIENT
Start: 2024-04-23 | End: 2024-05-03 | Stop reason: HOSPADM

## 2024-04-23 RX ORDER — ACETAMINOPHEN 500 MG
1000 TABLET ORAL 3 TIMES DAILY
COMMUNITY

## 2024-04-23 RX ORDER — INSULIN LISPRO 100 [IU]/ML
12 INJECTION, SOLUTION INTRAVENOUS; SUBCUTANEOUS DAILY
COMMUNITY

## 2024-04-23 RX ORDER — DICLOFENAC SODIUM 10 MG/G
2 GEL TOPICAL 2 TIMES DAILY PRN
COMMUNITY
End: 2024-05-02 | Stop reason: HOSPADM

## 2024-04-23 RX ORDER — DEXTROSE 50 % IN WATER (D50W) INTRAVENOUS SYRINGE
12.5
Status: DISCONTINUED | OUTPATIENT
Start: 2024-04-23 | End: 2024-05-03 | Stop reason: HOSPADM

## 2024-04-23 RX ORDER — LEVOTHYROXINE SODIUM 75 UG/1
150 TABLET ORAL DAILY
Status: DISCONTINUED | OUTPATIENT
Start: 2024-04-24 | End: 2024-04-24

## 2024-04-23 RX ORDER — ATORVASTATIN CALCIUM 10 MG/1
10 TABLET, FILM COATED ORAL DAILY
COMMUNITY
End: 2024-05-23 | Stop reason: HOSPADM

## 2024-04-23 RX ORDER — SERTRALINE HYDROCHLORIDE 50 MG/1
200 TABLET, FILM COATED ORAL DAILY
Status: DISCONTINUED | OUTPATIENT
Start: 2024-04-23 | End: 2024-05-03 | Stop reason: HOSPADM

## 2024-04-23 RX ORDER — AMOXICILLIN 250 MG
2 CAPSULE ORAL 2 TIMES DAILY PRN
COMMUNITY
End: 2024-05-09 | Stop reason: ENTERED-IN-ERROR

## 2024-04-23 RX ORDER — AMLODIPINE BESYLATE 5 MG/1
5 TABLET ORAL DAILY
COMMUNITY

## 2024-04-23 RX ORDER — FUROSEMIDE 20 MG/1
20 TABLET ORAL DAILY
COMMUNITY

## 2024-04-23 RX ORDER — SERTRALINE HYDROCHLORIDE 100 MG/1
200 TABLET, FILM COATED ORAL DAILY
COMMUNITY

## 2024-04-23 RX ORDER — CALCIUM CARBONATE/VITAMIN D3 600MG-62.5
1 CAPSULE ORAL 2 TIMES DAILY
COMMUNITY
End: 2024-05-09 | Stop reason: ENTERED-IN-ERROR

## 2024-04-23 RX ORDER — INSULIN LISPRO 100 [IU]/ML
0-5 INJECTION, SOLUTION INTRAVENOUS; SUBCUTANEOUS
Status: DISCONTINUED | OUTPATIENT
Start: 2024-04-23 | End: 2024-05-03 | Stop reason: HOSPADM

## 2024-04-23 RX ORDER — LEVOFLOXACIN 750 MG/1
750 TABLET ORAL
Status: DISCONTINUED | OUTPATIENT
Start: 2024-04-23 | End: 2024-04-25

## 2024-04-23 RX ORDER — LACTULOSE 10 G/15ML
30 SOLUTION ORAL EVERY 12 HOURS PRN
Status: DISCONTINUED | OUTPATIENT
Start: 2024-04-23 | End: 2024-05-03 | Stop reason: HOSPADM

## 2024-04-23 RX ORDER — HYDROCHLOROTHIAZIDE 25 MG/1
25 TABLET ORAL DAILY
COMMUNITY

## 2024-04-23 RX ORDER — HYDROCHLOROTHIAZIDE 25 MG/1
25 TABLET ORAL DAILY
Status: DISCONTINUED | OUTPATIENT
Start: 2024-04-23 | End: 2024-04-24

## 2024-04-23 RX ORDER — BACLOFEN 10 MG/1
10 TABLET ORAL 2 TIMES DAILY
COMMUNITY

## 2024-04-23 RX ORDER — TALC
6 POWDER (GRAM) TOPICAL NIGHTLY
COMMUNITY

## 2024-04-23 RX ORDER — POTASSIUM CHLORIDE 20 MEQ/1
20 TABLET, EXTENDED RELEASE ORAL DAILY
Status: DISCONTINUED | OUTPATIENT
Start: 2024-04-23 | End: 2024-04-25

## 2024-04-23 RX ORDER — LACTULOSE 10 G/15ML
30 SOLUTION ORAL EVERY 12 HOURS PRN
COMMUNITY
End: 2024-05-09 | Stop reason: ENTERED-IN-ERROR

## 2024-04-23 RX ORDER — GABAPENTIN 300 MG/1
600 CAPSULE ORAL 3 TIMES DAILY
Status: DISCONTINUED | OUTPATIENT
Start: 2024-04-23 | End: 2024-05-03 | Stop reason: HOSPADM

## 2024-04-23 RX ORDER — OXYCODONE HYDROCHLORIDE 5 MG/1
5 TABLET ORAL EVERY 6 HOURS PRN
COMMUNITY

## 2024-04-23 RX ORDER — ATORVASTATIN CALCIUM 10 MG/1
10 TABLET, FILM COATED ORAL DAILY
Status: DISCONTINUED | OUTPATIENT
Start: 2024-04-23 | End: 2024-05-03 | Stop reason: HOSPADM

## 2024-04-23 RX ORDER — IPRATROPIUM BROMIDE AND ALBUTEROL SULFATE 2.5; .5 MG/3ML; MG/3ML
3 SOLUTION RESPIRATORY (INHALATION) 4 TIMES DAILY PRN
Status: DISCONTINUED | OUTPATIENT
Start: 2024-04-23 | End: 2024-05-03 | Stop reason: HOSPADM

## 2024-04-23 RX ORDER — AMLODIPINE BESYLATE 5 MG/1
5 TABLET ORAL DAILY
Status: DISCONTINUED | OUTPATIENT
Start: 2024-04-23 | End: 2024-05-03 | Stop reason: HOSPADM

## 2024-04-23 RX ORDER — ATORVASTATIN CALCIUM 40 MG/1
40 TABLET, FILM COATED ORAL DAILY
COMMUNITY
End: 2024-05-09 | Stop reason: ENTERED-IN-ERROR

## 2024-04-23 RX ORDER — POLYETHYLENE GLYCOL 3350 17 G/17G
17 POWDER, FOR SOLUTION ORAL DAILY
COMMUNITY

## 2024-04-23 RX ORDER — DEXTROSE 50 % IN WATER (D50W) INTRAVENOUS SYRINGE
25
Status: DISCONTINUED | OUTPATIENT
Start: 2024-04-23 | End: 2024-05-03 | Stop reason: HOSPADM

## 2024-04-23 RX ORDER — EMTRICITABINE AND TENOFOVIR ALAFENAMIDE 200; 25 MG/1; MG/1
1 TABLET ORAL DAILY
COMMUNITY

## 2024-04-23 RX ORDER — LAMOTRIGINE 25 MG/1
25 TABLET ORAL EVERY EVENING
Status: DISCONTINUED | OUTPATIENT
Start: 2024-04-23 | End: 2024-05-03 | Stop reason: HOSPADM

## 2024-04-23 RX ORDER — TALC
6 POWDER (GRAM) TOPICAL NIGHTLY
Status: DISCONTINUED | OUTPATIENT
Start: 2024-04-23 | End: 2024-05-03 | Stop reason: HOSPADM

## 2024-04-23 RX ORDER — INSULIN GLARGINE 100 [IU]/ML
43 INJECTION, SOLUTION SUBCUTANEOUS NIGHTLY
COMMUNITY

## 2024-04-23 RX ORDER — DOCUSATE SODIUM 100 MG/1
100 CAPSULE, LIQUID FILLED ORAL 2 TIMES DAILY PRN
COMMUNITY
End: 2024-05-09 | Stop reason: ENTERED-IN-ERROR

## 2024-04-23 RX ORDER — POTASSIUM CHLORIDE 750 MG/1
20 CAPSULE, EXTENDED RELEASE ORAL DAILY
COMMUNITY

## 2024-04-23 RX ORDER — IPRATROPIUM BROMIDE AND ALBUTEROL SULFATE 2.5; .5 MG/3ML; MG/3ML
3 SOLUTION RESPIRATORY (INHALATION) 4 TIMES DAILY PRN
COMMUNITY

## 2024-04-23 RX ORDER — LAMOTRIGINE 25 MG/1
25 TABLET ORAL EVERY EVENING
COMMUNITY

## 2024-04-23 RX ORDER — LEVOTHYROXINE SODIUM 175 UG/1
175 TABLET ORAL
COMMUNITY

## 2024-04-23 RX ORDER — DICLOFENAC SODIUM 10 MG/G
4 GEL TOPICAL EVERY 12 HOURS PRN
COMMUNITY

## 2024-04-23 RX ORDER — GABAPENTIN 300 MG/1
600 CAPSULE ORAL 3 TIMES DAILY
COMMUNITY

## 2024-04-23 RX ADMIN — PERFLUTREN 10 ML OF DILUTION: 6.52 INJECTION, SUSPENSION INTRAVENOUS at 16:02

## 2024-04-23 RX ADMIN — APIXABAN 5 MG: 5 TABLET, FILM COATED ORAL at 01:26

## 2024-04-23 RX ADMIN — INSULIN LISPRO 2 UNITS: 100 INJECTION, SOLUTION INTRAVENOUS; SUBCUTANEOUS at 17:00

## 2024-04-23 RX ADMIN — CARBOXYMETHYLCELLULOSE SODIUM 1 DROP: 0.5 SOLUTION/ DROPS OPHTHALMIC at 21:35

## 2024-04-23 RX ADMIN — ATORVASTATIN CALCIUM 10 MG: 10 TABLET, FILM COATED ORAL at 12:09

## 2024-04-23 RX ADMIN — AMLODIPINE BESYLATE 5 MG: 5 TABLET ORAL at 12:08

## 2024-04-23 RX ADMIN — GABAPENTIN 600 MG: 300 CAPSULE ORAL at 12:08

## 2024-04-23 RX ADMIN — DOLUTEGRAVIR SODIUM 50 MG: 50 TABLET, FILM COATED ORAL at 12:07

## 2024-04-23 RX ADMIN — APIXABAN 5 MG: 5 TABLET, FILM COATED ORAL at 08:36

## 2024-04-23 RX ADMIN — SERTRALINE HYDROCHLORIDE 200 MG: 50 TABLET ORAL at 12:09

## 2024-04-23 RX ADMIN — EMTRICITABINE AND TENOFOVIR ALAFENAMIDE 1 TABLET: 200; 25 TABLET ORAL at 14:12

## 2024-04-23 RX ADMIN — Medication 6 MG: at 21:35

## 2024-04-23 RX ADMIN — HYDROCHLOROTHIAZIDE 25 MG: 25 TABLET ORAL at 12:09

## 2024-04-23 RX ADMIN — INSULIN LISPRO 1 UNITS: 100 INJECTION, SOLUTION INTRAVENOUS; SUBCUTANEOUS at 21:58

## 2024-04-23 RX ADMIN — FUROSEMIDE 20 MG: 20 TABLET ORAL at 12:09

## 2024-04-23 RX ADMIN — APIXABAN 5 MG: 5 TABLET, FILM COATED ORAL at 21:35

## 2024-04-23 RX ADMIN — INSULIN LISPRO 2 UNITS: 100 INJECTION, SOLUTION INTRAVENOUS; SUBCUTANEOUS at 12:10

## 2024-04-23 RX ADMIN — DOCUSATE SODIUM 100 MG: 100 CAPSULE, LIQUID FILLED ORAL at 21:35

## 2024-04-23 RX ADMIN — DOCUSATE SODIUM 100 MG: 100 CAPSULE, LIQUID FILLED ORAL at 01:26

## 2024-04-23 RX ADMIN — GABAPENTIN 600 MG: 300 CAPSULE ORAL at 17:00

## 2024-04-23 RX ADMIN — GABAPENTIN 600 MG: 300 CAPSULE ORAL at 21:35

## 2024-04-23 RX ADMIN — LEVOFLOXACIN 750 MG: 750 TABLET, FILM COATED ORAL at 12:07

## 2024-04-23 RX ADMIN — LAMOTRIGINE 25 MG: 25 TABLET ORAL at 21:35

## 2024-04-23 RX ADMIN — POTASSIUM CHLORIDE 20 MEQ: 1500 TABLET, EXTENDED RELEASE ORAL at 12:08

## 2024-04-23 RX ADMIN — INSULIN LISPRO 1 UNITS: 100 INJECTION, SOLUTION INTRAVENOUS; SUBCUTANEOUS at 08:37

## 2024-04-23 RX ADMIN — PANTOPRAZOLE SODIUM 40 MG: 40 INJECTION, POWDER, FOR SOLUTION INTRAVENOUS at 08:36

## 2024-04-23 ASSESSMENT — COGNITIVE AND FUNCTIONAL STATUS - GENERAL
STANDING UP FROM CHAIR USING ARMS: TOTAL
WALKING IN HOSPITAL ROOM: TOTAL
TURNING FROM BACK TO SIDE WHILE IN FLAT BAD: TOTAL
DAILY ACTIVITIY SCORE: 6
WALKING IN HOSPITAL ROOM: TOTAL
MOVING TO AND FROM BED TO CHAIR: TOTAL
DRESSING REGULAR UPPER BODY CLOTHING: TOTAL
MOVING FROM LYING ON BACK TO SITTING ON SIDE OF FLAT BED WITH BEDRAILS: TOTAL
DRESSING REGULAR UPPER BODY CLOTHING: TOTAL
TOILETING: TOTAL
CLIMB 3 TO 5 STEPS WITH RAILING: TOTAL
STANDING UP FROM CHAIR USING ARMS: TOTAL
TOILETING: TOTAL
DRESSING REGULAR LOWER BODY CLOTHING: TOTAL
TURNING FROM BACK TO SIDE WHILE IN FLAT BAD: TOTAL
MOBILITY SCORE: 6
MOBILITY SCORE: 6
MOVING FROM LYING ON BACK TO SITTING ON SIDE OF FLAT BED WITH BEDRAILS: TOTAL
EATING MEALS: TOTAL
PERSONAL GROOMING: TOTAL
DRESSING REGULAR LOWER BODY CLOTHING: TOTAL
HELP NEEDED FOR BATHING: TOTAL
DAILY ACTIVITIY SCORE: 6
MOVING TO AND FROM BED TO CHAIR: TOTAL
PERSONAL GROOMING: TOTAL
HELP NEEDED FOR BATHING: TOTAL
EATING MEALS: TOTAL
CLIMB 3 TO 5 STEPS WITH RAILING: TOTAL

## 2024-04-23 ASSESSMENT — ENCOUNTER SYMPTOMS
FREQUENCY: 0
DYSURIA: 0
BLOOD IN STOOL: 0
VOMITING: 0
FACIAL ASYMMETRY: 1
NAUSEA: 0
SPEECH DIFFICULTY: 1
CHILLS: 0
WEAKNESS: 1
SHORTNESS OF BREATH: 0
FEVER: 0

## 2024-04-23 ASSESSMENT — ACTIVITIES OF DAILY LIVING (ADL): LACK_OF_TRANSPORTATION: NO

## 2024-04-23 ASSESSMENT — PAIN SCALES - PAIN ASSESSMENT IN ADVANCED DEMENTIA (PAINAD)
BREATHING: NORMAL
BODYLANGUAGE: RELAXED
CONSOLABILITY: NO NEED TO CONSOLE
FACIALEXPRESSION: SMILING OR INEXPRESSIVE
TOTALSCORE: 0

## 2024-04-23 NOTE — PROGRESS NOTES
Janette Martinez is a 53 y.o. female on day 0 of admission presenting with Encephalopathy.      Subjective   Seen and examined, patient is sleepy but arousable, answering questions with head nods, no new complaints.  No overnight events.  The plan discussed with the patient and RN.       Objective     Last Recorded Vitals  /89 (BP Location: Right arm, Patient Position: Lying)   Pulse 87   Temp 36.3 °C (97.3 °F) (Temporal)   Resp 17   Wt 93 kg (205 lb)   SpO2 96%   Intake/Output last 3 Shifts:    Intake/Output Summary (Last 24 hours) at 4/23/2024 0927  Last data filed at 4/23/2024 0006  Gross per 24 hour   Intake 50 ml   Output --   Net 50 ml       Admission Weight  Weight: 93 kg (205 lb) (04/22/24 1400)    Daily Weight  04/22/24 : 93 kg (205 lb)    Image Results  MR brain wo IV contrast, MR angio head wo IV contrast, MR angio neck wo IV contrast  Narrative: Interpreted By:  Nelida Stahl,   STUDY:  MR BRAIN WO IV CONTRAST; MR ANGIO HEAD WO IV CONTRAST; MR ANGIO NECK  WO IV CONTRAST;  4/22/2024 8:16 pm      INDICATION:  Signs/Symptoms:Last time known well 8 AM, severe left-sided facial  droop, does not move any extremity.  Stroke protocol.      COMPARISON:  04/19/2023, CT 04/22/2024      ACCESSION NUMBER(S):  [id number]; [id number]; [id number]      ORDERING CLINICIAN:  SHAI WILLIS      TECHNIQUE:  Axial T2, FLAIR, DWI, gradient echo T2 and  sagittal and coronal T1  weighted images of brain were acquired.      Time-of-flight MRA of the head  and neck was performed. The images  were reviewed as source images and maximum intensity projections.      FINDINGS:  Brain:      CSF Spaces: The ventricles, sulci and basal cisterns enlarged,  concordant with parenchymal volume loss.      Parenchyma: There is no diffusion restriction abnormality to suggest  acute infarct.  Small regions of encephalomalacia in the bilateral  frontal centrum semiovale as well as the left thalamus, brock, left  middle cerebellar  peduncle and left mid brain. Findings appear  similar to prior imaging. Additional moderate non-specific white  matter changes with T2/FLAIR hyperintense white matter changes.  Gliosis in the right frontal lobe which may be related to a remote  ventriculostomy catheter. Scattered gradient echo hypointense foci  including the left basal ganglia and right thalamus. A region of  heterogeneous T2/FLAIR hyperintense signal of the left thalamus  extending into the midbrain with gradient echo signal likely related  to remote hemorrhagic infarct. There is no mass effect or midline  shift. Generalized parenchymal volume loss.      Paranasal Sinuses and Mastoids: Visualized paranasal sinuses and  mastoid air cells are unremarkable.      Postsurgical changes of the lenses.      MRA of head:      Anterior circulation:    There is expected flow signal in bilateral  intracranial internal carotid arteries, bilateral carotid terminals,  bilateral proximal anterior and middle cerebral arteries.      Posterior circulation: Fetal type origin of the left PCA. Bilateral  intracranial vertebral arteries, vertebrobasilar junction, basilar  artery and proximal posterior cerebral arteries demonstrate expected  flow signal.      MRA of neck:      The source images are mildly degraded by artifact.      Right carotid vessels:  There is expected flow signal in the  visualized portion of the common carotid artery.  There is mild  attenuation of flow signal at the carotid bifurcation which may be  secondary to flow related artifact. The internal carotid artery in  the neck demonstrates expected flow signal.      Left carotid vessels:   There is expected flow signal in the  visualized portion of the common carotid artery.  There is mild  attenuation of flow signal at the carotid bifurcation which may be  secondary to flow related artifact. The internal carotid artery in  the neck demonstrates expected flow signal.      Vertebral vessels:   The  visualized segments of the cervical  vertebral arteries demonstrate expected flow signal.      Impression: MRI Brain:      No evidence of acute infarct, intracranial mass effect or midline  shift.      Regions of encephalomalacia, remote hemosiderin deposition and  nonspecific white matter changes similar to prior imaging.      MRA:      No evidence of major vessel cutoff or significant stenosis on MRA  head and neck.      MACRO:  None      Signed by: Nelida Stahl 4/22/2024 9:01 PM  Dictation workstation:   [id number]  XR chest 1 view  Narrative: STUDY:  Chest Radiograph; 4-  INDICATION:  Altered mental status.  COMPARISON:  4- XR CHEST  ACCESSION NUMBER(S):  [id number]  ORDERING CLINICIAN:  SHAI WILLIS  TECHNIQUE:  Frontal chest was obtained at 14:10 hours.  FINDINGS:  CARDIOMEDIASTINAL SILHOUETTE:  Cardiomediastinal silhouette is normal in size and configuration.     LUNGS:  Minimal infiltrate or atelectasis left lung base.     ABDOMEN:  No remarkable upper abdominal findings.     BONES:  No acute osseous changes.  Impression: Minimal infiltrate or atelectasis left lung base.  Signed by Carl Rodriges MD  CT head wo IV contrast  Narrative: Interpreted By:  Javed Mcclure,   STUDY:  CT HEAD WO IV CONTRAST;  4/22/2024 2:01 pm      INDICATION:  Signs/Symptoms:Altered mental status as of 8 AM, normally has a  left-sided facial droop, now nonverbal and responding only to painful  stimuli, more pronounced left-sided facial droop.      COMPARISON:  None.      ACCESSION NUMBER(S):  [id number]      ORDERING CLINICIAN:  SHAI WILLIS      TECHNIQUE:  Noncontrast axial CT scan of head was performed. Angled reformats in  brain and bone windows were generated. The images were reviewed in  bone, brain, blood and soft tissue windows.      FINDINGS:  CSF Spaces: The ventricles, sulci and basal cisterns are within  normal limits. There is no extraaxial fluid collection.      Parenchyma: Periventricular and  subcortical white matter  hypoattenuation is nonspecific, however likely reflects chronic  microvascular ischemic versus chronic hypertensive changes. The  grey-white differentiation is intact. There is no mass effect or  midline shift.  There is no intracranial hemorrhage.      Calvarium: The calvarium is unremarkable.      Paranasal sinuses and mastoids: Visualized paranasal sinuses and  mastoids are clear.      Impression: No evidence of acute cortical infarct or intracranial hemorrhage.              MACRO:  None              Signed by: Javed Mcclure 4/22/2024 2:28 PM  Dictation workstation:   [id number]      Physical Exam  SKIN: Warm and dry.  No suspicious lesions or rashes.  HEENT:  NCAT, PERRLA, refusing to open mouth  LUNGS: Unlabored, CTAB, no significant wheezing, rhonchi, or rales appreciated  CARDS: RRR, no m/g/r appreciated  GI: Soft, NTND, BS+, no rebound, no guarding   MS/Extremities: WWP, no significant pitting edema, distal pulses intact, moves all extremities  NEURO: Alert, +left facial droop, aphasia (chronic), R hemiparesis (chronic), following basic commands, nods yes/no to simple questions   PSYCH: mood and behavior appropriate    Relevant Results  Results for orders placed or performed during the hospital encounter of 04/22/24 (from the past 24 hour(s))   CBC and Auto Differential   Result Value Ref Range    WBC 13.1 (H) 4.4 - 11.3 x10*3/uL    nRBC 0.0 0.0 - 0.0 /100 WBCs    RBC 3.47 (L) 4.00 - 5.20 x10*6/uL    Hemoglobin 10.7 (L) 12.0 - 16.0 g/dL    Hematocrit 32.4 (L) 36.0 - 46.0 %    MCV 93 80 - 100 fL    MCH 30.8 26.0 - 34.0 pg    MCHC 33.0 32.0 - 36.0 g/dL    RDW 16.7 (H) 11.5 - 14.5 %    Platelets 347 150 - 450 x10*3/uL    Neutrophils % 64.8 40.0 - 80.0 %    Immature Granulocytes %, Automated 0.4 0.0 - 0.9 %    Lymphocytes % 25.9 13.0 - 44.0 %    Monocytes % 7.0 2.0 - 10.0 %    Eosinophils % 1.4 0.0 - 6.0 %    Basophils % 0.5 0.0 - 2.0 %    Neutrophils Absolute 8.49 (H) 1.20 - 7.70  x10*3/uL    Immature Granulocytes Absolute, Automated 0.05 0.00 - 0.70 x10*3/uL    Lymphocytes Absolute 3.39 1.20 - 4.80 x10*3/uL    Monocytes Absolute 0.91 0.10 - 1.00 x10*3/uL    Eosinophils Absolute 0.18 0.00 - 0.70 x10*3/uL    Basophils Absolute 0.06 0.00 - 0.10 x10*3/uL   Magnesium   Result Value Ref Range    Magnesium 2.20 1.60 - 2.40 mg/dL   Comprehensive metabolic panel   Result Value Ref Range    Glucose 224 (H) 74 - 99 mg/dL    Sodium 140 136 - 145 mmol/L    Potassium 3.6 3.5 - 5.3 mmol/L    Chloride 103 98 - 107 mmol/L    Bicarbonate 27 21 - 32 mmol/L    Anion Gap 14 10 - 20 mmol/L    Urea Nitrogen 21 6 - 23 mg/dL    Creatinine 1.01 0.50 - 1.05 mg/dL    eGFR 66 >60 mL/min/1.73m*2    Calcium 8.6 8.6 - 10.3 mg/dL    Albumin 3.6 3.4 - 5.0 g/dL    Alkaline Phosphatase 61 33 - 110 U/L    Total Protein 7.9 6.4 - 8.2 g/dL    AST 49 (H) 9 - 39 U/L    Bilirubin, Total 0.5 0.0 - 1.2 mg/dL    ALT 14 7 - 45 U/L   Troponin I, High Sensitivity   Result Value Ref Range    Troponin I, High Sensitivity 5 0 - 13 ng/L   TSH with reflex to Free T4 if abnormal   Result Value Ref Range    Thyroid Stimulating Hormone 0.02 (L) 0.44 - 3.98 mIU/L   Thyroxine, Free   Result Value Ref Range    Thyroxine, Free 1.62 (H) 0.61 - 1.12 ng/dL   Protime-INR   Result Value Ref Range    Protime 20.4 (H) 9.8 - 12.8 seconds    INR 1.8 (H) 0.9 - 1.1   Urinalysis with Reflex Culture and Microscopic   Result Value Ref Range    Color, Urine Light-Yellow Light-Yellow, Yellow, Dark-Yellow    Appearance, Urine Clear Clear    Specific Gravity, Urine 1.018 1.005 - 1.035    pH, Urine 5.0 5.0, 5.5, 6.0, 6.5, 7.0, 7.5, 8.0    Protein, Urine 20 (TRACE) NEGATIVE, 10 (TRACE), 20 (TRACE) mg/dL    Glucose, Urine Normal Normal mg/dL    Blood, Urine NEGATIVE NEGATIVE    Ketones, Urine TRACE (A) NEGATIVE mg/dL    Bilirubin, Urine NEGATIVE NEGATIVE    Urobilinogen, Urine 2 (1+) (A) Normal mg/dL    Nitrite, Urine NEGATIVE NEGATIVE    Leukocyte Esterase, Urine 25  Prabhu/µL (A) NEGATIVE   Microscopic Only, Urine   Result Value Ref Range    WBC, Urine 1-5 1-5, NONE /HPF    RBC, Urine 1-2 NONE, 1-2, 3-5 /HPF    Squamous Epithelial Cells, Urine 1-9 (SPARSE) Reference range not established. /HPF    Bacteria, Urine 1+ (A) NONE SEEN /HPF    Mucus, Urine FEW Reference range not established. /LPF    Hyaline Casts, Urine 2+ (A) NONE /LPF   Ammonia   Result Value Ref Range    Ammonia 21 16 - 53 umol/L   Comprehensive metabolic panel   Result Value Ref Range    Glucose 197 (H) 74 - 99 mg/dL    Sodium 143 136 - 145 mmol/L    Potassium 3.2 (L) 3.5 - 5.3 mmol/L    Chloride 104 98 - 107 mmol/L    Bicarbonate 29 21 - 32 mmol/L    Anion Gap 13 10 - 20 mmol/L    Urea Nitrogen 22 6 - 23 mg/dL    Creatinine 0.83 0.50 - 1.05 mg/dL    eGFR 83 >60 mL/min/1.73m*2    Calcium 8.6 8.6 - 10.3 mg/dL    Albumin 3.5 3.4 - 5.0 g/dL    Alkaline Phosphatase 61 33 - 110 U/L    Total Protein 7.8 6.4 - 8.2 g/dL    AST 42 (H) 9 - 39 U/L    Bilirubin, Total 0.4 0.0 - 1.2 mg/dL    ALT 13 7 - 45 U/L   CBC and Auto Differential   Result Value Ref Range    WBC 10.9 4.4 - 11.3 x10*3/uL    nRBC 0.0 0.0 - 0.0 /100 WBCs    RBC 3.53 (L) 4.00 - 5.20 x10*6/uL    Hemoglobin 10.7 (L) 12.0 - 16.0 g/dL    Hematocrit 32.9 (L) 36.0 - 46.0 %    MCV 93 80 - 100 fL    MCH 30.3 26.0 - 34.0 pg    MCHC 32.5 32.0 - 36.0 g/dL    RDW 16.8 (H) 11.5 - 14.5 %    Platelets 322 150 - 450 x10*3/uL    Neutrophils % 59.2 40.0 - 80.0 %    Immature Granulocytes %, Automated 0.3 0.0 - 0.9 %    Lymphocytes % 28.3 13.0 - 44.0 %    Monocytes % 9.1 2.0 - 10.0 %    Eosinophils % 2.5 0.0 - 6.0 %    Basophils % 0.6 0.0 - 2.0 %    Neutrophils Absolute 6.43 1.20 - 7.70 x10*3/uL    Immature Granulocytes Absolute, Automated 0.03 0.00 - 0.70 x10*3/uL    Lymphocytes Absolute 3.07 1.20 - 4.80 x10*3/uL    Monocytes Absolute 0.99 0.10 - 1.00 x10*3/uL    Eosinophils Absolute 0.27 0.00 - 0.70 x10*3/uL    Basophils Absolute 0.06 0.00 - 0.10 x10*3/uL   POCT GLUCOSE    Result Value Ref Range    POCT Glucose 194 (H) 74 - 99 mg/dL          No current facility-administered medications on file prior to encounter.     Current Outpatient Medications on File Prior to Encounter   Medication Sig Dispense Refill    apixaban (Eliquis) 5 mg tablet Take 1 tablet (5 mg) by mouth 2 times a day.             Assessment/Plan            Principal Problem:    Encephalopathy  Active Problems:    HIV infection (Multi)    Mixed hyperlipidemia    Hypothyroidism    Aphasia as late effect of cerebrovascular accident    Stage 3a chronic kidney disease (Multi)    Acute pulmonary embolism (Multi)    Essential hypertension    Chronic systolic heart failure (Multi)    Diabetic polyneuropathy (Multi)    T2DM (type 2 diabetes mellitus) (Multi)    Gastroesophageal reflux disease    Esophageal dysphagia    Hemiplegia of nondominant side, late effect of cerebrovascular disease (Multi)    Janette Martinez is a 53 y.o. female with PMH significant for left thalamic ICH (2007, residual right hemiplegia, dysarthria and aphasia), thalamic infarct and thalamic pain syndrome (2022), HIV, Left corona radiata infarct (2023), left MCA fusiform aneurysm, PE (on Apixaban), HTN, HLD, CKD III, T2DM, HFrEF (EF 45% 5/23/22),  post-surgical hypothyroid after thyroidectomy for graves disease who presented to the ER with c/o:   -HPI limited as patient poor historian, with prior stroke with residual aphasia       #AMS new CVA is less likely   #Encephalopathy likely 2/2 UTI vs PNA  Hx CVA with residual R hemiparesis, aphasia  -CBC: +leukocytosis, no acute anemia, no thrombocytopenia  -CMP: no acute electrolyte abnormalities, no acute renal or liver dysfunction appreciated   -CTH: no acute processes  -MRI/MRA: No acute processes.  No LVO or acute stenosis.   -Will attempt again to get PTA med list from facility and resume home meds as appropriate  -ECHO ordered     #Inc o2 req  #Opacities on CXR  -Procal, BNP ordered  -PNA workup  ordered  -Started on Levaquin PO     #Pyuria  -Urine cultures pending  -Pt is on ABX    #Anemia  -Workup ordered    HIV:  -need to confirm HIV meds and resume  -last CD4 count 4/2022: CD3 + T cell % 89 (60-89%), CD3 + T cell #[phone] (high) [phone]), CD3 and CD4 + Tcell % 23 (34-61%), CD3 + CD4 + T cell # [phone])  -consider ID consult versus follow up, I do not see that she follows with ID in the EMR, will need to confirm      Hx graves s/p thyroidectomy   Post surgical hypothyroid  -TSH low, Free thyroxine high --> consistent with over medicated hypothyroid  -hold levothyroxine --> will need dose adjustment and repeat lab as OP       #Chronic medical problems  -Continue home meds with parameters   -Pharmacy team, patient's facility and patient med list was updated    HTN  HLP  T2DM  CKD3  HFrEF/ECHO 5/2022: EF 45%.  (EF 35-40% 2/17/2022)  -hypoglycemia protocol, accuchecks ACHS, ISS coverage  -Hba1c pending        GI ppx:   -Protonix  -bowel regimen     VTE ppx:  Hx PE  -continue Eliquis.        Disposition  -Pending improvement of symptoms, wean off O2, will consider neuro consult if the patient not improved with antibiotics.     Joanna Sargent MD

## 2024-04-23 NOTE — PROGRESS NOTES
Pharmacy Medication History Review    Janette Martinez is a 53 y.o. female admitted for Encephalopathy. Pharmacy reviewed the patient's lhlir-nu-xprcjowua medications and allergies for accuracy.    The list below reflectives the updated PTA list. Please review each medication in order reconciliation for additional clarification and justification.       The list below reflectives the updated allergy list. Please review each documented allergy for additional clarification and justification.  Allergies  Reviewed by Aishwarya Jo RN on 4/22/2024        Severity Reactions Comments    Aspirin High Unknown, Bleeding, Itching     Iodinated Contrast Media Medium Unknown, Hives     Amoxicillin Not Specified Hives     Other Not Specified Unknown     Morphine Low Unknown, Swelling, Itching, Rash     Sulfa (sulfonamide Antibiotics) Low Unknown, Rash             Below are additional concerns with the patient's PTA list.  Prior to Admission Medications   Prescriptions Last Dose Informant   acetaminophen (Tylenol) 500 mg tablet     Sig: Take 2 tablets (1,000 mg) by mouth 3 times a day.   amLODIPine (Norvasc) 5 mg tablet     Sig: Take 1 tablet (5 mg) by mouth once daily.   apixaban (Eliquis) 5 mg tablet     Sig: Take 1 tablet (5 mg) by mouth 2 times a day.   atorvastatin (Lipitor) 10 mg tablet     Sig: Take 1 tablet (10 mg) by mouth once daily.   atorvastatin (Lipitor) 40 mg tablet     Sig: Take 1 tablet (40 mg) by mouth once daily. To total 50mg daily   baclofen (Lioresal) 10 mg tablet     Sig: Take 1 tablet (10 mg) by mouth 2 times a day.   diclofenac sodium (Voltaren) 1 % gel     Sig: Apply 2.25 inches (2 g) topically 2 times a day as needed (to lower extremitites for pain).   diclofenac sodium (Voltaren) 1 % gel     Sig: Apply 4.5 inches (4 g) topically every 12 hours if needed.   docusate sodium (Colace) 100 mg capsule     Sig: Take 1 capsule (100 mg) by mouth 2 times a day as needed for constipation.   dolutegravir  (Tivicay) 50 mg tablet     Sig: Take 1 tablet (50 mg) by mouth once daily.   emtricitabine-tenofovir alafen (Descovy) 200-25 mg tablet     Sig: Take 1 tablet by mouth once daily.   furosemide (Lasix) 20 mg tablet     Sig: Take 1 tablet (20 mg) by mouth once daily.   gabapentin (Neurontin) 300 mg capsule     Sig: Take 2 capsules (600 mg) by mouth 3 times a day.   hydroCHLOROthiazide (HYDRODiuril) 25 mg tablet     Sig: Take 1 tablet (25 mg) by mouth once daily. Hold if SBP <110 or Heart rate <60   insulin glargine (Basaglar KwikPen U-100 Insulin) 100 unit/mL (3 mL) pen     Sig: Inject 43 Units under the skin once daily at bedtime. Take as directed per insulin instructions.   insulin lispro (HumaLOG) 100 unit/mL injection     Sig: Inject 0.02-0.1 mL (2-10 Units) under the skin 4 times a day before meals. Per sliding scale. If 151-200 give 2, if 201-250 give 4, if 251-300 give 6, if 301-350 give 8, if 351-400 give 10 units. If less than 60 or more than 400 call MD   ipratropium-albuteroL (Duo-Neb) 0.5-2.5 mg/3 mL nebulizer solution     Sig: Take 3 mL by nebulization 4 times a day as needed for wheezing.   lactulose 10 gram/15 mL (15 mL) solution     Sig: Take 30 mL by mouth every 12 hours if needed (constipation).   lamoTRIgine (LaMICtal) 25 mg tablet     Sig: Take 1 tablet (25 mg) by mouth once daily in the evening.   levothyroxine (Synthroid, Levoxyl) 175 mcg tablet     Sig: Take 1 tablet (175 mcg) by mouth once daily in the morning. Take before meals.   melatonin 3 mg tablet     Sig: Take 2 tablets (6 mg) by mouth once daily at bedtime.   omega 3-dha-epa-fish oil 300 mg (120 mg- 180mg)-1,000 mg capsule     Sig: Take 1 capsule by mouth 2 times a day.   oxyCODONE (Roxicodone) 5 mg immediate release tablet     Sig: Take 1 tablet (5 mg) by mouth every 6 hours if needed for severe pain (7 - 10).   polyethylene glycol (Glycolax, Miralax) 17 gram packet     Sig: Take 17 g by mouth once daily.   potassium chloride ER  (Micro-K) 10 mEq ER capsule     Sig: Take 2 capsules (20 mEq) by mouth once daily. Do not crush or chew.   sennosides-docusate sodium (Caro-Colace) 8.6-50 mg tablet     Sig: Take 2 tablets by mouth 2 times a day as needed for constipation.   sertraline (Zoloft) 100 mg tablet     Sig: Take 2 tablets (200 mg) by mouth once daily.      Facility-Administered Medications: None      Per med list from facility    Daniela Doherty

## 2024-04-23 NOTE — H&P
History Of Present Illness  Janette Martinez is a 53 y.o. female with PMH significant for left thalamic ICH (2007, residual right hemiplegia, dysarthria and aphasia), thalamic infarct and thalamic pain syndrome (2022), HIV, Left corona radiata infarct (2023), left MCA fusiform aneurysm, PE (on Apixaban), HTN, HLD, CKD III, T2DM, HFrEF (EF 45% 5/23/22),  post-surgical hypothyroid after thyroidectomy for graves disease who presented to the ER with c/o:   -HPI limited as patient poor historian, with prior stroke with residual aphasia  -reported patient was sent to the ER from her facility due to concern for AMS and worsening L sided facial droop.  -CTH and MRI in the ER negative for acute stroke.       Past Medical History  Past Medical History:   Diagnosis Date    Acute pulmonary embolism (Multi) 04/22/2024    Aphasia as late effect of cerebrovascular accident 04/25/2023    Essential hypertension 06/25/2010    Gastroesophageal reflux disease 03/10/2009    Hemiplegia of nondominant side, late effect of cerebrovascular disease (Multi) 09/03/2008    Hemorrhagic stroke (Multi) 06/25/2010    HIV infection (Multi) 02/21/2007    Hypothyroidism 10/10/2002    Formatting of this note might be different from the original.   S/p thyroidectomy 2002   Patholgy c/w Graves    Mixed hyperlipidemia 06/25/2010    Stage 3a chronic kidney disease (Multi) 04/22/2024    T2DM (type 2 diabetes mellitus) (Multi) 11/14/2012       Surgical History  Past Surgical History:   Procedure Laterality Date    CHOLECYSTECTOMY  01/08/2016    Cholecystectomy    COLON SURGERY  01/08/2016    Colon Surgery    CT ANGIO NECK  3/19/2019    CT NECK ANGIO W AND WO IV CONTRAST 3/19/2019 [facility] [place] [facility]    CT HEAD ANGIO W AND WO IV CONTRAST  3/19/2019    CT HEAD ANGIO W AND WO IV CONTRAST 3/19/2019 [facility] [place] [facility]    HERNIA REPAIR  01/08/2016    Hernia Repair    HYSTERECTOMY  01/08/2016    Hysterectomy    MR HEAD ANGIO WO IV CONTRAST  6/4/2018    MR HEAD  ANGIO WO IV CONTRAST 6/4/2018 [facility] CLINICAL LEGACY    MR HEAD ANGIO WO IV CONTRAST  7/11/2020    MR HEAD ANGIO WO IV CONTRAST 7/11/2020 [facility] CLINICAL LEGACY    MR HEAD ANGIO WO IV CONTRAST  9/30/2020    MR HEAD ANGIO WO IV CONTRAST 9/30/2020 [facility]    MR HEAD ANGIO WO IV CONTRAST  5/22/2022    MR HEAD ANGIO WO IV CONTRAST 5/22/2022 [facility] CLINICAL LEGACY    MR HEAD ANGIO WO IV CONTRAST  4/19/2023    MR HEAD ANGIO WO IV CONTRAST [facility] MRI    MR NECK ANGIO WO IV CONTRAST  6/4/2018    MR NECK ANGIO WO IV CONTRAST 6/4/2018 [facility] CLINICAL LEGACY    MR NECK ANGIO WO IV CONTRAST  7/11/2020    MR NECK ANGIO WO IV CONTRAST 7/11/2020 [facility] CLINICAL LEGACY    MR NECK ANGIO WO IV CONTRAST  9/30/2020    MR NECK ANGIO WO IV CONTRAST 9/30/2020 [facility]    MR NECK ANGIO WO IV CONTRAST  5/22/2022    MR NECK ANGIO WO IV CONTRAST 5/22/2022 [facility] CLINICAL LEGACY    MR NECK ANGIO WO IV CONTRAST  4/19/2023    MR NECK ANGIO WO IV CONTRAST [facility] MRI    OTHER SURGICAL HISTORY  01/08/2016    Tubal Stabilization    OTHER SURGICAL HISTORY  09/01/2016    Cervical Surgery (Gyn) Laser Vaporization Of Transformation Zone    THYROID SURGERY  09/27/2013    Thyroid Surgery       Social History  Nicotine: Denies  ETOH: Denies   Illicit substances: Denies      Family History  Reviewed.  Negative/non-contributory      Allergies  Allergies as of 04/22/2024 - Reviewed 04/22/2024   Allergen Reaction Noted    Aspirin Unknown, Bleeding, and Itching 06/24/2010    Iodinated contrast media Unknown and Hives 10/12/2022    Amoxicillin Hives 05/29/2007    Other Unknown 04/18/2023    Morphine Unknown, Swelling, Itching, and Rash 09/14/2011    Sulfa (sulfonamide antibiotics) Unknown and Rash 09/14/2005        Review of Systems  Review of Systems   Unable to perform ROS: Mental status change   Constitutional:  Negative for chills and fever.   Respiratory:  Negative for shortness of breath.    Cardiovascular:  Negative for chest pain.   Gastrointestinal:   Negative for blood in stool, nausea and vomiting.   Genitourinary:  Negative for dysuria, frequency and urgency.   Neurological:  Positive for facial asymmetry, speech difficulty and weakness.        SEE HPI       Relevant results reviewed   PROVIDER notes  Nursing notes  MEDS:  Current Facility-Administered Medications   Medication Dose Route Frequency Provider Last Rate Last Admin    acetaminophen (Tylenol) tablet 975 mg  975 mg oral q6h PRN Aminah Singleton PA-C        apixaban (Eliquis) tablet 5 mg  5 mg oral BID Aminah Singleton PA-C   5 mg at 04/23/24 0126    docusate sodium (Colace) capsule 100 mg  100 mg oral BID Aminah Singleton PA-C   100 mg at 04/23/24 0126    melatonin tablet 3 mg  3 mg oral Daily Aminah Singleton PA-C        pantoprazole (ProtoNix) EC tablet 40 mg  40 mg oral Daily before breakfast Aminah Singleton PA-C        Or    pantoprazole (ProtoNix) injection 40 mg  40 mg intravenous Daily before breakfast Aminah Singleton PA-C         Current Outpatient Medications   Medication Sig Dispense Refill    apixaban (Eliquis) 5 mg tablet Take 1 tablet (5 mg) by mouth 2 times a day.        LABS:  Results for orders placed or performed during the hospital encounter of 04/22/24 (from the past 24 hour(s))   CBC and Auto Differential   Result Value Ref Range    WBC 13.1 (H) 4.4 - 11.3 x10*3/uL    nRBC 0.0 0.0 - 0.0 /100 WBCs    RBC 3.47 (L) 4.00 - 5.20 x10*6/uL    Hemoglobin 10.7 (L) 12.0 - 16.0 g/dL    Hematocrit 32.4 (L) 36.0 - 46.0 %    MCV 93 80 - 100 fL    MCH 30.8 26.0 - 34.0 pg    MCHC 33.0 32.0 - 36.0 g/dL    RDW 16.7 (H) 11.5 - 14.5 %    Platelets 347 150 - 450 x10*3/uL    Neutrophils % 64.8 40.0 - 80.0 %    Immature Granulocytes %, Automated 0.4 0.0 - 0.9 %    Lymphocytes % 25.9 13.0 - 44.0 %    Monocytes % 7.0 2.0 - 10.0 %    Eosinophils % 1.4 0.0 - 6.0 %    Basophils % 0.5 0.0 - 2.0 %    Neutrophils Absolute 8.49 (H) 1.20 - 7.70 x10*3/uL    Immature Granulocytes Absolute, Automated 0.05 0.00 -  0.70 x10*3/uL    Lymphocytes Absolute 3.39 1.20 - 4.80 x10*3/uL    Monocytes Absolute 0.91 0.10 - 1.00 x10*3/uL    Eosinophils Absolute 0.18 0.00 - 0.70 x10*3/uL    Basophils Absolute 0.06 0.00 - 0.10 x10*3/uL   Magnesium   Result Value Ref Range    Magnesium 2.20 1.60 - 2.40 mg/dL   Comprehensive metabolic panel   Result Value Ref Range    Glucose 224 (H) 74 - 99 mg/dL    Sodium 140 136 - 145 mmol/L    Potassium 3.6 3.5 - 5.3 mmol/L    Chloride 103 98 - 107 mmol/L    Bicarbonate 27 21 - 32 mmol/L    Anion Gap 14 10 - 20 mmol/L    Urea Nitrogen 21 6 - 23 mg/dL    Creatinine 1.01 0.50 - 1.05 mg/dL    eGFR 66 >60 mL/min/1.73m*2    Calcium 8.6 8.6 - 10.3 mg/dL    Albumin 3.6 3.4 - 5.0 g/dL    Alkaline Phosphatase 61 33 - 110 U/L    Total Protein 7.9 6.4 - 8.2 g/dL    AST 49 (H) 9 - 39 U/L    Bilirubin, Total 0.5 0.0 - 1.2 mg/dL    ALT 14 7 - 45 U/L   Troponin I, High Sensitivity   Result Value Ref Range    Troponin I, High Sensitivity 5 0 - 13 ng/L   TSH with reflex to Free T4 if abnormal   Result Value Ref Range    Thyroid Stimulating Hormone 0.02 (L) 0.44 - 3.98 mIU/L   Thyroxine, Free   Result Value Ref Range    Thyroxine, Free 1.62 (H) 0.61 - 1.12 ng/dL   Protime-INR   Result Value Ref Range    Protime 20.4 (H) 9.8 - 12.8 seconds    INR 1.8 (H) 0.9 - 1.1   Urinalysis with Reflex Culture and Microscopic   Result Value Ref Range    Color, Urine Light-Yellow Light-Yellow, Yellow, Dark-Yellow    Appearance, Urine Clear Clear    Specific Gravity, Urine 1.018 1.005 - 1.035    pH, Urine 5.0 5.0, 5.5, 6.0, 6.5, 7.0, 7.5, 8.0    Protein, Urine 20 (TRACE) NEGATIVE, 10 (TRACE), 20 (TRACE) mg/dL    Glucose, Urine Normal Normal mg/dL    Blood, Urine NEGATIVE NEGATIVE    Ketones, Urine TRACE (A) NEGATIVE mg/dL    Bilirubin, Urine NEGATIVE NEGATIVE    Urobilinogen, Urine 2 (1+) (A) Normal mg/dL    Nitrite, Urine NEGATIVE NEGATIVE    Leukocyte Esterase, Urine 25 Prabhu/µL (A) NEGATIVE   Microscopic Only, Urine   Result Value Ref  Range    WBC, Urine 1-5 1-5, NONE /HPF    RBC, Urine 1-2 NONE, 1-2, 3-5 /HPF    Squamous Epithelial Cells, Urine 1-9 (SPARSE) Reference range not established. /HPF    Bacteria, Urine 1+ (A) NONE SEEN /HPF    Mucus, Urine FEW Reference range not established. /LPF    Hyaline Casts, Urine 2+ (A) NONE /LPF   Ammonia   Result Value Ref Range    Ammonia 21 16 - 53 umol/L   Comprehensive metabolic panel   Result Value Ref Range    Glucose 197 (H) 74 - 99 mg/dL    Sodium 143 136 - 145 mmol/L    Potassium 3.2 (L) 3.5 - 5.3 mmol/L    Chloride 104 98 - 107 mmol/L    Bicarbonate 29 21 - 32 mmol/L    Anion Gap 13 10 - 20 mmol/L    Urea Nitrogen 22 6 - 23 mg/dL    Creatinine 0.83 0.50 - 1.05 mg/dL    eGFR 83 >60 mL/min/1.73m*2    Calcium 8.6 8.6 - 10.3 mg/dL    Albumin 3.5 3.4 - 5.0 g/dL    Alkaline Phosphatase 61 33 - 110 U/L    Total Protein 7.8 6.4 - 8.2 g/dL    AST 42 (H) 9 - 39 U/L    Bilirubin, Total 0.4 0.0 - 1.2 mg/dL    ALT 13 7 - 45 U/L   CBC and Auto Differential   Result Value Ref Range    WBC 10.9 4.4 - 11.3 x10*3/uL    nRBC 0.0 0.0 - 0.0 /100 WBCs    RBC 3.53 (L) 4.00 - 5.20 x10*6/uL    Hemoglobin 10.7 (L) 12.0 - 16.0 g/dL    Hematocrit 32.9 (L) 36.0 - 46.0 %    MCV 93 80 - 100 fL    MCH 30.3 26.0 - 34.0 pg    MCHC 32.5 32.0 - 36.0 g/dL    RDW 16.8 (H) 11.5 - 14.5 %    Platelets 322 150 - 450 x10*3/uL    Neutrophils % 59.2 40.0 - 80.0 %    Immature Granulocytes %, Automated 0.3 0.0 - 0.9 %    Lymphocytes % 28.3 13.0 - 44.0 %    Monocytes % 9.1 2.0 - 10.0 %    Eosinophils % 2.5 0.0 - 6.0 %    Basophils % 0.6 0.0 - 2.0 %    Neutrophils Absolute 6.43 1.20 - 7.70 x10*3/uL    Immature Granulocytes Absolute, Automated 0.03 0.00 - 0.70 x10*3/uL    Lymphocytes Absolute 3.07 1.20 - 4.80 x10*3/uL    Monocytes Absolute 0.99 0.10 - 1.00 x10*3/uL    Eosinophils Absolute 0.27 0.00 - 0.70 x10*3/uL    Basophils Absolute 0.06 0.00 - 0.10 x10*3/uL      IMAGING:  MR brain wo IV contrast   Final Result   MRI Brain:        No evidence  of acute infarct, intracranial mass effect or midline   shift.        Regions of encephalomalacia, remote hemosiderin deposition and   nonspecific white matter changes similar to prior imaging.        MRA:        No evidence of major vessel cutoff or significant stenosis on MRA   head and neck.        MACRO:   None        Signed by: Nelida Stahl 4/22/2024 9:01 PM   Dictation workstation:   [id number]      MR angio head wo IV contrast   Final Result   MRI Brain:        No evidence of acute infarct, intracranial mass effect or midline   shift.        Regions of encephalomalacia, remote hemosiderin deposition and   nonspecific white matter changes similar to prior imaging.        MRA:        No evidence of major vessel cutoff or significant stenosis on MRA   head and neck.        MACRO:   None        Signed by: Nelida Stahl 4/22/2024 9:01 PM   Dictation workstation:   [id number]      MR angio neck wo IV contrast   Final Result   MRI Brain:        No evidence of acute infarct, intracranial mass effect or midline   shift.        Regions of encephalomalacia, remote hemosiderin deposition and   nonspecific white matter changes similar to prior imaging.        MRA:        No evidence of major vessel cutoff or significant stenosis on MRA   head and neck.        MACRO:   None        Signed by: Nelida Stahl 4/22/2024 9:01 PM   Dictation workstation:   [id number]      XR chest 1 view   Final Result   Minimal infiltrate or atelectasis left lung base.   Signed by Carl Rodriges MD      CT head wo IV contrast   Final Result   No evidence of acute cortical infarct or intracranial hemorrhage.                  MACRO:   None                  Signed by: Javed Mcclure 4/22/2024 2:28 PM   Dictation workstation:   [id number]             PHYSICAL EXAM  /88   Pulse 87   Temp 36.1 °C (97 °F)   Resp 16   Wt 93 kg (205 lb)   SpO2 98%   BMI 49.43 kg/m²   GENERAL: Alert  SKIN: Warm and dry.  No suspicious lesions or  rashes.  HEENT:  NCAT, PERRLA, refusing to open mouth  LUNGS: Unlabored, CTAB, no significant wheezing, rhonchi, or rales appreciated  CARDS: RRR, no m/g/r appreciated  GI: Soft, NTND, BS+, no rebound, no guarding   MS/Extremities: WWP, no significant pitting edema, distal pulses intact, moves all extremities  NEURO: Alert, +left facial droop, aphasia (chronic), R hemiparesis (chronic), following basic commands, nods yes/no to simple questions   PSYCH: mood and behavior appropriate    Assessment/Plan:   Janette Martinez is a 53 y.o. female with PMH significant for left thalamic ICH (2007, residual right hemiplegia, dysarthria and aphasia), thalamic infarct and thalamic pain syndrome (2022), HIV, Left corona radiata infarct (2023), left MCA fusiform aneurysm, PE (on Apixaban), HTN, HLD, CKD III, T2DM, HFrEF (EF 45% 5/23/22),  post-surgical hypothyroid after thyroidectomy for graves disease who presented to the ER with c/o:   -HPI limited as patient poor historian, with prior stroke with residual aphasia  -reported patient was sent to the ER from her facility due to concern for AMS and worsening L sided facial droop.      Principal Problem:    Encephalopathy  Active Problems:    HIV infection (Multi)    Mixed hyperlipidemia    Hypothyroidism    Aphasia as late effect of cerebrovascular accident    Stage 3a chronic kidney disease (Multi)    Acute pulmonary embolism (Multi)    Essential hypertension    Chronic systolic heart failure (Multi)    Diabetic polyneuropathy (Multi)    T2DM (type 2 diabetes mellitus) (Multi)    Gastroesophageal reflux disease    Esophageal dysphagia    Hemiplegia of nondominant side, late effect of cerebrovascular disease (Multi)    AMS  Encephalopathy   Hx CVA with residual R hemiparesis, aphasia  -CBC: +leukocytosis, no acute anemia, no thrombocytopenia  -CMP: no acute electrolyte abnormalities, no acute renal or liver dysfunction appreciated   -CTH: no acute processes  -MRI/MRA: No acute  processes.  No LVO or acute stenosis.   -UA: +leukest, no nitrites, 1-5 WBC --> which could suggest UTI, but patient denies urinary complaints (unclear if reliable historian), received dose of rocephin in the ER at 2300 on 4/22/24 which will provide coverage for 24h, will hold off on ordering additional antibiotics for now, and follow up urine culture results   -repeat labs in AM   -need to get PTA med list from facility and resume home meds as appropriate    HIV:  -need to confirm HIV meds and resume  -last CD4 count 4/2022: CD3 + T cell % 89 (60-89%), CD3 + T cell #[id number] (high) [phone]), CD3 and CD4 + Tcell % 23 (34-61%), CD3 + CD4 + T cell # [phone])  -consider ID consult versus follow up, I do not see that she follows with ID in the EMR, will need to confirm     Hx graves s/p thyroidectomy   Post surgical hypothyroid  -TSH low, Free thyroxine high --> consistent with over medicated hypothyroid  -hold levothyroxine --> will need dose adjustment and repeat lab as OP    HTN  HLP  T2DM  CKD3  -hypoglycemia protocol, accuchecks ACHS, ISS coverage  -Hba1c pending   -confirm home meds and resume as appropriate     HFrEF  -ECHO 5/2022: EF 45%.  (EF 35-40% 2/17/2022)  -need to confirm home meds and resume as appropriate     GI ppx:   -Protonix  -bowel regimen    VTE ppx:  Hx PE  -continue Eliquis.     Total time spent [including but not limited to]: Obtaining and reviewing patient medical records/history, obtaining a separate history,  examining and assessing the patient, providing  and education, placing pertinent orders for labs/tests/medications,  communicating with the patient/family/health team, documenting in the patient's EMR/formulation of this note, independently interpreting results and data, coordinating care:   65 minutes, with greater than 50% spent in personal discussion with patient and/or family    Aminah Singleton PA-C

## 2024-04-23 NOTE — CONSULTS
Wound Care Consult     Visit Date: 4/23/2024      Patient Name: Janette Martinez         MRN: [id number]           YOB: 1970     Reason for Consult: patient presents with an unstageable pressure injury to the coccyx in a region of hyperpigmentation.        Wound History: patient unable to give history of this wound.      Skin prevention supplies applied/in place:   Legs elevated on pillows and heels floated  Pillows under bony prominences  Mepilex sacrum foam dressing  Coloplast Critic-Aid cream applied to clean buttock, perineum and groin       Pertinent Labs:   Albumin   Date Value Ref Range Status   04/23/2024 3.5 3.4 - 5.0 g/dL Final   06/14/2022 4.0 3.4 - 5.0 g/dL Final       Wound Assessment:  Wound 04/23/24 Pressure Injury Coccyx (Active)   Wound Image   04/23/24 1032   Site Assessment Painful;Sloughing 04/23/24 1032   Caro-Wound Assessment Clean;Dry;Intact 04/23/24 1032   Pressure Injury Stage U 04/23/24 1032   Shape oval 04/23/24 1032   Wound Length (cm) 3 cm 04/23/24 1032   Wound Width (cm) 2 cm 04/23/24 1032   Wound Surface Area (cm^2) 6 cm^2 04/23/24 1032   Wound Depth (cm) 0.2 cm 04/23/24 1032   Wound Volume (cm^3) 1.2 cm^3 04/23/24 1032   State of Healing Non-healing;Slough 04/23/24 1032   Margins Attached edges 04/23/24 1032   Drainage Description Serosanguineous 04/23/24 1032   Drainage Amount Small 04/23/24 1032   Dressing Packed;Foam 04/23/24 1032   Dressing Changed Changed 04/23/24 1032   Dressing Status Clean;Dry 04/23/24 1032       Wound Team Summary Assessment: patient incontinent of urine and stool.  Caro care provided and Criticaid cream applied to region.  Assessment of coccyx complete and recommendations below.  2 sets of dressings at the bedside.      Coccyx: unstageable pressure injury  Every other day: Bedside RN/LPN to complete wound care.  Cleanse with normal saline and pat dry.  Apply no-sting barrier film to periwound skin.  Apply Medihoney alginate to wound  base.  Cover with Mepilex foam border dressing.  Strict q2 turns to offload sacrum and buttock.     While in bed patient should only be on one fitted sheet, and one chux. Please, do not use brief while patient is resting in bed. Elevate heels off the bed surface at all times. Turn and reposition at least every 2 hours.     Wound Team Plan: WOC team will follow up next week if patient is still admitted.  Thank you for this consultation, while inpatient please contact with any questions or changes in condition.       Farideh Mi RN  4/23/2024  11:58 AM

## 2024-04-23 NOTE — PROGRESS NOTES
[facility] dawna [facility]  [phone]     04/23/24 0625   Current Planned Discharge Disposition   Current Planned Discharge Disposition SNF

## 2024-04-23 NOTE — PROGRESS NOTES
04/23/24 0626   ACS Disability Status   Are you deaf or do you have serious difficulty hearing? N   Are you blind or do you have serious difficulty seeing, even when wearing glasses? N   Because of a physical, mental, or emotional condition, do you have serious difficulty concentrating, remembering, or making decisions? (5 years old or older) Y   Do you have serious difficulty walking or climbing stairs? Y   Do you have serious difficulty dressing or bathing? Y   Because of a physical, mental, or emotional condition, do you have serious difficulty doing errands alone such as visiting the doctor? Y

## 2024-04-23 NOTE — HOSPITAL COURSE
history of CVA and HIV. She presented today from her skilled nursing facility at about 2 PM with worsening left-sided facial droop. Last time known well was 8 AM. She normally speaks but was mute when she arrived. She was not really following commands. She will somewhat move her left upper extremity now but her presentation is more consistent with an encephalopathy. I spoke to Dr. Morse from neurology who agrees. I got the MRA and MRI which showed no vascular cutoff. The nurse is going to straight cath her now and will give antibiotics if she has a UTI.       Left thalamic ICH (2007)  thalamic infarct (2022)  Left corona radiata infarct (2023)  PE (Apixaban)  HTN   HLD  CKD3: baseline 1.3   T2DM  HIV    DNR-CCA per document 4/15/24?    -LVEF 35-40% 2/17/2022, improved to 45% on most  recent echo 5/22. no pfo.   - Patient not on any GDMT?     heart failure with reduced EF, diabetes, hypertension, hyperlipidemia and CVA with right-sided weakness and HIV,   #dysphagia 2/2 cva  - currently with residual r ight sided weakness, facial weakness, dysarthria. h/o stroke 2018.     CD4 absolute count (2/24/23)  CD3 + T cell % 89 (60-89%)  CD3 + T cell #[phone] (high) ([phone])  CD3 and CD4 + Tcell % 23 (34-61%)  CD3 + CD4 + T cell # [phone] ([phone])

## 2024-04-23 NOTE — PROGRESS NOTES
Transitional Care Coordination Progress Note:  Plan per Medical/Surgical team: treatment of AMS and worsening L sided facial droop, UTI with IV ATB  Status: Observation  Payor source: medicare A/B  Discharge disposition: [facility] [phone]  Potential Barriers: HX of CVA's  ADOD: 4/25/2024  JUAN CARLOS Gardner RN, BSN Transitional Care Coordinator ED# [phone]      04/23/24 0605   Discharge Planning   Living Arrangements Alone   Support Systems Family members   Assistance Needed ATB plan   Type of Residence Skilled nursing facility   Do you have animals or pets at home? No   Home or Post Acute Services Post acute facilities (Rehab/SNF/etc)   Type of Post Acute Facility Services Skilled nursing   Patient expects to be discharged to: [facility]  [phone]   Does the patient need discharge transport arranged? Yes   RoundTrip coordination needed? Yes   Has discharge transport been arranged? No   Financial Resource Strain   How hard is it for you to pay for the very basics like food, housing, medical care, and heating? Not hard   Housing Stability   In the last 12 months, was there a time when you were not able to pay the mortgage or rent on time? N   In the last 12 months, how many places have you lived? 1   In the last 12 months, was there a time when you did not have a steady place to sleep or slept in a shelter (including now)? N   Transportation Needs   In the past 12 months, has lack of transportation kept you from medical appointments or from getting medications? no   In the past 12 months, has lack of transportation kept you from meetings, work, or from getting things needed for daily living? No

## 2024-04-23 NOTE — PROGRESS NOTES
04/23/24 0860   Discharge Planning   Patient expects to be discharged to: [facility]     Patient is from [facility].  Pending wound consult.  Possible need for ID consult.  Will continue to follow for discharge planning needs.    1462 Patient is good with her bed days so can return when she is ready with no barriers.

## 2024-04-24 ENCOUNTER — APPOINTMENT (OUTPATIENT)
Dept: CARDIOLOGY | Facility: HOSPITAL | Age: 54
DRG: 689 | End: 2024-04-24
Payer: MEDICARE

## 2024-04-24 LAB
ALBUMIN SERPL BCP-MCNC: 3.5 G/DL (ref 3.4–5)
ANION GAP SERPL CALC-SCNC: 13 MMOL/L (ref 10–20)
BASOPHILS # BLD AUTO: 0.05 X10*3/UL (ref 0–0.1)
BASOPHILS NFR BLD AUTO: 0.4 %
BUN SERPL-MCNC: 23 MG/DL (ref 6–23)
CALCIUM SERPL-MCNC: 8.8 MG/DL (ref 8.6–10.3)
CD3+CD4+ CELLS # BLD: 0.59 X10E9/L
CD3+CD4+ CELLS # BLD: 587 /MM3
CD3+CD4+ CELLS NFR BLD: 22 %
CD3+CD4+ CELLS/CD3+CD8+ CLL BLD: 0.34 %
CD3+CD8+ CELLS # BLD: 1.71 X10E9/L
CD3+CD8+ CELLS NFR BLD: 64 %
CHLORIDE SERPL-SCNC: 101 MMOL/L (ref 98–107)
CO2 SERPL-SCNC: 29 MMOL/L (ref 21–32)
CREAT SERPL-MCNC: 1 MG/DL (ref 0.5–1.05)
EGFRCR SERPLBLD CKD-EPI 2021: 67 ML/MIN/1.73M*2
EOSINOPHIL # BLD AUTO: 0.24 X10*3/UL (ref 0–0.7)
EOSINOPHIL NFR BLD AUTO: 2.1 %
ERYTHROCYTE [DISTWIDTH] IN BLOOD BY AUTOMATED COUNT: 16.5 % (ref 11.5–14.5)
FLOW CYTOMETRY SPECIALIST REVIEW: ABNORMAL
GLUCOSE BLD MANUAL STRIP-MCNC: 169 MG/DL (ref 74–99)
GLUCOSE BLD MANUAL STRIP-MCNC: 193 MG/DL (ref 74–99)
GLUCOSE BLD MANUAL STRIP-MCNC: 223 MG/DL (ref 74–99)
GLUCOSE BLD MANUAL STRIP-MCNC: 227 MG/DL (ref 74–99)
GLUCOSE BLD MANUAL STRIP-MCNC: 260 MG/DL (ref 74–99)
GLUCOSE SERPL-MCNC: 214 MG/DL (ref 74–99)
HCT VFR BLD AUTO: 30.8 % (ref 36–46)
HGB BLD-MCNC: 10 G/DL (ref 12–16)
IMM GRANULOCYTES # BLD AUTO: 0.04 X10*3/UL (ref 0–0.7)
IMM GRANULOCYTES NFR BLD AUTO: 0.4 % (ref 0–0.9)
LYMPHOCYTES # BLD AUTO: 2.67 X10*3/UL (ref 1.2–4.8)
LYMPHOCYTES # SPEC AUTO: 2.67 X10*3/UL
LYMPHOCYTES NFR BLD AUTO: 23.6 %
MAGNESIUM SERPL-MCNC: 2.1 MG/DL (ref 1.6–2.4)
MCH RBC QN AUTO: 30.4 PG (ref 26–34)
MCHC RBC AUTO-ENTMCNC: 32.5 G/DL (ref 32–36)
MCV RBC AUTO: 94 FL (ref 80–100)
MONOCYTES # BLD AUTO: 1.01 X10*3/UL (ref 0.1–1)
MONOCYTES NFR BLD AUTO: 8.9 %
NEUTROPHILS # BLD AUTO: 7.31 X10*3/UL (ref 1.2–7.7)
NEUTROPHILS NFR BLD AUTO: 64.6 %
NRBC BLD-RTO: 0 /100 WBCS (ref 0–0)
PHOSPHATE SERPL-MCNC: 3.1 MG/DL (ref 2.5–4.9)
PLATELET # BLD AUTO: 333 X10*3/UL (ref 150–450)
POTASSIUM SERPL-SCNC: 3.1 MMOL/L (ref 3.5–5.3)
RBC # BLD AUTO: 3.29 X10*6/UL (ref 4–5.2)
SODIUM SERPL-SCNC: 140 MMOL/L (ref 136–145)
WBC # BLD AUTO: 11.3 X10*3/UL (ref 4.4–11.3)

## 2024-04-24 PROCEDURE — 80069 RENAL FUNCTION PANEL: CPT | Performed by: INTERNAL MEDICINE

## 2024-04-24 PROCEDURE — 36415 COLL VENOUS BLD VENIPUNCTURE: CPT | Performed by: INTERNAL MEDICINE

## 2024-04-24 PROCEDURE — 2500000005 HC RX 250 GENERAL PHARMACY W/O HCPCS: Performed by: INTERNAL MEDICINE

## 2024-04-24 PROCEDURE — 2500000006 HC RX 250 W HCPCS SELF ADMINISTERED DRUGS (ALT 637 FOR ALL PAYERS): Mod: MUE | Performed by: INTERNAL MEDICINE

## 2024-04-24 PROCEDURE — 2500000006 HC RX 250 W HCPCS SELF ADMINISTERED DRUGS (ALT 637 FOR ALL PAYERS): Performed by: HOSPITALIST

## 2024-04-24 PROCEDURE — 82947 ASSAY GLUCOSE BLOOD QUANT: CPT | Mod: 91

## 2024-04-24 PROCEDURE — 99232 SBSQ HOSP IP/OBS MODERATE 35: CPT | Performed by: HOSPITALIST

## 2024-04-24 PROCEDURE — 83735 ASSAY OF MAGNESIUM: CPT | Performed by: INTERNAL MEDICINE

## 2024-04-24 PROCEDURE — 2500000001 HC RX 250 WO HCPCS SELF ADMINISTERED DRUGS (ALT 637 FOR MEDICARE OP): Performed by: INTERNAL MEDICINE

## 2024-04-24 PROCEDURE — 2500000002 HC RX 250 W HCPCS SELF ADMINISTERED DRUGS (ALT 637 FOR MEDICARE OP, ALT 636 FOR OP/ED): Performed by: PHYSICIAN ASSISTANT

## 2024-04-24 PROCEDURE — 1200000002 HC GENERAL ROOM WITH TELEMETRY DAILY

## 2024-04-24 PROCEDURE — 2500000002 HC RX 250 W HCPCS SELF ADMINISTERED DRUGS (ALT 637 FOR MEDICARE OP, ALT 636 FOR OP/ED): Performed by: HOSPITALIST

## 2024-04-24 PROCEDURE — 2500000001 HC RX 250 WO HCPCS SELF ADMINISTERED DRUGS (ALT 637 FOR MEDICARE OP): Performed by: PHYSICIAN ASSISTANT

## 2024-04-24 PROCEDURE — 85025 COMPLETE CBC W/AUTO DIFF WBC: CPT | Performed by: INTERNAL MEDICINE

## 2024-04-24 PROCEDURE — 92610 EVALUATE SWALLOWING FUNCTION: CPT | Mod: GN

## 2024-04-24 PROCEDURE — 2500000006 HC RX 250 W HCPCS SELF ADMINISTERED DRUGS (ALT 637 FOR ALL PAYERS): Performed by: INTERNAL MEDICINE

## 2024-04-24 PROCEDURE — 2500000006 HC RX 250 W HCPCS SELF ADMINISTERED DRUGS (ALT 637 FOR ALL PAYERS): Mod: MUE | Performed by: HOSPITALIST

## 2024-04-24 PROCEDURE — 93010 ELECTROCARDIOGRAM REPORT: CPT | Performed by: STUDENT IN AN ORGANIZED HEALTH CARE EDUCATION/TRAINING PROGRAM

## 2024-04-24 PROCEDURE — 87536 HIV-1 QUANT&REVRSE TRNSCRPJ: CPT | Mod: AHULAB | Performed by: HOSPITALIST

## 2024-04-24 PROCEDURE — 93005 ELECTROCARDIOGRAM TRACING: CPT

## 2024-04-24 PROCEDURE — 82947 ASSAY GLUCOSE BLOOD QUANT: CPT

## 2024-04-24 RX ORDER — INSULIN GLARGINE 100 [IU]/ML
40 INJECTION, SOLUTION SUBCUTANEOUS NIGHTLY
Status: DISCONTINUED | OUTPATIENT
Start: 2024-04-24 | End: 2024-04-25

## 2024-04-24 RX ORDER — LEVOTHYROXINE SODIUM 125 UG/1
125 TABLET ORAL DAILY
Status: DISCONTINUED | OUTPATIENT
Start: 2024-04-25 | End: 2024-05-03 | Stop reason: HOSPADM

## 2024-04-24 RX ORDER — POTASSIUM CHLORIDE 20 MEQ/1
40 TABLET, EXTENDED RELEASE ORAL ONCE
Status: COMPLETED | OUTPATIENT
Start: 2024-04-24 | End: 2024-04-24

## 2024-04-24 RX ADMIN — INSULIN LISPRO 3 UNITS: 100 INJECTION, SOLUTION INTRAVENOUS; SUBCUTANEOUS at 18:20

## 2024-04-24 RX ADMIN — INSULIN LISPRO 1 UNITS: 100 INJECTION, SOLUTION INTRAVENOUS; SUBCUTANEOUS at 08:37

## 2024-04-24 RX ADMIN — CARBOXYMETHYLCELLULOSE SODIUM 1 DROP: 0.5 SOLUTION/ DROPS OPHTHALMIC at 21:22

## 2024-04-24 RX ADMIN — GABAPENTIN 600 MG: 300 CAPSULE ORAL at 08:36

## 2024-04-24 RX ADMIN — EMTRICITABINE AND TENOFOVIR ALAFENAMIDE 1 TABLET: 200; 25 TABLET ORAL at 09:00

## 2024-04-24 RX ADMIN — FUROSEMIDE 20 MG: 20 TABLET ORAL at 08:36

## 2024-04-24 RX ADMIN — LEVOTHYROXINE SODIUM 150 MCG: 75 TABLET ORAL at 06:51

## 2024-04-24 RX ADMIN — INSULIN HUMAN 20 UNITS: 100 INJECTION, SUSPENSION SUBCUTANEOUS at 11:07

## 2024-04-24 RX ADMIN — SERTRALINE HYDROCHLORIDE 200 MG: 50 TABLET ORAL at 08:36

## 2024-04-24 RX ADMIN — CARBOXYMETHYLCELLULOSE SODIUM 1 DROP: 0.5 SOLUTION/ DROPS OPHTHALMIC at 08:35

## 2024-04-24 RX ADMIN — INSULIN LISPRO 2 UNITS: 100 INJECTION, SOLUTION INTRAVENOUS; SUBCUTANEOUS at 11:53

## 2024-04-24 RX ADMIN — HYDROCHLOROTHIAZIDE 25 MG: 25 TABLET ORAL at 08:35

## 2024-04-24 RX ADMIN — POTASSIUM CHLORIDE 40 MEQ: 1500 TABLET, EXTENDED RELEASE ORAL at 11:06

## 2024-04-24 RX ADMIN — PANTOPRAZOLE SODIUM 40 MG: 40 TABLET, DELAYED RELEASE ORAL at 06:51

## 2024-04-24 RX ADMIN — APIXABAN 5 MG: 5 TABLET, FILM COATED ORAL at 08:35

## 2024-04-24 RX ADMIN — LEVOFLOXACIN 750 MG: 750 TABLET, FILM COATED ORAL at 08:35

## 2024-04-24 RX ADMIN — DOLUTEGRAVIR SODIUM 50 MG: 50 TABLET, FILM COATED ORAL at 08:35

## 2024-04-24 RX ADMIN — ATORVASTATIN CALCIUM 10 MG: 10 TABLET, FILM COATED ORAL at 08:36

## 2024-04-24 RX ADMIN — POTASSIUM CHLORIDE 20 MEQ: 1500 TABLET, EXTENDED RELEASE ORAL at 08:35

## 2024-04-24 RX ADMIN — AMLODIPINE BESYLATE 5 MG: 5 TABLET ORAL at 08:36

## 2024-04-24 SDOH — SOCIAL STABILITY: SOCIAL INSECURITY: ARE THERE ANY APPARENT SIGNS OF INJURIES/BEHAVIORS THAT COULD BE RELATED TO ABUSE/NEGLECT?: UNABLE TO ASSESS

## 2024-04-24 SDOH — SOCIAL STABILITY: SOCIAL INSECURITY: DOES ANYONE TRY TO KEEP YOU FROM HAVING/CONTACTING OTHER FRIENDS OR DOING THINGS OUTSIDE YOUR HOME?: UNABLE TO ASSESS

## 2024-04-24 SDOH — SOCIAL STABILITY: SOCIAL INSECURITY: DO YOU FEEL ANYONE HAS EXPLOITED OR TAKEN ADVANTAGE OF YOU FINANCIALLY OR OF YOUR PERSONAL PROPERTY?: UNABLE TO ASSESS

## 2024-04-24 SDOH — SOCIAL STABILITY: SOCIAL INSECURITY: DO YOU FEEL UNSAFE GOING BACK TO THE PLACE WHERE YOU ARE LIVING?: UNABLE TO ASSESS

## 2024-04-24 SDOH — SOCIAL STABILITY: SOCIAL INSECURITY: ABUSE: ADULT

## 2024-04-24 SDOH — SOCIAL STABILITY: SOCIAL INSECURITY: HAS ANYONE EVER THREATENED TO HURT YOUR FAMILY OR YOUR PETS?: UNABLE TO ASSESS

## 2024-04-24 SDOH — SOCIAL STABILITY: SOCIAL INSECURITY: HAVE YOU HAD THOUGHTS OF HARMING ANYONE ELSE?: UNABLE TO ASSESS

## 2024-04-24 SDOH — SOCIAL STABILITY: SOCIAL INSECURITY: WERE YOU ABLE TO COMPLETE ALL THE BEHAVIORAL HEALTH SCREENINGS?: NO

## 2024-04-24 SDOH — SOCIAL STABILITY: SOCIAL INSECURITY: ARE YOU OR HAVE YOU BEEN THREATENED OR ABUSED PHYSICALLY, EMOTIONALLY, OR SEXUALLY BY ANYONE?: UNABLE TO ASSESS

## 2024-04-24 SDOH — SOCIAL STABILITY: SOCIAL INSECURITY: HAVE YOU HAD ANY THOUGHTS OF HARMING ANYONE ELSE?: UNABLE TO ASSESS

## 2024-04-24 ASSESSMENT — COGNITIVE AND FUNCTIONAL STATUS - GENERAL
DRESSING REGULAR LOWER BODY CLOTHING: TOTAL
EATING MEALS: TOTAL
STANDING UP FROM CHAIR USING ARMS: TOTAL
TURNING FROM BACK TO SIDE WHILE IN FLAT BAD: TOTAL
HELP NEEDED FOR BATHING: TOTAL
WALKING IN HOSPITAL ROOM: TOTAL
TOILETING: TOTAL
TURNING FROM BACK TO SIDE WHILE IN FLAT BAD: TOTAL
MOVING FROM LYING ON BACK TO SITTING ON SIDE OF FLAT BED WITH BEDRAILS: TOTAL
PATIENT BASELINE BEDBOUND: YES
WALKING IN HOSPITAL ROOM: TOTAL
HELP NEEDED FOR BATHING: TOTAL
MOVING FROM LYING ON BACK TO SITTING ON SIDE OF FLAT BED WITH BEDRAILS: TOTAL
DRESSING REGULAR UPPER BODY CLOTHING: TOTAL
TURNING FROM BACK TO SIDE WHILE IN FLAT BAD: TOTAL
DAILY ACTIVITIY SCORE: 6
PERSONAL GROOMING: TOTAL
DAILY ACTIVITIY SCORE: 6
WALKING IN HOSPITAL ROOM: TOTAL
MOVING TO AND FROM BED TO CHAIR: TOTAL
EATING MEALS: TOTAL
DRESSING REGULAR LOWER BODY CLOTHING: TOTAL
PERSONAL GROOMING: TOTAL
EATING MEALS: TOTAL
STANDING UP FROM CHAIR USING ARMS: TOTAL
MOVING FROM LYING ON BACK TO SITTING ON SIDE OF FLAT BED WITH BEDRAILS: TOTAL
MOBILITY SCORE: 6
MOVING TO AND FROM BED TO CHAIR: TOTAL
STANDING UP FROM CHAIR USING ARMS: TOTAL
CLIMB 3 TO 5 STEPS WITH RAILING: TOTAL
DRESSING REGULAR LOWER BODY CLOTHING: TOTAL
TOILETING: TOTAL
MOVING TO AND FROM BED TO CHAIR: TOTAL
DRESSING REGULAR UPPER BODY CLOTHING: TOTAL
DRESSING REGULAR UPPER BODY CLOTHING: TOTAL
CLIMB 3 TO 5 STEPS WITH RAILING: TOTAL
MOBILITY SCORE: 6
PERSONAL GROOMING: TOTAL
MOBILITY SCORE: 6
TOILETING: TOTAL
DAILY ACTIVITIY SCORE: 6
HELP NEEDED FOR BATHING: TOTAL
CLIMB 3 TO 5 STEPS WITH RAILING: TOTAL

## 2024-04-24 ASSESSMENT — ACTIVITIES OF DAILY LIVING (ADL)
FEEDING YOURSELF: UNABLE TO ASSESS
HEARING - LEFT EAR: UNABLE TO ASSESS
GROOMING: UNABLE TO ASSESS
BATHING: UNABLE TO ASSESS
DRESSING YOURSELF: UNABLE TO ASSESS
ASSISTIVE_DEVICE: OTHER (COMMENT)
HEARING - RIGHT EAR: UNABLE TO ASSESS
JUDGMENT_ADEQUATE_SAFELY_COMPLETE_DAILY_ACTIVITIES: UNABLE TO ASSESS
TOILETING: UNABLE TO ASSESS
PATIENT'S MEMORY ADEQUATE TO SAFELY COMPLETE DAILY ACTIVITIES?: UNABLE TO ASSESS
ADEQUATE_TO_COMPLETE_ADL: UNABLE TO ASSESS
WALKS IN HOME: UNABLE TO ASSESS

## 2024-04-24 ASSESSMENT — PAIN SCALES - PAIN ASSESSMENT IN ADVANCED DEMENTIA (PAINAD)
TOTALSCORE: 0
BREATHING: NORMAL
CONSOLABILITY: NO NEED TO CONSOLE
BODYLANGUAGE: RELAXED
FACIALEXPRESSION: SMILING OR INEXPRESSIVE

## 2024-04-24 ASSESSMENT — LIFESTYLE VARIABLES
AUDIT-C TOTAL SCORE: -1
HOW OFTEN DO YOU HAVE A DRINK CONTAINING ALCOHOL: PATIENT UNABLE TO ANSWER
HOW OFTEN DO YOU HAVE 6 OR MORE DRINKS ON ONE OCCASION: PATIENT UNABLE TO ANSWER
SKIP TO QUESTIONS 9-10: 0
HOW MANY STANDARD DRINKS CONTAINING ALCOHOL DO YOU HAVE ON A TYPICAL DAY: PATIENT UNABLE TO ANSWER
AUDIT-C TOTAL SCORE: -1

## 2024-04-24 ASSESSMENT — PAIN SCALES - GENERAL: PAINLEVEL_OUTOF10: 0 - NO PAIN

## 2024-04-24 NOTE — PROGRESS NOTES
Between 7AM-7PM please message me via Epic Secure Chat.  After 7PM please page Nocturnist on call.    [facility] Hospitalist Progress Note        Janette Martinez    :  1970(53 y.o.)    MRN:  [id number]  Date: 24     Assessment and Plan:     Acute metabolic encephalopathy  Hx CVA with residual R hemiparesis, aphasia  -CBC: +leukocytosis, no acute anemia, no thrombocytopenia  -CMP: no acute electrolyte abnormalities, no acute renal or liver dysfunction appreciated   -CTH: no acute processes  -MRI/MRA: No acute processes.  No LVO or acute stenosis.  - possibly due to UTI vs aspiration pneumonitis  - tx underlying issues    Dysphagia  - SLP consulted, recommends NPO and repeat evaluation tomorrow    UTI  - urine culture pending; continue empiric levaquin    Hypoxia  - Procal negative. BNP wnl.   - possible aspiration pneumonitis vs atelectasis, wean O2 as tolerated    HIV  - checking CD4/8 panel and HIV RNA  - continue home ART    Hx graves s/p thyroidectomy   Post surgical hypothyroid  -TSH low, Free thyroxine high --> decrease synthroid to 125 mcg daily    HLD  - continue statin    T2DM  - Continue lantus 40 units nightly with ssi    Chronic HFrEF  HTN  - continue norvasc, lipitor, lasix    Mood disorder  - continue zoloft, lamictal    Hx of PE  - continue home eliquis    Hypokalemia  - replace and monitor    Possible vaginal bleeding  - check TVUS and consult GYN    DVT Prophylaxis: full anticoagulation with eliquis    Disposition: await consultant recommendations, await test results, and await clinical improvement    Upgraded to IP status, Dr. Ward consulted.     Electronically signed by Hussein Camejo DO on 24 at 2:09 PM     Subjective:      Interval History:   Vitals and chart notes from overnight reviewed.   No acute issues overnight.   Patient seen and evaluated at bedside.   Patient sleepy. Awakens to verbal stimuli. Able to mouth some answers but mostly shakes head yes/no due  to aphasia from prior stroke. Shakes no chest pain, no shortness of breath.     Review of Systems:   Other than patient's chronic conditions and those complaints in the history above, the rest of the 10 systems review were done and were negative.     Current medications:  Scheduled Meds:amLODIPine, 5 mg, oral, Daily  apixaban, 5 mg, oral, BID  atorvastatin, 10 mg, oral, Daily  docusate sodium, 100 mg, oral, BID  dolutegravir, 50 mg, oral, Daily  emtricitabine-tenofovir alafen, 1 tablet, oral, Daily  furosemide, 20 mg, oral, Daily  gabapentin, 600 mg, oral, TID  hydroCHLOROthiazide, 25 mg, oral, Daily  insulin glargine, 40 Units, subcutaneous, Nightly  insulin lispro, 0-5 Units, subcutaneous, Before meals & nightly  lamoTRIgine, 25 mg, oral, q PM  levoFLOXacin, 750 mg, oral, q24h MARTIN  levothyroxine, 150 mcg, oral, Daily  lubricating eye drops, 1 drop, Both Eyes, BID  melatonin, 6 mg, oral, Nightly  pantoprazole, 40 mg, oral, Daily before breakfast   Or  pantoprazole, 40 mg, intravenous, Daily before breakfast  perflutren protein A microsphere, 0.5 mL, intravenous, Once in imaging  potassium chloride CR, 20 mEq, oral, Daily  sertraline, 200 mg, oral, Daily  sulfur hexafluoride microsphr, 2 mL, intravenous, Once in imaging      Continuous Infusions:   PRN Meds:PRN medications: acetaminophen, dextrose, dextrose, glucagon, glucagon, ipratropium-albuteroL, lactulose      Objective:     Heart Rate:  [100-105]   Temp:  [36.1 °C (97 °F)-36.7 °C (98.1 °F)]   Resp:  [17-20]   BP: (116-137)/(74-85)   SpO2:  [53 %-98 %]          Physical Exam  Vitals and nursing note reviewed.   Constitutional:       Comments: Drowsy   Cardiovascular:      Rate and Rhythm: Normal rate and regular rhythm.   Pulmonary:      Effort: Pulmonary effort is normal.   Abdominal:      Palpations: Abdomen is soft.   Musculoskeletal:      Right lower leg: No edema.      Left lower leg: No edema.   Neurological:      Comments: +left facial droop, aphasia  (chronic), R hemiparesis (chronic), following basic commands, nods yes/no to simple questions          Labs:   Lab Results   Component Value Date     04/24/2024    K 3.1 (L) 04/24/2024     04/24/2024    CO2 29 04/24/2024    BUN 23 04/24/2024    CREATININE 1.00 04/24/2024    GLUCOSE 214 (H) 04/24/2024    CALCIUM 8.8 04/24/2024    PROT 7.8 04/23/2024    BILITOT 0.4 04/23/2024    ALKPHOS 61 04/23/2024    AST 42 (H) 04/23/2024    ALT 13 04/23/2024       Lab Results   Component Value Date    WBC 11.3 04/24/2024    HGB 10.0 (L) 04/24/2024    HCT 30.8 (L) 04/24/2024    MCV 94 04/24/2024     04/24/2024

## 2024-04-24 NOTE — PROGRESS NOTES
Speech-Language Pathology    SLP Adult Inpatient Speech-Language Pathology Clinical Swallow Evaluation    Patient Name: Janette Martinez  MRN: [id number]  Today's Date: 4/24/2024   Time Calculation  Start Time: 1320  Stop Time: 1338  Time Calculation (min): 18 min         Current Problem:   1. Encephalopathy        2. SOB (shortness of breath)  Transthoracic Echo (TTE) Complete    Transthoracic Echo (TTE) Complete            Recommendations:  Risk for Aspiration: Yes  Additional Recommendations: NPO  Solid Diet Recommendations : NPO  Liquid Diet Recommendations: No liquids by mouth      Assessment:  Prognosis: Guarded    Pt seen bedside for CSE this date. Pt exhibiting a severe oropharyngeal dysphagia at bedside and compounded by poor LEIGHA. Recommend NPO at this time.       Plan:  Inpatient/Swing Bed or Outpatient: Inpatient  Treatment/Interventions: Diet recommendations, Assess diet tolerance  SLP Plan: Skilled SLP  SLP Frequency: 2x per week    Dysphagia goals:  Pt will tolerate PO trials and re-assessment by SLP within 1-2 days to advance to PO diet. GOAL INITIATED 4/24.  Further goals TBD pending swallow re-assessment on 4/25.       Subjective     At bedside, pt with very limited arousability. Left eye shut and R eye minimally open. Leaning to the left in bed with severe L facial droop and weakness.     Unclear what baseline diet pt is on at SNF. Pt unable to state. Pt on supplemental 02. In doing chart review; SLP evaluated pt on 10/17/23 at [facility] with recommendation at that time for puree diet with thin liquids. +UTI per chart review.     Copied from HPI on admission on 4/22/24: HPI: 53-year-old female with a history of CVA and HIV presents for altered mental status.  Last time known well was 8 AM.  Nursing staff at her facility noted that she is nonverbal, somnolent and exhibiting a worsening left-sided facial droop.  The patient is only responsive to painful stimuli.  She normally speaks and interacts with staff.   History is being provided by EMS  Expand All Collapse All    History Of Present Illness  Janette Martinez is a 53 y.o. female with PMH significant for left thalamic ICH (2007, residual right hemiplegia, dysarthria and aphasia), thalamic infarct and thalamic pain syndrome (2022), HIV, Left corona radiata infarct (2023), left MCA fusiform aneurysm, PE (on Apixaban), HTN, HLD, CKD III, T2DM, HFrEF (EF 45% 5/23/22),  post-surgical hypothyroid after thyroidectomy for graves disease who presented to the ER with c/o:   -HPI limited as patient poor historian, with prior stroke with residual aphasia  -reported patient was sent to the ER from her facility due to concern for AMS and worsening L sided facial droop.  -CTH and MRI in the ER negative for acute stroke        CXR 4/22: LUNGS:  Minimal infiltrate or atelectasis left lung base.    IMPRESSION: 4/22/24  MRI Brain:      No evidence of acute infarct, intracranial mass effect or midline  shift.      Regions of encephalomalacia, remote hemosiderin deposition and  nonspecific white matter changes similar to prior imaging.      CT brain 4/22: IMPRESSION:  No evidence of acute cortical infarct or intracranial hemorrhage.    General Visit Information:  Patient Class: Inpatient  Living Environment: Nursing home (skilled/long-term)  Referred By: Dr Hussein Camejo DO  Date of Onset: 04/22/24  Date of Order: 04/24/24  Base[employer] Diet: unknown  Current Diet : regular/thin  Dysphagia Diagnosis: Severe oral stage dysphagia, Severe pharyngeal stage dysphagia         Objective       Baseline Assessment:     Pt seen for bedside swallow evaluation. Pt currently on a regular diet with thin liquids per chart review with some lunch items on tray next to pt. Pt with very poor LEIGHA at bedside; minimally arousable. Left eye fully shut with severe L side facial droop. Right eye minimally open. Pt with very limited movement during oral mech exam. Minimally able to protrude tongue; unable to pucker lips  "or say /ah/ on command. Severe left facial droop. Pt with natural lower dentition; endentulous upper. Pt unable to voice items or answer questions. Pt did very softly ask for \"cranberry juice\".    Pt exhibiting a severe oropharyngeal dysphagia at bedside. SLP had pt open her mouth, which she was able to do minimally and SLP retrieved a piece of pear sitting in pt's left oral cavity. Pear was removed by SLP. Oral care provided by SLP with limited response by pt to oral care. Pt given x2 very limited amounts of honey thick water with bolus falling into left oral cavity. No attempts made by pt to manipulate bolus or close lips around spoon. Poor, to no bolus formation and A-P transit with severely delayed swallow response observed. PO trials discontinued at this time and deemed unsafe by SLP.         Consistencies Trialed:  Consistencies Trialed: Yes  Consistencies Trialed: Honey thick/liquidised/moderately thick (IDDSI Level 3) - Spoon      Clinical Observations:  Patient Positioning: Partially/Semi Reclined. Poor LEIGHA. Severe dysphagia at bedside. Unsafe for PO intake at this time.         Inpatient:  Education Documentation    Reviewed results and recommendations with nurse Beatty and sent secure chat message to Dr Hussein Camejo, DO with recommendations.                        "

## 2024-04-24 NOTE — PROGRESS NOTES
04/24/24 0918   Discharge Planning   Patient expects to be discharged to: [facility]     Patient is not medically ready for discharge.  Patient now requiring supplemental O2--will wean as able.   Wound care saw patient yesterday and gave their recommendations for the care of her unstagable pressure injury to her coccyx.   Possible neuro consult if patient does not improve with antibiotics.  Possible need for ID consult.  Plan remains for patient to return to [facility] upon discharge.  Will continue to follow for discharge planning needs.    Asked Robb VAUGHN to send updates to facility.    936 Spoke with patient's sister Josh ([phone])--she woud like patient to go to another [facility] but patient likes [facility] so it ok for her to return.  Sister says patient is on continuous O2--will ask facility about that as the note says it is a new requirement and we will attempt to wean.  Sister also said patient was having vaginal bleeding which I do not see addressed in notes-will send message to provider.  Sister would like an update by provider or bedside nurse--updated both.

## 2024-04-24 NOTE — CARE PLAN
The patient's goals for the shift include      The clinical goals for the shift include Pt will remain free of falls    Over the shift, the patient did not make progress toward the following goals.   Problem: Pain  Goal: My pain/discomfort is manageable  Outcome: Progressing     Problem: Safety  Goal: Patient will be injury free during hospitalization  Outcome: Progressing  Goal: I will remain free of falls  Outcome: Progressing     Problem: Daily Care  Goal: Daily care needs are met  Outcome: Progressing     Problem: Psychosocial Needs  Goal: Demonstrates ability to cope with hospitalization/illness  Outcome: Progressing  Goal: Collaborate with me, my family, and caregiver to identify my specific goals  Outcome: Progressing     Problem: Discharge Barriers  Goal: My discharge needs are met  Outcome: Progressing     Problem: Skin  Goal: Decreased wound size/increased tissue granulation at next dressing change  Outcome: Progressing  Goal: Participates in plan/prevention/treatment measures  Outcome: Progressing  Goal: Prevent/manage excess moisture  Outcome: Progressing  Goal: Prevent/minimize sheer/friction injuries  Outcome: Progressing  Goal: Promote/optimize nutrition  Outcome: Progressing  Goal: Promote skin healing  Outcome: Progressing

## 2024-04-24 NOTE — CARE PLAN
The patient's goals for the shift include      The clinical goals for the shift include patient will remain free of falls within this shift.    Over the shift, the patient did not make progress toward the following goals.   Problem: Pain  Goal: My pain/discomfort is manageable  4/24/2024 0245 by Francisco Osman LPN  Outcome: Progressing  4/24/2024 0210 by Francisco Osman LPN  Outcome: Progressing     Problem: Safety  Goal: Patient will be injury free during hospitalization  4/24/2024 0245 by Francisco Osman LPN  Outcome: Progressing  4/24/2024 0210 by Francisco Osman LPN  Outcome: Progressing  Goal: I will remain free of falls  4/24/2024 0245 by Francisco Osman LPN  Outcome: Progressing  4/24/2024 0210 by Francisco Osman LPN  Outcome: Progressing     Problem: Daily Care  Goal: Daily care needs are met  4/24/2024 0245 by Francisco Osman LPN  Outcome: Progressing  4/24/2024 0210 by Francisco Osman LPN  Outcome: Progressing     Problem: Psychosocial Needs  Goal: Demonstrates ability to cope with hospitalization/illness  4/24/2024 0245 by Francisco Osman LPN  Outcome: Progressing  4/24/2024 0210 by Francisco Osman LPN  Outcome: Progressing  Goal: Collaborate with me, my family, and caregiver to identify my specific goals  4/24/2024 0245 by Francisco Osman LPN  Outcome: Progressing  4/24/2024 0210 by Francisco Osman LPN  Outcome: Progressing     Problem: Discharge Barriers  Goal: My discharge needs are met  4/24/2024 0245 by Francisco Osman LPN  Outcome: Progressing  4/24/2024 0210 by Francisco Osman LPN  Outcome: Progressing     Problem: Skin  Goal: Decreased wound size/increased tissue granulation at next dressing change  4/24/2024 0245 by Francisco Osman LPN  Outcome: Progressing  Flowsheets (Taken 4/23/2024 1404 by Rogerio Gaines RN)  Decreased wound size/increased tissue granulation at next dressing change: Promote sleep for wound healing  4/24/2024 0210 by  rFancisco Osman LPN  Outcome: Progressing  Goal: Participates in plan/prevention/treatment measures  4/24/2024 0245 by Francisco Osman LPN  Outcome: Progressing  Flowsheets (Taken 4/23/2024 1404 by Rogerio Gaines, RN)  Participates in plan/prevention/treatment measures: Elevate heels  4/24/2024 0210 by Francisco Osman LPN  Outcome: Progressing  Goal: Prevent/manage excess moisture  4/24/2024 0245 by Francisco Osman LPN  Outcome: Progressing  Flowsheets (Taken 4/23/2024 1404 by Rogerio Gaines, RN)  Prevent/manage excess moisture: Cleanse incontinence/protect with barrier cream  4/24/2024 0210 by Francisco Osman LPN  Outcome: Progressing  Goal: Prevent/minimize sheer/friction injuries  4/24/2024 0245 by Francisco Osman LPN  Outcome: Progressing  Flowsheets (Taken 4/23/2024 1404 by Rogerio Gaines, TARAH)  Prevent/minimize sheer/friction injuries:   Use pull sheet   Turn/reposition every 2 hours/use positioning/transfer devices  4/24/2024 0210 by Francisco Osman LPN  Outcome: Progressing  Goal: Promote/optimize nutrition  4/24/2024 0245 by Francisco Osman LPN  Outcome: Progressing  Flowsheets (Taken 4/23/2024 1404 by Rogerio Gaines, TARAH)  Promote/optimize nutrition: Assist with feeding  4/24/2024 0210 by Francisco Osman LPN  Outcome: Progressing  Goal: Promote skin healing  4/24/2024 0245 by Francisco Osman LPN  Outcome: Progressing  Flowsheets (Taken 4/23/2024 1404 by Rogerio Gaines, RN)  Promote skin healing: Turn/reposition every 2 hours/use positioning/transfer devices  4/24/2024 0210 by Francisco Osman LPN  Outcome: Progressing

## 2024-04-25 ENCOUNTER — APPOINTMENT (OUTPATIENT)
Dept: RADIOLOGY | Facility: HOSPITAL | Age: 54
DRG: 689 | End: 2024-04-25
Payer: MEDICARE

## 2024-04-25 LAB
ANION GAP SERPL CALC-SCNC: 17 MMOL/L (ref 10–20)
ATRIAL RATE: 80 BPM
BACTERIA UR CULT: ABNORMAL
BASOPHILS # BLD AUTO: 0.05 X10*3/UL (ref 0–0.1)
BASOPHILS NFR BLD AUTO: 0.2 %
BUN SERPL-MCNC: 25 MG/DL (ref 6–23)
CALCIUM SERPL-MCNC: 9 MG/DL (ref 8.6–10.3)
CHLORIDE SERPL-SCNC: 104 MMOL/L (ref 98–107)
CO2 SERPL-SCNC: 27 MMOL/L (ref 21–32)
CREAT SERPL-MCNC: 1.1 MG/DL (ref 0.5–1.05)
EGFRCR SERPLBLD CKD-EPI 2021: 59 ML/MIN/1.73M*2
EOSINOPHIL # BLD AUTO: 0.15 X10*3/UL (ref 0–0.7)
EOSINOPHIL NFR BLD AUTO: 0.7 %
ERYTHROCYTE [DISTWIDTH] IN BLOOD BY AUTOMATED COUNT: 16.8 % (ref 11.5–14.5)
GLUCOSE BLD MANUAL STRIP-MCNC: 171 MG/DL (ref 74–99)
GLUCOSE BLD MANUAL STRIP-MCNC: 177 MG/DL (ref 74–99)
GLUCOSE BLD MANUAL STRIP-MCNC: 197 MG/DL (ref 74–99)
GLUCOSE BLD MANUAL STRIP-MCNC: 227 MG/DL (ref 74–99)
GLUCOSE SERPL-MCNC: 161 MG/DL (ref 74–99)
HCT VFR BLD AUTO: 31 % (ref 36–46)
HGB BLD-MCNC: 10.1 G/DL (ref 12–16)
HIV1 RNA # PLAS NAA DL=20: NOT DETECTED {COPIES}/ML
HIV1 RNA SPEC NAA+PROBE-LOG#: NORMAL {LOG_COPIES}/ML
IMM GRANULOCYTES # BLD AUTO: 0.15 X10*3/UL (ref 0–0.7)
IMM GRANULOCYTES NFR BLD AUTO: 0.7 % (ref 0–0.9)
LYMPHOCYTES # BLD AUTO: 3.15 X10*3/UL (ref 1.2–4.8)
LYMPHOCYTES NFR BLD AUTO: 14.4 %
MCH RBC QN AUTO: 30.2 PG (ref 26–34)
MCHC RBC AUTO-ENTMCNC: 32.6 G/DL (ref 32–36)
MCV RBC AUTO: 93 FL (ref 80–100)
MONOCYTES # BLD AUTO: 1.3 X10*3/UL (ref 0.1–1)
MONOCYTES NFR BLD AUTO: 5.9 %
NEUTROPHILS # BLD AUTO: 17.08 X10*3/UL (ref 1.2–7.7)
NEUTROPHILS NFR BLD AUTO: 78.1 %
NRBC BLD-RTO: 0 /100 WBCS (ref 0–0)
P AXIS: 70 DEGREES
P OFFSET: 201 MS
P ONSET: 151 MS
PLATELET # BLD AUTO: 332 X10*3/UL (ref 150–450)
POTASSIUM SERPL-SCNC: 3.2 MMOL/L (ref 3.5–5.3)
PR INTERVAL: 150 MS
Q ONSET: 226 MS
QRS COUNT: 18 BEATS
QRS DURATION: 94 MS
QT INTERVAL: 398 MS
QTC CALCULATION(BAZETT): 526 MS
QTC FREDERICIA: 479 MS
R AXIS: -64 DEGREES
RBC # BLD AUTO: 3.34 X10*6/UL (ref 4–5.2)
SODIUM SERPL-SCNC: 145 MMOL/L (ref 136–145)
T AXIS: 63 DEGREES
T OFFSET: 425 MS
VENTRICULAR RATE: 105 BPM
WBC # BLD AUTO: 21.9 X10*3/UL (ref 4.4–11.3)

## 2024-04-25 PROCEDURE — 2500000002 HC RX 250 W HCPCS SELF ADMINISTERED DRUGS (ALT 637 FOR MEDICARE OP, ALT 636 FOR OP/ED): Performed by: INTERNAL MEDICINE

## 2024-04-25 PROCEDURE — C9113 INJ PANTOPRAZOLE SODIUM, VIA: HCPCS | Performed by: INTERNAL MEDICINE

## 2024-04-25 PROCEDURE — 82947 ASSAY GLUCOSE BLOOD QUANT: CPT

## 2024-04-25 PROCEDURE — 2500000004 HC RX 250 GENERAL PHARMACY W/ HCPCS (ALT 636 FOR OP/ED): Performed by: INTERNAL MEDICINE

## 2024-04-25 PROCEDURE — 1200000002 HC GENERAL ROOM WITH TELEMETRY DAILY

## 2024-04-25 PROCEDURE — 2500000005 HC RX 250 GENERAL PHARMACY W/O HCPCS: Performed by: INTERNAL MEDICINE

## 2024-04-25 PROCEDURE — 99221 1ST HOSP IP/OBS SF/LOW 40: CPT | Performed by: OBSTETRICS & GYNECOLOGY

## 2024-04-25 PROCEDURE — 85025 COMPLETE CBC W/AUTO DIFF WBC: CPT | Performed by: HOSPITALIST

## 2024-04-25 PROCEDURE — 36415 COLL VENOUS BLD VENIPUNCTURE: CPT | Performed by: HOSPITALIST

## 2024-04-25 PROCEDURE — 76857 US EXAM PELVIC LIMITED: CPT | Performed by: RADIOLOGY

## 2024-04-25 PROCEDURE — 92526 ORAL FUNCTION THERAPY: CPT | Mod: GN

## 2024-04-25 PROCEDURE — 80048 BASIC METABOLIC PNL TOTAL CA: CPT | Performed by: HOSPITALIST

## 2024-04-25 PROCEDURE — 76856 US EXAM PELVIC COMPLETE: CPT

## 2024-04-25 PROCEDURE — 2500000002 HC RX 250 W HCPCS SELF ADMINISTERED DRUGS (ALT 637 FOR MEDICARE OP, ALT 636 FOR OP/ED): Performed by: PHYSICIAN ASSISTANT

## 2024-04-25 RX ORDER — ENOXAPARIN SODIUM 100 MG/ML
40 INJECTION SUBCUTANEOUS DAILY
Status: DISCONTINUED | OUTPATIENT
Start: 2024-04-25 | End: 2024-04-25

## 2024-04-25 RX ORDER — ENOXAPARIN SODIUM 100 MG/ML
80 INJECTION SUBCUTANEOUS EVERY 12 HOURS
Status: DISCONTINUED | OUTPATIENT
Start: 2024-04-25 | End: 2024-05-03 | Stop reason: HOSPADM

## 2024-04-25 RX ORDER — LEVOFLOXACIN 5 MG/ML
500 INJECTION, SOLUTION INTRAVENOUS EVERY 24 HOURS
Status: DISCONTINUED | OUTPATIENT
Start: 2024-04-25 | End: 2024-04-30

## 2024-04-25 RX ORDER — PANTOPRAZOLE SODIUM 40 MG/10ML
40 INJECTION, POWDER, LYOPHILIZED, FOR SOLUTION INTRAVENOUS DAILY
Status: DISCONTINUED | OUTPATIENT
Start: 2024-04-25 | End: 2024-05-03 | Stop reason: HOSPADM

## 2024-04-25 RX ORDER — INSULIN GLARGINE 100 [IU]/ML
20 INJECTION, SOLUTION SUBCUTANEOUS EVERY 24 HOURS
Status: DISCONTINUED | OUTPATIENT
Start: 2024-04-25 | End: 2024-04-30

## 2024-04-25 RX ORDER — DEXTROSE MONOHYDRATE AND SODIUM CHLORIDE 5; .45 G/100ML; G/100ML
50 INJECTION, SOLUTION INTRAVENOUS CONTINUOUS
Status: DISCONTINUED | OUTPATIENT
Start: 2024-04-25 | End: 2024-05-03 | Stop reason: HOSPADM

## 2024-04-25 RX ORDER — POTASSIUM CHLORIDE 14.9 MG/ML
20 INJECTION INTRAVENOUS ONCE
Status: COMPLETED | OUTPATIENT
Start: 2024-04-25 | End: 2024-04-25

## 2024-04-25 RX ORDER — HYDRALAZINE HYDROCHLORIDE 20 MG/ML
10 INJECTION INTRAMUSCULAR; INTRAVENOUS EVERY 4 HOURS PRN
Status: DISCONTINUED | OUTPATIENT
Start: 2024-04-25 | End: 2024-05-03 | Stop reason: HOSPADM

## 2024-04-25 RX ADMIN — CARBOXYMETHYLCELLULOSE SODIUM 1 DROP: 0.5 SOLUTION/ DROPS OPHTHALMIC at 10:24

## 2024-04-25 RX ADMIN — INSULIN LISPRO 2 UNITS: 100 INJECTION, SOLUTION INTRAVENOUS; SUBCUTANEOUS at 12:46

## 2024-04-25 RX ADMIN — DEXTROSE AND SODIUM CHLORIDE 50 ML/HR: 5; 450 INJECTION, SOLUTION INTRAVENOUS at 10:39

## 2024-04-25 RX ADMIN — HYDROMORPHONE HYDROCHLORIDE 0.2 MG: 0.2 INJECTION, SOLUTION INTRAMUSCULAR; INTRAVENOUS; SUBCUTANEOUS at 16:37

## 2024-04-25 RX ADMIN — CARBOXYMETHYLCELLULOSE SODIUM 1 DROP: 0.5 SOLUTION/ DROPS OPHTHALMIC at 21:40

## 2024-04-25 RX ADMIN — ENOXAPARIN SODIUM 80 MG: 80 INJECTION SUBCUTANEOUS at 21:18

## 2024-04-25 RX ADMIN — HYDROMORPHONE HYDROCHLORIDE 0.2 MG: 0.2 INJECTION, SOLUTION INTRAMUSCULAR; INTRAVENOUS; SUBCUTANEOUS at 12:34

## 2024-04-25 RX ADMIN — POTASSIUM CHLORIDE 20 MEQ: 14.9 INJECTION, SOLUTION INTRAVENOUS at 10:47

## 2024-04-25 RX ADMIN — INSULIN GLARGINE 20 UNITS: 100 INJECTION, SOLUTION SUBCUTANEOUS at 22:22

## 2024-04-25 RX ADMIN — PANTOPRAZOLE SODIUM 40 MG: 40 INJECTION, POWDER, FOR SOLUTION INTRAVENOUS at 10:34

## 2024-04-25 RX ADMIN — ENOXAPARIN SODIUM 40 MG: 40 INJECTION SUBCUTANEOUS at 10:34

## 2024-04-25 RX ADMIN — LEVOFLOXACIN 500 MG: 500 INJECTION, SOLUTION INTRAVENOUS at 10:51

## 2024-04-25 RX ADMIN — INSULIN LISPRO 1 UNITS: 100 INJECTION, SOLUTION INTRAVENOUS; SUBCUTANEOUS at 16:37

## 2024-04-25 ASSESSMENT — PAIN - FUNCTIONAL ASSESSMENT
PAIN_FUNCTIONAL_ASSESSMENT: 0-10

## 2024-04-25 ASSESSMENT — COGNITIVE AND FUNCTIONAL STATUS - GENERAL
MOVING FROM LYING ON BACK TO SITTING ON SIDE OF FLAT BED WITH BEDRAILS: TOTAL
MOVING TO AND FROM BED TO CHAIR: TOTAL
TURNING FROM BACK TO SIDE WHILE IN FLAT BAD: TOTAL
WALKING IN HOSPITAL ROOM: TOTAL
TOILETING: TOTAL
WALKING IN HOSPITAL ROOM: TOTAL
EATING MEALS: TOTAL
DRESSING REGULAR LOWER BODY CLOTHING: TOTAL
CLIMB 3 TO 5 STEPS WITH RAILING: TOTAL
HELP NEEDED FOR BATHING: TOTAL
DAILY ACTIVITIY SCORE: 6
MOVING FROM LYING ON BACK TO SITTING ON SIDE OF FLAT BED WITH BEDRAILS: TOTAL
CLIMB 3 TO 5 STEPS WITH RAILING: TOTAL
HELP NEEDED FOR BATHING: TOTAL
MOBILITY SCORE: 6
DRESSING REGULAR LOWER BODY CLOTHING: TOTAL
EATING MEALS: TOTAL
TOILETING: TOTAL
PERSONAL GROOMING: TOTAL
DAILY ACTIVITIY SCORE: 6
TURNING FROM BACK TO SIDE WHILE IN FLAT BAD: TOTAL
PERSONAL GROOMING: TOTAL
STANDING UP FROM CHAIR USING ARMS: TOTAL
STANDING UP FROM CHAIR USING ARMS: TOTAL
MOBILITY SCORE: 6
DRESSING REGULAR UPPER BODY CLOTHING: TOTAL
MOVING TO AND FROM BED TO CHAIR: TOTAL
DRESSING REGULAR UPPER BODY CLOTHING: TOTAL

## 2024-04-25 ASSESSMENT — PAIN SCALES - GENERAL
PAINLEVEL_OUTOF10: 0 - NO PAIN
PAINLEVEL_OUTOF10: 4
PAINLEVEL_OUTOF10: 7
PAINLEVEL_OUTOF10: 0 - NO PAIN
PAINLEVEL_OUTOF10: 7
PAINLEVEL_OUTOF10: 0 - NO PAIN

## 2024-04-25 ASSESSMENT — PAIN DESCRIPTION - LOCATION
LOCATION: HEAD
LOCATION: HEAD

## 2024-04-25 NOTE — SIGNIFICANT EVENT
Ultrasound reviewed personally.  Images reviewed.  Endometrial thickness 5 mm.  Almost no chance of hyperplasia.  Monitor vaginal bleeding.  No need for intervention or sampling at this time.  Continue to monitor.  Would consider endometrial sampling most likely in the OR if bleeding worsens.  Continue to monitor and call me if increasing bleeding

## 2024-04-25 NOTE — CONSULTS
Reason For Consult  Postmenopausal bleeding    History Of Present Illness  Janette Martinez is a 53 y.o. female presenting with renal failure.  Reported a history of vaginal bleeding.  Had a D&C in 2019 for postmenopausal bleeding which was benign.  Had an ultrasound in 2023 which showed an endometrial thickness of less than 5 mm.    Patient has been noticing occasional blood spotting.    She is partially verbal and difficult to get a full history from.  Past Medical History  She has a past medical history of Acute pulmonary embolism (Multi) (04/22/2024), Aphasia as late effect of cerebrovascular accident (04/25/2023), Essential hypertension (06/25/2010), Gastroesophageal reflux disease (03/10/2009), Hemiplegia of nondominant side, late effect of cerebrovascular disease (Multi) (09/03/2008), Hemorrhagic stroke (Multi) (06/25/2010), HIV infection (Multi) (02/21/2007), Hypothyroidism (10/10/2002), Mixed hyperlipidemia (06/25/2010), Stage 3a chronic kidney disease (Multi) (04/22/2024), and T2DM (type 2 diabetes mellitus) (Multi) (11/14/2012).    Surgical History  She has a past surgical history that includes Thyroid surgery (09/27/2013); Other surgical history (01/08/2016); Hysterectomy (01/08/2016); Cholecystectomy (01/08/2016); Hernia repair (01/08/2016); Colon surgery (01/08/2016); Other surgical history (09/01/2016); MR angio head wo IV contrast (6/4/2018); MR angio neck wo IV contrast (6/4/2018); CT angio head w and wo IV contrast (3/19/2019); CT angio neck (3/19/2019); MR angio head wo IV contrast (7/11/2020); MR angio neck wo IV contrast (7/11/2020); MR angio head wo IV contrast (9/30/2020); MR angio neck wo IV contrast (9/30/2020); MR angio head wo IV contrast (5/22/2022); MR angio neck wo IV contrast (5/22/2022); MR angio head wo IV contrast (4/19/2023); and MR angio neck wo IV contrast (4/19/2023).     Social History  She has no history on file for tobacco use, alcohol use, and drug use.    Family History  No  family history on file.     Allergies  Aspirin, Iodinated contrast media, Amoxicillin, Other, Morphine, and Sulfa (sulfonamide antibiotics)    Review of Systems  See HPI     Physical Exam  Deferred     Last Recorded Vitals  Blood pressure 135/88, pulse 109, temperature 36.7 °C (98.1 °F), temperature source Temporal, resp. rate 18, weight 93 kg (205 lb), SpO2 93%.    Relevant Results  Ultrasound pending     Assessment/Plan     Reports of postmenopausal bleeding.  Ultrasound 1 year ago was normal with endometrial lining less than 5 mm  Ultrasound ordered for today.  Awaiting that report for further treatment  Will continue to follow with you    I spent 35 minutes in the professional and overall care of this patient.      Ethan Barber MD

## 2024-04-25 NOTE — CARE PLAN
Problem: Pain  Goal: My pain/discomfort is manageable  Outcome: Progressing     Problem: Safety  Goal: Patient will be injury free during hospitalization  Outcome: Progressing  Goal: I will remain free of falls  Outcome: Progressing     Problem: Daily Care  Goal: Daily care needs are met  Outcome: Progressing     Problem: Psychosocial Needs  Goal: Demonstrates ability to cope with hospitalization/illness  Outcome: Progressing  Goal: Collaborate with me, my family, and caregiver to identify my specific goals  Outcome: Progressing     Problem: Skin  Goal: Decreased wound size/increased tissue granulation at next dressing change  Outcome: Progressing  Goal: Participates in plan/prevention/treatment measures  Outcome: Progressing  Goal: Prevent/manage excess moisture  Outcome: Progressing  Goal: Prevent/minimize sheer/friction injuries  Outcome: Progressing  Flowsheets (Taken 4/25/2024 0109)  Prevent/minimize sheer/friction injuries:   Use pull sheet   HOB 30 degrees or less   Turn/reposition every 2 hours/use positioning/transfer devices  Goal: Promote/optimize nutrition  Outcome: Progressing  Goal: Promote skin healing  Outcome: Progressing   The patient's goals for the shift include      The clinical goals for the shift include remain HDS

## 2024-04-25 NOTE — PROGRESS NOTES
Janette Martinez is a 53 y.o. female on day 1 of admission presenting with Encephalopathy.      Subjective   History Of Present Illness  Janette Martinez is a 53 y.o. female with PMH significant for left thalamic ICH (2007, residual right hemiplegia, dysarthria and aphasia), thalamic infarct and thalamic pain syndrome (2022), HIV, Left corona radiata infarct (2023), left MCA fusiform aneurysm, PE (on Apixaban), HTN, HLD, CKD III, T2DM, HFrEF (EF 45% 5/23/22),  post-surgical hypothyroid after thyroidectomy for graves disease who presented to the ER with c/o:   -HPI limited as patient poor historian, with prior stroke with residual aphasia  -reported patient was sent to the ER from her facility due to concern for AMS and worsening L sided facial droop.  -CTH and MRI in the ER negative for acute stroke.     Objective     Last Recorded Vitals  /88 (BP Location: Left arm, Patient Position: Lying)   Pulse 109   Temp 36.7 °C (98.1 °F) (Temporal)   Resp 18   Wt 93 kg (205 lb)   SpO2 93%   Intake/Output last 3 Shifts:  No intake or output data in the 24 hours ending 04/25/24 0744    Admission Weight  Weight: 93 kg (205 lb) (04/22/24 1400)    Daily Weight  04/22/24 : 93 kg (205 lb)    Image Results  Electrocardiogram, 12-lead PRN ACS symptoms  Sinus rhythm with frequent Premature ventricular complexes  Left axis deviation  Low voltage QRS  Lateral infarct , age undetermined  Inferior-posterior infarct , age undetermined  Abnormal ECG  When compared with ECG of 19-APR-2023 13:18,  Significant changes have occurred      Physical Exam    GENERAL: Alert  SKIN: Warm and dry.  No suspicious lesions or rashes.  HEENT:  NCAT, PERRLA, refusing to open mouth  LUNGS: Unlabored, CTAB, no significant wheezing, rhonchi, or rales appreciated  CARDS: RRR, no m/g/r appreciated  GI: Soft, NTND, BS+, no rebound, no guarding   MS/Extremities: WWP, no significant pitting edema, distal pulses intact, moves all extremities  NEURO: Alert,  +left facial droop, aphasia (chronic), R hemiparesis (chronic), following basic commands, nods yes/no to simple questions     Relevant Results  Results for orders placed or performed during the hospital encounter of 04/22/24 (from the past 24 hour(s))   CBC and Auto Differential   Result Value Ref Range    WBC 21.9 (H) 4.4 - 11.3 x10*3/uL    nRBC 0.0 0.0 - 0.0 /100 WBCs    RBC 3.34 (L) 4.00 - 5.20 x10*6/uL    Hemoglobin 10.1 (L) 12.0 - 16.0 g/dL    Hematocrit 31.0 (L) 36.0 - 46.0 %    MCV 93 80 - 100 fL    MCH 30.2 26.0 - 34.0 pg    MCHC 32.6 32.0 - 36.0 g/dL    RDW 16.8 (H) 11.5 - 14.5 %    Platelets 332 150 - 450 x10*3/uL    Neutrophils % 78.1 40.0 - 80.0 %    Immature Granulocytes %, Automated 0.7 0.0 - 0.9 %    Lymphocytes % 14.4 13.0 - 44.0 %    Monocytes % 5.9 2.0 - 10.0 %    Eosinophils % 0.7 0.0 - 6.0 %    Basophils % 0.2 0.0 - 2.0 %    Neutrophils Absolute 17.08 (H) 1.20 - 7.70 x10*3/uL    Immature Granulocytes Absolute, Automated 0.15 0.00 - 0.70 x10*3/uL    Lymphocytes Absolute 3.15 1.20 - 4.80 x10*3/uL    Monocytes Absolute 1.30 (H) 0.10 - 1.00 x10*3/uL    Eosinophils Absolute 0.15 0.00 - 0.70 x10*3/uL    Basophils Absolute 0.05 0.00 - 0.10 x10*3/uL   Basic metabolic panel   Result Value Ref Range    Glucose 161 (H) 74 - 99 mg/dL    Sodium 145 136 - 145 mmol/L    Potassium 3.2 (L) 3.5 - 5.3 mmol/L    Chloride 104 98 - 107 mmol/L    Bicarbonate 27 21 - 32 mmol/L    Anion Gap 17 10 - 20 mmol/L    Urea Nitrogen 25 (H) 6 - 23 mg/dL    Creatinine 1.10 (H) 0.50 - 1.05 mg/dL    eGFR 59 (L) >60 mL/min/1.73m*2    Calcium 9.0 8.6 - 10.3 mg/dL   POCT GLUCOSE   Result Value Ref Range    POCT Glucose 177 (H) 74 - 99 mg/dL   POCT GLUCOSE   Result Value Ref Range    POCT Glucose 227 (H) 74 - 99 mg/dL   POCT GLUCOSE   Result Value Ref Range    POCT Glucose 197 (H) 74 - 99 mg/dL   POCT GLUCOSE   Result Value Ref Range    POCT Glucose 171 (H) 74 - 99 mg/dL       Scheduled medications  amLODIPine, 5 mg, oral, Daily  [Held  by provider] apixaban, 5 mg, oral, BID  atorvastatin, 10 mg, oral, Daily  docusate sodium, 100 mg, oral, BID  dolutegravir, 50 mg, oral, Daily  emtricitabine-tenofovir alafen, 1 tablet, oral, Daily  enoxaparin, 80 mg, subcutaneous, q12h  furosemide, 20 mg, oral, Daily  gabapentin, 600 mg, oral, TID  insulin glargine, 20 Units, subcutaneous, q24h  insulin lispro, 0-5 Units, subcutaneous, Before meals & nightly  lamoTRIgine, 25 mg, oral, q PM  levoFLOXacin, 500 mg, intravenous, q24h  levothyroxine, 125 mcg, oral, Daily  lubricating eye drops, 1 drop, Both Eyes, BID  melatonin, 6 mg, oral, Nightly  pantoprazole, 40 mg, oral, Daily before breakfast  pantoprazole, 40 mg, intravenous, Daily  sertraline, 200 mg, oral, Daily      Continuous medications  dextrose 5%-0.45 % sodium chloride, 50 mL/hr, Last Rate: 50 mL/hr (04/25/24 1039)      PRN medications  PRN medications: acetaminophen, dextrose, dextrose, glucagon, glucagon, hydrALAZINE, HYDROmorphone, ipratropium-albuteroL, lactulose       Assessment/Plan      Encephalopathy   Hx CVA with residual R hemiparesis, aphasia  -CBC: +leukocytosis, no acute anemia, no thrombocytopenia  -CMP: no acute electrolyte abnormalities, no acute renal or liver dysfunction appreciated   -CTH: no acute processes  -MRI/MRA: No acute processes.  No LVO or acute stenosis.   -UA: +leukest, no nitrites, 1-5 WBC --> which could suggest UTI, but patient denies urinary complaints (unclear if reliable historian), received dose of rocephin in the ER at 2300 on 4/22/24 which will provide coverage for 24h, will hold off on ordering additional antibiotics for now, and follow up urine culture results   -repeat labs in AM   -need to get PTA med list from facility and resume home meds as appropriate     HIV:  -need to confirm HIV meds and resume  -last CD4 count 4/2022: CD3 + T cell % 89 (60-89%), CD3 + T cell #[phone] (high) [phone]), CD3 and CD4 + Tcell % 23 (34-61%), CD3 + CD4 + T cell # [phone]  ([phone])  -consider ID consult versus follow up, I do not see that she follows with ID in the EMR, will need to confirm      Hx graves s/p thyroidectomy   Post surgical hypothyroid  -TSH low, Free thyroxine high --> consistent with over medicated hypothyroid  -hold levothyroxine --> will need dose adjustment and repeat lab as OP     HTN  HLP  T2DM  CKD3  -hypoglycemia protocol, accuchecks ACHS, ISS coverage  -Hba1c pending   -confirm home meds and resume as appropriate      HFrEF  -ECHO 5/2022: EF 45%.  (EF 35-40% 2/17/2022)  -need to confirm home meds and resume as appropriate      GI ppx:   -Protonix  -bowel regimen     VTE ppx:  Hx PE  -continue Eliquis.         4/25-patient still lethargic, failed second swallow eval today, continues to be n.p.o., IV fluids started some meds changed to p.o., will continue to monitor, will restart p.o. when patient is able to swallow safely         Radha Ward MD

## 2024-04-25 NOTE — PROGRESS NOTES
Speech-Language Pathology    SLP Adult Inpatient  Speech-Language Pathology Treatment     Patient Name: Janette Martinez  MRN: [id number]  Today's Date: 4/25/2024  Time Calculation  Start Time: 1105  Stop Time: 1120  Time Calculation (min): 15 min         Current Problem:   1. Encephalopathy        2. SOB (shortness of breath)  Transthoracic Echo (TTE) Complete    Transthoracic Echo (TTE) Complete            SLP Assessment:  Prognosis: Guarded       Plan:  Inpatient/Swing Bed or Outpatient: Inpatient  SLP TX Plan: Continue Plan of Care  SLP Frequency: 2x per week    Dysphagia goals:  Pt will tolerate PO trials and re-assessment by SLP within 1-3 days to advance to PO diet. ONGOING. RE-ASSESS TODAY 4/25; CONTINUED NPO RECOMMENDED.  2.   Determine need for MBSS.      Subjective   Pt continues to exhibit waxing and waning LEIGHA overall. Pt minimally more alert today compared to yesterday when seen by this SLP. Cleared by nurse to see patient.     Still trying to determine what diet pt was on at facility; left message there regarding this.     Most Recent Visit:  SLP Received On: 04/25/24    General Visit Information:   Referred By: Dr Hussein Camejo,       Pain Assessment:  Pt unable to state but when SLP aked pt if she was in pain, pt pointed to the R side of her head; nurse made aware.       Objective       Therapeutic Swallow:  Therapeutic Swallow Intervention : PO Trials  Liquid Diet Recommendations: No liquids by mouth    Pt given PO trials today with SLP. Pt with waxing and waning LEIGHA. Pt unable to verbalize to answer questions. Does point within room to make needs known; pointed to head when asked about pain and pointed to tv to have SLP turn volume on.     Pt given nectar water via tsp; L leakage evident from oral cavity with delayed, overt coughing and s/s aspiration following tsp. Swallow response is severely delayed at bedside. Pt given small amount of honey thick liquid following with again severely delayed  swallow response. Suction used in pt's oral cavity following honey thick liquid. Continue to recommend NPO at this time. May need to consider temporary alternate means of nutrition. SLP to continue to follow up for ongoing PO trials and analysis. Recommend oral care.       Inpatient:  Education Documentation    Reviewed results with patient and nurse Jarocho.

## 2024-04-26 VITALS
SYSTOLIC BLOOD PRESSURE: 111 MMHG | RESPIRATION RATE: 16 BRPM | DIASTOLIC BLOOD PRESSURE: 62 MMHG | WEIGHT: 205.6 LBS | HEIGHT: 55 IN | TEMPERATURE: 96.4 F | OXYGEN SATURATION: 98 % | BODY MASS INDEX: 47.58 KG/M2 | HEART RATE: 66 BPM

## 2024-04-26 LAB
ANION GAP SERPL CALC-SCNC: 16 MMOL/L (ref 10–20)
BASOPHILS # BLD AUTO: 0.06 X10*3/UL (ref 0–0.1)
BASOPHILS NFR BLD AUTO: 0.4 %
BUN SERPL-MCNC: 31 MG/DL (ref 6–23)
CALCIUM SERPL-MCNC: 8.7 MG/DL (ref 8.6–10.3)
CHLORIDE SERPL-SCNC: 105 MMOL/L (ref 98–107)
CO2 SERPL-SCNC: 28 MMOL/L (ref 21–32)
CREAT SERPL-MCNC: 1.11 MG/DL (ref 0.5–1.05)
EGFRCR SERPLBLD CKD-EPI 2021: 59 ML/MIN/1.73M*2
EOSINOPHIL # BLD AUTO: 0.16 X10*3/UL (ref 0–0.7)
EOSINOPHIL NFR BLD AUTO: 1.2 %
ERYTHROCYTE [DISTWIDTH] IN BLOOD BY AUTOMATED COUNT: 16.9 % (ref 11.5–14.5)
GLUCOSE BLD MANUAL STRIP-MCNC: 192 MG/DL (ref 74–99)
GLUCOSE BLD MANUAL STRIP-MCNC: 207 MG/DL (ref 74–99)
GLUCOSE BLD MANUAL STRIP-MCNC: 213 MG/DL (ref 74–99)
GLUCOSE BLD MANUAL STRIP-MCNC: 226 MG/DL (ref 74–99)
GLUCOSE SERPL-MCNC: 217 MG/DL (ref 74–99)
HCT VFR BLD AUTO: 30 % (ref 36–46)
HGB BLD-MCNC: 9.6 G/DL (ref 12–16)
IMM GRANULOCYTES # BLD AUTO: 0.09 X10*3/UL (ref 0–0.7)
IMM GRANULOCYTES NFR BLD AUTO: 0.7 % (ref 0–0.9)
LYMPHOCYTES # BLD AUTO: 2.57 X10*3/UL (ref 1.2–4.8)
LYMPHOCYTES NFR BLD AUTO: 19.1 %
MCH RBC QN AUTO: 30.4 PG (ref 26–34)
MCHC RBC AUTO-ENTMCNC: 32 G/DL (ref 32–36)
MCV RBC AUTO: 95 FL (ref 80–100)
MONOCYTES # BLD AUTO: 1.08 X10*3/UL (ref 0.1–1)
MONOCYTES NFR BLD AUTO: 8 %
NEUTROPHILS # BLD AUTO: 9.52 X10*3/UL (ref 1.2–7.7)
NEUTROPHILS NFR BLD AUTO: 70.6 %
NRBC BLD-RTO: 0 /100 WBCS (ref 0–0)
PLATELET # BLD AUTO: 328 X10*3/UL (ref 150–450)
POTASSIUM SERPL-SCNC: 3.5 MMOL/L (ref 3.5–5.3)
RBC # BLD AUTO: 3.16 X10*6/UL (ref 4–5.2)
SODIUM SERPL-SCNC: 145 MMOL/L (ref 136–145)
WBC # BLD AUTO: 13.5 X10*3/UL (ref 4.4–11.3)

## 2024-04-26 PROCEDURE — 85025 COMPLETE CBC W/AUTO DIFF WBC: CPT | Performed by: HOSPITALIST

## 2024-04-26 PROCEDURE — 2500000005 HC RX 250 GENERAL PHARMACY W/O HCPCS: Performed by: INTERNAL MEDICINE

## 2024-04-26 PROCEDURE — 2500000004 HC RX 250 GENERAL PHARMACY W/ HCPCS (ALT 636 FOR OP/ED): Performed by: INTERNAL MEDICINE

## 2024-04-26 PROCEDURE — 36415 COLL VENOUS BLD VENIPUNCTURE: CPT | Performed by: HOSPITALIST

## 2024-04-26 PROCEDURE — 92526 ORAL FUNCTION THERAPY: CPT | Mod: GN

## 2024-04-26 PROCEDURE — 80048 BASIC METABOLIC PNL TOTAL CA: CPT | Performed by: HOSPITALIST

## 2024-04-26 PROCEDURE — 2500000002 HC RX 250 W HCPCS SELF ADMINISTERED DRUGS (ALT 637 FOR MEDICARE OP, ALT 636 FOR OP/ED): Performed by: INTERNAL MEDICINE

## 2024-04-26 PROCEDURE — 82947 ASSAY GLUCOSE BLOOD QUANT: CPT

## 2024-04-26 PROCEDURE — 1200000002 HC GENERAL ROOM WITH TELEMETRY DAILY

## 2024-04-26 RX ADMIN — DEXTROSE AND SODIUM CHLORIDE 50 ML/HR: 5; 450 INJECTION, SOLUTION INTRAVENOUS at 05:16

## 2024-04-26 RX ADMIN — CARBOXYMETHYLCELLULOSE SODIUM 1 DROP: 0.5 SOLUTION/ DROPS OPHTHALMIC at 21:15

## 2024-04-26 RX ADMIN — LEVOFLOXACIN 500 MG: 500 INJECTION, SOLUTION INTRAVENOUS at 11:30

## 2024-04-26 RX ADMIN — CARBOXYMETHYLCELLULOSE SODIUM 1 DROP: 0.5 SOLUTION/ DROPS OPHTHALMIC at 11:32

## 2024-04-26 RX ADMIN — ENOXAPARIN SODIUM 80 MG: 80 INJECTION SUBCUTANEOUS at 05:16

## 2024-04-26 RX ADMIN — ENOXAPARIN SODIUM 80 MG: 80 INJECTION SUBCUTANEOUS at 17:22

## 2024-04-26 RX ADMIN — INSULIN GLARGINE 20 UNITS: 100 INJECTION, SOLUTION SUBCUTANEOUS at 21:11

## 2024-04-26 RX ADMIN — HYDRALAZINE HYDROCHLORIDE 10 MG: 20 INJECTION INTRAMUSCULAR; INTRAVENOUS at 13:11

## 2024-04-26 ASSESSMENT — PAIN SCALES - GENERAL
PAINLEVEL_OUTOF10: 0 - NO PAIN
PAINLEVEL_OUTOF10: 0 - NO PAIN

## 2024-04-26 ASSESSMENT — COGNITIVE AND FUNCTIONAL STATUS - GENERAL
CLIMB 3 TO 5 STEPS WITH RAILING: TOTAL
STANDING UP FROM CHAIR USING ARMS: TOTAL
TURNING FROM BACK TO SIDE WHILE IN FLAT BAD: TOTAL
MOVING FROM LYING ON BACK TO SITTING ON SIDE OF FLAT BED WITH BEDRAILS: TOTAL
MOVING TO AND FROM BED TO CHAIR: TOTAL
WALKING IN HOSPITAL ROOM: TOTAL
MOBILITY SCORE: 6

## 2024-04-26 ASSESSMENT — PAIN - FUNCTIONAL ASSESSMENT
PAIN_FUNCTIONAL_ASSESSMENT: 0-10
PAIN_FUNCTIONAL_ASSESSMENT: 0-10

## 2024-04-26 NOTE — PROGRESS NOTES
Name: Janette Martinez    YOB: 1970    Code Status: DNRcc arrest    Chief complaint:  Altered mental status;  etc.....      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        [facility] after her most recent hospitalization.    Diagnoses as follows:    Altered mental status; vomiting:  Nursing reports that patient vomited earlier and now has developed a change in mental status.  Patient typically is alert, talks, follows commands, answers questions.  Patient seen today she is in bed.   Today patient is not answering any questions and she already has a left sided -facial droop, but today it appears more pronounced.  She is not responding, however her eyes are open.  She is not following any commands either.   Patient also vomited earlier.       Sequela of Cerebrovascular accident; Hx CVA with right-sided hemiparesis; right sided weakness;   dysphagia 2/2 cva; expressive aphasia; History of acute/subacute stroke:   MRI was performed during a PREVIOUS hospitalization and it showed Acute/subacute tiny infarct in the left anterior brock.   Dr. Jiang from [facility] neuro/stroke was consulted at that time.  Currently on Eliquis 5 mg PO BID.  Pt has baseline dysarthria from a previous CVA, right Hemiparesis,  and expressive aphasia.  At her usual baseline she is able to communicate in short sentences and she is able to nod appropriately to questions.   She is on a mechanical soft, thin consistency diet.                        Chronic systolic HF; Benign essential HTN :     On Amlodipine and hydrochlorothiazide.            No oxygen desaturations reported.       Recent /62       No recent complaints of  chest pain, headaches or dizziness.         Post  Menopausal Vaginal Bleeding:  Last week nursing reported that patient had increased intermittent vaginal bleeding.       Staff monitoring for continued bleeding.        Patient was sent to a  OB/GYN appt. Last week for evaluation.       However she was not able to be examined because she was not in a stretcher.      The appt. Needs to be rescheduled and she   Patient is not having any abdominal pain or cramping.        DM type 2 with hyperglycemia; noncompliance with diabetic diet:        Patient is frequently noncompliant with diet.       On Lantus 43 units at bedtime and Humalog sliding scale.       Recent accuchecks: 164 mg/dL, 196 mg/dL, 158 mg/dL.        No episodes of hypoglycemia reported.        Ha1c trends:       5/1/23 Ha1c 8.1 %        10/17/23 Ha1c 8.3 %       12/4/23 hemoglobin A1c 8.9%     No complaints of chest pain.    No headaches.    No dizziness.      Major depressive disorder:   On Sertraline.   Also followed by in house psych NP.  On Sertraline  125 mg PO every day.  Patient generally  has a good appetite, no recent weight loss reported.              Immobility; Generalized muscle weakness;  At  in long term care.  Not able to ambulate.  Very weak.  Bedbound/wheelchair bound.                              Reviewed medical, social, surgical and family history.  Reviewed all current medications and performed medication reconciliation.  Reviewed vital signs AND recent blood work results.  Performed prescription drug management.   Reviewed [facility] Documentation  Discussed patient with nursing and paramedics.  Spent greater than 50 minutes on visit.                   ROS: 10 point ROS performed; negative unless noted in HPI.       LABS:  BMP: Date: 5/1/2023 Na 137 K 4.3 Cr 1.1 BUN 15 glucose 141 Ca 8.8 GFR 52  CBC: Date: 5/1/2023 WBC 8.4 Hgb 11.1 hematocrit 33.9 platelets 337  Liver function: Date: 5/1/2023 ALT 10 AST 29 alkaline phos.  63 bilirubin 0.4 albumin 3.6 protein 7.7       Ha1c  8.1 % on 5/1/23          BMP: Date: 5/15/2023 Na 139  K 3.4 Cr 1.2 BUN 20 glucose 122 Ca 8.8 GFR 57        CBC: Date: 5/15/2023 WBC 9 Hgb 10.2 hematocrit 30.9 platelets 277       Liver function: 5/15/2023 ALT 8 AST 28 alkaline phos.  57 bilirubin 0.5 albumin 3.5 protein 7.3          BMP: Date: 5/22/2023 Na 141 K 3.1 Cr 1 BUN 11 glucose 227 Ca 8.7 GFR 70        CBC: Date: 5/22/2023 WBC 8.2 Hgb 10 hematocrit 30.5 platelets 372        Liver function: Date: 5/22/2023 ALT 8 AST 22 alkaline phos.  63 bilirubin 0.3 albumin 3.2 protein 7.2     BMP: Date: 6/5/2023 Na 140 K 4.2 Cr 1.1 BUN 14 glucose 79 Ca 1.1 GFR 63  CBC: Date: 6/5/2023 WBC 8.1 Hgb 10.5 hematocrit 32.3 platelets 350  Liver function: Date: 6/5/2023 ALT 7 AST 25 alkaline phos.  59 bilirubin 0.4 albumin 3.3 protein 7.7    BMP: Date: 7/17/2023 Na 140 K 4.1 Cr 0.8 BUN 17 glucose 126 Ca 9.1 GFR 90  CBC: Date: 7/17/2023 WBC 14.1 Hgb 10.2 hematocrit 32.1 platelets 265  Liver function: Date: 7/17/2023 ALT 8 AST 23 alkaline phos.  72 bilirubin 0.5 albumin 3.4 protein 7.4    CBC: Date: 7/19/2023 WBC 10.7 Hgb 9.5 hematocrit 28.8 platelets 257    BMP: Date: 7/24/2023 Na 137 K 4.2 Cr 0.9 BUN 19 glucose 186 Ca 9.1 GFR 79  CBC: Date: 7/24/2023 WBC 11.6 Hgb 10.3 hematocrit 31.4 platelets 305  Liver function: Date: 7/24/2023 ALT 11 AST 27 alkaline phos.  77 bilirubin 0.3 albumin 3.6 protein 7.8    BMP: Date: 7/31/2023 Na 141 K 4.2 Cr 0.8 BUN 17 glucose 138 Ca 8.7 GFR 90  CBC: Date: 7/31/2023 WBC 12.3 Hgb 10.5 hematocrit 32.4 platelets 307  Liver function: Date: 7/31/2023 ALT 14 AST 24 alkaline phos.  81 bilirubin 0.4 albumin 3.5 protein 7.4    BMP: Date: 8/7/2023 Na 141 K 3.9 Cr 1.0 BUN 12 glucose 167 Ca 8.6 GFR 70  CBC: Date: 8/7/2023 WBC 10 Hgb 10.5 hematocrit 32.7 platelets 256  Liver function: Date: 8/7/2023 ALT 9 AST 23 alkaline phos.  76 bilirubin 0.5 albumin 3.3 protein 7    BMP: Date: 8/21/2023 Na 142 K 4.1 Cr 1 BUN 17 glucose 195 Ca 8.5 GFR 70  CBC: Date: 8/21/2023  WBC 10.9 Hgb 9.6 hematocrit 30 platelets 304  Liver function: Date: 8/21/2023 ALT 9 AST 24 alkaline phos.  74 bilirubin 0.4 albumin 3.2 protein 7.1    BMP: Date: 8/31/2023 Na 141 K 3.8 Cr 0.9 BUN 14 glucose 118 Ca 8.2 GFR 79  CBC: Date: 8/31/2023 WBC 12 Hgb 9.6 hematocrit 29.8 platelets 294  Liver function: Date: 8/31/2023 ALT 8 AST 21 alkaline phos.  64 bilirubin 0.4 albumin 3.1 protein 7    BMP: Date: 9/4/2023 Na 141 K 3.9 Cr 1.1 BUN 17 glucose 107 Ca 8.5 GFR 63   CBC: Date: 9/4/2023 WBC 12 Hgb 9.5 hematocrit 29.7 platelets 282  Liver function: Date: 9/4/2023 ALT 7 AST 22 alkaline phos.  64 bilirubin 0.4 albumin 3.3 protein 7.1    BMP: Date: 9/11/2023 Na 142 K 4 Cr 1 BUN 12 glucose 81 Ca 8.2 GFR 70  CBC: Date: 9/11/2023 WBC 8.7 Hgb 9.2 hematocrit 28.3 platelets 305  Liver function: Date: 9/11/2023 ALT 7 AST 22 alkaline phos.  61 bilirubin 0.3 albumin 3 protein 6.8    BMP: Date: 9/18/2023 Na 140 K 4 Cr 1 BUN 15 glucose 150 Ca 8.3 GFR 70  CBC: Date: 9/18/2023 WBC 12.1 Hgb 9.5 hematocrit 29.8 platelets 287  Liver function: Date: 9/18/2023 ALT 7 AST 23 alkaline phos.  62 bilirubin 0.4 albumin 3.4 protein 7.1    10/17/23 Ha1c 8.3 %    BMP: Date: 10/23/2023 Na 141 K 3.6 Cr 1 BUN 22 glucose 65 Ca 8.3 GFR 70  CBC: Date: 10/23/2023 WBC 12.5 Hgb 10.8  hematocrit 33.2 platelets 290  Liver function: Date: 10/23/2023 ALT 9 AST 31 alkaline phos.  66 bilirubin 0.6 albumin 3.6 protein 7.8    10/26/2023: Lipid panel: Cholesterol 111; triglycerides 111; HDL 33; LDL 56; VLDL 22.    10/26/2023: TSH: 1.081 (range 0.340-5.500).    BMP: Date: 10/30/2023 Na 143  K 3.5 Cr 0.9 BUN 11 glucose 171 Ca 8 GFR 65  CBC: Date: 10/30/2023 WBC 9.2 Hgb 10.1 hematocrit 31.1 platelets 280  Liver function: Date: 10/30/2023 ALT 11 AST 27 alkaline phos.  66 bilirubin 0.4 albumin 3.4 protein 7.2    BMP: Date: 11/13/2023 Na 138 K 3.9 Cr 0.9 BUN 11 glucose 308 Ca 8.4 GFR 79  CBC: Date: 11/13/2023 WBC 8.2 Hgb 10.6 hematocrit 33 platelets 295  Liver function:  Date: 11/13/2023 ALT 12 AST 37 alkaline phos.  69 bilirubin 0.4 albumin 3.2 protein 7.1    BMP: Date: 11/21/2023 Na 142 K 3.6 Cr 0.9 BUN 14 glucose 200 Ca 8.4 GFR 79.  CBC: Date: 11/21/2023 WBC 8.8 Hgb 10.1 hematocrit 30.4 platelets 290    BMP: Date: 11/27/2023 Na 138 K 3.4 Cr 1 BUN 12 glucose 54 Ca 8.7 GFR 70  CBC: Date: 11/27/2023 WBC 12.3 Hgb 10.6 hematocrit 32.4 platelets 307  Liver function: Date: 11/27/2023 ALT 7 AST 27 alkaline phos.  72 bilirubin 0.5 albumin 3.3 protein 7.7    BMP: Date: 12/4/2023 Na 141 K 4 Cr 1 BUN 12 glucose 209 Ca 8.5 GFR 70  CBC: Date: 12/4/2023 WBC 8.5 Hgb 10.6 hematocrit 32.7 platelets 326  Liver function: Date: 12/4/2023 ALT 8 AST 25 alkaline phos.  64 bilirubin 0.4 albumin 3.3 protein 7.1  12/4/2023 hemoglobin A1c 8.9%    BMP: Date: 12/25/2023 Na 140 K 3.9 Cr 0.9 BUN 17 glucose 172 Ca 8.4 GFR 79  CBC: Date: 12/25/2023 WBC 8.9 Hgb 9.9 hematocrit 30.7 platelets 272  Liver function: Date: 12/25/2023 ALT 8 AST 26 alkaline phos.  67 bilirubin 0.3 albumin 3.2 protein 7    BMP: Date: 12/26/2023 Na 142 K 4.2 Cr 0.9 BUN 15 glucose 168 Ca 8.6 GFR 79  CBC: Date: 12/26/2023 WBC 9.9 Hgb 10.5 hematocrit 32.9 platelets 302    BMP: Date: 1/15/2024 Na 139 K 3.9 Cr 1 BUN 14 glucose 206 Ca 8.5 GFR 70  CBC: Date: 1/15/2024 WBC 8.3 Hgb 9.8 hematocrit 29.8 platelets 244  Liver function: Date: 1/15/2024 ALT 7 AST 22 alkaline phos.  62 bilirubin 0.3 albumin 3.3 protein 6.8    1/16/2024: Lipid panel: Cholesterol 119; triglycerides 174; HDL 32; LDL 52; VLDL 35.    BMP: Date: 2/5/2024 Na 141 K 4.2 Cr 1 BUN 22 glucose 150 Ca 8.3 GFR 70  CBC: Date: 2/5/2024 WBC 10.9 Hgb 10.2 hematocrit 31.1 platelets 319  Liver function: Date: 2/5/2024 ALT 7 AST 29 alkaline phos.  77 bilirubin 0.6 albumin 3.4 protein 7.4    BMP: Date: 2/26/2024 Na 143 K 3.6 Cr 1.3 BUN 23 glucose 152 Ca 8.5 GFR 51  CBC: Date: 2/26/2024 WBC 8 Hgb 11.1 hematocrit 33.6 platelets 318  Liver function: Date: 2/26/2024 ALT 11 AST 48 alkaline phos.  67  bilirubin 0.5 albumin 3.5 protein 7.8    BMP: Date: 3/11/2024 Na 142 K 3.8 Cr 0.9 BUN 16 glucose 97 Ca 8.7 GFR 79  CBC: Date: 3/11/2024 WBC 9 Hgb 10.5 hematocrit 32.3 platelets 302  Liver function: Date: 3/11/2024  ALT 10 AST 39 alkaline phos.  64 bilirubin 0.4 albumin 3.1 protein 7.1    BMP: Date: 3/18/2024 Na 143 K 4.4 Cr 0.9 BUN 15 glucose 111 Ca 8.7 GFR 79  CBC: Date: 3/18/2024 WBC 9.1 Hgb 10.8 hematocrit 32.6 platelets 294  Liver function: Date: 3/18/2024 ALT 13 AST 49 alkaline phos.  68 bilirubin 0.6 albumin 3.2 protein 7.4    BMP: Date: 3/25/2024 Na 140 K 3.7 Cr 1 BUN 17 glucose 225 Ca 8.7 GFR 70  CBC: Date: 3/25/2024  WBC 8.6 Hgb 10.7 hematocrit 32 platelets 304  Liver function: Date: 3/25/2024 ALT 15 AST 56 alkaline phos.  53 bilirubin 0.5 albumin 3.3 protein 7.1    BMP: Date: 4/1/2024 Na 143 K 3.5 Cr 1 BUN 19 glucose 151 Ca 8.7 GFR 70  CBC: Date: 4/1/2024 WBC 8.4 Hgb 10.2 hematocrit 30.8 platelets 254  Liver function: Date: 4/1/2024 ALT 12 AST 32 alkaline phos.  56 bilirubin 0.4 albumin 3.3 protein 6.8                Past Medical History:   Diagnosis Date    Acute pulmonary embolism (Multi) 04/22/2024    Aphasia as late effect of cerebrovascular accident 04/25/2023    Essential hypertension 06/25/2010    Gastroesophageal reflux disease 03/10/2009    Hemiplegia of nondominant side, late effect of cerebrovascular disease (Multi) 09/03/2008    Hemorrhagic stroke (Multi) 06/25/2010    HIV infection (Multi) 02/21/2007    Hypothyroidism 10/10/2002    Formatting of this note might be different from the original.   S/p thyroidectomy 2002   Patholgy c/w Graves    Mixed hyperlipidemia 06/25/2010    Stage 3a chronic kidney disease (Multi) 04/22/2024    T2DM (type 2 diabetes mellitus) (Multi) 11/14/2012           Past Surgical History:   Procedure Laterality Date    CHOLECYSTECTOMY  01/08/2016    Cholecystectomy    COLON SURGERY  01/08/2016    Colon Surgery    CT ANGIO NECK  3/19/2019    CT NECK ANGIO W AND WO IV  CONTRAST 3/19/2019 [facility]Y [facility]GAC[facility]    CT HEAD ANGIO W AND WO IV CONTRAST  3/19/2019    CT HEAD ANGIO W AND WO IV CONTRAST 3/19/2019 [facility]ERGENCY LEGAC[facility]    HERNIA REPAIR  01/08/2016    Hernia Repair    HYSTERECTOMY  01/08/2016    Hysterectomy    MR HEAD ANGIO WO IV CONTRAST  6/4/2018    MR HEAD ANGIO WO IV CONTRAST 6/4/2018 [facility] CLINICAL LEGACY    MR HEAD ANGIO WO IV CONTRAST  7/11/2020    MR HEAD ANGIO WO IV CONTRAST 7/11/2020 [facility] CLINICAL LEGACY    MR HEAD ANGIO WO IV CONTRAST  9/30/2020    MR HEAD ANGIO WO IV CONTRAST 9/30/2020 [facility]IB LEGACY    MR HEAD ANGIO WO IV CONTRAST  5/22/2022    MR HEAD ANGIO WO IV CONTRAST 5/22/2022 [facility] CLINICAL LEGACY    MR HEAD ANGIO WO IV CONTRAST  4/19/2023    MR HEAD ANGIO WO IV CONTRAST [facility] MRI    MR NECK ANGIO WO IV CONTRAST  6/4/2018    MR NECK ANGIO WO IV CONTRAST 6/4/2018 [facility] CLINICAL LEGACY    MR NECK ANGIO WO IV CONTRAST  7/11/2020    MR NECK ANGIO WO IV CONTRAST 7/11/2020 [facility] CLINICAL LEGACY    MR NECK ANGIO WO IV CONTRAST  9/30/2020    MR NECK ANGIO WO IV CONTRAST 9/30/2020 [facility] LEGAC[facility]    MR NECK ANGIO WO IV CONTRAST  5/22/2022    MR NECK ANGIO WO IV CONTRAST 5/22/2022 [facility] CLINICAL LEGACY    MR NECK ANGIO WO IV CONTRAST  4/19/2023    MR NECK ANGIO WO IV CONTRAST [facility] MRI    OTHER SURGICAL HISTORY  01/08/2016    Tubal Stabilization    OTHER SURGICAL HISTORY  09/01/2016    Cervical Surgery (Gyn) Laser Vaporization Of Transformation Zone    THYROID SURGERY  09/27/2013    Thyroid Surgery            Surgical History  Problems    · History of Cervical Surgery (Gyn) Laser Vaporization Of Transformation Zone   · History of Cholecystectomy   · History of Colon Surgery   · History of Hernia Repair   · History of Hysterectomy   · History of Thyroid Surgery   · History of Tubal Stabilization     Family History  Mother    · Family history of Cancer  Family History    · Family history of Diabetes Mellitus (V18.0)   · Family history of Hypertension (V17.49)     Social  "History  Problems    · Disabled   · Does not use illicit drugs (V49.89) (Z78.9)   · Former smoker (V15.82) (Z87.891)   · Marital History - Single   · No alcohol.   · Denied: History of Secondhand smoke exposure   · Single     Allergies  Medication    · Aspirin TABS   · Contrast Media Ready-Box MISC   · morphine   · Sulfa Drugs        /62   Pulse 66   Temp 35.8 °C (96.4 °F)   Resp 16   Ht 1.372 m (4' 6\")   Wt 93.3 kg (205 lb 9.6 oz)   SpO2 98%   BMI 49.57 kg/m²      Physical Exam  Vitals and nursing note reviewed.   Constitutional:       General: She is awake.      Appearance: Normal appearance. She is ill-appearing.   HENT:      Head: Normocephalic.      Comments: White patches on tongue.     Right Ear: External ear normal.      Left Ear: External ear normal.      Nose: Nose normal.      Mouth/Throat:      Mouth: Mucous membranes are moist.      Pharynx: Oropharynx is clear.   Eyes:      Extraocular Movements: Extraocular movements intact.      Conjunctiva/sclera: Conjunctivae normal.      Pupils: Pupils are equal, round, and reactive to light.   Cardiovascular:      Rate and Rhythm: Normal rate and regular rhythm.      Pulses: Normal pulses.      Heart sounds: Normal heart sounds.   Pulmonary:      Effort: Pulmonary effort is normal.      Breath sounds: Normal breath sounds.   Abdominal:      General: Bowel sounds are normal.      Palpations: Abdomen is soft.   Musculoskeletal:      Cervical back: Normal range of motion and neck supple.      Comments: Generalized muscle weakness; immobility.    Right sided hemiparesis   Skin:     General: Skin is warm and dry.   Neurological:      Mental Status: She is alert. Mental status is at baseline.      Motor: Weakness present.      Coordination: Coordination abnormal.      Gait: Gait abnormal.      Comments: Right sided weakness--RLE 1/5; RUE 3/5; sensation intact to touch x 4 extremities.     Left sided facial droop--worse than usual baseline.    Generalized " muscle weakness   Psychiatric:         Mood and Affect: Mood normal.         Behavior: Behavior normal. Behavior is cooperative.          Assessment/Plan     Ordered vital signs STAT.  Ordered that patient be sent to [place] [id number] STAT      Altered mental status; vomiting:  Nursing reports that patient vomited earlier and now has developed a change in mental status.  Patient typically is alert, talks, follows commands, answers questions.  Today patient is not answering any questions and she already has a left sided -facial droop, but today it appears more pronounced.  She is not responding, however her eyes are open.  She is not following any commands either.   Ordered vital signs STAT.  Ordered that patient be sent to [place] [id number] STAT            Sequela of Cerebrovascular accident; Hx CVA with right-sided hemiparesis; right sided weakness;   dysphagia 2/2 cva; expressive aphasia; History of acute/subacute stroke:   MRI was performed during a PREVIOUS hospitalization and it showed Acute/subacute tiny infarct in the left anterior brock.   Dr. Jiang [place] [facility] [place] at that time.  Continue Eliquis 5 mg PO BID.  Continue mechanical soft, thin consistency diet.                        Chronic systolic HF; Benign essential HTN :           Continue Amlodipine and hydrochlorothiazide.            Monitor Bps.                        Post Menopausal Vaginal Bleeding:                      Staff monitoring for continued bleeding.                       Patient was sent to a  OB/GYN appt. Last week for evaluation.                      Hopefully patient will be evaluated in the hospital for her vaginal bleeding.                       Otherwise her appt. Needs to be rescheduled w/  OB/GYN appt. & she needs to go in stretcher.                              DM type 2 with hyperglycemia; noncompliance with diabetic diet:       Continue Lantus 43 units at bedtime and Humalog sliding scale.       Monitor  accuchecks.          Major depressive disorder:   Continue Sertraline.   Follow up with in house psych NP.              Immobility; Generalized muscle weakness;            Continue at  in long term care.        Problem List Items Addressed This Visit          Cardiac and Vasculature    Chronic systolic heart failure (Multi) (Chronic)       Endocrine/Metabolic    T2DM (type 2 diabetes mellitus) (Multi) (Chronic)     Other Visit Diagnoses       Altered mental status, unspecified altered mental status type    -  Primary    Vomiting, unspecified vomiting type, unspecified whether nausea present        Sequela of cerebrovascular accident        Hemiparesis affecting right side as late effect of stroke (Multi)        Dysphagia, unspecified type        Expressive aphasia        Benign essential HTN        Postmenopausal vaginal bleeding        Noncompliance with dietary restriction        Major depressive disorder with current active episode, unspecified depression episode severity, unspecified whether recurrent        Immobility        Generalized muscle weakness                  Maria Del Carmen Garduno, APRN-CNP

## 2024-04-26 NOTE — PROGRESS NOTES
Janette Martinez is a 53 y.o. female on day 2 of admission presenting with Encephalopathy.      Subjective   History Of Present Illness  Janette Martinez is a 53 y.o. female with PMH significant for left thalamic ICH (2007, residual right hemiplegia, dysarthria and aphasia), thalamic infarct and thalamic pain syndrome (2022), HIV, Left corona radiata infarct (2023), left MCA fusiform aneurysm, PE (on Apixaban), HTN, HLD, CKD III, T2DM, HFrEF (EF 45% 5/23/22),  post-surgical hypothyroid after thyroidectomy for graves disease who presented to the ER with c/o:   -HPI limited as patient poor historian, with prior stroke with residual aphasia  -reported patient was sent to the ER from her facility due to concern for AMS and worsening L sided facial droop.  -CTH and MRI in the ER negative for acute stroke.     Objective     Last Recorded Vitals  BP (!) 150/98 (BP Location: Left arm, Patient Position: Lying) Comment: rn notified  Pulse (!) 114 Comment: rn notified  Temp 37.1 °C (98.8 °F) (Temporal)   Resp 16   Wt 93 kg (205 lb)   SpO2 92%   Intake/Output last 3 Shifts:    Intake/Output Summary (Last 24 hours) at 4/26/2024 1532  Last data filed at 4/26/2024 0519  Gross per 24 hour   Intake 1033.33 ml   Output 700 ml   Net 333.33 ml       Admission Weight  Weight: 93 kg (205 lb) (04/22/24 1400)    Daily Weight  04/26/24 : 93 kg (205 lb)    Image Results  Electrocardiogram, 12-lead PRN ACS symptoms  Sinus rhythm with frequent Premature ventricular complexes  Left axis deviation  Low voltage QRS  Lateral infarct , age undetermined  Inferior-posterior infarct , age undetermined  Abnormal ECG  When compared with ECG of 19-APR-2023 13:18,  Significant changes have occurred  Confirmed by Jet Benavides ([id number]) on 4/25/2024 12:39:43 PM  US pelvis  Narrative: Interpreted By:  Felipe Zayas,   STUDY:  US PELVIS;  4/25/2024 9:55 am      INDICATION:  Signs/Symptoms:vaginal bleeding.      COMPARISON:  Previous pelvic ultrasound is  from 05/26/2022.      ACCESSION NUMBER(S):  [id number]      ORDERING CLINICIAN:  GAEL VALDEZ      TECHNIQUE:  Multiple multiplanar static gray scale, color and spectral waveform  sonographic images of the pelvis were obtained. Transabdominal  ultrasound was performed. Endovaginal imaging was not performed.      FINDINGS:  UTERUS:  The uterus measures  4.4 cm x 3.9 cm x 8.0 cm  .  There was a small  and perhaps partially calcified anterior left-sided uterine fundal  nodule measuring 13 x 15 x 18 mm, most likely small partially  calcified fibroid..      ENDOMETRIUM:  The endometrium measures a thickness of 0.5 cm.          RIGHT ADNEXA:  Unable to identify the right ovary. No gross right adnexal mass.      LEFT ADNEXA:  Unable to identify the left ovary. No gross left adnexal mass.      CUL DE SAC:  No gross free fluid is seen in the pelvic cul-de-sac.              Impression: The exam was somewhat limited due to patient's underlying medical  condition and non use of endovaginal imaging.      Unable to identify either ovary. No gross adnexal mass on either side.      Small anterior left-sided uterine fundal fibroid.      Endometrial thickness of 5 mm may be normal depending on the  patient's current menstrual status. Clinical correlation needed.      MACRO:  None      Signed by: Felipe Zayas 4/25/2024 11:00 AM  Dictation workstation:   [id number]      Physical Exam    GENERAL: Alert  SKIN: Warm and dry.  No suspicious lesions or rashes.  HEENT:  NCAT, PERRLA, refusing to open mouth  LUNGS: Unlabored, CTAB, no significant wheezing, rhonchi, or rales appreciated  CARDS: RRR, no m/g/r appreciated  GI: Soft, NTND, BS+, no rebound, no guarding   MS/Extremities: WWP, no significant pitting edema, distal pulses intact, moves all extremities  NEURO: Alert, +left facial droop, aphasia (chronic), R hemiparesis (chronic), following basic commands, nods yes/no to simple questions     Relevant Results  Results for orders placed  or performed during the hospital encounter of 04/22/24 (from the past 24 hour(s))   POCT GLUCOSE   Result Value Ref Range    POCT Glucose 197 (H) 74 - 99 mg/dL   POCT GLUCOSE   Result Value Ref Range    POCT Glucose 171 (H) 74 - 99 mg/dL   CBC and Auto Differential   Result Value Ref Range    WBC 13.5 (H) 4.4 - 11.3 x10*3/uL    nRBC 0.0 0.0 - 0.0 /100 WBCs    RBC 3.16 (L) 4.00 - 5.20 x10*6/uL    Hemoglobin 9.6 (L) 12.0 - 16.0 g/dL    Hematocrit 30.0 (L) 36.0 - 46.0 %    MCV 95 80 - 100 fL    MCH 30.4 26.0 - 34.0 pg    MCHC 32.0 32.0 - 36.0 g/dL    RDW 16.9 (H) 11.5 - 14.5 %    Platelets 328 150 - 450 x10*3/uL    Neutrophils % 70.6 40.0 - 80.0 %    Immature Granulocytes %, Automated 0.7 0.0 - 0.9 %    Lymphocytes % 19.1 13.0 - 44.0 %    Monocytes % 8.0 2.0 - 10.0 %    Eosinophils % 1.2 0.0 - 6.0 %    Basophils % 0.4 0.0 - 2.0 %    Neutrophils Absolute 9.52 (H) 1.20 - 7.70 x10*3/uL    Immature Granulocytes Absolute, Automated 0.09 0.00 - 0.70 x10*3/uL    Lymphocytes Absolute 2.57 1.20 - 4.80 x10*3/uL    Monocytes Absolute 1.08 (H) 0.10 - 1.00 x10*3/uL    Eosinophils Absolute 0.16 0.00 - 0.70 x10*3/uL    Basophils Absolute 0.06 0.00 - 0.10 x10*3/uL   Basic metabolic panel   Result Value Ref Range    Glucose 217 (H) 74 - 99 mg/dL    Sodium 145 136 - 145 mmol/L    Potassium 3.5 3.5 - 5.3 mmol/L    Chloride 105 98 - 107 mmol/L    Bicarbonate 28 21 - 32 mmol/L    Anion Gap 16 10 - 20 mmol/L    Urea Nitrogen 31 (H) 6 - 23 mg/dL    Creatinine 1.11 (H) 0.50 - 1.05 mg/dL    eGFR 59 (L) >60 mL/min/1.73m*2    Calcium 8.7 8.6 - 10.3 mg/dL   POCT GLUCOSE   Result Value Ref Range    POCT Glucose 213 (H) 74 - 99 mg/dL   POCT GLUCOSE   Result Value Ref Range    POCT Glucose 207 (H) 74 - 99 mg/dL       Scheduled medications  amLODIPine, 5 mg, oral, Daily  [Held by provider] apixaban, 5 mg, oral, BID  atorvastatin, 10 mg, oral, Daily  docusate sodium, 100 mg, oral, BID  dolutegravir, 50 mg, oral, Daily  emtricitabine-tenofovir alafen,  1 tablet, oral, Daily  enoxaparin, 80 mg, subcutaneous, q12h  furosemide, 20 mg, oral, Daily  gabapentin, 600 mg, oral, TID  insulin glargine, 20 Units, subcutaneous, q24h  insulin lispro, 0-5 Units, subcutaneous, Before meals & nightly  lamoTRIgine, 25 mg, oral, q PM  levoFLOXacin, 500 mg, intravenous, q24h  levothyroxine, 125 mcg, oral, Daily  lubricating eye drops, 1 drop, Both Eyes, BID  melatonin, 6 mg, oral, Nightly  pantoprazole, 40 mg, oral, Daily before breakfast  pantoprazole, 40 mg, intravenous, Daily  sertraline, 200 mg, oral, Daily      Continuous medications  dextrose 5%-0.45 % sodium chloride, 50 mL/hr, Last Rate: 50 mL/hr (04/26/24 0516)      PRN medications  PRN medications: acetaminophen, dextrose, dextrose, glucagon, glucagon, hydrALAZINE, HYDROmorphone, ipratropium-albuteroL, lactulose       Assessment/Plan      Encephalopathy   Hx CVA with residual R hemiparesis, aphasia  -CBC: +leukocytosis, no acute anemia, no thrombocytopenia  -CMP: no acute electrolyte abnormalities, no acute renal or liver dysfunction appreciated   -CTH: no acute processes  -MRI/MRA: No acute processes.  No LVO or acute stenosis.   -UA: +leukest, no nitrites, 1-5 WBC --> which could suggest UTI, but patient denies urinary complaints (unclear if reliable historian), received dose of rocephin in the ER at 2300 on 4/22/24 which will provide coverage for 24h, will hold off on ordering additional antibiotics for now, and follow up urine culture results   -repeat labs in AM   -need to get PTA med list from facility and resume home meds as appropriate     HIV:  -need to confirm HIV meds and resume  -last CD4 count 4/2022: CD3 + T cell % 89 (60-89%), CD3 + T cell #[phone] (high) [phone]), CD3 and CD4 + Tcell % 23 (34-61%), CD3 + CD4 + T cell # [phone])  -consider ID consult versus follow up, I do not see that she follows with ID in the EMR, will need to confirm      Hx graves s/p thyroidectomy   Post surgical hypothyroid  -TSH  low, Free thyroxine high --> consistent with over medicated hypothyroid  -hold levothyroxine --> will need dose adjustment and repeat lab as OP     HTN  HLP  T2DM  CKD3  -hypoglycemia protocol, accuchecks ACHS, ISS coverage  -Hba1c pending   -confirm home meds and resume as appropriate      HFrEF  -ECHO 5/2022: EF 45%.  (EF 35-40% 2/17/2022)  -need to confirm home meds and resume as appropriate      GI ppx:   -Protonix  -bowel regimen     VTE ppx:  Hx PE  -continue Eliquis.        4/25-patient still lethargic, failed second swallow eval today, continues to be n.p.o., IV fluids started some meds changed to p.o., will continue to monitor, will restart p.o. when patient is able to swallow safely     4/26-patient more awake today, is able to communicate with a whisper, using her left arm to gesture, discussed with speech therapist patient should remain n.p.o. until he has a modified barium swallow on Monday, patient appears to be stable, p.o. meds on hold, WBC improving, urine culture positive for E. coli, patient continues to be on Levaquin.  Continue IV fluids.  Blood sugars under control.    Radha Ward MD

## 2024-04-26 NOTE — PROGRESS NOTES
04/26/24 1611   Discharge Planning   Assistance Needed gyn monitoring bleeding   Patient expects to be discharged to: [facility] -no Barriers to return -bedhold   Does the patient need discharge transport arranged? Yes   RoundTrip coordination needed? Yes   Has discharge transport been arranged? No

## 2024-04-26 NOTE — CARE PLAN
The patient's goals for the shift include unable to express    The clinical goals for the shift include Comfort and rest    Over the shift, the patient did not make progress toward the following goals. Barriers to progression include chronic illness. Recommendations to address these barriers include provide a quiet environment.

## 2024-04-26 NOTE — PROGRESS NOTES
Speech-Language Pathology    Inpatient  Speech-Language Pathology Dysphagia Treatment    Patient Name: Janette Martinez  MRN: [id number]  : 1970  Today's Date: 24   Time Calculation  Start Time: 1028  Stop Time: 1057  Time Calculation (min): 29 min        Subjective:   Alert with hypophonia    Objective:  Goals:   Pt will tolerate PO trials and re-assessment by SLP within 1-2 days to advance to PO diet. GOAL INITIATED .     Goal Initiated: 24  Progress toward goals: ongoing    Therapeutic Swallow Intervention :  (Swallow Reassessment)    Treatment Results/ Swallow Comments: Patient seen to reassess swallow. Patient alert and attempting to verbalize with hypophonia with periodic aphonia noted during session. HOB elevated to achieve safe position for po trials. Ice chips presented with slowed bolus formation. Cessation of bolus manipulation noted. Swallow delay likely based on clinical presentation. Following multiple boluses, patient completed spontaneous reswallows. Upper airway congestion noted during forced inhalation following ice chip trials. Deferred additional bolus presentations due to concerns for aspiration given present condition. Discussed with nursing staff and physician concerns for swallowing safety and need for instrumental swallowing assessment. Baseline diet confirmed as puree.     Assessment:  SLP TX Intervention Outcome: No Progress Made    Patient continues to exhibit oral and suspected pharyngeal dysphagia. Prolonged bolus manipulation does not currently appear functional for meeting nutritional requirements orally. If condition anticipated to improve over next couple of days, would re assess on  to determine readiness for MBSS. Will need instrumental swallow assessment for objective assessment of swallowing function. Concern exists for poor airway clearance capabilities due to reduced breath support for phonation and likely reduction in airway protection ability. If  condition does not improve, may need to consider long term enteral nutritional support (e.g. PEG tube). Will need to follow for ongoing swallowing assessment.    Plan:    CONTINUE NPO WITH SLP TO REASSESS SWALLOW    SLP TX Plan: Continue Plan of Care  SLP Plan: Skilled SLP  SLP Frequency: 3x per week  Duration: Current admission  SLP Discharge Recommendations: Continue skilled speech therapy services post discharge  Discussed POC: Patient, Nursing, Physician  Discussed Risks/Benefits: Yes  Patient/Caregiver Agreeable: Yes

## 2024-04-26 NOTE — CONSULTS
Nutrition Assessment Note    Reason for Assessment  Reason for Assessment: Admission nursing screening    Pt admitted for:  Encephalopathy [G93.40]    Chart reviewed and pt visited.  SLP on consult, recommending NPO    Past Medical History:   Diagnosis Date    Acute pulmonary embolism (Multi) 04/22/2024    Aphasia as late effect of cerebrovascular accident 04/25/2023    Essential hypertension 06/25/2010    Gastroesophageal reflux disease 03/10/2009    Hemiplegia of nondominant side, late effect of cerebrovascular disease (Multi) 09/03/2008    Hemorrhagic stroke (Multi) 06/25/2010    HIV infection (Multi) 02/21/2007    Hypothyroidism 10/10/2002    Formatting of this note might be different from the original.   S/p thyroidectomy 2002   Patholgy c/w Graves    Mixed hyperlipidemia 06/25/2010    Stage 3a chronic kidney disease (Multi) 04/22/2024    T2DM (type 2 diabetes mellitus) (Multi) 11/14/2012     Results for orders placed or performed during the hospital encounter of 04/22/24 (from the past 24 hour(s))   POCT GLUCOSE   Result Value Ref Range    POCT Glucose 197 (H) 74 - 99 mg/dL   POCT GLUCOSE   Result Value Ref Range    POCT Glucose 171 (H) 74 - 99 mg/dL   CBC and Auto Differential   Result Value Ref Range    WBC 13.5 (H) 4.4 - 11.3 x10*3/uL    nRBC 0.0 0.0 - 0.0 /100 WBCs    RBC 3.16 (L) 4.00 - 5.20 x10*6/uL    Hemoglobin 9.6 (L) 12.0 - 16.0 g/dL    Hematocrit 30.0 (L) 36.0 - 46.0 %    MCV 95 80 - 100 fL    MCH 30.4 26.0 - 34.0 pg    MCHC 32.0 32.0 - 36.0 g/dL    RDW 16.9 (H) 11.5 - 14.5 %    Platelets 328 150 - 450 x10*3/uL    Neutrophils % 70.6 40.0 - 80.0 %    Immature Granulocytes %, Automated 0.7 0.0 - 0.9 %    Lymphocytes % 19.1 13.0 - 44.0 %    Monocytes % 8.0 2.0 - 10.0 %    Eosinophils % 1.2 0.0 - 6.0 %    Basophils % 0.4 0.0 - 2.0 %    Neutrophils Absolute 9.52 (H) 1.20 - 7.70 x10*3/uL    Immature Granulocytes Absolute, Automated 0.09 0.00 - 0.70 x10*3/uL    Lymphocytes Absolute 2.57 1.20 - 4.80  x10*3/uL    Monocytes Absolute 1.08 (H) 0.10 - 1.00 x10*3/uL    Eosinophils Absolute 0.16 0.00 - 0.70 x10*3/uL    Basophils Absolute 0.06 0.00 - 0.10 x10*3/uL   Basic metabolic panel   Result Value Ref Range    Glucose 217 (H) 74 - 99 mg/dL    Sodium 145 136 - 145 mmol/L    Potassium 3.5 3.5 - 5.3 mmol/L    Chloride 105 98 - 107 mmol/L    Bicarbonate 28 21 - 32 mmol/L    Anion Gap 16 10 - 20 mmol/L    Urea Nitrogen 31 (H) 6 - 23 mg/dL    Creatinine 1.11 (H) 0.50 - 1.05 mg/dL    eGFR 59 (L) >60 mL/min/1.73m*2    Calcium 8.7 8.6 - 10.3 mg/dL   POCT GLUCOSE   Result Value Ref Range    POCT Glucose 213 (H) 74 - 99 mg/dL   POCT GLUCOSE   Result Value Ref Range    POCT Glucose 207 (H) 74 - 99 mg/dL     Scheduled medications  amLODIPine, 5 mg, oral, Daily  [Held by provider] apixaban, 5 mg, oral, BID  atorvastatin, 10 mg, oral, Daily  docusate sodium, 100 mg, oral, BID  dolutegravir, 50 mg, oral, Daily  emtricitabine-tenofovir alafen, 1 tablet, oral, Daily  enoxaparin, 80 mg, subcutaneous, q12h  furosemide, 20 mg, oral, Daily  gabapentin, 600 mg, oral, TID  insulin glargine, 20 Units, subcutaneous, q24h  insulin lispro, 0-5 Units, subcutaneous, Before meals & nightly  lamoTRIgine, 25 mg, oral, q PM  levoFLOXacin, 500 mg, intravenous, q24h  levothyroxine, 125 mcg, oral, Daily  lubricating eye drops, 1 drop, Both Eyes, BID  melatonin, 6 mg, oral, Nightly  pantoprazole, 40 mg, oral, Daily before breakfast  pantoprazole, 40 mg, intravenous, Daily  sertraline, 200 mg, oral, Daily      Continuous medications  dextrose 5%-0.45 % sodium chloride, 50 mL/hr, Last Rate: 50 mL/hr (04/26/24 0516)      PRN medications  PRN medications: acetaminophen, dextrose, dextrose, glucagon, glucagon, hydrALAZINE, HYDROmorphone, ipratropium-albuteroL, lactulose  Dietary Orders (From admission, onward)       Start     Ordered    04/24/24 1409  NPO Diet; Effective now  Diet effective now         04/24/24 1408                  History:  Food and  "Nutrient History  Energy Intake: Poor < 50 %  Food and Nutrient History: NPO    Anthropometrics:  Height: 137.2 cm (4' 6.02\")  Weight: 93 kg (205 lb)  BMI (Calculated): 49.4    Wt Readings from Last 27 Encounters:   04/26/24 93 kg (205 lb)   04/15/24 93.3 kg (205 lb 9.6 oz)   04/09/24 93.3 kg (205 lb 9.6 oz)   03/11/24 94.9 kg (209 lb 3.2 oz)   02/05/24 102 kg (224 lb 12.8 oz)   01/15/24 102 kg (224 lb 12.8 oz)   01/02/24 102 kg (224 lb)   12/04/23 108 kg (238 lb)   11/27/23 108 kg (238 lb)   11/21/23 108 kg (238 lb)   11/13/23 108 kg (238 lb)   11/06/23 108 kg (238 lb)   10/31/23 108 kg (238 lb)   10/23/23 104 kg (228 lb 12.8 oz)   09/18/23 104 kg (228 lb 4.8 oz)   09/11/23 104 kg (228 lb 4.8 oz)   09/05/23 103 kg (227 lb 3.2 oz)   08/28/23 103 kg (227 lb 3.2 oz)   08/07/23 103 kg (227 lb 3.2 oz)   07/31/23 105 kg (231 lb 6.4 oz)   07/24/23 105 kg (231 lb 6.4 oz)   07/10/23 105 kg (231 lb 6.4 oz)   06/12/23 105 kg (231 lb 6.4 oz)   06/05/23 106 kg (233 lb 9.6 oz)   05/22/23 106 kg (233 lb 9.6 oz)   05/08/23 106 kg (233 lb 9.6 oz)   04/24/23 105 kg (231 lb 9.6 oz)     Weight Change  Weight History / % Weight Change: 102.2kg 1/10/24, 104.2kg 10/17/23, 113.9kg 5/9/23  Significant Weight Loss: Yes  Interpretation of Weight Loss: >10% in 6 months    Estimated Energy Needs  Total Energy Estimated Needs (kCal): 2325 kCal  Total Estimated Energy Need per Day (kCal/kg): 2790 kCal/kg  Method for Estimating Needs: 25-30    Estimated Protein Needs  Total Protein Estimated Needs (g): 75 g  Total Protein Estimated Needs (g/kg): 95 g/kg  Method for Estimating Needs: 0.8-1.0    Estimated Fluid Needs  Method for Estimating Needs: 1ml/kcal or per MD    Nutrition Focused Physical Findings:  Subcutaneous Fat Loss  Orbital Fat Pads: Mild-Moderate (slight dark circles and slight hollowing)  Buccal Fat Pads: Well nourished (full, rounded cheeks)    Muscle Wasting  Temporalis: Mild-Moderate (slight depression)    Edema  Edema: " none    Physical Findings (Nutrition Deficiency/Toxicity)  Skin: Positive (unstageable coccyx)     Nutrition Diagnosis   Malnutrition Diagnosis  Patient has Malnutrition Diagnosis: Yes  Diagnosis Status: New  Malnutrition Diagnosis: Severe malnutrition related to chronic disease or condition  As Evidenced by: > 7.5% weight loss in 3 months, > 10% weight loss in 6 months, moderate subcutaneous fat loss and moderate muscle wasting present    Patient has Nutrition Diagnosis: Yes  Nutrition Diagnosis 1: Increased nutrient needs  Diagnosis Status (1): New  Related to (1): pressure injury  As Evidenced by (1): unstageable coccyx       Nutrition Diagnosis 2: Inadequate oral intake  Diagnosis Status (2): New  Related to (2): acute illness  As Evidenced by (2): SLP recommending NPO       Nutrition Diagnosis 3: Altered nutrition related to laboratory values  Diagnosis Status (3): New  Related to (3): chronic illness  As Evidenced by (3): Glucose 217          Nutrition Interventions/Recommendations   Nutrition Prescription  Individualized Nutrition Prescription Provided for : Pt to be NPO/CLD< 5 days.  Should pt require to be NPO/CLD > 5 days, consider alternate route for nutrition. (Should pt require enteral nutrition to meet needs: Glucerna 1.5, goal rate 65ml/hr would provide 2340kcal, 128.7g protein & 1184ml free h2o; flushes per MD rec)    Coordination of Nutrition Care by a Nutrition Professional  Collaboration and Referral of Nutrition Care: Collaboration by nutrition professional with other providers    Education Documentation  No documentation found.      Nutrition Monitoring and Evaluation   Food and Nutrient Related History  Energy Intake: Estimated energy intake    Fluid Intake: Estimated fluid intake    Anthropometrics: Body Composition/Growth/Weight History  Weight Change: Weight gain, Weight loss    Biochemical Data, Medical Tests and Procedures  Electrolyte and Renal Panel: Other (Comment)  Criteria: as  clinically indicated    Gastrointestinal Profile: Other (Comment)  Criteria: as clinically indicated    Glucose/Endocrine Profile: Other (Comment)  Criteria: as clinically indicated    Nutritional Anemia Profile: Other (Comment)  Criteria: as clinically indicated    Vitamin Profile: Other (Comment)  Criteria: as clinically indicated    Nutrition Focused Physical Findings  Adipose: Loss of subcutaneous fat    Muscles: Muscle atrophy    Skin: Impaired wound healing    Other: Stool output, Urine volume, Overall appearance    Follow Up  Time Spent (min): 60 minutes  Last Date of Nutrition Visit: 04/25/24  Nutrition Follow-Up Needed?: Dietitian to reassess per policy  Follow up Comment: [facility]

## 2024-04-27 LAB
ANION GAP SERPL CALC-SCNC: 16 MMOL/L (ref 10–20)
BASOPHILS # BLD AUTO: 0.06 X10*3/UL (ref 0–0.1)
BASOPHILS NFR BLD AUTO: 0.3 %
BUN SERPL-MCNC: 27 MG/DL (ref 6–23)
CALCIUM SERPL-MCNC: 8.7 MG/DL (ref 8.6–10.3)
CHLORIDE SERPL-SCNC: 106 MMOL/L (ref 98–107)
CO2 SERPL-SCNC: 26 MMOL/L (ref 21–32)
CREAT SERPL-MCNC: 0.96 MG/DL (ref 0.5–1.05)
EGFRCR SERPLBLD CKD-EPI 2021: 70 ML/MIN/1.73M*2
EOSINOPHIL # BLD AUTO: 0.07 X10*3/UL (ref 0–0.7)
EOSINOPHIL NFR BLD AUTO: 0.4 %
ERYTHROCYTE [DISTWIDTH] IN BLOOD BY AUTOMATED COUNT: 16.8 % (ref 11.5–14.5)
GLUCOSE BLD MANUAL STRIP-MCNC: 182 MG/DL (ref 74–99)
GLUCOSE BLD MANUAL STRIP-MCNC: 218 MG/DL (ref 74–99)
GLUCOSE BLD MANUAL STRIP-MCNC: 229 MG/DL (ref 74–99)
GLUCOSE BLD MANUAL STRIP-MCNC: 273 MG/DL (ref 74–99)
GLUCOSE SERPL-MCNC: 248 MG/DL (ref 74–99)
HCT VFR BLD AUTO: 29.1 % (ref 36–46)
HGB BLD-MCNC: 9.3 G/DL (ref 12–16)
IMM GRANULOCYTES # BLD AUTO: 0.15 X10*3/UL (ref 0–0.7)
IMM GRANULOCYTES NFR BLD AUTO: 0.8 % (ref 0–0.9)
LYMPHOCYTES # BLD AUTO: 2.06 X10*3/UL (ref 1.2–4.8)
LYMPHOCYTES NFR BLD AUTO: 11.2 %
MCH RBC QN AUTO: 30.4 PG (ref 26–34)
MCHC RBC AUTO-ENTMCNC: 32 G/DL (ref 32–36)
MCV RBC AUTO: 95 FL (ref 80–100)
MONOCYTES # BLD AUTO: 1.67 X10*3/UL (ref 0.1–1)
MONOCYTES NFR BLD AUTO: 9.1 %
NEUTROPHILS # BLD AUTO: 14.37 X10*3/UL (ref 1.2–7.7)
NEUTROPHILS NFR BLD AUTO: 78.2 %
NRBC BLD-RTO: 0 /100 WBCS (ref 0–0)
PLATELET # BLD AUTO: 331 X10*3/UL (ref 150–450)
POTASSIUM SERPL-SCNC: 2.9 MMOL/L (ref 3.5–5.3)
RBC # BLD AUTO: 3.06 X10*6/UL (ref 4–5.2)
SODIUM SERPL-SCNC: 145 MMOL/L (ref 136–145)
WBC # BLD AUTO: 18.4 X10*3/UL (ref 4.4–11.3)

## 2024-04-27 PROCEDURE — 2500000005 HC RX 250 GENERAL PHARMACY W/O HCPCS: Performed by: INTERNAL MEDICINE

## 2024-04-27 PROCEDURE — 85025 COMPLETE CBC W/AUTO DIFF WBC: CPT | Performed by: HOSPITALIST

## 2024-04-27 PROCEDURE — 36415 COLL VENOUS BLD VENIPUNCTURE: CPT | Performed by: HOSPITALIST

## 2024-04-27 PROCEDURE — 80048 BASIC METABOLIC PNL TOTAL CA: CPT | Performed by: HOSPITALIST

## 2024-04-27 PROCEDURE — C9113 INJ PANTOPRAZOLE SODIUM, VIA: HCPCS | Performed by: INTERNAL MEDICINE

## 2024-04-27 PROCEDURE — 1100000001 HC PRIVATE ROOM DAILY

## 2024-04-27 PROCEDURE — 2500000002 HC RX 250 W HCPCS SELF ADMINISTERED DRUGS (ALT 637 FOR MEDICARE OP, ALT 636 FOR OP/ED): Performed by: PHYSICIAN ASSISTANT

## 2024-04-27 PROCEDURE — 2500000004 HC RX 250 GENERAL PHARMACY W/ HCPCS (ALT 636 FOR OP/ED): Performed by: INTERNAL MEDICINE

## 2024-04-27 PROCEDURE — 82947 ASSAY GLUCOSE BLOOD QUANT: CPT

## 2024-04-27 PROCEDURE — 2500000002 HC RX 250 W HCPCS SELF ADMINISTERED DRUGS (ALT 637 FOR MEDICARE OP, ALT 636 FOR OP/ED): Performed by: INTERNAL MEDICINE

## 2024-04-27 RX ORDER — POTASSIUM CHLORIDE 14.9 MG/ML
20 INJECTION INTRAVENOUS
Status: COMPLETED | OUTPATIENT
Start: 2024-04-27 | End: 2024-04-27

## 2024-04-27 RX ADMIN — INSULIN GLARGINE 20 UNITS: 100 INJECTION, SOLUTION SUBCUTANEOUS at 20:51

## 2024-04-27 RX ADMIN — ENOXAPARIN SODIUM 80 MG: 80 INJECTION SUBCUTANEOUS at 05:39

## 2024-04-27 RX ADMIN — HYDROMORPHONE HYDROCHLORIDE 0.2 MG: 0.2 INJECTION, SOLUTION INTRAMUSCULAR; INTRAVENOUS; SUBCUTANEOUS at 18:55

## 2024-04-27 RX ADMIN — INSULIN LISPRO 2 UNITS: 100 INJECTION, SOLUTION INTRAVENOUS; SUBCUTANEOUS at 20:50

## 2024-04-27 RX ADMIN — ENOXAPARIN SODIUM 80 MG: 80 INJECTION SUBCUTANEOUS at 18:55

## 2024-04-27 RX ADMIN — POTASSIUM CHLORIDE 20 MEQ: 14.9 INJECTION, SOLUTION INTRAVENOUS at 10:18

## 2024-04-27 RX ADMIN — CARBOXYMETHYLCELLULOSE SODIUM 1 DROP: 0.5 SOLUTION/ DROPS OPHTHALMIC at 09:05

## 2024-04-27 RX ADMIN — INSULIN LISPRO 3 UNITS: 100 INJECTION, SOLUTION INTRAVENOUS; SUBCUTANEOUS at 11:06

## 2024-04-27 RX ADMIN — DEXTROSE AND SODIUM CHLORIDE 50 ML/HR: 5; 450 INJECTION, SOLUTION INTRAVENOUS at 03:57

## 2024-04-27 RX ADMIN — POTASSIUM CHLORIDE 20 MEQ: 14.9 INJECTION, SOLUTION INTRAVENOUS at 11:50

## 2024-04-27 RX ADMIN — CARBOXYMETHYLCELLULOSE SODIUM 1 DROP: 0.5 SOLUTION/ DROPS OPHTHALMIC at 20:50

## 2024-04-27 RX ADMIN — LEVOFLOXACIN 500 MG: 500 INJECTION, SOLUTION INTRAVENOUS at 09:06

## 2024-04-27 RX ADMIN — PANTOPRAZOLE SODIUM 40 MG: 40 INJECTION, POWDER, FOR SOLUTION INTRAVENOUS at 09:05

## 2024-04-27 ASSESSMENT — COGNITIVE AND FUNCTIONAL STATUS - GENERAL
CLIMB 3 TO 5 STEPS WITH RAILING: TOTAL
STANDING UP FROM CHAIR USING ARMS: TOTAL
TURNING FROM BACK TO SIDE WHILE IN FLAT BAD: TOTAL
PERSONAL GROOMING: TOTAL
WALKING IN HOSPITAL ROOM: TOTAL
MOBILITY SCORE: 6
DRESSING REGULAR UPPER BODY CLOTHING: TOTAL
TOILETING: TOTAL
MOVING TO AND FROM BED TO CHAIR: TOTAL
MOVING FROM LYING ON BACK TO SITTING ON SIDE OF FLAT BED WITH BEDRAILS: TOTAL
EATING MEALS: TOTAL
DRESSING REGULAR LOWER BODY CLOTHING: TOTAL
DAILY ACTIVITIY SCORE: 6
HELP NEEDED FOR BATHING: TOTAL

## 2024-04-27 ASSESSMENT — PAIN SCALES - GENERAL
PAINLEVEL_OUTOF10: 0 - NO PAIN
PAINLEVEL_OUTOF10: 10 - WORST POSSIBLE PAIN
PAINLEVEL_OUTOF10: 0 - NO PAIN

## 2024-04-27 ASSESSMENT — PAIN DESCRIPTION - ORIENTATION: ORIENTATION: RIGHT

## 2024-04-27 ASSESSMENT — PAIN DESCRIPTION - LOCATION: LOCATION: EYE

## 2024-04-27 ASSESSMENT — PAIN - FUNCTIONAL ASSESSMENT: PAIN_FUNCTIONAL_ASSESSMENT: 0-10

## 2024-04-27 NOTE — PROGRESS NOTES
Janette Martinez is a 53 y.o. female on day 3 of admission presenting with Encephalopathy.      Subjective   History Of Present Illness  Janette Martinez is a 53 y.o. female with PMH significant for left thalamic ICH (2007, residual right hemiplegia, dysarthria and aphasia), thalamic infarct and thalamic pain syndrome (2022), HIV, Left corona radiata infarct (2023), left MCA fusiform aneurysm, PE (on Apixaban), HTN, HLD, CKD III, T2DM, HFrEF (EF 45% 5/23/22),  post-surgical hypothyroid after thyroidectomy for graves disease who presented to the ER with c/o:   -HPI limited as patient poor historian, with prior stroke with residual aphasia  -reported patient was sent to the ER from her facility due to concern for AMS and worsening L sided facial droop.  -CTH and MRI in the ER negative for acute stroke.     Objective     Last Recorded Vitals  BP (!) 148/92 (BP Location: Left arm, Patient Position: Lying)   Pulse 106   Temp 36.7 °C (98.1 °F) (Temporal)   Resp 18   Wt 93 kg (205 lb)   SpO2 98%   Intake/Output last 3 Shifts:    Intake/Output Summary (Last 24 hours) at 4/27/2024 1406  Last data filed at 4/27/2024 1018  Gross per 24 hour   Intake 1461.67 ml   Output 500 ml   Net 961.67 ml       Admission Weight  Weight: 93 kg (205 lb) (04/22/24 1400)    Daily Weight  04/26/24 : 93 kg (205 lb)    Image Results  Electrocardiogram, 12-lead PRN ACS symptoms  Sinus rhythm with frequent Premature ventricular complexes  Left axis deviation  Low voltage QRS  Lateral infarct , age undetermined  Inferior-posterior infarct , age undetermined  Abnormal ECG  When compared with ECG of 19-APR-2023 13:18,  Significant changes have occurred  Confirmed by Jet Benavides ([id number]) on 4/25/2024 12:39:43 PM  US pelvis  Narrative: Interpreted By:  Felipe Zayas,   STUDY:  US PELVIS;  4/25/2024 9:55 am      INDICATION:  Signs/Symptoms:vaginal bleeding.      COMPARISON:  Previous pelvic ultrasound is from 05/26/2022.      ACCESSION  NUMBER(S):  [id number]      ORDERING CLINICIAN:  GAEL VALDEZ      TECHNIQUE:  Multiple multiplanar static gray scale, color and spectral waveform  sonographic images of the pelvis were obtained. Transabdominal  ultrasound was performed. Endovaginal imaging was not performed.      FINDINGS:  UTERUS:  The uterus measures  4.4 cm x 3.9 cm x 8.0 cm  .  There was a small  and perhaps partially calcified anterior left-sided uterine fundal  nodule measuring 13 x 15 x 18 mm, most likely small partially  calcified fibroid..      ENDOMETRIUM:  The endometrium measures a thickness of 0.5 cm.          RIGHT ADNEXA:  Unable to identify the right ovary. No gross right adnexal mass.      LEFT ADNEXA:  Unable to identify the left ovary. No gross left adnexal mass.      CUL DE SAC:  No gross free fluid is seen in the pelvic cul-de-sac.              Impression: The exam was somewhat limited due to patient's underlying medical  condition and non use of endovaginal imaging.      Unable to identify either ovary. No gross adnexal mass on either side.      Small anterior left-sided uterine fundal fibroid.      Endometrial thickness of 5 mm may be normal depending on the  patient's current menstrual status. Clinical correlation needed.      MACRO:  None      Signed by: Felipe Zayas 4/25/2024 11:00 AM  Dictation workstation:   [id number]      Physical Exam    GENERAL: Alert  SKIN: Warm and dry.  No suspicious lesions or rashes.  HEENT:  NCAT, PERRLA, refusing to open mouth  LUNGS: Unlabored, CTAB, no significant wheezing, rhonchi, or rales appreciated  CARDS: RRR, no m/g/r appreciated  GI: Soft, NTND, BS+, no rebound, no guarding   MS/Extremities: WWP, no significant pitting edema, distal pulses intact, moves all extremities  NEURO: Alert, +left facial droop, aphasia (chronic), R hemiparesis (chronic), following basic commands, nods yes/no to simple questions     Relevant Results  Results for orders placed or performed during the  hospital encounter of 04/22/24 (from the past 24 hour(s))   POCT GLUCOSE   Result Value Ref Range    POCT Glucose 226 (H) 74 - 99 mg/dL   POCT GLUCOSE   Result Value Ref Range    POCT Glucose 192 (H) 74 - 99 mg/dL   CBC and Auto Differential   Result Value Ref Range    WBC 18.4 (H) 4.4 - 11.3 x10*3/uL    nRBC 0.0 0.0 - 0.0 /100 WBCs    RBC 3.06 (L) 4.00 - 5.20 x10*6/uL    Hemoglobin 9.3 (L) 12.0 - 16.0 g/dL    Hematocrit 29.1 (L) 36.0 - 46.0 %    MCV 95 80 - 100 fL    MCH 30.4 26.0 - 34.0 pg    MCHC 32.0 32.0 - 36.0 g/dL    RDW 16.8 (H) 11.5 - 14.5 %    Platelets 331 150 - 450 x10*3/uL    Neutrophils % 78.2 40.0 - 80.0 %    Immature Granulocytes %, Automated 0.8 0.0 - 0.9 %    Lymphocytes % 11.2 13.0 - 44.0 %    Monocytes % 9.1 2.0 - 10.0 %    Eosinophils % 0.4 0.0 - 6.0 %    Basophils % 0.3 0.0 - 2.0 %    Neutrophils Absolute 14.37 (H) 1.20 - 7.70 x10*3/uL    Immature Granulocytes Absolute, Automated 0.15 0.00 - 0.70 x10*3/uL    Lymphocytes Absolute 2.06 1.20 - 4.80 x10*3/uL    Monocytes Absolute 1.67 (H) 0.10 - 1.00 x10*3/uL    Eosinophils Absolute 0.07 0.00 - 0.70 x10*3/uL    Basophils Absolute 0.06 0.00 - 0.10 x10*3/uL   Basic metabolic panel   Result Value Ref Range    Glucose 248 (H) 74 - 99 mg/dL    Sodium 145 136 - 145 mmol/L    Potassium 2.9 (LL) 3.5 - 5.3 mmol/L    Chloride 106 98 - 107 mmol/L    Bicarbonate 26 21 - 32 mmol/L    Anion Gap 16 10 - 20 mmol/L    Urea Nitrogen 27 (H) 6 - 23 mg/dL    Creatinine 0.96 0.50 - 1.05 mg/dL    eGFR 70 >60 mL/min/1.73m*2    Calcium 8.7 8.6 - 10.3 mg/dL   POCT GLUCOSE   Result Value Ref Range    POCT Glucose 218 (H) 74 - 99 mg/dL   POCT GLUCOSE   Result Value Ref Range    POCT Glucose 273 (H) 74 - 99 mg/dL       Scheduled medications  amLODIPine, 5 mg, oral, Daily  [Held by provider] apixaban, 5 mg, oral, BID  atorvastatin, 10 mg, oral, Daily  docusate sodium, 100 mg, oral, BID  dolutegravir, 50 mg, oral, Daily  emtricitabine-tenofovir alafen, 1 tablet, oral,  Daily  enoxaparin, 80 mg, subcutaneous, q12h  furosemide, 20 mg, oral, Daily  gabapentin, 600 mg, oral, TID  insulin glargine, 20 Units, subcutaneous, q24h  insulin lispro, 0-5 Units, subcutaneous, Before meals & nightly  lamoTRIgine, 25 mg, oral, q PM  levoFLOXacin, 500 mg, intravenous, q24h  levothyroxine, 125 mcg, oral, Daily  lubricating eye drops, 1 drop, Both Eyes, BID  melatonin, 6 mg, oral, Nightly  pantoprazole, 40 mg, oral, Daily before breakfast  pantoprazole, 40 mg, intravenous, Daily  sertraline, 200 mg, oral, Daily      Continuous medications  dextrose 5%-0.45 % sodium chloride, 50 mL/hr, Last Rate: 50 mL/hr (04/27/24 0357)      PRN medications  PRN medications: acetaminophen, dextrose, dextrose, glucagon, glucagon, hydrALAZINE, HYDROmorphone, ipratropium-albuteroL, lactulose       Assessment/Plan      Encephalopathy   Hx CVA with residual R hemiparesis, aphasia  -CBC: +leukocytosis, no acute anemia, no thrombocytopenia  -CMP: no acute electrolyte abnormalities, no acute renal or liver dysfunction appreciated   -CTH: no acute processes  -MRI/MRA: No acute processes.  No LVO or acute stenosis.   -UA: +leukest, no nitrites, 1-5 WBC --> which could suggest UTI, but patient denies urinary complaints (unclear if reliable historian), received dose of rocephin in the ER at 2300 on 4/22/24 which will provide coverage for 24h, will hold off on ordering additional antibiotics for now, and follow up urine culture results   -repeat labs in AM   -need to get PTA med list from facility and resume home meds as appropriate     HIV:  -need to confirm HIV meds and resume  -last CD4 count 4/2022: CD3 + T cell % 89 (60-89%), CD3 + T cell #[phone] (high) [phone]), CD3 and CD4 + Tcell % 23 (34-61%), CD3 + CD4 + T cell # [phone]  -consider ID consult versus follow up, I do not see that she follows with ID in the EMR, will need to confirm      Hx graves s/p thyroidectomy   Post surgical hypothyroid  -TSH low, Free  thyroxine high --> consistent with over medicated hypothyroid  -hold levothyroxine --> will need dose adjustment and repeat lab as OP     HTN  HLP  T2DM  CKD3  -hypoglycemia protocol, accuchecks ACHS, ISS coverage  -Hba1c pending   -confirm home meds and resume as appropriate      HFrEF  -ECHO 5/2022: EF 45%.  (EF 35-40% 2/17/2022)  -need to confirm home meds and resume as appropriate      GI ppx:   -Protonix  -bowel regimen     VTE ppx:  Hx PE  -continue Eliquis.        4/25-patient still lethargic, failed second swallow eval today, continues to be n.p.o., IV fluids started some meds changed to p.o., will continue to monitor, will restart p.o. when patient is able to swallow safely     4/26-patient more awake today, is able to communicate with a whisper, using her left arm to gesture, discussed with speech therapist patient should remain n.p.o. until he has a modified barium swallow on Monday, patient appears to be stable, p.o. meds on hold, WBC improving, urine culture positive for E. coli, patient continues to be on Levaquin.  Continue IV fluids.  Blood sugars under control.    4/27-patient awake and mental status seems to be at baseline, still may have swallowing difficulty, will await MBS on Monday, hypokalemia-will replace, WBC improving, continue IV fluids, remains n.p.o.    Radha Ward MD

## 2024-04-27 NOTE — PROGRESS NOTES
04/27/24 1226   Discharge Planning   Patient expects to be discharged to: [facility]     Patient not med ready for discharge, LTC bedhold at [facility], patient NPO until Modified Barium Swallow completed,  will not need pre-cert to go back.

## 2024-04-27 NOTE — CARE PLAN
Problem: Pain  Goal: My pain/discomfort is manageable  Outcome: Progressing     Problem: Safety  Goal: Patient will be injury free during hospitalization  Outcome: Progressing  Goal: I will remain free of falls  Outcome: Progressing     Problem: Daily Care  Goal: Daily care needs are met  Outcome: Progressing     Problem: Psychosocial Needs  Goal: Demonstrates ability to cope with hospitalization/illness  Outcome: Progressing  Goal: Collaborate with me, my family, and caregiver to identify my specific goals  Outcome: Progressing     Problem: Discharge Barriers  Goal: My discharge needs are met  Outcome: Progressing     Problem: Skin  Goal: Decreased wound size/increased tissue granulation at next dressing change  Outcome: Progressing  Flowsheets (Taken 4/27/2024 1430)  Decreased wound size/increased tissue granulation at next dressing change: Protective dressings over bony prominences  Goal: Participates in plan/prevention/treatment measures  Outcome: Progressing  Flowsheets (Taken 4/27/2024 1430)  Participates in plan/prevention/treatment measures: Elevate heels  Goal: Prevent/manage excess moisture  Outcome: Progressing  Flowsheets (Taken 4/27/2024 1430)  Prevent/manage excess moisture: Cleanse incontinence/protect with barrier cream  Goal: Prevent/minimize sheer/friction injuries  Outcome: Progressing  Flowsheets (Taken 4/27/2024 1430)  Prevent/minimize sheer/friction injuries:   Turn/reposition every 2 hours/use positioning/transfer devices   Use pull sheet  Goal: Promote/optimize nutrition  Outcome: Progressing  Flowsheets (Taken 4/27/2024 1430)  Promote/optimize nutrition: Discuss with provider if NPO > 2 days  Goal: Promote skin healing  Outcome: Progressing  Flowsheets (Taken 4/27/2024 1430)  Promote skin healing: Turn/reposition every 2 hours/use positioning/transfer devices   The patient's goals for the shift include unable to express    The clinical goals for the shift include Q2 turns and mouth  care

## 2024-04-28 LAB
ALBUMIN SERPL BCP-MCNC: 3.2 G/DL (ref 3.4–5)
ALP SERPL-CCNC: 59 U/L (ref 33–110)
ALT SERPL W P-5'-P-CCNC: 13 U/L (ref 7–45)
ANION GAP SERPL CALC-SCNC: 13 MMOL/L (ref 10–20)
AST SERPL W P-5'-P-CCNC: 45 U/L (ref 9–39)
BILIRUB SERPL-MCNC: 0.5 MG/DL (ref 0–1.2)
BUN SERPL-MCNC: 15 MG/DL (ref 6–23)
CALCIUM SERPL-MCNC: 8.8 MG/DL (ref 8.6–10.3)
CHLORIDE SERPL-SCNC: 106 MMOL/L (ref 98–107)
CO2 SERPL-SCNC: 27 MMOL/L (ref 21–32)
CREAT SERPL-MCNC: 0.78 MG/DL (ref 0.5–1.05)
EGFRCR SERPLBLD CKD-EPI 2021: 90 ML/MIN/1.73M*2
ERYTHROCYTE [DISTWIDTH] IN BLOOD BY AUTOMATED COUNT: 16.3 % (ref 11.5–14.5)
GLUCOSE BLD MANUAL STRIP-MCNC: 160 MG/DL (ref 74–99)
GLUCOSE BLD MANUAL STRIP-MCNC: 174 MG/DL (ref 74–99)
GLUCOSE BLD MANUAL STRIP-MCNC: 183 MG/DL (ref 74–99)
GLUCOSE BLD MANUAL STRIP-MCNC: 192 MG/DL (ref 74–99)
GLUCOSE SERPL-MCNC: 175 MG/DL (ref 74–99)
HCT VFR BLD AUTO: 29 % (ref 36–46)
HGB BLD-MCNC: 9.6 G/DL (ref 12–16)
MCH RBC QN AUTO: 31 PG (ref 26–34)
MCHC RBC AUTO-ENTMCNC: 33.1 G/DL (ref 32–36)
MCV RBC AUTO: 94 FL (ref 80–100)
NRBC BLD-RTO: 0 /100 WBCS (ref 0–0)
PLATELET # BLD AUTO: 327 X10*3/UL (ref 150–450)
POTASSIUM SERPL-SCNC: 2.9 MMOL/L (ref 3.5–5.3)
PROT SERPL-MCNC: 7.9 G/DL (ref 6.4–8.2)
RBC # BLD AUTO: 3.1 X10*6/UL (ref 4–5.2)
SODIUM SERPL-SCNC: 143 MMOL/L (ref 136–145)
WBC # BLD AUTO: 18.2 X10*3/UL (ref 4.4–11.3)

## 2024-04-28 PROCEDURE — 2500000005 HC RX 250 GENERAL PHARMACY W/O HCPCS: Performed by: INTERNAL MEDICINE

## 2024-04-28 PROCEDURE — 1100000001 HC PRIVATE ROOM DAILY

## 2024-04-28 PROCEDURE — 2500000002 HC RX 250 W HCPCS SELF ADMINISTERED DRUGS (ALT 637 FOR MEDICARE OP, ALT 636 FOR OP/ED): Performed by: PHYSICIAN ASSISTANT

## 2024-04-28 PROCEDURE — C9113 INJ PANTOPRAZOLE SODIUM, VIA: HCPCS | Performed by: INTERNAL MEDICINE

## 2024-04-28 PROCEDURE — 36415 COLL VENOUS BLD VENIPUNCTURE: CPT | Performed by: INTERNAL MEDICINE

## 2024-04-28 PROCEDURE — 2500000004 HC RX 250 GENERAL PHARMACY W/ HCPCS (ALT 636 FOR OP/ED): Performed by: INTERNAL MEDICINE

## 2024-04-28 PROCEDURE — 80053 COMPREHEN METABOLIC PANEL: CPT | Performed by: INTERNAL MEDICINE

## 2024-04-28 PROCEDURE — 85027 COMPLETE CBC AUTOMATED: CPT | Performed by: INTERNAL MEDICINE

## 2024-04-28 PROCEDURE — 2500000002 HC RX 250 W HCPCS SELF ADMINISTERED DRUGS (ALT 637 FOR MEDICARE OP, ALT 636 FOR OP/ED): Performed by: INTERNAL MEDICINE

## 2024-04-28 PROCEDURE — 82947 ASSAY GLUCOSE BLOOD QUANT: CPT

## 2024-04-28 RX ORDER — POTASSIUM CHLORIDE 14.9 MG/ML
20 INJECTION INTRAVENOUS
Status: COMPLETED | OUTPATIENT
Start: 2024-04-28 | End: 2024-04-29

## 2024-04-28 RX ADMIN — HYDRALAZINE HYDROCHLORIDE 10 MG: 20 INJECTION INTRAMUSCULAR; INTRAVENOUS at 18:34

## 2024-04-28 RX ADMIN — CARBOXYMETHYLCELLULOSE SODIUM 1 DROP: 0.5 SOLUTION/ DROPS OPHTHALMIC at 21:41

## 2024-04-28 RX ADMIN — POTASSIUM CHLORIDE 20 MEQ: 14.9 INJECTION, SOLUTION INTRAVENOUS at 22:17

## 2024-04-28 RX ADMIN — HYDROMORPHONE HYDROCHLORIDE 0.2 MG: 0.2 INJECTION, SOLUTION INTRAMUSCULAR; INTRAVENOUS; SUBCUTANEOUS at 21:15

## 2024-04-28 RX ADMIN — INSULIN GLARGINE 20 UNITS: 100 INJECTION, SOLUTION SUBCUTANEOUS at 21:41

## 2024-04-28 RX ADMIN — ENOXAPARIN SODIUM 80 MG: 80 INJECTION SUBCUTANEOUS at 06:10

## 2024-04-28 RX ADMIN — ENOXAPARIN SODIUM 80 MG: 80 INJECTION SUBCUTANEOUS at 17:51

## 2024-04-28 RX ADMIN — CARBOXYMETHYLCELLULOSE SODIUM 1 DROP: 0.5 SOLUTION/ DROPS OPHTHALMIC at 09:07

## 2024-04-28 RX ADMIN — DEXTROSE AND SODIUM CHLORIDE 50 ML/HR: 5; 450 INJECTION, SOLUTION INTRAVENOUS at 17:54

## 2024-04-28 RX ADMIN — LEVOFLOXACIN 500 MG: 500 INJECTION, SOLUTION INTRAVENOUS at 09:07

## 2024-04-28 RX ADMIN — INSULIN LISPRO 2 UNITS: 100 INJECTION, SOLUTION INTRAVENOUS; SUBCUTANEOUS at 21:41

## 2024-04-28 RX ADMIN — INSULIN LISPRO 1 UNITS: 100 INJECTION, SOLUTION INTRAVENOUS; SUBCUTANEOUS at 12:49

## 2024-04-28 RX ADMIN — INSULIN LISPRO 1 UNITS: 100 INJECTION, SOLUTION INTRAVENOUS; SUBCUTANEOUS at 17:50

## 2024-04-28 RX ADMIN — PANTOPRAZOLE SODIUM 40 MG: 40 INJECTION, POWDER, FOR SOLUTION INTRAVENOUS at 08:51

## 2024-04-28 ASSESSMENT — COGNITIVE AND FUNCTIONAL STATUS - GENERAL
MOVING TO AND FROM BED TO CHAIR: TOTAL
DAILY ACTIVITIY SCORE: 6
TOILETING: TOTAL
DAILY ACTIVITIY SCORE: 6
TURNING FROM BACK TO SIDE WHILE IN FLAT BAD: TOTAL
STANDING UP FROM CHAIR USING ARMS: TOTAL
HELP NEEDED FOR BATHING: TOTAL
MOBILITY SCORE: 6
DRESSING REGULAR LOWER BODY CLOTHING: TOTAL
WALKING IN HOSPITAL ROOM: TOTAL
DRESSING REGULAR UPPER BODY CLOTHING: TOTAL
DRESSING REGULAR UPPER BODY CLOTHING: TOTAL
EATING MEALS: TOTAL
MOBILITY SCORE: 6
EATING MEALS: TOTAL
CLIMB 3 TO 5 STEPS WITH RAILING: TOTAL
PERSONAL GROOMING: TOTAL
WALKING IN HOSPITAL ROOM: TOTAL
MOVING TO AND FROM BED TO CHAIR: TOTAL
DRESSING REGULAR LOWER BODY CLOTHING: TOTAL
MOVING FROM LYING ON BACK TO SITTING ON SIDE OF FLAT BED WITH BEDRAILS: TOTAL
PERSONAL GROOMING: TOTAL
CLIMB 3 TO 5 STEPS WITH RAILING: TOTAL
TURNING FROM BACK TO SIDE WHILE IN FLAT BAD: TOTAL
STANDING UP FROM CHAIR USING ARMS: TOTAL
HELP NEEDED FOR BATHING: TOTAL
TOILETING: TOTAL
MOVING FROM LYING ON BACK TO SITTING ON SIDE OF FLAT BED WITH BEDRAILS: TOTAL

## 2024-04-28 ASSESSMENT — PAIN - FUNCTIONAL ASSESSMENT: PAIN_FUNCTIONAL_ASSESSMENT: 0-10

## 2024-04-28 ASSESSMENT — PAIN SCALES - GENERAL: PAINLEVEL_OUTOF10: 0 - NO PAIN

## 2024-04-28 NOTE — CARE PLAN
The patient's goals for the shift include unable to express    The clinical goals for the shift include Pt will remain HDS      Problem: Pain  Goal: My pain/discomfort is manageable  Outcome: Progressing     Problem: Safety  Goal: Patient will be injury free during hospitalization  Outcome: Progressing  Goal: I will remain free of falls  Outcome: Progressing     Problem: Daily Care  Goal: Daily care needs are met  Outcome: Progressing     Problem: Psychosocial Needs  Goal: Demonstrates ability to cope with hospitalization/illness  Outcome: Progressing  Goal: Collaborate with me, my family, and caregiver to identify my specific goals  Outcome: Progressing     Problem: Discharge Barriers  Goal: My discharge needs are met  Outcome: Progressing     Problem: Skin  Goal: Decreased wound size/increased tissue granulation at next dressing change  Outcome: Progressing  Flowsheets (Taken 4/27/2024 4874)  Decreased wound size/increased tissue granulation at next dressing change: Protective dressings over bony prominences  Goal: Participates in plan/prevention/treatment measures  Outcome: Progressing  Goal: Prevent/manage excess moisture  Outcome: Progressing  Goal: Prevent/minimize sheer/friction injuries  Outcome: Progressing  Goal: Promote/optimize nutrition  Outcome: Progressing  Goal: Promote skin healing  Outcome: Progressing

## 2024-04-29 LAB
ALBUMIN SERPL BCP-MCNC: 3 G/DL (ref 3.4–5)
ALP SERPL-CCNC: 58 U/L (ref 33–110)
ALT SERPL W P-5'-P-CCNC: 13 U/L (ref 7–45)
ANION GAP SERPL CALC-SCNC: 12 MMOL/L (ref 10–20)
AST SERPL W P-5'-P-CCNC: 48 U/L (ref 9–39)
BILIRUB SERPL-MCNC: 0.5 MG/DL (ref 0–1.2)
BUN SERPL-MCNC: 15 MG/DL (ref 6–23)
CALCIUM SERPL-MCNC: 8.3 MG/DL (ref 8.6–10.3)
CHLORIDE SERPL-SCNC: 108 MMOL/L (ref 98–107)
CO2 SERPL-SCNC: 26 MMOL/L (ref 21–32)
CREAT SERPL-MCNC: 0.76 MG/DL (ref 0.5–1.05)
EGFRCR SERPLBLD CKD-EPI 2021: >90 ML/MIN/1.73M*2
ERYTHROCYTE [DISTWIDTH] IN BLOOD BY AUTOMATED COUNT: 16.4 % (ref 11.5–14.5)
GLUCOSE BLD MANUAL STRIP-MCNC: 149 MG/DL (ref 74–99)
GLUCOSE BLD MANUAL STRIP-MCNC: 160 MG/DL (ref 74–99)
GLUCOSE BLD MANUAL STRIP-MCNC: 171 MG/DL (ref 74–99)
GLUCOSE SERPL-MCNC: 167 MG/DL (ref 74–99)
HCT VFR BLD AUTO: 28.3 % (ref 36–46)
HGB BLD-MCNC: 9.1 G/DL (ref 12–16)
MCH RBC QN AUTO: 30.2 PG (ref 26–34)
MCHC RBC AUTO-ENTMCNC: 32.2 G/DL (ref 32–36)
MCV RBC AUTO: 94 FL (ref 80–100)
NRBC BLD-RTO: 0 /100 WBCS (ref 0–0)
PLATELET # BLD AUTO: 294 X10*3/UL (ref 150–450)
POTASSIUM SERPL-SCNC: 3.4 MMOL/L (ref 3.5–5.3)
PROT SERPL-MCNC: 7.4 G/DL (ref 6.4–8.2)
RBC # BLD AUTO: 3.01 X10*6/UL (ref 4–5.2)
SODIUM SERPL-SCNC: 143 MMOL/L (ref 136–145)
WBC # BLD AUTO: 15.8 X10*3/UL (ref 4.4–11.3)

## 2024-04-29 PROCEDURE — 1100000001 HC PRIVATE ROOM DAILY

## 2024-04-29 PROCEDURE — 2500000001 HC RX 250 WO HCPCS SELF ADMINISTERED DRUGS (ALT 637 FOR MEDICARE OP): Performed by: INTERNAL MEDICINE

## 2024-04-29 PROCEDURE — 2500000002 HC RX 250 W HCPCS SELF ADMINISTERED DRUGS (ALT 637 FOR MEDICARE OP, ALT 636 FOR OP/ED): Performed by: INTERNAL MEDICINE

## 2024-04-29 PROCEDURE — 2500000002 HC RX 250 W HCPCS SELF ADMINISTERED DRUGS (ALT 637 FOR MEDICARE OP, ALT 636 FOR OP/ED): Performed by: PHYSICIAN ASSISTANT

## 2024-04-29 PROCEDURE — 2500000004 HC RX 250 GENERAL PHARMACY W/ HCPCS (ALT 636 FOR OP/ED): Performed by: INTERNAL MEDICINE

## 2024-04-29 PROCEDURE — C9113 INJ PANTOPRAZOLE SODIUM, VIA: HCPCS | Performed by: INTERNAL MEDICINE

## 2024-04-29 PROCEDURE — 36415 COLL VENOUS BLD VENIPUNCTURE: CPT | Performed by: INTERNAL MEDICINE

## 2024-04-29 PROCEDURE — 85027 COMPLETE CBC AUTOMATED: CPT | Performed by: INTERNAL MEDICINE

## 2024-04-29 PROCEDURE — 80053 COMPREHEN METABOLIC PANEL: CPT | Performed by: INTERNAL MEDICINE

## 2024-04-29 PROCEDURE — 82947 ASSAY GLUCOSE BLOOD QUANT: CPT | Mod: 91

## 2024-04-29 PROCEDURE — 82947 ASSAY GLUCOSE BLOOD QUANT: CPT

## 2024-04-29 PROCEDURE — 2500000001 HC RX 250 WO HCPCS SELF ADMINISTERED DRUGS (ALT 637 FOR MEDICARE OP): Performed by: PHYSICIAN ASSISTANT

## 2024-04-29 PROCEDURE — 2500000005 HC RX 250 GENERAL PHARMACY W/O HCPCS: Performed by: INTERNAL MEDICINE

## 2024-04-29 RX ORDER — POTASSIUM CHLORIDE 14.9 MG/ML
20 INJECTION INTRAVENOUS ONCE
Status: COMPLETED | OUTPATIENT
Start: 2024-04-29 | End: 2024-04-29

## 2024-04-29 RX ADMIN — CARBOXYMETHYLCELLULOSE SODIUM 1 DROP: 0.5 SOLUTION/ DROPS OPHTHALMIC at 09:00

## 2024-04-29 RX ADMIN — INSULIN GLARGINE 20 UNITS: 100 INJECTION, SOLUTION SUBCUTANEOUS at 21:08

## 2024-04-29 RX ADMIN — LEVOFLOXACIN 500 MG: 500 INJECTION, SOLUTION INTRAVENOUS at 08:30

## 2024-04-29 RX ADMIN — DEXTROSE AND SODIUM CHLORIDE 50 ML/HR: 5; 450 INJECTION, SOLUTION INTRAVENOUS at 11:06

## 2024-04-29 RX ADMIN — CARBOXYMETHYLCELLULOSE SODIUM 1 DROP: 0.5 SOLUTION/ DROPS OPHTHALMIC at 21:12

## 2024-04-29 RX ADMIN — LAMOTRIGINE 25 MG: 25 TABLET ORAL at 21:11

## 2024-04-29 RX ADMIN — POTASSIUM CHLORIDE 20 MEQ: 14.9 INJECTION, SOLUTION INTRAVENOUS at 00:21

## 2024-04-29 RX ADMIN — DOCUSATE SODIUM 100 MG: 100 CAPSULE, LIQUID FILLED ORAL at 21:11

## 2024-04-29 RX ADMIN — INSULIN LISPRO 1 UNITS: 100 INJECTION, SOLUTION INTRAVENOUS; SUBCUTANEOUS at 12:10

## 2024-04-29 RX ADMIN — GABAPENTIN 600 MG: 300 CAPSULE ORAL at 21:12

## 2024-04-29 RX ADMIN — POTASSIUM CHLORIDE 20 MEQ: 14.9 INJECTION, SOLUTION INTRAVENOUS at 11:06

## 2024-04-29 RX ADMIN — INSULIN LISPRO 1 UNITS: 100 INJECTION, SOLUTION INTRAVENOUS; SUBCUTANEOUS at 21:08

## 2024-04-29 RX ADMIN — Medication 6 MG: at 21:11

## 2024-04-29 RX ADMIN — INSULIN LISPRO 1 UNITS: 100 INJECTION, SOLUTION INTRAVENOUS; SUBCUTANEOUS at 08:35

## 2024-04-29 RX ADMIN — PANTOPRAZOLE SODIUM 40 MG: 40 INJECTION, POWDER, FOR SOLUTION INTRAVENOUS at 06:12

## 2024-04-29 RX ADMIN — ENOXAPARIN SODIUM 80 MG: 80 INJECTION SUBCUTANEOUS at 06:12

## 2024-04-29 RX ADMIN — DEXTROSE AND SODIUM CHLORIDE 50 ML/HR: 5; 450 INJECTION, SOLUTION INTRAVENOUS at 21:22

## 2024-04-29 RX ADMIN — ENOXAPARIN SODIUM 80 MG: 80 INJECTION SUBCUTANEOUS at 18:06

## 2024-04-29 ASSESSMENT — COGNITIVE AND FUNCTIONAL STATUS - GENERAL
PERSONAL GROOMING: TOTAL
TOILETING: TOTAL
DRESSING REGULAR LOWER BODY CLOTHING: TOTAL
EATING MEALS: TOTAL
STANDING UP FROM CHAIR USING ARMS: TOTAL
DAILY ACTIVITIY SCORE: 6
CLIMB 3 TO 5 STEPS WITH RAILING: TOTAL
DRESSING REGULAR UPPER BODY CLOTHING: TOTAL
MOVING FROM LYING ON BACK TO SITTING ON SIDE OF FLAT BED WITH BEDRAILS: TOTAL
MOVING TO AND FROM BED TO CHAIR: TOTAL
WALKING IN HOSPITAL ROOM: TOTAL
MOBILITY SCORE: 6
HELP NEEDED FOR BATHING: TOTAL
TURNING FROM BACK TO SIDE WHILE IN FLAT BAD: TOTAL

## 2024-04-29 ASSESSMENT — PAIN SCALES - GENERAL: PAINLEVEL_OUTOF10: 0 - NO PAIN

## 2024-04-29 NOTE — CARE PLAN
Problem: Pain  Goal: My pain/discomfort is manageable  Outcome: Progressing     Problem: Safety  Goal: Patient will be injury free during hospitalization  Outcome: Progressing  Goal: I will remain free of falls  Outcome: Progressing     Problem: Daily Care  Goal: Daily care needs are met  Outcome: Progressing     Problem: Psychosocial Needs  Goal: Demonstrates ability to cope with hospitalization/illness  Outcome: Progressing  Goal: Collaborate with me, my family, and caregiver to identify my specific goals  Outcome: Progressing     Problem: Discharge Barriers  Goal: My discharge needs are met  Outcome: Progressing     Problem: Skin  Goal: Decreased wound size/increased tissue granulation at next dressing change  Outcome: Progressing  Goal: Participates in plan/prevention/treatment measures  Outcome: Progressing  Goal: Prevent/manage excess moisture  Outcome: Progressing  Goal: Prevent/minimize sheer/friction injuries  Outcome: Progressing  Goal: Promote/optimize nutrition  Outcome: Progressing  Goal: Promote skin healing  Outcome: Progressing   The patient's goals for the shift include unable to express    The clinical goals for the shift include patient will have corrected potassium by end  of shift

## 2024-04-29 NOTE — CARE PLAN
The patient's goals for the shift include unable to express    The clinical goals for the shift include patient will have corrected potassium by end  of shift    Problem: Skin  Goal: Participates in plan/prevention/treatment measures  Flowsheets (Taken 4/27/2024 1430 by Nathan Motta RN)  Participates in plan/prevention/treatment measures: Elevate heels  Goal: Prevent/minimize sheer/friction injuries  Flowsheets (Taken 4/29/2024 0058)  Prevent/minimize sheer/friction injuries: Turn/reposition every 2 hours/use positioning/transfer devices

## 2024-04-29 NOTE — PROGRESS NOTES
Janette Martinez is a 53 y.o. female on day 5 of admission presenting with Encephalopathy.      Subjective   History Of Present Illness  Janette Martinez is a 53 y.o. female with PMH significant for left thalamic ICH (2007, residual right hemiplegia, dysarthria and aphasia), thalamic infarct and thalamic pain syndrome (2022), HIV, Left corona radiata infarct (2023), left MCA fusiform aneurysm, PE (on Apixaban), HTN, HLD, CKD III, T2DM, HFrEF (EF 45% 5/23/22),  post-surgical hypothyroid after thyroidectomy for graves disease who presented to the ER with c/o:   -HPI limited as patient poor historian, with prior stroke with residual aphasia  -reported patient was sent to the ER from her facility due to concern for AMS and worsening L sided facial droop.  -CTH and MRI in the ER negative for acute stroke.     Objective     Last Recorded Vitals  /69 (BP Location: Right arm, Patient Position: Lying)   Pulse 96   Temp 37.2 °C (99 °F) (Temporal)   Resp 16   Wt 93 kg (205 lb)   SpO2 99%   Intake/Output last 3 Shifts:    Intake/Output Summary (Last 24 hours) at 4/29/2024 0915  Last data filed at 4/29/2024 0058  Gross per 24 hour   Intake 620 ml   Output 200 ml   Net 420 ml       Admission Weight  Weight: 93 kg (205 lb) (04/22/24 1400)    Daily Weight  04/26/24 : 93 kg (205 lb)    Image Results  Electrocardiogram, 12-lead PRN ACS symptoms  Sinus rhythm with frequent Premature ventricular complexes  Left axis deviation  Low voltage QRS  Lateral infarct , age undetermined  Inferior-posterior infarct , age undetermined  Abnormal ECG  When compared with ECG of 19-APR-2023 13:18,  Significant changes have occurred  Confirmed by Jet Benavides ([id number]) on 4/25/2024 12:39:43 PM  US pelvis  Narrative: Interpreted By:  Felipe Zayas,   STUDY:  US PELVIS;  4/25/2024 9:55 am      INDICATION:  Signs/Symptoms:vaginal bleeding.      COMPARISON:  Previous pelvic ultrasound is from 05/26/2022.      ACCESSION NUMBER(S):  [id number]       ORDERING CLINICIAN:  DEEP VALDEZ      TECHNIQUE:  Multiple multiplanar static gray scale, color and spectral waveform  sonographic images of the pelvis were obtained. Transabdominal  ultrasound was performed. Endovaginal imaging was not performed.      FINDINGS:  UTERUS:  The uterus measures  4.4 cm x 3.9 cm x 8.0 cm  .  There was a small  and perhaps partially calcified anterior left-sided uterine fundal  nodule measuring 13 x 15 x 18 mm, most likely small partially  calcified fibroid..      ENDOMETRIUM:  The endometrium measures a thickness of 0.5 cm.          RIGHT ADNEXA:  Unable to identify the right ovary. No gross right adnexal mass.      LEFT ADNEXA:  Unable to identify the left ovary. No gross left adnexal mass.      CUL DE SAC:  No gross free fluid is seen in the pelvic cul-de-sac.              Impression: The exam was somewhat limited due to patient's underlying medical  condition and non use of endovaginal imaging.      Unable to identify either ovary. No gross adnexal mass on either side.      Small anterior left-sided uterine fundal fibroid.      Endometrial thickness of 5 mm may be normal depending on the  patient's current menstrual status. Clinical correlation needed.      MACRO:  None      Signed by: Felipe Zayas 4/25/2024 11:00 AM  Dictation workstation:   [id number]      Physical Exam    GENERAL: Alert  SKIN: Warm and dry.  No suspicious lesions or rashes.  HEENT:  NCAT, PERRLA, refusing to open mouth  LUNGS: Unlabored, CTAB, no significant wheezing, rhonchi, or rales appreciated  CARDS: RRR, no m/g/r appreciated  GI: Soft, NTND, BS+, no rebound, no guarding   MS/Extremities: WWP, no significant pitting edema, distal pulses intact, moves all extremities  NEURO: Alert, +left facial droop, aphasia (chronic), R hemiparesis (chronic), following basic commands, nods yes/no to simple questions     Relevant Results  Results for orders placed or performed during the hospital encounter of 04/22/24  (from the past 24 hour(s))   POCT GLUCOSE   Result Value Ref Range    POCT Glucose 192 (H) 74 - 99 mg/dL   POCT GLUCOSE   Result Value Ref Range    POCT Glucose 183 (H) 74 - 99 mg/dL   POCT GLUCOSE   Result Value Ref Range    POCT Glucose 174 (H) 74 - 99 mg/dL   Comprehensive Metabolic Panel   Result Value Ref Range    Glucose 175 (H) 74 - 99 mg/dL    Sodium 143 136 - 145 mmol/L    Potassium 2.9 (LL) 3.5 - 5.3 mmol/L    Chloride 106 98 - 107 mmol/L    Bicarbonate 27 21 - 32 mmol/L    Anion Gap 13 10 - 20 mmol/L    Urea Nitrogen 15 6 - 23 mg/dL    Creatinine 0.78 0.50 - 1.05 mg/dL    eGFR 90 >60 mL/min/1.73m*2    Calcium 8.8 8.6 - 10.3 mg/dL    Albumin 3.2 (L) 3.4 - 5.0 g/dL    Alkaline Phosphatase 59 33 - 110 U/L    Total Protein 7.9 6.4 - 8.2 g/dL    AST 45 (H) 9 - 39 U/L    Bilirubin, Total 0.5 0.0 - 1.2 mg/dL    ALT 13 7 - 45 U/L   CBC   Result Value Ref Range    WBC 18.2 (H) 4.4 - 11.3 x10*3/uL    nRBC 0.0 0.0 - 0.0 /100 WBCs    RBC 3.10 (L) 4.00 - 5.20 x10*6/uL    Hemoglobin 9.6 (L) 12.0 - 16.0 g/dL    Hematocrit 29.0 (L) 36.0 - 46.0 %    MCV 94 80 - 100 fL    MCH 31.0 26.0 - 34.0 pg    MCHC 33.1 32.0 - 36.0 g/dL    RDW 16.3 (H) 11.5 - 14.5 %    Platelets 327 150 - 450 x10*3/uL   CBC   Result Value Ref Range    WBC 15.8 (H) 4.4 - 11.3 x10*3/uL    nRBC 0.0 0.0 - 0.0 /100 WBCs    RBC 3.01 (L) 4.00 - 5.20 x10*6/uL    Hemoglobin 9.1 (L) 12.0 - 16.0 g/dL    Hematocrit 28.3 (L) 36.0 - 46.0 %    MCV 94 80 - 100 fL    MCH 30.2 26.0 - 34.0 pg    MCHC 32.2 32.0 - 36.0 g/dL    RDW 16.4 (H) 11.5 - 14.5 %    Platelets 294 150 - 450 x10*3/uL   Comprehensive Metabolic Panel   Result Value Ref Range    Glucose 167 (H) 74 - 99 mg/dL    Sodium 143 136 - 145 mmol/L    Potassium 3.4 (L) 3.5 - 5.3 mmol/L    Chloride 108 (H) 98 - 107 mmol/L    Bicarbonate 26 21 - 32 mmol/L    Anion Gap 12 10 - 20 mmol/L    Urea Nitrogen 15 6 - 23 mg/dL    Creatinine 0.76 0.50 - 1.05 mg/dL    eGFR >90 >60 mL/min/1.73m*2    Calcium 8.3 (L) 8.6 - 10.3  mg/dL    Albumin 3.0 (L) 3.4 - 5.0 g/dL    Alkaline Phosphatase 58 33 - 110 U/L    Total Protein 7.4 6.4 - 8.2 g/dL    AST 48 (H) 9 - 39 U/L    Bilirubin, Total 0.5 0.0 - 1.2 mg/dL    ALT 13 7 - 45 U/L   POCT GLUCOSE   Result Value Ref Range    POCT Glucose 171 (H) 74 - 99 mg/dL       Scheduled medications  amLODIPine, 5 mg, oral, Daily  [Held by provider] apixaban, 5 mg, oral, BID  atorvastatin, 10 mg, oral, Daily  docusate sodium, 100 mg, oral, BID  dolutegravir, 50 mg, oral, Daily  emtricitabine-tenofovir alafen, 1 tablet, oral, Daily  enoxaparin, 80 mg, subcutaneous, q12h  furosemide, 20 mg, oral, Daily  gabapentin, 600 mg, oral, TID  insulin glargine, 20 Units, subcutaneous, q24h  insulin lispro, 0-5 Units, subcutaneous, Before meals & nightly  lamoTRIgine, 25 mg, oral, q PM  levoFLOXacin, 500 mg, intravenous, q24h  levothyroxine, 125 mcg, oral, Daily  lubricating eye drops, 1 drop, Both Eyes, BID  melatonin, 6 mg, oral, Nightly  pantoprazole, 40 mg, oral, Daily before breakfast  pantoprazole, 40 mg, intravenous, Daily  sertraline, 200 mg, oral, Daily      Continuous medications  dextrose 5%-0.45 % sodium chloride, 50 mL/hr, Last Rate: 50 mL/hr (04/29/24 0058)      PRN medications  PRN medications: acetaminophen, dextrose, dextrose, glucagon, glucagon, hydrALAZINE, HYDROmorphone, ipratropium-albuteroL, lactulose       Assessment/Plan      Encephalopathy   Hx CVA with residual R hemiparesis, aphasia  -CBC: +leukocytosis, no acute anemia, no thrombocytopenia  -CMP: no acute electrolyte abnormalities, no acute renal or liver dysfunction appreciated   -CTH: no acute processes  -MRI/MRA: No acute processes.  No LVO or acute stenosis.   -UA: +leukest, no nitrites, 1-5 WBC --> which could suggest UTI, but patient denies urinary complaints (unclear if reliable historian), received dose of rocephin in the ER at 2300 on 4/22/24 which will provide coverage for 24h, will hold off on ordering additional antibiotics for now,  and follow up urine culture results   -repeat labs in AM   -need to get PTA med list from facility and resume home meds as appropriate     HIV:  -need to confirm HIV meds and resume  -last CD4 count 4/2022: CD3 + T cell % 89 (60-89%), CD3 + T cell #[id number] (high) [phone]), CD3 and CD4 + Tcell % 23 (34-61%), CD3 + CD4 + T cell # [phone]  -consider ID consult versus follow up, I do not see that she follows with ID in the EMR, will need to confirm      Hx graves s/p thyroidectomy   Post surgical hypothyroid  -TSH low, Free thyroxine high --> consistent with over medicated hypothyroid  -hold levothyroxine --> will need dose adjustment and repeat lab as OP     HTN  HLP  T2DM  CKD3  -hypoglycemia protocol, accuchecks ACHS, ISS coverage  -Hba1c pending   -confirm home meds and resume as appropriate      HFrEF  -ECHO 5/2022: EF 45%.  (EF 35-40% 2/17/2022)  -need to confirm home meds and resume as appropriate      GI ppx:   -Protonix  -bowel regimen     VTE ppx:  Hx PE  -continue Eliquis.        4/25-patient still lethargic, failed second swallow eval today, continues to be n.p.o., IV fluids started some meds changed to p.o., will continue to monitor, will restart p.o. when patient is able to swallow safely     4/26-patient more awake today, is able to communicate with a whisper, using her left arm to gesture, discussed with speech therapist patient should remain n.p.o. until he has a modified barium swallow on Monday, patient appears to be stable, p.o. meds on hold, WBC improving, urine culture positive for E. coli, patient continues to be on Levaquin.  Continue IV fluids.  Blood sugars under control.    4/27-patient awake and mental status seems to be at baseline, still may have swallowing difficulty, will await MBS on Monday, hypokalemia-will replace, WBC improving, continue IV fluids, remains n.p.o.    4/28-patient awake is able to speak in a whisper, is appropriate with her speech, states she is hungry and wants  to eat, is able to gesture with her left arm, patient scheduled for MBS tomorrow, if she is able to eat p.o. will start tomorrow, will repeat labs in a.m.    4/28 : Pt feels ok, planning MBS - still NPO - on IV fluids    Radha Ward MD

## 2024-04-29 NOTE — PROGRESS NOTES
04/29/24 1321   Discharge Planning   Patient expects to be discharged to: [facility]     Patient currently NPO, needs diet plan before dc back to LTC [facility] following    ADOD 1-2 days  BARRIERS results of MBS, urine culture  DISPO [facility], confirmed with patient she will return

## 2024-04-29 NOTE — PROGRESS NOTES
Janette Martinez is a 53 y.o. female on day 4 of admission presenting with Encephalopathy.      Subjective   History Of Present Illness  Janette Martinez is a 53 y.o. female with PMH significant for left thalamic ICH (2007, residual right hemiplegia, dysarthria and aphasia), thalamic infarct and thalamic pain syndrome (2022), HIV, Left corona radiata infarct (2023), left MCA fusiform aneurysm, PE (on Apixaban), HTN, HLD, CKD III, T2DM, HFrEF (EF 45% 5/23/22),  post-surgical hypothyroid after thyroidectomy for graves disease who presented to the ER with c/o:   -HPI limited as patient poor historian, with prior stroke with residual aphasia  -reported patient was sent to the ER from her facility due to concern for AMS and worsening L sided facial droop.  -CTH and MRI in the ER negative for acute stroke.     Objective     Last Recorded Vitals  BP (!) 167/96 (BP Location: Left arm, Patient Position: Lying)   Pulse 98   Temp 36.9 °C (98.4 °F) (Temporal)   Resp 16   Wt 93 kg (205 lb)   SpO2 100%   Intake/Output last 3 Shifts:    Intake/Output Summary (Last 24 hours) at 4/28/2024 2009  Last data filed at 4/28/2024 1720  Gross per 24 hour   Intake 637.5 ml   Output 900 ml   Net -262.5 ml       Admission Weight  Weight: 93 kg (205 lb) (04/22/24 1400)    Daily Weight  04/26/24 : 93 kg (205 lb)    Image Results  Electrocardiogram, 12-lead PRN ACS symptoms  Sinus rhythm with frequent Premature ventricular complexes  Left axis deviation  Low voltage QRS  Lateral infarct , age undetermined  Inferior-posterior infarct , age undetermined  Abnormal ECG  When compared with ECG of 19-APR-2023 13:18,  Significant changes have occurred  Confirmed by Jet Benavides ([id number]) on 4/25/2024 12:39:43 PM  US pelvis  Narrative: Interpreted By:  Felipe Zayas,   STUDY:  US PELVIS;  4/25/2024 9:55 am      INDICATION:  Signs/Symptoms:vaginal bleeding.      COMPARISON:  Previous pelvic ultrasound is from 05/26/2022.      ACCESSION  NUMBER(S):  [id number]      ORDERING CLINICIAN:  GAEL VALDEZ      TECHNIQUE:  Multiple multiplanar static gray scale, color and spectral waveform  sonographic images of the pelvis were obtained. Transabdominal  ultrasound was performed. Endovaginal imaging was not performed.      FINDINGS:  UTERUS:  The uterus measures  4.4 cm x 3.9 cm x 8.0 cm  .  There was a small  and perhaps partially calcified anterior left-sided uterine fundal  nodule measuring 13 x 15 x 18 mm, most likely small partially  calcified fibroid..      ENDOMETRIUM:  The endometrium measures a thickness of 0.5 cm.          RIGHT ADNEXA:  Unable to identify the right ovary. No gross right adnexal mass.      LEFT ADNEXA:  Unable to identify the left ovary. No gross left adnexal mass.      CUL DE SAC:  No gross free fluid is seen in the pelvic cul-de-sac.              Impression: The exam was somewhat limited due to patient's underlying medical  condition and non use of endovaginal imaging.      Unable to identify either ovary. No gross adnexal mass on either side.      Small anterior left-sided uterine fundal fibroid.      Endometrial thickness of 5 mm may be normal depending on the  patient's current menstrual status. Clinical correlation needed.      MACRO:  None      Signed by: Felipe Zayas 4/25/2024 11:00 AM  Dictation workstation:   [id number]      Physical Exam    GENERAL: Alert  SKIN: Warm and dry.  No suspicious lesions or rashes.  HEENT:  NCAT, PERRLA, refusing to open mouth  LUNGS: Unlabored, CTAB, no significant wheezing, rhonchi, or rales appreciated  CARDS: RRR, no m/g/r appreciated  GI: Soft, NTND, BS+, no rebound, no guarding   MS/Extremities: WWP, no significant pitting edema, distal pulses intact, moves all extremities  NEURO: Alert, +left facial droop, aphasia (chronic), R hemiparesis (chronic), following basic commands, nods yes/no to simple questions     Relevant Results  Results for orders placed or performed during the  hospital encounter of 04/22/24 (from the past 24 hour(s))   POCT GLUCOSE   Result Value Ref Range    POCT Glucose 229 (H) 74 - 99 mg/dL   POCT GLUCOSE   Result Value Ref Range    POCT Glucose 160 (H) 74 - 99 mg/dL   POCT GLUCOSE   Result Value Ref Range    POCT Glucose 192 (H) 74 - 99 mg/dL   POCT GLUCOSE   Result Value Ref Range    POCT Glucose 183 (H) 74 - 99 mg/dL       Scheduled medications  amLODIPine, 5 mg, oral, Daily  [Held by provider] apixaban, 5 mg, oral, BID  atorvastatin, 10 mg, oral, Daily  docusate sodium, 100 mg, oral, BID  dolutegravir, 50 mg, oral, Daily  emtricitabine-tenofovir alafen, 1 tablet, oral, Daily  enoxaparin, 80 mg, subcutaneous, q12h  furosemide, 20 mg, oral, Daily  gabapentin, 600 mg, oral, TID  insulin glargine, 20 Units, subcutaneous, q24h  insulin lispro, 0-5 Units, subcutaneous, Before meals & nightly  lamoTRIgine, 25 mg, oral, q PM  levoFLOXacin, 500 mg, intravenous, q24h  levothyroxine, 125 mcg, oral, Daily  lubricating eye drops, 1 drop, Both Eyes, BID  melatonin, 6 mg, oral, Nightly  pantoprazole, 40 mg, oral, Daily before breakfast  pantoprazole, 40 mg, intravenous, Daily  sertraline, 200 mg, oral, Daily      Continuous medications  dextrose 5%-0.45 % sodium chloride, 50 mL/hr, Last Rate: 50 mL/hr (04/28/24 1754)      PRN medications  PRN medications: acetaminophen, dextrose, dextrose, glucagon, glucagon, hydrALAZINE, HYDROmorphone, ipratropium-albuteroL, lactulose       Assessment/Plan      Encephalopathy   Hx CVA with residual R hemiparesis, aphasia  -CBC: +leukocytosis, no acute anemia, no thrombocytopenia  -CMP: no acute electrolyte abnormalities, no acute renal or liver dysfunction appreciated   -CTH: no acute processes  -MRI/MRA: No acute processes.  No LVO or acute stenosis.   -UA: +leukest, no nitrites, 1-5 WBC --> which could suggest UTI, but patient denies urinary complaints (unclear if reliable historian), received dose of rocephin in the ER at 2300 on 4/22/24  which will provide coverage for 24h, will hold off on ordering additional antibiotics for now, and follow up urine culture results   -repeat labs in AM   -need to get PTA med list from facility and resume home meds as appropriate     HIV:  -need to confirm HIV meds and resume  -last CD4 count 4/2022: CD3 + T cell % 89 (60-89%), CD3 + T cell #[id number] (high) [phone]), CD3 and CD4 + Tcell % 23 (34-61%), CD3 + CD4 + T cell # [phone] ([phone])  -consider ID consult versus follow up, I do not see that she follows with ID in the EMR, will need to confirm      Hx graves s/p thyroidectomy   Post surgical hypothyroid  -TSH low, Free thyroxine high --> consistent with over medicated hypothyroid  -hold levothyroxine --> will need dose adjustment and repeat lab as OP     HTN  HLP  T2DM  CKD3  -hypoglycemia protocol, accuchecks ACHS, ISS coverage  -Hba1c pending   -confirm home meds and resume as appropriate      HFrEF  -ECHO 5/2022: EF 45%.  (EF 35-40% 2/17/2022)  -need to confirm home meds and resume as appropriate      GI ppx:   -Protonix  -bowel regimen     VTE ppx:  Hx PE  -continue Eliquis.        4/25-patient still lethargic, failed second swallow eval today, continues to be n.p.o., IV fluids started some meds changed to p.o., will continue to monitor, will restart p.o. when patient is able to swallow safely     4/26-patient more awake today, is able to communicate with a whisper, using her left arm to gesture, discussed with speech therapist patient should remain n.p.o. until he has a modified barium swallow on Monday, patient appears to be stable, p.o. meds on hold, WBC improving, urine culture positive for E. coli, patient continues to be on Levaquin.  Continue IV fluids.  Blood sugars under control.    4/27-patient awake and mental status seems to be at baseline, still may have swallowing difficulty, will await MBS on Monday, hypokalemia-will replace, WBC improving, continue IV fluids, remains n.p.o.    4/28-patient awake  is able to speak in a whisper, is appropriate with her speech, states she is hungry and wants to eat, is able to gesture with her left arm, patient scheduled for MBS tomorrow, if she is able to eat p.o. will start tomorrow, will repeat labs in a.m.    Radha Ward MD

## 2024-04-30 ENCOUNTER — APPOINTMENT (OUTPATIENT)
Dept: CARDIOLOGY | Facility: HOSPITAL | Age: 54
DRG: 689 | End: 2024-04-30
Payer: MEDICARE

## 2024-04-30 LAB
ALBUMIN SERPL BCP-MCNC: 2.9 G/DL (ref 3.4–5)
ALP SERPL-CCNC: 52 U/L (ref 33–110)
ALT SERPL W P-5'-P-CCNC: 14 U/L (ref 7–45)
ANION GAP SERPL CALC-SCNC: 12 MMOL/L (ref 10–20)
AST SERPL W P-5'-P-CCNC: 54 U/L (ref 9–39)
BILIRUB SERPL-MCNC: 0.4 MG/DL (ref 0–1.2)
BUN SERPL-MCNC: 13 MG/DL (ref 6–23)
CALCIUM SERPL-MCNC: 7.8 MG/DL (ref 8.6–10.3)
CHLORIDE SERPL-SCNC: 109 MMOL/L (ref 98–107)
CO2 SERPL-SCNC: 25 MMOL/L (ref 21–32)
CREAT SERPL-MCNC: 0.68 MG/DL (ref 0.5–1.05)
EGFRCR SERPLBLD CKD-EPI 2021: >90 ML/MIN/1.73M*2
ERYTHROCYTE [DISTWIDTH] IN BLOOD BY AUTOMATED COUNT: 16.3 % (ref 11.5–14.5)
GLUCOSE BLD MANUAL STRIP-MCNC: 125 MG/DL (ref 74–99)
GLUCOSE BLD MANUAL STRIP-MCNC: 141 MG/DL (ref 74–99)
GLUCOSE BLD MANUAL STRIP-MCNC: 161 MG/DL (ref 74–99)
GLUCOSE SERPL-MCNC: 151 MG/DL (ref 74–99)
HCT VFR BLD AUTO: 28.6 % (ref 36–46)
HGB BLD-MCNC: 9.2 G/DL (ref 12–16)
MCH RBC QN AUTO: 30.3 PG (ref 26–34)
MCHC RBC AUTO-ENTMCNC: 32.2 G/DL (ref 32–36)
MCV RBC AUTO: 94 FL (ref 80–100)
NRBC BLD-RTO: 0 /100 WBCS (ref 0–0)
PLATELET # BLD AUTO: 273 X10*3/UL (ref 150–450)
POTASSIUM SERPL-SCNC: 3.1 MMOL/L (ref 3.5–5.3)
PROT SERPL-MCNC: 6.8 G/DL (ref 6.4–8.2)
RBC # BLD AUTO: 3.04 X10*6/UL (ref 4–5.2)
SODIUM SERPL-SCNC: 143 MMOL/L (ref 136–145)
WBC # BLD AUTO: 12.7 X10*3/UL (ref 4.4–11.3)

## 2024-04-30 PROCEDURE — 2500000002 HC RX 250 W HCPCS SELF ADMINISTERED DRUGS (ALT 637 FOR MEDICARE OP, ALT 636 FOR OP/ED): Performed by: PHYSICIAN ASSISTANT

## 2024-04-30 PROCEDURE — 80053 COMPREHEN METABOLIC PANEL: CPT | Performed by: INTERNAL MEDICINE

## 2024-04-30 PROCEDURE — 92526 ORAL FUNCTION THERAPY: CPT | Mod: GN

## 2024-04-30 PROCEDURE — 82947 ASSAY GLUCOSE BLOOD QUANT: CPT

## 2024-04-30 PROCEDURE — 2500000001 HC RX 250 WO HCPCS SELF ADMINISTERED DRUGS (ALT 637 FOR MEDICARE OP): Performed by: HOSPITALIST

## 2024-04-30 PROCEDURE — 85027 COMPLETE CBC AUTOMATED: CPT | Performed by: INTERNAL MEDICINE

## 2024-04-30 PROCEDURE — 1100000001 HC PRIVATE ROOM DAILY

## 2024-04-30 PROCEDURE — 2500000005 HC RX 250 GENERAL PHARMACY W/O HCPCS: Performed by: INTERNAL MEDICINE

## 2024-04-30 PROCEDURE — C9113 INJ PANTOPRAZOLE SODIUM, VIA: HCPCS | Performed by: PHYSICIAN ASSISTANT

## 2024-04-30 PROCEDURE — 2500000004 HC RX 250 GENERAL PHARMACY W/ HCPCS (ALT 636 FOR OP/ED): Performed by: PHYSICIAN ASSISTANT

## 2024-04-30 PROCEDURE — 2500000001 HC RX 250 WO HCPCS SELF ADMINISTERED DRUGS (ALT 637 FOR MEDICARE OP): Performed by: PHYSICIAN ASSISTANT

## 2024-04-30 PROCEDURE — 2500000006 HC RX 250 W HCPCS SELF ADMINISTERED DRUGS (ALT 637 FOR ALL PAYERS): Performed by: INTERNAL MEDICINE

## 2024-04-30 PROCEDURE — 93005 ELECTROCARDIOGRAM TRACING: CPT

## 2024-04-30 PROCEDURE — 2500000001 HC RX 250 WO HCPCS SELF ADMINISTERED DRUGS (ALT 637 FOR MEDICARE OP): Performed by: INTERNAL MEDICINE

## 2024-04-30 PROCEDURE — 2500000004 HC RX 250 GENERAL PHARMACY W/ HCPCS (ALT 636 FOR OP/ED): Performed by: INTERNAL MEDICINE

## 2024-04-30 RX ORDER — POTASSIUM CHLORIDE 14.9 MG/ML
20 INJECTION INTRAVENOUS ONCE
Status: COMPLETED | OUTPATIENT
Start: 2024-04-30 | End: 2024-05-01

## 2024-04-30 RX ORDER — INSULIN GLARGINE 100 [IU]/ML
20 INJECTION, SOLUTION SUBCUTANEOUS NIGHTLY
Status: DISCONTINUED | OUTPATIENT
Start: 2024-05-01 | End: 2024-05-03 | Stop reason: HOSPADM

## 2024-04-30 RX ADMIN — PANTOPRAZOLE SODIUM 40 MG: 40 INJECTION, POWDER, FOR SOLUTION INTRAVENOUS at 05:00

## 2024-04-30 RX ADMIN — SERTRALINE HYDROCHLORIDE 200 MG: 50 TABLET ORAL at 09:34

## 2024-04-30 RX ADMIN — HYDROMORPHONE HYDROCHLORIDE 0.2 MG: 0.2 INJECTION, SOLUTION INTRAMUSCULAR; INTRAVENOUS; SUBCUTANEOUS at 20:52

## 2024-04-30 RX ADMIN — GABAPENTIN 600 MG: 300 CAPSULE ORAL at 19:04

## 2024-04-30 RX ADMIN — ENOXAPARIN SODIUM 80 MG: 80 INJECTION SUBCUTANEOUS at 05:45

## 2024-04-30 RX ADMIN — INSULIN LISPRO 1 UNITS: 100 INJECTION, SOLUTION INTRAVENOUS; SUBCUTANEOUS at 09:39

## 2024-04-30 RX ADMIN — LAMOTRIGINE 25 MG: 25 TABLET ORAL at 20:52

## 2024-04-30 RX ADMIN — DEXTROSE AND SODIUM CHLORIDE 50 ML/HR: 5; 450 INJECTION, SOLUTION INTRAVENOUS at 21:07

## 2024-04-30 RX ADMIN — CARBOXYMETHYLCELLULOSE SODIUM 1 DROP: 0.5 SOLUTION/ DROPS OPHTHALMIC at 20:52

## 2024-04-30 RX ADMIN — ENOXAPARIN SODIUM 80 MG: 80 INJECTION SUBCUTANEOUS at 19:03

## 2024-04-30 RX ADMIN — DOCUSATE SODIUM 100 MG: 100 CAPSULE, LIQUID FILLED ORAL at 20:52

## 2024-04-30 RX ADMIN — INSULIN LISPRO 1 UNITS: 100 INJECTION, SOLUTION INTRAVENOUS; SUBCUTANEOUS at 13:09

## 2024-04-30 RX ADMIN — EMTRICITABINE AND TENOFOVIR ALAFENAMIDE 1 TABLET: 200; 25 TABLET ORAL at 09:34

## 2024-04-30 RX ADMIN — GABAPENTIN 600 MG: 300 CAPSULE ORAL at 09:34

## 2024-04-30 RX ADMIN — DOLUTEGRAVIR SODIUM 50 MG: 50 TABLET, FILM COATED ORAL at 09:34

## 2024-04-30 RX ADMIN — DOCUSATE SODIUM 100 MG: 100 CAPSULE, LIQUID FILLED ORAL at 09:35

## 2024-04-30 RX ADMIN — AMLODIPINE BESYLATE 5 MG: 5 TABLET ORAL at 09:35

## 2024-04-30 RX ADMIN — ATORVASTATIN CALCIUM 10 MG: 10 TABLET, FILM COATED ORAL at 09:35

## 2024-04-30 RX ADMIN — POTASSIUM CHLORIDE 20 MEQ: 14.9 INJECTION, SOLUTION INTRAVENOUS at 23:19

## 2024-04-30 RX ADMIN — LEVOTHYROXINE SODIUM 125 MCG: 125 TABLET ORAL at 05:45

## 2024-04-30 RX ADMIN — FUROSEMIDE 20 MG: 20 TABLET ORAL at 09:34

## 2024-04-30 RX ADMIN — ACETAMINOPHEN 975 MG: 325 TABLET ORAL at 13:10

## 2024-04-30 RX ADMIN — CARBOXYMETHYLCELLULOSE SODIUM 1 DROP: 0.5 SOLUTION/ DROPS OPHTHALMIC at 09:35

## 2024-04-30 ASSESSMENT — COGNITIVE AND FUNCTIONAL STATUS - GENERAL
HELP NEEDED FOR BATHING: TOTAL
CLIMB 3 TO 5 STEPS WITH RAILING: TOTAL
PERSONAL GROOMING: TOTAL
TURNING FROM BACK TO SIDE WHILE IN FLAT BAD: TOTAL
WALKING IN HOSPITAL ROOM: TOTAL
MOVING FROM LYING ON BACK TO SITTING ON SIDE OF FLAT BED WITH BEDRAILS: TOTAL
DRESSING REGULAR LOWER BODY CLOTHING: TOTAL
MOVING TO AND FROM BED TO CHAIR: TOTAL
DRESSING REGULAR UPPER BODY CLOTHING: TOTAL
DAILY ACTIVITIY SCORE: 6
STANDING UP FROM CHAIR USING ARMS: TOTAL
TOILETING: TOTAL
MOBILITY SCORE: 6
EATING MEALS: TOTAL

## 2024-04-30 ASSESSMENT — PAIN - FUNCTIONAL ASSESSMENT
PAIN_FUNCTIONAL_ASSESSMENT: 0-10
PAIN_FUNCTIONAL_ASSESSMENT: 0-10

## 2024-04-30 ASSESSMENT — PAIN SCALES - GENERAL
PAINLEVEL_OUTOF10: 8
PAINLEVEL_OUTOF10: 0 - NO PAIN

## 2024-04-30 ASSESSMENT — PAIN DESCRIPTION - LOCATION: LOCATION: HEAD

## 2024-04-30 NOTE — CONSULTS
Wound Care Consult     Visit Date: 4/30/2024      Patient Name: Janette Martinez         MRN: [id number]           YOB: 1970     Reason for Consult: WOC nurse visit to follow up on coccyx pressure injury.         Skin prevention supplies applied/in place:   EHOB Truvue boots (green)  EHOB TAP system with green turning wedges  Pillows under bony prominences  Mepilex sacrum foam dressing  EHOB waffle bed.       Pertinent Labs:   Albumin   Date Value Ref Range Status   04/30/2024 2.9 (L) 3.4 - 5.0 g/dL Final   06/14/2022 4.0 3.4 - 5.0 g/dL Final       Wound Assessment:  Wound 04/23/24 Pressure Injury Coccyx (Active)   Wound Image   04/30/24 1146   Site Assessment Painful;Pink;Sloughing 04/30/24 1146   Maggi-Wound Assessment Clean;Dry;Intact 04/30/24 1146   Pressure Injury Stage U 04/30/24 1146   Shape oval 04/30/24 1146   Wound Length (cm) 3 cm 04/30/24 1146   Wound Width (cm) 2 cm 04/30/24 1146   Wound Surface Area (cm^2) 6 cm^2 04/30/24 1146   Wound Depth (cm) 0.2 cm 04/30/24 1146   Wound Volume (cm^3) 1.2 cm^3 04/30/24 1146   Wound Healing % 0 04/30/24 1146   State of Healing Slough 04/30/24 1146   Margins Attached edges 04/30/24 1146   Drainage Description Serosanguineous 04/30/24 1146   Drainage Amount Small 04/30/24 1146   Dressing Foam;Packed 04/30/24 1146   Dressing Changed Changed 04/30/24 1146   Dressing Status Clean;Dry 04/30/24 1146     Wound Team Summary Assessment: wound bed sloughing, size has not decreased.  No change in wound care orders at this time.  Patient incontinent of large liquid stool- maggi care provided and Crit[employer] cream applied.  Patient turned on wedges to the right.       Wound Team Plan: WOC team will follow up next week if patient is still admitted.       Farideh Mi RN, BSN, [facility]  Phone: [phone]  4/30/2024  3:52 PM

## 2024-04-30 NOTE — PROGRESS NOTES
Nutrition Follow-up Note    Chart reviewed.  Pt remains NPO ( x 7 days)  MBS per MD to be completed tomorrow to assess safe swallow for pt.    Should pt require enteral nutrition to meet needs: Glucerna 1.5, goal rate 65ml/hr would provide 2340kcal, 128.7g protein & 1184ml free h2o; flushes per MD rec.    Results for orders placed or performed during the hospital encounter of 04/22/24 (from the past 24 hour(s))   POCT GLUCOSE   Result Value Ref Range    POCT Glucose 149 (H) 74 - 99 mg/dL   POCT GLUCOSE   Result Value Ref Range    POCT Glucose 161 (H) 74 - 99 mg/dL   CBC   Result Value Ref Range    WBC 12.7 (H) 4.4 - 11.3 x10*3/uL    nRBC 0.0 0.0 - 0.0 /100 WBCs    RBC 3.04 (L) 4.00 - 5.20 x10*6/uL    Hemoglobin 9.2 (L) 12.0 - 16.0 g/dL    Hematocrit 28.6 (L) 36.0 - 46.0 %    MCV 94 80 - 100 fL    MCH 30.3 26.0 - 34.0 pg    MCHC 32.2 32.0 - 36.0 g/dL    RDW 16.3 (H) 11.5 - 14.5 %    Platelets 273 150 - 450 x10*3/uL   Comprehensive Metabolic Panel   Result Value Ref Range    Glucose 151 (H) 74 - 99 mg/dL    Sodium 143 136 - 145 mmol/L    Potassium 3.1 (L) 3.5 - 5.3 mmol/L    Chloride 109 (H) 98 - 107 mmol/L    Bicarbonate 25 21 - 32 mmol/L    Anion Gap 12 10 - 20 mmol/L    Urea Nitrogen 13 6 - 23 mg/dL    Creatinine 0.68 0.50 - 1.05 mg/dL    eGFR >90 >60 mL/min/1.73m*2    Calcium 7.8 (L) 8.6 - 10.3 mg/dL    Albumin 2.9 (L) 3.4 - 5.0 g/dL    Alkaline Phosphatase 52 33 - 110 U/L    Total Protein 6.8 6.4 - 8.2 g/dL    AST 54 (H) 9 - 39 U/L    Bilirubin, Total 0.4 0.0 - 1.2 mg/dL    ALT 14 7 - 45 U/L     Scheduled medications  amLODIPine, 5 mg, oral, Daily  [Held by provider] apixaban, 5 mg, oral, BID  atorvastatin, 10 mg, oral, Daily  docusate sodium, 100 mg, oral, BID  dolutegravir, 50 mg, oral, Daily  emtricitabine-tenofovir alafen, 1 tablet, oral, Daily  enoxaparin, 80 mg, subcutaneous, q12h  furosemide, 20 mg, oral, Daily  gabapentin, 600 mg, oral, TID  insulin glargine, 20 Units, subcutaneous, q24h  insulin lispro,  "0-5 Units, subcutaneous, Before meals & nightly  lamoTRIgine, 25 mg, oral, q PM  levothyroxine, 125 mcg, oral, Daily  lubricating eye drops, 1 drop, Both Eyes, BID  melatonin, 6 mg, oral, Nightly  pantoprazole, 40 mg, oral, Daily before breakfast  pantoprazole, 40 mg, intravenous, Daily  sertraline, 200 mg, oral, Daily      Continuous medications  dextrose 5%-0.45 % sodium chloride, 50 mL/hr, Last Rate: 50 mL/hr (04/29/24 2122)      PRN medications  PRN medications: acetaminophen, dextrose, dextrose, glucagon, glucagon, hydrALAZINE, HYDROmorphone, ipratropium-albuteroL, lactulose  Dietary Orders (From admission, onward)       Start     Ordered    04/28/24 0730  NPO Diet Except: Ice chips; Effective now  Diet effective now        Question:  Except:  Answer:  Ice chips    04/28/24 0729                  History:  Food and Nutrient History  Energy Intake: Poor < 50 %  Food and Nutrient History: NPO x 7 days    Anthropometrics:  Height: 137.2 cm (4' 6.02\")  Weight: 93 kg (205 lb)  BMI (Calculated): 49.4    Wt Readings from Last 28 Encounters:   04/26/24 93 kg (205 lb)   04/22/24 93.3 kg (205 lb 9.6 oz)   04/15/24 93.3 kg (205 lb 9.6 oz)   04/09/24 93.3 kg (205 lb 9.6 oz)   03/11/24 94.9 kg (209 lb 3.2 oz)   02/05/24 102 kg (224 lb 12.8 oz)   01/15/24 102 kg (224 lb 12.8 oz)   01/02/24 102 kg (224 lb)   12/04/23 108 kg (238 lb)   11/27/23 108 kg (238 lb)   11/21/23 108 kg (238 lb)   11/13/23 108 kg (238 lb)   11/06/23 108 kg (238 lb)   10/31/23 108 kg (238 lb)   10/23/23 104 kg (228 lb 12.8 oz)   09/18/23 104 kg (228 lb 4.8 oz)   09/11/23 104 kg (228 lb 4.8 oz)   09/05/23 103 kg (227 lb 3.2 oz)   08/28/23 103 kg (227 lb 3.2 oz)   08/07/23 103 kg (227 lb 3.2 oz)   07/31/23 105 kg (231 lb 6.4 oz)   07/24/23 105 kg (231 lb 6.4 oz)   07/10/23 105 kg (231 lb 6.4 oz)   06/12/23 105 kg (231 lb 6.4 oz)   06/05/23 106 kg (233 lb 9.6 oz)   05/22/23 106 kg (233 lb 9.6 oz)   05/08/23 106 kg (233 lb 9.6 oz)   04/24/23 105 kg (231 lb " 9.6 oz)     Weight Change  Weight History / % Weight Change: 102.2kg 1/10/24, 104.2kg 10/17/23, 113.9kg 5/9/23  Significant Weight Loss: Yes  Interpretation of Weight Loss: >10% in 6 months    Estimated Energy Needs  Total Energy Estimated Needs (kCal): 2325 kCal  Total Estimated Energy Need per Day (kCal/kg): 2790 kCal/kg  Method for Estimating Needs: 25-30    Estimated Protein Needs  Total Protein Estimated Needs (g): 75 g  Total Protein Estimated Needs (g/kg): 95 g/kg  Method for Estimating Needs: 0.8-1.0    Estimated Fluid Needs  Method for Estimating Needs: 1ml/kcal or per MD    Nutrition Focused Physical Findings:  Subcutaneous Fat Loss  Orbital Fat Pads: Mild-Moderate (slight dark circles and slight hollowing)  Buccal Fat Pads: Well nourished (full, rounded cheeks)    Muscle Wasting  Temporalis: Mild-Moderate (slight depression)    Edema  Edema: none    Physical Findings (Nutrition Deficiency/Toxicity)  Skin: Positive (coccyx)     Nutrition Diagnosis   Malnutrition Diagnosis  Patient has Malnutrition Diagnosis: Yes  Diagnosis Status: Ongoing  Malnutrition Diagnosis: Severe malnutrition related to chronic disease or condition  As Evidenced by: > 7.5% weight loss in 3 months, > 10% weight loss in 6 months, prolonged poor intake of < 50% of estimated energy needs in > 5 days, moderate subcutaneous fat loss and moderate muscle wasting present    Patient has Nutrition Diagnosis: Yes  Nutrition Diagnosis 1: Increased nutrient needs  Diagnosis Status (1): Ongoing  Related to (1): pressure injury  As Evidenced by (1): unstageable coccyx       Nutrition Diagnosis 2: Inadequate oral intake  Diagnosis Status (2): Ongoing  Related to (2): acute illness  As Evidenced by (2): SLP recommending NPO       Nutrition Diagnosis 3: Altered nutrition related to laboratory values  Diagnosis Status (3): New  Related to (3): chronic illness  As Evidenced by (3): Glucose 151        Nutrition Interventions/Recommendations   Nutrition  Prescription  Individualized Nutrition Prescription Provided for : Pt to be NPO/CLD< 5 days.  Should pt require to be NPO/CLD > 5 days, consider alternate route for nutrition. (Should pt require enteral nutrition to meet needs: Glucerna 1.5, goal rate 65ml/hr would provide 2340kcal, 128.7g protein & 1184ml free h2o; flushes per MD rec)    Coordination of Nutrition Care by a Nutrition Professional  Collaboration and Referral of Nutrition Care: Collaboration by nutrition professional with other providers    Education Documentation  No documentation found.      Nutrition Monitoring and Evaluation   Food and Nutrient Related History  Energy Intake: Estimated energy intake    Fluid Intake: Estimated fluid intake    Anthropometrics: Body Composition/Growth/Weight History  Weight Change: Weight gain, Weight loss    Biochemical Data, Medical Tests and Procedures  Electrolyte and Renal Panel: Other (Comment)  Criteria: as clinically indicated    Gastrointestinal Profile: Other (Comment)  Criteria: as clinically indicated    Glucose/Endocrine Profile: Other (Comment)  Criteria: as clinically indicated    Nutritional Anemia Profile: Other (Comment)  Criteria: as clinically indicated    Vitamin Profile: Other (Comment)  Criteria: as clinically indicated    Nutrition Focused Physical Findings  Adipose: Loss of subcutaneous fat    Muscles: Muscle atrophy    Skin: Impaired wound healing    Other: Stool output, Urine volume, Overall appearance    Follow Up  Time Spent (min): 60 minutes  Last Date of Nutrition Visit: 04/30/24  Nutrition Follow-Up Needed?: Dietitian to reassess per policy  Follow up Comment: [facility] follow up

## 2024-05-01 ENCOUNTER — APPOINTMENT (OUTPATIENT)
Dept: RADIOLOGY | Facility: HOSPITAL | Age: 54
DRG: 689 | End: 2024-05-01
Payer: MEDICARE

## 2024-05-01 LAB
ALBUMIN SERPL BCP-MCNC: 3.1 G/DL (ref 3.4–5)
ALP SERPL-CCNC: 60 U/L (ref 33–110)
ALT SERPL W P-5'-P-CCNC: 31 U/L (ref 7–45)
ANION GAP SERPL CALC-SCNC: 11 MMOL/L (ref 10–20)
AST SERPL W P-5'-P-CCNC: 86 U/L (ref 9–39)
BILIRUB SERPL-MCNC: 0.5 MG/DL (ref 0–1.2)
BUN SERPL-MCNC: 10 MG/DL (ref 6–23)
CALCIUM SERPL-MCNC: 8.1 MG/DL (ref 8.6–10.3)
CHLORIDE SERPL-SCNC: 107 MMOL/L (ref 98–107)
CO2 SERPL-SCNC: 26 MMOL/L (ref 21–32)
CREAT SERPL-MCNC: 0.71 MG/DL (ref 0.5–1.05)
EGFRCR SERPLBLD CKD-EPI 2021: >90 ML/MIN/1.73M*2
ERYTHROCYTE [DISTWIDTH] IN BLOOD BY AUTOMATED COUNT: 16.2 % (ref 11.5–14.5)
GLUCOSE BLD MANUAL STRIP-MCNC: 169 MG/DL (ref 74–99)
GLUCOSE BLD MANUAL STRIP-MCNC: 175 MG/DL (ref 74–99)
GLUCOSE BLD MANUAL STRIP-MCNC: 196 MG/DL (ref 74–99)
GLUCOSE BLD MANUAL STRIP-MCNC: 199 MG/DL (ref 74–99)
GLUCOSE SERPL-MCNC: 163 MG/DL (ref 74–99)
HCT VFR BLD AUTO: 33.5 % (ref 36–46)
HGB BLD-MCNC: 10.4 G/DL (ref 12–16)
MCH RBC QN AUTO: 29.4 PG (ref 26–34)
MCHC RBC AUTO-ENTMCNC: 31 G/DL (ref 32–36)
MCV RBC AUTO: 95 FL (ref 80–100)
NRBC BLD-RTO: 0 /100 WBCS (ref 0–0)
PLATELET # BLD AUTO: 288 X10*3/UL (ref 150–450)
POTASSIUM SERPL-SCNC: 3.1 MMOL/L (ref 3.5–5.3)
PROT SERPL-MCNC: 7.2 G/DL (ref 6.4–8.2)
RBC # BLD AUTO: 3.54 X10*6/UL (ref 4–5.2)
SODIUM SERPL-SCNC: 141 MMOL/L (ref 136–145)
WBC # BLD AUTO: 13.8 X10*3/UL (ref 4.4–11.3)

## 2024-05-01 PROCEDURE — 82947 ASSAY GLUCOSE BLOOD QUANT: CPT

## 2024-05-01 PROCEDURE — 2500000001 HC RX 250 WO HCPCS SELF ADMINISTERED DRUGS (ALT 637 FOR MEDICARE OP): Performed by: INTERNAL MEDICINE

## 2024-05-01 PROCEDURE — 80053 COMPREHEN METABOLIC PANEL: CPT | Performed by: INTERNAL MEDICINE

## 2024-05-01 PROCEDURE — 2500000005 HC RX 250 GENERAL PHARMACY W/O HCPCS: Performed by: INTERNAL MEDICINE

## 2024-05-01 PROCEDURE — 2500000001 HC RX 250 WO HCPCS SELF ADMINISTERED DRUGS (ALT 637 FOR MEDICARE OP): Performed by: HOSPITALIST

## 2024-05-01 PROCEDURE — 74230 X-RAY XM SWLNG FUNCJ C+: CPT

## 2024-05-01 PROCEDURE — 2500000002 HC RX 250 W HCPCS SELF ADMINISTERED DRUGS (ALT 637 FOR MEDICARE OP, ALT 636 FOR OP/ED): Performed by: HOSPITALIST

## 2024-05-01 PROCEDURE — 36415 COLL VENOUS BLD VENIPUNCTURE: CPT | Performed by: INTERNAL MEDICINE

## 2024-05-01 PROCEDURE — 92611 MOTION FLUOROSCOPY/SWALLOW: CPT | Mod: GN

## 2024-05-01 PROCEDURE — 2500000002 HC RX 250 W HCPCS SELF ADMINISTERED DRUGS (ALT 637 FOR MEDICARE OP, ALT 636 FOR OP/ED): Performed by: PHYSICIAN ASSISTANT

## 2024-05-01 PROCEDURE — 85027 COMPLETE CBC AUTOMATED: CPT | Performed by: INTERNAL MEDICINE

## 2024-05-01 PROCEDURE — 2500000005 HC RX 250 GENERAL PHARMACY W/O HCPCS: Performed by: HOSPITALIST

## 2024-05-01 PROCEDURE — 2500000001 HC RX 250 WO HCPCS SELF ADMINISTERED DRUGS (ALT 637 FOR MEDICARE OP): Performed by: PHYSICIAN ASSISTANT

## 2024-05-01 PROCEDURE — 74230 X-RAY XM SWLNG FUNCJ C+: CPT | Performed by: RADIOLOGY

## 2024-05-01 PROCEDURE — 3430000001 HC RX 343 DIAGNOSTIC RADIOPHARMACEUTICALS: Performed by: INTERNAL MEDICINE

## 2024-05-01 PROCEDURE — 2500000004 HC RX 250 GENERAL PHARMACY W/ HCPCS (ALT 636 FOR OP/ED): Performed by: INTERNAL MEDICINE

## 2024-05-01 PROCEDURE — 1100000001 HC PRIVATE ROOM DAILY

## 2024-05-01 RX ORDER — POTASSIUM CHLORIDE 14.9 MG/ML
20 INJECTION INTRAVENOUS ONCE
Status: COMPLETED | OUTPATIENT
Start: 2024-05-01 | End: 2024-05-01

## 2024-05-01 RX ADMIN — ENOXAPARIN SODIUM 80 MG: 80 INJECTION SUBCUTANEOUS at 05:21

## 2024-05-01 RX ADMIN — ENOXAPARIN SODIUM 80 MG: 80 INJECTION SUBCUTANEOUS at 17:25

## 2024-05-01 RX ADMIN — INSULIN GLARGINE 20 UNITS: 100 INJECTION, SOLUTION SUBCUTANEOUS at 21:32

## 2024-05-01 RX ADMIN — CARBOXYMETHYLCELLULOSE SODIUM 1 DROP: 0.5 SOLUTION/ DROPS OPHTHALMIC at 14:38

## 2024-05-01 RX ADMIN — LEVOTHYROXINE SODIUM 125 MCG: 125 TABLET ORAL at 05:21

## 2024-05-01 RX ADMIN — ATORVASTATIN CALCIUM 10 MG: 10 TABLET, FILM COATED ORAL at 14:30

## 2024-05-01 RX ADMIN — BARIUM SULFATE 20 ML: 400 SUSPENSION ORAL at 15:17

## 2024-05-01 RX ADMIN — GABAPENTIN 600 MG: 300 CAPSULE ORAL at 14:29

## 2024-05-01 RX ADMIN — LAMOTRIGINE 25 MG: 25 TABLET ORAL at 21:31

## 2024-05-01 RX ADMIN — INSULIN LISPRO 1 UNITS: 100 INJECTION, SOLUTION INTRAVENOUS; SUBCUTANEOUS at 17:24

## 2024-05-01 RX ADMIN — FUROSEMIDE 20 MG: 20 TABLET ORAL at 14:30

## 2024-05-01 RX ADMIN — AMLODIPINE BESYLATE 5 MG: 5 TABLET ORAL at 14:30

## 2024-05-01 RX ADMIN — BARIUM SULFATE 5 ML: 0.81 POWDER, FOR SUSPENSION ORAL at 15:16

## 2024-05-01 RX ADMIN — GABAPENTIN 600 MG: 300 CAPSULE ORAL at 21:31

## 2024-05-01 RX ADMIN — EMTRICITABINE AND TENOFOVIR ALAFENAMIDE 1 TABLET: 200; 25 TABLET ORAL at 14:31

## 2024-05-01 RX ADMIN — PANTOPRAZOLE SODIUM 40 MG: 40 TABLET, DELAYED RELEASE ORAL at 05:21

## 2024-05-01 RX ADMIN — HYDROMORPHONE HYDROCHLORIDE 0.2 MG: 0.2 INJECTION, SOLUTION INTRAMUSCULAR; INTRAVENOUS; SUBCUTANEOUS at 14:43

## 2024-05-01 RX ADMIN — BARIUM SULFATE 15 ML: 400 PASTE ORAL at 15:17

## 2024-05-01 RX ADMIN — INSULIN LISPRO 1 UNITS: 100 INJECTION, SOLUTION INTRAVENOUS; SUBCUTANEOUS at 21:31

## 2024-05-01 RX ADMIN — POTASSIUM CHLORIDE 20 MEQ: 14.9 INJECTION, SOLUTION INTRAVENOUS at 16:13

## 2024-05-01 RX ADMIN — HYDROMORPHONE HYDROCHLORIDE 0.2 MG: 0.2 INJECTION, SOLUTION INTRAMUSCULAR; INTRAVENOUS; SUBCUTANEOUS at 22:01

## 2024-05-01 RX ADMIN — DOLUTEGRAVIR SODIUM 50 MG: 50 TABLET, FILM COATED ORAL at 14:30

## 2024-05-01 RX ADMIN — DEXTROSE AND SODIUM CHLORIDE 50 ML/HR: 5; 450 INJECTION, SOLUTION INTRAVENOUS at 16:12

## 2024-05-01 RX ADMIN — CARBOXYMETHYLCELLULOSE SODIUM 1 DROP: 0.5 SOLUTION/ DROPS OPHTHALMIC at 21:32

## 2024-05-01 RX ADMIN — BARIUM SULFATE 10 ML: 400 SUSPENSION ORAL at 15:16

## 2024-05-01 RX ADMIN — Medication 6 MG: at 21:31

## 2024-05-01 ASSESSMENT — COGNITIVE AND FUNCTIONAL STATUS - GENERAL
STANDING UP FROM CHAIR USING ARMS: TOTAL
HELP NEEDED FOR BATHING: TOTAL
STANDING UP FROM CHAIR USING ARMS: TOTAL
EATING MEALS: TOTAL
TOILETING: TOTAL
MOVING TO AND FROM BED TO CHAIR: TOTAL
MOVING FROM LYING ON BACK TO SITTING ON SIDE OF FLAT BED WITH BEDRAILS: TOTAL
DRESSING REGULAR LOWER BODY CLOTHING: TOTAL
MOVING FROM LYING ON BACK TO SITTING ON SIDE OF FLAT BED WITH BEDRAILS: TOTAL
DRESSING REGULAR UPPER BODY CLOTHING: TOTAL
DRESSING REGULAR UPPER BODY CLOTHING: TOTAL
CLIMB 3 TO 5 STEPS WITH RAILING: TOTAL
DRESSING REGULAR LOWER BODY CLOTHING: TOTAL
PERSONAL GROOMING: TOTAL
TURNING FROM BACK TO SIDE WHILE IN FLAT BAD: TOTAL
MOVING TO AND FROM BED TO CHAIR: TOTAL
DAILY ACTIVITIY SCORE: 6
PERSONAL GROOMING: TOTAL
CLIMB 3 TO 5 STEPS WITH RAILING: TOTAL
HELP NEEDED FOR BATHING: TOTAL
TOILETING: TOTAL
WALKING IN HOSPITAL ROOM: TOTAL
DAILY ACTIVITIY SCORE: 6
MOBILITY SCORE: 6
WALKING IN HOSPITAL ROOM: TOTAL
EATING MEALS: TOTAL
MOBILITY SCORE: 6
TURNING FROM BACK TO SIDE WHILE IN FLAT BAD: TOTAL

## 2024-05-01 ASSESSMENT — PAIN SCALES - GENERAL
PAINLEVEL_OUTOF10: 0 - NO PAIN
PAINLEVEL_OUTOF10: 5 - MODERATE PAIN
PAINLEVEL_OUTOF10: 8

## 2024-05-01 ASSESSMENT — PAIN - FUNCTIONAL ASSESSMENT
PAIN_FUNCTIONAL_ASSESSMENT: 0-10

## 2024-05-01 ASSESSMENT — PAIN DESCRIPTION - DESCRIPTORS: DESCRIPTORS: ACHING

## 2024-05-01 NOTE — PROGRESS NOTES
Janette Martinez is a 53 y.o. female on day 6 of admission presenting with Encephalopathy.      Subjective   History Of Present Illness  Janette Martinez is a 53 y.o. female with PMH significant for left thalamic ICH (2007, residual right hemiplegia, dysarthria and aphasia), thalamic infarct and thalamic pain syndrome (2022), HIV, Left corona radiata infarct (2023), left MCA fusiform aneurysm, PE (on Apixaban), HTN, HLD, CKD III, T2DM, HFrEF (EF 45% 5/23/22),  post-surgical hypothyroid after thyroidectomy for graves disease who presented to the ER with c/o:   -HPI limited as patient poor historian, with prior stroke with residual aphasia  -reported patient was sent to the ER from her facility due to concern for AMS and worsening L sided facial droop.  -CTH and MRI in the ER negative for acute stroke.     Objective     Last Recorded Vitals  /72 (BP Location: Right arm, Patient Position: Lying)   Pulse 80   Temp 37.1 °C (98.7 °F) (Temporal)   Resp 16   Wt 93 kg (205 lb)   SpO2 100%   Intake/Output last 3 Shifts:  No intake or output data in the 24 hours ending 04/30/24 2115      Admission Weight  Weight: 93 kg (205 lb) (04/22/24 1400)    Daily Weight  04/26/24 : 93 kg (205 lb)    Image Results  Electrocardiogram, 12-lead PRN ACS symptoms  Sinus rhythm with frequent Premature ventricular complexes  Left axis deviation  Low voltage QRS  Lateral infarct , age undetermined  Inferior-posterior infarct , age undetermined  Abnormal ECG  When compared with ECG of 19-APR-2023 13:18,  Significant changes have occurred  Confirmed by Jet Benavides ([id number]) on 4/25/2024 12:39:43 PM  US pelvis  Narrative: Interpreted By:  Felipe Zayas,   STUDY:  US PELVIS;  4/25/2024 9:55 am      INDICATION:  Signs/Symptoms:vaginal bleeding.      COMPARISON:  Previous pelvic ultrasound is from 05/26/2022.      ACCESSION NUMBER(S):  [id number]      ORDERING CLINICIAN:  GAEL VALDEZ      TECHNIQUE:  Multiple multiplanar static gray scale,  color and spectral waveform  sonographic images of the pelvis were obtained. Transabdominal  ultrasound was performed. Endovaginal imaging was not performed.      FINDINGS:  UTERUS:  The uterus measures  4.4 cm x 3.9 cm x 8.0 cm  .  There was a small  and perhaps partially calcified anterior left-sided uterine fundal  nodule measuring 13 x 15 x 18 mm, most likely small partially  calcified fibroid..      ENDOMETRIUM:  The endometrium measures a thickness of 0.5 cm.          RIGHT ADNEXA:  Unable to identify the right ovary. No gross right adnexal mass.      LEFT ADNEXA:  Unable to identify the left ovary. No gross left adnexal mass.      CUL DE SAC:  No gross free fluid is seen in the pelvic cul-de-sac.              Impression: The exam was somewhat limited due to patient's underlying medical  condition and non use of endovaginal imaging.      Unable to identify either ovary. No gross adnexal mass on either side.      Small anterior left-sided uterine fundal fibroid.      Endometrial thickness of 5 mm may be normal depending on the  patient's current menstrual status. Clinical correlation needed.      MACRO:  None      Signed by: Felipe Zayas 4/25/2024 11:00 AM  Dictation workstation:   [id number]      Physical Exam    GENERAL: Alert  SKIN: Warm and dry.  No suspicious lesions or rashes.  HEENT:  NCAT, PERRLA, refusing to open mouth  LUNGS: Unlabored, CTAB, no significant wheezing, rhonchi, or rales appreciated  CARDS: RRR, no m/g/r appreciated  GI: Soft, NTND, BS+, no rebound, no guarding   MS/Extremities: WWP, no significant pitting edema, distal pulses intact, moves all extremities  NEURO: Alert, +left facial droop, aphasia (chronic), R hemiparesis (chronic), following basic commands, nods yes/no to simple questions     Relevant Results  Results for orders placed or performed during the hospital encounter of 04/22/24 (from the past 24 hour(s))   CBC   Result Value Ref Range    WBC 12.7 (H) 4.4 - 11.3 x10*3/uL     nRBC 0.0 0.0 - 0.0 /100 WBCs    RBC 3.04 (L) 4.00 - 5.20 x10*6/uL    Hemoglobin 9.2 (L) 12.0 - 16.0 g/dL    Hematocrit 28.6 (L) 36.0 - 46.0 %    MCV 94 80 - 100 fL    MCH 30.3 26.0 - 34.0 pg    MCHC 32.2 32.0 - 36.0 g/dL    RDW 16.3 (H) 11.5 - 14.5 %    Platelets 273 150 - 450 x10*3/uL   Comprehensive Metabolic Panel   Result Value Ref Range    Glucose 151 (H) 74 - 99 mg/dL    Sodium 143 136 - 145 mmol/L    Potassium 3.1 (L) 3.5 - 5.3 mmol/L    Chloride 109 (H) 98 - 107 mmol/L    Bicarbonate 25 21 - 32 mmol/L    Anion Gap 12 10 - 20 mmol/L    Urea Nitrogen 13 6 - 23 mg/dL    Creatinine 0.68 0.50 - 1.05 mg/dL    eGFR >90 >60 mL/min/1.73m*2    Calcium 7.8 (L) 8.6 - 10.3 mg/dL    Albumin 2.9 (L) 3.4 - 5.0 g/dL    Alkaline Phosphatase 52 33 - 110 U/L    Total Protein 6.8 6.4 - 8.2 g/dL    AST 54 (H) 9 - 39 U/L    Bilirubin, Total 0.4 0.0 - 1.2 mg/dL    ALT 14 7 - 45 U/L   POCT GLUCOSE   Result Value Ref Range    POCT Glucose 141 (H) 74 - 99 mg/dL   POCT GLUCOSE   Result Value Ref Range    POCT Glucose 125 (H) 74 - 99 mg/dL       Scheduled medications  amLODIPine, 5 mg, oral, Daily  [Held by provider] apixaban, 5 mg, oral, BID  atorvastatin, 10 mg, oral, Daily  docusate sodium, 100 mg, oral, BID  dolutegravir, 50 mg, oral, Daily  emtricitabine-tenofovir alafen, 1 tablet, oral, Daily  enoxaparin, 80 mg, subcutaneous, q12h  furosemide, 20 mg, oral, Daily  gabapentin, 600 mg, oral, TID  [START ON 5/1/2024] insulin glargine, 20 Units, subcutaneous, Nightly  insulin lispro, 0-5 Units, subcutaneous, Before meals & nightly  lamoTRIgine, 25 mg, oral, q PM  levothyroxine, 125 mcg, oral, Daily  lubricating eye drops, 1 drop, Both Eyes, BID  melatonin, 6 mg, oral, Nightly  pantoprazole, 40 mg, oral, Daily before breakfast  pantoprazole, 40 mg, intravenous, Daily  sertraline, 200 mg, oral, Daily      Continuous medications  dextrose 5%-0.45 % sodium chloride, 50 mL/hr, Last Rate: 50 mL/hr (04/30/24 2107)      PRN medications  PRN  medications: acetaminophen, dextrose, dextrose, glucagon, glucagon, hydrALAZINE, HYDROmorphone, ipratropium-albuteroL, lactulose       Assessment/Plan      Encephalopathy   Hx CVA with residual R hemiparesis, aphasia  -CBC: +leukocytosis, no acute anemia, no thrombocytopenia  -CMP: no acute electrolyte abnormalities, no acute renal or liver dysfunction appreciated   -CTH: no acute processes  -MRI/MRA: No acute processes.  No LVO or acute stenosis.   -UA: +leukest, no nitrites, 1-5 WBC --> which could suggest UTI, but patient denies urinary complaints (unclear if reliable historian), received dose of rocephin in the ER at 2300 on 4/22/24 which will provide coverage for 24h, will hold off on ordering additional antibiotics for now, and follow up urine culture results   -repeat labs in AM   -need to get PTA med list from facility and resume home meds as appropriate     HIV:  -need to confirm HIV meds and resume  -last CD4 count 4/2022: CD3 + T cell % 89 (60-89%), CD3 + T cell #[phone] (high) [phone]), CD3 and CD4 + Tcell % 23 (34-61%), CD3 + CD4 + T cell # [phone])  -consider ID consult versus follow up, I do not see that she follows with ID in the EMR, will need to confirm      Hx graves s/p thyroidectomy   Post surgical hypothyroid  -TSH low, Free thyroxine high --> consistent with over medicated hypothyroid  -hold levothyroxine --> will need dose adjustment and repeat lab as OP     HTN  HLP  T2DM  CKD3  -hypoglycemia protocol, accuchecks ACHS, ISS coverage  -Hba1c pending   -confirm home meds and resume as appropriate      HFrEF  -ECHO 5/2022: EF 45%.  (EF 35-40% 2/17/2022)  -need to confirm home meds and resume as appropriate      GI ppx:   -Protonix  -bowel regimen     VTE ppx:  Hx PE  -continue Eliquis.         4/25-patient still lethargic, failed second swallow eval today, continues to be n.p.o., IV fluids started some meds changed to p.o., will continue to monitor, will restart p.o. when patient is able to  swallow safely     4/26-patient more awake today, is able to communicate with a whisper, using her left arm to gesture, discussed with speech therapist patient should remain n.p.o. until he has a modified barium swallow on Monday, patient appears to be stable, p.o. meds on hold, WBC improving, urine culture positive for E. coli, patient continues to be on Levaquin.  Continue IV fluids.  Blood sugars under control.    4/27-patient awake and mental status seems to be at baseline, still may have swallowing difficulty, will await MBS on Monday, hypokalemia-will replace, WBC improving, continue IV fluids, remains n.p.o.    4/28-patient awake is able to speak in a whisper, is appropriate with her speech, states she is hungry and wants to eat, is able to gesture with her left arm, patient scheduled for MBS tomorrow, if she is able to eat p.o. will start tomorrow, will repeat labs in a.m.    4/28 : Pt feels ok, planning MBS - still NPO - on IV fluids    4/30-patient has been n.p.o. for about 7 days, MBS planned for tomorrow, discussed with patient about a PEG if she fails MBS, she agrees for PEG, she may need an NG placed if PEG unable to be placed within an acceptable interval.  Patient more awake.  Hypokalemia will replace potassium, WBC continues to trend down    Radha Ward MD

## 2024-05-01 NOTE — PROGRESS NOTES
05/01/24 1411   Discharge Planning   Patient expects to be discharged to: [facility]     MBS completed today and sent to LTC facility, anticipate dc in 1-3 days, patient needs to be able to maintain PO needs. She has been NPO for about 7 days.

## 2024-05-01 NOTE — PROCEDURES
"Speech-Language Pathology      Modified Barium Swallow Study     Patient Name: Janette Martinez  MRN: [id number]  : 1970  Today's Date: 24  Time Calculation  Start Time: 1215  Stop Time: 1240  Time Calculation (min): 25 min       Recommendations:  PUREE DIET WITH HONEY THICKENED LIQUIDS  UPRIGHT AT 90 DEGREES FOR ALL PO  SLOW RATE AND SMALL BOLUS SIZE  LIQUIDS VIA TSP ONLY  MEDICATION CRUSHED IN PUREE CARRIER     Assessment/Impression:    Full detailed SLP/Radiologist Modified Barium Swallow study report can be found under Chart Review tab, Imaging tab and  titled \"FL Modified Barium Swallow Study\"      Pt. presenting with oropharyngeal dysphagia characterized by reduced bolus formation and swallow delay. Patient given water droplets prior to barium presentation to provide lubrication to oropharyngeal mucosa. Subsequently, barium contrast administered. Reduced bolus formation noted with premature pharyngeal entry. Thin and nectar liquids entered upper laryngeal vestibule prior to swallow. Honey liquids extended to piriform sinuses. Laryngeal penetration and silent aspiration noted with thin and nectar liquids. Transient laryngeal penetration only viewed intermittently with honey liquids, which cleared upper laryngeal vestibule post swallow.  No airway entry viewed with puree boluses. Initially, absent swallow noted with pudding consistencies requiring honey liquid bolus to resume oral manipulation and trigger swallow onset. Once swallows completed, adequate pharyngeal clearance achieved. Did not assess solids due to extensive oropharyngeal delay. Patient not felt to meet nutritional needs orally with course food textures. Modification of diet required to maximize swallowing efficiency and safety. Unable to perform esophageal sweep due to body habitus and positioning challenges.    Plan:  Treatment/Interventions:  Oral motor exercises, Patient/family education, Bolus trials  SLP Plan: Skilled SLP " warranted  SLP Frequency: 3x per week  Duration: 30 days    Discussed POC: Patient  Discussed Risks/Benefits: Yes  Patient/Caregiver Agreeable: Yes    Pain:   Rating 0-10: 0  Location: n/a       Goals:  Pt. to tolerate least restrictive diet without pulmonary compromise    Education:   Pt. educated on results of MBS study, recommended diet and recommended safe swallow strategies.  Verbal understanding given

## 2024-05-01 NOTE — PROGRESS NOTES
Janette Martinez is a 53 y.o. female on day 7 of admission presenting with Encephalopathy.      Subjective   History Of Present Illness  Janette Martinez is a 53 y.o. female with PMH significant for left thalamic ICH (2007, residual right hemiplegia, dysarthria and aphasia), thalamic infarct and thalamic pain syndrome (2022), HIV, Left corona radiata infarct (2023), left MCA fusiform aneurysm, PE (on Apixaban), HTN, HLD, CKD III, T2DM, HFrEF (EF 45% 5/23/22),  post-surgical hypothyroid after thyroidectomy for graves disease who presented to the ER with c/o:   -HPI limited as patient poor historian, with prior stroke with residual aphasia  -reported patient was sent to the ER from her facility due to concern for AMS and worsening L sided facial droop.  -CTH and MRI in the ER negative for acute stroke.     Objective     Last Recorded Vitals  /81 (BP Location: Left arm, Patient Position: Lying)   Pulse 95   Temp 36.8 °C (98.3 °F) (Oral)   Resp 18   Wt 93 kg (205 lb)   SpO2 95%   Intake/Output last 3 Shifts:    Intake/Output Summary (Last 24 hours) at 5/1/2024 0849  Last data filed at 5/1/2024 0700  Gross per 24 hour   Intake --   Output 300 ml   Net -300 ml         Admission Weight  Weight: 93 kg (205 lb) (04/22/24 1400)    Daily Weight  04/26/24 : 93 kg (205 lb)    Image Results  Electrocardiogram, 12-lead PRN ACS symptoms  Sinus rhythm with frequent Premature ventricular complexes  Left axis deviation  Low voltage QRS  Lateral infarct , age undetermined  Inferior-posterior infarct , age undetermined  Abnormal ECG  When compared with ECG of 19-APR-2023 13:18,  Significant changes have occurred  Confirmed by Jet Benavides ([id number]) on 4/25/2024 12:39:43 PM  US pelvis  Narrative: Interpreted By:  Felipe Zayas,   STUDY:  US PELVIS;  4/25/2024 9:55 am      INDICATION:  Signs/Symptoms:vaginal bleeding.      COMPARISON:  Previous pelvic ultrasound is from 05/26/2022.      ACCESSION NUMBER(S):  [id number]       ORDERING CLINICIAN:  DEEP VALDEZ      TECHNIQUE:  Multiple multiplanar static gray scale, color and spectral waveform  sonographic images of the pelvis were obtained. Transabdominal  ultrasound was performed. Endovaginal imaging was not performed.      FINDINGS:  UTERUS:  The uterus measures  4.4 cm x 3.9 cm x 8.0 cm  .  There was a small  and perhaps partially calcified anterior left-sided uterine fundal  nodule measuring 13 x 15 x 18 mm, most likely small partially  calcified fibroid..      ENDOMETRIUM:  The endometrium measures a thickness of 0.5 cm.          RIGHT ADNEXA:  Unable to identify the right ovary. No gross right adnexal mass.      LEFT ADNEXA:  Unable to identify the left ovary. No gross left adnexal mass.      CUL DE SAC:  No gross free fluid is seen in the pelvic cul-de-sac.              Impression: The exam was somewhat limited due to patient's underlying medical  condition and non use of endovaginal imaging.      Unable to identify either ovary. No gross adnexal mass on either side.      Small anterior left-sided uterine fundal fibroid.      Endometrial thickness of 5 mm may be normal depending on the  patient's current menstrual status. Clinical correlation needed.      MACRO:  None      Signed by: Felipe Zayas 4/25/2024 11:00 AM  Dictation workstation:   [id number]      Physical Exam    GENERAL: Alert  SKIN: Warm and dry.  No suspicious lesions or rashes.  HEENT:  NCAT, PERRLA, refusing to open mouth  LUNGS: Unlabored, CTAB, no significant wheezing, rhonchi, or rales appreciated  CARDS: RRR, no m/g/r appreciated  GI: Soft, NTND, BS+, no rebound, no guarding   MS/Extremities: WWP, no significant pitting edema, distal pulses intact, moves all extremities  NEURO: Alert, +left facial droop, aphasia (chronic), R hemiparesis (chronic), following basic commands, nods yes/no to simple questions     Relevant Results  Results for orders placed or performed during the hospital encounter of 04/22/24  (from the past 24 hour(s))   POCT GLUCOSE   Result Value Ref Range    POCT Glucose 141 (H) 74 - 99 mg/dL   POCT GLUCOSE   Result Value Ref Range    POCT Glucose 125 (H) 74 - 99 mg/dL   CBC   Result Value Ref Range    WBC 13.8 (H) 4.4 - 11.3 x10*3/uL    nRBC 0.0 0.0 - 0.0 /100 WBCs    RBC 3.54 (L) 4.00 - 5.20 x10*6/uL    Hemoglobin 10.4 (L) 12.0 - 16.0 g/dL    Hematocrit 33.5 (L) 36.0 - 46.0 %    MCV 95 80 - 100 fL    MCH 29.4 26.0 - 34.0 pg    MCHC 31.0 (L) 32.0 - 36.0 g/dL    RDW 16.2 (H) 11.5 - 14.5 %    Platelets 288 150 - 450 x10*3/uL   Comprehensive Metabolic Panel   Result Value Ref Range    Glucose 163 (H) 74 - 99 mg/dL    Sodium 141 136 - 145 mmol/L    Potassium 3.1 (L) 3.5 - 5.3 mmol/L    Chloride 107 98 - 107 mmol/L    Bicarbonate 26 21 - 32 mmol/L    Anion Gap 11 10 - 20 mmol/L    Urea Nitrogen 10 6 - 23 mg/dL    Creatinine 0.71 0.50 - 1.05 mg/dL    eGFR >90 >60 mL/min/1.73m*2    Calcium 8.1 (L) 8.6 - 10.3 mg/dL    Albumin 3.1 (L) 3.4 - 5.0 g/dL    Alkaline Phosphatase 60 33 - 110 U/L    Total Protein 7.2 6.4 - 8.2 g/dL    AST 86 (H) 9 - 39 U/L    Bilirubin, Total 0.5 0.0 - 1.2 mg/dL    ALT 31 7 - 45 U/L       Scheduled medications  amLODIPine, 5 mg, oral, Daily  [Held by provider] apixaban, 5 mg, oral, BID  atorvastatin, 10 mg, oral, Daily  docusate sodium, 100 mg, oral, BID  dolutegravir, 50 mg, oral, Daily  emtricitabine-tenofovir alafen, 1 tablet, oral, Daily  enoxaparin, 80 mg, subcutaneous, q12h  furosemide, 20 mg, oral, Daily  gabapentin, 600 mg, oral, TID  insulin glargine, 20 Units, subcutaneous, Nightly  insulin lispro, 0-5 Units, subcutaneous, Before meals & nightly  lamoTRIgine, 25 mg, oral, q PM  levothyroxine, 125 mcg, oral, Daily  lubricating eye drops, 1 drop, Both Eyes, BID  melatonin, 6 mg, oral, Nightly  pantoprazole, 40 mg, oral, Daily before breakfast  pantoprazole, 40 mg, intravenous, Daily  sertraline, 200 mg, oral, Daily      Continuous medications  dextrose 5%-0.45 % sodium  chloride, 50 mL/hr, Last Rate: 50 mL/hr (04/30/24 2107)      PRN medications  PRN medications: acetaminophen, dextrose, dextrose, glucagon, glucagon, hydrALAZINE, HYDROmorphone, ipratropium-albuteroL, lactulose       Assessment/Plan      Encephalopathy   Hx CVA with residual R hemiparesis, aphasia  -CBC: +leukocytosis, no acute anemia, no thrombocytopenia  -CMP: no acute electrolyte abnormalities, no acute renal or liver dysfunction appreciated   -CTH: no acute processes  -MRI/MRA: No acute processes.  No LVO or acute stenosis.   -UA: +leukest, no nitrites, 1-5 WBC --> which could suggest UTI, but patient denies urinary complaints (unclear if reliable historian), received dose of rocephin in the ER at 2300 on 4/22/24 which will provide coverage for 24h, will hold off on ordering additional antibiotics for now, and follow up urine culture results   -repeat labs in AM   -need to get PTA med list from facility and resume home meds as appropriate     HIV:  -need to confirm HIV meds and resume  -last CD4 count 4/2022: CD3 + T cell % 89 (60-89%), CD3 + T cell #[phone] (high) [phone]), CD3 and CD4 + Tcell % 23 (34-61%), CD3 + CD4 + T cell # [phone]  -consider ID consult versus follow up, I do not see that she follows with ID in the EMR, will need to confirm      Hx graves s/p thyroidectomy   Post surgical hypothyroid  -TSH low, Free thyroxine high --> consistent with over medicated hypothyroid  -hold levothyroxine --> will need dose adjustment and repeat lab as OP     HTN  HLP  T2DM  CKD3  -hypoglycemia protocol, accuchecks ACHS, ISS coverage  -Hba1c pending   -confirm home meds and resume as appropriate      HFrEF  -ECHO 5/2022: EF 45%.  (EF 35-40% 2/17/2022)  -need to confirm home meds and resume as appropriate      GI ppx:   -Protonix  -bowel regimen     VTE ppx:  Hx PE  -continue Eliquis.         4/25-patient still lethargic, failed second swallow eval today, continues to be n.p.o., IV fluids started some meds  changed to p.o., will continue to monitor, will restart p.o. when patient is able to swallow safely     4/26-patient more awake today, is able to communicate with a whisper, using her left arm to gesture, discussed with speech therapist patient should remain n.p.o. until he has a modified barium swallow on Monday, patient appears to be stable, p.o. meds on hold, WBC improving, urine culture positive for E. coli, patient continues to be on Levaquin.  Continue IV fluids.  Blood sugars under control.    4/27-patient awake and mental status seems to be at baseline, still may have swallowing difficulty, will await MBS on Monday, hypokalemia-will replace, WBC improving, continue IV fluids, remains n.p.o.    4/28-patient awake is able to speak in a whisper, is appropriate with her speech, states she is hungry and wants to eat, is able to gesture with her left arm, patient scheduled for MBS tomorrow, if she is able to eat p.o. will start tomorrow, will repeat labs in a.m.    4/28 : Pt feels ok, planning MBS - still NPO - on IV fluids    4/30-patient has been n.p.o. for about 7 days, MBS planned for tomorrow, discussed with patient about a PEG if she fails MBS, she agrees for PEG, she may need an NG placed if PEG unable to be placed within an acceptable interval.  Patient more awake.  Hypokalemia will replace potassium, WBC continues to trend down    5/1: Patient had an MBS done today, discussed with speech therapist, patient able to be on puréed diet, the same has been started on patient tolerating well, renal functions are appropriate, hypokalemia-potassium replaced, will restart all p.o. meds, patient should be returning to NH    Radha Ward MD

## 2024-05-02 VITALS
RESPIRATION RATE: 18 BRPM | BODY MASS INDEX: 47.44 KG/M2 | SYSTOLIC BLOOD PRESSURE: 112 MMHG | HEIGHT: 55 IN | DIASTOLIC BLOOD PRESSURE: 69 MMHG | HEART RATE: 95 BPM | WEIGHT: 205 LBS | OXYGEN SATURATION: 97 % | TEMPERATURE: 98.6 F

## 2024-05-02 LAB
ALBUMIN SERPL BCP-MCNC: 2.8 G/DL (ref 3.4–5)
ALP SERPL-CCNC: 56 U/L (ref 33–110)
ALT SERPL W P-5'-P-CCNC: 29 U/L (ref 7–45)
ANION GAP SERPL CALC-SCNC: 12 MMOL/L (ref 10–20)
ANION GAP SERPL CALC-SCNC: 12 MMOL/L (ref 10–20)
AST SERPL W P-5'-P-CCNC: 75 U/L (ref 9–39)
BILIRUB SERPL-MCNC: 0.4 MG/DL (ref 0–1.2)
BUN SERPL-MCNC: 7 MG/DL (ref 6–23)
BUN SERPL-MCNC: 8 MG/DL (ref 6–23)
CALCIUM SERPL-MCNC: 7.3 MG/DL (ref 8.6–10.3)
CALCIUM SERPL-MCNC: 7.4 MG/DL (ref 8.6–10.3)
CHLORIDE SERPL-SCNC: 105 MMOL/L (ref 98–107)
CHLORIDE SERPL-SCNC: 105 MMOL/L (ref 98–107)
CO2 SERPL-SCNC: 23 MMOL/L (ref 21–32)
CO2 SERPL-SCNC: 24 MMOL/L (ref 21–32)
CREAT SERPL-MCNC: 0.71 MG/DL (ref 0.5–1.05)
CREAT SERPL-MCNC: 0.74 MG/DL (ref 0.5–1.05)
EGFRCR SERPLBLD CKD-EPI 2021: >90 ML/MIN/1.73M*2
EGFRCR SERPLBLD CKD-EPI 2021: >90 ML/MIN/1.73M*2
ERYTHROCYTE [DISTWIDTH] IN BLOOD BY AUTOMATED COUNT: 15.8 % (ref 11.5–14.5)
GLUCOSE BLD MANUAL STRIP-MCNC: 198 MG/DL (ref 74–99)
GLUCOSE BLD MANUAL STRIP-MCNC: 200 MG/DL (ref 74–99)
GLUCOSE BLD MANUAL STRIP-MCNC: 209 MG/DL (ref 74–99)
GLUCOSE BLD MANUAL STRIP-MCNC: 224 MG/DL (ref 74–99)
GLUCOSE SERPL-MCNC: 196 MG/DL (ref 74–99)
GLUCOSE SERPL-MCNC: 217 MG/DL (ref 74–99)
HCT VFR BLD AUTO: 27.3 % (ref 36–46)
HGB BLD-MCNC: 8.9 G/DL (ref 12–16)
MCH RBC QN AUTO: 30.4 PG (ref 26–34)
MCHC RBC AUTO-ENTMCNC: 32.6 G/DL (ref 32–36)
MCV RBC AUTO: 93 FL (ref 80–100)
NRBC BLD-RTO: 0 /100 WBCS (ref 0–0)
PLATELET # BLD AUTO: 265 X10*3/UL (ref 150–450)
POTASSIUM SERPL-SCNC: 2.9 MMOL/L (ref 3.5–5.3)
POTASSIUM SERPL-SCNC: 3.7 MMOL/L (ref 3.5–5.3)
PROT SERPL-MCNC: 6.5 G/DL (ref 6.4–8.2)
RBC # BLD AUTO: 2.93 X10*6/UL (ref 4–5.2)
SODIUM SERPL-SCNC: 137 MMOL/L (ref 136–145)
SODIUM SERPL-SCNC: 137 MMOL/L (ref 136–145)
WBC # BLD AUTO: 11.8 X10*3/UL (ref 4.4–11.3)

## 2024-05-02 PROCEDURE — 2500000001 HC RX 250 WO HCPCS SELF ADMINISTERED DRUGS (ALT 637 FOR MEDICARE OP): Performed by: HOSPITALIST

## 2024-05-02 PROCEDURE — 36415 COLL VENOUS BLD VENIPUNCTURE: CPT | Performed by: INTERNAL MEDICINE

## 2024-05-02 PROCEDURE — 2500000002 HC RX 250 W HCPCS SELF ADMINISTERED DRUGS (ALT 637 FOR MEDICARE OP, ALT 636 FOR OP/ED): Performed by: HOSPITALIST

## 2024-05-02 PROCEDURE — 2500000005 HC RX 250 GENERAL PHARMACY W/O HCPCS: Performed by: HOSPITALIST

## 2024-05-02 PROCEDURE — 2500000006 HC RX 250 W HCPCS SELF ADMINISTERED DRUGS (ALT 637 FOR ALL PAYERS): Performed by: HOSPITALIST

## 2024-05-02 PROCEDURE — 2500000006 HC RX 250 W HCPCS SELF ADMINISTERED DRUGS (ALT 637 FOR ALL PAYERS): Performed by: INTERNAL MEDICINE

## 2024-05-02 PROCEDURE — 82947 ASSAY GLUCOSE BLOOD QUANT: CPT

## 2024-05-02 PROCEDURE — 2500000004 HC RX 250 GENERAL PHARMACY W/ HCPCS (ALT 636 FOR OP/ED): Performed by: INTERNAL MEDICINE

## 2024-05-02 PROCEDURE — 80053 COMPREHEN METABOLIC PANEL: CPT | Performed by: INTERNAL MEDICINE

## 2024-05-02 PROCEDURE — 92526 ORAL FUNCTION THERAPY: CPT | Mod: GN

## 2024-05-02 PROCEDURE — 2500000002 HC RX 250 W HCPCS SELF ADMINISTERED DRUGS (ALT 637 FOR MEDICARE OP, ALT 636 FOR OP/ED): Performed by: PHYSICIAN ASSISTANT

## 2024-05-02 PROCEDURE — C9113 INJ PANTOPRAZOLE SODIUM, VIA: HCPCS | Performed by: INTERNAL MEDICINE

## 2024-05-02 PROCEDURE — 82374 ASSAY BLOOD CARBON DIOXIDE: CPT | Performed by: INTERNAL MEDICINE

## 2024-05-02 PROCEDURE — 85027 COMPLETE CBC AUTOMATED: CPT | Performed by: INTERNAL MEDICINE

## 2024-05-02 RX ORDER — POTASSIUM CHLORIDE 14.9 MG/ML
20 INJECTION INTRAVENOUS
Status: COMPLETED | OUTPATIENT
Start: 2024-05-02 | End: 2024-05-02

## 2024-05-02 RX ORDER — POTASSIUM CHLORIDE 750 MG/1
10 TABLET, FILM COATED, EXTENDED RELEASE ORAL ONCE
Status: COMPLETED | OUTPATIENT
Start: 2024-05-02 | End: 2024-05-02

## 2024-05-02 RX ADMIN — POTASSIUM CHLORIDE 20 MEQ: 14.9 INJECTION, SOLUTION INTRAVENOUS at 11:42

## 2024-05-02 RX ADMIN — GABAPENTIN 600 MG: 300 CAPSULE ORAL at 09:25

## 2024-05-02 RX ADMIN — Medication 6 MG: at 20:58

## 2024-05-02 RX ADMIN — FUROSEMIDE 20 MG: 20 TABLET ORAL at 09:25

## 2024-05-02 RX ADMIN — SERTRALINE HYDROCHLORIDE 200 MG: 50 TABLET ORAL at 09:25

## 2024-05-02 RX ADMIN — DOLUTEGRAVIR SODIUM 50 MG: 50 TABLET, FILM COATED ORAL at 09:25

## 2024-05-02 RX ADMIN — LEVOTHYROXINE SODIUM 125 MCG: 125 TABLET ORAL at 05:18

## 2024-05-02 RX ADMIN — INSULIN LISPRO 1 UNITS: 100 INJECTION, SOLUTION INTRAVENOUS; SUBCUTANEOUS at 09:26

## 2024-05-02 RX ADMIN — LAMOTRIGINE 25 MG: 25 TABLET ORAL at 20:58

## 2024-05-02 RX ADMIN — INSULIN GLARGINE 20 UNITS: 100 INJECTION, SOLUTION SUBCUTANEOUS at 21:00

## 2024-05-02 RX ADMIN — DEXTROSE AND SODIUM CHLORIDE 50 ML/HR: 5; 450 INJECTION, SOLUTION INTRAVENOUS at 11:42

## 2024-05-02 RX ADMIN — PANTOPRAZOLE SODIUM 40 MG: 40 INJECTION, POWDER, FOR SOLUTION INTRAVENOUS at 09:25

## 2024-05-02 RX ADMIN — AMLODIPINE BESYLATE 5 MG: 5 TABLET ORAL at 09:25

## 2024-05-02 RX ADMIN — CARBOXYMETHYLCELLULOSE SODIUM 1 DROP: 0.5 SOLUTION/ DROPS OPHTHALMIC at 09:25

## 2024-05-02 RX ADMIN — INSULIN LISPRO 1 UNITS: 100 INJECTION, SOLUTION INTRAVENOUS; SUBCUTANEOUS at 16:00

## 2024-05-02 RX ADMIN — INSULIN LISPRO 2 UNITS: 100 INJECTION, SOLUTION INTRAVENOUS; SUBCUTANEOUS at 13:35

## 2024-05-02 RX ADMIN — POTASSIUM CHLORIDE 20 MEQ: 14.9 INJECTION, SOLUTION INTRAVENOUS at 09:26

## 2024-05-02 RX ADMIN — GABAPENTIN 600 MG: 300 CAPSULE ORAL at 21:00

## 2024-05-02 RX ADMIN — HYDROMORPHONE HYDROCHLORIDE 0.2 MG: 0.2 INJECTION, SOLUTION INTRAMUSCULAR; INTRAVENOUS; SUBCUTANEOUS at 21:32

## 2024-05-02 RX ADMIN — GABAPENTIN 600 MG: 300 CAPSULE ORAL at 18:24

## 2024-05-02 RX ADMIN — HYDROMORPHONE HYDROCHLORIDE 0.2 MG: 0.2 INJECTION, SOLUTION INTRAMUSCULAR; INTRAVENOUS; SUBCUTANEOUS at 15:24

## 2024-05-02 RX ADMIN — PANTOPRAZOLE SODIUM 40 MG: 40 TABLET, DELAYED RELEASE ORAL at 05:18

## 2024-05-02 RX ADMIN — ATORVASTATIN CALCIUM 10 MG: 10 TABLET, FILM COATED ORAL at 09:25

## 2024-05-02 RX ADMIN — ENOXAPARIN SODIUM 80 MG: 80 INJECTION SUBCUTANEOUS at 05:18

## 2024-05-02 RX ADMIN — EMTRICITABINE AND TENOFOVIR ALAFENAMIDE 1 TABLET: 200; 25 TABLET ORAL at 09:25

## 2024-05-02 RX ADMIN — INSULIN LISPRO 2 UNITS: 100 INJECTION, SOLUTION INTRAVENOUS; SUBCUTANEOUS at 20:59

## 2024-05-02 RX ADMIN — POTASSIUM CHLORIDE 10 MEQ: 750 TABLET, EXTENDED RELEASE ORAL at 09:25

## 2024-05-02 RX ADMIN — CARBOXYMETHYLCELLULOSE SODIUM 1 DROP: 0.5 SOLUTION/ DROPS OPHTHALMIC at 20:58

## 2024-05-02 RX ADMIN — ENOXAPARIN SODIUM 80 MG: 80 INJECTION SUBCUTANEOUS at 18:24

## 2024-05-02 ASSESSMENT — PAIN DESCRIPTION - ORIENTATION: ORIENTATION: RIGHT

## 2024-05-02 ASSESSMENT — PAIN SCALES - GENERAL
PAINLEVEL_OUTOF10: 7
PAINLEVEL_OUTOF10: 0 - NO PAIN
PAINLEVEL_OUTOF10: 8

## 2024-05-02 ASSESSMENT — PAIN - FUNCTIONAL ASSESSMENT
PAIN_FUNCTIONAL_ASSESSMENT: 0-10

## 2024-05-02 ASSESSMENT — COGNITIVE AND FUNCTIONAL STATUS - GENERAL
MOVING FROM LYING ON BACK TO SITTING ON SIDE OF FLAT BED WITH BEDRAILS: TOTAL
TOILETING: TOTAL
DRESSING REGULAR UPPER BODY CLOTHING: TOTAL
PERSONAL GROOMING: TOTAL
TURNING FROM BACK TO SIDE WHILE IN FLAT BAD: TOTAL
DRESSING REGULAR LOWER BODY CLOTHING: TOTAL
MOVING TO AND FROM BED TO CHAIR: TOTAL
EATING MEALS: A LITTLE
STANDING UP FROM CHAIR USING ARMS: TOTAL
CLIMB 3 TO 5 STEPS WITH RAILING: TOTAL
MOBILITY SCORE: 6
HELP NEEDED FOR BATHING: TOTAL
WALKING IN HOSPITAL ROOM: TOTAL
DAILY ACTIVITIY SCORE: 8

## 2024-05-02 ASSESSMENT — PAIN DESCRIPTION - LOCATION
LOCATION: ARM
LOCATION: COCCYX

## 2024-05-02 NOTE — PROGRESS NOTES
"Speech-Language Pathology    Inpatient  Speech-Language Pathology Dysphagia Treatment    Patient Name: Janette Martinez  MRN: [id number]  : 1970  Today's Date: 24   Time Calculation  Start Time: 1147  Stop Time: 1210  Time Calculation (min): 23 min        Subjective:     Alert with increased sleepiness. Hypophonia continues.    Objective:  Goals:   Pt. to tolerate least restrictive diet without pulmonary compromise       Therapeutic Swallow Intervention : PO Trials    Treatment Results/ Swallow Comments: Patient seen to assess tolerance of prescribed diet following MBSS. Patient's HOB elevated to full upright position. Patient initially consuming honey liquids via cup. Encouraged small bolus size and slow rate. Bolus formation with swallow delay (confirmed during MBSS ). Moist cough noted x2. Transitioned to tsp bolus presentations. Patient with persistent cough following multiple boluses suggestive of aspiration. Nursing did not report baseline cough, thus suspect cough related to aspiration.    Limited oral intake of puree foods and thickened liquids noted. Patient endorsing feeling \"more tired\". Unable to determine cause. Patient reporting restful night sleeping with no interruptions.    Assessment:  SLP TX Intervention Outcome: Decline in Function    Patient exhibiting overt s/s aspiration with honey liquids. Increase in swallow delay noted, thereby contributing to reduced tolerance. Limited oral intake noted with questionable ability to meet nutritional needs given extent of oropharyngeal delay. Question need for PEG tube vs GOC discussion as deemed appropriate by medical team.    Plan:    NPO WITH GOC DISCUSSION VS CONSIDERATION FOR PEG TUBE  SLP FOR DYSPHAGIA MANAGEMENT    SLP TX Plan: Continue Plan of Care  SLP Plan: Skilled SLP  SLP Frequency: 3x per week  Duration: Current admission  SLP Discharge Recommendations: Continue skilled speech therapy services post discharge  Discussed POC: " Patient  Discussed Risks/Benefits: Yes  Patient/Caregiver Agreeable: Yes

## 2024-05-02 NOTE — PROGRESS NOTES
Janette Martinez is a 53 y.o. female on day 8 of admission presenting with Encephalopathy.      Subjective   History Of Present Illness  Janette Martinez is a 53 y.o. female with PMH significant for left thalamic ICH (2007, residual right hemiplegia, dysarthria and aphasia), thalamic infarct and thalamic pain syndrome (2022), HIV, Left corona radiata infarct (2023), left MCA fusiform aneurysm, PE (on Apixaban), HTN, HLD, CKD III, T2DM, HFrEF (EF 45% 5/23/22),  post-surgical hypothyroid after thyroidectomy for graves disease who presented to the ER with c/o:   -HPI limited as patient poor historian, with prior stroke with residual aphasia  -reported patient was sent to the ER from her facility due to concern for AMS and worsening L sided facial droop.  -CTH and MRI in the ER negative for acute stroke.     Objective     Last Recorded Vitals  /73 (BP Location: Left arm, Patient Position: Lying)   Pulse 82   Temp 36.6 °C (97.8 °F) (Temporal)   Resp 18   Wt 93 kg (205 lb)   SpO2 98%   Intake/Output last 3 Shifts:    Intake/Output Summary (Last 24 hours) at 5/2/2024 0836  Last data filed at 5/2/2024 0752  Gross per 24 hour   Intake 120 ml   Output 1000 ml   Net -880 ml         Admission Weight  Weight: 93 kg (205 lb) (04/22/24 1400)    Daily Weight  04/26/24 : 93 kg (205 lb)    Image Results  FL modified barium swallow study  Narrative: Interpreted By:  Felipe Zayas and Lovelace Elizabeth   STUDY:  FL MODIFIED BARIUM SWALLOW STUDY;; 5/1/2024 3:13 pm      INDICATION:  Signs/Symptoms:post prandial cough.      COMPARISON:  None.      ACCESSION NUMBER(S):  [id number]      ORDERING CLINICIAN:  ARCELIA CRAFT      TECHNIQUE:  MBSS completed. Informed verbal consent obtained prior to completion  of exam. Trials of thin, nectar thick, honey thick, and puree given.  Fluoroscopy time :  2 minutes 35 seconds.      SLP: CADEN Giles/SLP  Phone/Pager: [phone]      SPEECH FINDINGS:  Reason for referral:  Concern for aspiration  Patient hx: See CSE  Respiratory status: Nasal cannula  Previous diet: NPO      FINAL SPEECH RECOMMENDATIONS      Diet recommendations/feeding strategies: PUREE DIET WITH  HONEY/MODERATELY THICKENED LIQUIDS  1. UPRIGHT AT 90 DEGREES FOR ALL PO  2. SLOW RATE AND SMALL BOLUS SIZE  3. LIQUIDS VIA TSP  4. MEDICATION CRUSHED IN PUREE CARRIER      Follow-up speech therapy recommended: Yes.      Education provided: Yes.      Treatment Provided today: No      Additional consult suggested: None      Repeat study/ dc plan: TBD      Mechanics of the swallow summary:  *Oral phase: Reduced bolus formation; decreased oral AP bolus transit  with puree; premature pharyngeal entry *Pharyngeal phase: Swallow  delay; absent swallow with initial puree bolus; laryngeal penetration  with thin/nectar/honey liquids; silent aspiration with thin and  nectar liquids *Esophageal phase: DNT 2/2 positioning challenges      SLP impressions with severity rating: Pt. presenting with  oropharyngeal dysphagia characterized by reduced bolus formation and  swallow delay. Patient given water droplets prior to barium  presentation to provide lubrication to oropharyngeal mucosa.  Subsequently, barium contrast administered. Reduced bolus formation  noted with premature pharyngeal entry. Thin and nectar liquids  entered upper laryngeal vestibule prior to swallow. Honey liquids  extended to piriform sinuses. Laryngeal penetration and silent  aspiration noted with thin and nectar liquids. Transient laryngeal  penetration only viewed intermittently with honey liquids, which  cleared upper laryngeal vestibule post swallow.  No airway entry  viewed with puree boluses. Initially, absent swallow noted with  pudding consistencies requiring honey liquid bolus to resume oral  manipulation and trigger swallow onset. Once swallows completed,  adequate pharyngeal clearance achieved. Did not assess solids due to  extensive oropharyngeal delay.  Patient not felt to meet nutritional  needs orally with course food textures. Modification of diet required  to maximize swallowing efficiency and safety. Unable to perform  esophageal sweep due to body habitus and positioning challenges.      Thin Liquids (MBSS)  Rosenbek's Penetration Aspiration Scale, Thin Liquids (MBSS):  8. SILENT ASPIRATION - contrast passes glottis, visible residue, NO  pt response      Nectar Thick Liquids (MBSS)  Rosenbek's Penetration Aspiration Scale, Nectar thick liquids (MBSS):  8. SILENT ASPIRATION - contrast passes glottis, visible residue, NO  pt response      Honey Thick Liquids (MBSS)  Rosenbek's Penetration Aspiration Scale, Honey thick liquids (MBSS):  2. PENETRATION that CLEARS - contrast enter airway, above vocal  cords, no residue      Purees (MBSS)  Rosenbek's Penetration Aspiration Scale, Purees (MBSS):  1. NO ASPIRATION & NO PENETRATION - no aspiration, contrast does  not enter airway      Solids (MBSS)  Rosenbek's Penetration Aspiration Scale, Solids (MBSS):  DNT      Speech Therapy section of this report signed by CADEN Giles/SLP on 5/1/2024 at 3:24 pm.      RADIOLOGY FINDINGS:  As above.      Radiology section of this report signed by Felipe Zayas MD.      Impression: Aspiration with thin liquid and nectar consistencies. Trace  penetration with honey consistency. No other aspiration or  penetration during this exam. Please see above for details.      MACRO:  None      Signed by: Felipe Zayas 5/1/2024 3:41 PM  Dictation workstation:   [id number]      Physical Exam    GENERAL: Alert  SKIN: Warm and dry.  No suspicious lesions or rashes.  HEENT:  NCAT, PERRLA, refusing to open mouth  LUNGS: Unlabored, CTAB, no significant wheezing, rhonchi, or rales appreciated  CARDS: RRR, no m/g/r appreciated  GI: Soft, NTND, BS+, no rebound, no guarding   MS/Extremities: WWP, no significant pitting edema, distal pulses intact, moves all extremities  NEURO: Alert, +left  facial droop, aphasia (chronic), R hemiparesis (chronic), following basic commands, nods yes/no to simple questions     Relevant Results  Results for orders placed or performed during the hospital encounter of 04/22/24 (from the past 24 hour(s))   POCT GLUCOSE   Result Value Ref Range    POCT Glucose 175 (H) 74 - 99 mg/dL   POCT GLUCOSE   Result Value Ref Range    POCT Glucose 169 (H) 74 - 99 mg/dL   POCT GLUCOSE   Result Value Ref Range    POCT Glucose 196 (H) 74 - 99 mg/dL   POCT GLUCOSE   Result Value Ref Range    POCT Glucose 199 (H) 74 - 99 mg/dL   CBC   Result Value Ref Range    WBC 11.8 (H) 4.4 - 11.3 x10*3/uL    nRBC 0.0 0.0 - 0.0 /100 WBCs    RBC 2.93 (L) 4.00 - 5.20 x10*6/uL    Hemoglobin 8.9 (L) 12.0 - 16.0 g/dL    Hematocrit 27.3 (L) 36.0 - 46.0 %    MCV 93 80 - 100 fL    MCH 30.4 26.0 - 34.0 pg    MCHC 32.6 32.0 - 36.0 g/dL    RDW 15.8 (H) 11.5 - 14.5 %    Platelets 265 150 - 450 x10*3/uL   Comprehensive Metabolic Panel   Result Value Ref Range    Glucose 196 (H) 74 - 99 mg/dL    Sodium 137 136 - 145 mmol/L    Potassium 2.9 (LL) 3.5 - 5.3 mmol/L    Chloride 105 98 - 107 mmol/L    Bicarbonate 23 21 - 32 mmol/L    Anion Gap 12 10 - 20 mmol/L    Urea Nitrogen 8 6 - 23 mg/dL    Creatinine 0.71 0.50 - 1.05 mg/dL    eGFR >90 >60 mL/min/1.73m*2    Calcium 7.3 (L) 8.6 - 10.3 mg/dL    Albumin 2.8 (L) 3.4 - 5.0 g/dL    Alkaline Phosphatase 56 33 - 110 U/L    Total Protein 6.5 6.4 - 8.2 g/dL    AST 75 (H) 9 - 39 U/L    Bilirubin, Total 0.4 0.0 - 1.2 mg/dL    ALT 29 7 - 45 U/L   POCT GLUCOSE   Result Value Ref Range    POCT Glucose 198 (H) 74 - 99 mg/dL       Scheduled medications  amLODIPine, 5 mg, oral, Daily  [Held by provider] apixaban, 5 mg, oral, BID  atorvastatin, 10 mg, oral, Daily  docusate sodium, 100 mg, oral, BID  dolutegravir, 50 mg, oral, Daily  emtricitabine-tenofovir alafen, 1 tablet, oral, Daily  enoxaparin, 80 mg, subcutaneous, q12h  furosemide, 20 mg, oral, Daily  gabapentin, 600 mg, oral,  TID  insulin glargine, 20 Units, subcutaneous, Nightly  insulin lispro, 0-5 Units, subcutaneous, Before meals & nightly  lamoTRIgine, 25 mg, oral, q PM  levothyroxine, 125 mcg, oral, Daily  lubricating eye drops, 1 drop, Both Eyes, BID  melatonin, 6 mg, oral, Nightly  pantoprazole, 40 mg, oral, Daily before breakfast  pantoprazole, 40 mg, intravenous, Daily  potassium chloride, 20 mEq, intravenous, q2h  potassium chloride CR, 10 mEq, oral, Once  sertraline, 200 mg, oral, Daily      Continuous medications  dextrose 5%-0.45 % sodium chloride, 50 mL/hr, Last Rate: 50 mL/hr (05/01/24 1612)      PRN medications  PRN medications: acetaminophen, dextrose, dextrose, glucagon, glucagon, hydrALAZINE, HYDROmorphone, ipratropium-albuteroL, lactulose       Assessment/Plan      Encephalopathy   Hx CVA with residual R hemiparesis, aphasia  -CBC: +leukocytosis, no acute anemia, no thrombocytopenia  -CMP: no acute electrolyte abnormalities, no acute renal or liver dysfunction appreciated   -CTH: no acute processes  -MRI/MRA: No acute processes.  No LVO or acute stenosis.   -UA: +leukest, no nitrites, 1-5 WBC --> which could suggest UTI, but patient denies urinary complaints (unclear if reliable historian), received dose of rocephin in the ER at 2300 on 4/22/24 which will provide coverage for 24h, will hold off on ordering additional antibiotics for now, and follow up urine culture results   -repeat labs in AM   -need to get PTA med list from facility and resume home meds as appropriate     HIV:  -need to confirm HIV meds and resume  -last CD4 count 4/2022: CD3 + T cell % 89 (60-89%), CD3 + T cell #[phone] (high) [phone]), CD3 and CD4 + Tcell % 23 (34-61%), CD3 + CD4 + T cell # [phone]  -consider ID consult versus follow up, I do not see that she follows with ID in the EMR, will need to confirm      Hx graves s/p thyroidectomy   Post surgical hypothyroid  -TSH low, Free thyroxine high --> consistent with over medicated  hypothyroid  -hold levothyroxine --> will need dose adjustment and repeat lab as OP     HTN  HLP  T2DM  CKD3  -hypoglycemia protocol, accuchecks ACHS, ISS coverage  -Hba1c pending   -confirm home meds and resume as appropriate      HFrEF  -ECHO 5/2022: EF 45%.  (EF 35-40% 2/17/2022)  -need to confirm home meds and resume as appropriate      GI ppx:   -Protonix  -bowel regimen     VTE ppx:  Hx PE  -continue Eliquis.         4/25-patient still lethargic, failed second swallow eval today, continues to be n.p.o., IV fluids started some meds changed to p.o., will continue to monitor, will restart p.o. when patient is able to swallow safely     4/26-patient more awake today, is able to communicate with a whisper, using her left arm to gesture, discussed with speech therapist patient should remain n.p.o. until he has a modified barium swallow on Monday, patient appears to be stable, p.o. meds on hold, WBC improving, urine culture positive for E. coli, patient continues to be on Levaquin.  Continue IV fluids.  Blood sugars under control.    4/27-patient awake and mental status seems to be at baseline, still may have swallowing difficulty, will await MBS on Monday, hypokalemia-will replace, WBC improving, continue IV fluids, remains n.p.o.    4/28-patient awake is able to speak in a whisper, is appropriate with her speech, states she is hungry and wants to eat, is able to gesture with her left arm, patient scheduled for MBS tomorrow, if she is able to eat p.o. will start tomorrow, will repeat labs in a.m.    4/28 : Pt feels ok, planning MBS - still NPO - on IV fluids    4/30-patient has been n.p.o. for about 7 days, MBS planned for tomorrow, discussed with patient about a PEG if she fails MBS, she agrees for PEG, she may need an NG placed if PEG unable to be placed within an acceptable interval.  Patient more awake.  Hypokalemia will replace potassium, WBC continues to trend down    5/1: Patient had an MBS done today,  discussed with speech therapist, patient able to be on puréed diet, the same has been started on patient tolerating well, renal functions are appropriate, hypokalemia-potassium replaced, will restart all p.o. meds, patient should be returning to NH    5/2-patient had severe ice hypokalemia, replaced, repeat BMP shows good potassium levels, patient is starting to eat well, on a puréed diet, okay for for discharge today.  Radha Ward MD

## 2024-05-06 ENCOUNTER — NURSING HOME VISIT (OUTPATIENT)
Dept: POST ACUTE CARE | Facility: EXTERNAL LOCATION | Age: 54
End: 2024-05-06
Payer: MEDICARE

## 2024-05-06 DIAGNOSIS — Z91.119 NONCOMPLIANCE WITH DIETARY RESTRICTION: ICD-10-CM

## 2024-05-06 DIAGNOSIS — I50.22 CHRONIC SYSTOLIC HEART FAILURE (MULTI): ICD-10-CM

## 2024-05-06 DIAGNOSIS — I69.30 SEQUELA OF CEREBROVASCULAR ACCIDENT: ICD-10-CM

## 2024-05-06 DIAGNOSIS — R13.19 ESOPHAGEAL DYSPHAGIA: Chronic | ICD-10-CM

## 2024-05-06 DIAGNOSIS — R47.01 EXPRESSIVE APHASIA: ICD-10-CM

## 2024-05-06 DIAGNOSIS — M62.81 GENERALIZED MUSCLE WEAKNESS: ICD-10-CM

## 2024-05-06 DIAGNOSIS — E03.9 HYPOTHYROIDISM, UNSPECIFIED TYPE: ICD-10-CM

## 2024-05-06 DIAGNOSIS — Z74.09 IMMOBILITY: ICD-10-CM

## 2024-05-06 DIAGNOSIS — Z79.4 TYPE 2 DIABETES MELLITUS WITH HYPERGLYCEMIA, WITH LONG-TERM CURRENT USE OF INSULIN (MULTI): ICD-10-CM

## 2024-05-06 DIAGNOSIS — I69.351 HEMIPARESIS AFFECTING RIGHT SIDE AS LATE EFFECT OF STROKE (MULTI): ICD-10-CM

## 2024-05-06 DIAGNOSIS — F32.9 MAJOR DEPRESSIVE DISORDER WITH CURRENT ACTIVE EPISODE, UNSPECIFIED DEPRESSION EPISODE SEVERITY, UNSPECIFIED WHETHER RECURRENT: ICD-10-CM

## 2024-05-06 DIAGNOSIS — I10 BENIGN ESSENTIAL HTN: ICD-10-CM

## 2024-05-06 DIAGNOSIS — G93.40 ENCEPHALOPATHY: Primary | ICD-10-CM

## 2024-05-06 DIAGNOSIS — H10.9 BACTERIAL CONJUNCTIVITIS OF RIGHT EYE: ICD-10-CM

## 2024-05-06 DIAGNOSIS — E11.65 TYPE 2 DIABETES MELLITUS WITH HYPERGLYCEMIA, WITH LONG-TERM CURRENT USE OF INSULIN (MULTI): ICD-10-CM

## 2024-05-06 DIAGNOSIS — Z21 ASYMPTOMATIC HIV INFECTION, WITH NO HISTORY OF HIV-RELATED ILLNESS (MULTI): Chronic | ICD-10-CM

## 2024-05-06 DIAGNOSIS — E11.42 DIABETIC POLYNEUROPATHY ASSOCIATED WITH TYPE 2 DIABETES MELLITUS (MULTI): Chronic | ICD-10-CM

## 2024-05-06 PROCEDURE — 99310 SBSQ NF CARE HIGH MDM 45: CPT | Performed by: NURSE PRACTITIONER

## 2024-05-06 NOTE — LETTER
Patient: Janette Martinez  : 1970    Encounter Date: 2024    Name: Janette Martinez    YOB: 1970    Code Status: DNRcc arrest    Chief complaint:  Readmit after hospitalization;   Encephalopathy;  etc.....      HPI:    53 year old female with past medical history of heart failure with reduced EF,   diabetes, hypertension, hyperlipidemia,        CVA (left MCA  with residual right-sided hemiparesis), GERD, HIV, hypothyroidism, hypertension,   and diabetes.      HOSPITAL COURSE:       Patient was sent to [facility] ER due to concern for AMS and worsening left sided facial droop.  HPI limited as patient poor historian, with prior stroke with residual aphasia.  Multiple labs and diagnostic tests performed in the hospital.   CBC performed: +leukocytosis, no acute anemia, no thrombocytopenia  CMP: no acute electrolyte abnormalities, no acute renal or liver dysfunction appreciated   CTH: no acute processes. Negative for acute stroke.   MRI/MRA: No acute processes.  No LVO or acute stenosis.   UA: +leukest, no nitrites, 1-5 WBC --> which could suggest UTI.  Patient denies urinary complaints (unclear if reliable historian).   Received  a dose of Rocephin in the ER.       Patient was evaluated by speech therapy and had a modified barium swallow performed in the hospital.      Recommendations as follows:   PUREE DIET WITH HONEY THICKENED LIQUIDS  UPRIGHT AT 90 DEGREES FOR ALL PO  SLOW RATE AND SMALL BOLUS SIZE  LIQUIDS VIA TSP ONLY  MEDICATION CRUSHED IN PUREE CARRIER         Patient re-admitted to [facility] On 5/3/24 after her most recent hospitalization.    Diagnoses as follows:    Encephalopathy   Hx CVA with residual R hemiparesis, aphasia  -CBC: +leukocytosis, no acute anemia, no thrombocytopenia  -CMP: no acute electrolyte abnormalities, no acute renal or liver dysfunction appreciated   -CTH: no acute processes  -MRI/MRA: No acute processes.  No LVO or acute stenosis.   -UA:  +leukest, no nitrites, 1-5 WBC --> which could suggest UTI, but patient denies urinary complaints (unclear if reliable historian),   Patient was given rocephin in the ER   Patient seen today, she is in her bed.  Calm, cooperative.  Mentation improving close to baseline.  No complaints of headaches or dizziness.  No chest pain.    Right eye bacterial conjunctivitis:  Staff had reported that patient's right eye was red and irritated.  She was started on moxifloxacin ophthalmic drops on 5/3/24.  Her right eye is still erythematous.   No purulent drainage noted or reported.  Patient is not complaining of any eye pain or change in vision.      HIV:        Recent CD4 count 4/2022: CD3 + T cell % 89 (60-89%), CD3 + T cell #[id number]       (high) ([phone]), CD3 and CD4 + Tcell % 23 (34-61%), CD3 + CD4 + T     cell    # [phone] (  [phone])       Patient needs ID consult.      On Descovy.    Hypothyroidism; Hx graves s/p thyroidectomy   Post surgical hypothyroid  TSH  was low, Free thyroxine high; consistent with over medicated hypothyroid  Levothyroxine was held.     Chronic systolic HF; Benign essential HTN :  On Amlodipine and hydrochlorothiazide.              No oxygen desaturations reported.               Recent BP                No recent complaints of  chest pain, headaches or dizziness.       Esophageal dysphagia:  Patient was evaluated by speech therapy and had a modified barium swallow performed in the hospital.      Recommendations as follows:   1.PUREE DIET WITH HONEY THICKENED LIQUIDS  2. UPRIGHT AT 90 DEGREES FOR ALL PO  3. SLOW RATE AND SMALL BOLUS SIZE  4. LIQUIDS VIA TSP ONLY  5 MEDICATION CRUSHED IN PUREE CARRIER               DM type 2 with hyperglycemia; diabetic polyneuropathy:        Patient is frequently noncompliant with diet.       On Lantus 43 units at bedtime and Humalog sliding scale.       Recent accuchecks: 225 mg/dL, 214 mg/dL, 147 mg/dL.        No episodes of hypoglycemia reported.        Ha1c  trends:       5/1/23 Ha1c 8.1 %        10/17/23 Ha1c 8.3 %       12/4/23 hemoglobin A1c 8.9%     No complaints of chest pain.    No headaches.    No dizziness.            Sequela of Cerebrovascular accident; Hx CVA with right-sided hemiparesis; right sided weakness;   dysphagia 2/2 cva; expressive aphasia; History of acute/subacute stroke:   MRI was performed during a PREVIOUS hospitalization and it showed Acute/subacute tiny infarct in the left anterior brock.   Dr. Jiang from [facility] neuro/stroke was consulted at that time.  Currently on Eliquis 5 mg PO BID.  Pt has baseline dysarthria from a previous CVA, right Hemiparesis,  and expressive aphasia.  At her usual baseline she is able to communicate in short sentences and she is able to nod appropriately to questions.   She is on a mechanical soft, thin consistency diet.                                 Major depressive disorder; decreased appetite.   On Sertraline.   Also followed by in house psych NP.  On Sertraline  125 mg PO every day.  Patient generally  has a good appetite, no recent weight loss reported.              Immobility; Generalized muscle weakness;  At  in long term care.  Not able to ambulate.  Very weak.  Bedbound/wheelchair bound.                                 Reviewed  hospital records from recent hospitalization.   Reviewed medical, social, surgical and family history.  Reviewed all current medications and performed medication reconciliation.  Reviewed vital signs AND recent blood work results.  Performed prescription drug management.   Reviewed [facility] Documentation  Discussed patient with nursing and paramedics.  Spent greater than 50 minutes on visit                  ROS: 10 point ROS performed; negative unless noted in HPI.       LABS:  BMP: Date: 5/1/2023 Na 137 K 4.3 Cr 1.1 BUN 15 glucose 141 Ca 8.8 GFR 52  CBC: Date: 5/1/2023 WBC 8.4 Hgb 11.1 hematocrit 33.9 platelets 337  Liver function: Date: 5/1/2023 ALT 10 AST 29  alkaline phos.  63 bilirubin 0.4 albumin 3.6 protein 7.7       Ha1c 8.1 % on 5/1/23          BMP: Date: 5/15/2023 Na 139  K 3.4 Cr 1.2 BUN 20 glucose 122 Ca 8.8 GFR 57        CBC: Date: 5/15/2023 WBC 9 Hgb 10.2 hematocrit 30.9 platelets 277       Liver function: 5/15/2023 ALT 8 AST 28 alkaline phos.  57 bilirubin 0.5 albumin 3.5 protein 7.3          BMP: Date: 5/22/2023 Na 141 K 3.1 Cr 1 BUN 11 glucose 227 Ca 8.7 GFR 70        CBC: Date: 5/22/2023 WBC 8.2 Hgb 10 hematocrit 30.5 platelets 372        Liver function: Date: 5/22/2023 ALT 8 AST 22 alkaline phos.  63 bilirubin 0.3 albumin 3.2 protein 7.2     BMP: Date: 6/5/2023 Na 140 K 4.2 Cr 1.1 BUN 14 glucose 79 Ca 1.1 GFR 63  CBC: Date: 6/5/2023 WBC 8.1 Hgb 10.5 hematocrit 32.3 platelets 350  Liver function: Date: 6/5/2023 ALT 7 AST 25 alkaline phos.  59 bilirubin 0.4 albumin 3.3 protein 7.7    BMP: Date: 7/17/2023 Na 140 K 4.1 Cr 0.8 BUN 17 glucose 126 Ca 9.1 GFR 90  CBC: Date: 7/17/2023 WBC 14.1 Hgb 10.2 hematocrit 32.1 platelets 265  Liver function: Date: 7/17/2023 ALT 8 AST 23 alkaline phos.  72 bilirubin 0.5 albumin 3.4 protein 7.4    CBC: Date: 7/19/2023 WBC 10.7 Hgb 9.5 hematocrit 28.8 platelets 257    BMP: Date: 7/24/2023 Na 137 K 4.2 Cr 0.9 BUN 19 glucose 186 Ca 9.1 GFR 79  CBC: Date: 7/24/2023 WBC 11.6 Hgb 10.3 hematocrit 31.4 platelets 305  Liver function: Date: 7/24/2023 ALT 11 AST 27 alkaline phos.  77 bilirubin 0.3 albumin 3.6 protein 7.8    BMP: Date: 7/31/2023 Na 141 K 4.2 Cr 0.8 BUN 17 glucose 138 Ca 8.7 GFR 90  CBC: Date: 7/31/2023 WBC 12.3 Hgb 10.5 hematocrit 32.4 platelets 307  Liver function: Date: 7/31/2023 ALT 14 AST 24 alkaline phos.  81 bilirubin 0.4 albumin 3.5 protein 7.4    BMP: Date: 8/7/2023 Na 141 K 3.9 Cr 1.0 BUN 12 glucose 167 Ca 8.6 GFR 70  CBC: Date: 8/7/2023 WBC 10 Hgb 10.5 hematocrit 32.7 platelets 256  Liver function: Date: 8/7/2023 ALT 9 AST 23 alkaline phos.  76 bilirubin 0.5 albumin 3.3 protein 7    BMP: Date: 8/21/2023 Na 142  K 4.1 Cr 1 BUN 17 glucose 195 Ca 8.5 GFR 70  CBC: Date: 8/21/2023 WBC 10.9 Hgb 9.6 hematocrit 30 platelets 304  Liver function: Date: 8/21/2023 ALT 9 AST 24 alkaline phos.  74 bilirubin 0.4 albumin 3.2 protein 7.1    BMP: Date: 8/31/2023 Na 141 K 3.8 Cr 0.9 BUN 14 glucose 118 Ca 8.2 GFR 79  CBC: Date: 8/31/2023 WBC 12 Hgb 9.6 hematocrit 29.8 platelets 294  Liver function: Date: 8/31/2023 ALT 8 AST 21 alkaline phos.  64 bilirubin 0.4 albumin 3.1 protein 7    BMP: Date: 9/4/2023 Na 141 K 3.9 Cr 1.1 BUN 17 glucose 107 Ca 8.5 GFR 63   CBC: Date: 9/4/2023 WBC 12 Hgb 9.5 hematocrit 29.7 platelets 282  Liver function: Date: 9/4/2023 ALT 7 AST 22 alkaline phos.  64 bilirubin 0.4 albumin 3.3 protein 7.1    BMP: Date: 9/11/2023 Na 142 K 4 Cr 1 BUN 12 glucose 81 Ca 8.2 GFR 70  CBC: Date: 9/11/2023 WBC 8.7 Hgb 9.2 hematocrit 28.3 platelets 305  Liver function: Date: 9/11/2023 ALT 7 AST 22 alkaline phos.  61 bilirubin 0.3 albumin 3 protein 6.8    BMP: Date: 9/18/2023 Na 140 K 4 Cr 1 BUN 15 glucose 150 Ca 8.3 GFR 70  CBC: Date: 9/18/2023 WBC 12.1 Hgb 9.5 hematocrit 29.8 platelets 287  Liver function: Date: 9/18/2023 ALT 7 AST 23 alkaline phos.  62 bilirubin 0.4 albumin 3.4 protein 7.1    10/17/23 Ha1c 8.3 %    BMP: Date: 10/23/2023 Na 141 K 3.6 Cr 1 BUN 22 glucose 65 Ca 8.3 GFR 70  CBC: Date: 10/23/2023 WBC 12.5 Hgb 10.8  hematocrit 33.2 platelets 290  Liver function: Date: 10/23/2023 ALT 9 AST 31 alkaline phos.  66 bilirubin 0.6 albumin 3.6 protein 7.8    10/26/2023: Lipid panel: Cholesterol 111; triglycerides 111; HDL 33; LDL 56; VLDL 22.    10/26/2023: TSH: 1.081 (range 0.340-5.500).    BMP: Date: 10/30/2023 Na 143  K 3.5 Cr 0.9 BUN 11 glucose 171 Ca 8 GFR 65  CBC: Date: 10/30/2023 WBC 9.2 Hgb 10.1 hematocrit 31.1 platelets 280  Liver function: Date: 10/30/2023 ALT 11 AST 27 alkaline phos.  66 bilirubin 0.4 albumin 3.4 protein 7.2    BMP: Date: 11/13/2023 Na 138 K 3.9 Cr 0.9 BUN 11 glucose 308 Ca 8.4 GFR 79  CBC: Date:  11/13/2023 WBC 8.2 Hgb 10.6 hematocrit 33 platelets 295  Liver function: Date: 11/13/2023 ALT 12 AST 37 alkaline phos.  69 bilirubin 0.4 albumin 3.2 protein 7.1    BMP: Date: 11/21/2023 Na 142 K 3.6 Cr 0.9 BUN 14 glucose 200 Ca 8.4 GFR 79.  CBC: Date: 11/21/2023 WBC 8.8 Hgb 10.1 hematocrit 30.4 platelets 290    BMP: Date: 11/27/2023 Na 138 K 3.4 Cr 1 BUN 12 glucose 54 Ca 8.7 GFR 70  CBC: Date: 11/27/2023 WBC 12.3 Hgb 10.6 hematocrit 32.4 platelets 307  Liver function: Date: 11/27/2023 ALT 7 AST 27 alkaline phos.  72 bilirubin 0.5 albumin 3.3 protein 7.7    BMP: Date: 12/4/2023 Na 141 K 4 Cr 1 BUN 12 glucose 209 Ca 8.5 GFR 70  CBC: Date: 12/4/2023 WBC 8.5 Hgb 10.6 hematocrit 32.7 platelets 326  Liver function: Date: 12/4/2023 ALT 8 AST 25 alkaline phos.  64 bilirubin 0.4 albumin 3.3 protein 7.1  12/4/2023 hemoglobin A1c 8.9%    BMP: Date: 12/25/2023 Na 140 K 3.9 Cr 0.9 BUN 17 glucose 172 Ca 8.4 GFR 79  CBC: Date: 12/25/2023 WBC 8.9 Hgb 9.9 hematocrit 30.7 platelets 272  Liver function: Date: 12/25/2023 ALT 8 AST 26 alkaline phos.  67 bilirubin 0.3 albumin 3.2 protein 7    BMP: Date: 12/26/2023 Na 142 K 4.2 Cr 0.9 BUN 15 glucose 168 Ca 8.6 GFR 79  CBC: Date: 12/26/2023 WBC 9.9 Hgb 10.5 hematocrit 32.9 platelets 302    BMP: Date: 1/15/2024 Na 139 K 3.9 Cr 1 BUN 14 glucose 206 Ca 8.5 GFR 70  CBC: Date: 1/15/2024 WBC 8.3 Hgb 9.8 hematocrit 29.8 platelets 244  Liver function: Date: 1/15/2024 ALT 7 AST 22 alkaline phos.  62 bilirubin 0.3 albumin 3.3 protein 6.8    1/16/2024: Lipid panel: Cholesterol 119; triglycerides 174; HDL 32; LDL 52; VLDL 35.    BMP: Date: 2/5/2024 Na 141 K 4.2 Cr 1 BUN 22 glucose 150 Ca 8.3 GFR 70  CBC: Date: 2/5/2024 WBC 10.9 Hgb 10.2 hematocrit 31.1 platelets 319  Liver function: Date: 2/5/2024 ALT 7 AST 29 alkaline phos.  77 bilirubin 0.6 albumin 3.4 protein 7.4    BMP: Date: 2/26/2024 Na 143 K 3.6 Cr 1.3 BUN 23 glucose 152 Ca 8.5 GFR 51  CBC: Date: 2/26/2024 WBC 8 Hgb 11.1 hematocrit 33.6  platelets 318  Liver function: Date: 2/26/2024 ALT 11 AST 48 alkaline phos.  67 bilirubin 0.5 albumin 3.5 protein 7.8    BMP: Date: 3/11/2024 Na 142 K 3.8 Cr 0.9 BUN 16 glucose 97 Ca 8.7 GFR 79  CBC: Date: 3/11/2024 WBC 9 Hgb 10.5 hematocrit 32.3 platelets 302  Liver function: Date: 3/11/2024  ALT 10 AST 39 alkaline phos.  64 bilirubin 0.4 albumin 3.1 protein 7.1    BMP: Date: 3/18/2024 Na 143 K 4.4 Cr 0.9 BUN 15 glucose 111 Ca 8.7 GFR 79  CBC: Date: 3/18/2024 WBC 9.1 Hgb 10.8 hematocrit 32.6 platelets 294  Liver function: Date: 3/18/2024 ALT 13 AST 49 alkaline phos.  68 bilirubin 0.6 albumin 3.2 protein 7.4    BMP: Date: 3/25/2024 Na 140 K 3.7 Cr 1 BUN 17 glucose 225 Ca 8.7 GFR 70  CBC: Date: 3/25/2024  WBC 8.6 Hgb 10.7 hematocrit 32 platelets 304  Liver function: Date: 3/25/2024 ALT 15 AST 56 alkaline phos.  53 bilirubin 0.5 albumin 3.3 protein 7.1    BMP: Date: 4/1/2024 Na 143 K 3.5 Cr 1 BUN 19 glucose 151 Ca 8.7 GFR 70  CBC: Date: 4/1/2024 WBC 8.4 Hgb 10.2 hematocrit 30.8 platelets 254  Liver function: Date: 4/1/2024 ALT 12 AST 32 alkaline phos.  56 bilirubin 0.4 albumin 3.3 protein 6.8                Past Medical History:   Diagnosis Date   • Acute pulmonary embolism (Multi) 04/22/2024   • Aphasia as late effect of cerebrovascular accident 04/25/2023   • Essential hypertension 06/25/2010   • Gastroesophageal reflux disease 03/10/2009   • Hemiplegia of nondominant side, late effect of cerebrovascular disease (Multi) 09/03/2008   • Hemorrhagic stroke (Multi) 06/25/2010   • HIV infection (Multi) 02/21/2007   • Hypothyroidism 10/10/2002    Formatting of this note might be different from the original.   S/p thyroidectomy 2002   Patholgy c/w Penny   • Mixed hyperlipidemia 06/25/2010   • Stage 3a chronic kidney disease (Multi) 04/22/2024   • T2DM (type 2 diabetes mellitus) (Multi) 11/14/2012           Past Surgical History:   Procedure Laterality Date   • CHOLECYSTECTOMY  01/08/2016    Cholecystectomy   • COLON  SURGERY  01/08/2016    Colon Surgery   • CT ANGIO NECK  3/19/2019    CT NECK ANGIO W AND WO IV CONTRAST 3/19/2019 [facility]   • CT HEAD ANGIO W AND WO IV CONTRAST  3/19/2019    CT HEAD ANGIO W AND WO IV CONTRAST 3/19/2019 [facility]GENCY [facility]GAC[facility]   • HERNIA REPAIR  01/08/2016    Hernia Repair   • HYSTERECTOMY  01/08/2016    Hysterectomy   • MR HEAD ANGIO WO IV CONTRAST  6/4/2018    MR HEAD ANGIO WO IV CONTRAST 6/4/2018 [facility] CLINICAL LEGACY   • MR HEAD ANGIO WO IV CONTRAST  7/11/2020    MR HEAD ANGIO WO IV CONTRAST 7/11/2020 [facility] CLINICAL LEGACY   • MR HEAD ANGIO WO IV CONTRAST  9/30/2020    MR HEAD ANGIO WO IV CONTRAST 9/30/2020 [facility]   • MR HEAD ANGIO WO IV CONTRAST  5/22/2022    MR HEAD ANGIO WO IV CONTRAST 5/22/2022 [facility] CLINICAL LEGACY   • MR HEAD ANGIO WO IV CONTRAST  4/19/2023    MR HEAD ANGIO WO IV CONTRAST [facility]   • MR NECK ANGIO WO IV CONTRAST  6/4/2018    MR NECK ANGIO WO IV CONTRAST 6/4/2018 [facility] CLINICAL LEGACY   • MR NECK ANGIO WO IV CONTRAST  7/11/2020    MR NECK ANGIO WO IV CONTRAST 7/11/2020 [facility] CLINICAL LEGACY   • MR NECK ANGIO WO IV CONTRAST  9/30/2020    MR NECK ANGIO WO IV CONTRAST 9/30/2020 [facility]   • MR NECK ANGIO WO IV CONTRAST  5/22/2022    MR NECK ANGIO WO IV CONTRAST 5/22/2022 [facility] CLINICAL LEGACY   • MR NECK ANGIO WO IV CONTRAST  4/19/2023    MR NECK ANGIO WO IV CONTRAST [facility]   • OTHER SURGICAL HISTORY  01/08/2016    Tubal Stabilization   • OTHER SURGICAL HISTORY  09/01/2016    Cervical Surgery (Gyn) Laser Vaporization Of Transformation Zone   • THYROID SURGERY  09/27/2013    Thyroid Surgery            Surgical History  Problems    · History of Cervical Surgery (Gyn) Laser Vaporization Of Transformation Zone   · History of Cholecystectomy   · History of Colon Surgery   · History of Hernia Repair   · History of Hysterectomy   · History of Thyroid Surgery   · History of Tubal Stabilization     Family History  Mother    · Family history of Cancer  Family History  "   · Family history of Diabetes Mellitus (V18.0)   · Family history of Hypertension (V17.49)     Social History  Problems    · Disabled   · Does not use illicit drugs (V49.89) (Z78.9)   · Former smoker (V15.82) (Z87.891)   · Marital History - Single   · No alcohol.   · Denied: History of Secondhand smoke exposure   · Single     Allergies  Medication    · Aspirin TABS   · Contrast Media Ready-Box MISC   · morphine   · Sulfa Drugs        /81   Pulse 76   Temp 36.4 °C (97.6 °F)   Resp 18   Ht 1.372 m (4' 6\")   Wt 93 kg (205 lb)   SpO2 96%   BMI 49.43 kg/m²      Physical Exam  Vitals and nursing note reviewed.   Constitutional:       General: She is awake.      Appearance: Normal appearance. She is ill-appearing.   HENT:      Head: Normocephalic.      Right Ear: External ear normal.      Left Ear: External ear normal.      Nose: Nose normal.      Mouth/Throat:      Mouth: Mucous membranes are moist.      Pharynx: Oropharynx is clear.   Eyes:      Extraocular Movements: Extraocular movements intact.      Conjunctiva/sclera:      Right eye: Right conjunctiva is injected. No exudate.     Left eye: Left conjunctiva is not injected. No exudate.     Pupils: Pupils are equal, round, and reactive to light.   Cardiovascular:      Rate and Rhythm: Normal rate and regular rhythm.      Pulses: Normal pulses.      Heart sounds: Normal heart sounds.   Pulmonary:      Effort: Pulmonary effort is normal.      Breath sounds: Normal breath sounds.   Abdominal:      General: Bowel sounds are normal.      Palpations: Abdomen is soft.   Musculoskeletal:      Cervical back: Normal range of motion and neck supple.      Comments: Generalized muscle weakness; immobility.    Right sided hemiparesis   Skin:     General: Skin is warm and dry.   Neurological:      Mental Status: She is alert. Mental status is at baseline.      Motor: Weakness present.      Coordination: Coordination abnormal.      Gait: Gait abnormal.      Comments: " Right sided weakness--RLE 1/5; RUE 3/5; sensation intact to touch x 4 extremities.     Left sided facial droop.    Generalized muscle weakness   Psychiatric:         Mood and Affect: Mood normal.         Behavior: Behavior normal. Behavior is cooperative.          Assessment/Plan  Ordered CMP and CBC with diff for tomorrow.     Encephalopathy:  Evaluated in the hospital.   -CBC: +leukocytosis, no acute anemia, no thrombocytopenia  -CMP: no acute electrolyte abnormalities, no acute renal or liver dysfunction appreciated   -CTH: no acute processes  -MRI/MRA: No acute processes.  No LVO or acute stenosis.   -UA: +leukest, no nitrites, 1-5 WBC --> which could suggest UTI, but patient denies urinary complaints (unclear if reliable historian),   Patient was given rocephin in the ER   Order CBC.  Monitor for fevers.  Encourage PO fluids.     Right eye bacterial conjunctivitis:  Continue moxifloxacin ophthalmic drops on 5/3/24.  Start doxycycline 100 mg PO BID for 10 days.    HIV:        Follow up with infectious disease.       Continue Descovy.    Hypothyroidism; Hx graves s/p thyroidectomy   Post surgical hypothyroid  TSH  was low, Free thyroxine high; consistent with over medicated hypothyroid  Levothyroxine was held.     Chronic systolic HF; Benign essential HTN :        Continue Amlodipine and hydrochlorothiazide.         Monitor oxygen sats.         Monitor Bps.                     Esophageal dysphagia:      Patient was evaluated by speech therapy and had a modified barium swallow performed in the hospital.      Recommendations as follows:   1.PUREE DIET WITH HONEY THICKENED LIQUIDS  2. UPRIGHT AT 90 DEGREES FOR ALL PO  3. SLOW RATE AND SMALL BOLUS SIZE  4. LIQUIDS VIA TSP ONLY  5 MEDICATION CRUSHED IN PUREE CARRIER   Follow up with in house speech therapy.              DM type 2 with hyperglycemia; diabetic polyneuropathy:        Patient is frequently noncompliant with diet.       Continue Lantus 43 units at bedtime  and Humalog sliding scale.       Monitor accuchecks.       Monitor Ha1c Q 3 months.           Sequela of Cerebrovascular accident; Hx CVA with right-sided hemiparesis; right sided weakness;   dysphagia 2/2 cva; expressive aphasia; History of acute/subacute stroke:   MRI was performed during a PREVIOUS hospitalization and it showed Acute/subacute tiny infarct in the left anterior brock.   [place]. Jiang [place] [facility] [place] at that time.  Continue Eliquis 5 mg PO BID.  Pt has baseline dysarthria from a previous CVA, right Hemiparesis,  and expressive aphasia.  At her usual baseline she is able to communicate in short sentences and she is able to nod appropriately to questions.   She is on a mechanical soft, thin consistency diet.                Major depressive disorder; decreased appetite.   Continue Sertraline.  Start Remeron 15 mg PO at bedtime.            Immobility; Generalized muscle weakness;  Continue at   in long term care.  Not able to ambulate.  Very weak.  Bedbound/wheelchair bound.       Problem List Items Addressed This Visit          Cardiac and Vasculature    Chronic systolic heart failure (Multi) (Chronic)       Endocrine/Metabolic    Hypothyroidism (Chronic)    T2DM (type 2 diabetes mellitus) (Multi) (Chronic)       Gastrointestinal and Abdominal    Esophageal dysphagia (Chronic)       Infectious Diseases    HIV infection (Multi) (Chronic)       Neuro    Diabetic polyneuropathy (Multi) (Chronic)    Encephalopathy - Primary     Other Visit Diagnoses       Bacterial conjunctivitis of right eye        Benign essential HTN        Sequela of cerebrovascular accident        Hemiparesis affecting right side as late effect of stroke (Multi)        Expressive aphasia        Major depressive disorder with current active episode, unspecified depression episode severity, unspecified whether recurrent        Noncompliance with dietary restriction        Immobility        Generalized muscle  weakness                    JASON Espitia       Electronically Signed By: JASON Espitia   5/8/24 11:42 PM

## 2024-05-08 VITALS
BODY MASS INDEX: 47.44 KG/M2 | OXYGEN SATURATION: 96 % | SYSTOLIC BLOOD PRESSURE: 148 MMHG | HEIGHT: 55 IN | RESPIRATION RATE: 18 BRPM | TEMPERATURE: 97.6 F | HEART RATE: 76 BPM | WEIGHT: 205 LBS | DIASTOLIC BLOOD PRESSURE: 81 MMHG

## 2024-05-09 ENCOUNTER — APPOINTMENT (OUTPATIENT)
Dept: RADIOLOGY | Facility: HOSPITAL | Age: 54
DRG: 177 | End: 2024-05-09
Payer: MEDICARE

## 2024-05-09 ENCOUNTER — APPOINTMENT (OUTPATIENT)
Dept: CARDIOLOGY | Facility: HOSPITAL | Age: 54
DRG: 177 | End: 2024-05-09
Payer: MEDICARE

## 2024-05-09 ENCOUNTER — HOSPITAL ENCOUNTER (INPATIENT)
Facility: HOSPITAL | Age: 54
LOS: 14 days | Discharge: NURSING, REHAB OR LONG-TERM CARE | DRG: 177 | End: 2024-05-23
Attending: EMERGENCY MEDICINE | Admitting: INTERNAL MEDICINE
Payer: MEDICARE

## 2024-05-09 DIAGNOSIS — R60.0 BILATERAL LEG EDEMA: ICD-10-CM

## 2024-05-09 DIAGNOSIS — J96.02 ACUTE RESPIRATORY FAILURE WITH HYPERCAPNIA (MULTI): ICD-10-CM

## 2024-05-09 DIAGNOSIS — R41.82 AMS (ALTERED MENTAL STATUS): Primary | ICD-10-CM

## 2024-05-09 DIAGNOSIS — R13.12 OROPHARYNGEAL DYSPHAGIA: ICD-10-CM

## 2024-05-09 DIAGNOSIS — B95.62 MRSA BACTEREMIA: ICD-10-CM

## 2024-05-09 DIAGNOSIS — R78.81 MRSA BACTEREMIA: ICD-10-CM

## 2024-05-09 DIAGNOSIS — I38 ENDOCARDITIS, VALVE UNSPECIFIED: ICD-10-CM

## 2024-05-09 LAB
ALBUMIN SERPL BCP-MCNC: 2.8 G/DL (ref 3.4–5)
ALP SERPL-CCNC: 84 U/L (ref 33–110)
ALT SERPL W P-5'-P-CCNC: 17 U/L (ref 7–45)
AMMONIA PLAS-SCNC: 21 UMOL/L (ref 16–53)
AMORPH CRY #/AREA UR COMP ASSIST: ABNORMAL /HPF
ANION GAP BLDV CALCULATED.4IONS-SCNC: 10 MMOL/L (ref 10–25)
ANION GAP SERPL CALC-SCNC: 15 MMOL/L (ref 10–20)
APPEARANCE UR: ABNORMAL
AST SERPL W P-5'-P-CCNC: 46 U/L (ref 9–39)
ATRIAL RATE: 105 BPM
B-HCG SERPL-ACNC: 3 MIU/ML
BACTERIA #/AREA URNS AUTO: ABNORMAL /HPF
BASE EXCESS BLDV CALC-SCNC: -2.1 MMOL/L (ref -2–3)
BASOPHILS # BLD MANUAL: 0 X10*3/UL (ref 0–0.1)
BASOPHILS NFR BLD MANUAL: 0 %
BILIRUB SERPL-MCNC: 0.4 MG/DL (ref 0–1.2)
BILIRUB UR STRIP.AUTO-MCNC: NEGATIVE MG/DL
BNP SERPL-MCNC: 112 PG/ML (ref 0–99)
BODY TEMPERATURE: 37 DEGREES CELSIUS
BUN SERPL-MCNC: 24 MG/DL (ref 6–23)
CA-I BLDV-SCNC: 1.16 MMOL/L (ref 1.1–1.33)
CALCIUM SERPL-MCNC: 7.4 MG/DL (ref 8.6–10.3)
CARDIAC TROPONIN I PNL SERPL HS: 53 NG/L (ref 0–13)
CARDIAC TROPONIN I PNL SERPL HS: 60 NG/L (ref 0–13)
CARDIAC TROPONIN I PNL SERPL HS: 68 NG/L (ref 0–13)
CHLORIDE BLDV-SCNC: 109 MMOL/L (ref 98–107)
CHLORIDE SERPL-SCNC: 114 MMOL/L (ref 98–107)
CO2 SERPL-SCNC: 24 MMOL/L (ref 21–32)
COLOR UR: YELLOW
CREAT SERPL-MCNC: 1.29 MG/DL (ref 0.5–1.05)
EGFRCR SERPLBLD CKD-EPI 2021: 49 ML/MIN/1.73M*2
EOSINOPHIL # BLD MANUAL: 0 X10*3/UL (ref 0–0.7)
EOSINOPHIL NFR BLD MANUAL: 0 %
ERYTHROCYTE [DISTWIDTH] IN BLOOD BY AUTOMATED COUNT: 17.2 % (ref 11.5–14.5)
GLUCOSE BLD MANUAL STRIP-MCNC: 140 MG/DL (ref 74–99)
GLUCOSE BLDV-MCNC: 265 MG/DL (ref 74–99)
GLUCOSE SERPL-MCNC: 133 MG/DL (ref 74–99)
GLUCOSE UR STRIP.AUTO-MCNC: NORMAL MG/DL
GRAN CASTS #/AREA UR COMP ASSIST: ABNORMAL /LPF
HCO3 BLDV-SCNC: 25.7 MMOL/L (ref 22–26)
HCT VFR BLD AUTO: 28.1 % (ref 36–46)
HCT VFR BLD EST: 27 % (ref 36–46)
HGB BLD-MCNC: 9.1 G/DL (ref 12–16)
HGB BLDV-MCNC: 8.9 G/DL (ref 12–16)
IMM GRANULOCYTES # BLD AUTO: 0.54 X10*3/UL (ref 0–0.7)
IMM GRANULOCYTES NFR BLD AUTO: 1.6 % (ref 0–0.9)
INHALED O2 CONCENTRATION: 21 %
INR PPP: 2 (ref 0.9–1.1)
KETONES UR STRIP.AUTO-MCNC: ABNORMAL MG/DL
LACTATE BLDV-SCNC: 1.5 MMOL/L (ref 0.4–2)
LACTATE BLDV-SCNC: 2.4 MMOL/L (ref 0.4–2)
LACTATE SERPL-SCNC: 1 MMOL/L (ref 0.4–2)
LEUKOCYTE ESTERASE UR QL STRIP.AUTO: ABNORMAL
LYMPHOCYTES # BLD MANUAL: 3.36 X10*3/UL (ref 1.2–4.8)
LYMPHOCYTES NFR BLD MANUAL: 10 %
MCH RBC QN AUTO: 30.8 PG (ref 26–34)
MCHC RBC AUTO-ENTMCNC: 32.4 G/DL (ref 32–36)
MCV RBC AUTO: 95 FL (ref 80–100)
MONOCYTES # BLD MANUAL: 1.34 X10*3/UL (ref 0.1–1)
MONOCYTES NFR BLD MANUAL: 4 %
MUCOUS THREADS #/AREA URNS AUTO: ABNORMAL /LPF
NEUTROPHILS # BLD MANUAL: 28.89 X10*3/UL (ref 1.2–7.7)
NEUTS BAND # BLD MANUAL: 0.67 X10*3/UL (ref 0–0.7)
NEUTS BAND NFR BLD MANUAL: 2 %
NEUTS SEG # BLD MANUAL: 28.22 X10*3/UL (ref 1.2–7)
NEUTS SEG NFR BLD MANUAL: 84 %
NITRITE UR QL STRIP.AUTO: NEGATIVE
NRBC BLD-RTO: 0.1 /100 WBCS (ref 0–0)
OXYHGB MFR BLDV: 33.5 % (ref 45–75)
P AXIS: 10 DEGREES
P OFFSET: 202 MS
P ONSET: 145 MS
PCO2 BLDV: 60 MM HG (ref 41–51)
PH BLDV: 7.24 PH (ref 7.33–7.43)
PH UR STRIP.AUTO: 5.5 [PH]
PLATELET # BLD AUTO: 257 X10*3/UL (ref 150–450)
PO2 BLDV: 32 MM HG (ref 35–45)
POLYCHROMASIA BLD QL SMEAR: ABNORMAL
POTASSIUM BLDV-SCNC: 3.9 MMOL/L (ref 3.5–5.3)
POTASSIUM SERPL-SCNC: 3.7 MMOL/L (ref 3.5–5.3)
PR INTERVAL: 148 MS
PROT SERPL-MCNC: 7.1 G/DL (ref 6.4–8.2)
PROT UR STRIP.AUTO-MCNC: ABNORMAL MG/DL
PROTHROMBIN TIME: 22.7 SECONDS (ref 9.8–12.8)
Q ONSET: 219 MS
QRS COUNT: 18 BEATS
QRS DURATION: 82 MS
QT INTERVAL: 302 MS
QTC CALCULATION(BAZETT): 399 MS
QTC FREDERICIA: 363 MS
R AXIS: 65 DEGREES
RBC # BLD AUTO: 2.95 X10*6/UL (ref 4–5.2)
RBC # UR STRIP.AUTO: ABNORMAL /UL
RBC #/AREA URNS AUTO: ABNORMAL /HPF
RBC MORPH BLD: ABNORMAL
SAO2 % BLDV: 34 % (ref 45–75)
SARS-COV-2 RNA RESP QL NAA+PROBE: NOT DETECTED
SODIUM BLDV-SCNC: 141 MMOL/L (ref 136–145)
SODIUM SERPL-SCNC: 149 MMOL/L (ref 136–145)
SP GR UR STRIP.AUTO: 1.03
SQUAMOUS #/AREA URNS AUTO: ABNORMAL /HPF
T AXIS: 257 DEGREES
T OFFSET: 370 MS
T4 FREE SERPL-MCNC: 1.08 NG/DL (ref 0.61–1.12)
TARGETS BLD QL SMEAR: ABNORMAL
TOTAL CELLS COUNTED BLD: 100
TSH SERPL-ACNC: 0.18 MIU/L (ref 0.44–3.98)
UROBILINOGEN UR STRIP.AUTO-MCNC: NORMAL MG/DL
VENTRICULAR RATE: 105 BPM
WBC # BLD AUTO: 33.6 X10*3/UL (ref 4.4–11.3)
WBC #/AREA URNS AUTO: ABNORMAL /HPF

## 2024-05-09 PROCEDURE — A4217 STERILE WATER/SALINE, 500 ML: HCPCS | Performed by: EMERGENCY MEDICINE

## 2024-05-09 PROCEDURE — 84132 ASSAY OF SERUM POTASSIUM: CPT | Mod: 91 | Performed by: INTERNAL MEDICINE

## 2024-05-09 PROCEDURE — 74018 RADEX ABDOMEN 1 VIEW: CPT

## 2024-05-09 PROCEDURE — 83605 ASSAY OF LACTIC ACID: CPT | Mod: MUE | Performed by: INTERNAL MEDICINE

## 2024-05-09 PROCEDURE — 82947 ASSAY GLUCOSE BLOOD QUANT: CPT

## 2024-05-09 PROCEDURE — 36415 COLL VENOUS BLD VENIPUNCTURE: CPT | Performed by: EMERGENCY MEDICINE

## 2024-05-09 PROCEDURE — 87493 C DIFF AMPLIFIED PROBE: CPT | Mod: 59 | Performed by: EMERGENCY MEDICINE

## 2024-05-09 PROCEDURE — 82140 ASSAY OF AMMONIA: CPT | Performed by: EMERGENCY MEDICINE

## 2024-05-09 PROCEDURE — 2500000001 HC RX 250 WO HCPCS SELF ADMINISTERED DRUGS (ALT 637 FOR MEDICARE OP): Performed by: EMERGENCY MEDICINE

## 2024-05-09 PROCEDURE — 83880 ASSAY OF NATRIURETIC PEPTIDE: CPT | Performed by: EMERGENCY MEDICINE

## 2024-05-09 PROCEDURE — 84702 CHORIONIC GONADOTROPIN TEST: CPT | Performed by: EMERGENCY MEDICINE

## 2024-05-09 PROCEDURE — 96365 THER/PROPH/DIAG IV INF INIT: CPT

## 2024-05-09 PROCEDURE — 2060000001 HC INTERMEDIATE ICU ROOM DAILY

## 2024-05-09 PROCEDURE — 96368 THER/DIAG CONCURRENT INF: CPT

## 2024-05-09 PROCEDURE — 74018 RADEX ABDOMEN 1 VIEW: CPT | Mod: RSC | Performed by: RADIOLOGY

## 2024-05-09 PROCEDURE — 96361 HYDRATE IV INFUSION ADD-ON: CPT

## 2024-05-09 PROCEDURE — 93005 ELECTROCARDIOGRAM TRACING: CPT

## 2024-05-09 PROCEDURE — 74176 CT ABD & PELVIS W/O CONTRAST: CPT | Performed by: RADIOLOGY

## 2024-05-09 PROCEDURE — 87086 URINE CULTURE/COLONY COUNT: CPT | Mod: AHULAB | Performed by: EMERGENCY MEDICINE

## 2024-05-09 PROCEDURE — 84439 ASSAY OF FREE THYROXINE: CPT | Performed by: EMERGENCY MEDICINE

## 2024-05-09 PROCEDURE — 80053 COMPREHEN METABOLIC PANEL: CPT | Performed by: EMERGENCY MEDICINE

## 2024-05-09 PROCEDURE — 83605 ASSAY OF LACTIC ACID: CPT | Performed by: EMERGENCY MEDICINE

## 2024-05-09 PROCEDURE — 71045 X-RAY EXAM CHEST 1 VIEW: CPT

## 2024-05-09 PROCEDURE — 87040 BLOOD CULTURE FOR BACTERIA: CPT | Mod: 91,AHULAB | Performed by: EMERGENCY MEDICINE

## 2024-05-09 PROCEDURE — 84443 ASSAY THYROID STIM HORMONE: CPT | Performed by: EMERGENCY MEDICINE

## 2024-05-09 PROCEDURE — 2500000004 HC RX 250 GENERAL PHARMACY W/ HCPCS (ALT 636 FOR OP/ED): Performed by: EMERGENCY MEDICINE

## 2024-05-09 PROCEDURE — 71045 X-RAY EXAM CHEST 1 VIEW: CPT | Performed by: RADIOLOGY

## 2024-05-09 PROCEDURE — 71250 CT THORAX DX C-: CPT | Performed by: RADIOLOGY

## 2024-05-09 PROCEDURE — 85007 BL SMEAR W/DIFF WBC COUNT: CPT | Performed by: EMERGENCY MEDICINE

## 2024-05-09 PROCEDURE — 84484 ASSAY OF TROPONIN QUANT: CPT | Performed by: EMERGENCY MEDICINE

## 2024-05-09 PROCEDURE — 74176 CT ABD & PELVIS W/O CONTRAST: CPT

## 2024-05-09 PROCEDURE — 70450 CT HEAD/BRAIN W/O DYE: CPT

## 2024-05-09 PROCEDURE — 99291 CRITICAL CARE FIRST HOUR: CPT | Mod: 25 | Performed by: EMERGENCY MEDICINE

## 2024-05-09 PROCEDURE — 96375 TX/PRO/DX INJ NEW DRUG ADDON: CPT

## 2024-05-09 PROCEDURE — 96366 THER/PROPH/DIAG IV INF ADDON: CPT

## 2024-05-09 PROCEDURE — 81001 URINALYSIS AUTO W/SCOPE: CPT | Performed by: EMERGENCY MEDICINE

## 2024-05-09 PROCEDURE — 85027 COMPLETE CBC AUTOMATED: CPT | Performed by: EMERGENCY MEDICINE

## 2024-05-09 PROCEDURE — 85610 PROTHROMBIN TIME: CPT | Performed by: EMERGENCY MEDICINE

## 2024-05-09 PROCEDURE — 87506 IADNA-DNA/RNA PROBE TQ 6-11: CPT | Mod: AHULAB | Performed by: EMERGENCY MEDICINE

## 2024-05-09 PROCEDURE — 87635 SARS-COV-2 COVID-19 AMP PRB: CPT | Performed by: EMERGENCY MEDICINE

## 2024-05-09 PROCEDURE — 70450 CT HEAD/BRAIN W/O DYE: CPT | Performed by: RADIOLOGY

## 2024-05-09 RX ORDER — MOXIFLOXACIN 5 MG/ML
1 SOLUTION/ DROPS OPHTHALMIC 3 TIMES DAILY
COMMUNITY

## 2024-05-09 RX ORDER — SODIUM CHLORIDE 9 MG/ML
75 INJECTION, SOLUTION INTRAVENOUS CONTINUOUS
Status: DISCONTINUED | OUTPATIENT
Start: 2024-05-09 | End: 2024-05-23 | Stop reason: HOSPADM

## 2024-05-09 RX ORDER — DOCUSATE SODIUM 100 MG/1
100 CAPSULE, LIQUID FILLED ORAL 2 TIMES DAILY
COMMUNITY

## 2024-05-09 RX ORDER — IPRATROPIUM BROMIDE AND ALBUTEROL SULFATE 2.5; .5 MG/3ML; MG/3ML
3 SOLUTION RESPIRATORY (INHALATION)
Status: DISCONTINUED | OUTPATIENT
Start: 2024-05-10 | End: 2024-05-13

## 2024-05-09 RX ORDER — DEXTROSE 50 % IN WATER (D50W) INTRAVENOUS SYRINGE
25
Status: DISCONTINUED | OUTPATIENT
Start: 2024-05-09 | End: 2024-05-23 | Stop reason: HOSPADM

## 2024-05-09 RX ORDER — ACETAMINOPHEN 650 MG/1
650 SUPPOSITORY RECTAL ONCE
Status: COMPLETED | OUTPATIENT
Start: 2024-05-09 | End: 2024-05-09

## 2024-05-09 RX ORDER — LEVOFLOXACIN 5 MG/ML
500 INJECTION, SOLUTION INTRAVENOUS EVERY 24 HOURS
Status: DISCONTINUED | OUTPATIENT
Start: 2024-05-10 | End: 2024-05-10

## 2024-05-09 RX ORDER — HYDRALAZINE HYDROCHLORIDE 20 MG/ML
10 INJECTION INTRAMUSCULAR; INTRAVENOUS EVERY 4 HOURS PRN
Status: DISCONTINUED | OUTPATIENT
Start: 2024-05-09 | End: 2024-05-23 | Stop reason: HOSPADM

## 2024-05-09 RX ORDER — METRONIDAZOLE 500 MG/100ML
500 INJECTION, SOLUTION INTRAVENOUS ONCE
Status: COMPLETED | OUTPATIENT
Start: 2024-05-09 | End: 2024-05-09

## 2024-05-09 RX ORDER — INSULIN LISPRO 100 [IU]/ML
0-10 INJECTION, SOLUTION INTRAVENOUS; SUBCUTANEOUS
Status: DISCONTINUED | OUTPATIENT
Start: 2024-05-10 | End: 2024-05-23 | Stop reason: HOSPADM

## 2024-05-09 RX ORDER — HEPARIN SODIUM 5000 [USP'U]/ML
5000 INJECTION, SOLUTION INTRAVENOUS; SUBCUTANEOUS 2 TIMES DAILY
Status: DISCONTINUED | OUTPATIENT
Start: 2024-05-09 | End: 2024-05-10

## 2024-05-09 RX ORDER — DEXTROSE 50 % IN WATER (D50W) INTRAVENOUS SYRINGE
12.5
Status: DISCONTINUED | OUTPATIENT
Start: 2024-05-09 | End: 2024-05-23 | Stop reason: HOSPADM

## 2024-05-09 RX ADMIN — SODIUM CHLORIDE, POTASSIUM CHLORIDE, SODIUM LACTATE AND CALCIUM CHLORIDE 500 ML: 600; 310; 30; 20 INJECTION, SOLUTION INTRAVENOUS at 12:51

## 2024-05-09 RX ADMIN — ACETAMINOPHEN 650 MG: 650 SUPPOSITORY RECTAL at 14:06

## 2024-05-09 RX ADMIN — AZITHROMYCIN MONOHYDRATE 500 MG: 500 INJECTION, POWDER, LYOPHILIZED, FOR SOLUTION INTRAVENOUS at 14:06

## 2024-05-09 RX ADMIN — SODIUM CHLORIDE, POTASSIUM CHLORIDE, SODIUM LACTATE AND CALCIUM CHLORIDE 1000 ML: 600; 310; 30; 20 INJECTION, SOLUTION INTRAVENOUS at 14:06

## 2024-05-09 RX ADMIN — SODIUM CHLORIDE, SODIUM LACTATE, POTASSIUM CHLORIDE, AND CALCIUM CHLORIDE 1000 ML: 600; 310; 30; 20 INJECTION, SOLUTION INTRAVENOUS at 13:40

## 2024-05-09 RX ADMIN — METRONIDAZOLE 500 MG: 500 INJECTION, SOLUTION INTRAVENOUS at 13:00

## 2024-05-09 RX ADMIN — VANCOMYCIN HYDROCHLORIDE 2 G: 10 INJECTION, POWDER, LYOPHILIZED, FOR SOLUTION INTRAVENOUS at 13:31

## 2024-05-09 RX ADMIN — CEFEPIME 2 G: 2 INJECTION, POWDER, FOR SOLUTION INTRAVENOUS at 12:50

## 2024-05-09 ASSESSMENT — PAIN SCALES - PAIN ASSESSMENT IN ADVANCED DEMENTIA (PAINAD)
FACIALEXPRESSION: SMILING OR INEXPRESSIVE
CONSOLABILITY: NO NEED TO CONSOLE
BODYLANGUAGE: RELAXED
TOTALSCORE: 0
BREATHING: NORMAL

## 2024-05-09 ASSESSMENT — PAIN SCALES - WONG BAKER: WONGBAKER_NUMERICALRESPONSE: HURTS LITTLE BIT

## 2024-05-09 ASSESSMENT — PAIN - FUNCTIONAL ASSESSMENT: PAIN_FUNCTIONAL_ASSESSMENT: 0-10

## 2024-05-09 NOTE — PROGRESS NOTES
Pharmacy Medication History Review    Per Med list from facility    Janette Martinez is a 53 y.o. female admitted for AMS (altered mental status). Pharmacy reviewed the patient's [id number] medications and allergies for accuracy.    The list below reflectives the updated PTA list. Please review each medication in order reconciliation for additional clarification and justification.       The list below reflectives the updated allergy list. Please review each documented allergy for additional clarification and justification.  Allergies  Reviewed by Kalyn Samuel RN on 5/9/2024        Severity Reactions Comments    Aspirin High Unknown, Bleeding, Itching     Iodinated Contrast Media Medium Unknown, Hives     Amoxicillin Not Specified Hives     Other Not Specified Unknown     Morphine Low Unknown, Swelling, Itching, Rash     Sulfa (sulfonamide Antibiotics) Low Unknown, Rash             Below are additional concerns with the patient's PTA list.  Prior to Admission Medications   Prescriptions Last Dose Informant   acetaminophen (Tylenol) 500 mg tablet     Sig: Take 2 tablets (1,000 mg) by mouth 3 times a day.   amLODIPine (Norvasc) 5 mg tablet     Sig: Take 1 tablet (5 mg) by mouth once daily.   apixaban (Eliquis) 5 mg tablet     Sig: Take 1 tablet (5 mg) by mouth 2 times a day.   atorvastatin (Lipitor) 10 mg tablet     Sig: Take 1 tablet (10 mg) by mouth once daily.   baclofen (Lioresal) 10 mg tablet     Sig: Take 1 tablet (10 mg) by mouth 2 times a day.   diclofenac sodium (Voltaren) 1 % gel     Sig: Apply 4.5 inches (4 g) topically every 12 hours if needed.   docusate sodium (Colace) 100 mg capsule     Sig: Take 1 capsule (100 mg) by mouth 2 times a day.   dolutegravir (Tivicay) 50 mg tablet     Sig: Take 1 tablet (50 mg) by mouth once daily.   emtricitabine-tenofovir alafen (Descovy) 200-25 mg tablet     Sig: Take 1 tablet by mouth once daily.   furosemide (Lasix) 20 mg tablet     Sig: Take 1 tablet (20 mg)  by mouth once daily.   gabapentin (Neurontin) 300 mg capsule     Sig: Take 2 capsules (600 mg) by mouth 3 times a day.   hydroCHLOROthiazide (HYDRODiuril) 25 mg tablet     Sig: Take 1 tablet (25 mg) by mouth once daily. Hold if SBP <110 or Heart rate <60   insulin glargine (Basaglar KwikPen U-100 Insulin) 100 unit/mL (3 mL) pen     Sig: Inject 43 Units under the skin once daily at bedtime. Take as directed per insulin instructions.   insulin lispro (HumaLOG) 100 unit/mL injection     Sig: Inject 0.12 mL (12 Units) under the skin once daily. At 1:30pm   ipratropium-albuteroL (Duo-Neb) 0.5-2.5 mg/3 mL nebulizer solution     Sig: Take 3 mL by nebulization 4 times a day as needed for wheezing.   lamoTRIgine (LaMICtal) 25 mg tablet     Sig: Take 1 tablet (25 mg) by mouth once daily in the evening.   levothyroxine (Synthroid, Levoxyl) 175 mcg tablet     Sig: Take 1 tablet (175 mcg) by mouth once daily in the morning. Take before meals.   melatonin 3 mg tablet     Sig: Take 2 tablets (6 mg) by mouth once daily at bedtime.   moxifloxacin (Vigamox) 0.5 % ophthalmic solution     Sig: Administer 1 drop into the right eye 3 times a day. 6am , 2pm, 10pm   oxyCODONE (Roxicodone) 5 mg immediate release tablet     Sig: Take 1 tablet (5 mg) by mouth every 6 hours if needed for severe pain (7 - 10).   polyethylene glycol (Glycolax, Miralax) 17 gram packet     Sig: Take 17 g by mouth once daily.   potassium chloride ER (Micro-K) 10 mEq ER capsule     Sig: Take 2 capsules (20 mEq) by mouth once daily. Do not crush or chew.   sertraline (Zoloft) 100 mg tablet     Sig: Take 2 tablets (200 mg) by mouth once daily.      Facility-Administered Medications: None          Daniela Doherty

## 2024-05-09 NOTE — PROGRESS NOTES
Name: Janette Martinez    YOB: 1970    Code Status: DNRcc arrest    Chief complaint:  Readmit after hospitalization;   Encephalopathy;  etc.....      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      HOSPITAL COURSE:       Patient was sent to [facility] ER due to concern for AMS and worsening left sided facial droop.  HPI limited as patient poor historian, with prior stroke with residual aphasia.  Multiple labs and diagnostic tests performed in the hospital.   CBC performed: +leukocytosis, no acute anemia, no thrombocytopenia  CMP: no acute electrolyte abnormalities, no acute renal or liver dysfunction appreciated   CTH: no acute processes. Negative for acute stroke.   MRI/MRA: No acute processes.  No LVO or acute stenosis.   UA: +leukest, no nitrites, 1-5 WBC --> which could suggest UTI.  Patient denies urinary complaints (unclear if reliable historian).   Received  a dose of Rocephin in the ER.       Patient was evaluated by speech therapy and had a modified barium swallow performed in the hospital.      Recommendations as follows:   PUREE DIET WITH HONEY THICKENED LIQUIDS  UPRIGHT AT 90 DEGREES FOR ALL PO  SLOW RATE AND SMALL BOLUS SIZE  LIQUIDS VIA TSP ONLY  MEDICATION CRUSHED IN PUREE CARRIER         Patient re-admitted to [facility] On 5/3/24 after her most recent hospitalization.    Diagnoses as follows:    Encephalopathy   Hx CVA with residual R hemiparesis, aphasia  -CBC: +leukocytosis, no acute anemia, no thrombocytopenia  -CMP: no acute electrolyte abnormalities, no acute renal or liver dysfunction appreciated   -CTH: no acute processes  -MRI/MRA: No acute processes.  No LVO or acute stenosis.   -UA: +leukest, no nitrites, 1-5 WBC --> which could suggest UTI, but patient denies  urinary complaints (unclear if reliable historian),   Patient was given rocephin in the ER   Patient seen today, she is in her bed.  Calm, cooperative.  Mentation improving close to baseline.  No complaints of headaches or dizziness.  No chest pain.    Right eye bacterial conjunctivitis:  Staff had reported that patient's right eye was red and irritated.  She was started on moxifloxacin ophthalmic drops on 5/3/24.  Her right eye is still erythematous.   No purulent drainage noted or reported.  Patient is not complaining of any eye pain or change in vision.      HIV:        Recent CD4 count 4/2022: CD3 + T cell % 89 (60-89%), CD3 + T cell #[id number]       (high) ([phone]), CD3 and CD4 + Tcell % 23 (34-61%), CD3 + CD4 + T     cell    # [phone] (  [phone]       Patient needs ID consult.      On Descovy.    Hypothyroidism; Hx graves s/p thyroidectomy   Post surgical hypothyroid  TSH  was low, Free thyroxine high; consistent with over medicated hypothyroid  Levothyroxine was held.     Chronic systolic HF; Benign essential HTN :  On Amlodipine and hydrochlorothiazide.              No oxygen desaturations reported.               Recent BP                No recent complaints of  chest pain, headaches or dizziness.       Esophageal dysphagia:  Patient was evaluated by speech therapy and had a modified barium swallow performed in the hospital.      Recommendations as follows:   1.PUREE DIET WITH HONEY THICKENED LIQUIDS  2. UPRIGHT AT 90 DEGREES FOR ALL PO  3. SLOW RATE AND SMALL BOLUS SIZE  4. LIQUIDS VIA TSP ONLY  5 MEDICATION CRUSHED IN PUREE CARRIER               DM type 2 with hyperglycemia; diabetic polyneuropathy:        Patient is frequently noncompliant with diet.       On Lantus 43 units at bedtime and Humalog sliding scale.       Recent accuchecks: 225 mg/dL, 214 mg/dL, 147 mg/dL.        No episodes of hypoglycemia reported.        Ha1c trends:       5/1/23 Ha1c 8.1 %        10/17/23 Ha1c 8.3 %       12/4/23 hemoglobin  A1c 8.9%     No complaints of chest pain.    No headaches.    No dizziness.            Sequela of Cerebrovascular accident; Hx CVA with right-sided hemiparesis; right sided weakness;   dysphagia 2/2 cva; expressive aphasia; History of acute/subacute stroke:   MRI was performed during a PREVIOUS hospitalization and it showed Acute/subacute tiny infarct in the left anterior brock.   [place] [facility] [place].  Currently on Eliquis 5 mg PO BID.  Pt has baseline dysarthria from a previous CVA, right Hemiparesis,  and expressive aphasia.  At her usual baseline she is able to communicate in short sentences and she is able to nod appropriately to questions.   She is on a mechanical soft, thin consistency diet.                                 Major depressive disorder; decreased appetite.   On Sertraline.   Also followed by in house psych NP.  On Sertraline  125 mg PO every day.  Patient generally  has a good appetite, no recent weight loss reported.              Immobility; Generalized muscle weakness;  At  in long term care.  Not able to ambulate.  Very weak.  Bedbound/wheelchair bound.                                 Reviewed  hospital records from recent hospitalization.   Reviewed medical, social, surgical and family history.  Reviewed all current medications and performed medication reconciliation.  Reviewed vital signs AND recent blood work results.  Performed prescription drug management.   Reviewed [facility] Documentation  Discussed patient with nursing and paramedics.  Spent greater than 50 minutes on visit                  ROS: 10 point ROS performed; negative unless noted in HPI.       LABS:  BMP: Date: 5/1/2023 Na 137 K 4.3 Cr 1.1 BUN 15 glucose 141 Ca 8.8 GFR 52  CBC: Date: 5/1/2023 WBC 8.4 Hgb 11.1 hematocrit 33.9 platelets 337  Liver function: Date: 5/1/2023 ALT 10 AST 29 alkaline phos.  63 bilirubin 0.4 albumin 3.6 protein 7.7       Ha1c 8.1 % on 5/1/23           BMP: Date: 5/15/2023 Na 139  K 3.4 Cr 1.2 BUN 20 glucose 122 Ca 8.8 GFR 57        CBC: Date: 5/15/2023 WBC 9 Hgb 10.2 hematocrit 30.9 platelets 277       Liver function: 5/15/2023 ALT 8 AST 28 alkaline phos.  57 bilirubin 0.5 albumin 3.5 protein 7.3          BMP: Date: 5/22/2023 Na 141 K 3.1 Cr 1 BUN 11 glucose 227 Ca 8.7 GFR 70        CBC: Date: 5/22/2023 WBC 8.2 Hgb 10 hematocrit 30.5 platelets 372        Liver function: Date: 5/22/2023 ALT 8 AST 22 alkaline phos.  63 bilirubin 0.3 albumin 3.2 protein 7.2     BMP: Date: 6/5/2023 Na 140 K 4.2 Cr 1.1 BUN 14 glucose 79 Ca 1.1 GFR 63  CBC: Date: 6/5/2023 WBC 8.1 Hgb 10.5 hematocrit 32.3 platelets 350  Liver function: Date: 6/5/2023 ALT 7 AST 25 alkaline phos.  59 bilirubin 0.4 albumin 3.3 protein 7.7    BMP: Date: 7/17/2023 Na 140 K 4.1 Cr 0.8 BUN 17 glucose 126 Ca 9.1 GFR 90  CBC: Date: 7/17/2023 WBC 14.1 Hgb 10.2 hematocrit 32.1 platelets 265  Liver function: Date: 7/17/2023 ALT 8 AST 23 alkaline phos.  72 bilirubin 0.5 albumin 3.4 protein 7.4    CBC: Date: 7/19/2023 WBC 10.7 Hgb 9.5 hematocrit 28.8 platelets 257    BMP: Date: 7/24/2023 Na 137 K 4.2 Cr 0.9 BUN 19 glucose 186 Ca 9.1 GFR 79  CBC: Date: 7/24/2023 WBC 11.6 Hgb 10.3 hematocrit 31.4 platelets 305  Liver function: Date: 7/24/2023 ALT 11 AST 27 alkaline phos.  77 bilirubin 0.3 albumin 3.6 protein 7.8    BMP: Date: 7/31/2023 Na 141 K 4.2 Cr 0.8 BUN 17 glucose 138 Ca 8.7 GFR 90  CBC: Date: 7/31/2023 WBC 12.3 Hgb 10.5 hematocrit 32.4 platelets 307  Liver function: Date: 7/31/2023 ALT 14 AST 24 alkaline phos.  81 bilirubin 0.4 albumin 3.5 protein 7.4    BMP: Date: 8/7/2023 Na 141 K 3.9 Cr 1.0 BUN 12 glucose 167 Ca 8.6 GFR 70  CBC: Date: 8/7/2023 WBC 10 Hgb 10.5 hematocrit 32.7 platelets 256  Liver function: Date: 8/7/2023 ALT 9 AST 23 alkaline phos.  76 bilirubin 0.5 albumin 3.3 protein 7    BMP: Date: 8/21/2023 Na 142 K 4.1 Cr 1 BUN 17 glucose 195 Ca 8.5 GFR 70  CBC: Date: 8/21/2023 WBC 10.9 Hgb 9.6  hematocrit 30 platelets 304  Liver function: Date: 8/21/2023 ALT 9 AST 24 alkaline phos.  74 bilirubin 0.4 albumin 3.2 protein 7.1    BMP: Date: 8/31/2023 Na 141 K 3.8 Cr 0.9 BUN 14 glucose 118 Ca 8.2 GFR 79  CBC: Date: 8/31/2023 WBC 12 Hgb 9.6 hematocrit 29.8 platelets 294  Liver function: Date: 8/31/2023 ALT 8 AST 21 alkaline phos.  64 bilirubin 0.4 albumin 3.1 protein 7    BMP: Date: 9/4/2023 Na 141 K 3.9 Cr 1.1 BUN 17 glucose 107 Ca 8.5 GFR 63   CBC: Date: 9/4/2023 WBC 12 Hgb 9.5 hematocrit 29.7 platelets 282  Liver function: Date: 9/4/2023 ALT 7 AST 22 alkaline phos.  64 bilirubin 0.4 albumin 3.3 protein 7.1    BMP: Date: 9/11/2023 Na 142 K 4 Cr 1 BUN 12 glucose 81 Ca 8.2 GFR 70  CBC: Date: 9/11/2023 WBC 8.7 Hgb 9.2 hematocrit 28.3 platelets 305  Liver function: Date: 9/11/2023 ALT 7 AST 22 alkaline phos.  61 bilirubin 0.3 albumin 3 protein 6.8    BMP: Date: 9/18/2023 Na 140 K 4 Cr 1 BUN 15 glucose 150 Ca 8.3 GFR 70  CBC: Date: 9/18/2023 WBC 12.1 Hgb 9.5 hematocrit 29.8 platelets 287  Liver function: Date: 9/18/2023 ALT 7 AST 23 alkaline phos.  62 bilirubin 0.4 albumin 3.4 protein 7.1    10/17/23 Ha1c 8.3 %    BMP: Date: 10/23/2023 Na 141 K 3.6 Cr 1 BUN 22 glucose 65 Ca 8.3 GFR 70  CBC: Date: 10/23/2023 WBC 12.5 Hgb 10.8  hematocrit 33.2 platelets 290  Liver function: Date: 10/23/2023 ALT 9 AST 31 alkaline phos.  66 bilirubin 0.6 albumin 3.6 protein 7.8    10/26/2023: Lipid panel: Cholesterol 111; triglycerides 111; HDL 33; LDL 56; VLDL 22.    10/26/2023: TSH: 1.081 (range 0.340-5.500).    BMP: Date: 10/30/2023 Na 143  K 3.5 Cr 0.9 BUN 11 glucose 171 Ca 8 GFR 65  CBC: Date: 10/30/2023 WBC 9.2 Hgb 10.1 hematocrit 31.1 platelets 280  Liver function: Date: 10/30/2023 ALT 11 AST 27 alkaline phos.  66 bilirubin 0.4 albumin 3.4 protein 7.2    BMP: Date: 11/13/2023 Na 138 K 3.9 Cr 0.9 BUN 11 glucose 308 Ca 8.4 GFR 79  CBC: Date: 11/13/2023 WBC 8.2 Hgb 10.6 hematocrit 33 platelets 295  Liver function: Date: 11/13/2023  ALT 12 AST 37 alkaline phos.  69 bilirubin 0.4 albumin 3.2 protein 7.1    BMP: Date: 11/21/2023 Na 142 K 3.6 Cr 0.9 BUN 14 glucose 200 Ca 8.4 GFR 79.  CBC: Date: 11/21/2023 WBC 8.8 Hgb 10.1 hematocrit 30.4 platelets 290    BMP: Date: 11/27/2023 Na 138 K 3.4 Cr 1 BUN 12 glucose 54 Ca 8.7 GFR 70  CBC: Date: 11/27/2023 WBC 12.3 Hgb 10.6 hematocrit 32.4 platelets 307  Liver function: Date: 11/27/2023 ALT 7 AST 27 alkaline phos.  72 bilirubin 0.5 albumin 3.3 protein 7.7    BMP: Date: 12/4/2023 Na 141 K 4 Cr 1 BUN 12 glucose 209 Ca 8.5 GFR 70  CBC: Date: 12/4/2023 WBC 8.5 Hgb 10.6 hematocrit 32.7 platelets 326  Liver function: Date: 12/4/2023 ALT 8 AST 25 alkaline phos.  64 bilirubin 0.4 albumin 3.3 protein 7.1  12/4/2023 hemoglobin A1c 8.9%    BMP: Date: 12/25/2023 Na 140 K 3.9 Cr 0.9 BUN 17 glucose 172 Ca 8.4 GFR 79  CBC: Date: 12/25/2023 WBC 8.9 Hgb 9.9 hematocrit 30.7 platelets 272  Liver function: Date: 12/25/2023 ALT 8 AST 26 alkaline phos.  67 bilirubin 0.3 albumin 3.2 protein 7    BMP: Date: 12/26/2023 Na 142 K 4.2 Cr 0.9 BUN 15 glucose 168 Ca 8.6 GFR 79  CBC: Date: 12/26/2023 WBC 9.9 Hgb 10.5 hematocrit 32.9 platelets 302    BMP: Date: 1/15/2024 Na 139 K 3.9 Cr 1 BUN 14 glucose 206 Ca 8.5 GFR 70  CBC: Date: 1/15/2024 WBC 8.3 Hgb 9.8 hematocrit 29.8 platelets 244  Liver function: Date: 1/15/2024 ALT 7 AST 22 alkaline phos.  62 bilirubin 0.3 albumin 3.3 protein 6.8    1/16/2024: Lipid panel: Cholesterol 119; triglycerides 174; HDL 32; LDL 52; VLDL 35.    BMP: Date: 2/5/2024 Na 141 K 4.2 Cr 1 BUN 22 glucose 150 Ca 8.3 GFR 70  CBC: Date: 2/5/2024 WBC 10.9 Hgb 10.2 hematocrit 31.1 platelets 319  Liver function: Date: 2/5/2024 ALT 7 AST 29 alkaline phos.  77 bilirubin 0.6 albumin 3.4 protein 7.4    BMP: Date: 2/26/2024 Na 143 K 3.6 Cr 1.3 BUN 23 glucose 152 Ca 8.5 GFR 51  CBC: Date: 2/26/2024 WBC 8 Hgb 11.1 hematocrit 33.6 platelets 318  Liver function: Date: 2/26/2024 ALT 11 AST 48 alkaline phos.  67 bilirubin 0.5  albumin 3.5 protein 7.8    BMP: Date: 3/11/2024 Na 142 K 3.8 Cr 0.9 BUN 16 glucose 97 Ca 8.7 GFR 79  CBC: Date: 3/11/2024 WBC 9 Hgb 10.5 hematocrit 32.3 platelets 302  Liver function: Date: 3/11/2024  ALT 10 AST 39 alkaline phos.  64 bilirubin 0.4 albumin 3.1 protein 7.1    BMP: Date: 3/18/2024 Na 143 K 4.4 Cr 0.9 BUN 15 glucose 111 Ca 8.7 GFR 79  CBC: Date: 3/18/2024 WBC 9.1 Hgb 10.8 hematocrit 32.6 platelets 294  Liver function: Date: 3/18/2024 ALT 13 AST 49 alkaline phos.  68 bilirubin 0.6 albumin 3.2 protein 7.4    BMP: Date: 3/25/2024 Na 140 K 3.7 Cr 1 BUN 17 glucose 225 Ca 8.7 GFR 70  CBC: Date: 3/25/2024  WBC 8.6 Hgb 10.7 hematocrit 32 platelets 304  Liver function: Date: 3/25/2024 ALT 15 AST 56 alkaline phos.  53 bilirubin 0.5 albumin 3.3 protein 7.1    BMP: Date: 4/1/2024 Na 143 K 3.5 Cr 1 BUN 19 glucose 151 Ca 8.7 GFR 70  CBC: Date: 4/1/2024 WBC 8.4 Hgb 10.2 hematocrit 30.8 platelets 254  Liver function: Date: 4/1/2024 ALT 12 AST 32 alkaline phos.  56 bilirubin 0.4 albumin 3.3 protein 6.8                Past Medical History:   Diagnosis Date    Acute pulmonary embolism (Multi) 04/22/2024    Aphasia as late effect of cerebrovascular accident 04/25/2023    Essential hypertension 06/25/2010    Gastroesophageal reflux disease 03/10/2009    Hemiplegia of nondominant side, late effect of cerebrovascular disease (Multi) 09/03/2008    Hemorrhagic stroke (Multi) 06/25/2010    HIV infection (Multi) 02/21/2007    Hypothyroidism 10/10/2002    Formatting of this note might be different from the original.   S/p thyroidectomy 2002   Patholgy c/w Graves    Mixed hyperlipidemia 06/25/2010    Stage 3a chronic kidney disease (Multi) 04/22/2024    T2DM (type 2 diabetes mellitus) (Multi) 11/14/2012           Past Surgical History:   Procedure Laterality Date    CHOLECYSTECTOMY  01/08/2016    Cholecystectomy    COLON SURGERY  01/08/2016    Colon Surgery    CT ANGIO NECK  3/19/2019    CT NECK ANGIO W AND WO [place] CONTRAST 3[place]19[place]  [facility] [place]    CT HEAD ANGIO W AND WO IV CONTRAST  3/19/2019    CT HEAD ANGIO W AND WO IV CONTRAST 3/19/2019 [facility] [place]    HERNIA REPAIR  01/08/2016    Hernia Repair    HYSTERECTOMY  01/08/2016    Hysterectomy    MR HEAD ANGIO WO IV CONTRAST  6/4/2018    MR HEAD ANGIO WO IV CONTRAST 6/4/2018 [facility] CLINICAL LEGACY    MR HEAD ANGIO WO IV CONTRAST  7/11/2020    MR HEAD ANGIO WO IV CONTRAST 7/11/2020 [facility] CLINICAL LEGACY    MR HEAD ANGIO WO IV CONTRAST  9/30/2020    MR HEAD ANGIO WO IV CONTRAST 9/30/2020 [facility] LEGAC[facility]    MR HEAD ANGIO WO IV CONTRAST  5/22/2022    MR HEAD ANGIO WO IV CONTRAST 5/22/2022 [facility] CLINICAL LEGACY    MR HEAD ANGIO WO IV CONTRAST  4/19/2023    MR HEAD ANGIO WO IV CONTRAST [facility]    MR NECK ANGIO WO IV CONTRAST  6/4/2018    MR NECK ANGIO WO IV CONTRAST 6/4/2018 [facility] CLINICAL LEGACY    MR NECK ANGIO WO IV CONTRAST  7/11/2020    MR NECK ANGIO WO IV CONTRAST 7/11/2020 [facility] CLINICAL LEGACY    MR NECK ANGIO WO IV CONTRAST  9/30/2020    MR NECK ANGIO WO IV CONTRAST 9/30/2020 [facility]    MR NECK ANGIO WO IV CONTRAST  5/22/2022    MR NECK ANGIO WO IV CONTRAST 5/22/2022 [facility] CLINICAL LEGACY    MR NECK ANGIO WO IV CONTRAST  4/19/2023    MR NECK ANGIO WO IV CONTRAST [facility] MRI    OTHER SURGICAL HISTORY  01/08/2016    Tubal Stabilization    OTHER SURGICAL HISTORY  09/01/2016    Cervical Surgery (Gyn) Laser Vaporization Of Transformation Zone    THYROID SURGERY  09/27/2013    Thyroid Surgery            Surgical History  Problems    · History of Cervical Surgery (Gyn) Laser Vaporization Of Transformation Zone   · History of Cholecystectomy   · History of Colon Surgery   · History of Hernia Repair   · History of Hysterectomy   · History of Thyroid Surgery   · History of Tubal Stabilization     Family History  Mother    · Family history of Cancer  Family History    · Family history of Diabetes Mellitus (V18.0)   · Family history of Hypertension (V17.49)     Social History  Problems    ·  "Disabled   · Does not use illicit drugs (V49.89) (Z78.9)   · Former smoker (V15.82) (Z87.891)   · Marital History - Single   · No alcohol.   · Denied: History of Secondhand smoke exposure   · Single     Allergies  Medication    · Aspirin TABS   · Contrast Media Ready-Box MISC   · morphine   · Sulfa Drugs        /81   Pulse 76   Temp 36.4 °C (97.6 °F)   Resp 18   Ht 1.372 m (4' 6\")   Wt 93 kg (205 lb)   SpO2 96%   BMI 49.43 kg/m²      Physical Exam  Vitals and nursing note reviewed.   Constitutional:       General: She is awake.      Appearance: Normal appearance. She is ill-appearing.   HENT:      Head: Normocephalic.      Right Ear: External ear normal.      Left Ear: External ear normal.      Nose: Nose normal.      Mouth/Throat:      Mouth: Mucous membranes are moist.      Pharynx: Oropharynx is clear.   Eyes:      Extraocular Movements: Extraocular movements intact.      Conjunctiva/sclera:      Right eye: Right conjunctiva is injected. No exudate.     Left eye: Left conjunctiva is not injected. No exudate.     Pupils: Pupils are equal, round, and reactive to light.   Cardiovascular:      Rate and Rhythm: Normal rate and regular rhythm.      Pulses: Normal pulses.      Heart sounds: Normal heart sounds.   Pulmonary:      Effort: Pulmonary effort is normal.      Breath sounds: Normal breath sounds.   Abdominal:      General: Bowel sounds are normal.      Palpations: Abdomen is soft.   Musculoskeletal:      Cervical back: Normal range of motion and neck supple.      Comments: Generalized muscle weakness; immobility.    Right sided hemiparesis   Skin:     General: Skin is warm and dry.   Neurological:      Mental Status: She is alert. Mental status is at baseline.      Motor: Weakness present.      Coordination: Coordination abnormal.      Gait: Gait abnormal.      Comments: Right sided weakness--RLE 1/5; RUE 3/5; sensation intact to touch x 4 extremities.     Left sided facial droop.    Generalized " muscle weakness   Psychiatric:         Mood and Affect: Mood normal.         Behavior: Behavior normal. Behavior is cooperative.          Assessment/Plan   Ordered CMP and CBC with diff for tomorrow.     Encephalopathy:  Evaluated in the hospital.   -CBC: +leukocytosis, no acute anemia, no thrombocytopenia  -CMP: no acute electrolyte abnormalities, no acute renal or liver dysfunction appreciated   -CTH: no acute processes  -MRI/MRA: No acute processes.  No LVO or acute stenosis.   -UA: +leukest, no nitrites, 1-5 WBC --> which could suggest UTI, but patient denies urinary complaints (unclear if reliable historian),   Patient was given rocephin in the ER   Order CBC.  Monitor for fevers.  Encourage PO fluids.     Right eye bacterial conjunctivitis:  Continue moxifloxacin ophthalmic drops on 5/3/24.  Start doxycycline 100 mg PO BID for 10 days.    HIV:        Follow up with infectious disease.       Continue Descovy.    Hypothyroidism; Hx graves s/p thyroidectomy   Post surgical hypothyroid  TSH  was low, Free thyroxine high; consistent with over medicated hypothyroid  Levothyroxine was held.     Chronic systolic HF; Benign essential HTN :        Continue Amlodipine and hydrochlorothiazide.         Monitor oxygen sats.         Monitor Bps.                     Esophageal dysphagia:      Patient was evaluated by speech therapy and had a modified barium swallow performed in the hospital.      Recommendations as follows:   1.PUREE DIET WITH HONEY THICKENED LIQUIDS  2. UPRIGHT AT 90 DEGREES FOR ALL PO  3. SLOW RATE AND SMALL BOLUS SIZE  4. LIQUIDS VIA TSP ONLY  5 MEDICATION CRUSHED IN PUREE CARRIER   Follow up with in house speech therapy.              DM type 2 with hyperglycemia; diabetic polyneuropathy:        Patient is frequently noncompliant with diet.       Continue Lantus 43 units at bedtime and Humalog sliding scale.       Monitor accuchecks.       Monitor Ha1c Q 3 months.           Sequela of Cerebrovascular  accident; Hx CVA with right-sided hemiparesis; right sided weakness;   dysphagia 2/2 cva; expressive aphasia; History of acute/subacute stroke:   MRI was performed during a PREVIOUS hospitalization and it showed Acute/subacute tiny infarct in the left anterior brock.   [place]. Jiang [place] [facility] [place] at that time.  Continue Eliquis 5 mg PO BID.  Pt has baseline dysarthria from a previous CVA, right Hemiparesis,  and expressive aphasia.  At her usual baseline she is able to communicate in short sentences and she is able to nod appropriately to questions.   She is on a mechanical soft, thin consistency diet.                Major depressive disorder; decreased appetite.   Continue Sertraline.  Start Remeron 15 mg PO at bedtime.            Immobility; Generalized muscle weakness;  Continue at   in long term care.  Not able to ambulate.  Very weak.  Bedbound/wheelchair bound.       Problem List Items Addressed This Visit          Cardiac and Vasculature    Chronic systolic heart failure (Multi) (Chronic)       Endocrine/Metabolic    Hypothyroidism (Chronic)    T2DM (type 2 diabetes mellitus) (Multi) (Chronic)       Gastrointestinal and Abdominal    Esophageal dysphagia (Chronic)       Infectious Diseases    HIV infection (Multi) (Chronic)       Neuro    Diabetic polyneuropathy (Multi) (Chronic)    Encephalopathy - Primary     Other Visit Diagnoses       Bacterial conjunctivitis of right eye        Benign essential HTN        Sequela of cerebrovascular accident        Hemiparesis affecting right side as late effect of stroke (Multi)        Expressive aphasia        Major depressive disorder with current active episode, unspecified depression episode severity, unspecified whether recurrent        Noncompliance with dietary restriction        Immobility        Generalized muscle weakness                    Maria Del Carmen Garduno, APRN-CNP

## 2024-05-09 NOTE — ED PROVIDER NOTES
History/Exam limitations: due to condition.   Additional history was obtained from EMS personnel and past medical records.    HPI:    Janette Martinez is a 53 y.o. female PMH HFrEF EF 45%, hypertension, hyperlipidemia, diabetes, CKD 3, grade status post thyroidectomy, hypothyroidism postsurgical, HIV, CVA with right-sided hemiparesis and aphasia presenting with altered mentation.  Per facility patient was last seen well at 9 AM.  She reportedly was found this afternoon with decreased mental status and no longer speaking.  They state that she is baseline alert and oriented and speaks.  Patient is nonverbal on our assessment.  Fatigued en route, tachycardic to 130, stable blood pressure.  Glucose 204.  Patient shakes her head no when asked about potential pain.  Reportedly sound baseline low-flow nasal cannula oxygen.  Per chart the patient is on Eliquis.    Worsening left-sided weakness, able to perform some  strength and plantarflexion dorsiflexion of the foot, no antigravity, nonverbal, some movement of left face, right facial droop present, appears to have some left facial weakness    Has contrast allergy, angio imaging deferred given this, relatively high suspicion for underlying infectious process      Last Known Well Time: 0900 5/9/2024      Physical Exam:  ED Triage Vitals   Temp Pulse Resp BP   -- -- -- --      SpO2 Temp src Heart Rate Source Patient Position   -- -- -- --      BP Location FiO2 (%)     -- --        GEN:      Alert, fatigued appearing  Eyes:       PERRL, lower right cornea slightly opacified, EOMI  HENT:      NC/AT, OP clear, airway patent, MM  CV:      Tachycardic rate, RR, no MRG, no LE pitting edema, 2+ radial and pedal pulses  PULM:     Rhonchorous breath sounds throughout bilateral lung fields, symmetric chest rise  ABD:      Soft, NT, ND, no masses, BS +  :       No CVA TTP  NEURO:   See NIHSS below    MSK:      FROM, no joint deformities or swelling, no e/o trauma  SKIN:       Warm  and dry  PSYCH:    Appropriate mood and affect        Interval: Baseline  Time: 11:17 AM  Person Administering Scale: Gideon Stubbs MD     1a  Level of consciousness: 0=alert; keenly responsive   1b. LOC questions:  2=Performs neither task correctly   1c. LOC commands: 0=Performs both tasks correctly   2.  Best Gaze: 0=normal   3. Visual: 0=No visual loss   4. Facial Palsy: 2=Partial paralysis (total or near total paralysis of the lower face)   5a. Motor left arm: 3=No effort against gravity, limb falls   5b.  Motor right arm: 4=No movement   6a. motor left leg: 3=No effort against gravity, limb falls   6b  Motor right le=No movement   7. Limb Ataxia: 0=Absent   8.  Sensory: 0=Normal; no sensory loss   9. Best Language:  3=Mute, global aphasia; no usable speech or auditory comprehension   10. Dysarthria: 2=Severe; patient speech is so slurred as to be unintelligible in the absence of or our of proportion to any dysphagia, or is mute/anarthric   11. Extinction and Inattention: 0=No abnormality   12. Distal motor function: 0=Normal     Total:   23         VAN: VAN: Positive          MDM/ED Course:   Janette Martinez is a 53 y.o. female PMH HFrEF EF 45%, hypertension, hyperlipidemia, diabetes, CKD 3, grade status post thyroidectomy, hypothyroidism postsurgical, HIV, CVA with right-sided hemiparesis and aphasia presenting with altered mentation.  Vitals markable for tachycardia to 130s.  Exam as documented above.  Stroke alert was initially activated given concerns for worsening aphasia, left-sided deficits.  Van positive however has baseline aphasia per prior notes, has worsening weakness in the left side, history of rales in the right side, possibility of CVA causing worsening left eye deficits however higher suspicion for underlying infectious etiology or other cause of AMS based on tachycardia.  Vessel imaging was deferred given patient's contrast allergy, CT head with no acute intracranial hemorrhage or  other acute findings.  Differential for altered mental status is extensive but includes intracranial pathology or bleed, ACS, seizure disorder in post-ictal state, infectious/inflammatory etiology, electrolyte abnormality, significant blood count anomaly most likely in the setting of significant anemia, thyroid storm or significant hypothyroidism, symptomatic arrhythmias, intoxication, ethanol, salicylate, and acetaminophen overdose, and coagulopathy.  Patient is not a TNK candidate given DOAC use.  Based on allergies, patient Isabell broad-spectrum antibiotics, vancomycin/cefepime/Flagyl.  Has a HFrEF history.  Initially 5 and cc IV fluids given.  CT chest abdomen pelvis with concerns of multifocal pneumonia, flat IVC consistent with volume depletion.  Recent echo as below with EF 60 to 65%, appears dry on exam, 2 additional liters IV fluids to be given.  Antibiotics broadened with azithromycin given concerns for multifocal pneumonia, possibly aspiration component.  Chemistry with creatinine 1.29 from normal baseline, BUN 24 consistent with YESSICA, suspect dehydration.  .  Lactate 1.0.  TSH is low at 0.18, free thyroxine normal.  Ammonia normal.  CBC with leukocytosis to 33.6 from last 11.8, hemoglobin platelets reassuring.  Urinalysis concerning for possible UTI.  Patient remained responsive to voice and nods yes and no to some questions, relatively fatigued appearing.  DNR/DNI.  Given continued fatigue and concerns for respiratory pathology, venous blood gas ordered.  Was notified by nursing staff that there was an issue with the order coming through on there and given a nonfocal panel have been ordered, changing to full panel and performing. Patient also received rectal Tylenol for temp of 100.0 F.  Loose stools present, marked leukocytosis, concern for possibility of C. difficile, stool PCR/C. difficile ordered.  Patient care was signed out to Dr. Ward for continued management, heart rate markedly improving  to low 100s from baseline 130s.  Troponin elevated in the 50s and 60s however stable.  Patient care signed out to my oncoming colleague at shift change pending venous blood gas results.    EKG reviewed by me: 12:16 PM sinus tachycardia, rate 129, normal axis, normal intervals, T wave inversion in lead I, aVL, borderline ST depressions inferiorly, no STEMI.    EKG reviewed by me: 3:28 PM sinus tachycardia, rate 105, diffuse T wave flattening, no STEMI, no ectopy, reassuring intervals, similar to prior with no further lateral T wave inversions.  *KUB was ordered in error (meant for another patient) no NG placed. Radiology dept notified.    ED Course as of 05/13/24 1117   Thu May 09, 2024   1336 Prior note displayed HFrEF 45% EF, recent echo from 4/23 displays EF 60 to 65%, indeterminate diastolic filling.  Given his recent echo and flat IVC on imaging, giving additional IV fluids. [JM]   1522 Loose stools present, given recent biotics, recent hospitalization, concern for C. difficile, potential cause of markedly elevated leukocytosis.  Blood pressure improved to 113/62.  Heart rate improved to 103 from 135.  Satting well on low-flow nasal cannula. [JM]   1715 Venous blood gas noted to have pH 7.24, pCO2 60 concerning for an acute respiratory acidosis and metabolic acidosis.  I evaluated the patient.  She is lethargic, coughing, not a candidate for BiPAP.  I spoke with the sister/emergency contact Josh Martinez who wants to maintain the DNR CCA status as well as no intubation, no noninvasive ventilation while this altered, no ICU level of care.  She would like us to make her comfortable and treat other reversible causes.  CODE STATUS updated to DNR/DNI/no ICU.  Patient would likely benefit from hospice initiation.  Dr. Ward updated. Will continue all other supportive measures at this time.  [CG]      ED Course User Index  [CG] Cassidy Hoffman MD  [JM] Gideon Stubbs MD         Diagnoses as of 05/13/24 1117    Acute respiratory failure with hypercapnia (Multi)         Chronic medical conditions affecting care listed in MDM. I independently reviewed imaging studies and agreed with radiology reads. I reviewed recent and relevant outside records including PCP notes, Prior discharge summaries, and prior radiology reports.      IV Thrombolysis:    No Thrombolysis contraindication reason: Working diagnosis is NOT a suspected ischemic stroke and Patient receiving direct thrombin inhibitors or direct Factor Xa inhibitors      Procedure  Critical Care    Performed by: Gideon Stubbs MD  Authorized by: Cassidy Hoffman MD    Critical care provider statement:     Critical care time (minutes):  40    Critical care time was exclusive of:  Separately billable procedures and treating other patients    Critical care was necessary to treat or prevent imminent or life-threatening deterioration of the following conditions:  Sepsis    Critical care was time spent personally by me on the following activities:  Development of treatment plan with patient or surrogate, discussions with primary provider, evaluation of patient's response to treatment, examination of patient, ordering and performing treatments and interventions, ordering and review of laboratory studies, ordering and review of radiographic studies, pulse oximetry, re-evaluation of patient's condition and review of old charts    Care discussed with: admitting provider         Diagnosis:   1.  Sepsis  2.  Altered mental status  3.  Pneumonia    Dispo: Hospitalized in stable condition      Disclaimer: Portions of this note were dictated by speech recognition. An attempt at proof reading was made to minimize errors. Minor errors in transcription may be present.  Please call if questions.     Gideon Stubbs MD  05/13/24 5799

## 2024-05-09 NOTE — ED TRIAGE NOTES
Pt to ED from [facility] for AMS with a LNW of 0900. EMS reports pt is verbal at baseline but is currently non-verbal and unable to follow commands. Pt takes Eliquis and had a stroke 2 weeks ago affecting her right side. Pt wears 4L of oxygen via n/c at baseline. EMS reports blood sugar 205.

## 2024-05-09 NOTE — PROGRESS NOTES
For full history, physical exam, and prior hospital course, please see previous ED provider note. This is in addition to the primary record.    Patient received in sign out from prior provider team pending transfer to stepdown unit.    ED Course as of 05/09/24 1720   Thu May 09, 2024   1336 Prior note displayed HFrEF 45% EF, recent echo from 4/23 displays EF 60 to 65%, indeterminate diastolic filling.  Given his recent echo and flat IVC on imaging, giving additional IV fluids. [JM]   1522 Loose stools present, given recent biotics, recent hospitalization, concern for C. difficile, potential cause of markedly elevated leukocytosis.  Blood pressure improved to 113/62.  Heart rate improved to 103 from 135.  Satting well on low-flow nasal cannula. [JM]   1715 Venous blood gas noted to have pH 7.24, pCO2 60 concerning for an acute respiratory acidosis and metabolic acidosis.  I evaluated the patient.  She is lethargic, coughing, not a candidate for BiPAP.  I spoke with the sister/emergency contact Josh Martinez who wants to maintain the DNR CCA status as well as no intubation, no ICU level of care.  She would like us to make her comfortable and treat other reversible causes.  CODE STATUS updated to DNR/DNI/no ICU.  Patient may benefit from hospice initiation.  Dr. Ward updated. Will continue all other supportive measures at this time.  [CG]      ED Course User Index  [CG] Cassidy Hoffman MD  [JM] Gideon Stubbs MD         Diagnoses as of 05/09/24 1720   Acute respiratory failure with hypercapnia (Multi)        Cassidy Hoffman MD  EM/IM/Peds    This note was dictated by speech recognition. Minor errors in transcription may be present.

## 2024-05-09 NOTE — ED NOTES
Dr. Stubbs to ambulance bay to assess patient. Stroke Alert called at 1147.     Kalyn Samuel RN  05/09/24 0703

## 2024-05-10 ENCOUNTER — APPOINTMENT (OUTPATIENT)
Dept: RADIOLOGY | Facility: HOSPITAL | Age: 54
DRG: 177 | End: 2024-05-10
Payer: MEDICARE

## 2024-05-10 LAB
ALBUMIN SERPL BCP-MCNC: 2.9 G/DL (ref 3.4–5)
ALP SERPL-CCNC: 104 U/L (ref 33–110)
ALT SERPL W P-5'-P-CCNC: 15 U/L (ref 7–45)
ANION GAP SERPL CALC-SCNC: 13 MMOL/L (ref 10–20)
AST SERPL W P-5'-P-CCNC: 44 U/L (ref 9–39)
ATRIAL RATE: 129 BPM
BILIRUB SERPL-MCNC: 0.5 MG/DL (ref 0–1.2)
BUN SERPL-MCNC: 24 MG/DL (ref 6–23)
C COLI+JEJ+UPSA DNA STL QL NAA+PROBE: NOT DETECTED
C DIF TOX TCDA+TCDB STL QL NAA+PROBE: NOT DETECTED
CALCIUM SERPL-MCNC: 7.8 MG/DL (ref 8.6–10.3)
CHLORIDE SERPL-SCNC: 108 MMOL/L (ref 98–107)
CO2 SERPL-SCNC: 26 MMOL/L (ref 21–32)
CREAT SERPL-MCNC: 1.13 MG/DL (ref 0.5–1.05)
EC STX1 GENE STL QL NAA+PROBE: NOT DETECTED
EC STX2 GENE STL QL NAA+PROBE: NOT DETECTED
EGFRCR SERPLBLD CKD-EPI 2021: 58 ML/MIN/1.73M*2
ERYTHROCYTE [DISTWIDTH] IN BLOOD BY AUTOMATED COUNT: 17.3 % (ref 11.5–14.5)
GLUCOSE BLD MANUAL STRIP-MCNC: 106 MG/DL (ref 74–99)
GLUCOSE BLD MANUAL STRIP-MCNC: 109 MG/DL (ref 74–99)
GLUCOSE BLD MANUAL STRIP-MCNC: 151 MG/DL (ref 74–99)
GLUCOSE BLD MANUAL STRIP-MCNC: 69 MG/DL (ref 74–99)
GLUCOSE SERPL-MCNC: 175 MG/DL (ref 74–99)
HCT VFR BLD AUTO: 29.5 % (ref 36–46)
HGB BLD-MCNC: 9.2 G/DL (ref 12–16)
HOLD SPECIMEN: NORMAL
MCH RBC QN AUTO: 30.9 PG (ref 26–34)
MCHC RBC AUTO-ENTMCNC: 31.2 G/DL (ref 32–36)
MCV RBC AUTO: 99 FL (ref 80–100)
MRSA DNA SPEC QL NAA+PROBE: DETECTED
NOROVIRUS GI + GII RNA STL NAA+PROBE: NOT DETECTED
NRBC BLD-RTO: 0.1 /100 WBCS (ref 0–0)
P AXIS: 65 DEGREES
P OFFSET: 205 MS
P ONSET: 149 MS
PLATELET # BLD AUTO: 249 X10*3/UL (ref 150–450)
POTASSIUM SERPL-SCNC: 3.7 MMOL/L (ref 3.5–5.3)
PR INTERVAL: 144 MS
PROT SERPL-MCNC: 7.2 G/DL (ref 6.4–8.2)
Q ONSET: 221 MS
QRS COUNT: 22 BEATS
QRS DURATION: 74 MS
QT INTERVAL: 296 MS
QTC CALCULATION(BAZETT): 433 MS
QTC FREDERICIA: 382 MS
R AXIS: 2 DEGREES
RBC # BLD AUTO: 2.98 X10*6/UL (ref 4–5.2)
RV RNA STL NAA+PROBE: NOT DETECTED
SALMONELLA DNA STL QL NAA+PROBE: NOT DETECTED
SHIGELLA DNA SPEC QL NAA+PROBE: NOT DETECTED
SODIUM SERPL-SCNC: 143 MMOL/L (ref 136–145)
T AXIS: 171 DEGREES
T OFFSET: 369 MS
V CHOLERAE DNA STL QL NAA+PROBE: NOT DETECTED
VENTRICULAR RATE: 129 BPM
WBC # BLD AUTO: 27.3 X10*3/UL (ref 4.4–11.3)
Y ENTEROCOL DNA STL QL NAA+PROBE: NOT DETECTED

## 2024-05-10 PROCEDURE — 36415 COLL VENOUS BLD VENIPUNCTURE: CPT | Performed by: INTERNAL MEDICINE

## 2024-05-10 PROCEDURE — 2500000005 HC RX 250 GENERAL PHARMACY W/O HCPCS: Performed by: INTERNAL MEDICINE

## 2024-05-10 PROCEDURE — 2060000001 HC INTERMEDIATE ICU ROOM DAILY

## 2024-05-10 PROCEDURE — 2500000002 HC RX 250 W HCPCS SELF ADMINISTERED DRUGS (ALT 637 FOR MEDICARE OP, ALT 636 FOR OP/ED): Mod: MUE | Performed by: INTERNAL MEDICINE

## 2024-05-10 PROCEDURE — 82947 ASSAY GLUCOSE BLOOD QUANT: CPT

## 2024-05-10 PROCEDURE — 71045 X-RAY EXAM CHEST 1 VIEW: CPT

## 2024-05-10 PROCEDURE — 84075 ASSAY ALKALINE PHOSPHATASE: CPT | Performed by: INTERNAL MEDICINE

## 2024-05-10 PROCEDURE — 92610 EVALUATE SWALLOWING FUNCTION: CPT | Mod: GN

## 2024-05-10 PROCEDURE — 31720 CLEARANCE OF AIRWAYS: CPT

## 2024-05-10 PROCEDURE — 2500000004 HC RX 250 GENERAL PHARMACY W/ HCPCS (ALT 636 FOR OP/ED): Performed by: INTERNAL MEDICINE

## 2024-05-10 PROCEDURE — 2500000004 HC RX 250 GENERAL PHARMACY W/ HCPCS (ALT 636 FOR OP/ED)

## 2024-05-10 PROCEDURE — 85027 COMPLETE CBC AUTOMATED: CPT | Performed by: INTERNAL MEDICINE

## 2024-05-10 PROCEDURE — 71045 X-RAY EXAM CHEST 1 VIEW: CPT | Performed by: RADIOLOGY

## 2024-05-10 PROCEDURE — 94640 AIRWAY INHALATION TREATMENT: CPT

## 2024-05-10 PROCEDURE — 2500000001 HC RX 250 WO HCPCS SELF ADMINISTERED DRUGS (ALT 637 FOR MEDICARE OP): Performed by: INTERNAL MEDICINE

## 2024-05-10 PROCEDURE — 2500000004 HC RX 250 GENERAL PHARMACY W/ HCPCS (ALT 636 FOR OP/ED): Performed by: PEDIATRICS

## 2024-05-10 PROCEDURE — 87641 MR-STAPH DNA AMP PROBE: CPT | Performed by: INTERNAL MEDICINE

## 2024-05-10 RX ORDER — BACLOFEN 10 MG/1
10 TABLET ORAL 2 TIMES DAILY
Status: DISCONTINUED | OUTPATIENT
Start: 2024-05-10 | End: 2024-05-23 | Stop reason: HOSPADM

## 2024-05-10 RX ORDER — AMLODIPINE BESYLATE 5 MG/1
5 TABLET ORAL DAILY
Status: DISCONTINUED | OUTPATIENT
Start: 2024-05-11 | End: 2024-05-23 | Stop reason: HOSPADM

## 2024-05-10 RX ORDER — OXYCODONE HYDROCHLORIDE 5 MG/1
5 TABLET ORAL EVERY 6 HOURS PRN
Status: DISCONTINUED | OUTPATIENT
Start: 2024-05-10 | End: 2024-05-23 | Stop reason: HOSPADM

## 2024-05-10 RX ORDER — ATORVASTATIN CALCIUM 10 MG/1
10 TABLET, FILM COATED ORAL DAILY
Status: DISCONTINUED | OUTPATIENT
Start: 2024-05-10 | End: 2024-05-17

## 2024-05-10 RX ORDER — VANCOMYCIN HYDROCHLORIDE 1 G/20ML
INJECTION, POWDER, LYOPHILIZED, FOR SOLUTION INTRAVENOUS DAILY PRN
Status: DISCONTINUED | OUTPATIENT
Start: 2024-05-10 | End: 2024-05-19

## 2024-05-10 RX ORDER — POTASSIUM CHLORIDE 1.5 G/1.58G
20 POWDER, FOR SOLUTION ORAL DAILY
Status: DISCONTINUED | OUTPATIENT
Start: 2024-05-11 | End: 2024-05-23 | Stop reason: HOSPADM

## 2024-05-10 RX ORDER — SERTRALINE HYDROCHLORIDE 50 MG/1
200 TABLET, FILM COATED ORAL DAILY
Status: DISCONTINUED | OUTPATIENT
Start: 2024-05-11 | End: 2024-05-23 | Stop reason: HOSPADM

## 2024-05-10 RX ORDER — LEVOFLOXACIN 5 MG/ML
750 INJECTION, SOLUTION INTRAVENOUS EVERY 24 HOURS
Status: DISCONTINUED | OUTPATIENT
Start: 2024-05-10 | End: 2024-05-13

## 2024-05-10 RX ORDER — POTASSIUM CHLORIDE 750 MG/1
20 TABLET, FILM COATED, EXTENDED RELEASE ORAL DAILY
Status: DISCONTINUED | OUTPATIENT
Start: 2024-05-10 | End: 2024-05-10

## 2024-05-10 RX ORDER — IPRATROPIUM BROMIDE AND ALBUTEROL SULFATE 2.5; .5 MG/3ML; MG/3ML
3 SOLUTION RESPIRATORY (INHALATION) EVERY 2 HOUR PRN
Status: DISCONTINUED | OUTPATIENT
Start: 2024-05-10 | End: 2024-05-23 | Stop reason: HOSPADM

## 2024-05-10 RX ORDER — FUROSEMIDE 20 MG/1
20 TABLET ORAL DAILY
Status: DISCONTINUED | OUTPATIENT
Start: 2024-05-11 | End: 2024-05-23 | Stop reason: HOSPADM

## 2024-05-10 RX ORDER — LAMOTRIGINE 25 MG/1
25 TABLET ORAL EVERY EVENING
Status: DISCONTINUED | OUTPATIENT
Start: 2024-05-10 | End: 2024-05-23 | Stop reason: HOSPADM

## 2024-05-10 RX ADMIN — IPRATROPIUM BROMIDE AND ALBUTEROL SULFATE 3 ML: 2.5; .5 SOLUTION RESPIRATORY (INHALATION) at 07:16

## 2024-05-10 RX ADMIN — Medication 4 L/MIN: at 00:30

## 2024-05-10 RX ADMIN — IPRATROPIUM BROMIDE AND ALBUTEROL SULFATE 3 ML: 2.5; .5 SOLUTION RESPIRATORY (INHALATION) at 00:21

## 2024-05-10 RX ADMIN — DEXTROSE MONOHYDRATE 12.5 G: 25 INJECTION, SOLUTION INTRAVENOUS at 23:40

## 2024-05-10 RX ADMIN — BACLOFEN 10 MG: 10 TABLET ORAL at 23:42

## 2024-05-10 RX ADMIN — Medication 4 L/MIN: at 07:16

## 2024-05-10 RX ADMIN — Medication 4 L/MIN: at 20:00

## 2024-05-10 RX ADMIN — ATORVASTATIN CALCIUM 10 MG: 10 TABLET, FILM COATED ORAL at 23:41

## 2024-05-10 RX ADMIN — INSULIN LISPRO 2 UNITS: 100 INJECTION, SOLUTION INTRAVENOUS; SUBCUTANEOUS at 08:20

## 2024-05-10 RX ADMIN — OXYCODONE HYDROCHLORIDE 5 MG: 5 TABLET ORAL at 23:42

## 2024-05-10 RX ADMIN — VANCOMYCIN HYDROCHLORIDE 1500 MG: 1.5 INJECTION, POWDER, LYOPHILIZED, FOR SOLUTION INTRAVENOUS at 23:52

## 2024-05-10 RX ADMIN — DOLUTEGRAVIR SODIUM 50 MG: 50 TABLET, FILM COATED ORAL at 23:41

## 2024-05-10 RX ADMIN — APIXABAN 5 MG: 5 TABLET, FILM COATED ORAL at 23:42

## 2024-05-10 RX ADMIN — IPRATROPIUM BROMIDE AND ALBUTEROL SULFATE 3 ML: 2.5; .5 SOLUTION RESPIRATORY (INHALATION) at 13:22

## 2024-05-10 RX ADMIN — LEVOFLOXACIN 750 MG: 750 INJECTION, SOLUTION INTRAVENOUS at 00:59

## 2024-05-10 RX ADMIN — IPRATROPIUM BROMIDE AND ALBUTEROL SULFATE 3 ML: 2.5; .5 SOLUTION RESPIRATORY (INHALATION) at 20:03

## 2024-05-10 RX ADMIN — LAMOTRIGINE 25 MG: 25 TABLET ORAL at 23:42

## 2024-05-10 RX ADMIN — Medication 4 L/MIN: at 08:00

## 2024-05-10 RX ADMIN — HEPARIN SODIUM 5000 UNITS: 5000 INJECTION INTRAVENOUS; SUBCUTANEOUS at 08:20

## 2024-05-10 RX ADMIN — SODIUM CHLORIDE 75 ML/HR: 9 INJECTION, SOLUTION INTRAVENOUS at 00:48

## 2024-05-10 RX ADMIN — HEPARIN SODIUM 5000 UNITS: 5000 INJECTION INTRAVENOUS; SUBCUTANEOUS at 00:48

## 2024-05-10 SDOH — SOCIAL STABILITY: SOCIAL INSECURITY: HAVE YOU HAD ANY THOUGHTS OF HARMING ANYONE ELSE?: UNABLE TO ASSESS

## 2024-05-10 SDOH — SOCIAL STABILITY: SOCIAL INSECURITY: ARE YOU OR HAVE YOU BEEN THREATENED OR ABUSED PHYSICALLY, EMOTIONALLY, OR SEXUALLY BY ANYONE?: UNABLE TO ASSESS

## 2024-05-10 SDOH — SOCIAL STABILITY: SOCIAL INSECURITY: DOES ANYONE TRY TO KEEP YOU FROM HAVING/CONTACTING OTHER FRIENDS OR DOING THINGS OUTSIDE YOUR HOME?: UNABLE TO ASSESS

## 2024-05-10 SDOH — SOCIAL STABILITY: SOCIAL INSECURITY: DO YOU FEEL UNSAFE GOING BACK TO THE PLACE WHERE YOU ARE LIVING?: UNABLE TO ASSESS

## 2024-05-10 SDOH — SOCIAL STABILITY: SOCIAL INSECURITY: HAS ANYONE EVER THREATENED TO HURT YOUR FAMILY OR YOUR PETS?: UNABLE TO ASSESS

## 2024-05-10 SDOH — SOCIAL STABILITY: SOCIAL INSECURITY: DO YOU FEEL ANYONE HAS EXPLOITED OR TAKEN ADVANTAGE OF YOU FINANCIALLY OR OF YOUR PERSONAL PROPERTY?: UNABLE TO ASSESS

## 2024-05-10 SDOH — SOCIAL STABILITY: SOCIAL INSECURITY: HAVE YOU HAD THOUGHTS OF HARMING ANYONE ELSE?: UNABLE TO ASSESS

## 2024-05-10 SDOH — SOCIAL STABILITY: SOCIAL INSECURITY: ABUSE: ADULT

## 2024-05-10 SDOH — SOCIAL STABILITY: SOCIAL INSECURITY: WERE YOU ABLE TO COMPLETE ALL THE BEHAVIORAL HEALTH SCREENINGS?: NO

## 2024-05-10 SDOH — SOCIAL STABILITY: SOCIAL INSECURITY: ARE THERE ANY APPARENT SIGNS OF INJURIES/BEHAVIORS THAT COULD BE RELATED TO ABUSE/NEGLECT?: UNABLE TO ASSESS

## 2024-05-10 ASSESSMENT — PAIN - FUNCTIONAL ASSESSMENT
PAIN_FUNCTIONAL_ASSESSMENT: 0-10

## 2024-05-10 ASSESSMENT — COGNITIVE AND FUNCTIONAL STATUS - GENERAL
HELP NEEDED FOR BATHING: TOTAL
WALKING IN HOSPITAL ROOM: TOTAL
PATIENT BASELINE BEDBOUND: YES
PERSONAL GROOMING: TOTAL
MOBILITY SCORE: 6
EATING MEALS: TOTAL
STANDING UP FROM CHAIR USING ARMS: TOTAL
CLIMB 3 TO 5 STEPS WITH RAILING: TOTAL
STANDING UP FROM CHAIR USING ARMS: TOTAL
TOILETING: TOTAL
MOVING FROM LYING ON BACK TO SITTING ON SIDE OF FLAT BED WITH BEDRAILS: TOTAL
HELP NEEDED FOR BATHING: TOTAL
DRESSING REGULAR UPPER BODY CLOTHING: TOTAL
TURNING FROM BACK TO SIDE WHILE IN FLAT BAD: TOTAL
DRESSING REGULAR UPPER BODY CLOTHING: TOTAL
TURNING FROM BACK TO SIDE WHILE IN FLAT BAD: TOTAL
DRESSING REGULAR LOWER BODY CLOTHING: TOTAL
DAILY ACTIVITIY SCORE: 6
MOVING TO AND FROM BED TO CHAIR: TOTAL
MOBILITY SCORE: 6
EATING MEALS: TOTAL
DRESSING REGULAR LOWER BODY CLOTHING: TOTAL
WALKING IN HOSPITAL ROOM: TOTAL
MOVING FROM LYING ON BACK TO SITTING ON SIDE OF FLAT BED WITH BEDRAILS: TOTAL
MOVING TO AND FROM BED TO CHAIR: TOTAL
PERSONAL GROOMING: TOTAL
TOILETING: TOTAL
CLIMB 3 TO 5 STEPS WITH RAILING: TOTAL
DAILY ACTIVITIY SCORE: 6

## 2024-05-10 ASSESSMENT — ACTIVITIES OF DAILY LIVING (ADL)
HEARING - LEFT EAR: UNABLE TO ASSESS
TOILETING: DEPENDENT
JUDGMENT_ADEQUATE_SAFELY_COMPLETE_DAILY_ACTIVITIES: UNABLE TO ASSESS
PATIENT'S MEMORY ADEQUATE TO SAFELY COMPLETE DAILY ACTIVITIES?: UNABLE TO ASSESS
HEARING - RIGHT EAR: UNABLE TO ASSESS
FEEDING YOURSELF: DEPENDENT
GROOMING: DEPENDENT
LACK_OF_TRANSPORTATION: PATIENT UNABLE TO ANSWER
WALKS IN HOME: DEPENDENT
ADEQUATE_TO_COMPLETE_ADL: UNABLE TO ASSESS
DRESSING YOURSELF: DEPENDENT
BATHING: DEPENDENT

## 2024-05-10 ASSESSMENT — PAIN SCALES - GENERAL
PAINLEVEL_OUTOF10: 0 - NO PAIN
PAINLEVEL_OUTOF10: 5 - MODERATE PAIN
PAINLEVEL_OUTOF10: 0 - NO PAIN
PAINLEVEL_OUTOF10: 7
PAINLEVEL_OUTOF10: 0 - NO PAIN

## 2024-05-10 ASSESSMENT — LIFESTYLE VARIABLES
SKIP TO QUESTIONS 9-10: 0
AUDIT-C TOTAL SCORE: -1
HOW OFTEN DO YOU HAVE 6 OR MORE DRINKS ON ONE OCCASION: PATIENT UNABLE TO ANSWER
AUDIT-C TOTAL SCORE: -1
HOW MANY STANDARD DRINKS CONTAINING ALCOHOL DO YOU HAVE ON A TYPICAL DAY: PATIENT UNABLE TO ANSWER
HOW OFTEN DO YOU HAVE A DRINK CONTAINING ALCOHOL: PATIENT UNABLE TO ANSWER

## 2024-05-10 ASSESSMENT — PAIN DESCRIPTION - LOCATION: LOCATION: GENERALIZED

## 2024-05-10 NOTE — PROGRESS NOTES
05/10/24 1149   Discharge Planning   Patient expects to be discharged to: Re: [facility]?     Called patient's sister to discuss discharge plan. Patient is from [facility] and is interested in new facility. Sister notified that we can start process here, but if patient is medically stable they will need to finish transfer process from [facility]. Sister agreeable to plan. Sister will be at bedside this afternoon, SW will provide LTC list.    1628: Sister unable to make it to hospital today. Requested that ICF referrals be placed 1. [facility]. [facility] 3. [facility]. List left at bedside.

## 2024-05-10 NOTE — CONSULTS
"INFECTIOUS DISEASE INPATIENT INITIAL CONSULTATION    Referred By: Radha Ward    Reason For Consult: Asp pneumonia     HPI:  This is a 53 y.o. female with PMH of HTN, DM II, DLD, hypothyroid after thyroidectomy for Grave's disease who presented with confusion and fever.    Was recently admitted, noted to have some swallowing issues and on special diet. Was at NH and had confusion/fever. Sent to ED and found to have aspiration PNA. Tmax here 100 but WBC was 33K, improved to 27.3 now. C. Diff and stool PCR pathogen panel checked both negative. Given IV Vanc/Cefepime/Flagyl/Azithromycin.      Allergies:  Aspirin, Iodinated contrast media, Amoxicillin, Other, Morphine, and Sulfa (sulfonamide antibiotics)     Vitals (Last 24 Hours):  Heart Rate:  []   Temp:  [36.2 °C (97.1 °F)-37.8 °C (100 °F)]   Resp:  [13-31]   BP: ()/(59-87)   Height:  [165.1 cm (5' 5\")]   Weight:  [88.3 kg (194 lb 10.7 oz)-90.7 kg (200 lb)]   SpO2:  [76 %-100 %]      PHYSICAL EXAM:  Gen - NAD, not speaking, eyes open  Heart - RRR, no murmurs  Lungs - crackles both lower lobes, no wheezing  Abd - soft, no ttp, BS present  Skin - no rash    MEDS:    Current Facility-Administered Medications:     dextrose 50 % injection 12.5 g, 12.5 g, intravenous, q15 min PRN, Radha Ward MD    dextrose 50 % injection 25 g, 25 g, intravenous, q15 min PRN, Radha Ward MD    glucagon (Glucagen) injection 1 mg, 1 mg, intramuscular, q15 min PRN, Radha Ward MD    glucagon (Glucagen) injection 1 mg, 1 mg, intramuscular, q15 min PRN, Radha Ward MD    heparin (porcine) injection 5,000 Units, 5,000 Units, subcutaneous, BID, Radha Ward MD, 5,000 Units at 05/10/24 0820    hydrALAZINE (Apresoline) injection 10 mg, 10 mg, intravenous, q4h PRN, Radha Ward MD    insulin lispro (HumaLOG) injection 0-10 Units, 0-10 Units, subcutaneous, TID with meals, Radha Ward MD, 2 Units at 05/10/24 0820    " ipratropium-albuteroL (Duo-Neb) 0.5-2.5 mg/3 mL nebulizer solution 3 mL, 3 mL, nebulization, q6h, Radha Ward MD, 3 mL at 05/10/24 0716    ipratropium-albuteroL (Duo-Neb) 0.5-2.5 mg/3 mL nebulizer solution 3 mL, 3 mL, nebulization, q2h PRN, Radha Ward MD    levoFLOXacin (Levaquin)  mg, 750 mg, intravenous, q24h, Osiel Becerril PharmD, Stopped at 05/10/24 0229    oxygen (O2) therapy, , inhalation, Continuous - Inhalation, Radha Ward MD, 4 L/min at 05/10/24 0800    sodium chloride 0.9% infusion, 75 mL/hr, intravenous, Continuous, Radha Ward MD, Last Rate: 75 mL/hr at 05/10/24 0800, 75 mL/hr at 05/10/24 0800     LABS:  Lab Results   Component Value Date    WBC 27.3 (H) 05/10/2024    HGB 9.2 (L) 05/10/2024    HCT 29.5 (L) 05/10/2024    MCV 99 05/10/2024     05/10/2024      Results from last 72 hours   Lab Units 05/10/24  0548   SODIUM mmol/L 143   POTASSIUM mmol/L 3.7   CHLORIDE mmol/L 108*   CO2 mmol/L 26   BUN mg/dL 24*   CREATININE mg/dL 1.13*   GLUCOSE mg/dL 175*   CALCIUM mg/dL 7.8*   ANION GAP mmol/L 13   EGFR mL/min/1.73m*2 58*     Results from last 72 hours   Lab Units 05/10/24  0548   ALK PHOS U/L 104   BILIRUBIN TOTAL mg/dL 0.5   PROTEIN TOTAL g/dL 7.2   ALT U/L 15   AST U/L 44*   ALBUMIN g/dL 2.9*     Estimated Creatinine Clearance: 61.7 mL/min (A) (by C-G formula based on SCr of 1.13 mg/dL (H)).      IMAGING:  X-Ray Abd 5/9  Impression:     No nasogastric tube seen in the lower chest or abdomen; correlate  with chest radiographs or repeat KUB after NG repositioning.     CXR 5/9  Impression:     Poor inspiration.    Mild cardiomegaly.    Bilateral infiltrates.     CT C/A/P 5/9  Impression:     1. Multifocal pneumonia with more dense airspace consolidation  demonstrated within the medial right and left lower lobes in the  posterior perihilar location. Scattered patchy areas of ground-glass  airspace infiltrate and consolidation within bilateral lung  fields.  Follow-up to clearing.    2. The IVC is flattened in appearance suggesting component of  hypovolemia. Correlate    3. No dilated loops of bowel. Postsurgical changes with enteroenteric  anastomosis in the right lower quadrant appearing unremarkable.       ASSESSMENT/PLAN:    Multifocal Aspiration PNA  Abx Allergies - penicillin, sulfa. Limits abx options.    IV Levofloxacin 750mg Q24H. Monitoring for adverse effects of abx such as rash/itching/diarrhea.    Will follow peripherally over the weekend. Please call Dr. Kim with questions. Thanks!    Sadiq Mayo MD  [facility]  Office #[phone]

## 2024-05-10 NOTE — PROGRESS NOTES
Speech-Language Pathology    Inpatient Speech-Language Pathology Clinical Swallow Evaluation    Patient Name: Janette Martinez  MRN: [id number]  : 1970  Today's Date: 05/10/24   Time Calculation  Start Time: 1410  Stop Time: 1440  Time Calculation (min): 30 min        RECOMMENDATIONS:    Solid Diet Recommendations : NPO  2.   Liquid Diet Recommendations: NPO  3.   Medication Administration Recommendations: Non Oral  4.    CONSIDER LONG TERM ENTERAL NUTRITIONAL SUPPORT    Assessment:  Assessment Results: Patient known to this SLP from previous admission. Last seen on 24 with concerns present for intolerance of pureed/honey diet in setting of increased weakness. Communication with physician took place. Patient then discharged back to SNF. Now readmitted with aspiration pna. Patient expressing desire to eat and reported lack of appetite for approximately 2 week period. At time of assessment, patient given honey liquids and purees. Congested, weak cough noted at baseline. Presentation of purees and liquids occurred once HOB elevated. Bolus formation remains slowed with swallow delay as confirmed on MBSS . As number of bolus trials increased with purees, increase in swallow delay occurred. Question if fatigue a factor. As delay became lengthier, delayed post prandial cough noted. Suspect aspiration. Given patient's generalized weakness, oropharyngeal delay and aspiration pna diagnosis, feel long term alternative nutritional support should be considered. Once medical condition improves with resolution of pna and as increased strength develops with adequate nutritional support, MBSS can be repeated to determine potential for po for pleasure. This may be considered at next level of care. Patient expressing an interest in consuming po at this time, however is at high risk for aspiration given clinical presentation. Stringent mouth care regimen at least TID needed to reduce bacterial colonization of oral chandan as  "patient will aspirate oral secretions.    Baseline Assessment:  Respiratory Status: Room air  History of Intubation: No        Behavior/Cognition: Alert, Cooperative  Patient Positioning: Upright in Bed  Baseline Vocal Quality: Weak (hypophonic)    Oral-Motor Assessment:  Dentition: Some Missing Teeth  Oral Motor: Impaired Function (generalized weakness with reduced ROM)    Plan:  SLP Plan: Skilled SLP (at next level of care)     Duration: Other (Comment)  SLP Discharge Recommendations: Skilled nursing facility placement  Discussed POC: Patient, Nursing  Discussed Risks/Benefits: Yes  Patient/Caregiver Agreeable: Yes    Goals:   N/A    General Visit Information:  Patient admitted: 5/9/24    Past Medical History: Per EMR \"HTN, DM II, DLD, hypothyroid after thyroidectomy for Grave's disease who presented with confusion and fever. Recently admitted 4/22/24 with encephalopathy. Has further history of L thalamic ICH (2007, residual right hemiplegia, dysarthria and aphasia), thalamic infarct and thalamic pain syndrome 2022, L MCA fusiform aneurysm, PE. Diagnosed with dysphagia and placed on puree/honey diet following MBSS on 5/1. Progression of dysphagia suspected during follow up visit 5/2 with post prandial cough given modified diet. Decrease in po intake also noted.    Chief Complaint/Reason for admission: Admitted with confusion and fever.    Relevant Imaging Results: CT chest 5/9/24 \"Multifocal pneumonia with more dense airspace consolidation demonstrated within the medial right and left lower lobes in the posterior perihilar location. Scattered patchy areas of ground-glass airspace infiltrate and consolidation within bilateral lung fields.\"    Living Environment: Nursing home (skilled/long-term)  Reason for Referral: history of dysphagia with readmission 2/2 pna  Ordering Physician: Dr. Ward  Current Diet : NPO    Pain:  Pain Assessment: 0-10  Pain Score: 0 - No pain    Treatment:    N/A    Inpatient " Education:  Patient educated on current swallow function and need for PEG tube  Patient education results: gave verbal understanding

## 2024-05-10 NOTE — PROGRESS NOTES
Janette Martinez is a 53 y.o. female on day 1 of admission presenting with AMS (altered mental status).      Subjective   HPI: This is a 53 y.o. female who was recently discharged after an admission for encephalopathy/sepsis.  Patient had a protracted hospitalization last visit, one of her issues was swallowing which was found to be okay for the puréed diet honey thickened liquids.  Today I was called from the nursing home with concerns for fever/altered mental status/suspicion for aspiration.  Discussed with director of nursing at the facility-patient was a full code and wanted her transferred to the ER by EMS.  After patient's arrival in the ER and further evaluation discussed with emergency room physician and agreed with hospitalization, as patient's condition was worsening in the ER the provider contacted patient's sister/POA and patient is currently DO NOT RESUSCITATE with limitations.  Patient is too drowsy to answer any questions, turns when called, not answering any questions     Objective     Last Recorded Vitals  /61 (BP Location: Right arm, Patient Position: Lying)   Pulse 87   Temp 36 °C (96.8 °F) (Temporal)   Resp 17   Wt 88.3 kg (194 lb 10.7 oz)   SpO2 100%   Intake/Output last 3 Shifts:    Intake/Output Summary (Last 24 hours) at 5/10/2024 1422  Last data filed at 5/10/2024 0441  Gross per 24 hour   Intake 3291.25 ml   Output 30 ml   Net 3261.25 ml       Admission Weight  Weight: 90.7 kg (200 lb) (05/09/24 1204)    Daily Weight  05/09/24 : 88.3 kg (194 lb 10.7 oz)    Image Results  Electrocardiogram, 12-lead PRN ACS symptoms  Sinus tachycardia  ST & T wave abnormality, consider lateral ischemia  Abnormal ECG  When compared with ECG of 24-APR-2024 08:17,  Significant changes have occurred  See ED provider note for full interpretation and clinical correlation  Confirmed by Columba Islas ([id number]) on 5/10/2024 11:06:45 AM      Physical Exam  GENERAL: awake, Ox0 cSKIN: Skin turgor normal. No  rashes  HEENT: no epistaxis, Moist mucosa.  NECK: supple  BACK: spine nontender to palpation, No CVAT.  LUNGS: Vesicular breath sounds, with no wheeze, no crepitations.   CARDIAC: REGULAR. S1 and S2; no rubs, no murmur  ABDOMEN: Abdomen soft, non-tender. BS+  EXTREMITIES: No edema, Good capillary refill.   NEURO: History of dysarthria, hemiparesis, but today's exam is limited   MUSCULOSKELETAL: No acute inflammation    Relevant Results    Results for orders placed or performed during the hospital encounter of 05/09/24 (from the past 24 hour(s))   ECG 12 lead   Result Value Ref Range    Ventricular Rate 105 BPM    Atrial Rate 105 BPM    OR Interval 148 ms    QRS Duration 82 ms    QT Interval 302 ms    QTC Calculation(Bazett) 399 ms    P Axis 10 degrees    R Axis 65 degrees    T Axis 257 degrees    QRS Count 18 beats    Q Onset 219 ms    P Onset 145 ms    P Offset 202 ms    T Offset 370 ms    QTC Fredericia 363 ms   Urinalysis with Reflex Culture and Microscopic   Result Value Ref Range    Color, Urine Yellow Light-Yellow, Yellow, Dark-Yellow    Appearance, Urine Ex.Turbid (N) Clear    Specific Gravity, Urine 1.026 1.005 - 1.035    pH, Urine 5.5 5.0, 5.5, 6.0, 6.5, 7.0, 7.5, 8.0    Protein, Urine 70 (1+) (A) NEGATIVE, 10 (TRACE), 20 (TRACE) mg/dL    Glucose, Urine Normal Normal mg/dL    Blood, Urine 0.2 (2+) (A) NEGATIVE    Ketones, Urine TRACE (A) NEGATIVE mg/dL    Bilirubin, Urine NEGATIVE NEGATIVE    Urobilinogen, Urine Normal Normal mg/dL    Nitrite, Urine NEGATIVE NEGATIVE    Leukocyte Esterase, Urine 500 Prabhu/µL (A) NEGATIVE   Extra Urine Gray Tube   Result Value Ref Range    Extra Tube Hold for add-ons.    Microscopic Only, Urine   Result Value Ref Range    WBC, Urine 21-50 (A) 1-5, NONE /HPF    RBC, Urine 11-20 (A) NONE, 1-2, 3-5 /HPF    Squamous Epithelial Cells, Urine 1-9 (SPARSE) Reference range not established. /HPF    Bacteria, Urine 1+ (A) NONE SEEN /HPF    Mucus, Urine FEW Reference range not  established. /LPF    Fine Granular Casts, Urine 1+ (A) NONE /LPF    Amorphous Crystals, Urine 2+ NONE, 1+, 2+ /HPF   C. difficile, PCR    Specimen: Stool   Result Value Ref Range    C. difficile, PCR Not Detected Not Detected   Stool Pathogen Panel, PCR    Specimen: Stool   Result Value Ref Range    Campylobacter Group Not Detected Not Detected    Salmonella species Not Detected Not Detected    Shigella species Not Detected Not Detected    Vibrio Group Not Detected Not Detected    Yersinia Enterocolitica Not Detected Not Detected    Shiga Toxin 1 Not Detected Not Detected    Shiga Toxin 2 Not Detected Not Detected    Norovirus GI/GII Not Detected Not Detected    Rotavirus A Not Detected Not Detected   Troponin I, High Sensitivity   Result Value Ref Range    Troponin I, High Sensitivity 60 (HH) 0 - 13 ng/L   BLOOD GAS VENOUS FULL PANEL   Result Value Ref Range    POCT pH, Venous 7.24 (LL) 7.33 - 7.43 pH    POCT pCO2, Venous 60 (H) 41 - 51 mm Hg    POCT pO2, Venous 32 (L) 35 - 45 mm Hg    POCT SO2, Venous 34 (L) 45 - 75 %    POCT Oxy Hemoglobin, Venous 33.5 (L) 45.0 - 75.0 %    POCT Hematocrit Calculated, Venous 27.0 (L) 36.0 - 46.0 %    POCT Sodium, Venous 141 136 - 145 mmol/L    POCT Potassium, Venous 3.9 3.5 - 5.3 mmol/L    POCT Chloride, Venous 109 (H) 98 - 107 mmol/L    POCT Ionized Calicum, Venous 1.16 1.10 - 1.33 mmol/L    POCT Glucose, Venous 265 (H) 74 - 99 mg/dL    POCT Lactate, Venous 2.4 (H) 0.4 - 2.0 mmol/L    POCT Base Excess, Venous -2.1 (L) -2.0 - 3.0 mmol/L    POCT HCO3 Calculated, Venous 25.7 22.0 - 26.0 mmol/L    POCT Hemoglobin, Venous 8.9 (L) 12.0 - 16.0 g/dL    POCT Anion Gap, Venous 10.0 10.0 - 25.0 mmol/L    Patient Temperature 37.0 degrees Celsius    FiO2 21 %   Blood Gas Lactic Acid, Venous   Result Value Ref Range    POCT Lactate, Venous 1.5 0.4 - 2.0 mmol/L   CBC   Result Value Ref Range    WBC 27.3 (H) 4.4 - 11.3 x10*3/uL    nRBC 0.1 (H) 0.0 - 0.0 /100 WBCs    RBC 2.98 (L) 4.00 - 5.20  x10*6/uL    Hemoglobin 9.2 (L) 12.0 - 16.0 g/dL    Hematocrit 29.5 (L) 36.0 - 46.0 %    MCV 99 80 - 100 fL    MCH 30.9 26.0 - 34.0 pg    MCHC 31.2 (L) 32.0 - 36.0 g/dL    RDW 17.3 (H) 11.5 - 14.5 %    Platelets 249 150 - 450 x10*3/uL   Comprehensive Metabolic Panel   Result Value Ref Range    Glucose 175 (H) 74 - 99 mg/dL    Sodium 143 136 - 145 mmol/L    Potassium 3.7 3.5 - 5.3 mmol/L    Chloride 108 (H) 98 - 107 mmol/L    Bicarbonate 26 21 - 32 mmol/L    Anion Gap 13 10 - 20 mmol/L    Urea Nitrogen 24 (H) 6 - 23 mg/dL    Creatinine 1.13 (H) 0.50 - 1.05 mg/dL    eGFR 58 (L) >60 mL/min/1.73m*2    Calcium 7.8 (L) 8.6 - 10.3 mg/dL    Albumin 2.9 (L) 3.4 - 5.0 g/dL    Alkaline Phosphatase 104 33 - 110 U/L    Total Protein 7.2 6.4 - 8.2 g/dL    AST 44 (H) 9 - 39 U/L    Bilirubin, Total 0.5 0.0 - 1.2 mg/dL    ALT 15 7 - 45 U/L   POCT GLUCOSE   Result Value Ref Range    POCT Glucose 151 (H) 74 - 99 mg/dL   POCT GLUCOSE   Result Value Ref Range    POCT Glucose 106 (H) 74 - 99 mg/dL       Scheduled medications  heparin, 5,000 Units, subcutaneous, BID  insulin lispro, 0-10 Units, subcutaneous, TID with meals  ipratropium-albuteroL, 3 mL, nebulization, q6h  levoFLOXacin, 750 mg, intravenous, q24h  oxygen, , inhalation, Continuous - Inhalation      Continuous medications  sodium chloride 0.9%, 75 mL/hr, Last Rate: 75 mL/hr (05/10/24 1200)      PRN medications  PRN medications: dextrose, dextrose, glucagon, glucagon, hydrALAZINE, ipratropium-albuteroL             Assessment/Plan      # Encephalopathy  - improved today     # Acute respiratory failure  -DNR with limitations per family  -Not a current candidate for ICU care or intubation     # Probable aspiration pneumonia  -Will initiate IV antibiotics     # YESSICA     # Old right hemiparesis/dysarthria     HIV:  Hx graves s/p thyroidectomy   Post surgical hypothyroid  HTN  HLP  T2DM  CKD3    5/10 : Pt has poor swallowing - seen by ST - will start NGT feeds over the weekend - NGT  placed today. Pt more awake - was waving at me - speaking appropriately.       Radha Ward MD

## 2024-05-10 NOTE — CARE PLAN
Problem: Skin  Goal: Decreased wound size/increased tissue granulation at next dressing change  Outcome: Progressing  Flowsheets (Taken 5/10/2024 0439)  Decreased wound size/increased tissue granulation at next dressing change: Promote sleep for wound healing  Goal: Participates in plan/prevention/treatment measures  Outcome: Progressing  Flowsheets (Taken 5/10/2024 0439)  Participates in plan/prevention/treatment measures: Discuss with provider PT/OT consult  Goal: Prevent/minimize sheer/friction injuries  Outcome: Progressing  Flowsheets (Taken 5/10/2024 0439)  Prevent/minimize sheer/friction injuries: HOB 30 degrees or less  Goal: Promote/optimize nutrition  Outcome: Progressing  Flowsheets (Taken 5/10/2024 0439)  Promote/optimize nutrition: Monitor/record intake including meals  Goal: Promote skin healing  Outcome: Progressing  Flowsheets (Taken 5/10/2024 0439)  Promote skin healing: Turn/reposition every 2 hours/use positioning/transfer devices

## 2024-05-10 NOTE — H&P
HISTORY AND PHYSICAL EXAMINATION    PATIENT NAME: Janette Martinez    MRN: [id number]  SERVICE DATE: 5/9/2024       PRIMARY CARE PHYSICIAN: Radha Ward MD          ASSESSMENT AND PLAN     # Encephalopathy    # Acute respiratory failure  -DNR with limitations per family  -Not a current candidate for ICU care or intubation    # Probable aspiration pneumonia  -Will initiate IV antibiotics    # YESSICA    # Old right hemiparesis/dysarthria    HIV:    Hx graves s/p thyroidectomy   Post surgical hypothyroid     HTN  HLP  T2DM  CKD3  -hypoglycemia protocol, accuchecks ACHS, ISS coverage  -Hba1c pending   -confirm home meds and resume as appropriate            Discussed with nurses/case management team and the specialists involved in this patient's care. Reviewed the EMR and documentation from other care-givers.    SUBJECTIVE  CHIEF COMPLAINT:      HPI: This is a 53 y.o. female who was recently discharged after an admission for encephalopathy/sepsis.  Patient had a protracted hospitalization last visit, one of her issues was swallowing which was found to be okay for the puréed diet honey thickened liquids.  Today I was called from the nursing home with concerns for fever/altered mental status/suspicion for aspiration.  Discussed with director of nursing at the facility-patient was a full code and wanted her transferred to the ER by EMS.  After patient's arrival in the ER and further evaluation discussed with emergency room physician and agreed with hospitalization, as patient's condition was worsening in the ER the provider contacted patient's sister/POA and patient is currently DO NOT RESUSCITATE with limitations.  Patient is too drowsy to answer any questions, turns when called, not answering any questions      ED HPI :     History/Exam limitations:Additional history was obtained from      HPI:    Janette Martinez is a 53 y.o. female PMH HFrEF EF 45%, hypertension, hyperlipidemia, diabetes, CKD 3, grade status post  thyroidectomy, hypothyroidism postsurgical, HIV, CVA with right-sided hemiparesis and aphasia presenting with altered mentation.  Per facility patient was last seen well at 9 AM.  She reportedly was found this afternoon with decreased mental status and no longer speaking.  They state that she is baseline alert and oriented and speaks.  Patient is nonverbal on our assessment.  Fatigued en route, tachycardic to 130, stable blood pressure.  Glucose 204.  Patient shakes her head no when asked about potential pain.  Reportedly sound baseline low-flow nasal cannula oxygen.  Per chart the patient is on Eliquis.     Worsening left-sided weakness, able to perform some  strength and plantarflexion dorsiflexion of the foot, no antigravity, nonverbal, some movement of left face, right facial droop present, appears to have some left facial weakness     PAST MEDICAL HISTORY:   Past Medical History:   Diagnosis Date    Acute pulmonary embolism (Multi) 04/22/2024    Aphasia as late effect of cerebrovascular accident 04/25/2023    Essential hypertension 06/25/2010    Gastroesophageal reflux disease 03/10/2009    Hemiplegia of nondominant side, late effect of cerebrovascular disease (Multi) 09/03/2008    Hemorrhagic stroke (Multi) 06/25/2010    HIV infection (Multi) 02/21/2007    Hypothyroidism 10/10/2002    Formatting of this note might be different from the original.   S/p thyroidectomy 2002   Patholgy c/w Graves    Mixed hyperlipidemia 06/25/2010    Stage 3a chronic kidney disease (Multi) 04/22/2024    T2DM (type 2 diabetes mellitus) (Multi) 11/14/2012     PAST SURGICAL HISTORY:   Past Surgical History:   Procedure Laterality Date    CHOLECYSTECTOMY  01/08/2016    Cholecystectomy    COLON SURGERY  01/08/2016    Colon Surgery    CT ANGIO NECK  3/19/2019    CT NECK ANGIO W AND WO IV CONTRAST 3/19/2019 [facility] [place]GENCY LEGACY    CT HEAD ANGIO W AND WO IV CONTRAST  3/19/2019    CT HEAD ANGIO W AND WO IV CONTRAST 3/19/2019 [facility] [place]  EMERGENCY LEGACY    HERNIA REPAIR  01/08/2016    Hernia Repair    HYSTERECTOMY  01/08/2016    Hysterectomy    MR HEAD ANGIO WO IV CONTRAST  6/4/2018    MR HEAD ANGIO WO IV CONTRAST 6/4/2018 [facility] CLINICAL LEGACY    MR HEAD ANGIO WO IV CONTRAST  7/11/2020    MR HEAD ANGIO WO IV CONTRAST 7/11/2020 [facility] CLINICAL LEGACY    MR HEAD ANGIO WO IV CONTRAST  9/30/2020    MR HEAD ANGIO WO IV CONTRAST 9/30/2020 [facility]IB LEGACY    MR HEAD ANGIO WO IV CONTRAST  5/22/2022    MR HEAD ANGIO WO IV CONTRAST 5/22/2022 [facility] CLINICAL LEGACY    MR HEAD ANGIO WO IV CONTRAST  4/19/2023    MR HEAD ANGIO WO IV CONTRAST [facility] MRI    MR NECK ANGIO WO IV CONTRAST  6/4/2018    MR NECK ANGIO WO IV CONTRAST 6/4/2018 [facility] CLINICAL LEGACY    MR NECK ANGIO WO IV CONTRAST  7/11/2020    MR NECK ANGIO WO IV CONTRAST 7/11/2020 [facility] CLINICAL LEGACY    MR NECK ANGIO WO IV CONTRAST  9/30/2020    MR NECK ANGIO WO IV CONTRAST 9/30/2020 [facility] LEGACY    MR NECK ANGIO WO IV CONTRAST  5/22/2022    MR NECK ANGIO WO IV CONTRAST 5/22/2022 [facility] CLINICAL LEGACY    MR NECK ANGIO WO IV CONTRAST  4/19/2023    MR NECK ANGIO WO IV CONTRAST [facility] MRI    OTHER SURGICAL HISTORY  01/08/2016    Tubal Stabilization    OTHER SURGICAL HISTORY  09/01/2016    Cervical Surgery (Gyn) Laser Vaporization Of Transformation Zone    THYROID SURGERY  09/27/2013    Thyroid Surgery     FAMILY HISTORY: No family history on file.  SOCIAL HISTORY:   Social History     Tobacco Use    Smoking status: Unknown   Vaping Use    Vaping status: Unknown       MEDICATIONS: Prior to Admission Medications  (Not in a hospital admission)     CURRENT ALLERGIES:   Allergies   Allergen Reactions    Aspirin Unknown, Bleeding and Itching    Iodinated Contrast Media Unknown and Hives    Amoxicillin Hives    Other Unknown    Morphine Unknown, Swelling, Itching and Rash    Sulfa (Sulfonamide Antibiotics) Unknown and Rash       COMPLETE REVIEW OF SYSTEMS:      GENERAL: No fever, appetite stable.  HEENT: No epistaxis, no  "mouth ulcers  NECK: no neck pain  RESPIRATORY: No new resp symptoms.  CARDIOVASCULAR: No cp, no leg edema, No orthopnea  GI: No NVD, no GI Bleed  : No hematuria, no dysuria  MUSCULOSKELETAL: No new jt pains or swelling  SKIN: No rashes, no ulcers  PSYCH: Denies feeling anxious or depressed.   HEMATOLOGY/LYMPHOLOGY: No bruising, no hx of VTE  ENDOCRINE: No hx of DM  NEURO: No hx of seizures or syncope, No hx of CVA      OBJECTIVE  PHYSICAL EXAM:   Heart Rate:  []   Temperature:  [36.2 °C (97.1 °F)-37.8 °C (100 °F)]   Respirations:  [13-31]   BP: ()/(59-87)   Height:  [165.1 cm (5' 5\")]   Weight:  [90.1 kg (198 lb 10.2 oz)-90.7 kg (200 lb)]   Pulse Ox:  [76 %-100 %]     Body mass index is 33.05 kg/m².    GENERAL: awake, Ox0 cSKIN: Skin turgor normal. No rashes  HEENT: no epistaxis, Moist mucosa.  NECK: supple  BACK: spine nontender to palpation, No CVAT.  LUNGS: Vesicular breath sounds, with no wheeze, no crepitations.   CARDIAC: REGULAR. S1 and S2; no rubs, no murmur  ABDOMEN: Abdomen soft, non-tender. BS+  EXTREMITIES: No edema, Good capillary refill.   NEURO: History of dysarthria, hemiparesis, but today's exam is limited   MUSCULOSKELETAL: No acute inflammation       .No current facility-administered medications for this encounter.    Current Outpatient Medications:     acetaminophen (Tylenol) 500 mg tablet, Take 2 tablets (1,000 mg) by mouth 3 times a day., Disp: , Rfl:     amLODIPine (Norvasc) 5 mg tablet, Take 1 tablet (5 mg) by mouth once daily., Disp: , Rfl:     apixaban (Eliquis) 5 mg tablet, Take 1 tablet (5 mg) by mouth 2 times a day., Disp: , Rfl:     atorvastatin (Lipitor) 10 mg tablet, Take 1 tablet (10 mg) by mouth once daily., Disp: , Rfl:     baclofen (Lioresal) 10 mg tablet, Take 1 tablet (10 mg) by mouth 2 times a day., Disp: , Rfl:     diclofenac sodium (Voltaren) 1 % gel, Apply 4.5 inches (4 g) topically every 12 hours if needed., Disp: , Rfl:     docusate sodium (Colace) 100 mg " capsule, Take 1 capsule (100 mg) by mouth 2 times a day., Disp: , Rfl:     dolutegravir (Tivicay) 50 mg tablet, Take 1 tablet (50 mg) by mouth once daily., Disp: , Rfl:     emtricitabine-tenofovir alafen (Descovy) 200-25 mg tablet, Take 1 tablet by mouth once daily., Disp: , Rfl:     furosemide (Lasix) 20 mg tablet, Take 1 tablet (20 mg) by mouth once daily., Disp: , Rfl:     gabapentin (Neurontin) 300 mg capsule, Take 2 capsules (600 mg) by mouth 3 times a day., Disp: , Rfl:     hydroCHLOROthiazide (HYDRODiuril) 25 mg tablet, Take 1 tablet (25 mg) by mouth once daily. Hold if SBP <110 or Heart rate <60, Disp: , Rfl:     insulin glargine (Basaglar KwikPen U-100 Insulin) 100 unit/mL (3 mL) pen, Inject 43 Units under the skin once daily at bedtime. Take as directed per insulin instructions., Disp: , Rfl:     insulin lispro (HumaLOG) 100 unit/mL injection, Inject 0.12 mL (12 Units) under the skin once daily. At 1:30pm, Disp: , Rfl:     ipratropium-albuteroL (Duo-Neb) 0.5-2.5 mg/3 mL nebulizer solution, Take 3 mL by nebulization 4 times a day as needed for wheezing., Disp: , Rfl:     lamoTRIgine (LaMICtal) 25 mg tablet, Take 1 tablet (25 mg) by mouth once daily in the evening., Disp: , Rfl:     levothyroxine (Synthroid, Levoxyl) 175 mcg tablet, Take 1 tablet (175 mcg) by mouth once daily in the morning. Take before meals., Disp: , Rfl:     melatonin 3 mg tablet, Take 2 tablets (6 mg) by mouth once daily at bedtime., Disp: , Rfl:     moxifloxacin (Vigamox) 0.5 % ophthalmic solution, Administer 1 drop into the right eye 3 times a day. 6am , 2pm, 10pm, Disp: , Rfl:     oxyCODONE (Roxicodone) 5 mg immediate release tablet, Take 1 tablet (5 mg) by mouth every 6 hours if needed for severe pain (7 - 10)., Disp: , Rfl:     polyethylene glycol (Glycolax, Miralax) 17 gram packet, Take 17 g by mouth once daily., Disp: , Rfl:     potassium chloride ER (Micro-K) 10 mEq ER capsule, Take 2 capsules (20 mEq) by mouth once daily. Do not  crush or chew., Disp: , Rfl:     sertraline (Zoloft) 100 mg tablet, Take 2 tablets (200 mg) by mouth once daily., Disp: , Rfl:     DATA:   Diagnostic tests reviewed for today's visit:    Most recent labs  Admission on 05/09/2024   Component Date Value Ref Range Status    WBC 05/09/2024 33.6 (H)  4.4 - 11.3 x10*3/uL Final    nRBC 05/09/2024 0.1 (H)  0.0 - 0.0 /100 WBCs Final    RBC 05/09/2024 2.95 (L)  4.00 - 5.20 x10*6/uL Final    Hemoglobin 05/09/2024 9.1 (L)  12.0 - 16.0 g/dL Final    Hematocrit 05/09/2024 28.1 (L)  36.0 - 46.0 % Final    MCV 05/09/2024 95  80 - 100 fL Final    MCH 05/09/2024 30.8  26.0 - 34.0 pg Final    MCHC 05/09/2024 32.4  32.0 - 36.0 g/dL Final    RDW 05/09/2024 17.2 (H)  11.5 - 14.5 % Final    Platelets 05/09/2024 257  150 - 450 x10*3/uL Final    Immature Granulocytes %, Automated 05/09/2024 1.6 (H)  0.0 - 0.9 % Final    Immature Granulocyte Count (IG) includes promyelocytes, myelocytes and metamyelocytes but does not include bands. Percent differential counts (%) should be interpreted in the context of the absolute cell counts (cells/UL).    Immature Granulocytes Absolute, Au* 05/09/2024 0.54  0.00 - 0.70 x10*3/uL Final    Glucose 05/09/2024 133 (H)  74 - 99 mg/dL Final    Sodium 05/09/2024 149 (H)  136 - 145 mmol/L Final    Potassium 05/09/2024 3.7  3.5 - 5.3 mmol/L Final    MILD HEMOLYSIS DETECTED. The result may be falsely elevated due to hemolysis or other interferents. Clinical correlation is recommended. Repeat testing may be considered.    Chloride 05/09/2024 114 (H)  98 - 107 mmol/L Final    Bicarbonate 05/09/2024 24  21 - 32 mmol/L Final    Anion Gap 05/09/2024 15  10 - 20 mmol/L Final    Urea Nitrogen 05/09/2024 24 (H)  6 - 23 mg/dL Final    Creatinine 05/09/2024 1.29 (H)  0.50 - 1.05 mg/dL Final    eGFR 05/09/2024 49 (L)  >60 mL/min/1.73m*2 Final    Calculations of estimated GFR are performed using the 2021 CKD-EPI Study Refit equation without the race variable for the  IDMS-Traceable creatinine methods.  https://jasn.asnjournals.org/content/early/2021/09/22/ASN.5445455779    Calcium 05/09/2024 7.4 (L)  8.6 - 10.3 mg/dL Final    Albumin 05/09/2024 2.8 (L)  3.4 - 5.0 g/dL Final    Alkaline Phosphatase 05/09/2024 84  33 - 110 U/L Final    Total Protein 05/09/2024 7.1  6.4 - 8.2 g/dL Final    AST 05/09/2024 46 (H)  9 - 39 U/L Final    MILD HEMOLYSIS DETECTED. The result may be falsely elevated due to hemolysis or other interferents. Clinical correlation is recommended. Repeat testing may be considered.    Bilirubin, Total 05/09/2024 0.4  0.0 - 1.2 mg/dL Final    ALT 05/09/2024 17  7 - 45 U/L Final    Patients treated with Sulfasalazine may generate falsely decreased results for ALT.    Lactate 05/09/2024 1.0  0.4 - 2.0 mmol/L Final    Troponin I, High Sensitivity 05/09/2024 53 (HH)  0 - 13 ng/L Final    Protime 05/09/2024 22.7 (H)  9.8 - 12.8 seconds Final    INR 05/09/2024 2.0 (H)  0.9 - 1.1 Final    Blood Culture 05/09/2024 Loaded on Instrument - Culture in progress   Preliminary    Blood Culture 05/09/2024 Loaded on Instrument - Culture in progress   Preliminary    Coronavirus 2019, PCR 05/09/2024 Not Detected  Not Detected Final    Thyroid Stimulating Hormone 05/09/2024 0.18 (L)  0.44 - 3.98 mIU/L Final    Ammonia 05/09/2024 21  16 - 53 umol/L Final    BNP 05/09/2024 112 (H)  0 - 99 pg/mL Final    Color, Urine 05/09/2024 Yellow  Light-Yellow, Yellow, Dark-Yellow Final    Appearance, Urine 05/09/2024 Ex.Turbid (N)  Clear Final    Specific Gravity, Urine 05/09/2024 1.026  1.005 - 1.035 Final    pH, Urine 05/09/2024 5.5  5.0, 5.5, 6.0, 6.5, 7.0, 7.5, 8.0 Final    Protein, Urine 05/09/2024 70 (1+) (A)  NEGATIVE, 10 (TRACE), 20 (TRACE) mg/dL Final    Glucose, Urine 05/09/2024 Normal  Normal mg/dL Final    Blood, Urine 05/09/2024 0.2 (2+) (A)  NEGATIVE Final    Ketones, Urine 05/09/2024 TRACE (A)  NEGATIVE mg/dL Final    Bilirubin, Urine 05/09/2024 NEGATIVE  NEGATIVE Final     Urobilinogen, Urine 05/09/2024 Normal  Normal mg/dL Final    Nitrite, Urine 05/09/2024 NEGATIVE  NEGATIVE Final    Leukocyte Esterase, Urine 05/09/2024 500 Prabhu/µL (A)  NEGATIVE Final    HCG, Beta-Quantitative 05/09/2024 3  <5 mIU/mL Final    POCT Glucose 05/09/2024 140 (H)  74 - 99 mg/dL Final    Troponin I, High Sensitivity 05/09/2024 68 (HH)  0 - 13 ng/L Final    Previous result verified on 5/9/2024 1306 on specimen/case [id number] called with component [facility] for procedure Troponin I, High Sensitivity with value 53 ng/L.    Thyroxine, Free 05/09/2024 1.08  0.61 - 1.12 ng/dL Final    Neutrophils %, Manual 05/09/2024 84.0  40.0 - 80.0 % Final    Percent differential counts (%) should be interpreted in the context of the absolute cell counts (cells/uL).    Bands %, Manual 05/09/2024 2.0  0.0 - 5.0 % Final    Lymphocytes %, Manual 05/09/2024 10.0  13.0 - 44.0 % Final    Monocytes %, Manual 05/09/2024 4.0  2.0 - 10.0 % Final    Eosinophils %, Manual 05/09/2024 0.0  0.0 - 6.0 % Final    Basophils %, Manual 05/09/2024 0.0  0.0 - 2.0 % Final    Seg Neutrophils Absolute, Manual 05/09/2024 28.22 (H)  1.20 - 7.00 x10*3/uL Final    Bands Absolute, Manual 05/09/2024 0.67  0.00 - 0.70 x10*3/uL Final    Lymphocytes Absolute, Manual 05/09/2024 3.36  1.20 - 4.80 x10*3/uL Final    Monocytes Absolute, Manual 05/09/2024 1.34 (H)  0.10 - 1.00 x10*3/uL Final    Eosinophils Absolute, Manual 05/09/2024 0.00  0.00 - 0.70 x10*3/uL Final    Basophils Absolute, Manual 05/09/2024 0.00  0.00 - 0.10 x10*3/uL Final    Total Cells Counted 05/09/2024 100   Final    Neutrophils Absolute, Manual 05/09/2024 28.89 (H)  1.20 - 7.70 x10*3/uL Final    RBC Morphology 05/09/2024 See Below   Final    Polychromasia 05/09/2024 Mild   Final    Target Cells 05/09/2024 Few   Final    Ventricular Rate 05/09/2024 105  BPM Final    Atrial Rate 05/09/2024 105  BPM Final    ID Interval 05/09/2024 148  ms Final    QRS Duration 05/09/2024 82  ms Final    QT  Interval 05/09/2024 302  ms Final    QTC Calculation(Bazett) 05/09/2024 399  ms Final    P Axis 05/09/2024 10  degrees Final    R Axis 05/09/2024 65  degrees Final    T [place] 05/09/2024 257  degrees Final    QRS Count 05/09/2024 18  beats Final    Q Onset 05/09/2024 219  ms Final    P Onset 05/09/2024 145  ms Final    P Offset 05/09/2024 202  ms Final    T Offset 05/09/2024 370  ms Final    QTC [place] 05/09/2024 363  ms Final    Troponin I, High Sensitivity 05/09/2024 60 (HH)  0 - 13 ng/L Final    Previous result verified on 5/9/2024 1306 on specimen/case [id number] [facility] Troponin I, High Sensitivity with value 53 ng/L.    WBC, Urine 05/09/2024 21-50 (A)  1-5, NONE /HPF Final    RBC, Urine 05/09/2024 11-20 (A)  NONE, 1-2, 3-5 /HPF Final    Squamous Epithelial Cells, Urine 05/09/2024 1-9 (SPARSE)  Reference range not established. /HPF Final    Bacteria, Urine 05/09/2024 1+ (A)  NONE SEEN /HPF Final    Mucus, Urine 05/09/2024 FEW  Reference range not established. /LPF Final    Fine Granular Casts, Urine 05/09/2024 1+ (A)  NONE /LPF Final    Amorphous Crystals, Urine 05/09/2024 2+  NONE, 1+, 2+ /HPF Final    POCT pH, Venous 05/09/2024 7.24 (LL)  7.33 - 7.43 pH Final    POCT pCO2, Venous 05/09/2024 60 (H)  41 - 51 mm Hg Final    POCT pO2, Venous 05/09/2024 32 (L)  35 - 45 mm Hg Final    POCT SO2, Venous 05/09/2024 34 (L)  45 - 75 % Final    POCT Oxy Hemoglobin, Venous 05/09/2024 33.5 (L)  45.0 - 75.0 % Final    POCT Hematocrit Calculated, Venous 05/09/2024 27.0 (L)  36.0 - 46.0 % Final    POCT Sodium, Venous 05/09/2024 141  136 - 145 mmol/L Final    POCT Potassium, Venous 05/09/2024 3.9  3.5 - 5.3 mmol/L Final    POCT Chloride, Venous 05/09/2024 109 (H)  98 - 107 mmol/L Final    POCT Ionized [place], Venous 05/09/2024 1.16  1.10 - 1.33 mmol/L Final    POCT Glucose, Venous 05/09/2024 265 (H)  74 - 99 mg/dL Final    POCT Lactate, Venous 05/09/2024 2.4 (H)  0.4 - 2.0 mmol/L Final  "   POCT Base Excess, Venous 05/09/2024 -2.1 (L)  -2.0 - 3.0 mmol/L Final    POCT HCO3 Calculated, Venous 05/09/2024 25.7  22.0 - 26.0 mmol/L Final    POCT Hemoglobin, Venous 05/09/2024 8.9 (L)  12.0 - 16.0 g/dL Final    POCT Anion Gap, Venous 05/09/2024 10.0  10.0 - 25.0 mmol/L Final    Patient Temperature 05/09/2024 37.0  degrees Celsius Final    FiO2 05/09/2024 21  % Final    POCT Lactate, Venous 05/09/2024 1.5  0.4 - 2.0 mmol/L Final       No results found for: \"GLU\"     XR abdomen 1 view  Narrative: Interpreted By:  Fidel Mueller,   STUDY:  XR ABDOMEN 1 VIEW; 5/9/2024 4:18 pm      INDICATION:  Signs/Symptoms:NG placement      COMPARISON:  August 2022 and 05/09/2024.      ACCESSION NUMBER(S):  [id number]      ORDERING CLINICIAN:  ROSAURA CHACKO      TECHNIQUE:  Number of films: Two-view KUB      FINDINGS:  No NG tube is visualized in the lower chest or in the abdomen.  Bibasilar consolidations are noted.  The bowel gas pattern is nonobstructive nonspecific, with gas and  stool throughout the colon. There is no free intraperitoneal air.  Surgical staples are noted in the pelvis.  No abnormal intra-abdominal calcifications are seen.  Degenerative changes involve the spine and hips.      Impression: No nasogastric tube seen in the lower chest or abdomen; correlate  with chest radiographs or repeat KUB after NG repositioning.      Signed by: Fidel Mueller 5/9/2024 4:41 PM  Dictation workstation:   [id number]  ECG 12 lead  Sinus tachycardia  Possible Anterior infarct , age undetermined  Abnormal ECG  When compared with ECG of 09-MAY-2024 12:16, (unconfirmed)  Questionable change in QRS axis  See ED provider note for full interpretation and clinical correlation  Confirmed by Columba Cabrera ([id number]) on 5/9/2024 4:04:08 PM  XR chest 1 view  Narrative: Interpreted By:  Mercedes Salazar,   STUDY:  XR CHEST 1 VIEW;  5/9/2024 1:27 pm      INDICATION:  Signs/Symptoms:AMS.      COMPARISON:  04/02/2024 and CT of the " chest dated 05/09/2024      ACCESSION NUMBER(S):  [id number]      ORDERING CLINICIAN:  ROSAURA CHACKO      FINDINGS:  Poor inspiratory effort is seen with crowding of pulmonary  vasculature at the lung bases. The heart is mildly enlarged. There is  diffuse bilateral interstitial prominence with patchy densities in  bilateral parahilar regions which on the chest CT obtained on the  same day demonstrated to be related to infiltrates. No gross pleural  effusion or pneumothorax is seen      Impression: Poor inspiration.      Mild cardiomegaly.      Bilateral infiltrates.              MACRO:  None      Signed by: Mercedes Salazar 5/9/2024 1:52 PM  Dictation workstation:   [id number]  CT chest abdomen pelvis wo IV contrast  Narrative: Interpreted By:  Kishor Muniz,   STUDY:  CT CHEST ABDOMEN PELVIS WO CONTRAST; ;  5/9/2024 12:09 pm      INDICATION:  Signs/Symptoms:AMS tachycardia.      COMPARISON:  05/22/2022      ACCESSION NUMBER(S):  [id number]      ORDERING CLINICIAN:  ROASURA CHACKO      TECHNIQUE:  Serial axial unenhanced CT images obtained of the chest, abdomen, and  pelvis. Images reformatted in the coronal and sagittal projection      All CT examinations are performed with 1 or more of the following  dose reduction techniques: Automated exposure control, adjustment of  mA and/or kv according to patient's size, or use of iterative  reconstruction techniques.      FINDINGS:  CT chest:      Mediastinum demonstrates right paratracheal lymph node in the  superior mediastinum measuring 8 x 6 mm not seen on the prior  examination. Also, there is component of precarinal lymphadenopathy  identified intervally developed from prior imaging measuring 12 x 8  mm. Subcarinal lymph node identified measures 9 mm in the short axis.  Esophagus is unremarkable      Heart and great vessels demonstrate mild vascular calcification of  the aortic arch. Ascending thoracic aorta measures 2.5 cm. No  cardiomegaly demonstrated.  Sliver of pericardial effusion noted.      Lung parenchyma demonstrates patchy areas of consolidation throughout  bilateral lung fields with more focal dense airspace consolidation in  the dependent portions of the medial segment of the right lower lobe  as well as medial basal segment of the left lower lobe in the  posterior perihilar distribution with associated air bronchograms  demonstrated. Other patchy areas of consolidation in the right  perihilar location in particular within the right upper lobe with  multifocal pneumonia. There are small bilateral basilar pleural  effusions. Within the areas of dense airspace consolidation there are  linear appearing areas of hyperdensity. Correlate with recent  contrast administration and aspiration.      Visualized osseous structures demonstrate multilevel degenerative  discogenic changes lower thoracic spine with multilevel anterior  osteophyte formation.      CT abdomen:      Liver is unremarkable within limits of this unenhanced CT      Spleen and adrenal glands are unremarkable      Status post cholecystectomy      Pancreas is unremarkable      Right kidney demonstrates no hydronephrosis or nephrolithiasis.  Visualized course of the right ureter is unremarkable.      Left kidney demonstrates no hydronephrosis or nephrolithiasis.      Retroperitoneum demonstrates flattened IVC. Correlate with component  of hypovolemia. Mild vascular calcification of the abdominal aorta      Loops of large bowel demonstrate fluid-filled portions of the  proximal to mid colon. Mild stool-filled distal colon with hyperdense  component within the fecal column unclear if this relates to recent  contrast administration. Small bowel loops are nondilated.  Postsurgical changes are demonstrated with enteroenteric anastomosis  in the right lower quadrant appearing unremarkable. No perienteric  fat stranding. Stomach is unremarkable with dependent hyperdense  fluid in the stomach lumen.      CT  pelvis:      Unopacified bladder is unremarkable. There is no pelvic  lymphadenopathy. No free fluid demonstrated. Uterus and adnexa are  unremarkable      Visualized osseous structures demonstrate moderate osteoarthritic  degenerative change bilateral hips. Mild bilateral SI joint  osteoarthritis demonstrated. There is moderate facet arthropathy L4/5.                      Impression: 1. Multifocal pneumonia with more dense airspace consolidation  demonstrated within the medial right and left lower lobes in the  posterior perihilar location. Scattered patchy areas of ground-glass  airspace infiltrate and consolidation within bilateral lung fields.  Follow-up to clearing.      2. The IVC is flattened in appearance suggesting component of  hypovolemia. Correlate      3. No dilated loops of bowel. Postsurgical changes with enteroenteric  anastomosis in the right lower quadrant appearing unremarkable.          MACRO:  None      Signed by: Kishor Muniz 5/9/2024 1:01 PM  Dictation workstation:   [id number]  CT brain attack head wo IV contrast  Narrative: Interpreted By:  Michael Lucia,   STUDY:  CT BRAIN ATTACK HEAD WO IV CONTRAST  5/9/2024 12:02 pm      INDICATION:  Signs/Symptoms:AMS      COMPARISON:  04/22/2024      ACCESSION NUMBER(S):  [id number]      ORDERING CLINICIAN:  ROSAURA CHACKO      TECHNIQUE:  Contiguous axial CT images of the brain were obtained without IV  contrast.      FINDINGS:  The ventricles, cisterns and sulci are prominent, consistent with  moderate diffuse volume loss. Areas of white matter low attenuation  are nonspecific but likely related to chronic microvascular disease.  There is intracranial atherosclerosis.      Gray-white differentiation is preserved.  No acute intracranial hemorrhage or mass effect.  No midline shift. Patent basal cisterns.  No extraaxial fluid collections.      The calvaria is intact.  The visualized paranasal sinuses and mastoid air cells are clear.       Impression: No acute intracranial pathology.          MACRO:  Michael Lucia discussed the significance and urgency of this critical  finding by telephone with  ROSAURA CHACKO on 5/9/2024 at 12:21 pm.  (**-RCF-**) Findings:  See findings.          Signed by: Michael Lucia 5/9/2024 12:21 PM  Dictation workstation:   [id number]        EKG:   Tele:     SIGNATURE: Radha Ward MD  DATE: May 9, 2024  TIME: 11:24 PM

## 2024-05-11 LAB
ALBUMIN SERPL BCP-MCNC: 2.2 G/DL (ref 3.4–5)
ALP SERPL-CCNC: 67 U/L (ref 33–110)
ALT SERPL W P-5'-P-CCNC: 14 U/L (ref 7–45)
ANION GAP SERPL CALC-SCNC: 11 MMOL/L (ref 10–20)
AST SERPL W P-5'-P-CCNC: 38 U/L (ref 9–39)
BILIRUB SERPL-MCNC: 0.4 MG/DL (ref 0–1.2)
BUN SERPL-MCNC: 19 MG/DL (ref 6–23)
CALCIUM SERPL-MCNC: 6.8 MG/DL (ref 8.6–10.3)
CHLORIDE SERPL-SCNC: 113 MMOL/L (ref 98–107)
CO2 SERPL-SCNC: 24 MMOL/L (ref 21–32)
CREAT SERPL-MCNC: 0.89 MG/DL (ref 0.5–1.05)
EGFRCR SERPLBLD CKD-EPI 2021: 77 ML/MIN/1.73M*2
ERYTHROCYTE [DISTWIDTH] IN BLOOD BY AUTOMATED COUNT: 17.2 % (ref 11.5–14.5)
GLUCOSE BLD MANUAL STRIP-MCNC: 113 MG/DL (ref 74–99)
GLUCOSE BLD MANUAL STRIP-MCNC: 129 MG/DL (ref 74–99)
GLUCOSE BLD MANUAL STRIP-MCNC: 154 MG/DL (ref 74–99)
GLUCOSE BLD MANUAL STRIP-MCNC: 158 MG/DL (ref 74–99)
GLUCOSE BLD MANUAL STRIP-MCNC: 161 MG/DL (ref 74–99)
GLUCOSE BLD MANUAL STRIP-MCNC: 172 MG/DL (ref 74–99)
GLUCOSE SERPL-MCNC: 146 MG/DL (ref 74–99)
HCT VFR BLD AUTO: 24 % (ref 36–46)
HGB BLD-MCNC: 7.4 G/DL (ref 12–16)
MCH RBC QN AUTO: 30.7 PG (ref 26–34)
MCHC RBC AUTO-ENTMCNC: 30.8 G/DL (ref 32–36)
MCV RBC AUTO: 100 FL (ref 80–100)
NRBC BLD-RTO: 0.3 /100 WBCS (ref 0–0)
PLATELET # BLD AUTO: 227 X10*3/UL (ref 150–450)
POTASSIUM SERPL-SCNC: 3.2 MMOL/L (ref 3.5–5.3)
PROT SERPL-MCNC: 5.5 G/DL (ref 6.4–8.2)
RBC # BLD AUTO: 2.41 X10*6/UL (ref 4–5.2)
SODIUM SERPL-SCNC: 145 MMOL/L (ref 136–145)
VANCOMYCIN SERPL-MCNC: 25.3 UG/ML (ref 5–20)
WBC # BLD AUTO: 19.1 X10*3/UL (ref 4.4–11.3)

## 2024-05-11 PROCEDURE — 85027 COMPLETE CBC AUTOMATED: CPT | Performed by: INTERNAL MEDICINE

## 2024-05-11 PROCEDURE — 94640 AIRWAY INHALATION TREATMENT: CPT

## 2024-05-11 PROCEDURE — 36415 COLL VENOUS BLD VENIPUNCTURE: CPT | Performed by: INTERNAL MEDICINE

## 2024-05-11 PROCEDURE — 2500000002 HC RX 250 W HCPCS SELF ADMINISTERED DRUGS (ALT 637 FOR MEDICARE OP, ALT 636 FOR OP/ED): Performed by: INTERNAL MEDICINE

## 2024-05-11 PROCEDURE — 2500000001 HC RX 250 WO HCPCS SELF ADMINISTERED DRUGS (ALT 637 FOR MEDICARE OP): Performed by: INTERNAL MEDICINE

## 2024-05-11 PROCEDURE — 84075 ASSAY ALKALINE PHOSPHATASE: CPT | Performed by: INTERNAL MEDICINE

## 2024-05-11 PROCEDURE — 2500000004 HC RX 250 GENERAL PHARMACY W/ HCPCS (ALT 636 FOR OP/ED)

## 2024-05-11 PROCEDURE — 2500000004 HC RX 250 GENERAL PHARMACY W/ HCPCS (ALT 636 FOR OP/ED): Performed by: INTERNAL MEDICINE

## 2024-05-11 PROCEDURE — 2500000001 HC RX 250 WO HCPCS SELF ADMINISTERED DRUGS (ALT 637 FOR MEDICARE OP): Performed by: PEDIATRICS

## 2024-05-11 PROCEDURE — 2500000006 HC RX 250 W HCPCS SELF ADMINISTERED DRUGS (ALT 637 FOR ALL PAYERS): Performed by: INTERNAL MEDICINE

## 2024-05-11 PROCEDURE — 82947 ASSAY GLUCOSE BLOOD QUANT: CPT

## 2024-05-11 PROCEDURE — 80202 ASSAY OF VANCOMYCIN: CPT

## 2024-05-11 PROCEDURE — 2500000004 HC RX 250 GENERAL PHARMACY W/ HCPCS (ALT 636 FOR OP/ED): Performed by: PEDIATRICS

## 2024-05-11 PROCEDURE — 2060000001 HC INTERMEDIATE ICU ROOM DAILY

## 2024-05-11 PROCEDURE — 2500000005 HC RX 250 GENERAL PHARMACY W/O HCPCS: Performed by: INTERNAL MEDICINE

## 2024-05-11 RX ADMIN — VANCOMYCIN HYDROCHLORIDE 1500 MG: 1.5 INJECTION, POWDER, LYOPHILIZED, FOR SOLUTION INTRAVENOUS at 23:17

## 2024-05-11 RX ADMIN — FUROSEMIDE 20 MG: 20 TABLET ORAL at 09:31

## 2024-05-11 RX ADMIN — SERTRALINE HYDROCHLORIDE 200 MG: 50 TABLET ORAL at 09:31

## 2024-05-11 RX ADMIN — BACLOFEN 10 MG: 10 TABLET ORAL at 21:41

## 2024-05-11 RX ADMIN — SODIUM CHLORIDE 75 ML/HR: 9 INJECTION, SOLUTION INTRAVENOUS at 18:10

## 2024-05-11 RX ADMIN — APIXABAN 5 MG: 5 TABLET, FILM COATED ORAL at 21:41

## 2024-05-11 RX ADMIN — LEVOFLOXACIN 750 MG: 750 INJECTION, SOLUTION INTRAVENOUS at 00:30

## 2024-05-11 RX ADMIN — INSULIN LISPRO 2 UNITS: 100 INJECTION, SOLUTION INTRAVENOUS; SUBCUTANEOUS at 16:24

## 2024-05-11 RX ADMIN — LAMOTRIGINE 25 MG: 25 TABLET ORAL at 21:41

## 2024-05-11 RX ADMIN — IPRATROPIUM BROMIDE AND ALBUTEROL SULFATE 3 ML: 2.5; .5 SOLUTION RESPIRATORY (INHALATION) at 00:48

## 2024-05-11 RX ADMIN — IPRATROPIUM BROMIDE AND ALBUTEROL SULFATE 3 ML: 2.5; .5 SOLUTION RESPIRATORY (INHALATION) at 19:20

## 2024-05-11 RX ADMIN — BACLOFEN 10 MG: 10 TABLET ORAL at 09:31

## 2024-05-11 RX ADMIN — ATORVASTATIN CALCIUM 10 MG: 10 TABLET, FILM COATED ORAL at 21:40

## 2024-05-11 RX ADMIN — POTASSIUM CHLORIDE 20 MEQ: 1.5 POWDER, FOR SOLUTION ORAL at 09:32

## 2024-05-11 RX ADMIN — OXYCODONE HYDROCHLORIDE 5 MG: 5 TABLET ORAL at 06:40

## 2024-05-11 RX ADMIN — APIXABAN 5 MG: 5 TABLET, FILM COATED ORAL at 09:32

## 2024-05-11 RX ADMIN — IPRATROPIUM BROMIDE AND ALBUTEROL SULFATE 3 ML: 2.5; .5 SOLUTION RESPIRATORY (INHALATION) at 13:06

## 2024-05-11 RX ADMIN — AMLODIPINE BESYLATE 5 MG: 5 TABLET ORAL at 09:32

## 2024-05-11 RX ADMIN — LEVOTHYROXINE SODIUM 175 MCG: 125 TABLET ORAL at 06:35

## 2024-05-11 RX ADMIN — OXYCODONE HYDROCHLORIDE 5 MG: 5 TABLET ORAL at 21:41

## 2024-05-11 RX ADMIN — Medication 2 L/MIN: at 20:00

## 2024-05-11 RX ADMIN — IPRATROPIUM BROMIDE AND ALBUTEROL SULFATE 3 ML: 2.5; .5 SOLUTION RESPIRATORY (INHALATION) at 08:06

## 2024-05-11 RX ADMIN — DOLUTEGRAVIR SODIUM 50 MG: 50 TABLET, FILM COATED ORAL at 09:31

## 2024-05-11 RX ADMIN — INSULIN LISPRO 2 UNITS: 100 INJECTION, SOLUTION INTRAVENOUS; SUBCUTANEOUS at 12:35

## 2024-05-11 ASSESSMENT — PAIN - FUNCTIONAL ASSESSMENT
PAIN_FUNCTIONAL_ASSESSMENT: 0-10

## 2024-05-11 ASSESSMENT — COGNITIVE AND FUNCTIONAL STATUS - GENERAL
HELP NEEDED FOR BATHING: TOTAL
STANDING UP FROM CHAIR USING ARMS: TOTAL
TURNING FROM BACK TO SIDE WHILE IN FLAT BAD: TOTAL
DAILY ACTIVITIY SCORE: 6
TOILETING: TOTAL
DRESSING REGULAR LOWER BODY CLOTHING: TOTAL
EATING MEALS: TOTAL
CLIMB 3 TO 5 STEPS WITH RAILING: TOTAL
STANDING UP FROM CHAIR USING ARMS: TOTAL
WALKING IN HOSPITAL ROOM: TOTAL
WALKING IN HOSPITAL ROOM: TOTAL
MOVING FROM LYING ON BACK TO SITTING ON SIDE OF FLAT BED WITH BEDRAILS: TOTAL
HELP NEEDED FOR BATHING: TOTAL
CLIMB 3 TO 5 STEPS WITH RAILING: TOTAL
PERSONAL GROOMING: TOTAL
MOVING FROM LYING ON BACK TO SITTING ON SIDE OF FLAT BED WITH BEDRAILS: TOTAL
DRESSING REGULAR UPPER BODY CLOTHING: TOTAL
TOILETING: TOTAL
DRESSING REGULAR LOWER BODY CLOTHING: TOTAL
MOVING TO AND FROM BED TO CHAIR: TOTAL
DRESSING REGULAR UPPER BODY CLOTHING: TOTAL
MOVING TO AND FROM BED TO CHAIR: TOTAL
EATING MEALS: TOTAL
MOBILITY SCORE: 6
PERSONAL GROOMING: TOTAL
TURNING FROM BACK TO SIDE WHILE IN FLAT BAD: TOTAL
MOBILITY SCORE: 6
DAILY ACTIVITIY SCORE: 6

## 2024-05-11 ASSESSMENT — PAIN SCALES - GENERAL
PAINLEVEL_OUTOF10: 8
PAINLEVEL_OUTOF10: 0 - NO PAIN
PAINLEVEL_OUTOF10: 7
PAINLEVEL_OUTOF10: 5 - MODERATE PAIN
PAINLEVEL_OUTOF10: 0 - NO PAIN

## 2024-05-11 ASSESSMENT — PAIN DESCRIPTION - ORIENTATION: ORIENTATION: RIGHT

## 2024-05-11 NOTE — NURSING NOTE
Spoke with brain from radiology about getting the xray read, he stated he would put in a read request.

## 2024-05-11 NOTE — CARE PLAN
Problem: Pain - Adult  Goal: Verbalizes/displays adequate comfort level or baseline comfort level  Outcome: Progressing     Problem: Safety - Adult  Goal: Free from fall injury  Outcome: Progressing     Problem: Discharge Planning  Goal: Discharge to home or other facility with appropriate resources  Outcome: Progressing     Problem: Chronic Conditions and Co-morbidities  Goal: Patient's chronic conditions and co-morbidity symptoms are monitored and maintained or improved  Outcome: Progressing     Problem: Diabetes  Goal: Achieve decreasing blood glucose levels by end of shift  Outcome: Progressing  Goal: Decrease in ketones present in urine by end of shift  Outcome: Progressing  Goal: Maintain electrolyte levels within acceptable range throughout shift  Outcome: Progressing  Goal: Maintain glucose levels >70mg/dl to <250mg/dl throughout shift  Outcome: Progressing     Problem: Skin  Goal: Decreased wound size/increased tissue granulation at next dressing change  Outcome: Progressing  Goal: Participates in plan/prevention/treatment measures  Outcome: Progressing  Goal: Prevent/minimize sheer/friction injuries  Outcome: Progressing  Goal: Promote/optimize nutrition  Outcome: Progressing  Goal: Promote skin healing  Outcome: Progressing     Problem: Nutrition  Goal: Less than 5 days NPO/clear liquids  Outcome: Progressing  Goal: Oral intake greater 75%  Outcome: Progressing  Goal: Nutrition support is meeting 75% of nutrient needs  Outcome: Progressing  Goal: Lab values WNL  Outcome: Progressing  Goal: Promote healing  Outcome: Progressing   The patient's goals for the shift include      The clinical goals for the shift include Patient remain HDS throughout shift    Over the shift, the patient did not make progress toward the following goals. Barriers to progression include . Recommendations to address these barriers include .

## 2024-05-11 NOTE — CARE PLAN
Problem: Pain - Adult  Goal: Verbalizes/displays adequate comfort level or baseline comfort level  Outcome: Progressing     Problem: Safety - Adult  Goal: Free from fall injury  Outcome: Progressing     Problem: Discharge Planning  Goal: Discharge to home or other facility with appropriate resources  Outcome: Progressing     Problem: Chronic Conditions and Co-morbidities  Goal: Patient's chronic conditions and co-morbidity symptoms are monitored and maintained or improved  Outcome: Progressing     Problem: Diabetes  Goal: Achieve decreasing blood glucose levels by end of shift  Outcome: Progressing  Goal: Decrease in ketones present in urine by end of shift  Outcome: Progressing  Goal: Maintain electrolyte levels within acceptable range throughout shift  Outcome: Progressing  Goal: Maintain glucose levels >70mg/dl to <250mg/dl throughout shift  Outcome: Progressing     Problem: Skin  Goal: Decreased wound size/increased tissue granulation at next dressing change  Outcome: Progressing  Flowsheets (Taken 5/11/2024 2367)  Decreased wound size/increased tissue granulation at next dressing change: Protective dressings over bony prominences  Goal: Participates in plan/prevention/treatment measures  Outcome: Progressing  Goal: Prevent/minimize sheer/friction injuries  Outcome: Progressing  Goal: Promote/optimize nutrition  Outcome: Progressing  Goal: Promote skin healing  Outcome: Progressing     Problem: Nutrition  Goal: Less than 5 days NPO/clear liquids  Outcome: Progressing  Goal: Oral intake greater 75%  Outcome: Progressing  Goal: Nutrition support is meeting 75% of nutrient needs  Outcome: Progressing  Goal: Lab values WNL  Outcome: Progressing  Goal: Promote healing  Outcome: Progressing   The patient's goals for the shift include      The clinical goals for the shift include Pt to remain safe and hemodynamcally stable through end of shift    Over the shift, the patient did not make progress toward the following  goals. Barriers to progression include . Recommendations to address these barriers include .

## 2024-05-11 NOTE — PROGRESS NOTES
"Vancomycin Dosing by Pharmacy- FOLLOW UP    Janette Martinez is a 53 y.o. year old female who Pharmacy has been consulted for vancomycin dosing for pneumonia. Based on the patient's indication and renal status this patient is being dosed based on a goal AUC of 400-600.     Renal function is currently improving.    Current vancomycin dose: 1500 mg given every 24 hours    Estimated vancomycin AUC on current dose: 496 mg/L.hr     Visit Vitals  /60 (BP Location: Left arm, Patient Position: Lying)   Pulse 88   Temp 36.4 °C (97.5 °F) (Skin)   Resp 16        Lab Results   Component Value Date    CREATININE 0.89 05/11/2024    CREATININE 1.13 (H) 05/10/2024    CREATININE 1.29 (H) 05/09/2024    CREATININE 0.74 05/02/2024        Patient weight is No results found for: \"PTWEIGHT\"    No results found for: \"CULTURE\"     I/O last 3 completed shifts:  In: 941.3 (10.7 mL/kg) [I.V.:291.3 (3.3 mL/kg); IV Piggyback:650]  Out: 300 (3.4 mL/kg) [Urine:300 (0.1 mL/kg/hr)]  Weight: 88.3 kg   [unfilled]    Lab Results   Component Value Date    PATIENTTEMP 37.0 05/09/2024    PATIENTTEMP 37.0 04/19/2023        Assessment/Plan    Within goal AUC range. Continue current vancomycin regimen.    This dosing regimen is predicted by [employer] to result in the following pharmacokinetic parameters:  Exposure target: AUC24 (range)400-600 mg/L.hr   AUC24,ss: 496 mg/L.hr  Probability of AUC24 > 400: 91 %  Ctrough,ss: 12.6 mg/L  Probability of Ctrough,ss > 20: 5 %  Probability of nephrotoxicity (Lodise DONALD 2009): 8 %    The next level will be obtained on 5/12 at AM labs. May be obtained sooner if clinically indicated.   Will continue to monitor renal function daily while on vancomycin and order serum creatinine at least every 48 hours if not already ordered.  Follow for continued vancomycin needs, clinical response, and signs/symptoms of toxicity.       Faizan Paredes, PharmD           "

## 2024-05-11 NOTE — CONSULTS
"Vancomycin Dosing by Pharmacy- INITIAL    Janette Martinez is a 53 y.o. year old female who Pharmacy has been consulted for vancomycin dosing for pneumonia. Based on the patient's indication and renal status this patient will be dosed based on a goal AUC of 400-600.     Renal function is currently declining.    Visit Vitals  /60 (BP Location: Right arm, Patient Position: Lying)   Pulse 98   Temp 36.5 °C (97.7 °F) (Temporal)   Resp 18        Lab Results   Component Value Date    CREATININE 1.13 (H) 05/10/2024    CREATININE 1.29 (H) 05/09/2024    CREATININE 0.74 05/02/2024    CREATININE 0.71 05/02/2024        Patient weight is No results found for: \"PTWEIGHT\"    No results found for: \"CULTURE\"     I/O last 3 completed shifts:  In: 3291.3 (37.3 mL/kg) [I.V.:291.3 (3.3 mL/kg); IV Piggyback:3000]  Out: 30 (0.3 mL/kg) [Urine:30 (0 mL/kg/hr)]  Weight: 88.3 kg   [unfilled]    Lab Results   Component Value Date    PATIENTTEMP 37.0 05/09/2024    PATIENTTEMP 37.0 04/19/2023          Assessment/Plan     Patient has already been given a loading dose of 2,000 mg.  Will initiate vancomycin maintenance,  1,500 mg every 24 hours.    This dosing regimen is predicted by [employer] to result in the following pharmacokinetic parameters:    AUC24,ss: 453 mg/L.hr  Probability of AUC24 > 400: 64 %  Ctrough,ss: 12.8 mg/L  Probability of Ctrough,ss > 20: 16 %  Probability of nephrotoxicity (Lodise DONALD 2009): 8 %    Follow-up level will be ordered on 5/11/24 at 05:00 unless clinically indicated sooner.  Will continue to monitor renal function daily while on vancomycin and order serum creatinine at least every 48 hours if not already ordered.  Follow for continued vancomycin needs, clinical response, and signs/symptoms of toxicity.     Lexy Cleary, PharmD       "

## 2024-05-12 ENCOUNTER — APPOINTMENT (OUTPATIENT)
Dept: RADIOLOGY | Facility: HOSPITAL | Age: 54
DRG: 177 | End: 2024-05-12
Payer: MEDICARE

## 2024-05-12 LAB
ALBUMIN SERPL BCP-MCNC: 2.5 G/DL (ref 3.4–5)
ALP SERPL-CCNC: 70 U/L (ref 33–110)
ALT SERPL W P-5'-P-CCNC: 15 U/L (ref 7–45)
ANION GAP SERPL CALC-SCNC: 10 MMOL/L (ref 10–20)
AST SERPL W P-5'-P-CCNC: 37 U/L (ref 9–39)
BACTERIA UR CULT: ABNORMAL
BILIRUB SERPL-MCNC: 0.4 MG/DL (ref 0–1.2)
BUN SERPL-MCNC: 14 MG/DL (ref 6–23)
CALCIUM SERPL-MCNC: 6.8 MG/DL (ref 8.6–10.3)
CHLORIDE SERPL-SCNC: 107 MMOL/L (ref 98–107)
CO2 SERPL-SCNC: 25 MMOL/L (ref 21–32)
CREAT SERPL-MCNC: 0.77 MG/DL (ref 0.5–1.05)
EGFRCR SERPLBLD CKD-EPI 2021: >90 ML/MIN/1.73M*2
ERYTHROCYTE [DISTWIDTH] IN BLOOD BY AUTOMATED COUNT: 16.7 % (ref 11.5–14.5)
GLUCOSE BLD MANUAL STRIP-MCNC: 182 MG/DL (ref 74–99)
GLUCOSE BLD MANUAL STRIP-MCNC: 193 MG/DL (ref 74–99)
GLUCOSE BLD MANUAL STRIP-MCNC: 194 MG/DL (ref 74–99)
GLUCOSE BLD MANUAL STRIP-MCNC: 200 MG/DL (ref 74–99)
GLUCOSE BLD MANUAL STRIP-MCNC: 216 MG/DL (ref 74–99)
GLUCOSE BLD MANUAL STRIP-MCNC: 229 MG/DL (ref 74–99)
GLUCOSE SERPL-MCNC: 234 MG/DL (ref 74–99)
HCT VFR BLD AUTO: 21.9 % (ref 36–46)
HGB BLD-MCNC: 7 G/DL (ref 12–16)
MCH RBC QN AUTO: 30 PG (ref 26–34)
MCHC RBC AUTO-ENTMCNC: 32 G/DL (ref 32–36)
MCV RBC AUTO: 94 FL (ref 80–100)
NRBC BLD-RTO: 0.1 /100 WBCS (ref 0–0)
PLATELET # BLD AUTO: 227 X10*3/UL (ref 150–450)
POTASSIUM SERPL-SCNC: 3.6 MMOL/L (ref 3.5–5.3)
PROT SERPL-MCNC: 5.6 G/DL (ref 6.4–8.2)
RBC # BLD AUTO: 2.33 X10*6/UL (ref 4–5.2)
SODIUM SERPL-SCNC: 138 MMOL/L (ref 136–145)
VANCOMYCIN SERPL-MCNC: 28.6 UG/ML (ref 5–20)
WBC # BLD AUTO: 13.8 X10*3/UL (ref 4.4–11.3)

## 2024-05-12 PROCEDURE — 80202 ASSAY OF VANCOMYCIN: CPT | Performed by: PHARMACIST

## 2024-05-12 PROCEDURE — 74018 RADEX ABDOMEN 1 VIEW: CPT | Performed by: RADIOLOGY

## 2024-05-12 PROCEDURE — 2500000001 HC RX 250 WO HCPCS SELF ADMINISTERED DRUGS (ALT 637 FOR MEDICARE OP): Performed by: PEDIATRICS

## 2024-05-12 PROCEDURE — 2500000001 HC RX 250 WO HCPCS SELF ADMINISTERED DRUGS (ALT 637 FOR MEDICARE OP): Performed by: INTERNAL MEDICINE

## 2024-05-12 PROCEDURE — 2500000002 HC RX 250 W HCPCS SELF ADMINISTERED DRUGS (ALT 637 FOR MEDICARE OP, ALT 636 FOR OP/ED): Performed by: INTERNAL MEDICINE

## 2024-05-12 PROCEDURE — 74018 RADEX ABDOMEN 1 VIEW: CPT

## 2024-05-12 PROCEDURE — 71045 X-RAY EXAM CHEST 1 VIEW: CPT | Performed by: RADIOLOGY

## 2024-05-12 PROCEDURE — 2500000004 HC RX 250 GENERAL PHARMACY W/ HCPCS (ALT 636 FOR OP/ED): Performed by: PEDIATRICS

## 2024-05-12 PROCEDURE — 82947 ASSAY GLUCOSE BLOOD QUANT: CPT | Mod: 91,MUE

## 2024-05-12 PROCEDURE — 80053 COMPREHEN METABOLIC PANEL: CPT | Performed by: INTERNAL MEDICINE

## 2024-05-12 PROCEDURE — 85027 COMPLETE CBC AUTOMATED: CPT | Performed by: INTERNAL MEDICINE

## 2024-05-12 PROCEDURE — 2500000005 HC RX 250 GENERAL PHARMACY W/O HCPCS: Performed by: INTERNAL MEDICINE

## 2024-05-12 PROCEDURE — 2060000001 HC INTERMEDIATE ICU ROOM DAILY

## 2024-05-12 PROCEDURE — 31720 CLEARANCE OF AIRWAYS: CPT

## 2024-05-12 PROCEDURE — 36415 COLL VENOUS BLD VENIPUNCTURE: CPT | Performed by: INTERNAL MEDICINE

## 2024-05-12 PROCEDURE — 94640 AIRWAY INHALATION TREATMENT: CPT

## 2024-05-12 PROCEDURE — 2500000006 HC RX 250 W HCPCS SELF ADMINISTERED DRUGS (ALT 637 FOR ALL PAYERS): Performed by: INTERNAL MEDICINE

## 2024-05-12 RX ADMIN — IPRATROPIUM BROMIDE AND ALBUTEROL SULFATE 3 ML: 2.5; .5 SOLUTION RESPIRATORY (INHALATION) at 13:50

## 2024-05-12 RX ADMIN — LEVOFLOXACIN 750 MG: 750 INJECTION, SOLUTION INTRAVENOUS at 02:24

## 2024-05-12 RX ADMIN — OXYCODONE HYDROCHLORIDE 5 MG: 5 TABLET ORAL at 20:37

## 2024-05-12 RX ADMIN — INSULIN LISPRO 2 UNITS: 100 INJECTION, SOLUTION INTRAVENOUS; SUBCUTANEOUS at 12:46

## 2024-05-12 RX ADMIN — DOLUTEGRAVIR SODIUM 50 MG: 50 TABLET, FILM COATED ORAL at 10:44

## 2024-05-12 RX ADMIN — FUROSEMIDE 20 MG: 20 TABLET ORAL at 10:44

## 2024-05-12 RX ADMIN — APIXABAN 5 MG: 5 TABLET, FILM COATED ORAL at 10:44

## 2024-05-12 RX ADMIN — INSULIN LISPRO 2 UNITS: 100 INJECTION, SOLUTION INTRAVENOUS; SUBCUTANEOUS at 17:09

## 2024-05-12 RX ADMIN — BACLOFEN 10 MG: 10 TABLET ORAL at 10:43

## 2024-05-12 RX ADMIN — ATORVASTATIN CALCIUM 10 MG: 10 TABLET, FILM COATED ORAL at 20:37

## 2024-05-12 RX ADMIN — POTASSIUM CHLORIDE 20 MEQ: 1.5 POWDER, FOR SOLUTION ORAL at 10:44

## 2024-05-12 RX ADMIN — SERTRALINE HYDROCHLORIDE 200 MG: 50 TABLET ORAL at 10:44

## 2024-05-12 RX ADMIN — INSULIN LISPRO 4 UNITS: 100 INJECTION, SOLUTION INTRAVENOUS; SUBCUTANEOUS at 09:07

## 2024-05-12 RX ADMIN — IPRATROPIUM BROMIDE AND ALBUTEROL SULFATE 3 ML: 2.5; .5 SOLUTION RESPIRATORY (INHALATION) at 08:02

## 2024-05-12 RX ADMIN — LEVOTHYROXINE SODIUM 175 MCG: 125 TABLET ORAL at 07:05

## 2024-05-12 RX ADMIN — APIXABAN 5 MG: 5 TABLET, FILM COATED ORAL at 20:37

## 2024-05-12 RX ADMIN — Medication 2 L/MIN: at 01:25

## 2024-05-12 RX ADMIN — AMLODIPINE BESYLATE 5 MG: 5 TABLET ORAL at 10:44

## 2024-05-12 RX ADMIN — IPRATROPIUM BROMIDE AND ALBUTEROL SULFATE 3 ML: 2.5; .5 SOLUTION RESPIRATORY (INHALATION) at 01:25

## 2024-05-12 RX ADMIN — IPRATROPIUM BROMIDE AND ALBUTEROL SULFATE 3 ML: 2.5; .5 SOLUTION RESPIRATORY (INHALATION) at 19:11

## 2024-05-12 RX ADMIN — LAMOTRIGINE 25 MG: 25 TABLET ORAL at 20:37

## 2024-05-12 RX ADMIN — Medication 1 L/MIN: at 08:00

## 2024-05-12 RX ADMIN — BACLOFEN 10 MG: 10 TABLET ORAL at 20:37

## 2024-05-12 ASSESSMENT — COGNITIVE AND FUNCTIONAL STATUS - GENERAL
WALKING IN HOSPITAL ROOM: TOTAL
HELP NEEDED FOR BATHING: TOTAL
PERSONAL GROOMING: TOTAL
DAILY ACTIVITIY SCORE: 6
MOBILITY SCORE: 6
DRESSING REGULAR LOWER BODY CLOTHING: TOTAL
MOBILITY SCORE: 6
TOILETING: TOTAL
PERSONAL GROOMING: TOTAL
TOILETING: TOTAL
EATING MEALS: TOTAL
CLIMB 3 TO 5 STEPS WITH RAILING: TOTAL
DRESSING REGULAR LOWER BODY CLOTHING: TOTAL
MOVING TO AND FROM BED TO CHAIR: TOTAL
CLIMB 3 TO 5 STEPS WITH RAILING: TOTAL
TURNING FROM BACK TO SIDE WHILE IN FLAT BAD: TOTAL
EATING MEALS: TOTAL
DRESSING REGULAR UPPER BODY CLOTHING: TOTAL
MOVING TO AND FROM BED TO CHAIR: TOTAL
WALKING IN HOSPITAL ROOM: TOTAL
MOVING FROM LYING ON BACK TO SITTING ON SIDE OF FLAT BED WITH BEDRAILS: TOTAL
HELP NEEDED FOR BATHING: TOTAL
TURNING FROM BACK TO SIDE WHILE IN FLAT BAD: TOTAL
DAILY ACTIVITIY SCORE: 6
STANDING UP FROM CHAIR USING ARMS: TOTAL
STANDING UP FROM CHAIR USING ARMS: TOTAL
DRESSING REGULAR UPPER BODY CLOTHING: TOTAL
MOVING FROM LYING ON BACK TO SITTING ON SIDE OF FLAT BED WITH BEDRAILS: TOTAL

## 2024-05-12 ASSESSMENT — PAIN SCALES - PAIN ASSESSMENT IN ADVANCED DEMENTIA (PAINAD)
CONSOLABILITY: NO NEED TO CONSOLE
BODYLANGUAGE: RELAXED
FACIALEXPRESSION: SMILING OR INEXPRESSIVE
TOTALSCORE: 0
BREATHING: NORMAL
BREATHING: NORMAL
CONSOLABILITY: NO NEED TO CONSOLE

## 2024-05-12 ASSESSMENT — PAIN SCALES - GENERAL
PAINLEVEL_OUTOF10: 0 - NO PAIN
PAINLEVEL_OUTOF10: 9
PAINLEVEL_OUTOF10: 2
PAINLEVEL_OUTOF10: 4
PAINLEVEL_OUTOF10: 0 - NO PAIN
PAINLEVEL_OUTOF10: 5 - MODERATE PAIN

## 2024-05-12 ASSESSMENT — PAIN - FUNCTIONAL ASSESSMENT
PAIN_FUNCTIONAL_ASSESSMENT: 0-10

## 2024-05-12 NOTE — PROGRESS NOTES
Janette Martinez is a 53 y.o. female on day 2 of admission presenting with AMS (altered mental status).      Subjective   HPI: This is a 53 y.o. female who was recently discharged after an admission for encephalopathy/sepsis.  Patient had a protracted hospitalization last visit, one of her issues was swallowing which was found to be okay for the puréed diet honey thickened liquids.  Today I was called from the nursing home with concerns for fever/altered mental status/suspicion for aspiration.  Discussed with director of nursing at the facility-patient was a full code and wanted her transferred to the ER by EMS.  After patient's arrival in the ER and further evaluation discussed with emergency room physician and agreed with hospitalization, as patient's condition was worsening in the ER the provider contacted patient's sister/POA and patient is currently DO NOT RESUSCITATE with limitations.  Patient is too drowsy to answer any questions, turns when called, not answering any questions     Objective     Last Recorded Vitals  /65 (BP Location: Left arm, Patient Position: Lying)   Pulse 92   Temp 36.5 °C (97.7 °F) (Temporal)   Resp 16   Wt 88.3 kg (194 lb 10.7 oz)   SpO2 95%   Intake/Output last 3 Shifts:    Intake/Output Summary (Last 24 hours) at 5/11/2024 2312  Last data filed at 5/11/2024 1602  Gross per 24 hour   Intake 500 ml   Output 1500 ml   Net -1000 ml       Admission Weight  Weight: 90.7 kg (200 lb) (05/09/24 1204)    Daily Weight  05/09/24 : 88.3 kg (194 lb 10.7 oz)    Image Results  XR chest 1 view  Narrative: Interpreted By:  Desi Ordonez,   STUDY:  XR CHEST 1 VIEW;  5/10/2024 7:45 pm      INDICATION:  Signs/Symptoms:Nasal gastric tube placement      COMPARISON:  Chest x-ray 05/09/2024      ACCESSION NUMBER(S):  [id number]      ORDERING CLINICIAN:  ARCELIA CRAFT      TECHNIQUE:  Portable upright frontal view of the chest was obtained .      FINDINGS:  There is an enteric tube bent in  the region of the gastric antrum  with the tip in the region of the gastric body.      The heart is enlarged. There is mild vascular congestion and  interstitial edema.      There are ill-defined bilateral airspace opacities. There are trace  bilateral pleural effusions. No pneumothorax.      Impression: 1. Enteric tube bent in the region of the gastric antrum with the tip  in the region of the gastric body.  2. Cardiomegaly. Mild vascular congestion and interstitial edema.  Trace bilateral pleural effusions. Ill-defined bilateral airspace  opacities, suggestive of multifocal pneumonia.              MACRO:  None.      Signed by: Desi Ordonez 5/10/2024 9:53 PM  Dictation workstation:   [id number]  Electrocardiogram, 12-lead PRN ACS symptoms  Sinus tachycardia  ST & T wave abnormality, consider lateral ischemia  Abnormal ECG  When compared with ECG of 24-APR-2024 08:17,  Significant changes have occurred  See ED provider note for full interpretation and clinical correlation  Confirmed by Columba Islas ([id number]) on 5/10/2024 11:06:45 AM      Physical Exam  GENERAL: awake, Ox0 cSKIN: Skin turgor normal. No rashes  HEENT: no epistaxis, Moist mucosa.  NECK: supple  BACK: spine nontender to palpation, No CVAT.  LUNGS: Vesicular breath sounds, with no wheeze, no crepitations.   CARDIAC: REGULAR. S1 and S2; no rubs, no murmur  ABDOMEN: Abdomen soft, non-tender. BS+  EXTREMITIES: No edema, Good capillary refill.   NEURO: History of dysarthria, hemiparesis, but today's exam is limited   MUSCULOSKELETAL: No acute inflammation    Relevant Results    Results for orders placed or performed during the hospital encounter of 05/09/24 (from the past 24 hour(s))   POCT GLUCOSE   Result Value Ref Range    POCT Glucose 113 (H) 74 - 99 mg/dL   POCT GLUCOSE   Result Value Ref Range    POCT Glucose 158 (H) 74 - 99 mg/dL   CBC   Result Value Ref Range    WBC 19.1 (H) 4.4 - 11.3 x10*3/uL    nRBC 0.3 (H) 0.0 - 0.0 /100 WBCs    RBC  2.41 (L) 4.00 - 5.20 x10*6/uL    Hemoglobin 7.4 (L) 12.0 - 16.0 g/dL    Hematocrit 24.0 (L) 36.0 - 46.0 %     80 - 100 fL    MCH 30.7 26.0 - 34.0 pg    MCHC 30.8 (L) 32.0 - 36.0 g/dL    RDW 17.2 (H) 11.5 - 14.5 %    Platelets 227 150 - 450 x10*3/uL   Comprehensive Metabolic Panel   Result Value Ref Range    Glucose 146 (H) 74 - 99 mg/dL    Sodium 145 136 - 145 mmol/L    Potassium 3.2 (L) 3.5 - 5.3 mmol/L    Chloride 113 (H) 98 - 107 mmol/L    Bicarbonate 24 21 - 32 mmol/L    Anion Gap 11 10 - 20 mmol/L    Urea Nitrogen 19 6 - 23 mg/dL    Creatinine 0.89 0.50 - 1.05 mg/dL    eGFR 77 >60 mL/min/1.73m*2    Calcium 6.8 (L) 8.6 - 10.3 mg/dL    Albumin 2.2 (L) 3.4 - 5.0 g/dL    Alkaline Phosphatase 67 33 - 110 U/L    Total Protein 5.5 (L) 6.4 - 8.2 g/dL    AST 38 9 - 39 U/L    Bilirubin, Total 0.4 0.0 - 1.2 mg/dL    ALT 14 7 - 45 U/L   Vancomycin   Result Value Ref Range    Vancomycin 25.3 (H) 5.0 - 20.0 ug/mL   POCT GLUCOSE   Result Value Ref Range    POCT Glucose 129 (H) 74 - 99 mg/dL   POCT GLUCOSE   Result Value Ref Range    POCT Glucose 154 (H) 74 - 99 mg/dL   POCT GLUCOSE   Result Value Ref Range    POCT Glucose 172 (H) 74 - 99 mg/dL   POCT GLUCOSE   Result Value Ref Range    POCT Glucose 161 (H) 74 - 99 mg/dL       Scheduled medications  amLODIPine, 5 mg, oral, Daily  apixaban, 5 mg, oral, BID  atorvastatin, 10 mg, oral, Daily  baclofen, 10 mg, oral, BID  dolutegravir, 50 mg, oral, Daily  furosemide, 20 mg, oral, Daily  insulin lispro, 0-10 Units, subcutaneous, TID with meals  ipratropium-albuteroL, 3 mL, nebulization, q6h  lamoTRIgine, 25 mg, oral, q PM  levoFLOXacin, 750 mg, intravenous, q24h  levothyroxine, 175 mcg, oral, Daily before breakfast  oxygen, , inhalation, Continuous - Inhalation  potassium chloride, 20 mEq, nasogastric tube, Daily  sertraline, 200 mg, oral, Daily  vancomycin, 1,500 mg, intravenous, q24h      Continuous medications  sodium chloride 0.9%, 75 mL/hr, Last Rate: 75 mL/hr (05/11/24  1810)      PRN medications  PRN medications: dextrose, dextrose, glucagon, glucagon, hydrALAZINE, ipratropium-albuteroL, oxyCODONE, vancomycin             Assessment/Plan      # Encephalopathy  - improved today     # Acute respiratory failure  -DNR with limitations per family  -Not a current candidate for ICU care or intubation     # Probable aspiration pneumonia  -Will initiate IV antibiotics     # YESSICA     # Old right hemiparesis/dysarthria     HIV:  Hx graves s/p thyroidectomy   Post surgical hypothyroid  HTN  HLP  T2DM  CKD3    5/10 : Pt has poor swallowing - seen by ST - will start NGT feeds over the weekend - NGT placed today. Pt more awake - was waving at me - speaking appropriately.     5/11-patient seen at bedside, patient's sister/POA at bedside discussed with both of them about poor p.o. intake and the need for a more definitive plan.  They both agree for PEG if needed patient to get MBS study done on Monday, will consult GI if patient fails swallowing.  She may need a PEG even if she able to swallow because her intake has been less than 50%.  Radha Ward MD

## 2024-05-12 NOTE — PROGRESS NOTES
"Vancomycin Dosing by Pharmacy- FOLLOW UP    Janette Martinez is a 53 y.o. year old female who Pharmacy has been consulted for vancomycin dosing for pneumonia. Based on the patient's indication and renal status this patient is being dosed based on a goal AUC of 400-600.     Renal function is currently improving/ now approximately at baseline.    Current vancomycin dose: 1500 mg given every 24 hours    Estimated vancomycin AUC on current dose: 515 mg/L.hr     Visit Vitals  /72 (BP Location: Right arm, Patient Position: Lying)   Pulse 92   Temp 36.4 °C (97.5 °F) (Temporal)   Resp 16        Lab Results   Component Value Date    CREATININE 0.77 05/12/2024    CREATININE 0.89 05/11/2024    CREATININE 1.13 (H) 05/10/2024    CREATININE 1.29 (H) 05/09/2024        Patient weight is No results found for: \"PTWEIGHT\"    No results found for: \"CULTURE\"     I/O last 3 completed shifts:  In: 1150 (12.5 mL/kg) [IV Piggyback:1150]  Out: 1950 (21.1 mL/kg) [Urine:1950 (0.6 mL/kg/hr)]  Weight: 92.3 kg   [unfilled]    Lab Results   Component Value Date    PATIENTTEMP 37.0 05/09/2024    PATIENTTEMP 37.0 04/19/2023        Assessment/Plan    Within goal AUC range. Continue current vancomycin regimen.    This dosing regimen is predicted by [employer] to result in the following pharmacokinetic parameters:  Exposure target: AUC24 (range)400-600 mg/L.hr   AUC24,ss: 515 mg/L.hr  Probability of AUC24 > 400: 96 %  Ctrough,ss: 13.4 mg/L  Probability of Ctrough,ss > 20: 6 %  Probability of nephrotoxicity (Lodise DONALD 2009): 9 %    The next level will be obtained on 5/14 at AM labs. May be obtained sooner if clinically indicated.   Will continue to monitor renal function daily while on vancomycin and order serum creatinine at least every 48 hours if not already ordered.  Follow for continued vancomycin needs, clinical response, and signs/symptoms of toxicity.       Faizan Paredes, PharmD           "

## 2024-05-12 NOTE — CARE PLAN
Problem: Pain - Adult  Goal: Verbalizes/displays adequate comfort level or baseline comfort level  Outcome: Progressing     Problem: Safety - Adult  Goal: Free from fall injury  Outcome: Progressing     Problem: Discharge Planning  Goal: Discharge to home or other facility with appropriate resources  Outcome: Progressing     Problem: Chronic Conditions and Co-morbidities  Goal: Patient's chronic conditions and co-morbidity symptoms are monitored and maintained or improved  Outcome: Progressing     Problem: Diabetes  Goal: Achieve decreasing blood glucose levels by end of shift  Outcome: Progressing  Goal: Decrease in ketones present in urine by end of shift  Outcome: Progressing  Goal: Maintain electrolyte levels within acceptable range throughout shift  Outcome: Progressing  Goal: Maintain glucose levels >70mg/dl to <250mg/dl throughout shift  Outcome: Progressing     Problem: Skin  Goal: Decreased wound size/increased tissue granulation at next dressing change  5/12/2024 1810 by Anamika Beavers RN  Outcome: Progressing  5/12/2024 1810 by Anamika Beavers RN  Flowsheets (Taken 5/12/2024 1810)  Decreased wound size/increased tissue granulation at next dressing change: Utilize specialty bed per algorithm  Goal: Participates in plan/prevention/treatment measures  Outcome: Progressing  Goal: Prevent/minimize sheer/friction injuries  Outcome: Progressing  Goal: Promote/optimize nutrition  Outcome: Progressing  Goal: Promote skin healing  Outcome: Progressing     Problem: Nutrition  Goal: Less than 5 days NPO/clear liquids  Outcome: Progressing  Goal: Oral intake greater 75%  Outcome: Progressing  Goal: Nutrition support is meeting 75% of nutrient needs  Outcome: Progressing  Goal: Lab values WNL  Outcome: Progressing  Goal: Promote healing  Outcome: Progressing   The patient's goals for the shift include      The clinical goals for the shift include Pt to remain hemodyamically stable through end of shift    Over  the shift, the patient did not make progress toward the following goals. Barriers to progression include . Recommendations to address these barriers include .

## 2024-05-12 NOTE — NURSING NOTE
On assessment this am NG tube noted no longer measuring at 60 which it did the yesterday per documentation. Tube feeding shut off and tube re-advanced in rt nare to 60. Tube feeding remains off and MD contacted for CXR orders for placement check.    1041- per xray results NG tube is in stomach. MD notified and gave instructions to resume medication administration as well as feedings.

## 2024-05-13 LAB
ALBUMIN SERPL BCP-MCNC: 2.3 G/DL (ref 3.4–5)
ALP SERPL-CCNC: 67 U/L (ref 33–110)
ALT SERPL W P-5'-P-CCNC: 12 U/L (ref 7–45)
ANION GAP SERPL CALC-SCNC: 10 MMOL/L (ref 10–20)
AST SERPL W P-5'-P-CCNC: 29 U/L (ref 9–39)
BACTERIA BLD CULT: NORMAL
BILIRUB SERPL-MCNC: 0.3 MG/DL (ref 0–1.2)
BUN SERPL-MCNC: 11 MG/DL (ref 6–23)
CALCIUM SERPL-MCNC: 7.1 MG/DL (ref 8.6–10.3)
CHLORIDE SERPL-SCNC: 102 MMOL/L (ref 98–107)
CO2 SERPL-SCNC: 25 MMOL/L (ref 21–32)
CREAT SERPL-MCNC: 0.75 MG/DL (ref 0.5–1.05)
EGFRCR SERPLBLD CKD-EPI 2021: >90 ML/MIN/1.73M*2
ERYTHROCYTE [DISTWIDTH] IN BLOOD BY AUTOMATED COUNT: 16.5 % (ref 11.5–14.5)
GLUCOSE BLD MANUAL STRIP-MCNC: 162 MG/DL (ref 74–99)
GLUCOSE BLD MANUAL STRIP-MCNC: 187 MG/DL (ref 74–99)
GLUCOSE BLD MANUAL STRIP-MCNC: 207 MG/DL (ref 74–99)
GLUCOSE BLD MANUAL STRIP-MCNC: 309 MG/DL (ref 74–99)
GLUCOSE SERPL-MCNC: 315 MG/DL (ref 74–99)
HCT VFR BLD AUTO: 22.5 % (ref 36–46)
HGB BLD-MCNC: 7.3 G/DL (ref 12–16)
MCH RBC QN AUTO: 30.8 PG (ref 26–34)
MCHC RBC AUTO-ENTMCNC: 32.4 G/DL (ref 32–36)
MCV RBC AUTO: 95 FL (ref 80–100)
NRBC BLD-RTO: 0.2 /100 WBCS (ref 0–0)
PLATELET # BLD AUTO: 254 X10*3/UL (ref 150–450)
POTASSIUM SERPL-SCNC: 3.7 MMOL/L (ref 3.5–5.3)
PROT SERPL-MCNC: 6 G/DL (ref 6.4–8.2)
RBC # BLD AUTO: 2.37 X10*6/UL (ref 4–5.2)
SODIUM SERPL-SCNC: 133 MMOL/L (ref 136–145)
WBC # BLD AUTO: 12.8 X10*3/UL (ref 4.4–11.3)

## 2024-05-13 PROCEDURE — 2500000004 HC RX 250 GENERAL PHARMACY W/ HCPCS (ALT 636 FOR OP/ED): Performed by: INTERNAL MEDICINE

## 2024-05-13 PROCEDURE — 82947 ASSAY GLUCOSE BLOOD QUANT: CPT | Mod: 91

## 2024-05-13 PROCEDURE — 92526 ORAL FUNCTION THERAPY: CPT | Mod: GN

## 2024-05-13 PROCEDURE — 80053 COMPREHEN METABOLIC PANEL: CPT | Performed by: INTERNAL MEDICINE

## 2024-05-13 PROCEDURE — 36415 COLL VENOUS BLD VENIPUNCTURE: CPT | Performed by: INTERNAL MEDICINE

## 2024-05-13 PROCEDURE — 2500000001 HC RX 250 WO HCPCS SELF ADMINISTERED DRUGS (ALT 637 FOR MEDICARE OP): Performed by: PEDIATRICS

## 2024-05-13 PROCEDURE — 2500000006 HC RX 250 W HCPCS SELF ADMINISTERED DRUGS (ALT 637 FOR ALL PAYERS): Performed by: INTERNAL MEDICINE

## 2024-05-13 PROCEDURE — 2500000004 HC RX 250 GENERAL PHARMACY W/ HCPCS (ALT 636 FOR OP/ED): Performed by: PEDIATRICS

## 2024-05-13 PROCEDURE — 2500000004 HC RX 250 GENERAL PHARMACY W/ HCPCS (ALT 636 FOR OP/ED)

## 2024-05-13 PROCEDURE — 2500000005 HC RX 250 GENERAL PHARMACY W/O HCPCS: Performed by: INTERNAL MEDICINE

## 2024-05-13 PROCEDURE — 2060000001 HC INTERMEDIATE ICU ROOM DAILY

## 2024-05-13 PROCEDURE — 94640 AIRWAY INHALATION TREATMENT: CPT

## 2024-05-13 PROCEDURE — 2500000002 HC RX 250 W HCPCS SELF ADMINISTERED DRUGS (ALT 637 FOR MEDICARE OP, ALT 636 FOR OP/ED): Performed by: INTERNAL MEDICINE

## 2024-05-13 PROCEDURE — 85027 COMPLETE CBC AUTOMATED: CPT | Performed by: INTERNAL MEDICINE

## 2024-05-13 PROCEDURE — 2500000001 HC RX 250 WO HCPCS SELF ADMINISTERED DRUGS (ALT 637 FOR MEDICARE OP): Performed by: INTERNAL MEDICINE

## 2024-05-13 RX ORDER — IPRATROPIUM BROMIDE AND ALBUTEROL SULFATE 2.5; .5 MG/3ML; MG/3ML
3 SOLUTION RESPIRATORY (INHALATION)
Status: DISCONTINUED | OUTPATIENT
Start: 2024-05-13 | End: 2024-05-23 | Stop reason: HOSPADM

## 2024-05-13 RX ADMIN — IPRATROPIUM BROMIDE AND ALBUTEROL SULFATE 3 ML: 2.5; .5 SOLUTION RESPIRATORY (INHALATION) at 00:57

## 2024-05-13 RX ADMIN — BACLOFEN 10 MG: 10 TABLET ORAL at 21:55

## 2024-05-13 RX ADMIN — Medication 1 L/MIN: at 19:13

## 2024-05-13 RX ADMIN — IPRATROPIUM BROMIDE AND ALBUTEROL SULFATE 3 ML: 2.5; .5 SOLUTION RESPIRATORY (INHALATION) at 19:13

## 2024-05-13 RX ADMIN — INSULIN LISPRO 2 UNITS: 100 INJECTION, SOLUTION INTRAVENOUS; SUBCUTANEOUS at 17:25

## 2024-05-13 RX ADMIN — APIXABAN 5 MG: 5 TABLET, FILM COATED ORAL at 21:55

## 2024-05-13 RX ADMIN — FUROSEMIDE 20 MG: 20 TABLET ORAL at 09:41

## 2024-05-13 RX ADMIN — ATORVASTATIN CALCIUM 10 MG: 10 TABLET, FILM COATED ORAL at 21:55

## 2024-05-13 RX ADMIN — OXYCODONE HYDROCHLORIDE 5 MG: 5 TABLET ORAL at 21:57

## 2024-05-13 RX ADMIN — AMLODIPINE BESYLATE 5 MG: 5 TABLET ORAL at 09:40

## 2024-05-13 RX ADMIN — LEVOFLOXACIN 750 MG: 750 INJECTION, SOLUTION INTRAVENOUS at 00:56

## 2024-05-13 RX ADMIN — VANCOMYCIN HYDROCHLORIDE 1500 MG: 1.5 INJECTION, POWDER, LYOPHILIZED, FOR SOLUTION INTRAVENOUS at 00:54

## 2024-05-13 RX ADMIN — SODIUM CHLORIDE 75 ML/HR: 9 INJECTION, SOLUTION INTRAVENOUS at 01:10

## 2024-05-13 RX ADMIN — POTASSIUM CHLORIDE 20 MEQ: 1.5 POWDER, FOR SOLUTION ORAL at 09:41

## 2024-05-13 RX ADMIN — APIXABAN 5 MG: 5 TABLET, FILM COATED ORAL at 09:41

## 2024-05-13 RX ADMIN — INSULIN LISPRO 6 UNITS: 100 INJECTION, SOLUTION INTRAVENOUS; SUBCUTANEOUS at 13:51

## 2024-05-13 RX ADMIN — LEVOTHYROXINE SODIUM 175 MCG: 125 TABLET ORAL at 05:31

## 2024-05-13 RX ADMIN — BACLOFEN 10 MG: 10 TABLET ORAL at 09:41

## 2024-05-13 RX ADMIN — IPRATROPIUM BROMIDE AND ALBUTEROL SULFATE 3 ML: 2.5; .5 SOLUTION RESPIRATORY (INHALATION) at 14:34

## 2024-05-13 RX ADMIN — INSULIN LISPRO 8 UNITS: 100 INJECTION, SOLUTION INTRAVENOUS; SUBCUTANEOUS at 09:40

## 2024-05-13 RX ADMIN — SERTRALINE HYDROCHLORIDE 200 MG: 50 TABLET ORAL at 09:40

## 2024-05-13 RX ADMIN — LAMOTRIGINE 25 MG: 25 TABLET ORAL at 21:55

## 2024-05-13 RX ADMIN — VANCOMYCIN HYDROCHLORIDE 1500 MG: 1.5 INJECTION, POWDER, LYOPHILIZED, FOR SOLUTION INTRAVENOUS at 22:19

## 2024-05-13 RX ADMIN — DOLUTEGRAVIR SODIUM 50 MG: 50 TABLET, FILM COATED ORAL at 09:41

## 2024-05-13 RX ADMIN — IPRATROPIUM BROMIDE AND ALBUTEROL SULFATE 3 ML: 2.5; .5 SOLUTION RESPIRATORY (INHALATION) at 07:10

## 2024-05-13 ASSESSMENT — COGNITIVE AND FUNCTIONAL STATUS - GENERAL
TURNING FROM BACK TO SIDE WHILE IN FLAT BAD: TOTAL
MOVING TO AND FROM BED TO CHAIR: TOTAL
WALKING IN HOSPITAL ROOM: TOTAL
DRESSING REGULAR LOWER BODY CLOTHING: TOTAL
STANDING UP FROM CHAIR USING ARMS: TOTAL
HELP NEEDED FOR BATHING: TOTAL
MOVING FROM LYING ON BACK TO SITTING ON SIDE OF FLAT BED WITH BEDRAILS: TOTAL
EATING MEALS: TOTAL
MOBILITY SCORE: 6
CLIMB 3 TO 5 STEPS WITH RAILING: TOTAL
MOVING TO AND FROM BED TO CHAIR: TOTAL
PERSONAL GROOMING: TOTAL
TURNING FROM BACK TO SIDE WHILE IN FLAT BAD: TOTAL
TOILETING: TOTAL
DAILY ACTIVITIY SCORE: 6
CLIMB 3 TO 5 STEPS WITH RAILING: TOTAL
DAILY ACTIVITIY SCORE: 6
DRESSING REGULAR LOWER BODY CLOTHING: TOTAL
EATING MEALS: TOTAL
PERSONAL GROOMING: TOTAL
STANDING UP FROM CHAIR USING ARMS: TOTAL
WALKING IN HOSPITAL ROOM: TOTAL
MOBILITY SCORE: 6
DRESSING REGULAR UPPER BODY CLOTHING: TOTAL
HELP NEEDED FOR BATHING: TOTAL
MOVING FROM LYING ON BACK TO SITTING ON SIDE OF FLAT BED WITH BEDRAILS: TOTAL
TOILETING: TOTAL
DRESSING REGULAR UPPER BODY CLOTHING: TOTAL

## 2024-05-13 ASSESSMENT — PAIN - FUNCTIONAL ASSESSMENT
PAIN_FUNCTIONAL_ASSESSMENT: 0-10
PAIN_FUNCTIONAL_ASSESSMENT: VAS (VISUAL ANALOG SCALE)
PAIN_FUNCTIONAL_ASSESSMENT: 0-10
PAIN_FUNCTIONAL_ASSESSMENT: 0-10

## 2024-05-13 ASSESSMENT — PAIN SCALES - GENERAL
PAINLEVEL_OUTOF10: 0 - NO PAIN
PAINLEVEL_OUTOF10: 7
PAINLEVEL_OUTOF10: 0 - NO PAIN

## 2024-05-13 ASSESSMENT — PAIN DESCRIPTION - LOCATION: LOCATION: GENERALIZED

## 2024-05-13 NOTE — PROGRESS NOTES
Janette Martinez is a 53 y.o. female on day 3 of admission presenting with AMS (altered mental status).      Subjective   HPI: This is a 53 y.o. female who was recently discharged after an admission for encephalopathy/sepsis.  Patient had a protracted hospitalization last visit, one of her issues was swallowing which was found to be okay for the puréed diet honey thickened liquids.  Today I was called from the nursing home with concerns for fever/altered mental status/suspicion for aspiration.  Discussed with director of nursing at the facility-patient was a full code and wanted her transferred to the ER by EMS.  After patient's arrival in the ER and further evaluation discussed with emergency room physician and agreed with hospitalization, as patient's condition was worsening in the ER the provider contacted patient's sister/POA and patient is currently DO NOT RESUSCITATE with limitations.  Patient is too drowsy to answer any questions, turns when called, not answering any questions     Objective     Last Recorded Vitals  BP 92/59 (BP Location: Left arm, Patient Position: Lying)   Pulse 92   Temp 36.8 °C (98.3 °F) (Temporal)   Resp 17   Wt 92.3 kg (203 lb 7.8 oz)   SpO2 100%   Intake/Output last 3 Shifts:    Intake/Output Summary (Last 24 hours) at 5/12/2024 2002  Last data filed at 5/12/2024 1254  Gross per 24 hour   Intake 650 ml   Output 1150 ml   Net -500 ml       Admission Weight  Weight: 90.7 kg (200 lb) (05/09/24 1204)    Daily Weight  05/12/24 : 92.3 kg (203 lb 7.8 oz)    Image Results  XR chest abdomen for OG NG placement  Narrative: Interpreted By:  Deyanira Velez,   STUDY:  XR CHEST ABDOMEN FOR OG NG PLACEMENT;  5/12/2024 10:18 am      INDICATION:  Signs/Symptoms:NGT advanced.      COMPARISON:  Chest and abdomen dated 05/09/2024      ACCESSION NUMBER(S):  [id number]      ORDERING CLINICIAN:  ARCELIA CRAFT      TECHNIQUE:  AP portable chest and abdomen image were obtained.      FINDINGS:  Heart is  normal in size.      There is bilateral parenchymal infiltration.      There are atherosclerotic changes of the aorta. There are  degenerative changes of the spine. There are degenerative changes of  the shoulders.      A nasogastric tube is coarse in the region of the stomach.      There is no significant bowel distention.      There are numerous clips and staples throughout the abdomen.      There are degenerative changes of the spine.      COMPARISON OF FINDINGS:  The chest is similar. The nasogastric tube was placed in the interval.      Impression: Parenchymal infiltration. Interval nasogastric tube placement.      Nonspecific bowel gas pattern.      MACRO:  none      Signed by: Deyanira Velez 5/12/2024 10:36 AM  Dictation workstation:   [id number]      Physical Exam  GENERAL: awake, Ox0 cSKIN: Skin turgor normal. No rashes  HEENT: no epistaxis, Moist mucosa.  NECK: supple  BACK: spine nontender to palpation, No CVAT.  LUNGS: Vesicular breath sounds, with no wheeze, no crepitations.   CARDIAC: REGULAR. S1 and S2; no rubs, no murmur  ABDOMEN: Abdomen soft, non-tender. BS+  EXTREMITIES: No edema, Good capillary refill.   NEURO: History of dysarthria, hemiparesis, but today's exam is limited   MUSCULOSKELETAL: No acute inflammation    Relevant Results    Results for orders placed or performed during the hospital encounter of 05/09/24 (from the past 24 hour(s))   POCT GLUCOSE   Result Value Ref Range    POCT Glucose 161 (H) 74 - 99 mg/dL   POCT GLUCOSE   Result Value Ref Range    POCT Glucose 194 (H) 74 - 99 mg/dL   POCT GLUCOSE   Result Value Ref Range    POCT Glucose 229 (H) 74 - 99 mg/dL   CBC   Result Value Ref Range    WBC 13.8 (H) 4.4 - 11.3 x10*3/uL    nRBC 0.1 (H) 0.0 - 0.0 /100 WBCs    RBC 2.33 (L) 4.00 - 5.20 x10*6/uL    Hemoglobin 7.0 (L) 12.0 - 16.0 g/dL    Hematocrit 21.9 (L) 36.0 - 46.0 %    MCV 94 80 - 100 fL    MCH 30.0 26.0 - 34.0 pg    MCHC 32.0 32.0 - 36.0 g/dL    RDW 16.7 (H) 11.5 - 14.5 %     Platelets 227 150 - 450 x10*3/uL   Comprehensive Metabolic Panel   Result Value Ref Range    Glucose 234 (H) 74 - 99 mg/dL    Sodium 138 136 - 145 mmol/L    Potassium 3.6 3.5 - 5.3 mmol/L    Chloride 107 98 - 107 mmol/L    Bicarbonate 25 21 - 32 mmol/L    Anion Gap 10 10 - 20 mmol/L    Urea Nitrogen 14 6 - 23 mg/dL    Creatinine 0.77 0.50 - 1.05 mg/dL    eGFR >90 >60 mL/min/1.73m*2    Calcium 6.8 (L) 8.6 - 10.3 mg/dL    Albumin 2.5 (L) 3.4 - 5.0 g/dL    Alkaline Phosphatase 70 33 - 110 U/L    Total Protein 5.6 (L) 6.4 - 8.2 g/dL    AST 37 9 - 39 U/L    Bilirubin, Total 0.4 0.0 - 1.2 mg/dL    ALT 15 7 - 45 U/L   Vancomycin   Result Value Ref Range    Vancomycin 28.6 (H) 5.0 - 20.0 ug/mL   POCT GLUCOSE   Result Value Ref Range    POCT Glucose 216 (H) 74 - 99 mg/dL   POCT GLUCOSE   Result Value Ref Range    POCT Glucose 193 (H) 74 - 99 mg/dL   POCT GLUCOSE   Result Value Ref Range    POCT Glucose 200 (H) 74 - 99 mg/dL   POCT GLUCOSE   Result Value Ref Range    POCT Glucose 182 (H) 74 - 99 mg/dL       Scheduled medications  amLODIPine, 5 mg, oral, Daily  apixaban, 5 mg, oral, BID  atorvastatin, 10 mg, oral, Daily  baclofen, 10 mg, oral, BID  dolutegravir, 50 mg, oral, Daily  furosemide, 20 mg, oral, Daily  insulin lispro, 0-10 Units, subcutaneous, TID with meals  ipratropium-albuteroL, 3 mL, nebulization, q6h  lamoTRIgine, 25 mg, oral, q PM  levoFLOXacin, 750 mg, intravenous, q24h  levothyroxine, 175 mcg, oral, Daily before breakfast  oxygen, , inhalation, Continuous - Inhalation  potassium chloride, 20 mEq, nasogastric tube, Daily  sertraline, 200 mg, oral, Daily  vancomycin, 1,500 mg, intravenous, q24h      Continuous medications  sodium chloride 0.9%, 75 mL/hr, Last Rate: 75 mL/hr (05/11/24 1810)      PRN medications  PRN medications: dextrose, dextrose, glucagon, glucagon, hydrALAZINE, ipratropium-albuteroL, oxyCODONE, vancomycin             Assessment/Plan      # Encephalopathy  - improved today     # Acute  respiratory failure  -DNR with limitations per family  -Not a current candidate for ICU care or intubation     # Probable aspiration pneumonia  -Will initiate IV antibiotics     # YESSICA     # Old right hemiparesis/dysarthria     HIV:  Hx graves s/p thyroidectomy   Post surgical hypothyroid  HTN  HLP  T2DM  CKD3    5/10 : Pt has poor swallowing - seen by ST - will start NGT feeds over the weekend - NGT placed today. Pt more awake - was waving at me - speaking appropriately.     5/11-patient seen at bedside, patient's sister/POA at bedside discussed with both of them about poor p.o. intake and the need for a more definitive plan.  They both agree for PEG if needed patient to get MBS study done on Monday, will consult GI if patient fails swallowing.  She may need a PEG even if she able to swallow because her intake has been less than 50%.    5/12-patient seen at bedside, has NG in place, NG had, come out a few inches was replaced and x-ray was verified, swallow test tomorrow.      Radha Ward MD

## 2024-05-13 NOTE — CARE PLAN
The patient's goals for the shift include      The clinical goals for the shift include Pt to remain hemodyamically stable through end of shift

## 2024-05-13 NOTE — PROGRESS NOTES
"  INFECTIOUS DISEASE DAILY PROGRESS NOTE    SUBJECTIVE:    Blood cx was positive for MRSA and started on IV Vanc/ WBC is down. She is confused. Has no specific complaints for me.    OBJECTIVE:  VITALS (Last 24 Hours)  /79 (BP Location: Left arm, Patient Position: Lying)   Pulse 100   Temp 36.7 °C (98 °F) (Temporal)   Resp 16   Ht 1.651 m (5' 5\")   Wt 93.1 kg (205 lb 4 oz)   SpO2 90%   BMI 34.16 kg/m²     PHYSICAL EXAM:  Gen - NAD, not speaking, eyes open  Heart - RRR, no murmurs  Lungs - crackles both lower lobes, no wheezing  Abd - soft, no ttp, BS present  Skin - no rash    ABX: IV Vanc/Levofloxacin    LABS:  Lab Results   Component Value Date    WBC 12.8 (H) 05/13/2024    HGB 7.3 (L) 05/13/2024    HCT 22.5 (L) 05/13/2024    MCV 95 05/13/2024     05/13/2024     Lab Results   Component Value Date    GLUCOSE 315 (H) 05/13/2024    CALCIUM 7.1 (L) 05/13/2024     (L) 05/13/2024    K 3.7 05/13/2024    CO2 25 05/13/2024     05/13/2024    BUN 11 05/13/2024    CREATININE 0.75 05/13/2024     Results from last 72 hours   Lab Units 05/13/24  0701   ALK PHOS U/L 67   BILIRUBIN TOTAL mg/dL 0.3   PROTEIN TOTAL g/dL 6.0*   ALT U/L 12   AST U/L 29   ALBUMIN g/dL 2.3*     Estimated Creatinine Clearance: 95.5 mL/min (by C-G formula based on SCr of 0.75 mg/dL).    CRP   Date/Time Value Ref Range Status   05/27/2020 05:56 PM 1.04 (A) mg/dL Final     Comment:     REF VALUE  < 1.00         ASSESSMENT/PLAN:     Multifocal Aspiration PNA due to MRSA  MRSA Bacteremia  Abx Allergies - penicillin, sulfa. Limits abx options.    Stopping Levofloxacin. Continue on IV Vancomycin. Repeat blood cx x2 ordered. Will check TTE.    Monitoring for adverse effects of abx such as rash/itching/diarrhea - none apparent.     Will follow. Thanks!    Sadiq Mayo MD  [facility]  Office #[phone]      "

## 2024-05-13 NOTE — PROGRESS NOTES
"Speech-Language Pathology    Inpatient  Speech-Language Pathology Dysphagia Treatment    Patient Name: Janette Martinez  MRN: [id number]  : 1970  Today's Date: 24   Time Calculation  Start Time: 910  Stop Time: 939  Time Calculation (min): 29 min        Subjective:   Alert and verbal with hypophonia.    Objective:  Goals:   N/A    Therapeutic Swallow Intervention : PO Trials    Treatment Results/ Swallow Comments: Patient seen at bedside to reassess swallow. Attending physician referencing in note  for MBSS on Monday, however exam not ordered. Following initial SLP evaluation, SLP did not feel repeat MBSS beneficial at this time given oropharyngeal delay and acute pulmonary condition. Placement of PEG tube recommended with consideration for timing of repeat MBSS to be determined at next level of care. Additional time needed to allow for some recovery of strength since patient being treated for \"probable aspiration pna\" and has had limited po intake for approximately 2 week period, which may a contributing factor to her weakened state.    Decision made to proceed with po trials with SLP for status update. Patient seated upright in bed for trials of puree. Baseline cough noted. Lingual weakness evident with reduced ROM (appearing unchanged from previous assessment). Puree boluses presented via tsp with slowed bolus formation and swallow delay. Patient exhibited a delayed post prandial cough given each bolus. Cough moist and congested in quality appearing altered from baseline. This was suspicious for aspiration. Deferred additional bolus presentations due to concern for aspiration. Results discussed with patient following session. Concerns for aspiration with po intake discussed with timing of MBSS to be considered at next level of care. Communication with above findings relayed to MD and nursing.    Assessment:  SLP TX Intervention Outcome: No Progress Made    Patient with no change in swallowing " function from previous exam. Feel patient remains a high risk for aspiration with po diet. Resolution of pna with implementation of therapy course needed as well as ongoing nutritional support to allow for improvement in physiological function of swallowing mechanism prior to repeating instrumental assessment. Completing repeat MBSS at this time may be premature and not yield any new information already identified during current clinical assessment.     Plan:    NPO WITH CONSIDERATION FOR PEG TUBE  SLP TO FOLLOW AT NEXT LEVEL OF CARE    SLP TX Plan: Discharge from Speech Therapy (SLP follow up post d/c.)  SLP Plan: Skilled SLP (at next level of care)     Duration: Other (Comment)  SLP Discharge Recommendations: Continue skilled speech therapy services post discharge  Discussed POC: Patient  Discussed Risks/Benefits: Yes  Patient/Caregiver Agreeable: Yes

## 2024-05-13 NOTE — PROGRESS NOTES
05/13/24 1420   Discharge Planning   Patient expects to be discharged to: LTC     Spoke with patient's sister. Notified her of ICF responses- no one can take except Josep Pt. Per daughter's request, additional referrals sent. SW will watch for responses.

## 2024-05-14 LAB
ALBUMIN SERPL BCP-MCNC: 2.4 G/DL (ref 3.4–5)
ALP SERPL-CCNC: 63 U/L (ref 33–110)
ALT SERPL W P-5'-P-CCNC: 12 U/L (ref 7–45)
ANION GAP SERPL CALC-SCNC: 11 MMOL/L (ref 10–20)
AST SERPL W P-5'-P-CCNC: 30 U/L (ref 9–39)
BACTERIA BLD AEROBE CULT: ABNORMAL
BACTERIA BLD CULT: ABNORMAL
BILIRUB SERPL-MCNC: 0.2 MG/DL (ref 0–1.2)
BUN SERPL-MCNC: 10 MG/DL (ref 6–23)
CALCIUM SERPL-MCNC: 7 MG/DL (ref 8.6–10.3)
CHLORIDE SERPL-SCNC: 101 MMOL/L (ref 98–107)
CO2 SERPL-SCNC: 25 MMOL/L (ref 21–32)
CREAT SERPL-MCNC: 0.96 MG/DL (ref 0.5–1.05)
EGFRCR SERPLBLD CKD-EPI 2021: 70 ML/MIN/1.73M*2
ERYTHROCYTE [DISTWIDTH] IN BLOOD BY AUTOMATED COUNT: 16.2 % (ref 11.5–14.5)
GLUCOSE BLD MANUAL STRIP-MCNC: 219 MG/DL (ref 74–99)
GLUCOSE BLD MANUAL STRIP-MCNC: 249 MG/DL (ref 74–99)
GLUCOSE BLD MANUAL STRIP-MCNC: 288 MG/DL (ref 74–99)
GLUCOSE BLD MANUAL STRIP-MCNC: 289 MG/DL (ref 74–99)
GLUCOSE SERPL-MCNC: 268 MG/DL (ref 74–99)
GRAM STN SPEC: ABNORMAL
HCT VFR BLD AUTO: 21.3 % (ref 36–46)
HGB BLD-MCNC: 7.1 G/DL (ref 12–16)
MCH RBC QN AUTO: 31.3 PG (ref 26–34)
MCHC RBC AUTO-ENTMCNC: 33.3 G/DL (ref 32–36)
MCV RBC AUTO: 94 FL (ref 80–100)
NRBC BLD-RTO: 0 /100 WBCS (ref 0–0)
PLATELET # BLD AUTO: 283 X10*3/UL (ref 150–450)
POTASSIUM SERPL-SCNC: 3.6 MMOL/L (ref 3.5–5.3)
PROT SERPL-MCNC: 6.2 G/DL (ref 6.4–8.2)
RBC # BLD AUTO: 2.27 X10*6/UL (ref 4–5.2)
SODIUM SERPL-SCNC: 133 MMOL/L (ref 136–145)
VANCOMYCIN SERPL-MCNC: 45.5 UG/ML (ref 5–20)
WBC # BLD AUTO: 15.6 X10*3/UL (ref 4.4–11.3)

## 2024-05-14 PROCEDURE — 2500000001 HC RX 250 WO HCPCS SELF ADMINISTERED DRUGS (ALT 637 FOR MEDICARE OP): Performed by: PEDIATRICS

## 2024-05-14 PROCEDURE — 2500000005 HC RX 250 GENERAL PHARMACY W/O HCPCS: Performed by: INTERNAL MEDICINE

## 2024-05-14 PROCEDURE — 1200000002 HC GENERAL ROOM WITH TELEMETRY DAILY

## 2024-05-14 PROCEDURE — 2500000006 HC RX 250 W HCPCS SELF ADMINISTERED DRUGS (ALT 637 FOR ALL PAYERS): Performed by: INTERNAL MEDICINE

## 2024-05-14 PROCEDURE — 2500000002 HC RX 250 W HCPCS SELF ADMINISTERED DRUGS (ALT 637 FOR MEDICARE OP, ALT 636 FOR OP/ED): Performed by: INTERNAL MEDICINE

## 2024-05-14 PROCEDURE — 80202 ASSAY OF VANCOMYCIN: CPT | Performed by: PHARMACIST

## 2024-05-14 PROCEDURE — 85027 COMPLETE CBC AUTOMATED: CPT | Performed by: INTERNAL MEDICINE

## 2024-05-14 PROCEDURE — 87040 BLOOD CULTURE FOR BACTERIA: CPT | Mod: AHULAB | Performed by: INTERNAL MEDICINE

## 2024-05-14 PROCEDURE — 36415 COLL VENOUS BLD VENIPUNCTURE: CPT | Performed by: INTERNAL MEDICINE

## 2024-05-14 PROCEDURE — 2500000001 HC RX 250 WO HCPCS SELF ADMINISTERED DRUGS (ALT 637 FOR MEDICARE OP): Performed by: INTERNAL MEDICINE

## 2024-05-14 PROCEDURE — 94640 AIRWAY INHALATION TREATMENT: CPT

## 2024-05-14 PROCEDURE — 82947 ASSAY GLUCOSE BLOOD QUANT: CPT

## 2024-05-14 PROCEDURE — 99221 1ST HOSP IP/OBS SF/LOW 40: CPT | Performed by: INTERNAL MEDICINE

## 2024-05-14 PROCEDURE — 84075 ASSAY ALKALINE PHOSPHATASE: CPT | Performed by: INTERNAL MEDICINE

## 2024-05-14 RX ORDER — GUAIFENESIN 100 MG/5ML
200 SOLUTION ORAL EVERY 6 HOURS PRN
Status: DISCONTINUED | OUTPATIENT
Start: 2024-05-14 | End: 2024-05-23 | Stop reason: HOSPADM

## 2024-05-14 RX ORDER — BENZONATATE 100 MG/1
100 CAPSULE ORAL 3 TIMES DAILY PRN
Status: DISCONTINUED | OUTPATIENT
Start: 2024-05-14 | End: 2024-05-14

## 2024-05-14 RX ADMIN — ATORVASTATIN CALCIUM 10 MG: 10 TABLET, FILM COATED ORAL at 20:02

## 2024-05-14 RX ADMIN — BENZONATATE 100 MG: 100 CAPSULE ORAL at 16:53

## 2024-05-14 RX ADMIN — IPRATROPIUM BROMIDE AND ALBUTEROL SULFATE 3 ML: 2.5; .5 SOLUTION RESPIRATORY (INHALATION) at 07:15

## 2024-05-14 RX ADMIN — OXYCODONE HYDROCHLORIDE 5 MG: 5 TABLET ORAL at 22:17

## 2024-05-14 RX ADMIN — DOLUTEGRAVIR SODIUM 50 MG: 50 TABLET, FILM COATED ORAL at 08:16

## 2024-05-14 RX ADMIN — IPRATROPIUM BROMIDE AND ALBUTEROL SULFATE 3 ML: 2.5; .5 SOLUTION RESPIRATORY (INHALATION) at 19:24

## 2024-05-14 RX ADMIN — POTASSIUM CHLORIDE 20 MEQ: 1.5 POWDER, FOR SOLUTION ORAL at 08:17

## 2024-05-14 RX ADMIN — APIXABAN 5 MG: 5 TABLET, FILM COATED ORAL at 08:14

## 2024-05-14 RX ADMIN — SERTRALINE HYDROCHLORIDE 200 MG: 50 TABLET ORAL at 08:15

## 2024-05-14 RX ADMIN — AMLODIPINE BESYLATE 5 MG: 5 TABLET ORAL at 08:16

## 2024-05-14 RX ADMIN — INSULIN LISPRO 6 UNITS: 100 INJECTION, SOLUTION INTRAVENOUS; SUBCUTANEOUS at 08:16

## 2024-05-14 RX ADMIN — FUROSEMIDE 20 MG: 20 TABLET ORAL at 08:17

## 2024-05-14 RX ADMIN — APIXABAN 5 MG: 5 TABLET, FILM COATED ORAL at 20:02

## 2024-05-14 RX ADMIN — OXYCODONE HYDROCHLORIDE 5 MG: 5 TABLET ORAL at 08:18

## 2024-05-14 RX ADMIN — Medication 1 L/MIN: at 08:00

## 2024-05-14 RX ADMIN — BENZONATATE 100 MG: 100 CAPSULE ORAL at 20:04

## 2024-05-14 RX ADMIN — BACLOFEN 10 MG: 10 TABLET ORAL at 08:16

## 2024-05-14 RX ADMIN — INSULIN LISPRO 4 UNITS: 100 INJECTION, SOLUTION INTRAVENOUS; SUBCUTANEOUS at 12:53

## 2024-05-14 RX ADMIN — LAMOTRIGINE 25 MG: 25 TABLET ORAL at 20:02

## 2024-05-14 RX ADMIN — Medication 1 L/MIN: at 20:00

## 2024-05-14 RX ADMIN — IPRATROPIUM BROMIDE AND ALBUTEROL SULFATE 3 ML: 2.5; .5 SOLUTION RESPIRATORY (INHALATION) at 13:44

## 2024-05-14 RX ADMIN — LEVOTHYROXINE SODIUM 175 MCG: 125 TABLET ORAL at 08:14

## 2024-05-14 RX ADMIN — INSULIN LISPRO 6 UNITS: 100 INJECTION, SOLUTION INTRAVENOUS; SUBCUTANEOUS at 16:01

## 2024-05-14 RX ADMIN — OXYCODONE HYDROCHLORIDE 5 MG: 5 TABLET ORAL at 15:48

## 2024-05-14 RX ADMIN — GUAIFENESIN 200 MG: 200 SOLUTION ORAL at 22:18

## 2024-05-14 RX ADMIN — Medication 1 L/MIN: at 07:15

## 2024-05-14 RX ADMIN — BACLOFEN 10 MG: 10 TABLET ORAL at 20:02

## 2024-05-14 ASSESSMENT — COGNITIVE AND FUNCTIONAL STATUS - GENERAL
STANDING UP FROM CHAIR USING ARMS: TOTAL
MOBILITY SCORE: 6
CLIMB 3 TO 5 STEPS WITH RAILING: TOTAL
DRESSING REGULAR LOWER BODY CLOTHING: TOTAL
DRESSING REGULAR UPPER BODY CLOTHING: TOTAL
STANDING UP FROM CHAIR USING ARMS: TOTAL
MOBILITY SCORE: 6
DAILY ACTIVITIY SCORE: 6
PERSONAL GROOMING: TOTAL
DRESSING REGULAR LOWER BODY CLOTHING: TOTAL
CLIMB 3 TO 5 STEPS WITH RAILING: TOTAL
MOVING FROM LYING ON BACK TO SITTING ON SIDE OF FLAT BED WITH BEDRAILS: TOTAL
TOILETING: TOTAL
MOVING TO AND FROM BED TO CHAIR: TOTAL
TURNING FROM BACK TO SIDE WHILE IN FLAT BAD: TOTAL
MOVING TO AND FROM BED TO CHAIR: TOTAL
DRESSING REGULAR UPPER BODY CLOTHING: TOTAL
EATING MEALS: TOTAL
MOVING FROM LYING ON BACK TO SITTING ON SIDE OF FLAT BED WITH BEDRAILS: TOTAL
PERSONAL GROOMING: TOTAL
HELP NEEDED FOR BATHING: TOTAL
TURNING FROM BACK TO SIDE WHILE IN FLAT BAD: TOTAL
TOILETING: TOTAL
DAILY ACTIVITIY SCORE: 6
HELP NEEDED FOR BATHING: TOTAL
WALKING IN HOSPITAL ROOM: TOTAL
WALKING IN HOSPITAL ROOM: TOTAL
EATING MEALS: TOTAL

## 2024-05-14 ASSESSMENT — PAIN - FUNCTIONAL ASSESSMENT
PAIN_FUNCTIONAL_ASSESSMENT: 0-10

## 2024-05-14 ASSESSMENT — PAIN SCALES - GENERAL
PAINLEVEL_OUTOF10: 5 - MODERATE PAIN
PAINLEVEL_OUTOF10: 9
PAINLEVEL_OUTOF10: 0 - NO PAIN
PAINLEVEL_OUTOF10: 7
PAINLEVEL_OUTOF10: 5 - MODERATE PAIN
PAINLEVEL_OUTOF10: 0 - NO PAIN
PAINLEVEL_OUTOF10: 7
PAINLEVEL_OUTOF10: 6
PAINLEVEL_OUTOF10: 5 - MODERATE PAIN
PAINLEVEL_OUTOF10: 7

## 2024-05-14 ASSESSMENT — ENCOUNTER SYMPTOMS
FEVER: 1
COUGH: 1
SHORTNESS OF BREATH: 1

## 2024-05-14 NOTE — CARE PLAN
The patient's goals for the shift include      The clinical goals for the shift include remains hemodynamically stable throughout shift

## 2024-05-14 NOTE — CONSULTS
Reason For Consult  PEG placement    History Of Present Illness  Janette Martinez is a 53 y.o. female with history of heart failure, hypertension, hyperlipidemia, CKD, diabetes, status post thyroidectomy, CVA with right-sided hemiparesis and aphasia, presented with change in mental status, multifocal aspiration pneumonia, MRSA bacteremia.  Patient is on antibiotics, ID follows.  Patient failed swallow evaluation. GI consulted for PEG placement.   She tolerates enteral feeding via NG tube.  Labs showed WBC 15.6, normocytic anemia with H&H 7.1 and 21.3, INR from 5/9/24 2.0, LFT WNL.     Past Medical History  left thalamic ICH (2007, residual right hemiplegia, dysarthria and aphasia), thalamic infarct and thalamic pain syndrome (2022), HIV, Left corona radiata infarct (2023), left MCA fusiform aneurysm, PE (on Apixaban), HTN, HLD, CKD III, T2DM, HFrEF (EF 45% 5/23/22),  post-surgical hypothyroid after thyroidectomy for graves disease     Surgical History  She has a past surgical history that includes Thyroid surgery (09/27/2013); Other surgical history (01/08/2016); Hysterectomy (01/08/2016); Cholecystectomy (01/08/2016); Hernia repair (01/08/2016); Colon surgery (01/08/2016); Other surgical history (09/01/2016); MR angio head wo IV contrast (6/4/2018); MR angio neck wo IV contrast (6/4/2018); CT angio head w and wo IV contrast (3/19/2019); CT angio neck (3/19/2019); MR angio head wo IV contrast (7/11/2020); MR angio neck wo IV contrast (7/11/2020); MR angio head wo IV contrast (9/30/2020); MR angio neck wo IV contrast (9/30/2020); MR angio head wo IV contrast (5/22/2022); MR angio neck wo IV contrast (5/22/2022); MR angio head wo IV contrast (4/19/2023); and MR angio neck wo IV contrast (4/19/2023).     Social History  She has no history on file for tobacco use, alcohol use, and drug use.    Family History  No family history on file.     Allergies  Aspirin, Iodinated contrast media, Amoxicillin, Other, Morphine, and  "Sulfa (sulfonamide antibiotics)    Review of Systems  Review of Systems   Constitutional:  Positive for fever.   Respiratory:  Positive for cough and shortness of breath.    Cardiovascular:  Negative for chest pain and leg swelling.          Physical Exam  Physical Exam  Constitutional:       General: She is not in acute distress.     Appearance: She is ill-appearing. She is not diaphoretic.   HENT:      Head: Normocephalic and atraumatic.      Nose: Nose normal.      Mouth/Throat:      Mouth: Mucous membranes are moist.      Pharynx: Oropharynx is clear.   Eyes:      Conjunctiva/sclera: Conjunctivae normal.   Cardiovascular:      Rate and Rhythm: Normal rate and regular rhythm.      Heart sounds: Normal heart sounds.   Pulmonary:      Effort: No respiratory distress.      Breath sounds: Rhonchi present.   Abdominal:      General: Bowel sounds are normal. There is no distension.      Palpations: Abdomen is soft. There is no mass.      Tenderness: There is abdominal tenderness in the epigastric area. There is no guarding or rebound.      Hernia: No hernia is present.   Musculoskeletal:      Cervical back: Neck supple.   Skin:     General: Skin is warm and dry.   Neurological:      Mental Status: She is alert. Mental status is at baseline.      Motor: Weakness present.   Psychiatric:         Mood and Affect: Mood normal.         Behavior: Behavior normal.            Last Recorded Vitals  Blood pressure 106/66, pulse 88, temperature 36.7 °C (98.1 °F), temperature source Temporal, resp. rate 17, height 1.626 m (5' 4.02\"), weight 93.1 kg (205 lb 4 oz), SpO2 96%.    Relevant Results      Scheduled medications  amLODIPine, 5 mg, oral, Daily  apixaban, 5 mg, oral, BID  atorvastatin, 10 mg, oral, Daily  baclofen, 10 mg, oral, BID  dolutegravir, 50 mg, oral, Daily  furosemide, 20 mg, oral, Daily  insulin lispro, 0-10 Units, subcutaneous, TID  ipratropium-albuteroL, 3 mL, nebulization, TID  lamoTRIgine, 25 mg, oral, q " PM  levothyroxine, 175 mcg, oral, Daily before breakfast  oxygen, , inhalation, Continuous - Inhalation  potassium chloride, 20 mEq, nasogastric tube, Daily  sertraline, 200 mg, oral, Daily      Continuous medications  sodium chloride 0.9%, 75 mL/hr, Last Rate: 75 mL/hr (05/13/24 0400)      PRN medications  PRN medications: dextrose, dextrose, glucagon, glucagon, hydrALAZINE, ipratropium-albuteroL, oxyCODONE, vancomycin  Results for orders placed or performed during the hospital encounter of 05/09/24 (from the past 24 hour(s))   POCT GLUCOSE   Result Value Ref Range    POCT Glucose 187 (H) 74 - 99 mg/dL   POCT GLUCOSE   Result Value Ref Range    POCT Glucose 162 (H) 74 - 99 mg/dL   CBC   Result Value Ref Range    WBC 15.6 (H) 4.4 - 11.3 x10*3/uL    nRBC 0.0 0.0 - 0.0 /100 WBCs    RBC 2.27 (L) 4.00 - 5.20 x10*6/uL    Hemoglobin 7.1 (L) 12.0 - 16.0 g/dL    Hematocrit 21.3 (L) 36.0 - 46.0 %    MCV 94 80 - 100 fL    MCH 31.3 26.0 - 34.0 pg    MCHC 33.3 32.0 - 36.0 g/dL    RDW 16.2 (H) 11.5 - 14.5 %    Platelets 283 150 - 450 x10*3/uL   Comprehensive Metabolic Panel   Result Value Ref Range    Glucose 268 (H) 74 - 99 mg/dL    Sodium 133 (L) 136 - 145 mmol/L    Potassium 3.6 3.5 - 5.3 mmol/L    Chloride 101 98 - 107 mmol/L    Bicarbonate 25 21 - 32 mmol/L    Anion Gap 11 10 - 20 mmol/L    Urea Nitrogen 10 6 - 23 mg/dL    Creatinine 0.96 0.50 - 1.05 mg/dL    eGFR 70 >60 mL/min/1.73m*2    Calcium 7.0 (L) 8.6 - 10.3 mg/dL    Albumin 2.4 (L) 3.4 - 5.0 g/dL    Alkaline Phosphatase 63 33 - 110 U/L    Total Protein 6.2 (L) 6.4 - 8.2 g/dL    AST 30 9 - 39 U/L    Bilirubin, Total 0.2 0.0 - 1.2 mg/dL    ALT 12 7 - 45 U/L   Vancomycin   Result Value Ref Range    Vancomycin 45.5 (H) 5.0 - 20.0 ug/mL   POCT GLUCOSE   Result Value Ref Range    POCT Glucose 288 (H) 74 - 99 mg/dL   POCT GLUCOSE   Result Value Ref Range    POCT Glucose 249 (H) 74 - 99 mg/dL     XR chest abdomen for OG NG placement    Result Date: 5/12/2024    Parenchymal  infiltration. Interval nasogastric tube placement.   Nonspecific bowel gas pattern.   MACRO: none   Signed by: Deyanira Velez 5/12/2024 10:36 AM Dictation workstation:   [id number]    XR chest 1 view    Result Date: 5/10/2024      1. Enteric tube bent in the region of the gastric antrum with the tip in the region of the gastric body. 2. Cardiomegaly. Mild vascular congestion and interstitial edema. Trace bilateral pleural effusions. Ill-defined bilateral airspace opacities, suggestive of multifocal pneumonia.       MACRO: None.   Signed by: Desi Ordonez 5/10/2024 9:53 PM Dictation workstation:   [id number]    Electrocardiogram, 12-lead PRN ACS symptoms    Result Date: 5/10/2024  Sinus tachycardia ST & T wave abnormality, consider lateral ischemia Abnormal ECG When compared with ECG of 24-APR-2024 08:17, Significant changes have occurred See ED provider note for full interpretation and clinical correlation Confirmed by Columba Islas ([id number]) on 5/10/2024 11:06:45 AM    CT chest abdomen pelvis wo IV contrast    Result Date: 5/9/2024    1. Multifocal pneumonia with more dense airspace consolidation demonstrated within the medial right and left lower lobes in the posterior perihilar location. Scattered patchy areas of ground-glass airspace infiltrate and consolidation within bilateral lung fields. Follow-up to clearing.   2. The IVC is flattened in appearance suggesting component of hypovolemia. Correlate   3. No dilated loops of bowel. Postsurgical changes with enteroenteric anastomosis in the right lower quadrant appearing unremarkable.     MACRO: None   Signed by: Kishor Muniz 5/9/2024 1:01 PM Dictation workstation:   [id number]    CT brain attack head wo IV contrast    Result Date: 5/9/2024    No acute intracranial pathology.     MACRO: Michael Lucia discussed the significance and urgency of this critical finding by telephone with  ROSAURA CHACKO on 5/9/2024 at 12:21 pm. (**-RCF-**) Findings:  See  findings.     Signed by: Michael Lucia 5/9/2024 12:21 PM Dictation workstation:   [id number]    FL modified barium swallow study    Result Date: 5/1/2024    Aspiration with thin liquid and nectar consistencies. Trace penetration with honey consistency. No other aspiration or penetration during this exam. Please see above for details.   MACRO: None   Signed by: Felipe Zayas 5/1/2024 3:41 PM Dictation workstation:   [id number]      US pelvis    Result Date: 4/25/2024    The exam was somewhat limited due to patient's underlying medical condition and non use of endovaginal imaging.   Unable to identify either ovary. No gross adnexal mass on either side.   Small anterior left-sided uterine fundal fibroid.   Endometrial thickness of 5 mm may be normal depending on the patient's current menstrual status. Clinical correlation needed.   MACRO: None   Signed by: Felipe Zayas 4/25/2024 11:00 AM Dictation workstation:   [id number]      Assessment/Plan     53 y.o. female with history of heart failure, hypertension, hyperlipidemia, CKD, diabetes, status post thyroidectomy, CVA with right-sided hemiparesis and aphasia, presented with change in mental status, multifocal aspiration pneumonia, MRSA bacteremia. Pt with oropharyngeal dysphagia, failed swallow evaluation. GI consulted for PEG placement    - recommend to cont treatment of pneumonia  - aspiration precautions  - aggressive oral care  - will check INR tomorrow  - consider PEG placement when she is medically stable from respiratory standpoint    NAVEEN Painter-CNP  I saw and evaluated the patient. I personally obtained the key and critical portions of the history and physical exam or was physically present for key and critical portions performed by the NP. I reviewed the NP's documentation and discussed the patient with the resident. I agree with the NP's medical decision making as documented in the note.      Oropharyngeal dysphagia due to CVA.  Aspiration  pneumonia.  Elevated INR.    Will reevaluate tomorrow.  Timing of PEG tube placement to be determined depending on patient's clinical status

## 2024-05-14 NOTE — PROGRESS NOTES
"Vancomycin Dosing by Pharmacy- FOLLOW UP    Janette Martinez is a 53 y.o. year old female who Pharmacy has been consulted for vancomycin dosing for pneumonia/bacteremia. Based on the patient's indication and renal status this patient is being dosed based on a goal AUC of 500-600.     Renal function is currently stable.    Current vancomycin dose: 1500 mg given every 24 hours    Estimated vancomycin AUC on current dose: 878 mg/L.hr     Visit Vitals  /70 (BP Location: Left arm, Patient Position: Lying)   Pulse 97   Temp 37.3 °C (99.1 °F) (Oral)   Resp 17        Lab Results   Component Value Date    CREATININE 0.96 05/14/2024    CREATININE 0.75 05/13/2024    CREATININE 0.77 05/12/2024    CREATININE 0.89 05/11/2024        Patient weight is No results found for: \"PTWEIGHT\"    No results found for: \"CULTURE\"     I/O last 3 completed shifts:  In: 650 (7 mL/kg) [IV Piggyback:650]  Out: 1200 (12.9 mL/kg) [Urine:1200 (0.4 mL/kg/hr)]  Weight: 93.1 kg   [unfilled]    Lab Results   Component Value Date    PATIENTTEMP 37.0 05/09/2024    PATIENTTEMP 37.0 04/19/2023        Assessment/Plan    Level was suprisingly high at 45.5, renal function has been stable. Will hold off any doses today and the next level will be obtained on 5/15 at 0500. May be obtained sooner if clinically indicated.   Will continue to monitor renal function daily while on vancomycin and order serum creatinine at least every 48 hours if not already ordered.  Follow for continued vancomycin needs, clinical response, and signs/symptoms of toxicity.       Mattie Hodge, PharmD           "

## 2024-05-14 NOTE — PROGRESS NOTES
Janette Martinez is a 53 y.o. female on day 5 of admission presenting with AMS (altered mental status).      Subjective   HPI: This is a 53 y.o. female who was recently discharged after an admission for encephalopathy/sepsis.  Patient had a protracted hospitalization last visit, one of her issues was swallowing which was found to be okay for the puréed diet honey thickened liquids.  Today I was called from the nursing home with concerns for fever/altered mental status/suspicion for aspiration.  Discussed with director of nursing at the facility-patient was a full code and wanted her transferred to the ER by EMS.  After patient's arrival in the ER and further evaluation discussed with emergency room physician and agreed with hospitalization, as patient's condition was worsening in the ER the provider contacted patient's sister/POA and patient is currently DO NOT RESUSCITATE with limitations.  Patient is too drowsy to answer any questions, turns when called, not answering any questions     Objective     Last Recorded Vitals  /70 (BP Location: Left arm, Patient Position: Lying)   Pulse 97   Temp 37.3 °C (99.1 °F) (Oral)   Resp 17   Wt 93.1 kg (205 lb 4 oz)   SpO2 97%   Intake/Output last 3 Shifts:    Intake/Output Summary (Last 24 hours) at 5/14/2024 0748  Last data filed at 5/14/2024 0400  Gross per 24 hour   Intake --   Output 600 ml   Net -600 ml       Admission Weight  Weight: 90.7 kg (200 lb) (05/09/24 1204)    Daily Weight  05/13/24 : 93.1 kg (205 lb 4 oz)    Image Results  XR chest abdomen for OG NG placement  Narrative: Interpreted By:  Deyanira Velez,   STUDY:  XR CHEST ABDOMEN FOR OG NG PLACEMENT;  5/12/2024 10:18 am      INDICATION:  Signs/Symptoms:NGT advanced.      COMPARISON:  Chest and abdomen dated 05/09/2024      ACCESSION NUMBER(S):  [id number]      ORDERING CLINICIAN:  ARCELIA CRAFT      TECHNIQUE:  AP portable chest and abdomen image were obtained.      FINDINGS:  Heart is normal in  size.      There is bilateral parenchymal infiltration.      There are atherosclerotic changes of the aorta. There are  degenerative changes of the spine. There are degenerative changes of  the shoulders.      A nasogastric tube is coarse in the region of the stomach.      There is no significant bowel distention.      There are numerous clips and staples throughout the abdomen.      There are degenerative changes of the spine.      COMPARISON OF FINDINGS:  The chest is similar. The nasogastric tube was placed in the interval.      Impression: Parenchymal infiltration. Interval nasogastric tube placement.      Nonspecific bowel gas pattern.      MACRO:  none      Signed by: Deyanira Velez 5/12/2024 10:36 AM  Dictation workstation:   [id number]      Physical Exam  GENERAL: awake, Ox0 cSKIN: Skin turgor normal. No rashes  HEENT: no epistaxis, Moist mucosa.  NECK: supple  BACK: spine nontender to palpation, No CVAT.  LUNGS: Vesicular breath sounds, with no wheeze, no crepitations.   CARDIAC: REGULAR. S1 and S2; no rubs, no murmur  ABDOMEN: Abdomen soft, non-tender. BS+  EXTREMITIES: No edema, Good capillary refill.   NEURO: History of dysarthria, hemiparesis, but today's exam is limited   MUSCULOSKELETAL: No acute inflammation    Relevant Results    Results for orders placed or performed during the hospital encounter of 05/09/24 (from the past 24 hour(s))   POCT GLUCOSE   Result Value Ref Range    POCT Glucose 309 (H) 74 - 99 mg/dL   POCT GLUCOSE   Result Value Ref Range    POCT Glucose 207 (H) 74 - 99 mg/dL   POCT GLUCOSE   Result Value Ref Range    POCT Glucose 187 (H) 74 - 99 mg/dL   POCT GLUCOSE   Result Value Ref Range    POCT Glucose 162 (H) 74 - 99 mg/dL   CBC   Result Value Ref Range    WBC 15.6 (H) 4.4 - 11.3 x10*3/uL    nRBC 0.0 0.0 - 0.0 /100 WBCs    RBC 2.27 (L) 4.00 - 5.20 x10*6/uL    Hemoglobin 7.1 (L) 12.0 - 16.0 g/dL    Hematocrit 21.3 (L) 36.0 - 46.0 %    MCV 94 80 - 100 fL    MCH 31.3 26.0 - 34.0 pg     MCHC 33.3 32.0 - 36.0 g/dL    RDW 16.2 (H) 11.5 - 14.5 %    Platelets 283 150 - 450 x10*3/uL   Comprehensive Metabolic Panel   Result Value Ref Range    Glucose 268 (H) 74 - 99 mg/dL    Sodium 133 (L) 136 - 145 mmol/L    Potassium 3.6 3.5 - 5.3 mmol/L    Chloride 101 98 - 107 mmol/L    Bicarbonate 25 21 - 32 mmol/L    Anion Gap 11 10 - 20 mmol/L    Urea Nitrogen 10 6 - 23 mg/dL    Creatinine 0.96 0.50 - 1.05 mg/dL    eGFR 70 >60 mL/min/1.73m*2    Calcium 7.0 (L) 8.6 - 10.3 mg/dL    Albumin 2.4 (L) 3.4 - 5.0 g/dL    Alkaline Phosphatase 63 33 - 110 U/L    Total Protein 6.2 (L) 6.4 - 8.2 g/dL    AST 30 9 - 39 U/L    Bilirubin, Total 0.2 0.0 - 1.2 mg/dL    ALT 12 7 - 45 U/L   Vancomycin   Result Value Ref Range    Vancomycin 45.5 (H) 5.0 - 20.0 ug/mL   POCT GLUCOSE   Result Value Ref Range    POCT Glucose 288 (H) 74 - 99 mg/dL       Scheduled medications  amLODIPine, 5 mg, oral, Daily  apixaban, 5 mg, oral, BID  atorvastatin, 10 mg, oral, Daily  baclofen, 10 mg, oral, BID  dolutegravir, 50 mg, oral, Daily  furosemide, 20 mg, oral, Daily  insulin lispro, 0-10 Units, subcutaneous, TID with meals  ipratropium-albuteroL, 3 mL, nebulization, TID  lamoTRIgine, 25 mg, oral, q PM  levothyroxine, 175 mcg, oral, Daily before breakfast  oxygen, , inhalation, Continuous - Inhalation  potassium chloride, 20 mEq, nasogastric tube, Daily  sertraline, 200 mg, oral, Daily      Continuous medications  sodium chloride 0.9%, 75 mL/hr, Last Rate: 75 mL/hr (05/13/24 0400)      PRN medications  PRN medications: dextrose, dextrose, glucagon, glucagon, hydrALAZINE, ipratropium-albuteroL, oxyCODONE, vancomycin             Assessment/Plan      # Encephalopathy  - improved today     # Acute respiratory failure  -DNR with limitations per family  -Not a current candidate for ICU care or intubation     # Probable aspiration pneumonia  -Will initiate IV antibiotics     # YESSICA     # Old right hemiparesis/dysarthria     HIV:  Hx graves s/p thyroidectomy    Post surgical hypothyroid  HTN  HLP  T2DM  CKD3    5/10 : Pt has poor swallowing - seen by ST - will start NGT feeds over the weekend - NGT placed today. Pt more awake - was waving at me - speaking appropriately.     5/11-patient seen at bedside, patient's sister/POA at bedside discussed with both of them about poor p.o. intake and the need for a more definitive plan.  They both agree for PEG if needed patient to get MBS study done on Monday, will consult GI if patient fails swallowing.  She may need a PEG even if she able to swallow because her intake has been less than 50%.    5/12-patient seen at bedside, has NG in place, NG had, come out a few inches was replaced and x-ray was verified, swallow test tomorrow.    5/13-MRSA bacteremia, on IV vancomycin, TTE pending, patient did not do well with swallow eval, discussed with speech therapist-will proceed to consider PEG, consult GI, patient agrees with the plan.    5/14: Pt seen by GI, input appreciated, will consult pulm to optimize for PEG placement.    Radha Ward MD

## 2024-05-14 NOTE — PROGRESS NOTES
Janette Martinez is a 53 y.o. female on day 4 of admission presenting with AMS (altered mental status).      Subjective   HPI: This is a 53 y.o. female who was recently discharged after an admission for encephalopathy/sepsis.  Patient had a protracted hospitalization last visit, one of her issues was swallowing which was found to be okay for the puréed diet honey thickened liquids.  Today I was called from the nursing home with concerns for fever/altered mental status/suspicion for aspiration.  Discussed with director of nursing at the facility-patient was a full code and wanted her transferred to the ER by EMS.  After patient's arrival in the ER and further evaluation discussed with emergency room physician and agreed with hospitalization, as patient's condition was worsening in the ER the provider contacted patient's sister/POA and patient is currently DO NOT RESUSCITATE with limitations.  Patient is too drowsy to answer any questions, turns when called, not answering any questions     Objective     Last Recorded Vitals  /81 (BP Location: Left arm, Patient Position: Lying)   Pulse 100   Temp 37.4 °C (99.3 °F) (Temporal)   Resp 18   Wt 93.1 kg (205 lb 4 oz)   SpO2 90%   Intake/Output last 3 Shifts:    Intake/Output Summary (Last 24 hours) at 5/13/2024 2228  Last data filed at 5/13/2024 1300  Gross per 24 hour   Intake 650 ml   Output 300 ml   Net 350 ml       Admission Weight  Weight: 90.7 kg (200 lb) (05/09/24 1204)    Daily Weight  05/13/24 : 93.1 kg (205 lb 4 oz)    Image Results  XR chest abdomen for OG NG placement  Narrative: Interpreted By:  Deyanira Velez,   STUDY:  XR CHEST ABDOMEN FOR OG NG PLACEMENT;  5/12/2024 10:18 am      INDICATION:  Signs/Symptoms:NGT advanced.      COMPARISON:  Chest and abdomen dated 05/09/2024      ACCESSION NUMBER(S):  [id number]      ORDERING CLINICIAN:  ARCELIA CRAFT      TECHNIQUE:  AP portable chest and abdomen image were obtained.      FINDINGS:  Heart is  normal in size.      There is bilateral parenchymal infiltration.      There are atherosclerotic changes of the aorta. There are  degenerative changes of the spine. There are degenerative changes of  the shoulders.      A nasogastric tube is coarse in the region of the stomach.      There is no significant bowel distention.      There are numerous clips and staples throughout the abdomen.      There are degenerative changes of the spine.      COMPARISON OF FINDINGS:  The chest is similar. The nasogastric tube was placed in the interval.      Impression: Parenchymal infiltration. Interval nasogastric tube placement.      Nonspecific bowel gas pattern.      MACRO:  none      Signed by: Deyanira Velez 5/12/2024 10:36 AM  Dictation workstation:   [id number]      Physical Exam  GENERAL: awake, Ox0 cSKIN: Skin turgor normal. No rashes  HEENT: no epistaxis, Moist mucosa.  NECK: supple  BACK: spine nontender to palpation, No CVAT.  LUNGS: Vesicular breath sounds, with no wheeze, no crepitations.   CARDIAC: REGULAR. S1 and S2; no rubs, no murmur  ABDOMEN: Abdomen soft, non-tender. BS+  EXTREMITIES: No edema, Good capillary refill.   NEURO: History of dysarthria, hemiparesis, but today's exam is limited   MUSCULOSKELETAL: No acute inflammation    Relevant Results    Results for orders placed or performed during the hospital encounter of 05/09/24 (from the past 24 hour(s))   CBC   Result Value Ref Range    WBC 12.8 (H) 4.4 - 11.3 x10*3/uL    nRBC 0.2 (H) 0.0 - 0.0 /100 WBCs    RBC 2.37 (L) 4.00 - 5.20 x10*6/uL    Hemoglobin 7.3 (L) 12.0 - 16.0 g/dL    Hematocrit 22.5 (L) 36.0 - 46.0 %    MCV 95 80 - 100 fL    MCH 30.8 26.0 - 34.0 pg    MCHC 32.4 32.0 - 36.0 g/dL    RDW 16.5 (H) 11.5 - 14.5 %    Platelets 254 150 - 450 x10*3/uL   Comprehensive Metabolic Panel   Result Value Ref Range    Glucose 315 (H) 74 - 99 mg/dL    Sodium 133 (L) 136 - 145 mmol/L    Potassium 3.7 3.5 - 5.3 mmol/L    Chloride 102 98 - 107 mmol/L     Bicarbonate 25 21 - 32 mmol/L    Anion Gap 10 10 - 20 mmol/L    Urea Nitrogen 11 6 - 23 mg/dL    Creatinine 0.75 0.50 - 1.05 mg/dL    eGFR >90 >60 mL/min/1.73m*2    Calcium 7.1 (L) 8.6 - 10.3 mg/dL    Albumin 2.3 (L) 3.4 - 5.0 g/dL    Alkaline Phosphatase 67 33 - 110 U/L    Total Protein 6.0 (L) 6.4 - 8.2 g/dL    AST 29 9 - 39 U/L    Bilirubin, Total 0.3 0.0 - 1.2 mg/dL    ALT 12 7 - 45 U/L   POCT GLUCOSE   Result Value Ref Range    POCT Glucose 309 (H) 74 - 99 mg/dL   POCT GLUCOSE   Result Value Ref Range    POCT Glucose 207 (H) 74 - 99 mg/dL   POCT GLUCOSE   Result Value Ref Range    POCT Glucose 187 (H) 74 - 99 mg/dL   POCT GLUCOSE   Result Value Ref Range    POCT Glucose 162 (H) 74 - 99 mg/dL       Scheduled medications  amLODIPine, 5 mg, oral, Daily  apixaban, 5 mg, oral, BID  atorvastatin, 10 mg, oral, Daily  baclofen, 10 mg, oral, BID  dolutegravir, 50 mg, oral, Daily  furosemide, 20 mg, oral, Daily  insulin lispro, 0-10 Units, subcutaneous, TID with meals  ipratropium-albuteroL, 3 mL, nebulization, TID  lamoTRIgine, 25 mg, oral, q PM  levothyroxine, 175 mcg, oral, Daily before breakfast  oxygen, , inhalation, Continuous - Inhalation  potassium chloride, 20 mEq, nasogastric tube, Daily  sertraline, 200 mg, oral, Daily  vancomycin, 1,500 mg, intravenous, q24h      Continuous medications  sodium chloride 0.9%, 75 mL/hr, Last Rate: 75 mL/hr (05/13/24 0400)      PRN medications  PRN medications: dextrose, dextrose, glucagon, glucagon, hydrALAZINE, ipratropium-albuteroL, oxyCODONE, vancomycin             Assessment/Plan      # Encephalopathy  - improved today     # Acute respiratory failure  -DNR with limitations per family  -Not a current candidate for ICU care or intubation     # Probable aspiration pneumonia  -Will initiate IV antibiotics     # YESSICA     # Old right hemiparesis/dysarthria     HIV:  Hx graves s/p thyroidectomy   Post surgical hypothyroid  HTN  HLP  T2DM  CKD3    5/10 : Pt has poor swallowing -  seen by ST - will start NGT feeds over the weekend - NGT placed today. Pt more awake - was waving at me - speaking appropriately.     5/11-patient seen at bedside, patient's sister/POA at bedside discussed with both of them about poor p.o. intake and the need for a more definitive plan.  They both agree for PEG if needed patient to get MBS study done on Monday, will consult GI if patient fails swallowing.  She may need a PEG even if she able to swallow because her intake has been less than 50%.    5/12-patient seen at bedside, has NG in place, NG had, come out a few inches was replaced and x-ray was verified, swallow test tomorrow.    5/30-MRSA bacteremia, on IV vancomycin, TTE pending, patient did not do well with swallow eval, discussed with speech therapist-will proceed to consider PEG, consult GI, patient agrees with the plan.    Radha Ward MD

## 2024-05-14 NOTE — PROGRESS NOTES
"  INFECTIOUS DISEASE DAILY PROGRESS NOTE    SUBJECTIVE:    No overnight events. No new complaints. Afebrile. No rash/itching/diarrhea. Denies pain.    OBJECTIVE:  VITALS (Last 24 Hours)  /55 (BP Location: Left arm, Patient Position: Lying)   Pulse 97   Temp 37 °C (98.6 °F) (Temporal)   Resp 17   Ht 1.651 m (5' 5\")   Wt 93.1 kg (205 lb 4 oz)   SpO2 97%   BMI 34.16 kg/m²     PHYSICAL EXAM:  Gen - NAD, seems better overall, not confused  Heart - RRR  Lungs - no wheezing  Abd - soft, no ttp, BS present  Skin - no rash    ABX: IV Vanc    LABS:  Lab Results   Component Value Date    WBC 15.6 (H) 05/14/2024    HGB 7.1 (L) 05/14/2024    HCT 21.3 (L) 05/14/2024    MCV 94 05/14/2024     05/14/2024     Lab Results   Component Value Date    GLUCOSE 268 (H) 05/14/2024    CALCIUM 7.0 (L) 05/14/2024     (L) 05/14/2024    K 3.6 05/14/2024    CO2 25 05/14/2024     05/14/2024    BUN 10 05/14/2024    CREATININE 0.96 05/14/2024     Results from last 72 hours   Lab Units 05/14/24  0548   ALK PHOS U/L 63   BILIRUBIN TOTAL mg/dL 0.2   PROTEIN TOTAL g/dL 6.2*   ALT U/L 12   AST U/L 30   ALBUMIN g/dL 2.4*     Estimated Creatinine Clearance: 74.6 mL/min (by C-G formula based on SCr of 0.96 mg/dL).    CRP   Date/Time Value Ref Range Status   05/27/2020 05:56 PM 1.04 (A) mg/dL Final     Comment:     REF VALUE  < 1.00         ASSESSMENT/PLAN:     Multifocal Aspiration PNA due to MRSA  MRSA Bacteremia  Abx Allergies - penicillin, sulfa. Limits abx options.    Stopping Levofloxacin. Continue on IV Vancomycin. Repeat blood cx x2 pending. Will check TTE - ordered and pending.    Vancomycin level high, on hold for now. Renal function is stable. Monitoring closely.    Monitoring for adverse effects of abx such as rash/itching/diarrhea - none apparent.     Will follow. Thanks!    Sadiq Mayo, MD  [facility]  Office #[phone]      "

## 2024-05-14 NOTE — PROGRESS NOTES
05/14/24 0714   Discharge Planning   Patient expects to be discharged to: LTC- facilities reviewing     Patient came from [facility]. Sister requesting new LTC facility. SW working with sister to find accepting facility. Admitted with AMS. Blood cultures positive for mrsa, started on IV vanc, ID following. NPO, GI consult pending for PEG placement. TCC to continue to follow.

## 2024-05-14 NOTE — CONSULTS
"Nutrition Assessment Note  Nutrition Assessment      Reason for Assessment  Reason for Assessment: Provider consult order, Tube feeding recommendations    Pt readmitted 2/2 encephalopathy.   PMH includes CVA, dysphagia, DM, constipation, HIV, UC.     NG in place, TF infusing @ 40 ml/hr. Pt agreeable to PEG placement as per MD documentation. GI consulted for PEG placement.   Pt to remain NPO as per SLP recommendations.   Most recent MBSS 5/1/24, puree with honey thick liquids recommended.     History:  Food and Nutrient History  Food and Nutrient History: NPO, NG in place, Glucerna1.5 @ 40 ml/hr ordered    Diet Experience  Previously prescribed diets: Previous modified diet  Previous Diet / Nutrition Education / Counseling: puree with honey thick liquids recommended most recent MBSS 5/1, however pt wtih severe dysphagia, SLP recommending NPO this admission and pt is agreeable to PEG per MD documentation, GI consult pending for PEG placement       Dietary Orders (From admission, onward)       Start     Ordered    05/12/24 1642  Enteral feeding with NPO NG (nasogastric tube); 40; 200; Water; Tap water; Every 6 hours  Diet effective now        Question Answer Comment   Tube feeding formula: Glucerna 1.5    Feeding route: NG (nasogastric tube)    Tube feeding continuous rate (mL/hr): 40    Tube feeding flush (mL): 200    Flush type: Water    Water type: Tap water    Flush frequency: Every 6 hours        05/12/24 1641                    Anthropometrics:  Height: 162.6 cm (5' 4.02\")  Weight: 93.1 kg (205 lb 4 oz)  BMI (Calculated): 35.21    Weight Change  Weight History / % Weight Change: 93 kg 4/30/24; 102.2kg 1/10/24, 104.2kg 10/17/23, 113.9kg 5/9/23, 114.1 kg 4/28/23.  Significant Weight Loss: Yes  Interpretation of Weight Loss: >10% in 6 months (pt with approximately 11% weight loss over past 6-7 months)     IBW/kg (Dietitian Calculated): 54.5 kg  Percent of IBW: 171 %      Latest Reference Range & Units 05/13/24 07:01 " "  GLUCOSE 74 - 99 mg/dL 315 (H)   SODIUM 136 - 145 mmol/L 133 (L)   POTASSIUM 3.5 - 5.3 mmol/L 3.7   CHLORIDE 98 - 107 mmol/L 102   Bicarbonate 21 - 32 mmol/L 25   Anion Gap 10 - 20 mmol/L 10   Blood Urea Nitrogen 6 - 23 mg/dL 11   Creatinine 0.50 - 1.05 mg/dL 0.75   EGFR >60 mL/min/1.73m*2 >90   Calcium 8.6 - 10.3 mg/dL 7.1 (L)   Albumin 3.4 - 5.0 g/dL 2.3 (L)   Alkaline Phosphatase 33 - 110 U/L 67   ALT 7 - 45 U/L 12   AST 9 - 39 U/L 29   Bilirubin Total 0.0 - 1.2 mg/dL 0.3   Total Protein 6.4 - 8.2 g/dL 6.0 (L)   (H): Data is abnormally high  (L): Data is abnormally low    Scheduled medications  amLODIPine, 5 mg, oral, Daily  apixaban, 5 mg, oral, BID  atorvastatin, 10 mg, oral, Daily  baclofen, 10 mg, oral, BID  dolutegravir, 50 mg, oral, Daily  furosemide, 20 mg, oral, Daily  insulin lispro, 0-10 Units, subcutaneous, TID with meals  ipratropium-albuteroL, 3 mL, nebulization, TID  lamoTRIgine, 25 mg, oral, q PM  levothyroxine, 175 mcg, oral, Daily before breakfast  oxygen, , inhalation, Continuous - Inhalation  potassium chloride, 20 mEq, nasogastric tube, Daily  sertraline, 200 mg, oral, Daily      Continuous medications  sodium chloride 0.9%, 75 mL/hr, Last Rate: 75 mL/hr (05/13/24 0400)      PRN medications  PRN medications: dextrose, dextrose, glucagon, glucagon, hydrALAZINE, ipratropium-albuteroL, oxyCODONE, vancomycin    Energy Needs:  Calculated Energy Needs Using Equations  Height: 162.6 cm (5' 4.02\")  Weight Used for Equation Calculations: 93.1 kg (205 lb 4 oz)  Shana Sinha Equation (Overweight or Obese Patients): 1511  Equation Chosen to Use by RD: Gio Chiu  Activity Factor: 1.2  Stress Factor: 1.2  Total Energy Needs: 2200  Temp: 36.7 °C (98.1 °F)         Estimated Protein Needs  Method for Estimating Needs: 75-95 g/day protein (1.4-1.7 g/kg IBW)    Estimated Fluid Needs  Method for Estimating Needs: per MD, or ~ 1 ml/kcal/day         Nutrition Focused Physical Findings:  Subcutaneous Fat " Loss  Orbital Fat Pads: Mild-Moderate (slight dark circles and slight hollowing) (NFPE, combination of physical and visual assessment, PCA at bedside for vitals)  Buccal Fat Pads: Well nourished (full, rounded cheeks)    Muscle Wasting  Temporalis: Mild-Moderate (slight depression)  Pectoralis (Clavicular Region): Mild-Moderate (some protrusion of clavicle)  Deltoid/Trapezius: Mild-Moderate (slight protrusion of acromion process)  Interosseous: Mild-Moderate (slightly depressed area between thumb and forefinger)    Edema  Edema: +1 trace  Edema Location: generalized      Physical Findings (Nutrition Deficiency/Toxicity)  Skin: Positive (coccyx unstageable per wound care RN previous admission 4/30/24)         Nutrition Diagnosis   Malnutrition Diagnosis  Patient has Malnutrition Diagnosis: Yes  Diagnosis Status: New  Malnutrition Diagnosis: Severe malnutrition related to chronic disease or condition  As Evidenced by: pt with approximately 11% weight loss over past 6-7 months, areas of depleted adipose tissues and skeletal tissue wasting per nutrition focused physical exam, generalized edema    Patient has Nutrition Diagnosis: Yes  Nutrition Diagnosis 1: Swallowing difficulity  Diagnosis Status (1): New  Related to (1): dysphagia  As Evidenced by (1): NPO status, NG for enteral nutrition support         Nutrition Interventions/Recommendations   Nutrition Prescription  Individualized Nutrition Prescription Provided for : GI consulted for PEG placement, pending; continue NPO status  - Glucerna 1.5 to continue (jevity 1.5 hanging and infusing, d/w RN, RN changing to Glucerna 1.5 formula as ordered currently).   - TF should be held one hour pre and one hour post levothyroxine administration   - recommend glucerna 1.5 @ 55 ml/hr (running for 22 hours per day 2/2 levothyroxine) via NG, will provide 1/815 kcal/day, 100 g/day protein, 918 ml free water  - water flushes per MD , adjust as indicated   - last BM recorded was  5/10; monitor stool output closely, scheduled bowel regimen as indicated.   - continue SSI for now , mild scale , however recommend change to every six hours  - RFP, Mg daily; replete prn   - music therapy consult       Food and/or Nutrient Delivery Interventions      Enteral Intake: Other (Comment)  Goal: long term nutrition support indicated 2/2 NPO status as per SLP recommendations        Additional Interventions: nutrition plan of care to parallel overall plan of care, pt agreeable to PEG     Coordination of Nutrition Care by a Nutrition Professional  Collaboration and Referral of Nutrition Care: Collaboration by nutrition professional with other providers    Education Documentation  No documentation found.         Nutrition Monitoring and Evaluation   Food and Nutrient Related History       Enteral and Parenteral Nutrition Intake: Enteral nutrition formula/solution, Enteral nutrition intake  Criteria: long term nutrition support to meet aprpxomiately 100% of daily estimated energy requirements and daily estimated protein requirements       Anthropometrics: Body Composition/Growth/Weight History  Weight: Weight change  Criteria: monitor trend      Biochemical Data, Medical Tests and Procedures  Electrolyte and Renal Panel: Other (Comment), BUN, Phosphorus, Potassium, Calcium, ionized, Calcium, serum, Sodium, Chloride, Creatinine, Magnesium  Criteria: monitor closely    Gastrointestinal Profile: Other (Comment), Aspartate aminotransferase (AST), Alanine aminotransferase (ALT), Bilirubin, total  Criteria: monitor    Glucose/Endocrine Profile: Other (Comment), Glucose, casual  Criteria: monitor    Nutritional Anemia Profile: Other (Comment), Hematocrit, Hemoglobin, Iron, serum  Criteria: as indicated    Vitamin Profile: Other (Comment), Vitamin D, 25 hydroxy  Criteria: as indicated    Nutrition Focused Physical Findings: monitor        Other: continue NPO status as per SLP recommendations       Follow Up  Time  Spent (min): 60 minutes  Last Date of Nutrition Visit: 05/14/24  Nutrition Follow-Up Needed?: Dietitian to reassess per policy  Follow up Comment: svr gavino, NPO with PEG pending; KEIRY

## 2024-05-15 ENCOUNTER — APPOINTMENT (OUTPATIENT)
Dept: CARDIOLOGY | Facility: HOSPITAL | Age: 54
DRG: 177 | End: 2024-05-15
Payer: MEDICARE

## 2024-05-15 LAB
ALBUMIN SERPL BCP-MCNC: 2.3 G/DL (ref 3.4–5)
ALP SERPL-CCNC: 66 U/L (ref 33–110)
ALT SERPL W P-5'-P-CCNC: 12 U/L (ref 7–45)
ANION GAP SERPL CALC-SCNC: 11 MMOL/L (ref 10–20)
AORTIC VALVE PEAK VELOCITY: 1.28 M/S
AST SERPL W P-5'-P-CCNC: 31 U/L (ref 9–39)
AV PEAK GRADIENT: 6.6 MMHG
BILIRUB SERPL-MCNC: 0.2 MG/DL (ref 0–1.2)
BUN SERPL-MCNC: 13 MG/DL (ref 6–23)
CALCIUM SERPL-MCNC: 7 MG/DL (ref 8.6–10.3)
CHLORIDE SERPL-SCNC: 99 MMOL/L (ref 98–107)
CO2 SERPL-SCNC: 27 MMOL/L (ref 21–32)
CREAT SERPL-MCNC: 1.03 MG/DL (ref 0.5–1.05)
EGFRCR SERPLBLD CKD-EPI 2021: 64 ML/MIN/1.73M*2
ERYTHROCYTE [DISTWIDTH] IN BLOOD BY AUTOMATED COUNT: 16.5 % (ref 11.5–14.5)
GLUCOSE BLD MANUAL STRIP-MCNC: 212 MG/DL (ref 74–99)
GLUCOSE BLD MANUAL STRIP-MCNC: 230 MG/DL (ref 74–99)
GLUCOSE BLD MANUAL STRIP-MCNC: 234 MG/DL (ref 74–99)
GLUCOSE BLD MANUAL STRIP-MCNC: 259 MG/DL (ref 74–99)
GLUCOSE BLD MANUAL STRIP-MCNC: 290 MG/DL (ref 74–99)
GLUCOSE BLD MANUAL STRIP-MCNC: 319 MG/DL (ref 74–99)
GLUCOSE SERPL-MCNC: 298 MG/DL (ref 74–99)
HCT VFR BLD AUTO: 21.8 % (ref 36–46)
HGB BLD-MCNC: 7.1 G/DL (ref 12–16)
INR PPP: 1.4 (ref 0.9–1.1)
LEFT VENTRICLE INTERNAL DIMENSION DIASTOLE: 5.02 CM (ref 3.5–6)
MCH RBC QN AUTO: 30.3 PG (ref 26–34)
MCHC RBC AUTO-ENTMCNC: 32.6 G/DL (ref 32–36)
MCV RBC AUTO: 93 FL (ref 80–100)
MITRAL VALVE E/A RATIO: 0.82
NRBC BLD-RTO: 0.3 /100 WBCS (ref 0–0)
PLATELET # BLD AUTO: 297 X10*3/UL (ref 150–450)
POTASSIUM SERPL-SCNC: 4.4 MMOL/L (ref 3.5–5.3)
PROT SERPL-MCNC: 5.9 G/DL (ref 6.4–8.2)
PROTHROMBIN TIME: 15.3 SECONDS (ref 9.8–12.8)
RBC # BLD AUTO: 2.34 X10*6/UL (ref 4–5.2)
SODIUM SERPL-SCNC: 133 MMOL/L (ref 136–145)
VANCOMYCIN SERPL-MCNC: 26.3 UG/ML (ref 5–20)
WBC # BLD AUTO: 15 X10*3/UL (ref 4.4–11.3)

## 2024-05-15 PROCEDURE — 1200000002 HC GENERAL ROOM WITH TELEMETRY DAILY

## 2024-05-15 PROCEDURE — 93308 TTE F-UP OR LMTD: CPT

## 2024-05-15 PROCEDURE — 94640 AIRWAY INHALATION TREATMENT: CPT

## 2024-05-15 PROCEDURE — 2500000005 HC RX 250 GENERAL PHARMACY W/O HCPCS: Performed by: INTERNAL MEDICINE

## 2024-05-15 PROCEDURE — 2500000006 HC RX 250 W HCPCS SELF ADMINISTERED DRUGS (ALT 637 FOR ALL PAYERS): Performed by: INTERNAL MEDICINE

## 2024-05-15 PROCEDURE — 93308 TTE F-UP OR LMTD: CPT | Performed by: INTERNAL MEDICINE

## 2024-05-15 PROCEDURE — 99232 SBSQ HOSP IP/OBS MODERATE 35: CPT | Performed by: NURSE PRACTITIONER

## 2024-05-15 PROCEDURE — 85610 PROTHROMBIN TIME: CPT | Performed by: NURSE PRACTITIONER

## 2024-05-15 PROCEDURE — 2500000004 HC RX 250 GENERAL PHARMACY W/ HCPCS (ALT 636 FOR OP/ED): Performed by: INTERNAL MEDICINE

## 2024-05-15 PROCEDURE — 2500000001 HC RX 250 WO HCPCS SELF ADMINISTERED DRUGS (ALT 637 FOR MEDICARE OP): Performed by: INTERNAL MEDICINE

## 2024-05-15 PROCEDURE — 2500000002 HC RX 250 W HCPCS SELF ADMINISTERED DRUGS (ALT 637 FOR MEDICARE OP, ALT 636 FOR OP/ED): Performed by: INTERNAL MEDICINE

## 2024-05-15 PROCEDURE — 80202 ASSAY OF VANCOMYCIN: CPT | Performed by: INTERNAL MEDICINE

## 2024-05-15 PROCEDURE — 80053 COMPREHEN METABOLIC PANEL: CPT | Performed by: INTERNAL MEDICINE

## 2024-05-15 PROCEDURE — 36415 COLL VENOUS BLD VENIPUNCTURE: CPT | Performed by: INTERNAL MEDICINE

## 2024-05-15 PROCEDURE — 2500000004 HC RX 250 GENERAL PHARMACY W/ HCPCS (ALT 636 FOR OP/ED): Performed by: NURSE PRACTITIONER

## 2024-05-15 PROCEDURE — 2500000001 HC RX 250 WO HCPCS SELF ADMINISTERED DRUGS (ALT 637 FOR MEDICARE OP): Performed by: PEDIATRICS

## 2024-05-15 PROCEDURE — 82947 ASSAY GLUCOSE BLOOD QUANT: CPT

## 2024-05-15 PROCEDURE — 99222 1ST HOSP IP/OBS MODERATE 55: CPT | Performed by: INTERNAL MEDICINE

## 2024-05-15 PROCEDURE — 85027 COMPLETE CBC AUTOMATED: CPT | Performed by: INTERNAL MEDICINE

## 2024-05-15 RX ORDER — POLYETHYLENE GLYCOL 3350 17 G/17G
17 POWDER, FOR SOLUTION ORAL DAILY
Status: DISCONTINUED | OUTPATIENT
Start: 2024-05-15 | End: 2024-05-23 | Stop reason: HOSPADM

## 2024-05-15 RX ADMIN — Medication 1 L/MIN: at 19:10

## 2024-05-15 RX ADMIN — BACLOFEN 10 MG: 10 TABLET ORAL at 20:58

## 2024-05-15 RX ADMIN — IPRATROPIUM BROMIDE AND ALBUTEROL SULFATE 3 ML: 2.5; .5 SOLUTION RESPIRATORY (INHALATION) at 12:48

## 2024-05-15 RX ADMIN — OXYCODONE HYDROCHLORIDE 5 MG: 5 TABLET ORAL at 14:35

## 2024-05-15 RX ADMIN — DOLUTEGRAVIR SODIUM 50 MG: 50 TABLET, FILM COATED ORAL at 09:11

## 2024-05-15 RX ADMIN — APIXABAN 5 MG: 5 TABLET, FILM COATED ORAL at 20:57

## 2024-05-15 RX ADMIN — FUROSEMIDE 20 MG: 20 TABLET ORAL at 09:10

## 2024-05-15 RX ADMIN — AMLODIPINE BESYLATE 5 MG: 5 TABLET ORAL at 09:11

## 2024-05-15 RX ADMIN — POTASSIUM CHLORIDE 20 MEQ: 1.5 POWDER, FOR SOLUTION ORAL at 09:11

## 2024-05-15 RX ADMIN — INSULIN LISPRO 4 UNITS: 100 INJECTION, SOLUTION INTRAVENOUS; SUBCUTANEOUS at 16:36

## 2024-05-15 RX ADMIN — INSULIN LISPRO 8 UNITS: 100 INJECTION, SOLUTION INTRAVENOUS; SUBCUTANEOUS at 09:12

## 2024-05-15 RX ADMIN — APIXABAN 5 MG: 5 TABLET, FILM COATED ORAL at 09:11

## 2024-05-15 RX ADMIN — LAMOTRIGINE 25 MG: 25 TABLET ORAL at 20:58

## 2024-05-15 RX ADMIN — SODIUM CHLORIDE 75 ML/HR: 9 INJECTION, SOLUTION INTRAVENOUS at 16:37

## 2024-05-15 RX ADMIN — INSULIN LISPRO 6 UNITS: 100 INJECTION, SOLUTION INTRAVENOUS; SUBCUTANEOUS at 12:47

## 2024-05-15 RX ADMIN — Medication 1 L/MIN: at 20:00

## 2024-05-15 RX ADMIN — BACLOFEN 10 MG: 10 TABLET ORAL at 09:11

## 2024-05-15 RX ADMIN — POLYETHYLENE GLYCOL 3350 17 G: 17 POWDER, FOR SOLUTION ORAL at 16:37

## 2024-05-15 RX ADMIN — ATORVASTATIN CALCIUM 10 MG: 10 TABLET, FILM COATED ORAL at 20:58

## 2024-05-15 RX ADMIN — Medication 1 L/MIN: at 07:38

## 2024-05-15 RX ADMIN — SODIUM CHLORIDE 75 ML/HR: 9 INJECTION, SOLUTION INTRAVENOUS at 06:00

## 2024-05-15 RX ADMIN — IPRATROPIUM BROMIDE AND ALBUTEROL SULFATE 3 ML: 2.5; .5 SOLUTION RESPIRATORY (INHALATION) at 07:38

## 2024-05-15 RX ADMIN — LEVOTHYROXINE SODIUM 175 MCG: 125 TABLET ORAL at 06:00

## 2024-05-15 RX ADMIN — IPRATROPIUM BROMIDE AND ALBUTEROL SULFATE 3 ML: 2.5; .5 SOLUTION RESPIRATORY (INHALATION) at 19:10

## 2024-05-15 RX ADMIN — SERTRALINE HYDROCHLORIDE 200 MG: 50 TABLET ORAL at 09:10

## 2024-05-15 RX ADMIN — OXYCODONE HYDROCHLORIDE 5 MG: 5 TABLET ORAL at 05:53

## 2024-05-15 ASSESSMENT — PAIN DESCRIPTION - LOCATION: LOCATION: ABDOMEN

## 2024-05-15 ASSESSMENT — COGNITIVE AND FUNCTIONAL STATUS - GENERAL
EATING MEALS: TOTAL
TURNING FROM BACK TO SIDE WHILE IN FLAT BAD: TOTAL
DRESSING REGULAR UPPER BODY CLOTHING: TOTAL
STANDING UP FROM CHAIR USING ARMS: TOTAL
PERSONAL GROOMING: TOTAL
DAILY ACTIVITIY SCORE: 6
MOVING TO AND FROM BED TO CHAIR: TOTAL
WALKING IN HOSPITAL ROOM: TOTAL
MOVING FROM LYING ON BACK TO SITTING ON SIDE OF FLAT BED WITH BEDRAILS: TOTAL
DRESSING REGULAR LOWER BODY CLOTHING: TOTAL
HELP NEEDED FOR BATHING: TOTAL
MOBILITY SCORE: 6
TOILETING: TOTAL
CLIMB 3 TO 5 STEPS WITH RAILING: TOTAL

## 2024-05-15 ASSESSMENT — PAIN SCALES - GENERAL
PAINLEVEL_OUTOF10: 0 - NO PAIN
PAINLEVEL_OUTOF10: 0 - NO PAIN
PAINLEVEL_OUTOF10: 5 - MODERATE PAIN
PAINLEVEL_OUTOF10: 9
PAINLEVEL_OUTOF10: 0 - NO PAIN
PAINLEVEL_OUTOF10: 4
PAINLEVEL_OUTOF10: 0 - NO PAIN
PAINLEVEL_OUTOF10: 6

## 2024-05-15 ASSESSMENT — PAIN - FUNCTIONAL ASSESSMENT
PAIN_FUNCTIONAL_ASSESSMENT: 0-10

## 2024-05-15 NOTE — PROGRESS NOTES
05/15/24 0711   Discharge Planning   Patient expects to be discharged to: LTC- facilities reviewing     GI saw yesterday, plan for possible PEG placement when respiratory status stable. On IV vanc for pna, repeat blood cultures and tte pending. Patient from [facility]. SW working with family for new LTC facility.

## 2024-05-15 NOTE — PROGRESS NOTES
"  INFECTIOUS DISEASE DAILY PROGRESS NOTE    SUBJECTIVE:    No overnight events. No new complaints. Afebrile. No rash/itching/diarrhea. Discussed awaiting repeat blood cx and TTE.    OBJECTIVE:  VITALS (Last 24 Hours)  /72 (BP Location: Left arm, Patient Position: Lying)   Pulse 91   Temp 36.6 °C (97.9 °F) (Temporal)   Resp 17   Ht 1.626 m (5' 4.02\")   Wt 96.5 kg (212 lb 11.9 oz)   SpO2 93%   BMI 36.50 kg/m²     PHYSICAL EXAM:  Gen - NAD, laying in bed  Lungs - no wheezing  Abd - soft, no ttp, BS present  Skin - no rashes    ABX: IV Vanc    LABS:  Lab Results   Component Value Date    WBC 15.0 (H) 05/15/2024    HGB 7.1 (L) 05/15/2024    HCT 21.8 (L) 05/15/2024    MCV 93 05/15/2024     05/15/2024     Lab Results   Component Value Date    GLUCOSE 298 (H) 05/15/2024    CALCIUM 7.0 (L) 05/15/2024     (L) 05/15/2024    K 4.4 05/15/2024    CO2 27 05/15/2024    CL 99 05/15/2024    BUN 13 05/15/2024    CREATININE 1.03 05/15/2024     Results from last 72 hours   Lab Units 05/15/24  0533   ALK PHOS U/L 66   BILIRUBIN TOTAL mg/dL 0.2   PROTEIN TOTAL g/dL 5.9*   ALT U/L 12   AST U/L 31   ALBUMIN g/dL 2.3*     Estimated Creatinine Clearance: 69.6 mL/min (by C-G formula based on SCr of 1.03 mg/dL).    CRP   Date/Time Value Ref Range Status   05/27/2020 05:56 PM 1.04 (A) mg/dL Final     Comment:     REF VALUE  < 1.00         ASSESSMENT/PLAN:     Multifocal Aspiration PNA due to MRSA  MRSA Bacteremia  Abx Allergies - penicillin, sulfa. Limits abx options.    IV Vancomycin. Monitoring Vancomycin levels and renal function. Cr stable.    Repeat blood cx x2 NGTD.    TTE - ordered and pending.    Monitoring for adverse effects of abx such as rash/itching/diarrhea - none apparent.     Will follow. Thanks!    Sadiq Mayo MD  [facility]  Office #[phone]      "

## 2024-05-15 NOTE — PROGRESS NOTES
Physical Therapy                 Therapy Communication Note    Patient Name: Janette Martinez  MRN: [id number]  Today's Date: 5/15/2024     Discipline: Physical Therapy    Missed Visit Reason: Missed Visit Reason:  (Per chart review, pt has been bedbound, total assist for ADLs and IADLs, and [facility] lift transfer for >1 year. Pt is not appropriate for any acute PT at this time.  PT orders will be discharged at this time. TCC aware)    Missed Time: Attempt    Comment:

## 2024-05-15 NOTE — PROGRESS NOTES
"Vancomycin Dosing by Pharmacy- FOLLOW UP    Janette Martinez is a 53 y.o. year old female who Pharmacy has been consulted for vancomycin dosing for pneumonia/bacteremia. Based on the patient's indication and renal status this patient is being dosed based on a goal trough/random level of 15-20.     Renal function is currently stable.    Current vancomycin dose: dosing by level    Most recent random level: 26.3 mcg/mL    Visit Vitals  /66 (BP Location: Left arm, Patient Position: Lying)   Pulse 90   Temp 36.8 °C (98.2 °F) (Temporal)   Resp 18        Lab Results   Component Value Date    CREATININE 1.03 05/15/2024    CREATININE 0.96 05/14/2024    CREATININE 0.75 05/13/2024    CREATININE 0.77 05/12/2024        Patient weight is No results found for: \"PTWEIGHT\"    No results found for: \"CULTURE\"     I/O last 3 completed shifts:  In: 4786.4 (49.6 mL/kg) [I.V.:735.4 (7.6 mL/kg); NG/GT:4051]  Out: 1400 (14.5 mL/kg) [Urine:1400 (0.4 mL/kg/hr)]  Weight: 96.5 kg   [unfilled]    Lab Results   Component Value Date    PATIENTTEMP 37.0 05/09/2024    PATIENTTEMP 37.0 04/19/2023        Assessment/Plan    Above goal random/trough level. No vanco tonight.  The next level will be obtained on 5/16 with am labs. May be obtained sooner if clinically indicated.   Will continue to monitor renal function daily while on vancomycin and order serum creatinine at least every 48 hours if not already ordered.  Follow for continued vancomycin needs, clinical response, and signs/symptoms of toxicity.       Carola Rodas [facility]           "

## 2024-05-15 NOTE — PROGRESS NOTES
Occupational Therapy                 Therapy Communication Note- OT screen and DC    Patient Name: Janette Martinez  MRN: [id number]  Today's Date: 5/15/2024     Discipline: Occupational Therapy    Missed Visit Reason: Missed Visit Reason:  (Per chart review, pt has been bedbound, total A for ADLs/IADLs, and [facility] lift transfer for >1 year. Pt is not appropriate skilled acute OT services at this time.  Will D/C OT orders. TCC aware.)    Missed Time: Attempt

## 2024-05-15 NOTE — PROGRESS NOTES
GI Daily Progress Note    Assessment/Plan:    53 y.o. F with h/o heart failure, hypertension, hyperlipidemia, CKD, diabetes, s/p thyroidectomy, CVA with right-sided hemiparesis and aphasia, presented with change in mental status, multifocal aspiration pneumonia, MRSA bacteremia. Pt with oropharyngeal dysphagia, failed swallow evaluation. GI consulted for PEG placement     - recommend to cont treatment of pneumonia  - aspiration precautions  - aggressive oral care  - please hold eliquis on Friday if possible  - consider PEG placement on Monday  -Miralax as needed for constipation     LOS: 6 days     Subjective:    Patient expresses no new complains     Objective:    Vital signs in last 24 hours:  Temp:  [36.6 °C (97.9 °F)-37.1 °C (98.8 °F)] 36.8 °C (98.2 °F)  Heart Rate:  [78-91] 83  Resp:  [17-18] 18  BP: (103-123)/(66-72) 103/66    Intake/Output last 3 shifts:  I/O last 3 completed shifts:  In: 4786.4 (49.6 mL/kg) [I.V.:735.4 (7.6 mL/kg); NG/GT:4051]  Out: 1400 (14.5 mL/kg) [Urine:1400 (0.4 mL/kg/hr)]  Weight: 96.5 kg   Intake/Output this shift:  No intake/output data recorded.    Physical Exam  Constitutional:       General: She is not in acute distress.     Appearance: She is ill-appearing.   Pulmonary:      Effort: Pulmonary effort is normal.      Breath sounds: Rhonchi present.   Abdominal:      General: Bowel sounds are normal. There is no distension.      Palpations: Abdomen is soft.      Tenderness: There is abdominal tenderness.   Neurological:      Mental Status: Mental status is at baseline. She is lethargic.   Psychiatric:         Mood and Affect: Mood normal.         Behavior: Behavior normal.          Results for orders placed or performed during the hospital encounter of 05/09/24 (from the past 24 hour(s))   POCT GLUCOSE   Result Value Ref Range    POCT Glucose 289 (H) 74 - 99 mg/dL   POCT GLUCOSE   Result Value Ref Range    POCT Glucose 219 (H) 74 - 99 mg/dL   POCT GLUCOSE   Result Value Ref Range     POCT Glucose 230 (H) 74 - 99 mg/dL   POCT GLUCOSE   Result Value Ref Range    POCT Glucose 259 (H) 74 - 99 mg/dL   CBC   Result Value Ref Range    WBC 15.0 (H) 4.4 - 11.3 x10*3/uL    nRBC 0.3 (H) 0.0 - 0.0 /100 WBCs    RBC 2.34 (L) 4.00 - 5.20 x10*6/uL    Hemoglobin 7.1 (L) 12.0 - 16.0 g/dL    Hematocrit 21.8 (L) 36.0 - 46.0 %    MCV 93 80 - 100 fL    MCH 30.3 26.0 - 34.0 pg    MCHC 32.6 32.0 - 36.0 g/dL    RDW 16.5 (H) 11.5 - 14.5 %    Platelets 297 150 - 450 x10*3/uL   Comprehensive Metabolic Panel   Result Value Ref Range    Glucose 298 (H) 74 - 99 mg/dL    Sodium 133 (L) 136 - 145 mmol/L    Potassium 4.4 3.5 - 5.3 mmol/L    Chloride 99 98 - 107 mmol/L    Bicarbonate 27 21 - 32 mmol/L    Anion Gap 11 10 - 20 mmol/L    Urea Nitrogen 13 6 - 23 mg/dL    Creatinine 1.03 0.50 - 1.05 mg/dL    eGFR 64 >60 mL/min/1.73m*2    Calcium 7.0 (L) 8.6 - 10.3 mg/dL    Albumin 2.3 (L) 3.4 - 5.0 g/dL    Alkaline Phosphatase 66 33 - 110 U/L    Total Protein 5.9 (L) 6.4 - 8.2 g/dL    AST 31 9 - 39 U/L    Bilirubin, Total 0.2 0.0 - 1.2 mg/dL    ALT 12 7 - 45 U/L   Protime-INR   Result Value Ref Range    Protime 15.3 (H) 9.8 - 12.8 seconds    INR 1.4 (H) 0.9 - 1.1   Vancomycin   Result Value Ref Range    Vancomycin 26.3 (H) 5.0 - 20.0 ug/mL   POCT GLUCOSE   Result Value Ref Range    POCT Glucose 319 (H) 74 - 99 mg/dL   POCT GLUCOSE   Result Value Ref Range    POCT Glucose 290 (H) 74 - 99 mg/dL

## 2024-05-15 NOTE — CONSULTS
Inpatient consult to Pulmonology  Consult performed by: Elaine Bennett MD  Consult ordered by: Radha Ward MD  Reason for consult: Optomization for peg and aspiration PNA      Department of Medicine  Division of Pulmonary, Critical Care, and Sleep Medicine  Consultation Note    No ref. provider found    History Of Present Illness:    Janette Martinez is a 53 y.o. female With past medical history of hypertension, dyslipidemia, hypothyroidism status post thyroidectomy for Graves' disease, type 2 diabetes who initially presented to [facility] with complaint of confusion and fever.    Pulmonary was consulted for concern for aspiration pneumonia and optimization before placement of PEG tube by GI.    Per chart review patient was recently in a nursing home after recent admission where she was diagnosed with dysphagia and had a specific diet order for her.  She was sent from her nursing home due to confusion and fever.  In the ED she was diagnosed with aspiration pneumonia.  Initially white count was elevated but is currently downtrending.  She was treated with broad-spectrum antibiotics (vancomycin, cefepime, Flagyl, azithromycin).  Infectious diseases following and managing antibiotics.  Furthermore per chart review patient is currently on 1 L nasal cannula.  Furthermore, GI was consulted for PEG tube placement and they saw the patient on the 14th.  Recommendations were to treat pneumonia, check INR, and stabilize the patient from a respiratory standpoint for procedure.  Further chart shown MRSA bacteremia likely secondary to her multifocal aspiration pneumonia.  As mentioned above ID is following due to multiple antibiotic allergies.  She is appropriately treated with vancomycin despite having elevated levels for which this medication was placed on hold currently.    On interview today patients speech was very low. She was AAOx2. Therefor history is limited.      Review of systems:   Review of  Systems   Unable to perform ROS: Mental status change      A 10+ point ROS was completed and otherwise negative except as noted above and per HPI.    Past Medical History:  Past Medical History:   Diagnosis Date    Acute pulmonary embolism (Multi) 04/22/2024    Aphasia as late effect of cerebrovascular accident 04/25/2023    Essential hypertension 06/25/2010    Gastroesophageal reflux disease 03/10/2009    Hemiplegia of nondominant side, late effect of cerebrovascular disease (Multi) 09/03/2008    Hemorrhagic stroke (Multi) 06/25/2010    HIV infection (Multi) 02/21/2007    Hypothyroidism 10/10/2002    Formatting of this note might be different from the original.   S/p thyroidectomy 2002   Patholgy c/w Graves    Mixed hyperlipidemia 06/25/2010    Stage 3a chronic kidney disease (Multi) 04/22/2024    T2DM (type 2 diabetes mellitus) (Multi) 11/14/2012       Past Surgical History:  Past Surgical History:   Procedure Laterality Date    CHOLECYSTECTOMY  01/08/2016    Cholecystectomy    COLON SURGERY  01/08/2016    Colon Surgery    CT ANGIO NECK  3/19/2019    CT NECK ANGIO W AND WO IV CONTRAST 3[place]19/[place] [facility] [place]    CT HEAD ANGIO W AND WO IV CONTRAST  3/19/2019    CT HEAD ANGIO W AND WO IV CONTRAST 3/19/2019 [facility] [place] [facility]    HERNIA REPAIR  01/08/2016    Hernia Repair    HYSTERECTOMY  01/08/2016    Hysterectomy    MR HEAD ANGIO WO IV CONTRAST  6/4/2018    MR HEAD ANGIO WO IV CONTRAST 6/4/2018 [facility] CLINICAL LEGACY    MR HEAD ANGIO WO IV CONTRAST  7/11/2020    MR HEAD ANGIO WO IV CONTRAST 7/11/2020 [facility] CLINICAL LEGACY    MR HEAD ANGIO WO IV CONTRAST  9/30/2020    MR HEAD ANGIO WO IV CONTRAST 9/30/2020 [facility] LEGAC[facility]    MR HEAD ANGIO WO IV CONTRAST  5/22/2022    MR HEAD ANGIO WO IV CONTRAST 5/22/2022 [facility] CLINICAL LEGACY    MR HEAD ANGIO WO IV CONTRAST  4/19/2023    MR HEAD ANGIO WO IV CONTRAST [facility]    MR NECK ANGIO WO IV CONTRAST  6/4/2018    MR NECK ANGIO WO IV CONTRAST 6/4/2018 [facility] CLINICAL LEGACY     MR NECK ANGIO WO IV CONTRAST  7/11/2020    MR NECK ANGIO WO IV CONTRAST 7/11/2020 [facility] CLINICAL LEGACY    MR NECK ANGIO WO IV CONTRAST  9/30/2020    MR NECK ANGIO WO IV CONTRAST 9/30/2020 [facility]GAC[facility]    MR NECK ANGIO WO IV CONTRAST  5/22/2022    MR NECK ANGIO WO IV CONTRAST 5/22/2022 [facility] CLINICAL LEGACY    MR NECK ANGIO WO IV CONTRAST  4/19/2023    MR NECK ANGIO WO IV CONTRAST [facility]    OTHER SURGICAL HISTORY  01/08/2016    Tubal Stabilization    OTHER SURGICAL HISTORY  09/01/2016    Cervical Surgery (Gyn) Laser Vaporization Of Transformation Zone    THYROID SURGERY  09/27/2013    Thyroid Surgery        Family History:  No family history on file.     Social History:   has no history on file for tobacco use, alcohol use, and drug use.      Objective     Last Recorded Vitals:  Vitals:    05/15/24 0900 05/15/24 1000 05/15/24 1100 05/15/24 1140   BP:    103/66   BP Location:    Left arm   Patient Position:    Lying   Pulse: 86 87 79 90   Resp:    18   Temp:    36.8 °C (98.2 °F)   TempSrc:    Temporal   SpO2:    98%   Weight:       Height:           Oxygen Therapy  SpO2: 98 %  Medical Gas Therapy: Supplemental oxygen  O2 Delivery Method: Nasal cannula  FiO2 (%): 36 %    Physical Exam:  Physical Exam  Constitutional:       General: She is not in acute distress.     Appearance: She is toxic-appearing.      Comments: Patient was seen laying in bed comfortably.  Nasal cannula was out of her nose.   HENT:      Head: Normocephalic and atraumatic.      Nose:      Comments: Nasogastric tube in place     Mouth/Throat:      Mouth: Mucous membranes are dry.   Eyes:      Comments: Right eye fixed and affected by stroke   Neck:      Comments: No visualized masses  Cardiovascular:      Rate and Rhythm: Normal rate and regular rhythm.      Heart sounds: No murmur heard.     No friction rub. No gallop.   Pulmonary:      Effort: No respiratory distress.      Breath sounds: Normal breath sounds. No wheezing, rhonchi or rales.    Abdominal:      General: There is no distension.      Palpations: Abdomen is soft.      Tenderness: There is abdominal tenderness. There is no guarding.   Musculoskeletal:         General: No tenderness.      Right lower leg: No edema.      Left lower leg: No edema.   Skin:     General: Skin is warm and dry.   Neurological:      Mental Status: She is alert. She is disoriented.      Motor: Weakness (Right-sided upper extremity lower extremity weakness) present.   Psychiatric:         Mood and Affect: Mood normal.           Allergies:  Allergies   Allergen Reactions    Aspirin Unknown, Bleeding and Itching    Iodinated Contrast Media Unknown and Hives    Amoxicillin Hives    Other Unknown    Morphine Unknown, Swelling, Itching and Rash    Sulfa (Sulfonamide Antibiotics) Unknown and Rash       Inpatient Medications:  amLODIPine, 5 mg, oral, Daily  apixaban, 5 mg, oral, BID  atorvastatin, 10 mg, oral, Daily  baclofen, 10 mg, oral, BID  dolutegravir, 50 mg, oral, Daily  furosemide, 20 mg, oral, Daily  insulin lispro, 0-10 Units, subcutaneous, TID  ipratropium-albuteroL, 3 mL, nebulization, TID  lamoTRIgine, 25 mg, oral, q PM  levothyroxine, 175 mcg, oral, Daily before breakfast  oxygen, , inhalation, Continuous - Inhalation  potassium chloride, 20 mEq, nasogastric tube, Daily  sertraline, 200 mg, oral, Daily      sodium chloride 0.9%, 75 mL/hr, Last Rate: 75 mL/hr (05/15/24 0600)      PRN medications: dextrose, dextrose, glucagon, glucagon, guaiFENesin, hydrALAZINE, ipratropium-albuteroL, oxyCODONE, vancomycin    Outpatient Medications:  Prior to Admission medications    Medication Sig Start Date End Date Taking? Authorizing Provider   acetaminophen (Tylenol) 500 mg tablet Take 2 tablets (1,000 mg) by mouth 3 times a day.    Historical Provider, MD   amLODIPine (Norvasc) 5 mg tablet Take 1 tablet (5 mg) by mouth once daily.    Historical Provider, MD   apixaban (Eliquis) 5 mg tablet Take 1 tablet (5 mg) by mouth 2  times a day.    Katie Provider, MD   atorvastatin (Lipitor) 10 mg tablet Take 1 tablet (10 mg) by mouth once daily.    Katie Provider, MD   baclofen (Lioresal) 10 mg tablet Take 1 tablet (10 mg) by mouth 2 times a day.    Katie Mancilla MD   diclofenac sodium (Voltaren) 1 % gel Apply 4.5 inches (4 g) topically every 12 hours if needed.    Katie Mancilla MD   docusate sodium (Colace) 100 mg capsule Take 1 capsule (100 mg) by mouth 2 times a day.    Katie Mancilla MD   dolutegravir (Tivicay) 50 mg tablet Take 1 tablet (50 mg) by mouth once daily.    Katie Mancilla MD   emtricitabine-tenofovir alafen (Descovy) 200-25 mg tablet Take 1 tablet by mouth once daily.    Katie Provider, MD   furosemide (Lasix) 20 mg tablet Take 1 tablet (20 mg) by mouth once daily.    Katie Provider, MD   gabapentin (Neurontin) 300 mg capsule Take 2 capsules (600 mg) by mouth 3 times a day.    Katie Provider, MD   hydroCHLOROthiazide (HYDRODiuril) 25 mg tablet Take 1 tablet (25 mg) by mouth once daily. Hold if SBP <110 or Heart rate <60    Katie Mancilla MD   insulin glargine (Basaglar KwikPen U-100 Insulin) 100 unit/mL (3 mL) pen Inject 43 Units under the skin once daily at bedtime. Take as directed per insulin instructions.    Katie Mancilla MD   insulin lispro (HumaLOG) 100 unit/mL injection Inject 0.12 mL (12 Units) under the skin once daily. At 1:30pm    Katie Mancilla MD   ipratropium-albuteroL (Duo-Neb) 0.5-2.5 mg/3 mL nebulizer solution Take 3 mL by nebulization 4 times a day as needed for wheezing.    Katie Mancilla MD   lamoTRIgine (LaMICtal) 25 mg tablet Take 1 tablet (25 mg) by mouth once daily in the evening.    Katie Provider, MD   levothyroxine (Synthroid, Levoxyl) 175 mcg tablet Take 1 tablet (175 mcg) by mouth once daily in the morning. Take before meals.    Katie Mancilla MD   melatonin 3 mg tablet Take 2 tablets (6 mg) by mouth once daily at  bedtime.    Historical Provider, MD   moxifloxacin (Vigamox) 0.5 % ophthalmic solution Administer 1 drop into the right eye 3 times a day. 6am , 2pm, 10pm    Historical Provider, MD   oxyCODONE (Roxicodone) 5 mg immediate release tablet Take 1 tablet (5 mg) by mouth every 6 hours if needed for severe pain (7 - 10).    Historical Provider, MD   polyethylene glycol (Glycolax, Miralax) 17 gram packet Take 17 g by mouth once daily.    Historical Provider, MD   potassium chloride ER (Micro-K) 10 mEq ER capsule Take 2 capsules (20 mEq) by mouth once daily. Do not crush or chew.    Historical Provider, MD   sertraline (Zoloft) 100 mg tablet Take 2 tablets (200 mg) by mouth once daily.    Historical Provider, MD   atorvastatin (Lipitor) 40 mg tablet Take 1 tablet (40 mg) by mouth once daily. To total 50mg daily  5/9/24  Historical Provider, MD   docusate sodium (Colace) 100 mg capsule Take 1 capsule (100 mg) by mouth 2 times a day as needed for constipation.  5/9/24  Historical Provider, MD   lactulose 10 gram/15 mL (15 mL) solution Take 30 mL by mouth every 12 hours if needed (constipation).  5/9/24  Historical Provider, MD   omega 3-dha-epa-fish oil 300 mg (120 mg- 180mg)-1,000 mg capsule Take 1 capsule by mouth 2 times a day.  5/9/24  Historical Provider, MD   sennosides-docusate sodium (Caro-Colace) 8.6-50 mg tablet Take 2 tablets by mouth 2 times a day as needed for constipation.  5/9/24  Historical Provider, MD       LABS/IMAGING/DIAGNOSTICS REVIEW:    Labs:  Lab Results   Component Value Date    WBC 15.0 (H) 05/15/2024    HGB 7.1 (L) 05/15/2024    HCT 21.8 (L) 05/15/2024    MCV 93 05/15/2024     05/15/2024      Lab Results   Component Value Date    GLUCOSE 298 (H) 05/15/2024    CALCIUM 7.0 (L) 05/15/2024     (L) 05/15/2024    K 4.4 05/15/2024    CO2 27 05/15/2024    CL 99 05/15/2024    BUN 13 05/15/2024    CREATININE 1.03 05/15/2024      Lab Results   Component Value Date    ALT 12 05/15/2024    AST 31  05/15/2024    ALKPHOS 66 05/15/2024    BILITOT 0.2 05/15/2024        CT chest abdomen pelvis wo IV contrast 05/09/2024    Narrative  Interpreted By:  Kishor Muniz,  STUDY:  CT CHEST ABDOMEN PELVIS WO CONTRAST; ;  5/9/2024 12:09 pm    INDICATION:  Signs/Symptoms:AMS tachycardia.    COMPARISON:  05/22/2022    ACCESSION NUMBER(S):  [id number]    ORDERING CLINICIAN:  ROSAURA CHACKO    TECHNIQUE:  Serial axial unenhanced CT images obtained of the chest, abdomen, and  pelvis. Images reformatted in the coronal and sagittal projection    All CT examinations are performed with 1 or more of the following  dose reduction techniques: Automated exposure control, adjustment of  mA and/or kv according to patient's size, or use of iterative  reconstruction techniques.    FINDINGS:  CT chest:    Mediastinum demonstrates right paratracheal lymph node in the  superior mediastinum measuring 8 x 6 mm not seen on the prior  examination. Also, there is component of precarinal lymphadenopathy  identified intervally developed from prior imaging measuring 12 x 8  mm. Subcarinal lymph node identified measures 9 mm in the short axis.  Esophagus is unremarkable    Heart and great vessels demonstrate mild vascular calcification of  the aortic arch. Ascending thoracic aorta measures 2.5 cm. No  cardiomegaly demonstrated. Sliver of pericardial effusion noted.    Lung parenchyma demonstrates patchy areas of consolidation throughout  bilateral lung fields with more focal dense airspace consolidation in  the dependent portions of the medial segment of the right lower lobe  as well as medial basal segment of the left lower lobe in the  posterior perihilar distribution with associated air bronchograms  demonstrated. Other patchy areas of consolidation in the right  perihilar location in particular within the right upper lobe with  multifocal pneumonia. There are small bilateral basilar pleural  effusions. Within the areas of dense airspace  consolidation there are  linear appearing areas of hyperdensity. Correlate with recent  contrast administration and aspiration.    Visualized osseous structures demonstrate multilevel degenerative  discogenic changes lower thoracic spine with multilevel anterior  osteophyte formation.    CT abdomen:    Liver is unremarkable within limits of this unenhanced CT    Spleen and adrenal glands are unremarkable    Status post cholecystectomy    Pancreas is unremarkable    Right kidney demonstrates no hydronephrosis or nephrolithiasis.  Visualized course of the right ureter is unremarkable.    Left kidney demonstrates no hydronephrosis or nephrolithiasis.    Retroperitoneum demonstrates flattened IVC. Correlate with component  of hypovolemia. Mild vascular calcification of the abdominal aorta    Loops of large bowel demonstrate fluid-filled portions of the  proximal to mid colon. Mild stool-filled distal colon with hyperdense  component within the fecal column unclear if this relates to recent  contrast administration. Small bowel loops are nondilated.  Postsurgical changes are demonstrated with enteroenteric anastomosis  in the right lower quadrant appearing unremarkable. No perienteric  fat stranding. Stomach is unremarkable with dependent hyperdense  fluid in the stomach lumen.    CT pelvis:    Unopacified bladder is unremarkable. There is no pelvic  lymphadenopathy. No free fluid demonstrated. Uterus and adnexa are  unremarkable    Visualized osseous structures demonstrate moderate osteoarthritic  degenerative change bilateral hips. Mild bilateral SI joint  osteoarthritis demonstrated. There is moderate facet arthropathy L4/5.    Impression  1. Multifocal pneumonia with more dense airspace consolidation  demonstrated within the medial right and left lower lobes in the  posterior perihilar location. Scattered patchy areas of ground-glass  airspace infiltrate and consolidation within bilateral lung fields.  Follow-up to  clearing.    2. The IVC is flattened in appearance suggesting component of  hypovolemia. Correlate    3. No dilated loops of bowel. Postsurgical changes with enteroenteric  anastomosis in the right lower quadrant appearing unremarkable.      MACRO:  None    Signed by: Kishor Muniz 5/9/2024 1:01 PM  Dictation workstation:   [id number]    XR chest 1 view 05/10/2024    Narrative  Interpreted By:  Desi Ordonez,  STUDY:  XR CHEST 1 VIEW;  5/10/2024 7:45 pm    INDICATION:  Signs/Symptoms:Nasal gastric tube placement    COMPARISON:  Chest x-ray 05/09/2024    ACCESSION NUMBER(S):  [id number]    ORDERING CLINICIAN:  ARCELIA CRAFT    TECHNIQUE:  Portable upright frontal view of the chest was obtained .    FINDINGS:  There is an enteric tube bent in the region of the gastric antrum  with the tip in the region of the gastric body.    The heart is enlarged. There is mild vascular congestion and  interstitial edema.    There are ill-defined bilateral airspace opacities. There are trace  bilateral pleural effusions. No pneumothorax.    Impression  1. Enteric tube bent in the region of the gastric antrum with the tip  in the region of the gastric body.  2. Cardiomegaly. Mild vascular congestion and interstitial edema.  Trace bilateral pleural effusions. Ill-defined bilateral airspace  opacities, suggestive of multifocal pneumonia.        MACRO:  None.    Signed by: Desi Ordonez 5/10/2024 9:53 PM  Dictation workstation:   [id number]      XR chest 1 view 05/09/2024    Narrative  Interpreted By:  Mercedes Salazar,  STUDY:  XR CHEST 1 VIEW;  5/9/2024 1:27 pm    INDICATION:  Signs/Symptoms:AMS.    COMPARISON:  04/02/2024 and CT of the chest dated 05/09/2024    ACCESSION NUMBER(S):  [id number]    ORDERING CLINICIAN:  ROSAURA CHACKO    FINDINGS:  Poor inspiratory effort is seen with crowding of pulmonary  vasculature at the lung bases. The heart is mildly enlarged. There is  diffuse bilateral interstitial prominence  "with patchy densities in  bilateral parahilar regions which on the chest CT obtained on the  same day demonstrated to be related to infiltrates. No gross pleural  effusion or pneumothorax is seen    Impression  Poor inspiration.    Mild cardiomegaly.    Bilateral infiltrates.        MACRO:  None    Signed by: Mercedes Salazar 5/9/2024 1:52 PM  Dictation workstation:   [id number]      Pulmonary Functions Testing Results:  No results found for: \"FEV1\", \"FVC\", \"CZE4QTM\", \"TLC\", \"DLCO\"     Relevant imaging results were personally reviewed.      Assessment/Plan     Janette Martinez is a 53 y.o. female With past medical history of hypertension, dyslipidemia, hypothyroidism status post thyroidectomy for Graves' disease, type 2 diabetes who initially presented to [facility] ED with complaint of confusion and fever.  Pulmonary was consulted for further management of aspiration pneumonia and optimization of respiratory status before PEG tube placement    Patient was seen and assessed by myself and his chart was reviewed for previous pulmonary visits, Labs and imaging.     Patient has MRSA bacteremia likely secondary to her multifocal aspiration pneumonia.  Furthermore, urine culture was positive for Enterococcus faecalis.  Her MRSA nares was also positive.  C. difficile PCR was performed and was found to be negative.  Stool pathogen panel was also pan negative.  Repeat blood cultures are currently in progress.  Infectious diseases following and managing antibiotics as well.  Vancomycin levels are high today.  Medication is currently on hold.    #Aspiration pneumonia  #Oropharyngeal dysphagia  #Acute hypoxic respiratory failure  #MRSA bacteremia  #Enterococcus faecalis UTI  #Metabolic encephalopathy      Recommendations are as follows:  -Antibiotics per infectious disease  -Aspiration precautions  -N.p.o. per speech pathology with consideration for PEG tube  -Agree with GI for placement of PEG tube.  Will have to " discontinue DOAC or consider heparin drip..  -Continue aggressive bronchopulmonary hygiene with Acapella, incentive spirometer, and MetaNeb.  -Patient is only on 1 L nasal cannula currently.  -In terms of optimization; every patient undergoing conscious sedation is at risk for respiratory failure or worsening respiratory failure.  The patient's current oxygenation requirements 1 L nasal cannula albeit her nasal cannula was out of her nose when I saw her this morning.  She is likely as optimized as best as possible at this point in order to undergo this procedure.  Can consider positive pressure therapy if needed after procedure in order to support her breathing.    Thank you for this consult.  Pulmonary team will continue to follow-up patient is in house.     Elaine Bennett MD, MPH  Pulmonary and Critical Care Medicine     Please excuse any typographical or unwanted errors as voice recognition software was used to complete this note.

## 2024-05-16 LAB
ALBUMIN SERPL BCP-MCNC: 2.3 G/DL (ref 3.4–5)
ALP SERPL-CCNC: 63 U/L (ref 33–110)
ALT SERPL W P-5'-P-CCNC: 10 U/L (ref 7–45)
ANION GAP SERPL CALC-SCNC: 9 MMOL/L (ref 10–20)
AST SERPL W P-5'-P-CCNC: 27 U/L (ref 9–39)
BILIRUB SERPL-MCNC: 0.2 MG/DL (ref 0–1.2)
BUN SERPL-MCNC: 14 MG/DL (ref 6–23)
CALCIUM SERPL-MCNC: 7.3 MG/DL (ref 8.6–10.3)
CHLORIDE SERPL-SCNC: 99 MMOL/L (ref 98–107)
CO2 SERPL-SCNC: 29 MMOL/L (ref 21–32)
CREAT SERPL-MCNC: 1.02 MG/DL (ref 0.5–1.05)
EGFRCR SERPLBLD CKD-EPI 2021: 65 ML/MIN/1.73M*2
ERYTHROCYTE [DISTWIDTH] IN BLOOD BY AUTOMATED COUNT: 16.2 % (ref 11.5–14.5)
GLUCOSE BLD MANUAL STRIP-MCNC: 215 MG/DL (ref 74–99)
GLUCOSE BLD MANUAL STRIP-MCNC: 225 MG/DL (ref 74–99)
GLUCOSE BLD MANUAL STRIP-MCNC: 301 MG/DL (ref 74–99)
GLUCOSE BLD MANUAL STRIP-MCNC: 308 MG/DL (ref 74–99)
GLUCOSE SERPL-MCNC: 302 MG/DL (ref 74–99)
HCT VFR BLD AUTO: 21 % (ref 36–46)
HGB BLD-MCNC: 6.9 G/DL (ref 12–16)
MCH RBC QN AUTO: 30.9 PG (ref 26–34)
MCHC RBC AUTO-ENTMCNC: 32.9 G/DL (ref 32–36)
MCV RBC AUTO: 94 FL (ref 80–100)
NRBC BLD-RTO: 0.3 /100 WBCS (ref 0–0)
PLATELET # BLD AUTO: 320 X10*3/UL (ref 150–450)
POTASSIUM SERPL-SCNC: 4.6 MMOL/L (ref 3.5–5.3)
PROT SERPL-MCNC: 6.1 G/DL (ref 6.4–8.2)
RBC # BLD AUTO: 2.23 X10*6/UL (ref 4–5.2)
SODIUM SERPL-SCNC: 132 MMOL/L (ref 136–145)
VANCOMYCIN SERPL-MCNC: 16.5 UG/ML (ref 5–20)
WBC # BLD AUTO: 15.6 X10*3/UL (ref 4.4–11.3)

## 2024-05-16 PROCEDURE — 99232 SBSQ HOSP IP/OBS MODERATE 35: CPT | Performed by: INTERNAL MEDICINE

## 2024-05-16 PROCEDURE — 2500000004 HC RX 250 GENERAL PHARMACY W/ HCPCS (ALT 636 FOR OP/ED): Mod: JZ | Performed by: PHARMACIST

## 2024-05-16 PROCEDURE — 2500000004 HC RX 250 GENERAL PHARMACY W/ HCPCS (ALT 636 FOR OP/ED): Performed by: NURSE PRACTITIONER

## 2024-05-16 PROCEDURE — 2500000001 HC RX 250 WO HCPCS SELF ADMINISTERED DRUGS (ALT 637 FOR MEDICARE OP): Performed by: PEDIATRICS

## 2024-05-16 PROCEDURE — 2500000004 HC RX 250 GENERAL PHARMACY W/ HCPCS (ALT 636 FOR OP/ED): Performed by: INTERNAL MEDICINE

## 2024-05-16 PROCEDURE — 80053 COMPREHEN METABOLIC PANEL: CPT | Performed by: INTERNAL MEDICINE

## 2024-05-16 PROCEDURE — 1100000001 HC PRIVATE ROOM DAILY

## 2024-05-16 PROCEDURE — 82947 ASSAY GLUCOSE BLOOD QUANT: CPT

## 2024-05-16 PROCEDURE — 2500000001 HC RX 250 WO HCPCS SELF ADMINISTERED DRUGS (ALT 637 FOR MEDICARE OP): Performed by: INTERNAL MEDICINE

## 2024-05-16 PROCEDURE — 80202 ASSAY OF VANCOMYCIN: CPT | Performed by: INTERNAL MEDICINE

## 2024-05-16 PROCEDURE — 2500000002 HC RX 250 W HCPCS SELF ADMINISTERED DRUGS (ALT 637 FOR MEDICARE OP, ALT 636 FOR OP/ED): Performed by: INTERNAL MEDICINE

## 2024-05-16 PROCEDURE — 2500000006 HC RX 250 W HCPCS SELF ADMINISTERED DRUGS (ALT 637 FOR ALL PAYERS): Performed by: INTERNAL MEDICINE

## 2024-05-16 PROCEDURE — 36415 COLL VENOUS BLD VENIPUNCTURE: CPT | Performed by: INTERNAL MEDICINE

## 2024-05-16 PROCEDURE — 2500000005 HC RX 250 GENERAL PHARMACY W/O HCPCS: Performed by: INTERNAL MEDICINE

## 2024-05-16 PROCEDURE — 85027 COMPLETE CBC AUTOMATED: CPT | Performed by: INTERNAL MEDICINE

## 2024-05-16 PROCEDURE — 94640 AIRWAY INHALATION TREATMENT: CPT

## 2024-05-16 RX ORDER — ENOXAPARIN SODIUM 100 MG/ML
40 INJECTION SUBCUTANEOUS DAILY
Status: DISCONTINUED | OUTPATIENT
Start: 2024-05-17 | End: 2024-05-22

## 2024-05-16 RX ADMIN — VANCOMYCIN HYDROCHLORIDE 1250 MG: 1.25 INJECTION, POWDER, LYOPHILIZED, FOR SOLUTION INTRAVENOUS at 10:20

## 2024-05-16 RX ADMIN — APIXABAN 5 MG: 5 TABLET, FILM COATED ORAL at 08:25

## 2024-05-16 RX ADMIN — LAMOTRIGINE 25 MG: 25 TABLET ORAL at 21:45

## 2024-05-16 RX ADMIN — IPRATROPIUM BROMIDE AND ALBUTEROL SULFATE 3 ML: 2.5; .5 SOLUTION RESPIRATORY (INHALATION) at 15:03

## 2024-05-16 RX ADMIN — LEVOTHYROXINE SODIUM 175 MCG: 125 TABLET ORAL at 06:14

## 2024-05-16 RX ADMIN — SODIUM CHLORIDE 75 ML/HR: 9 INJECTION, SOLUTION INTRAVENOUS at 18:03

## 2024-05-16 RX ADMIN — BACLOFEN 10 MG: 10 TABLET ORAL at 21:45

## 2024-05-16 RX ADMIN — AMLODIPINE BESYLATE 5 MG: 5 TABLET ORAL at 08:26

## 2024-05-16 RX ADMIN — APIXABAN 5 MG: 5 TABLET, FILM COATED ORAL at 21:45

## 2024-05-16 RX ADMIN — INSULIN LISPRO 8 UNITS: 100 INJECTION, SOLUTION INTRAVENOUS; SUBCUTANEOUS at 08:19

## 2024-05-16 RX ADMIN — INSULIN LISPRO 4 UNITS: 100 INJECTION, SOLUTION INTRAVENOUS; SUBCUTANEOUS at 16:57

## 2024-05-16 RX ADMIN — IPRATROPIUM BROMIDE AND ALBUTEROL SULFATE 3 ML: 2.5; .5 SOLUTION RESPIRATORY (INHALATION) at 19:55

## 2024-05-16 RX ADMIN — ATORVASTATIN CALCIUM 10 MG: 10 TABLET, FILM COATED ORAL at 21:45

## 2024-05-16 RX ADMIN — POLYETHYLENE GLYCOL 3350 17 G: 17 POWDER, FOR SOLUTION ORAL at 06:14

## 2024-05-16 RX ADMIN — INSULIN LISPRO 8 UNITS: 100 INJECTION, SOLUTION INTRAVENOUS; SUBCUTANEOUS at 12:05

## 2024-05-16 RX ADMIN — FUROSEMIDE 20 MG: 20 TABLET ORAL at 08:26

## 2024-05-16 RX ADMIN — Medication 1 L/MIN: at 07:29

## 2024-05-16 RX ADMIN — Medication 2 L/MIN: at 19:55

## 2024-05-16 RX ADMIN — IPRATROPIUM BROMIDE AND ALBUTEROL SULFATE 3 ML: 2.5; .5 SOLUTION RESPIRATORY (INHALATION) at 07:26

## 2024-05-16 RX ADMIN — BACLOFEN 10 MG: 10 TABLET ORAL at 08:26

## 2024-05-16 RX ADMIN — DOLUTEGRAVIR SODIUM 50 MG: 50 TABLET, FILM COATED ORAL at 08:24

## 2024-05-16 RX ADMIN — Medication 1 L/MIN: at 20:00

## 2024-05-16 RX ADMIN — SODIUM CHLORIDE 75 ML/HR: 9 INJECTION, SOLUTION INTRAVENOUS at 04:06

## 2024-05-16 RX ADMIN — SERTRALINE HYDROCHLORIDE 200 MG: 50 TABLET ORAL at 08:25

## 2024-05-16 ASSESSMENT — COGNITIVE AND FUNCTIONAL STATUS - GENERAL
MOBILITY SCORE: 6
HELP NEEDED FOR BATHING: TOTAL
EATING MEALS: TOTAL
HELP NEEDED FOR BATHING: TOTAL
TOILETING: TOTAL
DRESSING REGULAR UPPER BODY CLOTHING: TOTAL
PERSONAL GROOMING: TOTAL
MOVING TO AND FROM BED TO CHAIR: TOTAL
PERSONAL GROOMING: TOTAL
MOBILITY SCORE: 6
HELP NEEDED FOR BATHING: TOTAL
MOVING FROM LYING ON BACK TO SITTING ON SIDE OF FLAT BED WITH BEDRAILS: TOTAL
DAILY ACTIVITIY SCORE: 6
CLIMB 3 TO 5 STEPS WITH RAILING: TOTAL
DRESSING REGULAR LOWER BODY CLOTHING: TOTAL
EATING MEALS: TOTAL
DAILY ACTIVITIY SCORE: 6
TOILETING: TOTAL
DAILY ACTIVITIY SCORE: 6
DAILY ACTIVITIY SCORE: 6
DRESSING REGULAR UPPER BODY CLOTHING: TOTAL
DRESSING REGULAR LOWER BODY CLOTHING: TOTAL
DRESSING REGULAR UPPER BODY CLOTHING: TOTAL
STANDING UP FROM CHAIR USING ARMS: TOTAL
TOILETING: TOTAL
WALKING IN HOSPITAL ROOM: TOTAL
STANDING UP FROM CHAIR USING ARMS: TOTAL
DRESSING REGULAR UPPER BODY CLOTHING: TOTAL
TOILETING: TOTAL
STANDING UP FROM CHAIR USING ARMS: TOTAL
CLIMB 3 TO 5 STEPS WITH RAILING: TOTAL
MOVING TO AND FROM BED TO CHAIR: TOTAL
MOVING FROM LYING ON BACK TO SITTING ON SIDE OF FLAT BED WITH BEDRAILS: TOTAL
TURNING FROM BACK TO SIDE WHILE IN FLAT BAD: TOTAL
CLIMB 3 TO 5 STEPS WITH RAILING: TOTAL
DRESSING REGULAR LOWER BODY CLOTHING: TOTAL
MOBILITY SCORE: 6
TOILETING: TOTAL
WALKING IN HOSPITAL ROOM: TOTAL
DRESSING REGULAR UPPER BODY CLOTHING: TOTAL
MOVING TO AND FROM BED TO CHAIR: TOTAL
DRESSING REGULAR UPPER BODY CLOTHING: TOTAL
MOBILITY SCORE: 6
TURNING FROM BACK TO SIDE WHILE IN FLAT BAD: TOTAL
MOVING FROM LYING ON BACK TO SITTING ON SIDE OF FLAT BED WITH BEDRAILS: TOTAL
TURNING FROM BACK TO SIDE WHILE IN FLAT BAD: TOTAL
STANDING UP FROM CHAIR USING ARMS: TOTAL
STANDING UP FROM CHAIR USING ARMS: TOTAL
EATING MEALS: TOTAL
CLIMB 3 TO 5 STEPS WITH RAILING: TOTAL
MOVING FROM LYING ON BACK TO SITTING ON SIDE OF FLAT BED WITH BEDRAILS: TOTAL
CLIMB 3 TO 5 STEPS WITH RAILING: TOTAL
MOBILITY SCORE: 6
WALKING IN HOSPITAL ROOM: TOTAL
WALKING IN HOSPITAL ROOM: TOTAL
EATING MEALS: TOTAL
MOVING TO AND FROM BED TO CHAIR: TOTAL
MOVING FROM LYING ON BACK TO SITTING ON SIDE OF FLAT BED WITH BEDRAILS: TOTAL
HELP NEEDED FOR BATHING: TOTAL
MOVING TO AND FROM BED TO CHAIR: TOTAL
WALKING IN HOSPITAL ROOM: TOTAL
EATING MEALS: TOTAL
HELP NEEDED FOR BATHING: TOTAL
WALKING IN HOSPITAL ROOM: TOTAL
EATING MEALS: TOTAL
TOILETING: TOTAL
CLIMB 3 TO 5 STEPS WITH RAILING: TOTAL
MOVING FROM LYING ON BACK TO SITTING ON SIDE OF FLAT BED WITH BEDRAILS: TOTAL
TURNING FROM BACK TO SIDE WHILE IN FLAT BAD: TOTAL
DAILY ACTIVITIY SCORE: 6
MOBILITY SCORE: 6
PERSONAL GROOMING: TOTAL
DRESSING REGULAR LOWER BODY CLOTHING: TOTAL
MOVING TO AND FROM BED TO CHAIR: TOTAL
HELP NEEDED FOR BATHING: TOTAL
DRESSING REGULAR LOWER BODY CLOTHING: TOTAL
PERSONAL GROOMING: TOTAL
TURNING FROM BACK TO SIDE WHILE IN FLAT BAD: TOTAL
TURNING FROM BACK TO SIDE WHILE IN FLAT BAD: TOTAL
PERSONAL GROOMING: TOTAL
DRESSING REGULAR LOWER BODY CLOTHING: TOTAL
PERSONAL GROOMING: TOTAL
STANDING UP FROM CHAIR USING ARMS: TOTAL
DAILY ACTIVITIY SCORE: 6

## 2024-05-16 ASSESSMENT — PAIN - FUNCTIONAL ASSESSMENT
PAIN_FUNCTIONAL_ASSESSMENT: 0-10

## 2024-05-16 ASSESSMENT — PAIN SCALES - GENERAL
PAINLEVEL_OUTOF10: 0 - NO PAIN

## 2024-05-16 NOTE — PROGRESS NOTES
Janette Martinez is a 53 y.o. female on day 6 of admission presenting with AMS (altered mental status).      Subjective   HPI: This is a 53 y.o. female who was recently discharged after an admission for encephalopathy/sepsis.  Patient had a protracted hospitalization last visit, one of her issues was swallowing which was found to be okay for the puréed diet honey thickened liquids.  Today I was called from the nursing home with concerns for fever/altered mental status/suspicion for aspiration.  Discussed with director of nursing at the facility-patient was a full code and wanted her transferred to the ER by EMS.  After patient's arrival in the ER and further evaluation discussed with emergency room physician and agreed with hospitalization, as patient's condition was worsening in the ER the provider contacted patient's sister/POA and patient is currently DO NOT RESUSCITATE with limitations.  Patient is too drowsy to answer any questions, turns when called, not answering any questions     Objective     Last Recorded Vitals  /65 (BP Location: Right arm, Patient Position: Lying)   Pulse 90   Temp 37 °C (98.6 °F) (Temporal)   Resp 16   Wt 96.5 kg (212 lb 11.9 oz)   SpO2 100%   Intake/Output last 3 Shifts:    Intake/Output Summary (Last 24 hours) at 5/15/2024 2305  Last data filed at 5/15/2024 1800  Gross per 24 hour   Intake 4786.43 ml   Output 950 ml   Net 3836.43 ml       Admission Weight  Weight: 90.7 kg (200 lb) (05/09/24 1204)    Daily Weight  05/15/24 : 96.5 kg (212 lb 11.9 oz)    Image Results  Transthoracic Echo Complete     [facility], [address] [place] [address]               Tel [phone] and Fax [phone]    TRANSTHORACIC ECHOCARDIOGRAM REPORT       Patient Name:      JANETTE MARTINEZ      Reading Physician:    [id number] Arjun Leal MD  Study Date:        5/15/2024            Ordering Provider:    Susi PAGAN  MARGARETH  MRN/PID:           [id number]             Fellow:  Accession#:        [id number]         Nurse:  Date of Birth/Age: 1970 / 53      Sonographer:          Gonzalo Bah RDCS                     years  Gender:            F                    Additional Staff:  Height:            163.00 cm            Admit Date:  Weight:            96.50 kg             Admission Status:  BSA / BMI:         2.01 m2 / 36.32      Encounter#:           [id number]                     kg/m2                                          Department Location:  [facility] Non                                                                Invasive    Study Type:    TRANSTHORACIC ECHO (TTE) COMPLETE  Diagnosis/ICD: Endocarditis, valve unspecified-I38  Indication:    endocarditis  CPT Code:      Echo Limited-44581    Patient History:  Pertinent History: Endocarditis.    Study Detail: The following Echo studies were performed: 2D, M-Mode, Doppler and                color flow. Technically challenging study due to poor acoustic                windows, body habitus and patient lying in supine position. Unable                to obtain suprasternal notch, subcostal and apical 2-chamber view.       PHYSICIAN INTERPRETATION:  Left Ventricle: The left ventricular systolic function is normal, with an estimated ejection fraction of 55-60%. There are no regional wall motion abnormalities. The left ventricular cavity size is normal. Left ventricular diastolic filling was not assessed.  Left Atrium: The left atrium was not assessed.  Right Ventricle: The right ventricle was not assessed. There is normal right ventricular global systolic function.  Right Atrium: The right atrium was not assessed.  Aortic Valve: The aortic valve is trileaflet. There is no evidence of aortic valve regurgitation. The peak instantaneous gradient of the aortic valve is 6.6 mmHg.  Mitral Valve: The mitral valve is normal in structure. There is mild mitral valve  regurgitation.  Tricuspid Valve: The tricuspid valve is structurally normal. There is trace tricuspid regurgitation. The right ventricular systolic pressure is unable to be estimated.  Pulmonic Valve: The pulmonic valve is structurally normal. There is trace pulmonic valve regurgitation.  Pericardium: There is a trivial pericardial effusion.  Aorta: The aortic root was not assessed.  In comparison to the previous echocardiogram(s): Compared with study from 2024, no significant change.       CONCLUSIONS:   1. Left ventricular systolic function is normal with a 55-60% estimated ejection fraction.   2. No definite valvular vegetations were visualized.    QUANTITATIVE DATA SUMMARY:  2D MEASUREMENTS:                           Normal Ranges:  IVSd:          0.81 cm   (0.6-1.1cm)  LVPWd:         1.04 cm   (0.6-1.1cm)  LVIDd:         5.02 cm   (3.9-5.9cm)  LVIDs:         3.62 cm  LV Mass Index: 81.7 g/m2  LV % FS        28.0 %    LV DIASTOLIC FUNCTION:                      Normal Ranges:  MV Peak E: 0.81 m/s (0.7-1.2 m/s)  MV Peak A: 0.99 m/s (0.42-0.7 m/s)  E/A Ratio: 0.82     (1.0-2.2)    MITRAL VALVE:                  Normal Ranges:  MV DT: 193 msec (150-240msec)    AORTIC VALVE:                        Normal Ranges:  AoV Vmax:    1.28 m/s (<=1.7m/s)  AoV Peak P.6 mmHg (<20mmHg)       [id number] Arjun Leal MD  Electronically signed on 5/15/2024 at 4:10:25 PM       ** Final **      Physical Exam  GENERAL: awake, Ox0 cSKIN: Skin turgor normal. No rashes  HEENT: no epistaxis, Moist mucosa.  NECK: supple  BACK: spine nontender to palpation, No CVAT.  LUNGS: Vesicular breath sounds, with no wheeze, no crepitations.   CARDIAC: REGULAR. S1 and S2; no rubs, no murmur  ABDOMEN: Abdomen soft, non-tender. BS+  EXTREMITIES: No edema, Good capillary refill.   NEURO: History of dysarthria, hemiparesis, but today's exam is limited   MUSCULOSKELETAL: No acute inflammation    Relevant Results    Results for orders placed or  performed during the hospital encounter of 05/09/24 (from the past 24 hour(s))   POCT GLUCOSE   Result Value Ref Range    POCT Glucose 230 (H) 74 - 99 mg/dL   POCT GLUCOSE   Result Value Ref Range    POCT Glucose 259 (H) 74 - 99 mg/dL   CBC   Result Value Ref Range    WBC 15.0 (H) 4.4 - 11.3 x10*3/uL    nRBC 0.3 (H) 0.0 - 0.0 /100 WBCs    RBC 2.34 (L) 4.00 - 5.20 x10*6/uL    Hemoglobin 7.1 (L) 12.0 - 16.0 g/dL    Hematocrit 21.8 (L) 36.0 - 46.0 %    MCV 93 80 - 100 fL    MCH 30.3 26.0 - 34.0 pg    MCHC 32.6 32.0 - 36.0 g/dL    RDW 16.5 (H) 11.5 - 14.5 %    Platelets 297 150 - 450 x10*3/uL   Comprehensive Metabolic Panel   Result Value Ref Range    Glucose 298 (H) 74 - 99 mg/dL    Sodium 133 (L) 136 - 145 mmol/L    Potassium 4.4 3.5 - 5.3 mmol/L    Chloride 99 98 - 107 mmol/L    Bicarbonate 27 21 - 32 mmol/L    Anion Gap 11 10 - 20 mmol/L    Urea Nitrogen 13 6 - 23 mg/dL    Creatinine 1.03 0.50 - 1.05 mg/dL    eGFR 64 >60 mL/min/1.73m*2    Calcium 7.0 (L) 8.6 - 10.3 mg/dL    Albumin 2.3 (L) 3.4 - 5.0 g/dL    Alkaline Phosphatase 66 33 - 110 U/L    Total Protein 5.9 (L) 6.4 - 8.2 g/dL    AST 31 9 - 39 U/L    Bilirubin, Total 0.2 0.0 - 1.2 mg/dL    ALT 12 7 - 45 U/L   Protime-INR   Result Value Ref Range    Protime 15.3 (H) 9.8 - 12.8 seconds    INR 1.4 (H) 0.9 - 1.1   Vancomycin   Result Value Ref Range    Vancomycin 26.3 (H) 5.0 - 20.0 ug/mL   POCT GLUCOSE   Result Value Ref Range    POCT Glucose 319 (H) 74 - 99 mg/dL   POCT GLUCOSE   Result Value Ref Range    POCT Glucose 290 (H) 74 - 99 mg/dL   Transthoracic Echo Complete   Result Value Ref Range    AV pk luis 1.28 m/s    MV E/A ratio 0.82     LVIDd 5.02 cm    AV pk grad 6.6 mmHg   POCT GLUCOSE   Result Value Ref Range    POCT Glucose 234 (H) 74 - 99 mg/dL   POCT GLUCOSE   Result Value Ref Range    POCT Glucose 212 (H) 74 - 99 mg/dL       Scheduled medications  amLODIPine, 5 mg, oral, Daily  apixaban, 5 mg, oral, BID  atorvastatin, 10 mg, oral, Daily  baclofen, 10  mg, oral, BID  dolutegravir, 50 mg, oral, Daily  furosemide, 20 mg, oral, Daily  insulin lispro, 0-10 Units, subcutaneous, TID  ipratropium-albuteroL, 3 mL, nebulization, TID  lamoTRIgine, 25 mg, oral, q PM  levothyroxine, 175 mcg, oral, Daily before breakfast  oxygen, , inhalation, Continuous - Inhalation  polyethylene glycol, 17 g, orogastric tube, Daily  potassium chloride, 20 mEq, nasogastric tube, Daily  sertraline, 200 mg, oral, Daily      Continuous medications  sodium chloride 0.9%, 75 mL/hr, Last Rate: 75 mL/hr (05/15/24 1637)      PRN medications  PRN medications: dextrose, dextrose, glucagon, glucagon, guaiFENesin, hydrALAZINE, ipratropium-albuteroL, oxyCODONE, vancomycin             Assessment/Plan      # Encephalopathy  - improved today     # Acute respiratory failure  -DNR with limitations per family  -Not a current candidate for ICU care or intubation     # Probable aspiration pneumonia  -Will initiate IV antibiotics     # YESSICA     # Old right hemiparesis/dysarthria     HIV:  Hx graves s/p thyroidectomy   Post surgical hypothyroid  HTN  HLP  T2DM  CKD3    5/10 : Pt has poor swallowing - seen by ST - will start NGT feeds over the weekend - NGT placed today. Pt more awake - was waving at me - speaking appropriately.     5/11-patient seen at bedside, patient's sister/POA at bedside discussed with both of them about poor p.o. intake and the need for a more definitive plan.  They both agree for PEG if needed patient to get MBS study done on Monday, will consult GI if patient fails swallowing.  She may need a PEG even if she able to swallow because her intake has been less than 50%.    5/12-patient seen at bedside, has NG in place, NG had, come out a few inches was replaced and x-ray was verified, swallow test tomorrow.    5/30-MRSA bacteremia, on IV vancomycin, TTE pending, patient did not do well with swallow eval, discussed with speech therapist-will proceed to consider PEG, consult GI, patient agrees  with the plan.    5/15-patient feels well, denies complaints, GI concerned about aspiration pneumonia, will consult pulmonary to optimize her pulmonary issues, GI planning PEG placement Monday, will hold Eliquis after Friday, PT OT evaluation noted    Radha Ward MD

## 2024-05-16 NOTE — PROGRESS NOTES
Department of Medicine  Division of Pulmonary, Critical Care, and Sleep Medicine  Progress Note    Subjective     Janette Martinez is a 53 y.o. female on day 7 of admission presenting with AMS (altered mental status).    Pt was seen today resting comfortably in bed. No new complaints. Still wearing 1L NC. Unsure of when procedure will be.     Objective     Vitals:      5/15/2024     2:00 PM 5/15/2024     2:35 PM 5/15/2024     4:17 PM 5/15/2024     7:23 PM 5/15/2024    11:31 PM 5/16/2024     4:03 AM 5/16/2024     7:43 AM   Vitals   Systolic   110 126 112 108 113   Diastolic   67 65 54 55 58   Heart Rate 83 86  90 93 87 76   Temp   36.7 °C (98.1 °F) 37 °C (98.6 °F) 36.7 °C (98.1 °F) 37 °C (98.6 °F) 36.5 °C (97.7 °F)   Resp   16 16 16 14 17        Oxygen Therapy:  SpO2: 97 %  Medical Gas Therapy: Supplemental oxygen  O2 Delivery Method: Nasal cannula  FiO2 (%):  [24 %] 24 %    Intake/Output::  I/O last 3 completed shifts:  In: 6556.4 (67.9 mL/kg) [I.V.:2505.4 (26 mL/kg); NG/GT:4051]  Out: 1950 (20.2 mL/kg) [Urine:1950 (0.6 mL/kg/hr)]  Weight: 96.5 kg     Physical Exam:  Physical Exam  Constitutional:       General: She is not in acute distress.     Appearance: She is ill-appearing. She is not toxic-appearing.   HENT:      Head: Normocephalic and atraumatic.      Nose:      Comments: NG tube in place.      Mouth/Throat:      Mouth: Mucous membranes are dry.      Pharynx: Oropharynx is clear.   Eyes:      Extraocular Movements: Extraocular movements intact.   Neck:      Comments: No visualized masses  Cardiovascular:      Rate and Rhythm: Normal rate and regular rhythm.      Heart sounds: No murmur heard.     No friction rub. No gallop.   Pulmonary:      Effort: No respiratory distress.      Breath sounds: Rhonchi and rales present. No wheezing.   Abdominal:      General: There is no distension.      Palpations: Abdomen is soft.      Tenderness: There is no abdominal tenderness. There is no guarding.   Musculoskeletal:          General: No tenderness.      Right lower leg: Edema present.      Left lower leg: Edema present.      Comments: Trace BLLE edema    Skin:     General: Skin is warm and dry.   Neurological:      Mental Status: She is alert.      Motor: Weakness (RUE and RLE) present.   Psychiatric:         Mood and Affect: Mood normal.         Allergies:  Allergies   Allergen Reactions    Aspirin Unknown, Bleeding and Itching    Iodinated Contrast Media Unknown and Hives    Amoxicillin Hives    Other Unknown    Morphine Unknown, Swelling, Itching and Rash    Sulfa (Sulfonamide Antibiotics) Unknown and Rash       Inpatient Medications:  amLODIPine, 5 mg, oral, Daily  apixaban, 5 mg, oral, BID  atorvastatin, 10 mg, oral, Daily  baclofen, 10 mg, oral, BID  dolutegravir, 50 mg, oral, Daily  furosemide, 20 mg, oral, Daily  insulin lispro, 0-10 Units, subcutaneous, TID  ipratropium-albuteroL, 3 mL, nebulization, TID  lamoTRIgine, 25 mg, oral, q PM  levothyroxine, 175 mcg, oral, Daily before breakfast  oxygen, , inhalation, Continuous - Inhalation  polyethylene glycol, 17 g, orogastric tube, Daily  potassium chloride, 20 mEq, nasogastric tube, Daily  sertraline, 200 mg, oral, Daily      sodium chloride 0.9%, 75 mL/hr, Last Rate: 75 mL/hr (05/16/24 0613)      PRN medications: dextrose, dextrose, glucagon, glucagon, guaiFENesin, hydrALAZINE, ipratropium-albuteroL, oxyCODONE, vancomycin    Lab/Radiology/Diagnostic Review:    All labs and Imaging have been personally reviewed.     Assessment/Plan     Janette Martinez is a 53 y.o. female With past medical history of hypertension, dyslipidemia, hypothyroidism status post thyroidectomy for Graves' disease, type 2 diabetes who initially presented to [facility] ED with complaint of confusion and fever.  Pulmonary was consulted for further management of aspiration pneumonia and optimization of respiratory status before PEG tube placement     Patient was seen and assessed by myself and  his chart was reviewed for previous pulmonary visits, Labs and imaging.      Patient has MRSA bacteremia likely secondary to her multifocal aspiration pneumonia.  Furthermore, urine culture was positive for Enterococcus faecalis.  Her MRSA nares was also positive.  C. difficile PCR was performed and was found to be negative.  Stool pathogen panel was also pan negative.  Repeat blood cultures are currently in progress.  Infectious diseases following and managing antibiotics as well.  In terms of optimization; every patient undergoing conscious sedation is at risk for respiratory failure or worsening respiratory failure.  The patient's current oxygenation requirements 1 L nasal cannula and being weaned.  She is likely as optimized as best as possible at this point from a respiratory stand point in order to undergo this procedure.  Consideration can be made for positive pressure therapy if needed after procedure in order to support her breathing. I would defer to anesthesia to make this decision during and after the procedure.      #Aspiration pneumonia in RML and RLL   #Oropharyngeal dysphagia  #Acute hypoxic respiratory failure  #MRSA bacteremia  #Enterococcus faecalis UTI  #Metabolic encephalopathy  #Leukocytosis       Recommendations are as follows:  -Antibiotics per infectious disease  -Aspiration precautions  -N.p.o. per speech pathology with consideration for PEG tube; NG in place and tube feeds infusing. Dietician recs appreciated.   -Agree with GI for placement of PEG tube.  Will have to discontinue DOAC or consider heparin drip.  -Continue aggressive bronchopulmonary hygiene with Acapella, incentive spirometer, and MetaNeb.  -Patient is only on 1 L nasal cannula currently. Please wean off as SpO2 is 97%.      I spent 40 minutes in the professional and overall care of this patient.    Pulmonary team will continue to follow the patient while they are in house.    Elaine Bennett MD, MPH  Pulmonary and  Critical Care Medicine     Please excuse any typographical or unwanted errors as voice recognition software was used to complete this note.

## 2024-05-16 NOTE — PROGRESS NOTES
05/16/24 1048   Discharge Planning   Patient expects to be discharged to: [facility] vs [facility]     ID new order to place midline when cx neg for 48 hrs. Pt will dc on Vanco IV stop date 6/10. FOC still undecided.

## 2024-05-16 NOTE — PROGRESS NOTES
05/16/24 1019   Discharge Planning   Patient expects to be discharged to: [facility] vs [facility]     Spoke with patient's sister. Sister is hoping to tour both [facility] and [facility] prior to making a decision. Sw will follow up with sister about FOC.    1605: attempted to reach sister for FOC. Left vm

## 2024-05-16 NOTE — PROGRESS NOTES
"                                                       '' Infectious Disease Progress Note''        Interval Events:  No new symptoms  Afebrile  Blood culture no growth to date  WBC 15.6 K  Estimated Creatinine Clearance: 70.2 mL/min (by C-G formula based on SCr of 1.02 mg/dL).  TTE did not show vegetation        Current antibiotic:  Vancomycin    Physical Exam:  /58 (BP Location: Right arm, Patient Position: Lying)   Pulse 76   Temp 36.5 °C (97.7 °F) (Temporal)   Resp 17   Ht 1.626 m (5' 4.02\")   Wt 96.5 kg (212 lb 11.9 oz)   SpO2 97%   BMI 36.50 kg/m²   General: NAD, nontoxic appearing  Skin: no new rash  Resp: lungs CTA b/l  CV:  normal S1/S2, no murmur   Abd: soft, non-tender  Ext: no edema      Lab Results:  Reviewed    Micro:  5/9 blood culture: MRSA  5/9 urine culture: E faecalis   5/14 blood culture: No growth to date    Imaging: reviewed         Assessment:    -Multifocal aspiration pneumonia due to MRSA  -MRSA bacteremia  -PCN and sulfa allergy  -High vancomycin level  -RLE pain     Plan/Recommendations:  -Continue vancomycin iv (pharmacy dosing)  -Anticipate discharge on vancomycin IV x 4 weeks through 6/10/24 (we should wait to determine appropriate vancomycin dosing)  -Follow-up blood culture  -Place a midline when her blood culture remains negative for 48 hours  -Consider RLE duplex      Please call ID with any concerns or questions.  Discussed with patient and pharmacy.    Judah Duval MD  [facility]  #[phone]      "

## 2024-05-16 NOTE — DOCUMENTATION CLARIFICATION NOTE
PATIENT:               FLACO DURBIN  ACCT #:                  [id number]  MRN:                       [id number]  :                       1970  ADMIT DATE:       2024 11:44 AM  DISCH DATE:  RESPONDING PROVIDER #:        [id number]          PROVIDER RESPONSE TEXT:    Patient treated for Aspiration/MRSA Pneumonia without sepsis    CDI QUERY TEXT:    Clarification      Instruction:  Based on your assessment of the patient and the clinical information, please provide the requested documentation by clicking on the appropriate radio button and enter any additional information if prompted.    Question: Is there a diagnosis indicative of a patient meeting SIRS criteria and with organ dysfunction in the setting of MRSA bacteremia with aspiration and MRSA PNA    When answering this query, please exercise your independent professional judgment. The fact that a question is being asked, does not imply that any particular answer is desired or expected.    The patient's clinical indicators include:  Clinical Information: Patient admitted with Aspiration PNA, Encephalopathy, Hypoxia    Clinical Indicators:  -24 at 12:04 Vitals: Temp-99.1, HR-135, RR-13, BP-107/74 SpO2- 95 percent on 4L of O2 at baseline    -24 labs: WBC's-33.6, Creatinine-1.29, BUN-24, Lactate-1.0, Trops-53, 68, 60, Bili-normal    Treatment: IV Levaquin    Risk Factors: NH patient with recent hospitalization for Sepsis, stroke  Options provided:  -- Sepsis with organ dysfunction of encephalopathy  -- Sepsis with other organ dysfunction, Please specify additional information below  -- Patient treated for Aspiration/MRSA Pneumonia without sepsis  -- Other - I will add my own diagnosis  -- Refer to Clinical Documentation Reviewer    Query created by: Gayle Gant on 5/10/2024 3:06 PM      Electronically signed by:  ARCELIA CRAFT MD 2024 10:01 AM

## 2024-05-16 NOTE — PROGRESS NOTES
"Vancomycin Dosing by Pharmacy- FOLLOW UP    Janette Martinez is a 53 y.o. year old female who Pharmacy has been consulted for vancomycin dosing for other bacteremia . Based on the patient's indication and renal status this patient is being dosed based on a goal trough/random level of 15-20.     Renal function is currently stable- declined from baseline.    Current vancomycin dose: dose by level. Last received 1500mg 5/13 @2219    Most recent random level: 16.5 mcg/mL    Visit Vitals  /58 (BP Location: Right arm, Patient Position: Lying)   Pulse 76   Temp 36.5 °C (97.7 °F) (Temporal)   Resp 17        Lab Results   Component Value Date    CREATININE 1.02 05/16/2024    CREATININE 1.03 05/15/2024    CREATININE 0.96 05/14/2024    CREATININE 0.75 05/13/2024        Patient weight is No results found for: \"PTWEIGHT\"    No results found for: \"CULTURE\"     I/O last 3 completed shifts:  In: 6556.4 (67.9 mL/kg) [I.V.:2505.4 (26 mL/kg); NG/GT:4051]  Out: 1950 (20.2 mL/kg) [Urine:1950 (0.6 mL/kg/hr)]  Weight: 96.5 kg   [unfilled]    Lab Results   Component Value Date    PATIENTTEMP 37.0 05/09/2024    PATIENTTEMP 37.0 04/19/2023        Assessment/Plan    Within goal random/trough level. Will give 1250mg x 1 today.  The next level will be obtained on 5/17 at AM labs. May be obtained sooner if clinically indicated.   Will continue to monitor renal function daily while on vancomycin and order serum creatinine at least every 48 hours if not already ordered.  Follow for continued vancomycin needs, clinical response, and signs/symptoms of toxicity.       Faizan Paredes, PharmD           "

## 2024-05-16 NOTE — DISCHARGE INSTRUCTIONS
Recommended wound care:    Coccyx: unstageable pressure injury  Daily: Cleanse with normal saline and pat dry.  Apply no-sting barrier film to periwound skin.  Apply Medihoney alginate to wound base.  Cover with Mepilex foam border dressing.  Strict q2 turns to offload sacrum and buttock.

## 2024-05-17 LAB
ALBUMIN SERPL BCP-MCNC: 2.5 G/DL (ref 3.4–5)
ALP SERPL-CCNC: 67 U/L (ref 33–110)
ALT SERPL W P-5'-P-CCNC: 9 U/L (ref 7–45)
ANION GAP SERPL CALC-SCNC: 8 MMOL/L (ref 10–20)
AST SERPL W P-5'-P-CCNC: 27 U/L (ref 9–39)
BILIRUB SERPL-MCNC: 0.2 MG/DL (ref 0–1.2)
BUN SERPL-MCNC: 12 MG/DL (ref 6–23)
CALCIUM SERPL-MCNC: 7.3 MG/DL (ref 8.6–10.3)
CHLORIDE SERPL-SCNC: 98 MMOL/L (ref 98–107)
CO2 SERPL-SCNC: 32 MMOL/L (ref 21–32)
CREAT SERPL-MCNC: 1.01 MG/DL (ref 0.5–1.05)
EGFRCR SERPLBLD CKD-EPI 2021: 66 ML/MIN/1.73M*2
ERYTHROCYTE [DISTWIDTH] IN BLOOD BY AUTOMATED COUNT: 16.5 % (ref 11.5–14.5)
GLUCOSE BLD MANUAL STRIP-MCNC: 188 MG/DL (ref 74–99)
GLUCOSE BLD MANUAL STRIP-MCNC: 272 MG/DL (ref 74–99)
GLUCOSE BLD MANUAL STRIP-MCNC: 314 MG/DL (ref 74–99)
GLUCOSE BLD MANUAL STRIP-MCNC: 356 MG/DL (ref 74–99)
GLUCOSE SERPL-MCNC: 310 MG/DL (ref 74–99)
HCT VFR BLD AUTO: 22.2 % (ref 36–46)
HGB BLD-MCNC: 7.3 G/DL (ref 12–16)
MCH RBC QN AUTO: 30.9 PG (ref 26–34)
MCHC RBC AUTO-ENTMCNC: 32.9 G/DL (ref 32–36)
MCV RBC AUTO: 94 FL (ref 80–100)
NRBC BLD-RTO: 0.2 /100 WBCS (ref 0–0)
PLATELET # BLD AUTO: 372 X10*3/UL (ref 150–450)
POTASSIUM SERPL-SCNC: 4.5 MMOL/L (ref 3.5–5.3)
PROT SERPL-MCNC: 6.5 G/DL (ref 6.4–8.2)
RBC # BLD AUTO: 2.36 X10*6/UL (ref 4–5.2)
SODIUM SERPL-SCNC: 133 MMOL/L (ref 136–145)
VANCOMYCIN SERPL-MCNC: 21.8 UG/ML (ref 5–20)
WBC # BLD AUTO: 16.6 X10*3/UL (ref 4.4–11.3)

## 2024-05-17 PROCEDURE — 2500000006 HC RX 250 W HCPCS SELF ADMINISTERED DRUGS (ALT 637 FOR ALL PAYERS): Performed by: INTERNAL MEDICINE

## 2024-05-17 PROCEDURE — 80053 COMPREHEN METABOLIC PANEL: CPT | Performed by: INTERNAL MEDICINE

## 2024-05-17 PROCEDURE — 94664 DEMO&/EVAL PT USE INHALER: CPT

## 2024-05-17 PROCEDURE — 2500000004 HC RX 250 GENERAL PHARMACY W/ HCPCS (ALT 636 FOR OP/ED): Performed by: INTERNAL MEDICINE

## 2024-05-17 PROCEDURE — 80202 ASSAY OF VANCOMYCIN: CPT | Performed by: PHARMACIST

## 2024-05-17 PROCEDURE — 2500000001 HC RX 250 WO HCPCS SELF ADMINISTERED DRUGS (ALT 637 FOR MEDICARE OP): Performed by: INTERNAL MEDICINE

## 2024-05-17 PROCEDURE — 2500000002 HC RX 250 W HCPCS SELF ADMINISTERED DRUGS (ALT 637 FOR MEDICARE OP, ALT 636 FOR OP/ED): Performed by: INTERNAL MEDICINE

## 2024-05-17 PROCEDURE — 2500000001 HC RX 250 WO HCPCS SELF ADMINISTERED DRUGS (ALT 637 FOR MEDICARE OP): Performed by: PEDIATRICS

## 2024-05-17 PROCEDURE — 82947 ASSAY GLUCOSE BLOOD QUANT: CPT | Mod: 91

## 2024-05-17 PROCEDURE — 2500000004 HC RX 250 GENERAL PHARMACY W/ HCPCS (ALT 636 FOR OP/ED): Performed by: NURSE PRACTITIONER

## 2024-05-17 PROCEDURE — 36415 COLL VENOUS BLD VENIPUNCTURE: CPT | Performed by: INTERNAL MEDICINE

## 2024-05-17 PROCEDURE — 2500000005 HC RX 250 GENERAL PHARMACY W/O HCPCS: Performed by: INTERNAL MEDICINE

## 2024-05-17 PROCEDURE — 85027 COMPLETE CBC AUTOMATED: CPT | Performed by: INTERNAL MEDICINE

## 2024-05-17 PROCEDURE — 94640 AIRWAY INHALATION TREATMENT: CPT

## 2024-05-17 PROCEDURE — 1100000001 HC PRIVATE ROOM DAILY

## 2024-05-17 RX ORDER — INSULIN GLARGINE 100 [IU]/ML
20 INJECTION, SOLUTION SUBCUTANEOUS EVERY 24 HOURS
Status: DISCONTINUED | OUTPATIENT
Start: 2024-05-17 | End: 2024-05-23 | Stop reason: HOSPADM

## 2024-05-17 RX ORDER — DAPTOMYCIN IN SODIUM CHLORIDE 700 MG/100ML
700 INJECTION, SOLUTION INTRAVENOUS
Status: DISCONTINUED | OUTPATIENT
Start: 2024-05-19 | End: 2024-05-23 | Stop reason: HOSPADM

## 2024-05-17 RX ORDER — LIDOCAINE HYDROCHLORIDE 10 MG/ML
5 INJECTION INFILTRATION; PERINEURAL ONCE
Status: DISCONTINUED | OUTPATIENT
Start: 2024-05-17 | End: 2024-05-23 | Stop reason: HOSPADM

## 2024-05-17 RX ADMIN — INSULIN LISPRO 10 UNITS: 100 INJECTION, SOLUTION INTRAVENOUS; SUBCUTANEOUS at 14:02

## 2024-05-17 RX ADMIN — BACLOFEN 10 MG: 10 TABLET ORAL at 10:54

## 2024-05-17 RX ADMIN — INSULIN LISPRO 8 UNITS: 100 INJECTION, SOLUTION INTRAVENOUS; SUBCUTANEOUS at 10:54

## 2024-05-17 RX ADMIN — FUROSEMIDE 20 MG: 20 TABLET ORAL at 10:53

## 2024-05-17 RX ADMIN — Medication 2 L/MIN: at 21:41

## 2024-05-17 RX ADMIN — INSULIN LISPRO 6 UNITS: 100 INJECTION, SOLUTION INTRAVENOUS; SUBCUTANEOUS at 17:15

## 2024-05-17 RX ADMIN — SERTRALINE HYDROCHLORIDE 200 MG: 50 TABLET ORAL at 10:53

## 2024-05-17 RX ADMIN — IPRATROPIUM BROMIDE AND ALBUTEROL SULFATE 3 ML: 2.5; .5 SOLUTION RESPIRATORY (INHALATION) at 21:41

## 2024-05-17 RX ADMIN — POLYETHYLENE GLYCOL 3350 17 G: 17 POWDER, FOR SOLUTION ORAL at 06:39

## 2024-05-17 RX ADMIN — OXYCODONE HYDROCHLORIDE 5 MG: 5 TABLET ORAL at 11:36

## 2024-05-17 RX ADMIN — AMLODIPINE BESYLATE 5 MG: 5 TABLET ORAL at 10:54

## 2024-05-17 RX ADMIN — INSULIN GLARGINE 20 UNITS: 100 INJECTION, SOLUTION SUBCUTANEOUS at 22:34

## 2024-05-17 RX ADMIN — HYDRALAZINE HYDROCHLORIDE 10 MG: 20 INJECTION INTRAMUSCULAR; INTRAVENOUS at 17:15

## 2024-05-17 RX ADMIN — DOLUTEGRAVIR SODIUM 50 MG: 50 TABLET, FILM COATED ORAL at 11:38

## 2024-05-17 RX ADMIN — POTASSIUM CHLORIDE 20 MEQ: 1.5 POWDER, FOR SOLUTION ORAL at 10:53

## 2024-05-17 RX ADMIN — IPRATROPIUM BROMIDE AND ALBUTEROL SULFATE 3 ML: 2.5; .5 SOLUTION RESPIRATORY (INHALATION) at 07:32

## 2024-05-17 RX ADMIN — SODIUM CHLORIDE 75 ML/HR: 9 INJECTION, SOLUTION INTRAVENOUS at 07:07

## 2024-05-17 RX ADMIN — IPRATROPIUM BROMIDE AND ALBUTEROL SULFATE 3 ML: 2.5; .5 SOLUTION RESPIRATORY (INHALATION) at 14:02

## 2024-05-17 RX ADMIN — ENOXAPARIN SODIUM 40 MG: 40 INJECTION SUBCUTANEOUS at 10:53

## 2024-05-17 RX ADMIN — SODIUM CHLORIDE 75 ML/HR: 9 INJECTION, SOLUTION INTRAVENOUS at 22:34

## 2024-05-17 RX ADMIN — Medication 2 L/MIN: at 07:32

## 2024-05-17 RX ADMIN — LEVOTHYROXINE SODIUM 175 MCG: 125 TABLET ORAL at 06:39

## 2024-05-17 RX ADMIN — BACLOFEN 10 MG: 10 TABLET ORAL at 22:34

## 2024-05-17 RX ADMIN — LAMOTRIGINE 25 MG: 25 TABLET ORAL at 22:34

## 2024-05-17 ASSESSMENT — PAIN SCALES - GENERAL
PAINLEVEL_OUTOF10: 0 - NO PAIN
PAINLEVEL_OUTOF10: 0 - NO PAIN
PAINLEVEL_OUTOF10: 7

## 2024-05-17 ASSESSMENT — COGNITIVE AND FUNCTIONAL STATUS - GENERAL
TOILETING: TOTAL
MOBILITY SCORE: 6
HELP NEEDED FOR BATHING: TOTAL
DRESSING REGULAR UPPER BODY CLOTHING: TOTAL
CLIMB 3 TO 5 STEPS WITH RAILING: TOTAL
DAILY ACTIVITIY SCORE: 6
TURNING FROM BACK TO SIDE WHILE IN FLAT BAD: TOTAL
EATING MEALS: TOTAL
MOVING FROM LYING ON BACK TO SITTING ON SIDE OF FLAT BED WITH BEDRAILS: TOTAL
DRESSING REGULAR LOWER BODY CLOTHING: TOTAL
MOVING TO AND FROM BED TO CHAIR: TOTAL
WALKING IN HOSPITAL ROOM: TOTAL
STANDING UP FROM CHAIR USING ARMS: TOTAL
PERSONAL GROOMING: TOTAL

## 2024-05-17 ASSESSMENT — PAIN - FUNCTIONAL ASSESSMENT
PAIN_FUNCTIONAL_ASSESSMENT: 0-10
PAIN_FUNCTIONAL_ASSESSMENT: 0-10

## 2024-05-17 NOTE — PROGRESS NOTES
Janette Martinez is a 53 y.o. female on day 8 of admission presenting with AMS (altered mental status).      Subjective   HPI: This is a 53 y.o. female who was recently discharged after an admission for encephalopathy/sepsis.  Patient had a protracted hospitalization last visit, one of her issues was swallowing which was found to be okay for the puréed diet honey thickened liquids.  Today I was called from the nursing home with concerns for fever/altered mental status/suspicion for aspiration.  Discussed with director of nursing at the facility-patient was a full code and wanted her transferred to the ER by EMS.  After patient's arrival in the ER and further evaluation discussed with emergency room physician and agreed with hospitalization, as patient's condition was worsening in the ER the provider contacted patient's sister/POA and patient is currently DO NOT RESUSCITATE with limitations.  Patient is too drowsy to answer any questions, turns when called, not answering any questions     Objective     Last Recorded Vitals  /80   Pulse 76   Temp 36.6 °C (97.8 °F) (Temporal)   Resp 18   Wt 96.5 kg (212 lb 11.9 oz)   SpO2 99%   Intake/Output last 3 Shifts:    Intake/Output Summary (Last 24 hours) at 5/17/2024 1039  Last data filed at 5/17/2024 0819  Gross per 24 hour   Intake 1350 ml   Output 2300 ml   Net -950 ml       Admission Weight  Weight: 90.7 kg (200 lb) (05/09/24 1204)    Daily Weight  05/15/24 : 96.5 kg (212 lb 11.9 oz)    Image Results  Transthoracic Echo Complete     [facility], [address] [place] [address]               Tel [phone] and Fax [phone]    TRANSTHORACIC ECHOCARDIOGRAM REPORT       Patient Name:      JANETTE MARTINEZ      Reading Physician:    [id number] Arjun Leal MD  Study Date:        5/15/2024            Ordering Provider:    [id number] LATASHA ZULUAGA  MRN/PID:           [id number]              Fellow:  Accession#:        [id number]         Nurse:  Date of Birth/Age: 1970 / 53      Sonographer:          Gonzalo Bah RDCS                     years  Gender:            F                    Additional Staff:  Height:            163.00 cm            Admit Date:  Weight:            96.50 kg             Admission Status:  BSA / BMI:         2.01 m2 / 36.32      Encounter#:           [id number]                     kg/m2                                          Department Location:  [facility] Non                                                                Invasive    Study Type:    TRANSTHORACIC ECHO (TTE) COMPLETE  Diagnosis/ICD: Endocarditis, valve unspecified-I38  Indication:    endocarditis  CPT Code:      Echo Limited-43131    Patient History:  Pertinent History: Endocarditis.    Study Detail: The following Echo studies were performed: 2D, M-Mode, Doppler and                color flow. Technically challenging study due to poor acoustic                windows, body habitus and patient lying in supine position. Unable                to obtain suprasternal notch, subcostal and apical 2-chamber view.       PHYSICIAN INTERPRETATION:  Left Ventricle: The left ventricular systolic function is normal, with an estimated ejection fraction of 55-60%. There are no regional wall motion abnormalities. The left ventricular cavity size is normal. Left ventricular diastolic filling was not assessed.  Left Atrium: The left atrium was not assessed.  Right Ventricle: The right ventricle was not assessed. There is normal right ventricular global systolic function.  Right Atrium: The right atrium was not assessed.  Aortic Valve: The aortic valve is trileaflet. There is no evidence of aortic valve regurgitation. The peak instantaneous gradient of the aortic valve is 6.6 mmHg.  Mitral Valve: The mitral valve is normal in structure. There is mild mitral valve regurgitation.  Tricuspid Valve: The tricuspid valve is  structurally normal. There is trace tricuspid regurgitation. The right ventricular systolic pressure is unable to be estimated.  Pulmonic Valve: The pulmonic valve is structurally normal. There is trace pulmonic valve regurgitation.  Pericardium: There is a trivial pericardial effusion.  Aorta: The aortic root was not assessed.  In comparison to the previous echocardiogram(s): Compared with study from 2024, no significant change.       CONCLUSIONS:   1. Left ventricular systolic function is normal with a 55-60% estimated ejection fraction.   2. No definite valvular vegetations were visualized.    QUANTITATIVE DATA SUMMARY:  2D MEASUREMENTS:                           Normal Ranges:  IVSd:          0.81 cm   (0.6-1.1cm)  LVPWd:         1.04 cm   (0.6-1.1cm)  LVIDd:         5.02 cm   (3.9-5.9cm)  LVIDs:         3.62 cm  LV Mass Index: 81.7 g/m2  LV % FS        28.0 %    LV DIASTOLIC FUNCTION:                      Normal Ranges:  MV Peak E: 0.81 m/s (0.7-1.2 m/s)  MV Peak A: 0.99 m/s (0.42-0.7 m/s)  E/A Ratio: 0.82     (1.0-2.2)    MITRAL VALVE:                  Normal Ranges:  MV DT: 193 msec (150-240msec)    AORTIC VALVE:                        Normal Ranges:  AoV Vmax:    1.28 m/s (<=1.7m/s)  AoV Peak P.6 mmHg (<20mmHg)       [id number] Arjun Leal MD  Electronically signed on 5/15/2024 at 4:10:25 PM       ** Final **      Physical Exam  GENERAL: awake, Ox0 cSKIN: Skin turgor normal. No rashes  HEENT: no epistaxis, Moist mucosa.  NECK: supple  BACK: spine nontender to palpation, No CVAT.  LUNGS: Vesicular breath sounds, with no wheeze, no crepitations.   CARDIAC: REGULAR. S1 and S2; no rubs, no murmur  ABDOMEN: Abdomen soft, non-tender. BS+  EXTREMITIES: No edema, Good capillary refill.   NEURO: History of dysarthria, hemiparesis, but today's exam is limited   MUSCULOSKELETAL: No acute inflammation    Relevant Results    Results for orders placed or performed during the hospital encounter of 24 (from the  past 24 hour(s))   POCT GLUCOSE   Result Value Ref Range    POCT Glucose 301 (H) 74 - 99 mg/dL   POCT GLUCOSE   Result Value Ref Range    POCT Glucose 225 (H) 74 - 99 mg/dL   POCT GLUCOSE   Result Value Ref Range    POCT Glucose 215 (H) 74 - 99 mg/dL   CBC   Result Value Ref Range    WBC 16.6 (H) 4.4 - 11.3 x10*3/uL    nRBC 0.2 (H) 0.0 - 0.0 /100 WBCs    RBC 2.36 (L) 4.00 - 5.20 x10*6/uL    Hemoglobin 7.3 (L) 12.0 - 16.0 g/dL    Hematocrit 22.2 (L) 36.0 - 46.0 %    MCV 94 80 - 100 fL    MCH 30.9 26.0 - 34.0 pg    MCHC 32.9 32.0 - 36.0 g/dL    RDW 16.5 (H) 11.5 - 14.5 %    Platelets 372 150 - 450 x10*3/uL   Comprehensive Metabolic Panel   Result Value Ref Range    Glucose 310 (H) 74 - 99 mg/dL    Sodium 133 (L) 136 - 145 mmol/L    Potassium 4.5 3.5 - 5.3 mmol/L    Chloride 98 98 - 107 mmol/L    Bicarbonate 32 21 - 32 mmol/L    Anion Gap 8 (L) 10 - 20 mmol/L    Urea Nitrogen 12 6 - 23 mg/dL    Creatinine 1.01 0.50 - 1.05 mg/dL    eGFR 66 >60 mL/min/1.73m*2    Calcium 7.3 (L) 8.6 - 10.3 mg/dL    Albumin 2.5 (L) 3.4 - 5.0 g/dL    Alkaline Phosphatase 67 33 - 110 U/L    Total Protein 6.5 6.4 - 8.2 g/dL    AST 27 9 - 39 U/L    Bilirubin, Total 0.2 0.0 - 1.2 mg/dL    ALT 9 7 - 45 U/L   Vancomycin   Result Value Ref Range    Vancomycin 21.8 (H) 5.0 - 20.0 ug/mL   POCT GLUCOSE   Result Value Ref Range    POCT Glucose 314 (H) 74 - 99 mg/dL       Scheduled medications  amLODIPine, 5 mg, oral, Daily  [Held by provider] apixaban, 5 mg, oral, BID  baclofen, 10 mg, oral, BID  [START ON 5/19/2024] daptomycin, 700 mg, intravenous, q24h MARTIN  dolutegravir, 50 mg, oral, Daily  enoxaparin, 40 mg, subcutaneous, Daily  furosemide, 20 mg, oral, Daily  insulin lispro, 0-10 Units, subcutaneous, TID  ipratropium-albuteroL, 3 mL, nebulization, TID  lamoTRIgine, 25 mg, oral, q PM  levothyroxine, 175 mcg, oral, Daily before breakfast  lidocaine, 5 mL, infiltration, Once  oxygen, , inhalation, Continuous - Inhalation  polyethylene glycol, 17 g,  orogastric tube, Daily  potassium chloride, 20 mEq, nasogastric tube, Daily  sertraline, 200 mg, oral, Daily  [START ON 5/18/2024] vancomycin, 1,250 mg, intravenous, Once      Continuous medications  sodium chloride 0.9%, 75 mL/hr, Last Rate: 75 mL/hr (05/17/24 0707)      PRN medications  PRN medications: dextrose, dextrose, glucagon, glucagon, guaiFENesin, hydrALAZINE, ipratropium-albuteroL, oxyCODONE, vancomycin             Assessment/Plan      # Encephalopathy  - improved today     # Acute respiratory failure  -DNR with limitations per family  -Not a current candidate for ICU care or intubation     # Probable aspiration pneumonia  -Will initiate IV antibiotics     # YESSICA-resolved     # Old right hemiparesis/dysarthria     HIV:  Hx graves s/p thyroidectomy   Post surgical hypothyroid  HTN  HLP  T2DM    5/10 : Pt has poor swallowing - seen by ST - will start NGT feeds over the weekend - NGT placed today. Pt more awake - was waving at me - speaking appropriately.     5/11-patient seen at bedside, patient's sister/POA at bedside discussed with both of them about poor p.o. intake and the need for a more definitive plan.  They both agree for PEG if needed patient to get MBS study done on Monday, will consult GI if patient fails swallowing.  She may need a PEG even if she able to swallow because her intake has been less than 50%.    5/12-patient seen at bedside, has NG in place, NG had, come out a few inches was replaced and x-ray was verified, swallow test tomorrow.    5/30-MRSA bacteremia, on IV vancomycin, TTE pending, patient did not do well with swallow eval, discussed with speech therapist-will proceed to consider PEG, consult GI, patient agrees with the plan.    5/15-patient feels well, denies complaints, GI concerned about aspiration pneumonia, will consult pulmonary to optimize her pulmonary issues, GI planning PEG placement Monday, will hold Eliquis after Friday, PT OT evaluation noted    5/16-patient has  dysphagia, NG tube still in place, PEG placement planned for Monday, stop Eliquis, will start heparin subcu, patient stabilizing from the pulmonary perspective.      5/17 : Hyperglycemic - will add Lantus, planning PEG on Monday , Eliquis on hold. Midline is being planned.  GI unable to place PEG because of staffing, will consult surgery per the recommendation.    Radha Ward MD

## 2024-05-17 NOTE — PROGRESS NOTES
"                                                       '' Infectious Disease Progress Note''        Interval Events:  No new symptoms  Afebrile  Repeat Blood culture no growth to date  WBC 16.6 K  Estimated Creatinine Clearance: 70.9 mL/min (by C-G formula based on SCr of 1.01 mg/dL).  Vancomycin level 21        Current antibiotic:  Vancomycin    Physical Exam:  /80   Pulse 76   Temp 36.6 °C (97.8 °F) (Temporal)   Resp 18   Ht 1.626 m (5' 4.02\")   Wt 96.5 kg (212 lb 11.9 oz)   SpO2 99%   BMI 36.50 kg/m²   General: NAD, nontoxic appearing, on 2 L NC  Skin: no new rash  Resp: lungs CTA b/l  CV:  normal S1/S2, no murmur   Abd: soft, non-tender  Ext: no edema      Lab Results:  Reviewed    Micro:  5/9 blood culture: MRSA  5/9 urine culture: E faecalis   5/14 blood culture: No growth to date    Imaging: reviewed         Assessment:    -Multifocal aspiration pneumonia due to MRSA  -MRSA bacteremia  -PCN and sulfa allergy  -High vancomycin level  -RLE pain     Plan/Recommendations:  -Continue vancomycin iv (pharmacy dosing) thru tomorrow to complete 10 day course of pneumonia treatment  -Start her on daptomycin on Sunday 5/19/24 thru 6/10/24 to complete 4 week course of bacteremia treatment (vancomycin dosing at SNF will be difficult on her)  I placed her script in her chart.  -Place a midline   -Consider RLE duplex  -Check CBC/diff, CMP, CK every Monday and fax to Dr. Mayo at [phone]  -Follow-up with Dr. Mayo on 6/11/2024      Please call ID with any concerns or questions.  Discussed with patient and pharmacy.    Judah Duval MD  [facility]  #[phone]      "

## 2024-05-17 NOTE — PROGRESS NOTES
Transitional Care Coordination Progress Note:  Plan per Medical/Surgical team: treatment of PN ?aspiration with IV ATB, oxygen & NGT for feeds, pulm consult,  GI consult- MBS & possible PEG insertion on MONDAY  ID consult- Anticipate discharge on vancomycin IV x 4 weeks through 6/10/24  -Follow-up blood culture  -Place a midline when her blood culture remains negative for 48 hours  Status: Inpatient day 8  Payor source: medicare A/B  Discharge disposition: from [facility], sister wants new SNF  Potential Barriers: HX of CVA's, pressure wounds  ADOD: 5/21/2024  JUAN CARLOS Gardner RN, BSN Transitional Care Coordinator ED# [phone]     @ IV ATB script sent to SNF.ATB plan changed to DAPTOMYCIN 50mg every 24 hours through 6/10/2024. Still needs midline placed.     05/17/24 0705   Discharge Planning   Assistance Needed sister needs to identify first choice for SNF   Type of Post Acute Facility Services Skilled nursing   Patient expects to be discharged to: NEW SNF: [facility] vs [facility]

## 2024-05-17 NOTE — PROGRESS NOTES
"Vancomycin Dosing by Pharmacy- FOLLOW UP    Janette Martinez is a 53 y.o. year old female who Pharmacy has been consulted for vancomycin dosing for pneumonia. Based on the patient's indication and renal status this patient is being dosed based on a goal AUC of 400-600.     Renal function is currently stable.    Most recent random level: 21.8 mcg/mL    Visit Vitals  /80   Pulse 76   Temp 36.6 °C (97.8 °F) (Temporal)   Resp 18        Lab Results   Component Value Date    CREATININE 1.01 05/17/2024    CREATININE 1.02 05/16/2024    CREATININE 1.03 05/15/2024    CREATININE 0.96 05/14/2024        Patient weight is No results found for: \"PTWEIGHT\"    No results found for: \"CULTURE\"     I/O last 3 completed shifts:  In: 3120 (32.3 mL/kg) [I.V.:2655 (27.5 mL/kg); NG/GT:465]  Out: 2100 (21.8 mL/kg) [Urine:2100 (0.6 mL/kg/hr)]  Weight: 96.5 kg   [unfilled]    Lab Results   Component Value Date    PATIENTTEMP 37.0 05/09/2024    PATIENTTEMP 37.0 04/19/2023        Assessment/Plan    Discussed with Dr. Duval, we'll hold off on any doses today given higher level and give one more dose of vancomycin 1250mg tomorrow morning.  Follow for continued vancomycin needs, clinical response, and signs/symptoms of toxicity.       Mattie Hodge, PharmD           "

## 2024-05-17 NOTE — CARE PLAN
The patient's goals for the shift include      The clinical goals for the shift include pain management and for pt to remain HDS/safe throughout the shift

## 2024-05-17 NOTE — PROGRESS NOTES
[facility] vs [facility], awaiting sister's FOC     05/17/24 0722   Current Planned Discharge Disposition   Current Planned Discharge Disposition SNF

## 2024-05-17 NOTE — PROGRESS NOTES
Janette Martinez is a 53 y.o. female on day 7 of admission presenting with AMS (altered mental status).      Subjective   HPI: This is a 53 y.o. female who was recently discharged after an admission for encephalopathy/sepsis.  Patient had a protracted hospitalization last visit, one of her issues was swallowing which was found to be okay for the puréed diet honey thickened liquids.  Today I was called from the nursing home with concerns for fever/altered mental status/suspicion for aspiration.  Discussed with director of nursing at the facility-patient was a full code and wanted her transferred to the ER by EMS.  After patient's arrival in the ER and further evaluation discussed with emergency room physician and agreed with hospitalization, as patient's condition was worsening in the ER the provider contacted patient's sister/POA and patient is currently DO NOT RESUSCITATE with limitations.  Patient is too drowsy to answer any questions, turns when called, not answering any questions     Objective     Last Recorded Vitals  /74   Pulse 86   Temp 36.3 °C (97.4 °F)   Resp 18   Wt 96.5 kg (212 lb 11.9 oz)   SpO2 (!) 86% Comment: pt placed on 2L SpO2 increased to 94%  Intake/Output last 3 Shifts:    Intake/Output Summary (Last 24 hours) at 5/16/2024 2034  Last data filed at 5/16/2024 1801  Gross per 24 hour   Intake 3120 ml   Output 2100 ml   Net 1020 ml       Admission Weight  Weight: 90.7 kg (200 lb) (05/09/24 1204)    Daily Weight  05/15/24 : 96.5 kg (212 lb 11.9 oz)    Image Results  Transthoracic Echo Complete     [facility], [address] [place] [address]               Tel [phone] and Fax [phone]    TRANSTHORACIC ECHOCARDIOGRAM REPORT       Patient Name:      JANETTE MARTINEZ      Reading Physician:    [id number] Arjun Leal MD  Study Date:        5/15/2024            Ordering Provider:    Susi PAGAN  MARGARETH  MRN/PID:           [id number]             Fellow:  Accession#:        [id number]         Nurse:  Date of Birth/Age: 1970 / 53      Sonographer:          Gonzalo Bah RDCS                     years  Gender:            F                    Additional Staff:  Height:            163.00 cm            Admit Date:  Weight:            96.50 kg             Admission Status:  BSA / BMI:         2.01 m2 / 36.32      Encounter#:           [id number]                     kg/m2                                          Department Location:  Critical access hospital Non                                                                Invasive    Study Type:    TRANSTHORACIC ECHO (TTE) COMPLETE  Diagnosis/ICD: Endocarditis, valve unspecified-I38  Indication:    endocarditis  CPT Code:      Echo Limited-42870    Patient History:  Pertinent History: Endocarditis.    Study Detail: The following Echo studies were performed: 2D, M-Mode, Doppler and                color flow. Technically challenging study due to poor acoustic                windows, body habitus and patient lying in supine position. Unable                to obtain suprasternal notch, subcostal and apical 2-chamber view.       PHYSICIAN INTERPRETATION:  Left Ventricle: The left ventricular systolic function is normal, with an estimated ejection fraction of 55-60%. There are no regional wall motion abnormalities. The left ventricular cavity size is normal. Left ventricular diastolic filling was not assessed.  Left Atrium: The left atrium was not assessed.  Right Ventricle: The right ventricle was not assessed. There is normal right ventricular global systolic function.  Right Atrium: The right atrium was not assessed.  Aortic Valve: The aortic valve is trileaflet. There is no evidence of aortic valve regurgitation. The peak instantaneous gradient of the aortic valve is 6.6 mmHg.  Mitral Valve: The mitral valve is normal in structure. There is mild mitral valve  regurgitation.  Tricuspid Valve: The tricuspid valve is structurally normal. There is trace tricuspid regurgitation. The right ventricular systolic pressure is unable to be estimated.  Pulmonic Valve: The pulmonic valve is structurally normal. There is trace pulmonic valve regurgitation.  Pericardium: There is a trivial pericardial effusion.  Aorta: The aortic root was not assessed.  In comparison to the previous echocardiogram(s): Compared with study from 2024, no significant change.       CONCLUSIONS:   1. Left ventricular systolic function is normal with a 55-60% estimated ejection fraction.   2. No definite valvular vegetations were visualized.    QUANTITATIVE DATA SUMMARY:  2D MEASUREMENTS:                           Normal Ranges:  IVSd:          0.81 cm   (0.6-1.1cm)  LVPWd:         1.04 cm   (0.6-1.1cm)  LVIDd:         5.02 cm   (3.9-5.9cm)  LVIDs:         3.62 cm  LV Mass Index: 81.7 g/m2  LV % FS        28.0 %    LV DIASTOLIC FUNCTION:                      Normal Ranges:  MV Peak E: 0.81 m/s (0.7-1.2 m/s)  MV Peak A: 0.99 m/s (0.42-0.7 m/s)  E/A Ratio: 0.82     (1.0-2.2)    MITRAL VALVE:                  Normal Ranges:  MV DT: 193 msec (150-240msec)    AORTIC VALVE:                        Normal Ranges:  AoV Vmax:    1.28 m/s (<=1.7m/s)  AoV Peak P.6 mmHg (<20mmHg)       [id number] Arjun Leal MD  Electronically signed on 5/15/2024 at 4:10:25 PM       ** Final **      Physical Exam  GENERAL: awake, Ox0 cSKIN: Skin turgor normal. No rashes  HEENT: no epistaxis, Moist mucosa.  NECK: supple  BACK: spine nontender to palpation, No CVAT.  LUNGS: Vesicular breath sounds, with no wheeze, no crepitations.   CARDIAC: REGULAR. S1 and S2; no rubs, no murmur  ABDOMEN: Abdomen soft, non-tender. BS+  EXTREMITIES: No edema, Good capillary refill.   NEURO: History of dysarthria, hemiparesis, but today's exam is limited   MUSCULOSKELETAL: No acute inflammation    Relevant Results    Results for orders placed or  performed during the hospital encounter of 05/09/24 (from the past 24 hour(s))   CBC   Result Value Ref Range    WBC 15.6 (H) 4.4 - 11.3 x10*3/uL    nRBC 0.3 (H) 0.0 - 0.0 /100 WBCs    RBC 2.23 (L) 4.00 - 5.20 x10*6/uL    Hemoglobin 6.9 (L) 12.0 - 16.0 g/dL    Hematocrit 21.0 (L) 36.0 - 46.0 %    MCV 94 80 - 100 fL    MCH 30.9 26.0 - 34.0 pg    MCHC 32.9 32.0 - 36.0 g/dL    RDW 16.2 (H) 11.5 - 14.5 %    Platelets 320 150 - 450 x10*3/uL   Comprehensive Metabolic Panel   Result Value Ref Range    Glucose 302 (H) 74 - 99 mg/dL    Sodium 132 (L) 136 - 145 mmol/L    Potassium 4.6 3.5 - 5.3 mmol/L    Chloride 99 98 - 107 mmol/L    Bicarbonate 29 21 - 32 mmol/L    Anion Gap 9 (L) 10 - 20 mmol/L    Urea Nitrogen 14 6 - 23 mg/dL    Creatinine 1.02 0.50 - 1.05 mg/dL    eGFR 65 >60 mL/min/1.73m*2    Calcium 7.3 (L) 8.6 - 10.3 mg/dL    Albumin 2.3 (L) 3.4 - 5.0 g/dL    Alkaline Phosphatase 63 33 - 110 U/L    Total Protein 6.1 (L) 6.4 - 8.2 g/dL    AST 27 9 - 39 U/L    Bilirubin, Total 0.2 0.0 - 1.2 mg/dL    ALT 10 7 - 45 U/L   Vancomycin   Result Value Ref Range    Vancomycin 16.5 5.0 - 20.0 ug/mL   POCT GLUCOSE   Result Value Ref Range    POCT Glucose 308 (H) 74 - 99 mg/dL   POCT GLUCOSE   Result Value Ref Range    POCT Glucose 301 (H) 74 - 99 mg/dL   POCT GLUCOSE   Result Value Ref Range    POCT Glucose 225 (H) 74 - 99 mg/dL       Scheduled medications  amLODIPine, 5 mg, oral, Daily  apixaban, 5 mg, oral, BID  atorvastatin, 10 mg, oral, Daily  baclofen, 10 mg, oral, BID  dolutegravir, 50 mg, oral, Daily  furosemide, 20 mg, oral, Daily  insulin lispro, 0-10 Units, subcutaneous, TID  ipratropium-albuteroL, 3 mL, nebulization, TID  lamoTRIgine, 25 mg, oral, q PM  levothyroxine, 175 mcg, oral, Daily before breakfast  oxygen, , inhalation, Continuous - Inhalation  polyethylene glycol, 17 g, orogastric tube, Daily  potassium chloride, 20 mEq, nasogastric tube, Daily  sertraline, 200 mg, oral, Daily      Continuous  medications  sodium chloride 0.9%, 75 mL/hr, Last Rate: 75 mL/hr (05/16/24 8680)      PRN medications  PRN medications: dextrose, dextrose, glucagon, glucagon, guaiFENesin, hydrALAZINE, ipratropium-albuteroL, oxyCODONE, vancomycin             Assessment/Plan      # Encephalopathy  - improved today     # Acute respiratory failure  -DNR with limitations per family  -Not a current candidate for ICU care or intubation     # Probable aspiration pneumonia  -Will initiate IV antibiotics     # YESSICA     # Old right hemiparesis/dysarthria     HIV:  Hx graves s/p thyroidectomy   Post surgical hypothyroid  HTN  HLP  T2DM  CKD3    5/10 : Pt has poor swallowing - seen by ST - will start NGT feeds over the weekend - NGT placed today. Pt more awake - was waving at me - speaking appropriately.     5/11-patient seen at bedside, patient's sister/POA at bedside discussed with both of them about poor p.o. intake and the need for a more definitive plan.  They both agree for PEG if needed patient to get MBS study done on Monday, will consult GI if patient fails swallowing.  She may need a PEG even if she able to swallow because her intake has been less than 50%.    5/12-patient seen at bedside, has NG in place, NG had, come out a few inches was replaced and x-ray was verified, swallow test tomorrow.    5/30-MRSA bacteremia, on IV vancomycin, TTE pending, patient did not do well with swallow eval, discussed with speech therapist-will proceed to consider PEG, consult GI, patient agrees with the plan.    5/15-patient feels well, denies complaints, GI concerned about aspiration pneumonia, will consult pulmonary to optimize her pulmonary issues, GI planning PEG placement Monday, will hold Eliquis after Friday, PT OT evaluation noted    5/16-patient has dysphagia, NG tube still in place, PEG placement planned for Monday, stop Eliquis, will start heparin subcu, patient stabilizing from the pulmonary perspective.    Radha Ward MD

## 2024-05-18 ENCOUNTER — APPOINTMENT (OUTPATIENT)
Dept: RADIOLOGY | Facility: HOSPITAL | Age: 54
DRG: 177 | End: 2024-05-18
Payer: MEDICARE

## 2024-05-18 LAB
ALBUMIN SERPL BCP-MCNC: 2.4 G/DL (ref 3.4–5)
ALP SERPL-CCNC: 75 U/L (ref 33–110)
ALT SERPL W P-5'-P-CCNC: 9 U/L (ref 7–45)
ANION GAP SERPL CALC-SCNC: 10 MMOL/L (ref 10–20)
AST SERPL W P-5'-P-CCNC: 29 U/L (ref 9–39)
BACTERIA BLD CULT: NORMAL
BACTERIA BLD CULT: NORMAL
BILIRUB SERPL-MCNC: 0.3 MG/DL (ref 0–1.2)
BUN SERPL-MCNC: 13 MG/DL (ref 6–23)
CALCIUM SERPL-MCNC: 7.8 MG/DL (ref 8.6–10.3)
CHLORIDE SERPL-SCNC: 96 MMOL/L (ref 98–107)
CK SERPL-CCNC: 21 U/L (ref 0–215)
CO2 SERPL-SCNC: 30 MMOL/L (ref 21–32)
CREAT SERPL-MCNC: 1.02 MG/DL (ref 0.5–1.05)
EGFRCR SERPLBLD CKD-EPI 2021: 65 ML/MIN/1.73M*2
ERYTHROCYTE [DISTWIDTH] IN BLOOD BY AUTOMATED COUNT: 16.7 % (ref 11.5–14.5)
GLUCOSE BLD MANUAL STRIP-MCNC: 165 MG/DL (ref 74–99)
GLUCOSE BLD MANUAL STRIP-MCNC: 166 MG/DL (ref 74–99)
GLUCOSE BLD MANUAL STRIP-MCNC: 225 MG/DL (ref 74–99)
GLUCOSE BLD MANUAL STRIP-MCNC: 237 MG/DL (ref 74–99)
GLUCOSE BLD MANUAL STRIP-MCNC: 242 MG/DL (ref 74–99)
GLUCOSE BLD MANUAL STRIP-MCNC: 262 MG/DL (ref 74–99)
GLUCOSE BLD MANUAL STRIP-MCNC: 283 MG/DL (ref 74–99)
GLUCOSE SERPL-MCNC: 271 MG/DL (ref 74–99)
HCT VFR BLD AUTO: 22.6 % (ref 36–46)
HGB BLD-MCNC: 7.4 G/DL (ref 12–16)
MCH RBC QN AUTO: 30.8 PG (ref 26–34)
MCHC RBC AUTO-ENTMCNC: 32.7 G/DL (ref 32–36)
MCV RBC AUTO: 94 FL (ref 80–100)
NRBC BLD-RTO: 0.1 /100 WBCS (ref 0–0)
PLATELET # BLD AUTO: 394 X10*3/UL (ref 150–450)
POTASSIUM SERPL-SCNC: 4.4 MMOL/L (ref 3.5–5.3)
PROT SERPL-MCNC: 6.7 G/DL (ref 6.4–8.2)
RBC # BLD AUTO: 2.4 X10*6/UL (ref 4–5.2)
SODIUM SERPL-SCNC: 132 MMOL/L (ref 136–145)
WBC # BLD AUTO: 20.2 X10*3/UL (ref 4.4–11.3)

## 2024-05-18 PROCEDURE — 2500000004 HC RX 250 GENERAL PHARMACY W/ HCPCS (ALT 636 FOR OP/ED): Performed by: INTERNAL MEDICINE

## 2024-05-18 PROCEDURE — 82947 ASSAY GLUCOSE BLOOD QUANT: CPT | Mod: 91

## 2024-05-18 PROCEDURE — 2500000001 HC RX 250 WO HCPCS SELF ADMINISTERED DRUGS (ALT 637 FOR MEDICARE OP): Performed by: PEDIATRICS

## 2024-05-18 PROCEDURE — 94640 AIRWAY INHALATION TREATMENT: CPT

## 2024-05-18 PROCEDURE — 2500000004 HC RX 250 GENERAL PHARMACY W/ HCPCS (ALT 636 FOR OP/ED): Performed by: PHARMACIST

## 2024-05-18 PROCEDURE — 93970 EXTREMITY STUDY: CPT

## 2024-05-18 PROCEDURE — 2500000002 HC RX 250 W HCPCS SELF ADMINISTERED DRUGS (ALT 637 FOR MEDICARE OP, ALT 636 FOR OP/ED): Performed by: INTERNAL MEDICINE

## 2024-05-18 PROCEDURE — 85027 COMPLETE CBC AUTOMATED: CPT | Performed by: INTERNAL MEDICINE

## 2024-05-18 PROCEDURE — 2500000004 HC RX 250 GENERAL PHARMACY W/ HCPCS (ALT 636 FOR OP/ED): Performed by: NURSE PRACTITIONER

## 2024-05-18 PROCEDURE — 2500000001 HC RX 250 WO HCPCS SELF ADMINISTERED DRUGS (ALT 637 FOR MEDICARE OP): Performed by: INTERNAL MEDICINE

## 2024-05-18 PROCEDURE — 84075 ASSAY ALKALINE PHOSPHATASE: CPT | Performed by: INTERNAL MEDICINE

## 2024-05-18 PROCEDURE — 1100000001 HC PRIVATE ROOM DAILY

## 2024-05-18 PROCEDURE — 82550 ASSAY OF CK (CPK): CPT | Performed by: PHARMACIST

## 2024-05-18 PROCEDURE — 36415 COLL VENOUS BLD VENIPUNCTURE: CPT | Performed by: INTERNAL MEDICINE

## 2024-05-18 PROCEDURE — 93971 EXTREMITY STUDY: CPT | Performed by: RADIOLOGY

## 2024-05-18 PROCEDURE — 2500000006 HC RX 250 W HCPCS SELF ADMINISTERED DRUGS (ALT 637 FOR ALL PAYERS): Performed by: INTERNAL MEDICINE

## 2024-05-18 PROCEDURE — 2500000005 HC RX 250 GENERAL PHARMACY W/O HCPCS: Performed by: INTERNAL MEDICINE

## 2024-05-18 RX ADMIN — Medication 1 L/MIN: at 20:00

## 2024-05-18 RX ADMIN — BACLOFEN 10 MG: 10 TABLET ORAL at 21:46

## 2024-05-18 RX ADMIN — DOLUTEGRAVIR SODIUM 50 MG: 50 TABLET, FILM COATED ORAL at 08:48

## 2024-05-18 RX ADMIN — VANCOMYCIN HYDROCHLORIDE 1250 MG: 1.25 INJECTION, POWDER, LYOPHILIZED, FOR SOLUTION INTRAVENOUS at 09:58

## 2024-05-18 RX ADMIN — IPRATROPIUM BROMIDE AND ALBUTEROL SULFATE 3 ML: 2.5; .5 SOLUTION RESPIRATORY (INHALATION) at 20:57

## 2024-05-18 RX ADMIN — INSULIN GLARGINE 20 UNITS: 100 INJECTION, SOLUTION SUBCUTANEOUS at 22:20

## 2024-05-18 RX ADMIN — Medication 1 L/MIN: at 08:00

## 2024-05-18 RX ADMIN — LAMOTRIGINE 25 MG: 25 TABLET ORAL at 21:46

## 2024-05-18 RX ADMIN — INSULIN LISPRO 6 UNITS: 100 INJECTION, SOLUTION INTRAVENOUS; SUBCUTANEOUS at 12:00

## 2024-05-18 RX ADMIN — LEVOTHYROXINE SODIUM 175 MCG: 125 TABLET ORAL at 06:52

## 2024-05-18 RX ADMIN — AMLODIPINE BESYLATE 5 MG: 5 TABLET ORAL at 08:48

## 2024-05-18 RX ADMIN — SERTRALINE HYDROCHLORIDE 200 MG: 50 TABLET ORAL at 08:48

## 2024-05-18 RX ADMIN — INSULIN LISPRO 4 UNITS: 100 INJECTION, SOLUTION INTRAVENOUS; SUBCUTANEOUS at 08:47

## 2024-05-18 RX ADMIN — INSULIN LISPRO 6 UNITS: 100 INJECTION, SOLUTION INTRAVENOUS; SUBCUTANEOUS at 17:00

## 2024-05-18 RX ADMIN — ENOXAPARIN SODIUM 40 MG: 40 INJECTION SUBCUTANEOUS at 08:49

## 2024-05-18 RX ADMIN — BACLOFEN 10 MG: 10 TABLET ORAL at 08:48

## 2024-05-18 RX ADMIN — FUROSEMIDE 20 MG: 20 TABLET ORAL at 08:48

## 2024-05-18 RX ADMIN — POLYETHYLENE GLYCOL 3350 17 G: 17 POWDER, FOR SOLUTION ORAL at 06:52

## 2024-05-18 RX ADMIN — POTASSIUM CHLORIDE 20 MEQ: 1.5 POWDER, FOR SOLUTION ORAL at 08:48

## 2024-05-18 NOTE — PROGRESS NOTES
"Janette Martinez is a 53 y.o. female on day 9 of admission presenting with AMS (altered mental status).    Subjective   Drowsy but arousable.  Denies shortness of breath, pain or cough.  On 2 L nasal cannula oxygen.     Objective   Physical Exam  Vitals  Blood pressure (!) 158/91, pulse 92, temperature 36.8 °C (98.2 °F), temperature source Temporal, resp. rate 18, height 1.626 m (5' 4.02\"), weight 96.5 kg (212 lb 11.9 oz), SpO2 98%.  Intake/Output last 3 Shifts:  I/O last 3 completed shifts:  In: - (0 mL/kg)   Out: 3000 (31.1 mL/kg) [Urine:3000 (0.9 mL/kg/hr)]  Weight: 96.5 kg     General-in no acute distress.  Lungs-clear, without wheezes, rales or rhonchi.    Heart-regular rate and rhythm.  Abdomen-positive bowel sounds.  Extremities-no edema.  Skin-no rashes.    Scheduled medications  amLODIPine, 5 mg, oral, Daily  [Held by provider] apixaban, 5 mg, oral, BID  baclofen, 10 mg, oral, BID  [START ON 5/19/2024] daptomycin, 700 mg, intravenous, q24h MARTIN  dolutegravir, 50 mg, oral, Daily  enoxaparin, 40 mg, subcutaneous, Daily  furosemide, 20 mg, oral, Daily  insulin glargine, 20 Units, subcutaneous, q24h  insulin lispro, 0-10 Units, subcutaneous, TID  ipratropium-albuteroL, 3 mL, nebulization, TID  lamoTRIgine, 25 mg, oral, q PM  levothyroxine, 175 mcg, oral, Daily before breakfast  lidocaine, 5 mL, infiltration, Once  oxygen, , inhalation, Continuous - Inhalation  polyethylene glycol, 17 g, orogastric tube, Daily  potassium chloride, 20 mEq, nasogastric tube, Daily  sertraline, 200 mg, oral, Daily  vancomycin, 1,250 mg, intravenous, Once      Continuous medications  sodium chloride 0.9%, 75 mL/hr, Last Rate: 75 mL/hr (05/17/24 2234)      PRN medications  PRN medications: dextrose, dextrose, glucagon, glucagon, guaiFENesin, hydrALAZINE, ipratropium-albuteroL, oxyCODONE, vancomycin     Results for orders placed or performed during the hospital encounter of 05/09/24 (from the past 24 hour(s))   POCT GLUCOSE   Result " Value Ref Range    POCT Glucose 356 (H) 74 - 99 mg/dL   POCT GLUCOSE   Result Value Ref Range    POCT Glucose 272 (H) 74 - 99 mg/dL   POCT GLUCOSE   Result Value Ref Range    POCT Glucose 188 (H) 74 - 99 mg/dL   POCT GLUCOSE   Result Value Ref Range    POCT Glucose 225 (H) 74 - 99 mg/dL   POCT GLUCOSE   Result Value Ref Range    POCT Glucose 262 (H) 74 - 99 mg/dL   CBC   Result Value Ref Range    WBC 20.2 (H) 4.4 - 11.3 x10*3/uL    nRBC 0.1 (H) 0.0 - 0.0 /100 WBCs    RBC 2.40 (L) 4.00 - 5.20 x10*6/uL    Hemoglobin 7.4 (L) 12.0 - 16.0 g/dL    Hematocrit 22.6 (L) 36.0 - 46.0 %    MCV 94 80 - 100 fL    MCH 30.8 26.0 - 34.0 pg    MCHC 32.7 32.0 - 36.0 g/dL    RDW 16.7 (H) 11.5 - 14.5 %    Platelets 394 150 - 450 x10*3/uL   Comprehensive Metabolic Panel   Result Value Ref Range    Glucose 271 (H) 74 - 99 mg/dL    Sodium 132 (L) 136 - 145 mmol/L    Potassium 4.4 3.5 - 5.3 mmol/L    Chloride 96 (L) 98 - 107 mmol/L    Bicarbonate 30 21 - 32 mmol/L    Anion Gap 10 10 - 20 mmol/L    Urea Nitrogen 13 6 - 23 mg/dL    Creatinine 1.02 0.50 - 1.05 mg/dL    eGFR 65 >60 mL/min/1.73m*2    Calcium 7.8 (L) 8.6 - 10.3 mg/dL    Albumin 2.4 (L) 3.4 - 5.0 g/dL    Alkaline Phosphatase 75 33 - 110 U/L    Total Protein 6.7 6.4 - 8.2 g/dL    AST 29 9 - 39 U/L    Bilirubin, Total 0.3 0.0 - 1.2 mg/dL    ALT 9 7 - 45 U/L   Creatine Kinase   Result Value Ref Range    Creatine Kinase 21 0 - 215 U/L   POCT GLUCOSE   Result Value Ref Range    POCT Glucose 237 (H) 74 - 99 mg/dL      Assessment:  53-year-old HIV-positive woman with aspiration pneumonia and MRSA bacteremia, in association with dysphagia enterococcal urinary tract infection, metabolic encephalopathy and hypoxia.  There is no bronchospasm presently.    Recommend:  1.  Continue DuoNeb, vancomycin, Lasix, Synthroid and Lovenox.  2.  Wean FiO2 to keep oxygen saturation approximately 92 to 95%.  3.  Incentive spirometry and Acapella treatment, if the patient is able to cooperate.  4.  The  patient is scheduled for PEG tube placement.    Yohannes Silverman MD

## 2024-05-18 NOTE — PROGRESS NOTES
Janette Martinez is a 53 y.o. female on day 9 of admission presenting with AMS (altered mental status).      Subjective   HPI: This is a 53 y.o. female who was recently discharged after an admission for encephalopathy/sepsis.  Patient had a protracted hospitalization last visit, one of her issues was swallowing which was found to be okay for the puréed diet honey thickened liquids.  Today I was called from the nursing home with concerns for fever/altered mental status/suspicion for aspiration.  Discussed with director of nursing at the facility-patient was a full code and wanted her transferred to the ER by EMS.  After patient's arrival in the ER and further evaluation discussed with emergency room physician and agreed with hospitalization, as patient's condition was worsening in the ER the provider contacted patient's sister/POA and patient is currently DO NOT RESUSCITATE with limitations.  Patient is too drowsy to answer any questions, turns when called, not answering any questions     Objective     Last Recorded Vitals  BP (!) 158/91   Pulse 92   Temp 36.8 °C (98.2 °F) (Temporal)   Resp 18   Wt 96.5 kg (212 lb 11.9 oz)   SpO2 98%   Intake/Output last 3 Shifts:    Intake/Output Summary (Last 24 hours) at 5/18/2024 1102  Last data filed at 5/18/2024 0812  Gross per 24 hour   Intake --   Output 2400 ml   Net -2400 ml       Admission Weight  Weight: 90.7 kg (200 lb) (05/09/24 1204)    Daily Weight  05/15/24 : 96.5 kg (212 lb 11.9 oz)    Image Results  Transthoracic Echo Complete     [facility], [address] [place] [address]               Tel [phone] and Fax [phone]    TRANSTHORACIC ECHOCARDIOGRAM REPORT       Patient Name:      JANETTE MARTINEZ      Reading Physician:    [id number] Arjun Leal MD  Study Date:        5/15/2024            Ordering Provider:    [id number] ZULUAGA  MRN/PID:           [id number]              Fellow:  Accession#:        [id number]         Nurse:  Date of Birth/Age: 1970 / 53      Sonographer:          Gonzalo Bah RDCS                     years  Gender:            F                    Additional Staff:  Height:            163.00 cm            Admit Date:  Weight:            96.50 kg             Admission Status:  BSA / BMI:         2.01 m2 / 36.32      Encounter#:           [id number]                     kg/m2                                          Department Location:  [facility] Non                                                                Invasive    Study Type:    TRANSTHORACIC ECHO (TTE) COMPLETE  Diagnosis/ICD: Endocarditis, valve unspecified-I38  Indication:    endocarditis  CPT Code:      Echo Limited-53870    Patient History:  Pertinent History: Endocarditis.    Study Detail: The following Echo studies were performed: 2D, M-Mode, Doppler and                color flow. Technically challenging study due to poor acoustic                windows, body habitus and patient lying in supine position. Unable                to obtain suprasternal notch, subcostal and apical 2-chamber view.       PHYSICIAN INTERPRETATION:  Left Ventricle: The left ventricular systolic function is normal, with an estimated ejection fraction of 55-60%. There are no regional wall motion abnormalities. The left ventricular cavity size is normal. Left ventricular diastolic filling was not assessed.  Left Atrium: The left atrium was not assessed.  Right Ventricle: The right ventricle was not assessed. There is normal right ventricular global systolic function.  Right Atrium: The right atrium was not assessed.  Aortic Valve: The aortic valve is trileaflet. There is no evidence of aortic valve regurgitation. The peak instantaneous gradient of the aortic valve is 6.6 mmHg.  Mitral Valve: The mitral valve is normal in structure. There is mild mitral valve regurgitation.  Tricuspid Valve: The tricuspid valve is  structurally normal. There is trace tricuspid regurgitation. The right ventricular systolic pressure is unable to be estimated.  Pulmonic Valve: The pulmonic valve is structurally normal. There is trace pulmonic valve regurgitation.  Pericardium: There is a trivial pericardial effusion.  Aorta: The aortic root was not assessed.  In comparison to the previous echocardiogram(s): Compared with study from 2024, no significant change.       CONCLUSIONS:   1. Left ventricular systolic function is normal with a 55-60% estimated ejection fraction.   2. No definite valvular vegetations were visualized.    QUANTITATIVE DATA SUMMARY:  2D MEASUREMENTS:                           Normal Ranges:  IVSd:          0.81 cm   (0.6-1.1cm)  LVPWd:         1.04 cm   (0.6-1.1cm)  LVIDd:         5.02 cm   (3.9-5.9cm)  LVIDs:         3.62 cm  LV Mass Index: 81.7 g/m2  LV % FS        28.0 %    LV DIASTOLIC FUNCTION:                      Normal Ranges:  MV Peak E: 0.81 m/s (0.7-1.2 m/s)  MV Peak A: 0.99 m/s (0.42-0.7 m/s)  E/A Ratio: 0.82     (1.0-2.2)    MITRAL VALVE:                  Normal Ranges:  MV DT: 193 msec (150-240msec)    AORTIC VALVE:                        Normal Ranges:  AoV Vmax:    1.28 m/s (<=1.7m/s)  AoV Peak P.6 mmHg (<20mmHg)       [id number] Arjun Leal MD  Electronically signed on 5/15/2024 at 4:10:25 PM       ** Final **      Physical Exam  GENERAL: awake, Ox0 cSKIN: Skin turgor normal. No rashes  HEENT: no epistaxis, Moist mucosa.  NECK: supple  BACK: spine nontender to palpation, No CVAT.  LUNGS: Vesicular breath sounds, with no wheeze, no crepitations.   CARDIAC: REGULAR. S1 and S2; no rubs, no murmur  ABDOMEN: Abdomen soft, non-tender. BS+  EXTREMITIES: No edema, Good capillary refill.   NEURO: History of dysarthria, hemiparesis, but today's exam is limited   MUSCULOSKELETAL: No acute inflammation    Relevant Results    Results for orders placed or performed during the hospital encounter of 24 (from the  past 24 hour(s))   POCT GLUCOSE   Result Value Ref Range    POCT Glucose 356 (H) 74 - 99 mg/dL   POCT GLUCOSE   Result Value Ref Range    POCT Glucose 272 (H) 74 - 99 mg/dL   POCT GLUCOSE   Result Value Ref Range    POCT Glucose 188 (H) 74 - 99 mg/dL   POCT GLUCOSE   Result Value Ref Range    POCT Glucose 225 (H) 74 - 99 mg/dL   POCT GLUCOSE   Result Value Ref Range    POCT Glucose 262 (H) 74 - 99 mg/dL   CBC   Result Value Ref Range    WBC 20.2 (H) 4.4 - 11.3 x10*3/uL    nRBC 0.1 (H) 0.0 - 0.0 /100 WBCs    RBC 2.40 (L) 4.00 - 5.20 x10*6/uL    Hemoglobin 7.4 (L) 12.0 - 16.0 g/dL    Hematocrit 22.6 (L) 36.0 - 46.0 %    MCV 94 80 - 100 fL    MCH 30.8 26.0 - 34.0 pg    MCHC 32.7 32.0 - 36.0 g/dL    RDW 16.7 (H) 11.5 - 14.5 %    Platelets 394 150 - 450 x10*3/uL   Comprehensive Metabolic Panel   Result Value Ref Range    Glucose 271 (H) 74 - 99 mg/dL    Sodium 132 (L) 136 - 145 mmol/L    Potassium 4.4 3.5 - 5.3 mmol/L    Chloride 96 (L) 98 - 107 mmol/L    Bicarbonate 30 21 - 32 mmol/L    Anion Gap 10 10 - 20 mmol/L    Urea Nitrogen 13 6 - 23 mg/dL    Creatinine 1.02 0.50 - 1.05 mg/dL    eGFR 65 >60 mL/min/1.73m*2    Calcium 7.8 (L) 8.6 - 10.3 mg/dL    Albumin 2.4 (L) 3.4 - 5.0 g/dL    Alkaline Phosphatase 75 33 - 110 U/L    Total Protein 6.7 6.4 - 8.2 g/dL    AST 29 9 - 39 U/L    Bilirubin, Total 0.3 0.0 - 1.2 mg/dL    ALT 9 7 - 45 U/L   Creatine Kinase   Result Value Ref Range    Creatine Kinase 21 0 - 215 U/L   POCT GLUCOSE   Result Value Ref Range    POCT Glucose 237 (H) 74 - 99 mg/dL       Scheduled medications  amLODIPine, 5 mg, oral, Daily  [Held by provider] apixaban, 5 mg, oral, BID  baclofen, 10 mg, oral, BID  [START ON 5/19/2024] daptomycin, 700 mg, intravenous, q24h MARTIN  dolutegravir, 50 mg, oral, Daily  enoxaparin, 40 mg, subcutaneous, Daily  furosemide, 20 mg, oral, Daily  insulin glargine, 20 Units, subcutaneous, q24h  insulin lispro, 0-10 Units, subcutaneous, TID  ipratropium-albuteroL, 3 mL, nebulization,  TID  lamoTRIgine, 25 mg, oral, q PM  levothyroxine, 175 mcg, oral, Daily before breakfast  lidocaine, 5 mL, infiltration, Once  oxygen, , inhalation, Continuous - Inhalation  polyethylene glycol, 17 g, orogastric tube, Daily  potassium chloride, 20 mEq, nasogastric tube, Daily  sertraline, 200 mg, oral, Daily  vancomycin, 1,250 mg, intravenous, Once      Continuous medications  sodium chloride 0.9%, 75 mL/hr, Last Rate: 75 mL/hr (05/17/24 2234)      PRN medications  PRN medications: dextrose, dextrose, glucagon, glucagon, guaiFENesin, hydrALAZINE, ipratropium-albuteroL, oxyCODONE, vancomycin             Assessment/Plan      # Encephalopathy  - improved today     # Acute respiratory failure  -DNR with limitations per family  -Not a current candidate for ICU care or intubation     # Probable aspiration pneumonia  -Will initiate IV antibiotics     # YESSICA-resolved     # Old right hemiparesis/dysarthria     HIV:  Hx graves s/p thyroidectomy   Post surgical hypothyroid  HTN  HLP  T2DM    5/10 : Pt has poor swallowing - seen by ST - will start NGT feeds over the weekend - NGT placed today. Pt more awake - was waving at me - speaking appropriately.     5/11-patient seen at bedside, patient's sister/POA at bedside discussed with both of them about poor p.o. intake and the need for a more definitive plan.  They both agree for PEG if needed patient to get MBS study done on Monday, will consult GI if patient fails swallowing.  She may need a PEG even if she able to swallow because her intake has been less than 50%.    5/12-patient seen at bedside, has NG in place, NG had, come out a few inches was replaced and x-ray was verified, swallow test tomorrow.    5/30-MRSA bacteremia, on IV vancomycin, TTE pending, patient did not do well with swallow eval, discussed with speech therapist-will proceed to consider PEG, consult GI, patient agrees with the plan.    5/15-patient feels well, denies complaints, GI concerned about aspiration  pneumonia, will consult pulmonary to optimize her pulmonary issues, GI planning PEG placement Monday, will hold Eliquis after Friday, PT OT evaluation noted    5/16-patient has dysphagia, NG tube still in place, PEG placement planned for Monday, stop Eliquis, will start heparin subcu, patient stabilizing from the pulmonary perspective.      5/17 : Hyperglycemic - will add Lantus, planning PEG on Monday , Eliquis on hold. Midline is being planned.  GI unable to place PEG because of staffing, will consult surgery per the recommendation.    5/18 : Surgery consulted for probable PEG on Monday - NGT in place, Encephalopathy     Radha Ward MD

## 2024-05-18 NOTE — PROGRESS NOTES
"                                                       '' Infectious Disease Progress Note''        Interval Events:  No new symptoms  Afebrile  WBC 20 K  Estimated Creatinine Clearance: 70.2 mL/min (by C-G formula based on SCr of 1.02 mg/dL).        Current antibiotic:  Vancomycin    Physical Exam:  BP (!) 158/91   Pulse 92   Temp 36.8 °C (98.2 °F) (Temporal)   Resp 18   Ht 1.626 m (5' 4.02\")   Wt 96.5 kg (212 lb 11.9 oz)   SpO2 98%   BMI 36.50 kg/m²   General: NAD, nontoxic appearing, on 2 L NC  Skin: no new rash  Resp: lungs CTA b/l  CV:  normal S1/S2, no murmur   Abd: soft, non-tender  Ext: no edema      Lab Results:  Reviewed    Micro:  5/9 blood culture: MRSA  5/9 urine culture: E faecalis   5/14 blood culture: No growth to date    Imaging: reviewed         Assessment:    -Multifocal aspiration pneumonia due to MRSA  -MRSA bacteremia  -PCN and sulfa allergy  -High vancomycin level  -RLE pain   -Worsening leukocytosis       Plan/Recommendations:  -Continue vancomycin iv (pharmacy dosing) thru today to complete 10 day course of pneumonia treatment  -Start her on daptomycin on Sunday 5/19/24 thru 6/10/24 to complete 4 week course of bacteremia treatment (vancomycin dosing at [facility] will be difficult on her)  -Place a midline   -Consider RLE duplex  -Check CBC/diff, CMP, CK every Monday and fax to Dr. Mayo at [phone]  -Follow-up with Dr. Mayo on 6/11/2024  -check C.diff PCR of she develops diarrhea       Please call ID with any concerns or questions.      Judah Duval MD  [facility] [place]  #[phone]      "

## 2024-05-19 LAB
ANION GAP SERPL CALC-SCNC: 11 MMOL/L (ref 10–20)
BUN SERPL-MCNC: 14 MG/DL (ref 6–23)
CALCIUM SERPL-MCNC: 7.5 MG/DL (ref 8.6–10.3)
CHLORIDE SERPL-SCNC: 95 MMOL/L (ref 98–107)
CO2 SERPL-SCNC: 32 MMOL/L (ref 21–32)
CREAT SERPL-MCNC: 1.08 MG/DL (ref 0.5–1.05)
EGFRCR SERPLBLD CKD-EPI 2021: 61 ML/MIN/1.73M*2
ERYTHROCYTE [DISTWIDTH] IN BLOOD BY AUTOMATED COUNT: 17 % (ref 11.5–14.5)
GLUCOSE BLD MANUAL STRIP-MCNC: 176 MG/DL (ref 74–99)
GLUCOSE BLD MANUAL STRIP-MCNC: 176 MG/DL (ref 74–99)
GLUCOSE BLD MANUAL STRIP-MCNC: 184 MG/DL (ref 74–99)
GLUCOSE BLD MANUAL STRIP-MCNC: 197 MG/DL (ref 74–99)
GLUCOSE BLD MANUAL STRIP-MCNC: 212 MG/DL (ref 74–99)
GLUCOSE SERPL-MCNC: 197 MG/DL (ref 74–99)
HCT VFR BLD AUTO: 22.1 % (ref 36–46)
HGB BLD-MCNC: 7.2 G/DL (ref 12–16)
MCH RBC QN AUTO: 30.6 PG (ref 26–34)
MCHC RBC AUTO-ENTMCNC: 32.6 G/DL (ref 32–36)
MCV RBC AUTO: 94 FL (ref 80–100)
NRBC BLD-RTO: 0 /100 WBCS (ref 0–0)
PLATELET # BLD AUTO: 388 X10*3/UL (ref 150–450)
POTASSIUM SERPL-SCNC: 4.7 MMOL/L (ref 3.5–5.3)
RBC # BLD AUTO: 2.35 X10*6/UL (ref 4–5.2)
SODIUM SERPL-SCNC: 133 MMOL/L (ref 136–145)
WBC # BLD AUTO: 17.7 X10*3/UL (ref 4.4–11.3)

## 2024-05-19 PROCEDURE — 2500000006 HC RX 250 W HCPCS SELF ADMINISTERED DRUGS (ALT 637 FOR ALL PAYERS): Performed by: INTERNAL MEDICINE

## 2024-05-19 PROCEDURE — 2500000004 HC RX 250 GENERAL PHARMACY W/ HCPCS (ALT 636 FOR OP/ED): Performed by: INTERNAL MEDICINE

## 2024-05-19 PROCEDURE — 85027 COMPLETE CBC AUTOMATED: CPT

## 2024-05-19 PROCEDURE — 2500000004 HC RX 250 GENERAL PHARMACY W/ HCPCS (ALT 636 FOR OP/ED): Performed by: NURSE PRACTITIONER

## 2024-05-19 PROCEDURE — 94640 AIRWAY INHALATION TREATMENT: CPT

## 2024-05-19 PROCEDURE — 94664 DEMO&/EVAL PT USE INHALER: CPT

## 2024-05-19 PROCEDURE — 82947 ASSAY GLUCOSE BLOOD QUANT: CPT

## 2024-05-19 PROCEDURE — 2500000001 HC RX 250 WO HCPCS SELF ADMINISTERED DRUGS (ALT 637 FOR MEDICARE OP): Performed by: INTERNAL MEDICINE

## 2024-05-19 PROCEDURE — 2500000002 HC RX 250 W HCPCS SELF ADMINISTERED DRUGS (ALT 637 FOR MEDICARE OP, ALT 636 FOR OP/ED): Performed by: INTERNAL MEDICINE

## 2024-05-19 PROCEDURE — 36415 COLL VENOUS BLD VENIPUNCTURE: CPT

## 2024-05-19 PROCEDURE — 1100000001 HC PRIVATE ROOM DAILY

## 2024-05-19 PROCEDURE — 80048 BASIC METABOLIC PNL TOTAL CA: CPT

## 2024-05-19 PROCEDURE — 2500000005 HC RX 250 GENERAL PHARMACY W/O HCPCS: Performed by: INTERNAL MEDICINE

## 2024-05-19 RX ADMIN — LAMOTRIGINE 25 MG: 25 TABLET ORAL at 21:59

## 2024-05-19 RX ADMIN — IPRATROPIUM BROMIDE AND ALBUTEROL SULFATE 3 ML: 2.5; .5 SOLUTION RESPIRATORY (INHALATION) at 21:02

## 2024-05-19 RX ADMIN — INSULIN LISPRO 4 UNITS: 100 INJECTION, SOLUTION INTRAVENOUS; SUBCUTANEOUS at 13:01

## 2024-05-19 RX ADMIN — DAPTOMYCIN IN SODIUM CHLORIDE 700 MG: 700 INJECTION, SOLUTION INTRAVENOUS at 09:27

## 2024-05-19 RX ADMIN — ENOXAPARIN SODIUM 40 MG: 40 INJECTION SUBCUTANEOUS at 09:26

## 2024-05-19 RX ADMIN — LEVOTHYROXINE SODIUM 175 MCG: 125 TABLET ORAL at 06:33

## 2024-05-19 RX ADMIN — AMLODIPINE BESYLATE 5 MG: 5 TABLET ORAL at 09:26

## 2024-05-19 RX ADMIN — BACLOFEN 10 MG: 10 TABLET ORAL at 21:58

## 2024-05-19 RX ADMIN — INSULIN GLARGINE 20 UNITS: 100 INJECTION, SOLUTION SUBCUTANEOUS at 23:22

## 2024-05-19 RX ADMIN — SERTRALINE HYDROCHLORIDE 200 MG: 50 TABLET ORAL at 09:26

## 2024-05-19 RX ADMIN — INSULIN LISPRO 2 UNITS: 100 INJECTION, SOLUTION INTRAVENOUS; SUBCUTANEOUS at 17:07

## 2024-05-19 RX ADMIN — BACLOFEN 10 MG: 10 TABLET ORAL at 09:26

## 2024-05-19 RX ADMIN — IPRATROPIUM BROMIDE AND ALBUTEROL SULFATE 3 ML: 2.5; .5 SOLUTION RESPIRATORY (INHALATION) at 14:44

## 2024-05-19 RX ADMIN — POLYETHYLENE GLYCOL 3350 17 G: 17 POWDER, FOR SOLUTION ORAL at 06:33

## 2024-05-19 RX ADMIN — IPRATROPIUM BROMIDE AND ALBUTEROL SULFATE 3 ML: 2.5; .5 SOLUTION RESPIRATORY (INHALATION) at 07:40

## 2024-05-19 RX ADMIN — Medication 2 L/MIN: at 08:00

## 2024-05-19 RX ADMIN — SODIUM CHLORIDE 75 ML/HR: 9 INJECTION, SOLUTION INTRAVENOUS at 06:45

## 2024-05-19 RX ADMIN — DOLUTEGRAVIR SODIUM 50 MG: 50 TABLET, FILM COATED ORAL at 09:26

## 2024-05-19 RX ADMIN — FUROSEMIDE 20 MG: 20 TABLET ORAL at 09:26

## 2024-05-19 RX ADMIN — Medication 1.5 L/MIN: at 21:02

## 2024-05-19 RX ADMIN — SODIUM CHLORIDE 75 ML/HR: 9 INJECTION, SOLUTION INTRAVENOUS at 22:19

## 2024-05-19 RX ADMIN — INSULIN LISPRO 2 UNITS: 100 INJECTION, SOLUTION INTRAVENOUS; SUBCUTANEOUS at 09:27

## 2024-05-19 RX ADMIN — HYDRALAZINE HYDROCHLORIDE 10 MG: 20 INJECTION INTRAMUSCULAR; INTRAVENOUS at 21:58

## 2024-05-19 RX ADMIN — IRON SUCROSE 300 MG: 20 INJECTION, SOLUTION INTRAVENOUS at 13:41

## 2024-05-19 ASSESSMENT — COGNITIVE AND FUNCTIONAL STATUS - GENERAL
MOVING TO AND FROM BED TO CHAIR: TOTAL
EATING MEALS: TOTAL
CLIMB 3 TO 5 STEPS WITH RAILING: TOTAL
DRESSING REGULAR LOWER BODY CLOTHING: TOTAL
STANDING UP FROM CHAIR USING ARMS: TOTAL
MOBILITY SCORE: 6
DAILY ACTIVITIY SCORE: 6
TOILETING: TOTAL
HELP NEEDED FOR BATHING: TOTAL
DRESSING REGULAR UPPER BODY CLOTHING: TOTAL
PERSONAL GROOMING: TOTAL
TURNING FROM BACK TO SIDE WHILE IN FLAT BAD: TOTAL
WALKING IN HOSPITAL ROOM: TOTAL
MOVING FROM LYING ON BACK TO SITTING ON SIDE OF FLAT BED WITH BEDRAILS: TOTAL

## 2024-05-19 ASSESSMENT — PAIN - FUNCTIONAL ASSESSMENT: PAIN_FUNCTIONAL_ASSESSMENT: 0-10

## 2024-05-19 ASSESSMENT — PAIN SCALES - GENERAL
PAINLEVEL_OUTOF10: 0 - NO PAIN
PAINLEVEL_OUTOF10: 0 - NO PAIN

## 2024-05-19 NOTE — CARE PLAN
Problem: Pain - Adult  Goal: Verbalizes/displays adequate comfort level or baseline comfort level  5/19/2024 1544 by Viral Ferrari RN  Outcome: Progressing  5/19/2024 1536 by Viral Ferrari RN  Outcome: Progressing  5/19/2024 1208 by Viral Ferrari RN  Outcome: Progressing     Problem: Safety - Adult  Goal: Free from fall injury  5/19/2024 1544 by Viral Ferrari RN  Outcome: Progressing  5/19/2024 1536 by Viral Ferrari RN  Outcome: Progressing  5/19/2024 1208 by Viral Ferrari RN  Outcome: Progressing     Problem: Discharge Planning  Goal: Discharge to home or other facility with appropriate resources  5/19/2024 1544 by Viral Ferrari RN  Outcome: Progressing  5/19/2024 1536 by Viral Ferrari RN  Outcome: Progressing  5/19/2024 1208 by Viral Ferrari RN  Outcome: Progressing     Problem: Chronic Conditions and Co-morbidities  Goal: Patient's chronic conditions and co-morbidity symptoms are monitored and maintained or improved  5/19/2024 1544 by Viral Ferrari RN  Outcome: Progressing  5/19/2024 1536 by Viral Ferrari RN  Outcome: Progressing  5/19/2024 1208 by Viral Ferrari RN  Outcome: Progressing     Problem: Diabetes  Goal: Achieve decreasing blood glucose levels by end of shift  5/19/2024 1544 by Viral Ferrari RN  Outcome: Progressing  5/19/2024 1536 by Viral Ferrari RN  Outcome: Progressing  5/19/2024 1208 by Viral Ferrari RN  Outcome: Progressing  Goal: Decrease in ketones present in urine by end of shift  5/19/2024 1544 by Viral Ferrari RN  Outcome: Progressing  5/19/2024 1536 by Viral Ferrari RN  Outcome: Progressing  5/19/2024 1208 by Viral Ferrari RN  Outcome: Progressing  Goal: Maintain electrolyte levels within acceptable range throughout shift  5/19/2024 1544 by Viral Ferrari RN  Outcome: Progressing  5/19/2024 1536 by Viral Ferrari RN  Outcome: Progressing  5/19/2024 1208 by Viral Ferrari RN  Outcome: Progressing  Goal: Maintain glucose levels >70mg/dl to <250mg/dl throughout shift  5/19/2024 1544 by Viral Ferrari RN  Outcome: Progressing  5/19/2024 1536 by Viral Ferrari,  RN  Outcome: Progressing  5/19/2024 1208 by Viral Ferrari RN  Outcome: Progressing     Problem: Skin  Goal: Decreased wound size/increased tissue granulation at next dressing change  5/19/2024 1544 by Viral Ferrari RN  Outcome: Progressing  5/19/2024 1536 by Viral Ferrari RN  Outcome: Progressing  5/19/2024 1208 by Viral Ferrari RN  Outcome: Progressing  Goal: Participates in plan/prevention/treatment measures  5/19/2024 1544 by Viral Ferrari RN  Outcome: Progressing  5/19/2024 1536 by Viral Ferrari RN  Outcome: Progressing  5/19/2024 1208 by Viral Ferrari RN  Outcome: Progressing  Goal: Prevent/minimize sheer/friction injuries  5/19/2024 1544 by Viral Ferrari RN  Outcome: Progressing  5/19/2024 1536 by Viral Ferrari RN  Outcome: Progressing  5/19/2024 1208 by Viral Ferrari RN  Outcome: Progressing  Goal: Promote/optimize nutrition  5/19/2024 1544 by Viral Ferrari RN  Outcome: Progressing  5/19/2024 1536 by Viral Ferrari RN  Outcome: Progressing  5/19/2024 1208 by Viral Ferrari RN  Outcome: Progressing  Goal: Promote skin healing  5/19/2024 1544 by Viral Ferrari RN  Outcome: Progressing  5/19/2024 1536 by Viral Ferrari RN  Outcome: Progressing  5/19/2024 1208 by Viral Ferrari RN  Outcome: Progressing     Problem: Nutrition  Goal: Less than 5 days NPO/clear liquids  5/19/2024 1544 by Viral Ferrari RN  Outcome: Progressing  5/19/2024 1536 by Viral Ferrari RN  Outcome: Progressing  5/19/2024 1208 by Viral Ferrari RN  Outcome: Progressing  Goal: Oral intake greater 75%  5/19/2024 1544 by Viral Ferrari RN  Outcome: Progressing  5/19/2024 1536 by Viral Ferrari RN  Outcome: Progressing  5/19/2024 1208 by Viral Ferrari RN  Outcome: Progressing  Goal: Nutrition support is meeting 75% of nutrient needs  5/19/2024 1544 by Viral Ferrari RN  Outcome: Progressing  5/19/2024 1536 by Viral Ferrari RN  Outcome: Progressing  5/19/2024 1208 by Viral Ferrari RN  Outcome: Progressing  Goal: Lab values WNL  5/19/2024 1544 by Viral Ferrari RN  Outcome: Progressing  5/19/2024 1536 by Viral Ferrari RN  Outcome:  Progressing  5/19/2024 1208 by Viral Ferrari RN  Outcome: Progressing  Goal: Promote healing  5/19/2024 1544 by Viral Ferrari RN  Outcome: Progressing  5/19/2024 1536 by Viral Ferrari RN  Outcome: Progressing  5/19/2024 1208 by Viral Ferrari RN  Outcome: Progressing   The patient's goals for the shift include      The clinical goals for the shift include Pt will remain hemodynamically stable throughout shift    O

## 2024-05-19 NOTE — CARE PLAN
The patient's goals for the shift include        Problem: Pain - Adult  Goal: Verbalizes/displays adequate comfort level or baseline comfort level  5/19/2024 1536 by Viral Ferrari RN  Outcome: Progressing  5/19/2024 1208 by Viral Ferrari RN  Outcome: Progressing     Problem: Safety - Adult  Goal: Free from fall injury  5/19/2024 1536 by Viral Ferrari RN  Outcome: Progressing  5/19/2024 1208 by Viral Ferrari RN  Outcome: Progressing     Problem: Discharge Planning  Goal: Discharge to home or other facility with appropriate resources  5/19/2024 1536 by Viral Ferrari RN  Outcome: Progressing  5/19/2024 1208 by Viral Ferrari RN  Outcome: Progressing     Problem: Chronic Conditions and Co-morbidities  Goal: Patient's chronic conditions and co-morbidity symptoms are monitored and maintained or improved  5/19/2024 1536 by Viral Ferrari RN  Outcome: Progressing  5/19/2024 1208 by Viral Ferrari RN  Outcome: Progressing     Problem: Diabetes  Goal: Achieve decreasing blood glucose levels by end of shift  5/19/2024 1536 by Viral Ferrari RN  Outcome: Progressing  5/19/2024 1208 by Viral Ferrari RN  Outcome: Progressing  Goal: Decrease in ketones present in urine by end of shift  5/19/2024 1536 by Viral Ferrari RN  Outcome: Progressing  5/19/2024 1208 by Viral Ferrari RN  Outcome: Progressing  Goal: Maintain electrolyte levels within acceptable range throughout shift  5/19/2024 1536 by Viral Ferrari RN  Outcome: Progressing  5/19/2024 1208 by Viral Ferrari RN  Outcome: Progressing  Goal: Maintain glucose levels >70mg/dl to <250mg/dl throughout shift  5/19/2024 1536 by Viral Ferrari RN  Outcome: Progressing  5/19/2024 1208 by Viral Ferrari RN  Outcome: Progressing     Problem: Skin  Goal: Decreased wound size/increased tissue granulation at next dressing change  5/19/2024 1536 by Viral Ferrari RN  Outcome: Progressing  5/19/2024 1208 by Viral Ferrari RN  Outcome: Progressing  Goal: Participates in plan/prevention/treatment measures  5/19/2024 1536 by Viral Ferrari RN  Outcome:  Progressing  5/19/2024 1208 by Viral Ferrari RN  Outcome: Progressing  Goal: Prevent/minimize sheer/friction injuries  5/19/2024 1536 by Viral Ferrari RN  Outcome: Progressing  5/19/2024 1208 by Viral Ferrari RN  Outcome: Progressing  Goal: Promote/optimize nutrition  5/19/2024 1536 by Viral Ferrari RN  Outcome: Progressing  5/19/2024 1208 by Viral Ferrari RN  Outcome: Progressing  Goal: Promote skin healing  5/19/2024 1536 by Viral Ferrari RN  Outcome: Progressing  5/19/2024 1208 by Viral Ferrari RN  Outcome: Progressing     Problem: Nutrition  Goal: Less than 5 days NPO/clear liquids  5/19/2024 1536 by Viral Ferrari RN  Outcome: Progressing  5/19/2024 1208 by Viral Ferrari RN  Outcome: Progressing  Goal: Oral intake greater 75%  5/19/2024 1536 by Viral Ferrari RN  Outcome: Progressing  5/19/2024 1208 by Viral Ferrari RN  Outcome: Progressing  Goal: Nutrition support is meeting 75% of nutrient needs  5/19/2024 1536 by Viral Ferrari RN  Outcome: Progressing  5/19/2024 1208 by Viral Ferrari RN  Outcome: Progressing  Goal: Lab values WNL  5/19/2024 1536 by Viral Ferrari RN  Outcome: Progressing  5/19/2024 1208 by Viral Ferrari RN  Outcome: Progressing  Goal: Promote healing  5/19/2024 1536 by Viral Ferrari RN  Outcome: Progressing  5/19/2024 1208 by Viral Ferrari RN  Outcome: Progressing

## 2024-05-19 NOTE — CARE PLAN
The patient's goals for the shift include      The clinical goals for the shift include Pt will remain hemodynamically stable throughout shift    Problem: Pain - Adult  Goal: Verbalizes/displays adequate comfort level or baseline comfort level  5/19/2024 1818 by Viral Ferrari RN  Outcome: Progressing  5/19/2024 1544 by Viral Ferrari RN  Outcome: Progressing  5/19/2024 1536 by Viral Ferrari RN  Outcome: Progressing  5/19/2024 1208 by Viral Ferrari RN  Outcome: Progressing     Problem: Safety - Adult  Goal: Free from fall injury  5/19/2024 1818 by Viral Ferrari RN  Outcome: Progressing  5/19/2024 1544 by Viral Ferrari RN  Outcome: Progressing  5/19/2024 1536 by Viral Ferrari RN  Outcome: Progressing  5/19/2024 1208 by Viral Ferrari RN  Outcome: Progressing     Problem: Discharge Planning  Goal: Discharge to home or other facility with appropriate resources  5/19/2024 1818 by Viral Ferrari RN  Outcome: Progressing  5/19/2024 1544 by Viral Ferrari RN  Outcome: Progressing  5/19/2024 1536 by Viral Ferrari RN  Outcome: Progressing  5/19/2024 1208 by Viral Ferrari RN  Outcome: Progressing     Problem: Chronic Conditions and Co-morbidities  Goal: Patient's chronic conditions and co-morbidity symptoms are monitored and maintained or improved  5/19/2024 1818 by Viral Ferrari RN  Outcome: Progressing  5/19/2024 1544 by Viral Ferrari RN  Outcome: Progressing  5/19/2024 1536 by Viral Ferrari RN  Outcome: Progressing  5/19/2024 1208 by Viral Ferrari RN  Outcome: Progressing     Problem: Diabetes  Goal: Achieve decreasing blood glucose levels by end of shift  5/19/2024 1818 by Viral Ferrari RN  Outcome: Progressing  5/19/2024 1544 by iVral Ferrari RN  Outcome: Progressing  5/19/2024 1536 by Viral Ferrari RN  Outcome: Progressing  5/19/2024 1208 by iVral Ferrari RN  Outcome: Progressing  Goal: Decrease in ketones present in urine by end of shift  5/19/2024 1818 by Viral Ferrari RN  Outcome: Progressing  5/19/2024 1544 by Viral Ferrari RN  Outcome: Progressing  5/19/2024 1536 by Viral Ferrari,  RN  Outcome: Progressing  5/19/2024 1208 by Viral Ferrari RN  Outcome: Progressing  Goal: Maintain electrolyte levels within acceptable range throughout shift  5/19/2024 1818 by Viral Ferrari RN  Outcome: Progressing  5/19/2024 1544 by Viral Ferrari RN  Outcome: Progressing  5/19/2024 1536 by Viral Ferrari RN  Outcome: Progressing  5/19/2024 1208 by Viral Ferrari RN  Outcome: Progressing  Goal: Maintain glucose levels >70mg/dl to <250mg/dl throughout shift  5/19/2024 1818 by Viral Ferrari RN  Outcome: Progressing  5/19/2024 1544 by Viral Ferrari RN  Outcome: Progressing  5/19/2024 1536 by Viral Ferrari RN  Outcome: Progressing  5/19/2024 1208 by Viral Ferrari RN  Outcome: Progressing     Problem: Skin  Goal: Decreased wound size/increased tissue granulation at next dressing change  5/19/2024 1818 by Viral Ferrari RN  Outcome: Progressing  5/19/2024 1544 by Viral Ferrari RN  Outcome: Progressing  5/19/2024 1536 by Viral Ferrari RN  Outcome: Progressing  5/19/2024 1208 by Viral Ferrari RN  Outcome: Progressing  Goal: Participates in plan/prevention/treatment measures  5/19/2024 1818 by Viral Ferrari RN  Outcome: Progressing  5/19/2024 1544 by Viral Ferrari RN  Outcome: Progressing  5/19/2024 1536 by Viral Ferrari RN  Outcome: Progressing  5/19/2024 1208 by Viral Ferrari RN  Outcome: Progressing  Goal: Prevent/minimize sheer/friction injuries  5/19/2024 1818 by Viral Ferrari RN  Outcome: Progressing  5/19/2024 1544 by Viral Ferrari RN  Outcome: Progressing  5/19/2024 1536 by Viral Ferrari RN  Outcome: Progressing  5/19/2024 1208 by Viral Ferrari RN  Outcome: Progressing  Goal: Promote/optimize nutrition  5/19/2024 1818 by Viral Ferrari RN  Outcome: Progressing  5/19/2024 1544 by Viral Ferrari RN  Outcome: Progressing  5/19/2024 1536 by Viral Ferrari RN  Outcome: Progressing  5/19/2024 1208 by Viral Ferrari RN  Outcome: Progressing  Goal: Promote skin healing  5/19/2024 1818 by Viral Ferrari RN  Outcome: Progressing  5/19/2024 1544 by Viral Ferrari RN  Outcome: Progressing  5/19/2024 1536 by Viral  Vega, TARAH  Outcome: Progressing  5/19/2024 1208 by Viral Ferrari RN  Outcome: Progressing     Problem: Nutrition  Goal: Less than 5 days NPO/clear liquids  5/19/2024 1818 by Viral Ferrari RN  Outcome: Progressing  5/19/2024 1544 by Viral Ferrari RN  Outcome: Progressing  5/19/2024 1536 by Viral Ferrari RN  Outcome: Progressing  5/19/2024 1208 by Viral Ferrari RN  Outcome: Progressing  Goal: Oral intake greater 75%  5/19/2024 1818 by Viral Ferrari RN  Outcome: Progressing  5/19/2024 1544 by Viral Ferrari RN  Outcome: Progressing  5/19/2024 1536 by Viral Ferrari RN  Outcome: Progressing  5/19/2024 1208 by Viral Ferrari RN  Outcome: Progressing  Goal: Nutrition support is meeting 75% of nutrient needs  5/19/2024 1818 by Viral Ferrari RN  Outcome: Progressing  5/19/2024 1544 by Viral Ferrari RN  Outcome: Progressing  5/19/2024 1536 by Viral Ferrari RN  Outcome: Progressing  5/19/2024 1208 by Viral Ferrari RN  Outcome: Progressing  Goal: Lab values WNL  5/19/2024 1818 by Viral Ferrari RN  Outcome: Progressing  5/19/2024 1544 by Viral Ferrari RN  Outcome: Progressing  5/19/2024 1536 by Viral Ferrari RN  Outcome: Progressing  5/19/2024 1208 by Viral Ferrari RN  Outcome: Progressing  Goal: Promote healing  5/19/2024 1818 by Viral Ferrari RN  Outcome: Progressing  5/19/2024 1544 by Viral Ferrari RN  Outcome: Progressing  5/19/2024 1536 by Viral Ferrari, RN  Outcome: Progressing  5/19/2024 1208 by Viral Ferrari RN  Outcome: Progressing

## 2024-05-19 NOTE — CARE PLAN
Problem: Pain - Adult  Goal: Verbalizes/displays adequate comfort level or baseline comfort level  Outcome: Progressing     Problem: Safety - Adult  Goal: Free from fall injury  Outcome: Progressing     Problem: Discharge Planning  Goal: Discharge to home or other facility with appropriate resources  Outcome: Progressing     Problem: Chronic Conditions and Co-morbidities  Goal: Patient's chronic conditions and co-morbidity symptoms are monitored and maintained or improved  Outcome: Progressing     Problem: Diabetes  Goal: Achieve decreasing blood glucose levels by end of shift  Outcome: Progressing  Goal: Decrease in ketones present in urine by end of shift  Outcome: Progressing  Goal: Maintain electrolyte levels within acceptable range throughout shift  Outcome: Progressing  Goal: Maintain glucose levels >70mg/dl to <250mg/dl throughout shift  Outcome: Progressing     Problem: Skin  Goal: Decreased wound size/increased tissue granulation at next dressing change  Outcome: Progressing  Goal: Participates in plan/prevention/treatment measures  Outcome: Progressing  Goal: Prevent/minimize sheer/friction injuries  Outcome: Progressing  Goal: Promote/optimize nutrition  Outcome: Progressing  Goal: Promote skin healing  Outcome: Progressing     Problem: Nutrition  Goal: Less than 5 days NPO/clear liquids  Outcome: Progressing  Goal: Oral intake greater 75%  Outcome: Progressing  Goal: Nutrition support is meeting 75% of nutrient needs  Outcome: Progressing  Goal: Lab values WNL  Outcome: Progressing  Goal: Promote healing  Outcome: Progressing   The patient's goals for the shift include      The clinical goals for the shift include remain hemodynaimcally stable

## 2024-05-19 NOTE — PROGRESS NOTES
PROGRESS NOTE - INTERNAL MEDICINE     PATIENT NAME:  Janette Martinez    MRN:  [id number]  SERVICE DATE:  5/19/2024       ADMITTING PHYSICIAN:  Radha Ward MD    ASSESSMENT AND PLAN    Principal Problem:    AMS (altered mental status)    DM2 uncontrolled  Iron deficiency anemia  Hypertension  Aspiration pneumonia, with oropharyngeal dysphagia  Metabolic encephalopathy from acute illness  Acute hypoxic respiratory failure, probably from aspiration pneumonia  MRSA BSI  UTI      Plan:  Continue Lantus, Accu-Cheks, SSI, hypoglycemia protocol.  Hold Lantus prior to G-tube placement  Await G-tube placement, surgery consult ordered  Hold tube feeds if surgical procedure planned for tomorrow  Daptomycin, in coordination with ID recommendations  Fall, aspiration precautions  IV iron  DVT prophylaxis      INTERVAL HISTORY OF PRESENT ILLNESS:  [facility] TF via NG. No chest pain/sob. No n/v/d. Limited hx from pt. No new fever. acute events from last 24 hrs reviewed.    Discussed with nursing and case management team and the specialists involved in this patient's care. Reviewed the EMR and documentation from other care-givers.      OBJECTIVE  PHYSICAL EXAM:     GENERAL: awake, alert, Ox1, cooperative resting comfortably  SKIN: Skin turgor normal. No rashes  HEENT: no epistaxis, Moist mucosa. NG+  LUNGS: Vesicular breath sounds, with few prox airway rhonchi.  CARDIAC: REGULAR. S1 and S2; no rubs, no murmur  ABDOMEN: Abdomen soft, non-tender. +BS.  EXTREMITIES: Good capillary refill.   NEURO: Insight NONE. No invol movements  MUSCULOSKELETAL: No acute inflammation               5/18/2024    11:38 AM 5/18/2024     3:21 PM 5/18/2024     8:07 PM 5/18/2024    11:46 PM 5/19/2024     4:01 AM 5/19/2024     7:55 AM 5/19/2024    11:22 AM   Vitals   Systolic 132 164 128 131 111 147 125   Diastolic 84 90 75 68 64 75 79   Heart Rate 92 90 89 91 96 99 91   Temp 36.7 °C (98 °F) 36.7 °C (98 °F) 36.9 °C (98.4 °F) 36.7 °C (98.1 °F) 36.1 °C (97 °F)  37 °C (98.6 °F) 37.1 °C (98.8 °F)   Resp 18 18 18 16 16 18 18     Body mass index is 36.5 kg/m².    Intake/Output Summary (Last 24 hours) at 5/19/2024 1231  Last data filed at 5/19/2024 0755  Gross per 24 hour   Intake --   Output 3150 ml   Net -3150 ml           Current Facility-Administered Medications:     amLODIPine (Norvasc) tablet 5 mg, 5 mg, oral, Daily, Radha Ward MD, 5 mg at 05/19/24 0926    [Held by provider] apixaban (Eliquis) tablet 5 mg, 5 mg, oral, BID, Radha Ward MD, 5 mg at 05/16/24 2145    baclofen (Lioresal) tablet 10 mg, 10 mg, oral, BID, Radha Ward MD, 10 mg at 05/19/24 0926    DAPTOmycin (Cubicin) 700 mg/100 mL  mg, 700 mg, intravenous, q24h MARTIN, Judah Duval MD, Stopped at 05/19/24 0957    dextrose 50 % injection 12.5 g, 12.5 g, intravenous, q15 min PRN, Radha Ward MD, 12.5 g at 05/10/24 2340    dextrose 50 % injection 25 g, 25 g, intravenous, q15 min PRN, Radha Ward MD    dolutegravir (Tivicay) tablet 50 mg, 50 mg, oral, Daily, Radha Ward MD, 50 mg at 05/19/24 0926    enoxaparin (Lovenox) syringe 40 mg, 40 mg, subcutaneous, Daily, Radha Ward MD, 40 mg at 05/19/24 0926    furosemide (Lasix) tablet 20 mg, 20 mg, oral, Daily, Radha Ward MD, 20 mg at 05/19/24 0926    glucagon (Glucagen) injection 1 mg, 1 mg, intramuscular, q15 min PRN, Radha Ward MD    glucagon (Glucagen) injection 1 mg, 1 mg, intramuscular, q15 min PRN, Radha Ward MD    guaiFENesin (Robitussin) 100 mg/5 mL syrup 200 mg, 200 mg, oral, q6h PRN, Radha Ward MD, 200 mg at 05/14/24 2218    hydrALAZINE (Apresoline) injection 10 mg, 10 mg, intravenous, q4h PRN, Radha Ward MD, 10 mg at 05/17/24 1715    insulin glargine (Lantus) injection 20 Units, 20 Units, subcutaneous, q24h, Radha Ward MD, 20 Units at 05/18/24 2220    insulin lispro (HumaLOG) injection 0-10 Units, 0-10 Units, subcutaneous, TID, Radha Ward,  MD, 2 Units at 05/19/24 0927    ipratropium-albuteroL (Duo-Neb) 0.5-2.5 mg/3 mL nebulizer solution 3 mL, 3 mL, nebulization, q2h PRN, Radha Ward MD    ipratropium-albuteroL (Duo-Neb) 0.5-2.5 mg/3 mL nebulizer solution 3 mL, 3 mL, nebulization, TID, Radha Ward MD, 3 mL at 05/19/24 0740    lamoTRIgine (LaMICtal) tablet 25 mg, 25 mg, oral, q PM, Radha Ward MD, 25 mg at 05/18/24 2146    levothyroxine (Synthroid, Levoxyl) tablet 175 mcg, 175 mcg, oral, Daily before breakfast, Radha Ward MD, 175 mcg at 05/19/24 0633    lidocaine (Xylocaine) 10 mg/mL (1 %) injection 50 mg, 5 mL, infiltration, Once, Judah Duval MD    oxyCODONE (Roxicodone) immediate release tablet 5 mg, 5 mg, oral, q6h PRN, Radha Ward MD, 5 mg at 05/17/24 1136    oxygen (O2) therapy, , inhalation, Continuous - Inhalation, Radha Ward MD, 2 L/min at 05/19/24 0800    polyethylene glycol (Glycolax, Miralax) packet 17 g, 17 g, orogastric tube, Daily, Iman TELLO APRN-CNP, 17 g at 05/19/24 0633    potassium chloride (Klor-Con) packet 20 mEq, 20 mEq, nasogastric tube, Daily, Osiel Becerril PharmD, 20 mEq at 05/18/24 0848    sertraline (Zoloft) tablet 200 mg, 200 mg, oral, Daily, Radha Ward MD, 200 mg at 05/19/24 0926    sodium chloride 0.9% infusion, 75 mL/hr, intravenous, Continuous, Radha Ward MD, Last Rate: 75 mL/hr at 05/19/24 0645, 75 mL/hr at 05/19/24 0645    DATA:   Diagnostic tests reviewed for today's visit:    Most recent labs  Results from last 7 days   Lab Units 05/19/24  0627 05/18/24  0704 05/17/24  0702   WBC AUTO x10*3/uL 17.7* 20.2* 16.6*   HEMOGLOBIN g/dL 7.2* 7.4* 7.3*   HEMATOCRIT % 22.1* 22.6* 22.2*   PLATELETS AUTO x10*3/uL 388 394 372     Results from last 7 days   Lab Units 05/19/24  0627 05/18/24  0704 05/17/24  0702 05/16/24  0513   SODIUM mmol/L 133* 132* 133* 132*   POTASSIUM mmol/L 4.7 4.4 4.5 4.6   CHLORIDE mmol/L 95* 96* 98 99   CO2 mmol/L 32 30 32 29  "  BUN mg/dL 14 13 12 14   CREATININE mg/dL 1.08* 1.02 1.01 1.02   CALCIUM mg/dL 7.5* 7.8* 7.3* 7.3*   PROTEIN TOTAL g/dL  --  6.7 6.5 6.1*   BILIRUBIN TOTAL mg/dL  --  0.3 0.2 0.2   ALK PHOS U/L  --  75 67 63   ALT U/L  --  9 9 10   AST U/L  --  29 27 27   GLUCOSE mg/dL 197* 271* 310* 302*         Results from last 7 days   Lab Units 05/18/24  0704   CK TOTAL U/L 21     No results found for: \"TROPONINT\"    Results from last 7 days   Lab Units 05/19/24  1115 05/19/24  0757 05/19/24  0353 05/18/24  2326 05/18/24  2013 05/18/24  1523 05/18/24  1140 05/18/24  0814 05/18/24  0447 05/18/24  0102 05/17/24  2049 05/17/24  1519   POCT GLUCOSE mg/dL 212* 197* 176* 165* 166* 242* 283* 237* 262* 225* 188* 272*      Lower extremity venous duplex bilateral   Final Result   No sonographic evidence for deep vein thrombosis within the evaluated   veins of the lower extremities bilaterally.        MACRO:   None.        Signed by: Nazario Mcmahon 5/18/2024 3:55 PM   Dictation workstation:   [id number]      Transthoracic Echo Complete   Final Result      XR chest abdomen for OG NG placement   Final Result   Parenchymal infiltration. Interval nasogastric tube placement.        Nonspecific bowel gas pattern.        MACRO:   none        Signed by: Deyanira Velez 5/12/2024 10:36 AM   Dictation workstation:   [id number]      XR chest 1 view   Final Result   1. Enteric tube bent in the region of the gastric antrum with the tip   in the region of the gastric body.   2. Cardiomegaly. Mild vascular congestion and interstitial edema.   Trace bilateral pleural effusions. Ill-defined bilateral airspace   opacities, suggestive of multifocal pneumonia.                  MACRO:   None.        Signed by: Desi Ordonez 5/10/2024 9:53 PM   Dictation workstation:   [id number]      XR abdomen 1 view   Final Result   No nasogastric tube seen in the lower chest or abdomen; correlate   with chest radiographs or repeat KUB after NG repositioning.        Signed " by: Fidel Mueller 5/9/2024 4:41 PM   Dictation workstation:   [id number]      XR chest 1 view   Final Result   Poor inspiration.        Mild cardiomegaly.        Bilateral infiltrates.                  MACRO:   None        Signed by: Mercedes Salazar 5/9/2024 1:52 PM   Dictation workstation:   [id number]      CT chest abdomen pelvis wo IV contrast   Final Result   1. Multifocal pneumonia with more dense airspace consolidation   demonstrated within the medial right and left lower lobes in the   posterior perihilar location. Scattered patchy areas of ground-glass   airspace infiltrate and consolidation within bilateral lung fields.   Follow-up to clearing.        2. The IVC is flattened in appearance suggesting component of   hypovolemia. Correlate        3. No dilated loops of bowel. Postsurgical changes with enteroenteric   anastomosis in the right lower quadrant appearing unremarkable.             MACRO:   None        Signed by: Kishor Muniz 5/9/2024 1:01 PM   Dictation workstation:   [id number]      CT brain attack head wo IV contrast   Final Result   No acute intracranial pathology.             MACRO:   Michael Lucia discussed the significance and urgency of this critical   finding by telephone with  ROSAURA CHACKO on 5/9/2024 at 12:21 pm.   (**-RCF-**) Findings:  See findings.             Signed by: Michael Lucia 5/9/2024 12:21 PM   Dictation workstation:   [id number]      Bedside Midline Imaging    (Results Pending)         SIGNATURE: Abby White MD PATIENT NAME: Janette Martinez   DATE: 5/19/2024 MRN: [id number]   TIME: 12:31 PM

## 2024-05-19 NOTE — CARE PLAN
The patient's goals for the shift include      The clinical goals for the shift include Pt will remain hemodynamically stable throughout shift    Problem: Pain - Adult  Goal: Verbalizes/displays adequate comfort level or baseline comfort level  5/19/2024 1845 by Viral Ferrari RN  Outcome: Progressing  5/19/2024 1818 by Viral Ferrari RN  Outcome: Progressing  5/19/2024 1544 by Viral Ferrari RN  Outcome: Progressing  5/19/2024 1536 by Viral Ferrari RN  Outcome: Progressing  5/19/2024 1208 by Viral Ferrari RN  Outcome: Progressing     Problem: Safety - Adult  Goal: Free from fall injury  5/19/2024 1845 by Viral Ferrari RN  Outcome: Progressing  5/19/2024 1818 by Viral Ferrari RN  Outcome: Progressing  5/19/2024 1544 by Viral Ferrari RN  Outcome: Progressing  5/19/2024 1536 by Viral Ferrari RN  Outcome: Progressing  5/19/2024 1208 by Viral Ferrari RN  Outcome: Progressing     Problem: Discharge Planning  Goal: Discharge to home or other facility with appropriate resources  5/19/2024 1845 by Viral Ferrari RN  Outcome: Progressing  5/19/2024 1818 by Viral Ferrari RN  Outcome: Progressing  5/19/2024 1544 by Viral Ferrari RN  Outcome: Progressing  5/19/2024 1536 by Viral Ferrari RN  Outcome: Progressing  5/19/2024 1208 by Viral Ferrari RN  Outcome: Progressing     Problem: Chronic Conditions and Co-morbidities  Goal: Patient's chronic conditions and co-morbidity symptoms are monitored and maintained or improved  5/19/2024 1845 by Viral Ferrari RN  Outcome: Progressing  5/19/2024 1818 by Viral Ferrari RN  Outcome: Progressing  5/19/2024 1544 by Viral Ferrari RN  Outcome: Progressing  5/19/2024 1536 by Viral Ferrari RN  Outcome: Progressing  5/19/2024 1208 by Viral Ferrari RN  Outcome: Progressing     Problem: Diabetes  Goal: Achieve decreasing blood glucose levels by end of shift  5/19/2024 1845 by Viral Ferrari RN  Outcome: Progressing  5/19/2024 1818 by Viral Vega, RN  Outcome: Progressing  5/19/2024 1544 by Viral Ferrari RN  Outcome: Progressing  5/19/2024 1536 by Viral Ferarri RN  Outcome:  Progressing  5/19/2024 1208 by Viral Ferrari RN  Outcome: Progressing  Goal: Decrease in ketones present in urine by end of shift  5/19/2024 1845 by Viral Ferrari RN  Outcome: Progressing  5/19/2024 1818 by Viral Ferrari RN  Outcome: Progressing  5/19/2024 1544 by Viral Ferrari RN  Outcome: Progressing  5/19/2024 1536 by Viral Ferrari RN  Outcome: Progressing  5/19/2024 1208 by Viral Ferrari RN  Outcome: Progressing  Goal: Maintain electrolyte levels within acceptable range throughout shift  5/19/2024 1845 by Viral Ferrari RN  Outcome: Progressing  5/19/2024 1818 by Viral Ferrari RN  Outcome: Progressing  5/19/2024 1544 by Viral Ferrari RN  Outcome: Progressing  5/19/2024 1536 by Viral Ferrari RN  Outcome: Progressing  5/19/2024 1208 by Viral Ferrari RN  Outcome: Progressing  Goal: Maintain glucose levels >70mg/dl to <250mg/dl throughout shift  5/19/2024 1845 by Viral Ferrari RN  Outcome: Progressing  5/19/2024 1818 by Viral Ferrari RN  Outcome: Progressing  5/19/2024 1544 by Viral Ferrari RN  Outcome: Progressing  5/19/2024 1536 by Viral Ferrari RN  Outcome: Progressing  5/19/2024 1208 by Viral Ferrari RN  Outcome: Progressing     Problem: Skin  Goal: Decreased wound size/increased tissue granulation at next dressing change  5/19/2024 1845 by Viral Ferrari RN  Outcome: Progressing  5/19/2024 1818 by Viral Ferrari RN  Outcome: Progressing  5/19/2024 1544 by Viral Ferrari RN  Outcome: Progressing  5/19/2024 1536 by Viral Ferrari RN  Outcome: Progressing  5/19/2024 1208 by Viral Ferrari RN  Outcome: Progressing  Goal: Participates in plan/prevention/treatment measures  5/19/2024 1845 by Viral Ferrari RN  Outcome: Progressing  5/19/2024 1818 by Viral Ferrari RN  Outcome: Progressing  5/19/2024 1544 by Viral Ferrari RN  Outcome: Progressing  5/19/2024 1536 by Viral Ferrari RN  Outcome: Progressing  5/19/2024 1208 by Viral Ferrari RN  Outcome: Progressing  Goal: Prevent/minimize sheer/friction injuries  5/19/2024 1845 by Viral Ferrari RN  Outcome: Progressing  5/19/2024 1818 by Viral Ferrari,  RN  Outcome: Progressing  5/19/2024 1544 by Viral Ferrari RN  Outcome: Progressing  5/19/2024 1536 by Viral Ferrari RN  Outcome: Progressing  5/19/2024 1208 by Viral Ferrari RN  Outcome: Progressing  Goal: Promote/optimize nutrition  5/19/2024 1845 by Viral Ferrari RN  Outcome: Progressing  5/19/2024 1818 by Viral Ferrari, RN  Outcome: Progressing  5/19/2024 1544 by Viral Ferrari RN  Outcome: Progressing  5/19/2024 1536 by Viral Ferrari RN  Outcome: Progressing  5/19/2024 1208 by Viral Ferrari RN  Outcome: Progressing  Goal: Promote skin healing  5/19/2024 1845 by Viral Ferrari, RN  Outcome: Progressing  5/19/2024 1818 by Viral Ferrari RN  Outcome: Progressing  5/19/2024 1544 by Viral Ferrari RN  Outcome: Progressing  5/19/2024 1536 by Viral Ferrari RN  Outcome: Progressing  5/19/2024 1208 by Viral Ferrari RN  Outcome: Progressing     Problem: Nutrition  Goal: Less than 5 days NPO/clear liquids  5/19/2024 1845 by Viral Ferrari, RN  Outcome: Progressing  5/19/2024 1818 by Viral Ferrari RN  Outcome: Progressing  5/19/2024 1544 by Viral Ferrari RN  Outcome: Progressing  5/19/2024 1536 by Viral Ferrari RN  Outcome: Progressing  5/19/2024 1208 by Viral Ferrari RN  Outcome: Progressing  Goal: Oral intake greater 75%  5/19/2024 1845 by Viral Ferrari, RN  Outcome: Progressing  5/19/2024 1818 by Viral Ferrari RN  Outcome: Progressing  5/19/2024 1544 by Viral Ferrari RN  Outcome: Progressing  5/19/2024 1536 by Viral Ferrari RN  Outcome: Progressing  5/19/2024 1208 by Viral Ferrari RN  Outcome: Progressing  Goal: Nutrition support is meeting 75% of nutrient needs  5/19/2024 1845 by Viral Ferrari, RN  Outcome: Progressing  5/19/2024 1818 by Viral Ferrari RN  Outcome: Progressing  5/19/2024 1544 by Viral Ferrari RN  Outcome: Progressing  5/19/2024 1536 by Viral Ferrari RN  Outcome: Progressing  5/19/2024 1208 by Viral Ferrari RN  Outcome: Progressing  Goal: Lab values WNL  5/19/2024 1845 by Viral Ferrari RN  Outcome: Progressing  5/19/2024 1818 by Viral Ferrari RN  Outcome: Progressing  5/19/2024 1544 by Viral Ferrari  RN  Outcome: Progressing  5/19/2024 1536 by Viral Ferrari RN  Outcome: Progressing  5/19/2024 1208 by Viral Ferrari RN  Outcome: Progressing  Goal: Promote healing  5/19/2024 1845 by Viral Ferrari RN  Outcome: Progressing  5/19/2024 1818 by Viral Ferrari RN  Outcome: Progressing  5/19/2024 1544 by Viral Ferrari RN  Outcome: Progressing  5/19/2024 1536 by Viral Ferrari RN  Outcome: Progressing  5/19/2024 1208 by Viral Ferrari RN  Outcome: Progressing

## 2024-05-19 NOTE — PROGRESS NOTES
"Janette Martinez is a 53 y.o. female on day 10 of admission presenting with AMS (altered mental status).    Subjective   Feels \"okay\".  Admits to mild shortness of breath, but denies pain or cough.  On 2 L nasal cannula oxygen.  Lower extremity ultrasound showed no DVT bilaterally.     Objective   Physical Exam  Vitals  Blood pressure 147/75, pulse 99, temperature 37 °C (98.6 °F), temperature source Temporal, resp. rate 18, height 1.626 m (5' 4.02\"), weight 96.5 kg (212 lb 11.9 oz), SpO2 94%.  Intake/Output last 3 Shifts:  I/O last 3 completed shifts:  In: - (0 mL/kg)   Out: 4900 (50.8 mL/kg) [Urine:4900 (1.4 mL/kg/hr)]  Weight: 96.5 kg     General-drowsy but arousable; in no acute distress.  Answering questions appropriately.  Lungs-clear, without wheezes, rales or rhonchi.  Heart-regular rate and rhythm.  Abdomen-positive bowel sounds.  Extremities-no edema.  Skin-no rashes.    Scheduled medications  amLODIPine, 5 mg, oral, Daily  [Held by provider] apixaban, 5 mg, oral, BID  baclofen, 10 mg, oral, BID  daptomycin, 700 mg, intravenous, q24h MARTIN  dolutegravir, 50 mg, oral, Daily  enoxaparin, 40 mg, subcutaneous, Daily  furosemide, 20 mg, oral, Daily  insulin glargine, 20 Units, subcutaneous, q24h  insulin lispro, 0-10 Units, subcutaneous, TID  ipratropium-albuteroL, 3 mL, nebulization, TID  lamoTRIgine, 25 mg, oral, q PM  levothyroxine, 175 mcg, oral, Daily before breakfast  lidocaine, 5 mL, infiltration, Once  oxygen, , inhalation, Continuous - Inhalation  polyethylene glycol, 17 g, orogastric tube, Daily  potassium chloride, 20 mEq, nasogastric tube, Daily  sertraline, 200 mg, oral, Daily      Continuous medications  sodium chloride 0.9%, 75 mL/hr, Last Rate: 75 mL/hr (05/19/24 0645)      PRN medications  PRN medications: dextrose, dextrose, glucagon, glucagon, guaiFENesin, hydrALAZINE, ipratropium-albuteroL, oxyCODONE     Results for orders placed or performed during the hospital encounter of 05/09/24 (from the " past 24 hour(s))   POCT GLUCOSE   Result Value Ref Range    POCT Glucose 283 (H) 74 - 99 mg/dL   POCT GLUCOSE   Result Value Ref Range    POCT Glucose 242 (H) 74 - 99 mg/dL   POCT GLUCOSE   Result Value Ref Range    POCT Glucose 166 (H) 74 - 99 mg/dL   POCT GLUCOSE   Result Value Ref Range    POCT Glucose 165 (H) 74 - 99 mg/dL   POCT GLUCOSE   Result Value Ref Range    POCT Glucose 176 (H) 74 - 99 mg/dL   CBC   Result Value Ref Range    WBC 17.7 (H) 4.4 - 11.3 x10*3/uL    nRBC 0.0 0.0 - 0.0 /100 WBCs    RBC 2.35 (L) 4.00 - 5.20 x10*6/uL    Hemoglobin 7.2 (L) 12.0 - 16.0 g/dL    Hematocrit 22.1 (L) 36.0 - 46.0 %    MCV 94 80 - 100 fL    MCH 30.6 26.0 - 34.0 pg    MCHC 32.6 32.0 - 36.0 g/dL    RDW 17.0 (H) 11.5 - 14.5 %    Platelets 388 150 - 450 x10*3/uL   Basic Metabolic Panel   Result Value Ref Range    Glucose 197 (H) 74 - 99 mg/dL    Sodium 133 (L) 136 - 145 mmol/L    Potassium 4.7 3.5 - 5.3 mmol/L    Chloride 95 (L) 98 - 107 mmol/L    Bicarbonate 32 21 - 32 mmol/L    Anion Gap 11 10 - 20 mmol/L    Urea Nitrogen 14 6 - 23 mg/dL    Creatinine 1.08 (H) 0.50 - 1.05 mg/dL    eGFR 61 >60 mL/min/1.73m*2    Calcium 7.5 (L) 8.6 - 10.3 mg/dL   POCT GLUCOSE   Result Value Ref Range    POCT Glucose 197 (H) 74 - 99 mg/dL      Assessment:  53-year-old woman with a history of hypertension, hypothyroidism, dysphagia, thyroidectomy, Graves' disease, diabetes, HIV positive and metabolic encephalopathy, admitted with a change in mental status and fevers, and found to have MRSA bacteremia and (probable aspiration) pneumonia, and an Enterococcal urinary tract infection, leading to hypoxic respiratory failure.  Pulmonary embolism is unlikely.  There is no bronchospasm.    Recommend:  1.  Continue DuoNeb, daptomycin, Lovenox, Lasix and Synthroid.  2.  Keep oxygen saturation approximately 92 to 95%.  3.  Incentive spirometry and Acapella treatment, if the patient is able to cooperate.  4.  The patient is scheduled for PEG tube  placement.    Yohannes Silverman MD

## 2024-05-20 PROBLEM — R13.12 OROPHARYNGEAL DYSPHAGIA: Status: ACTIVE | Noted: 2024-05-09

## 2024-05-20 LAB
ANION GAP SERPL CALC-SCNC: 8 MMOL/L (ref 10–20)
BUN SERPL-MCNC: 14 MG/DL (ref 6–23)
CALCIUM SERPL-MCNC: 7.7 MG/DL (ref 8.6–10.3)
CHLORIDE SERPL-SCNC: 98 MMOL/L (ref 98–107)
CO2 SERPL-SCNC: 33 MMOL/L (ref 21–32)
CREAT SERPL-MCNC: 1.02 MG/DL (ref 0.5–1.05)
EGFRCR SERPLBLD CKD-EPI 2021: 65 ML/MIN/1.73M*2
ERYTHROCYTE [DISTWIDTH] IN BLOOD BY AUTOMATED COUNT: 18 % (ref 11.5–14.5)
GLUCOSE BLD MANUAL STRIP-MCNC: 119 MG/DL (ref 74–99)
GLUCOSE BLD MANUAL STRIP-MCNC: 135 MG/DL (ref 74–99)
GLUCOSE BLD MANUAL STRIP-MCNC: 158 MG/DL (ref 74–99)
GLUCOSE SERPL-MCNC: 125 MG/DL (ref 74–99)
HCT VFR BLD AUTO: 25.9 % (ref 36–46)
HGB BLD-MCNC: 8.3 G/DL (ref 12–16)
MCH RBC QN AUTO: 30.1 PG (ref 26–34)
MCHC RBC AUTO-ENTMCNC: 32 G/DL (ref 32–36)
MCV RBC AUTO: 94 FL (ref 80–100)
NRBC BLD-RTO: 0 /100 WBCS (ref 0–0)
PLATELET # BLD AUTO: 355 X10*3/UL (ref 150–450)
POTASSIUM SERPL-SCNC: 4.3 MMOL/L (ref 3.5–5.3)
RBC # BLD AUTO: 2.76 X10*6/UL (ref 4–5.2)
SODIUM SERPL-SCNC: 135 MMOL/L (ref 136–145)
WBC # BLD AUTO: 16.8 X10*3/UL (ref 4.4–11.3)

## 2024-05-20 PROCEDURE — 2500000004 HC RX 250 GENERAL PHARMACY W/ HCPCS (ALT 636 FOR OP/ED): Performed by: INTERNAL MEDICINE

## 2024-05-20 PROCEDURE — 36415 COLL VENOUS BLD VENIPUNCTURE: CPT | Performed by: INTERNAL MEDICINE

## 2024-05-20 PROCEDURE — 80048 BASIC METABOLIC PNL TOTAL CA: CPT | Performed by: INTERNAL MEDICINE

## 2024-05-20 PROCEDURE — 94640 AIRWAY INHALATION TREATMENT: CPT

## 2024-05-20 PROCEDURE — 2500000001 HC RX 250 WO HCPCS SELF ADMINISTERED DRUGS (ALT 637 FOR MEDICARE OP): Performed by: INTERNAL MEDICINE

## 2024-05-20 PROCEDURE — 82947 ASSAY GLUCOSE BLOOD QUANT: CPT

## 2024-05-20 PROCEDURE — 2500000006 HC RX 250 W HCPCS SELF ADMINISTERED DRUGS (ALT 637 FOR ALL PAYERS): Performed by: INTERNAL MEDICINE

## 2024-05-20 PROCEDURE — 2500000001 HC RX 250 WO HCPCS SELF ADMINISTERED DRUGS (ALT 637 FOR MEDICARE OP): Performed by: PEDIATRICS

## 2024-05-20 PROCEDURE — 1100000001 HC PRIVATE ROOM DAILY

## 2024-05-20 PROCEDURE — 85027 COMPLETE CBC AUTOMATED: CPT | Performed by: INTERNAL MEDICINE

## 2024-05-20 PROCEDURE — 2500000002 HC RX 250 W HCPCS SELF ADMINISTERED DRUGS (ALT 637 FOR MEDICARE OP, ALT 636 FOR OP/ED): Performed by: INTERNAL MEDICINE

## 2024-05-20 PROCEDURE — 2500000005 HC RX 250 GENERAL PHARMACY W/O HCPCS: Performed by: INTERNAL MEDICINE

## 2024-05-20 PROCEDURE — 99233 SBSQ HOSP IP/OBS HIGH 50: CPT | Performed by: INTERNAL MEDICINE

## 2024-05-20 RX ADMIN — INSULIN GLARGINE 20 UNITS: 100 INJECTION, SOLUTION SUBCUTANEOUS at 21:43

## 2024-05-20 RX ADMIN — AMLODIPINE BESYLATE 5 MG: 5 TABLET ORAL at 10:08

## 2024-05-20 RX ADMIN — IRON SUCROSE 300 MG: 20 INJECTION, SOLUTION INTRAVENOUS at 09:57

## 2024-05-20 RX ADMIN — IPRATROPIUM BROMIDE AND ALBUTEROL SULFATE 3 ML: 2.5; .5 SOLUTION RESPIRATORY (INHALATION) at 06:57

## 2024-05-20 RX ADMIN — SODIUM CHLORIDE 75 ML/HR: 9 INJECTION, SOLUTION INTRAVENOUS at 09:57

## 2024-05-20 RX ADMIN — IPRATROPIUM BROMIDE AND ALBUTEROL SULFATE 3 ML: 2.5; .5 SOLUTION RESPIRATORY (INHALATION) at 20:03

## 2024-05-20 RX ADMIN — DOLUTEGRAVIR SODIUM 50 MG: 50 TABLET, FILM COATED ORAL at 10:08

## 2024-05-20 RX ADMIN — Medication 1 L/MIN: at 20:07

## 2024-05-20 RX ADMIN — LEVOTHYROXINE SODIUM 175 MCG: 125 TABLET ORAL at 06:48

## 2024-05-20 RX ADMIN — Medication 2 L/MIN: at 08:00

## 2024-05-20 RX ADMIN — HYDRALAZINE HYDROCHLORIDE 10 MG: 20 INJECTION INTRAMUSCULAR; INTRAVENOUS at 04:59

## 2024-05-20 RX ADMIN — SERTRALINE HYDROCHLORIDE 200 MG: 50 TABLET ORAL at 10:07

## 2024-05-20 RX ADMIN — IPRATROPIUM BROMIDE AND ALBUTEROL SULFATE 3 ML: 2.5; .5 SOLUTION RESPIRATORY (INHALATION) at 12:37

## 2024-05-20 RX ADMIN — FUROSEMIDE 20 MG: 20 TABLET ORAL at 10:08

## 2024-05-20 RX ADMIN — OXYCODONE HYDROCHLORIDE 5 MG: 5 TABLET ORAL at 04:59

## 2024-05-20 RX ADMIN — BACLOFEN 10 MG: 10 TABLET ORAL at 10:07

## 2024-05-20 RX ADMIN — POTASSIUM CHLORIDE 20 MEQ: 1.5 POWDER, FOR SOLUTION ORAL at 14:16

## 2024-05-20 RX ADMIN — DAPTOMYCIN IN SODIUM CHLORIDE 700 MG: 700 INJECTION, SOLUTION INTRAVENOUS at 09:57

## 2024-05-20 ASSESSMENT — PAIN SCALES - GENERAL
PAINLEVEL_OUTOF10: 0 - NO PAIN
PAINLEVEL_OUTOF10: 7

## 2024-05-20 ASSESSMENT — PAIN DESCRIPTION - DESCRIPTORS: DESCRIPTORS: PATIENT UNABLE TO DESCRIBE

## 2024-05-20 ASSESSMENT — PAIN - FUNCTIONAL ASSESSMENT: PAIN_FUNCTIONAL_ASSESSMENT: 0-10

## 2024-05-20 NOTE — PROGRESS NOTES
05/20/24 1040   Current Planned Discharge Disposition   Current Planned Discharge Disposition SNF

## 2024-05-20 NOTE — PROGRESS NOTES
05/20/24 1024   Current Planned Discharge Disposition   Current Planned Discharge Disposition [facility]     WNE  spoke to  sibling  Maik - she said she  did not  tour  facilities  this  weekend. She  reports she has a  VI  and needs  someone  to  drive. Maik  says  she will be  at    [facility]  tom[facility]  after[facility].     Maik  told to  call Kalani  with choices    Kailey Waters, MSW, LSW

## 2024-05-20 NOTE — PROGRESS NOTES
05/20/24 1040   Discharge Planning   Type of Residence Skilled nursing facility   Who is requesting discharge planning? Provider   Home or Post Acute Services Post acute facilities (Rehab/SNF/etc)   Type of Post Acute Facility Services Skilled nursing   Patient expects to be discharged to: SNF   Does the patient need discharge transport arranged? Yes   RoundTrip coordination needed? Yes   Patient Choice   Provider Choice list and CMS website (https://medicare.gov/care-compare#search) for post-acute Quality and Resource Measure Data were provided and reviewed with: Family   Patient / Family choosing to utilize agency / facility established prior to hospitalization Yes     Requested SW touch base with patient's sister on FOC.  Patient to continue on IV antibiotics through 6/10/24  PLAN: possible peg today  DISP: SNF once sister decides on FOC.  ADOD: 3 days  Kalani Sharma RN

## 2024-05-20 NOTE — PROGRESS NOTES
Janette Martinez is a 53 y.o. female on day 11 of admission presenting with AMS (altered mental status).      Subjective   HPI: This is a 53 y.o. female who was recently discharged after an admission for encephalopathy/sepsis.  Patient had a protracted hospitalization last visit, one of her issues was swallowing which was found to be okay for the puréed diet honey thickened liquids.  Today I was called from the nursing home with concerns for fever/altered mental status/suspicion for aspiration.  Discussed with director of nursing at the facility-patient was a full code and wanted her transferred to the ER by EMS.  After patient's arrival in the ER and further evaluation discussed with emergency room physician and agreed with hospitalization, as patient's condition was worsening in the ER the provider contacted patient's sister/POA and patient is currently DO NOT RESUSCITATE with limitations.  Patient is too drowsy to answer any questions, turns when called, not answering any questions     Objective     Last Recorded Vitals  /64   Pulse 102   Temp 36.7 °C (98.1 °F)   Resp 17   Wt 96.5 kg (212 lb 11.9 oz)   SpO2 95%   Intake/Output last 3 Shifts:    Intake/Output Summary (Last 24 hours) at 5/20/2024 0936  Last data filed at 5/20/2024 0447  Gross per 24 hour   Intake 5698.75 ml   Output 3325 ml   Net 2373.75 ml       Admission Weight  Weight: 90.7 kg (200 lb) (05/09/24 1204)    Daily Weight  05/15/24 : 96.5 kg (212 lb 11.9 oz)    Image Results  Lower extremity venous duplex bilateral  Narrative: Interpreted By:  Nazario Mcmahon,   STUDY:  [facility] US LOWER EXTREMITY VENOUS DUPLEX BILATERAL;  5/18/2024 1:25 pm      INDICATION:  Signs/Symptoms:Bj LE swelling, R/O DVT.      COMPARISON:  None.      ACCESSION NUMBER(S):  [id number]      ORDERING CLINICIAN:  ARCELIA CRAFT      TECHNIQUE:  Vascular ultrasound of the bilateral lower extremities was performed.  Real-time compression views as well as Gray scale, color  Doppler and  spectral Doppler waveform analysis was performed.      FINDINGS:  Evaluation of the visualized portions of the bilateral common femoral  vein, proximal, mid, and distal femoral vein, and popliteal vein were  performed.  Evaluation of the visualized portions of the calf veins  was also performed.      The evaluated veins demonstrate normal compressibility. There is  intact venous flow demonstrating normal respiratory variability and  normal augmentation of flow with calf compression. Therefore, there  is no ultrasonographic evidence for deep vein thrombosis within the  evaluated veins.      Impression: No sonographic evidence for deep vein thrombosis within the evaluated  veins of the lower extremities bilaterally.      MACRO:  None.      Signed by: Nazario Mcmahon 5/18/2024 3:55 PM  Dictation workstation:   [id number]      Physical Exam  GENERAL: awake, Ox0 cSKIN: Skin turgor normal. No rashes  HEENT: no epistaxis, Moist mucosa.  NECK: supple  BACK: spine nontender to palpation, No CVAT.  LUNGS: Vesicular breath sounds, with no wheeze, no crepitations.   CARDIAC: REGULAR. S1 and S2; no rubs, no murmur  ABDOMEN: Abdomen soft, non-tender. BS+  EXTREMITIES: No edema, Good capillary refill.   NEURO: History of dysarthria, hemiparesis, but today's exam is limited   MUSCULOSKELETAL: No acute inflammation    Relevant Results    Results for orders placed or performed during the hospital encounter of 05/09/24 (from the past 24 hour(s))   POCT GLUCOSE   Result Value Ref Range    POCT Glucose 212 (H) 74 - 99 mg/dL   POCT GLUCOSE   Result Value Ref Range    POCT Glucose 176 (H) 74 - 99 mg/dL   POCT GLUCOSE   Result Value Ref Range    POCT Glucose 184 (H) 74 - 99 mg/dL   POCT GLUCOSE   Result Value Ref Range    POCT Glucose 158 (H) 74 - 99 mg/dL       Scheduled medications  amLODIPine, 5 mg, oral, Daily  [Held by provider] apixaban, 5 mg, oral, BID  baclofen, 10 mg, oral, BID  daptomycin, 700 mg, intravenous, q24h  MARTIN  dolutegravir, 50 mg, oral, Daily  enoxaparin, 40 mg, subcutaneous, Daily  furosemide, 20 mg, oral, Daily  insulin glargine, 20 Units, subcutaneous, q24h  insulin lispro, 0-10 Units, subcutaneous, TID  ipratropium-albuteroL, 3 mL, nebulization, TID  iron sucrose, 300 mg, intravenous, Daily  lamoTRIgine, 25 mg, oral, q PM  levothyroxine, 175 mcg, oral, Daily before breakfast  lidocaine, 5 mL, infiltration, Once  oxygen, , inhalation, Continuous - Inhalation  polyethylene glycol, 17 g, orogastric tube, Daily  potassium chloride, 20 mEq, nasogastric tube, Daily  sertraline, 200 mg, oral, Daily      Continuous medications  sodium chloride 0.9%, 75 mL/hr, Last Rate: 75 mL/hr (05/19/24 2607)      PRN medications  PRN medications: dextrose, dextrose, glucagon, glucagon, guaiFENesin, hydrALAZINE, ipratropium-albuteroL, oxyCODONE             Assessment/Plan      # Encephalopathy  - improved today     # Acute respiratory failure  -DNR with limitations per family  -Not a current candidate for ICU care or intubation     # Probable aspiration pneumonia  -Will initiate IV antibiotics     # YESSICA-resolved     # Old right hemiparesis/dysarthria     HIV:  Hx graves s/p thyroidectomy   Post surgical hypothyroid  HTN  HLP  T2DM    5/10 : Pt has poor swallowing - seen by ST - will start NGT feeds over the weekend - NGT placed today. Pt more awake - was waving at me - speaking appropriately.     5/11-patient seen at bedside, patient's sister/POA at bedside discussed with both of them about poor p.o. intake and the need for a more definitive plan.  They both agree for PEG if needed patient to get MBS study done on Monday, will consult GI if patient fails swallowing.  She may need a PEG even if she able to swallow because her intake has been less than 50%.    5/12-patient seen at bedside, has NG in place, NG had, come out a few inches was replaced and x-ray was verified, swallow test tomorrow.    5/30-MRSA bacteremia, on IV vancomycin,  TTE pending, patient did not do well with swallow eval, discussed with speech therapist-will proceed to consider PEG, consult GI, patient agrees with the plan.    5/15-patient feels well, denies complaints, GI concerned about aspiration pneumonia, will consult pulmonary to optimize her pulmonary issues, GI planning PEG placement Monday, will hold Eliquis after Friday, PT OT evaluation noted    5/16-patient has dysphagia, NG tube still in place, PEG placement planned for Monday, stop Eliquis, will start heparin subcu, patient stabilizing from the pulmonary perspective.      5/17 : Hyperglycemic - will add Lantus, planning PEG on Monday , Eliquis on hold. Midline is being planned.  GI unable to place PEG because of staffing, will consult surgery per the recommendation.    5/18 : Surgery consulted for probable PEG on Monday - NGT in place, Encephalopathy     5/20-patient waiting for a PEG placement, she has been n.p.o. since yesterday, placement has been postponed to tomorrow, will start PEG feeds after placement tomorrow, continue IV fluids, has elevated WBC-IV antibiotics per ID,  Radha Ward MD

## 2024-05-20 NOTE — PROGRESS NOTES
Janette Martinez is a 53 y.o. female on day 11 of admission presenting with AMS (altered mental status).  Patient with h/o HTN, Grave's disease s/p thyroidectomy, hypothyroidism, DM, dysphagia, HIV, metabolic encephalopathy, who p/w AMS and fever. Work up revealed enterococcal UTI, MRSA bacteremia and pneumonia (likely aspiration) c/b hypoxic respiratory failure for which pulmonary is consulted. Since then has been treated with antibiotics.   Subjective   No acute overnight events. Remains on 2L via NC.     Overall is feeling OK. Denies SOB, cough, sputum, N/V, or any pains. No wheezing.   Objective   Scheduled medications  amLODIPine, 5 mg, oral, Daily  [Held by provider] apixaban, 5 mg, oral, BID  baclofen, 10 mg, oral, BID  daptomycin, 700 mg, intravenous, q24h MARTIN  dolutegravir, 50 mg, oral, Daily  enoxaparin, 40 mg, subcutaneous, Daily  furosemide, 20 mg, oral, Daily  insulin glargine, 20 Units, subcutaneous, q24h  insulin lispro, 0-10 Units, subcutaneous, TID  ipratropium-albuteroL, 3 mL, nebulization, TID  iron sucrose, 300 mg, intravenous, Daily  lamoTRIgine, 25 mg, oral, q PM  levothyroxine, 175 mcg, oral, Daily before breakfast  lidocaine, 5 mL, infiltration, Once  oxygen, , inhalation, Continuous - Inhalation  polyethylene glycol, 17 g, orogastric tube, Daily  potassium chloride, 20 mEq, nasogastric tube, Daily  sertraline, 200 mg, oral, Daily    Continuous medications  sodium chloride 0.9%, 75 mL/hr, Last Rate: 75 mL/hr (05/20/24 1122)    PRN medications  PRN medications: dextrose, dextrose, glucagon, glucagon, guaiFENesin, hydrALAZINE, ipratropium-albuteroL, oxyCODONE   Physical Exam  Constitutional:       General: She is not in acute distress.     Appearance: She is obese. She is ill-appearing. She is not toxic-appearing.   HENT:      Head: Normocephalic and atraumatic.      Nose:      Comments: On 2L NC.      Mouth/Throat:      Mouth: Mucous membranes are moist.      Comments: Mallampati 3.   Eyes:      " General: No scleral icterus.     Extraocular Movements: Extraocular movements intact.      Pupils: Pupils are equal, round, and reactive to light.   Cardiovascular:      Rate and Rhythm: Normal rate and regular rhythm.      Heart sounds: No murmur heard.     No friction rub. No gallop.   Pulmonary:      Effort: Pulmonary effort is normal. No respiratory distress.      Breath sounds: Rales (diffuse crackles.) present. No wheezing.   Abdominal:      General: There is no distension.      Palpations: Abdomen is soft.      Tenderness: There is no abdominal tenderness.   Musculoskeletal:         General: No tenderness.      Cervical back: Neck supple. No rigidity or tenderness.      Right lower leg: No edema.      Left lower leg: No edema.   Lymphadenopathy:      Cervical: No cervical adenopathy.   Skin:     General: Skin is warm and dry.      Coloration: Skin is not jaundiced.   Neurological:      Mental Status: She is alert.      Comments: Awake and alert, R facial droop with R sided hemiparalysis. Able to move LUE and LLE.    Psychiatric:         Mood and Affect: Mood normal.         Behavior: Behavior normal.   Last Recorded Vitals  Blood pressure 130/77, pulse 97, temperature 36.8 °C (98.3 °F), resp. rate 16, height 1.626 m (5' 4.02\"), weight 96.5 kg (212 lb 11.9 oz), SpO2 94%.  Intake/Output last 3 Shifts:  I/O last 3 completed shifts:  In: 5698.8 (59.1 mL/kg) [I.V.:5698.8 (59.1 mL/kg)]  Out: 5675 (58.8 mL/kg) [Urine:5675 (1.6 mL/kg/hr)]  Weight: 96.5 kg   Relevant Results  Results for orders placed or performed during the hospital encounter of 05/09/24 (from the past 24 hour(s))   POCT GLUCOSE   Result Value Ref Range    POCT Glucose 176 (H) 74 - 99 mg/dL   POCT GLUCOSE   Result Value Ref Range    POCT Glucose 184 (H) 74 - 99 mg/dL   POCT GLUCOSE   Result Value Ref Range    POCT Glucose 158 (H) 74 - 99 mg/dL   CBC   Result Value Ref Range    WBC 16.8 (H) 4.4 - 11.3 x10*3/uL    nRBC 0.0 0.0 - 0.0 /100 WBCs    RBC " 2.76 (L) 4.00 - 5.20 x10*6/uL    Hemoglobin 8.3 (L) 12.0 - 16.0 g/dL    Hematocrit 25.9 (L) 36.0 - 46.0 %    MCV 94 80 - 100 fL    MCH 30.1 26.0 - 34.0 pg    MCHC 32.0 32.0 - 36.0 g/dL    RDW 18.0 (H) 11.5 - 14.5 %    Platelets 355 150 - 450 x10*3/uL   Basic metabolic panel   Result Value Ref Range    Glucose 125 (H) 74 - 99 mg/dL    Sodium 135 (L) 136 - 145 mmol/L    Potassium 4.3 3.5 - 5.3 mmol/L    Chloride 98 98 - 107 mmol/L    Bicarbonate 33 (H) 21 - 32 mmol/L    Anion Gap 8 (L) 10 - 20 mmol/L    Urea Nitrogen 14 6 - 23 mg/dL    Creatinine 1.02 0.50 - 1.05 mg/dL    eGFR 65 >60 mL/min/1.73m*2    Calcium 7.7 (L) 8.6 - 10.3 mg/dL   POCT GLUCOSE   Result Value Ref Range    POCT Glucose 119 (H) 74 - 99 mg/dL   Lower extremity venous duplex bilateral    Result Date: 5/18/2024  Interpreted By:  Nazario Mcmahon, STUDY: [facility] US LOWER EXTREMITY VENOUS DUPLEX BILATERAL;  5/18/2024 1:25 pm   INDICATION: Signs/Symptoms:Bj LE swelling, R/O DVT.   COMPARISON: None.   ACCESSION NUMBER(S): [id number]   ORDERING CLINICIAN: ARCELIA CRAFT   TECHNIQUE: Vascular ultrasound of the bilateral lower extremities was performed. Real-time compression views as well as Gray scale, color Doppler and spectral Doppler waveform analysis was performed.   FINDINGS: Evaluation of the visualized portions of the bilateral common femoral vein, proximal, mid, and distal femoral vein, and popliteal vein were performed.  Evaluation of the visualized portions of the calf veins was also performed.   The evaluated veins demonstrate normal compressibility. There is intact venous flow demonstrating normal respiratory variability and normal augmentation of flow with calf compression. Therefore, there is no ultrasonographic evidence for deep vein thrombosis within the evaluated veins.       No sonographic evidence for deep vein thrombosis within the evaluated veins of the lower extremities bilaterally.   MACRO: None.   Signed by: Nazario Mcmahon 5/18/2024 3:55 PM  Dictation workstation:   [id number]   Assessment/Plan   Principal Problem:    AMS (altered mental status)  Active Problems:    Oropharyngeal dysphagia  53 YOF with h/o HTN, Grave's disease s/p thyroidectomy, hypothyroidism, DM, dysphagia, HIV, metabolic encephalopathy, who p/w AMS and fever. Work up revealed enterococcal UTI, MRSA bacteremia and pneumonia (likely aspiration) c/b hypoxic respiratory failure for which pulmonary is consulted. Since then has been treated with antibiotics.     Pneumonia: ? Causes, multifocal, with bronchial spread. Aspiration vs bacterial vs atypical. MRSA swab +ve, also +ve bacteremia with MRSA overall suggesting MRSA pneumonia.       ID on consult, will FU with their recommendations      Continue Daptomycin      Consider bronch + BAL if clinically worse        Respiratory failure: acute with hypoxia, due to above. Echo unremarkable. Also no evidence suggesting obstructive lung disease. Muscle weakness from CVA also might be playing a role.       Continue supplemental O2, wean off as tolerates      BPH       Continue Duo-neb         DVT prophylaxis: with Lovenox    Pulmonary will FU while in houses.     Vlad Lee MD

## 2024-05-21 ENCOUNTER — ANESTHESIA EVENT (OUTPATIENT)
Dept: OPERATING ROOM | Facility: HOSPITAL | Age: 54
DRG: 177 | End: 2024-05-21
Payer: MEDICARE

## 2024-05-21 ENCOUNTER — ANESTHESIA (OUTPATIENT)
Dept: OPERATING ROOM | Facility: HOSPITAL | Age: 54
DRG: 177 | End: 2024-05-21
Payer: MEDICARE

## 2024-05-21 LAB
BASOPHILS # BLD AUTO: 0.1 X10*3/UL (ref 0–0.1)
BASOPHILS NFR BLD AUTO: 0.5 %
EOSINOPHIL # BLD AUTO: 0.45 X10*3/UL (ref 0–0.7)
EOSINOPHIL NFR BLD AUTO: 2.5 %
ERYTHROCYTE [DISTWIDTH] IN BLOOD BY AUTOMATED COUNT: 17.8 % (ref 11.5–14.5)
GLUCOSE BLD MANUAL STRIP-MCNC: 105 MG/DL (ref 74–99)
GLUCOSE BLD MANUAL STRIP-MCNC: 73 MG/DL (ref 74–99)
GLUCOSE BLD MANUAL STRIP-MCNC: 82 MG/DL (ref 74–99)
GLUCOSE BLD MANUAL STRIP-MCNC: 83 MG/DL (ref 74–99)
HCT VFR BLD AUTO: 23.5 % (ref 36–46)
HGB BLD-MCNC: 7.7 G/DL (ref 12–16)
HOLD SPECIMEN: NORMAL
IMM GRANULOCYTES # BLD AUTO: 0.16 X10*3/UL (ref 0–0.7)
IMM GRANULOCYTES NFR BLD AUTO: 0.9 % (ref 0–0.9)
LYMPHOCYTES # BLD AUTO: 3.01 X10*3/UL (ref 1.2–4.8)
LYMPHOCYTES NFR BLD AUTO: 16.5 %
MCH RBC QN AUTO: 30.4 PG (ref 26–34)
MCHC RBC AUTO-ENTMCNC: 32.8 G/DL (ref 32–36)
MCV RBC AUTO: 93 FL (ref 80–100)
MONOCYTES # BLD AUTO: 1.45 X10*3/UL (ref 0.1–1)
MONOCYTES NFR BLD AUTO: 8 %
NEUTROPHILS # BLD AUTO: 13.02 X10*3/UL (ref 1.2–7.7)
NEUTROPHILS NFR BLD AUTO: 71.6 %
NRBC BLD-RTO: 0.1 /100 WBCS (ref 0–0)
PLATELET # BLD AUTO: 429 X10*3/UL (ref 150–450)
RBC # BLD AUTO: 2.53 X10*6/UL (ref 4–5.2)
WBC # BLD AUTO: 18.2 X10*3/UL (ref 4.4–11.3)

## 2024-05-21 PROCEDURE — 2720000007 HC OR 272 NO HCPCS: Performed by: SURGERY

## 2024-05-21 PROCEDURE — 2500000002 HC RX 250 W HCPCS SELF ADMINISTERED DRUGS (ALT 637 FOR MEDICARE OP, ALT 636 FOR OP/ED): Performed by: SURGERY

## 2024-05-21 PROCEDURE — 3700000002 HC GENERAL ANESTHESIA TIME - EACH INCREMENTAL 1 MINUTE: Performed by: SURGERY

## 2024-05-21 PROCEDURE — 82947 ASSAY GLUCOSE BLOOD QUANT: CPT

## 2024-05-21 PROCEDURE — 43246 EGD PLACE GASTROSTOMY TUBE: CPT | Performed by: SURGERY

## 2024-05-21 PROCEDURE — 3700000001 HC GENERAL ANESTHESIA TIME - INITIAL BASE CHARGE: Performed by: SURGERY

## 2024-05-21 PROCEDURE — A43246 PR EDG PERCUTANEOUS PLACEMENT GASTROSTOMY TUBE: Performed by: ANESTHESIOLOGIST ASSISTANT

## 2024-05-21 PROCEDURE — 7100000002 HC RECOVERY ROOM TIME - EACH INCREMENTAL 1 MINUTE: Performed by: SURGERY

## 2024-05-21 PROCEDURE — 94640 AIRWAY INHALATION TREATMENT: CPT

## 2024-05-21 PROCEDURE — A43246 PR EDG PERCUTANEOUS PLACEMENT GASTROSTOMY TUBE: Performed by: ANESTHESIOLOGY

## 2024-05-21 PROCEDURE — 99233 SBSQ HOSP IP/OBS HIGH 50: CPT | Performed by: INTERNAL MEDICINE

## 2024-05-21 PROCEDURE — 3600000004 HC OR TIME - INITIAL BASE CHARGE - PROCEDURE LEVEL FOUR: Performed by: SURGERY

## 2024-05-21 PROCEDURE — 2500000004 HC RX 250 GENERAL PHARMACY W/ HCPCS (ALT 636 FOR OP/ED): Performed by: ANESTHESIOLOGIST ASSISTANT

## 2024-05-21 PROCEDURE — 2500000004 HC RX 250 GENERAL PHARMACY W/ HCPCS (ALT 636 FOR OP/ED): Performed by: INTERNAL MEDICINE

## 2024-05-21 PROCEDURE — 85025 COMPLETE CBC W/AUTO DIFF WBC: CPT | Performed by: INTERNAL MEDICINE

## 2024-05-21 PROCEDURE — 3600000009 HC OR TIME - EACH INCREMENTAL 1 MINUTE - PROCEDURE LEVEL FOUR: Performed by: SURGERY

## 2024-05-21 PROCEDURE — 7100000001 HC RECOVERY ROOM TIME - INITIAL BASE CHARGE: Performed by: SURGERY

## 2024-05-21 PROCEDURE — 2500000005 HC RX 250 GENERAL PHARMACY W/O HCPCS: Performed by: SURGERY

## 2024-05-21 PROCEDURE — 2500000002 HC RX 250 W HCPCS SELF ADMINISTERED DRUGS (ALT 637 FOR MEDICARE OP, ALT 636 FOR OP/ED): Performed by: INTERNAL MEDICINE

## 2024-05-21 PROCEDURE — 36415 COLL VENOUS BLD VENIPUNCTURE: CPT | Performed by: INTERNAL MEDICINE

## 2024-05-21 PROCEDURE — 2500000004 HC RX 250 GENERAL PHARMACY W/ HCPCS (ALT 636 FOR OP/ED): Mod: JW | Performed by: SURGERY

## 2024-05-21 PROCEDURE — 2500000001 HC RX 250 WO HCPCS SELF ADMINISTERED DRUGS (ALT 637 FOR MEDICARE OP): Performed by: SURGERY

## 2024-05-21 PROCEDURE — 2500000005 HC RX 250 GENERAL PHARMACY W/O HCPCS: Performed by: INTERNAL MEDICINE

## 2024-05-21 PROCEDURE — 1100000001 HC PRIVATE ROOM DAILY

## 2024-05-21 PROCEDURE — 2500000005 HC RX 250 GENERAL PHARMACY W/O HCPCS: Performed by: ANESTHESIOLOGY

## 2024-05-21 PROCEDURE — 0DH63UZ INSERTION OF FEEDING DEVICE INTO STOMACH, PERCUTANEOUS APPROACH: ICD-10-PCS | Performed by: SURGERY

## 2024-05-21 RX ORDER — BUPIVACAINE HYDROCHLORIDE 5 MG/ML
INJECTION, SOLUTION EPIDURAL; INTRACAUDAL AS NEEDED
Status: DISCONTINUED | OUTPATIENT
Start: 2024-05-21 | End: 2024-05-21 | Stop reason: HOSPADM

## 2024-05-21 RX ORDER — DIPHENHYDRAMINE HYDROCHLORIDE 50 MG/ML
12.5 INJECTION INTRAMUSCULAR; INTRAVENOUS ONCE AS NEEDED
Status: DISCONTINUED | OUTPATIENT
Start: 2024-05-21 | End: 2024-05-21 | Stop reason: HOSPADM

## 2024-05-21 RX ORDER — ALBUTEROL SULFATE 0.83 MG/ML
2.5 SOLUTION RESPIRATORY (INHALATION) ONCE AS NEEDED
Status: DISCONTINUED | OUTPATIENT
Start: 2024-05-21 | End: 2024-05-21 | Stop reason: HOSPADM

## 2024-05-21 RX ORDER — HYDRALAZINE HYDROCHLORIDE 20 MG/ML
5 INJECTION INTRAMUSCULAR; INTRAVENOUS EVERY 30 MIN PRN
Status: DISCONTINUED | OUTPATIENT
Start: 2024-05-21 | End: 2024-05-21 | Stop reason: HOSPADM

## 2024-05-21 RX ORDER — PROPOFOL 10 MG/ML
INJECTION, EMULSION INTRAVENOUS AS NEEDED
Status: DISCONTINUED | OUTPATIENT
Start: 2024-05-21 | End: 2024-05-21

## 2024-05-21 RX ORDER — OXYCODONE HYDROCHLORIDE 5 MG/1
5 TABLET ORAL EVERY 4 HOURS PRN
Status: DISCONTINUED | OUTPATIENT
Start: 2024-05-21 | End: 2024-05-21 | Stop reason: HOSPADM

## 2024-05-21 RX ORDER — SODIUM CHLORIDE, SODIUM LACTATE, POTASSIUM CHLORIDE, CALCIUM CHLORIDE 600; 310; 30; 20 MG/100ML; MG/100ML; MG/100ML; MG/100ML
100 INJECTION, SOLUTION INTRAVENOUS CONTINUOUS
Status: DISCONTINUED | OUTPATIENT
Start: 2024-05-21 | End: 2024-05-21 | Stop reason: HOSPADM

## 2024-05-21 RX ORDER — ONDANSETRON HYDROCHLORIDE 2 MG/ML
4 INJECTION, SOLUTION INTRAVENOUS ONCE AS NEEDED
Status: DISCONTINUED | OUTPATIENT
Start: 2024-05-21 | End: 2024-05-21 | Stop reason: HOSPADM

## 2024-05-21 RX ORDER — CEFAZOLIN 1 G/1
INJECTION, POWDER, FOR SOLUTION INTRAVENOUS AS NEEDED
Status: DISCONTINUED | OUTPATIENT
Start: 2024-05-21 | End: 2024-05-21

## 2024-05-21 RX ADMIN — Medication 2.5 L/MIN: at 20:00

## 2024-05-21 RX ADMIN — OXYCODONE HYDROCHLORIDE 5 MG: 5 TABLET ORAL at 22:52

## 2024-05-21 RX ADMIN — ENOXAPARIN SODIUM 40 MG: 40 INJECTION SUBCUTANEOUS at 09:34

## 2024-05-21 RX ADMIN — PROPOFOL 100 MCG/KG/MIN: 10 INJECTION, EMULSION INTRAVENOUS at 13:07

## 2024-05-21 RX ADMIN — BACLOFEN 10 MG: 10 TABLET ORAL at 22:52

## 2024-05-21 RX ADMIN — Medication 3 L/MIN: at 13:41

## 2024-05-21 RX ADMIN — Medication 1 L/MIN: at 07:36

## 2024-05-21 RX ADMIN — SODIUM CHLORIDE 75 ML/HR: 9 INJECTION, SOLUTION INTRAVENOUS at 00:18

## 2024-05-21 RX ADMIN — CEFAZOLIN 2 G: 330 INJECTION, POWDER, FOR SOLUTION INTRAMUSCULAR; INTRAVENOUS at 13:05

## 2024-05-21 RX ADMIN — PROPOFOL 50 MG: 10 INJECTION, EMULSION INTRAVENOUS at 13:05

## 2024-05-21 RX ADMIN — IPRATROPIUM BROMIDE AND ALBUTEROL SULFATE 3 ML: 2.5; .5 SOLUTION RESPIRATORY (INHALATION) at 07:36

## 2024-05-21 RX ADMIN — INSULIN GLARGINE 20 UNITS: 100 INJECTION, SOLUTION SUBCUTANEOUS at 22:52

## 2024-05-21 RX ADMIN — LAMOTRIGINE 25 MG: 25 TABLET ORAL at 22:52

## 2024-05-21 SDOH — HEALTH STABILITY: MENTAL HEALTH: CURRENT SMOKER: 0

## 2024-05-21 ASSESSMENT — PAIN SCALES - GENERAL
PAINLEVEL_OUTOF10: 0 - NO PAIN

## 2024-05-21 ASSESSMENT — PAIN SCALES - PAIN ASSESSMENT IN ADVANCED DEMENTIA (PAINAD)
BREATHING: NORMAL
CONSOLABILITY: NO NEED TO CONSOLE
CONSOLABILITY: NO NEED TO CONSOLE
TOTALSCORE: HEAT APPLIED;REPOSITIONED
BREATHING: NORMAL
TOTALSCORE: HEAT APPLIED
FACIALEXPRESSION: SMILING OR INEXPRESSIVE
FACIALEXPRESSION: SMILING OR INEXPRESSIVE
BODYLANGUAGE: RELAXED
FACIALEXPRESSION: SMILING OR INEXPRESSIVE
TOTALSCORE: HEAT APPLIED
CONSOLABILITY: NO NEED TO CONSOLE
TOTALSCORE: 0
BODYLANGUAGE: RELAXED
FACIALEXPRESSION: SMILING OR INEXPRESSIVE
BODYLANGUAGE: RELAXED
CONSOLABILITY: NO NEED TO CONSOLE
TOTALSCORE: 0
BODYLANGUAGE: RELAXED
TOTALSCORE: 0
BODYLANGUAGE: RELAXED
TOTALSCORE: HEAT APPLIED
BREATHING: NORMAL
CONSOLABILITY: NO NEED TO CONSOLE
FACIALEXPRESSION: SMILING OR INEXPRESSIVE
BREATHING: NORMAL
TOTALSCORE: 0
BREATHING: NORMAL
TOTALSCORE: 0

## 2024-05-21 ASSESSMENT — PAIN - FUNCTIONAL ASSESSMENT
PAIN_FUNCTIONAL_ASSESSMENT: PAINAD (PAIN ASSESSMENT IN ADVANCED DEMENTIA SCALE)
PAIN_FUNCTIONAL_ASSESSMENT: VAS (VISUAL ANALOG SCALE)

## 2024-05-21 NOTE — CONSULTS
Wound Care Consult     Visit Date: 5/21/2024      Patient Name: Janette Martinez         MRN: [id number]           YOB: 1970     Outcome of [facility] nurse visit: Patient off unit having G-tube replaced.  Will attempt to visit tomorrow.          Farideh Mi RN, BSN, [facility]  Phone: [phone]  5/21/2024  2:47 PM

## 2024-05-21 NOTE — PROGRESS NOTES
Janette Martinez is a 53 y.o. female on day 12 of admission presenting with AMS (altered mental status).  Patient with h/o HTN, Grave's disease s/p thyroidectomy, hypothyroidism, DM, dysphagia, HIV, metabolic encephalopathy, who p/w AMS and fever. Work up revealed enterococcal UTI, MRSA bacteremia and pneumonia (likely aspiration) c/b hypoxic respiratory failure for which pulmonary is consulted. Since then has been treated with antibiotics.   Subjective   No acute overnight events. S/p PEG placement today. Seen post op. Remains on 2-3L     Overall is feeling OK. Denies SOB, cough, sputum, N/V, or any pains. No wheezing or other complaints.   Objective   Scheduled medications  [Transfer Hold] amLODIPine, 5 mg, oral, Daily  [Held by provider] apixaban, 5 mg, oral, BID  [Transfer Hold] baclofen, 10 mg, oral, BID  [Transfer Hold] daptomycin, 700 mg, intravenous, q24h MARTIN  [Transfer Hold] dolutegravir, 50 mg, oral, Daily  [Transfer Hold] emtricitabine-tenofovir alafen, 1 tablet, oral, Daily  [Transfer Hold] enoxaparin, 40 mg, subcutaneous, Daily  [Transfer Hold] furosemide, 20 mg, oral, Daily  [Transfer Hold] insulin glargine, 20 Units, subcutaneous, q24h  [Transfer Hold] insulin lispro, 0-10 Units, subcutaneous, TID  [Transfer Hold] ipratropium-albuteroL, 3 mL, nebulization, TID  iron sucrose, 300 mg, intravenous, Daily  [Transfer Hold] lamoTRIgine, 25 mg, oral, q PM  [Transfer Hold] levothyroxine, 175 mcg, oral, Daily before breakfast  [Transfer Hold] lidocaine, 5 mL, infiltration, Once  oxygen, , inhalation, Continuous - Inhalation  [Transfer Hold] polyethylene glycol, 17 g, orogastric tube, Daily  [Transfer Hold] potassium chloride, 20 mEq, nasogastric tube, Daily  [Transfer Hold] sertraline, 200 mg, oral, Daily    Continuous medications  sodium chloride 0.9%, 75 mL/hr, Last Rate: 75 mL/hr (05/21/24 1250)    PRN medications  PRN medications: [Transfer Hold] dextrose, [Transfer Hold] dextrose, [Transfer Hold] glucagon,  "[Transfer Hold] glucagon, [Transfer Hold] guaiFENesin, [Transfer Hold] hydrALAZINE, [Transfer Hold] ipratropium-albuteroL, [Transfer Hold] oxyCODONE   Physical Exam  Constitutional:       General: She is not in acute distress.     Appearance: She is obese. She is ill-appearing. She is not toxic-appearing.   HENT:      Head: Normocephalic and atraumatic.      Nose:      Comments: On 3L NC.      Mouth/Throat:      Mouth: Mucous membranes are moist.      Comments: Mallampati 3.   Eyes:      General: No scleral icterus.     Extraocular Movements: Extraocular movements intact.      Pupils: Pupils are equal, round, and reactive to light.   Cardiovascular:      Rate and Rhythm: Normal rate and regular rhythm.      Heart sounds: No murmur heard.     No friction rub. No gallop.   Pulmonary:      Effort: Pulmonary effort is normal. No respiratory distress.      Breath sounds: Rales (diffuse crackles.) present. No wheezing.   Abdominal:      General: There is no distension.      Palpations: Abdomen is soft.      Tenderness: There is no abdominal tenderness.   Musculoskeletal:         General: No tenderness.      Cervical back: Neck supple. No rigidity or tenderness.      Right lower leg: No edema.      Left lower leg: No edema.   Lymphadenopathy:      Cervical: No cervical adenopathy.   Skin:     General: Skin is warm and dry.      Coloration: Skin is not jaundiced.   Neurological:      Mental Status: She is alert.      Comments: Awake and alert, R facial droop with R sided hemiparalysis. Able to move LUE and LLE.    Psychiatric:         Mood and Affect: Mood normal.         Behavior: Behavior normal.     Last Recorded Vitals  Blood pressure 134/68, pulse 90, temperature 36.8 °C (98.2 °F), temperature source Temporal, resp. rate 19, height 1.626 m (5' 4.02\"), weight 96.5 kg (212 lb 11.9 oz), SpO2 100%.  Intake/Output last 3 Shifts:  I/O last 3 completed shifts:  In: 2526.3 (26.2 mL/kg) [I.V.:1796.3 (18.6 mL/kg); IV " Piggyback:730]  Out: 2675 (27.7 mL/kg) [Urine:2675 (0.8 mL/kg/hr)]  Weight: 96.5 kg   Relevant Results  Results for orders placed or performed during the hospital encounter of 05/09/24 (from the past 24 hour(s))   POCT GLUCOSE   Result Value Ref Range    POCT Glucose 135 (H) 74 - 99 mg/dL   Green Top   Result Value Ref Range    Extra Tube Hold for add-ons.    CBC and Auto Differential   Result Value Ref Range    WBC 18.2 (H) 4.4 - 11.3 x10*3/uL    nRBC 0.1 (H) 0.0 - 0.0 /100 WBCs    RBC 2.53 (L) 4.00 - 5.20 x10*6/uL    Hemoglobin 7.7 (L) 12.0 - 16.0 g/dL    Hematocrit 23.5 (L) 36.0 - 46.0 %    MCV 93 80 - 100 fL    MCH 30.4 26.0 - 34.0 pg    MCHC 32.8 32.0 - 36.0 g/dL    RDW 17.8 (H) 11.5 - 14.5 %    Platelets 429 150 - 450 x10*3/uL    Neutrophils % 71.6 40.0 - 80.0 %    Immature Granulocytes %, Automated 0.9 0.0 - 0.9 %    Lymphocytes % 16.5 13.0 - 44.0 %    Monocytes % 8.0 2.0 - 10.0 %    Eosinophils % 2.5 0.0 - 6.0 %    Basophils % 0.5 0.0 - 2.0 %    Neutrophils Absolute 13.02 (H) 1.20 - 7.70 x10*3/uL    Immature Granulocytes Absolute, Automated 0.16 0.00 - 0.70 x10*3/uL    Lymphocytes Absolute 3.01 1.20 - 4.80 x10*3/uL    Monocytes Absolute 1.45 (H) 0.10 - 1.00 x10*3/uL    Eosinophils Absolute 0.45 0.00 - 0.70 x10*3/uL    Basophils Absolute 0.10 0.00 - 0.10 x10*3/uL   POCT GLUCOSE   Result Value Ref Range    POCT Glucose 73 (L) 74 - 99 mg/dL   POCT GLUCOSE   Result Value Ref Range    POCT Glucose 82 74 - 99 mg/dL   POCT GLUCOSE   Result Value Ref Range    POCT Glucose 83 74 - 99 mg/dL   No results found.  Assessment/Plan   Principal Problem:    AMS (altered mental status)  Active Problems:    Oropharyngeal dysphagia  53 YOF with h/o HTN, Grave's disease s/p thyroidectomy, hypothyroidism, DM, dysphagia, HIV, metabolic encephalopathy, who p/w AMS and fever. Work up revealed enterococcal UTI, MRSA bacteremia and pneumonia (likely aspiration) c/b hypoxic respiratory failure for which pulmonary is consulted. Since  then has been treated with antibiotics.     Pneumonia: ? Causes, multifocal, with bronchial spread. Aspiration vs bacterial vs atypical. MRSA swab +ve, also +ve bacteremia with MRSA overall suggesting MRSA pneumonia.       ID on consult, will FU with their recommendations      Continue Daptomycin      Consider bronch + BAL if clinically worse, but currently is improved clinically        Respiratory failure: acute with hypoxia, due to above. Echo unremarkable. Also no evidence suggesting obstructive lung disease. Muscle weakness from CVA also might be playing a role. O2 requirements more or less stable.       Continue supplemental O2, wean off as tolerates      BPH       Continue Duo-neb         Oropharyngeal dysphagia: S/p PEG placement      Tube feeds via PEG    DVT prophylaxis: with Lovenox    Pulmonary will FU while in house.    Vlad Lee MD

## 2024-05-21 NOTE — PROGRESS NOTES
Janette Martinez is a 53 y.o. female on day 12 of admission presenting with AMS (altered mental status).      Subjective   HPI: This is a 53 y.o. female who was recently discharged after an admission for encephalopathy/sepsis.  Patient had a protracted hospitalization last visit, one of her issues was swallowing which was found to be okay for the puréed diet honey thickened liquids.  Today I was called from the nursing home with concerns for fever/altered mental status/suspicion for aspiration.  Discussed with director of nursing at the facility-patient was a full code and wanted her transferred to the ER by EMS.  After patient's arrival in the ER and further evaluation discussed with emergency room physician and agreed with hospitalization, as patient's condition was worsening in the ER the provider contacted patient's sister/POA and patient is currently DO NOT RESUSCITATE with limitations.  Patient is too drowsy to answer any questions, turns when called, not answering any questions     Objective     Last Recorded Vitals  /68   Pulse 90   Temp 36.8 °C (98.2 °F) (Temporal)   Resp 19   Wt 96.5 kg (212 lb 11.9 oz)   SpO2 100%   Intake/Output last 3 Shifts:    Intake/Output Summary (Last 24 hours) at 5/21/2024 1552  Last data filed at 5/21/2024 1400  Gross per 24 hour   Intake 1289.75 ml   Output 800 ml   Net 489.75 ml       Admission Weight  Weight: 90.7 kg (200 lb) (05/09/24 1204)    Daily Weight  05/15/24 : 96.5 kg (212 lb 11.9 oz)    Image Results  Lower extremity venous duplex bilateral  Narrative: Interpreted By:  Nazario Mcmahon,   STUDY:  [facility] US LOWER EXTREMITY VENOUS DUPLEX BILATERAL;  5/18/2024 1:25 pm      INDICATION:  Signs/Symptoms:Bj LE swelling, R/O DVT.      COMPARISON:  None.      ACCESSION NUMBER(S):  [id number]      ORDERING CLINICIAN:  ARCELIA CRAFT      TECHNIQUE:  Vascular ultrasound of the bilateral lower extremities was performed.  Real-time compression views as well as Gray scale,  color Doppler and  spectral Doppler waveform analysis was performed.      FINDINGS:  Evaluation of the visualized portions of the bilateral common femoral  vein, proximal, mid, and distal femoral vein, and popliteal vein were  performed.  Evaluation of the visualized portions of the calf veins  was also performed.      The evaluated veins demonstrate normal compressibility. There is  intact venous flow demonstrating normal respiratory variability and  normal augmentation of flow with calf compression. Therefore, there  is no ultrasonographic evidence for deep vein thrombosis within the  evaluated veins.      Impression: No sonographic evidence for deep vein thrombosis within the evaluated  veins of the lower extremities bilaterally.      MACRO:  None.      Signed by: Nazario Mcmahon 5/18/2024 3:55 PM  Dictation workstation:   [id number]      Physical Exam  GENERAL: awake, Ox0 cSKIN: Skin turgor normal. No rashes  HEENT: no epistaxis, Moist mucosa.  NECK: supple  BACK: spine nontender to palpation, No CVAT.  LUNGS: Vesicular breath sounds, with no wheeze, no crepitations.   CARDIAC: REGULAR. S1 and S2; no rubs, no murmur  ABDOMEN: Abdomen soft, non-tender. BS+  EXTREMITIES: No edema, Good capillary refill.   NEURO: History of dysarthria, hemiparesis, but today's exam is limited   MUSCULOSKELETAL: No acute inflammation    Relevant Results    Results for orders placed or performed during the hospital encounter of 05/09/24 (from the past 24 hour(s))   Green Top   Result Value Ref Range    Extra Tube Hold for add-ons.    CBC and Auto Differential   Result Value Ref Range    WBC 18.2 (H) 4.4 - 11.3 x10*3/uL    nRBC 0.1 (H) 0.0 - 0.0 /100 WBCs    RBC 2.53 (L) 4.00 - 5.20 x10*6/uL    Hemoglobin 7.7 (L) 12.0 - 16.0 g/dL    Hematocrit 23.5 (L) 36.0 - 46.0 %    MCV 93 80 - 100 fL    MCH 30.4 26.0 - 34.0 pg    MCHC 32.8 32.0 - 36.0 g/dL    RDW 17.8 (H) 11.5 - 14.5 %    Platelets 429 150 - 450 x10*3/uL    Neutrophils % 71.6 40.0 -  80.0 %    Immature Granulocytes %, Automated 0.9 0.0 - 0.9 %    Lymphocytes % 16.5 13.0 - 44.0 %    Monocytes % 8.0 2.0 - 10.0 %    Eosinophils % 2.5 0.0 - 6.0 %    Basophils % 0.5 0.0 - 2.0 %    Neutrophils Absolute 13.02 (H) 1.20 - 7.70 x10*3/uL    Immature Granulocytes Absolute, Automated 0.16 0.00 - 0.70 x10*3/uL    Lymphocytes Absolute 3.01 1.20 - 4.80 x10*3/uL    Monocytes Absolute 1.45 (H) 0.10 - 1.00 x10*3/uL    Eosinophils Absolute 0.45 0.00 - 0.70 x10*3/uL    Basophils Absolute 0.10 0.00 - 0.10 x10*3/uL   POCT GLUCOSE   Result Value Ref Range    POCT Glucose 73 (L) 74 - 99 mg/dL   POCT GLUCOSE   Result Value Ref Range    POCT Glucose 82 74 - 99 mg/dL   POCT GLUCOSE   Result Value Ref Range    POCT Glucose 83 74 - 99 mg/dL       Scheduled medications  [Transfer Hold] amLODIPine, 5 mg, oral, Daily  [Held by provider] apixaban, 5 mg, oral, BID  [Transfer Hold] baclofen, 10 mg, oral, BID  [Transfer Hold] daptomycin, 700 mg, intravenous, q24h MARTIN  [Transfer Hold] dolutegravir, 50 mg, oral, Daily  [Transfer Hold] emtricitabine-tenofovir alafen, 1 tablet, oral, Daily  [Transfer Hold] enoxaparin, 40 mg, subcutaneous, Daily  [Transfer Hold] furosemide, 20 mg, oral, Daily  [Transfer Hold] insulin glargine, 20 Units, subcutaneous, q24h  [Transfer Hold] insulin lispro, 0-10 Units, subcutaneous, TID  [Transfer Hold] ipratropium-albuteroL, 3 mL, nebulization, TID  iron sucrose, 300 mg, intravenous, Daily  [Transfer Hold] lamoTRIgine, 25 mg, oral, q PM  [Transfer Hold] levothyroxine, 175 mcg, oral, Daily before breakfast  [Transfer Hold] lidocaine, 5 mL, infiltration, Once  oxygen, , inhalation, Continuous - Inhalation  [Transfer Hold] polyethylene glycol, 17 g, orogastric tube, Daily  [Transfer Hold] potassium chloride, 20 mEq, nasogastric tube, Daily  [Transfer Hold] sertraline, 200 mg, oral, Daily      Continuous medications  sodium chloride 0.9%, 75 mL/hr, Last Rate: 75 mL/hr (05/21/24 1250)      PRN medications  PRN  medications: [Transfer Hold] dextrose, [Transfer Hold] dextrose, [Transfer Hold] glucagon, [Transfer Hold] glucagon, [Transfer Hold] guaiFENesin, [Transfer Hold] hydrALAZINE, [Transfer Hold] ipratropium-albuteroL, [Transfer Hold] oxyCODONE             Assessment/Plan      # Encephalopathy  - improved today     # Acute respiratory failure  -DNR with limitations per family  -Not a current candidate for ICU care or intubation     # Probable aspiration pneumonia  -Will initiate IV antibiotics     # YESSICA-resolved     # Old right hemiparesis/dysarthria     HIV:  Hx graves s/p thyroidectomy   Post surgical hypothyroid  HTN  HLP  T2DM    5/10 : Pt has poor swallowing - seen by ST - will start NGT feeds over the weekend - NGT placed today. Pt more awake - was waving at me - speaking appropriately.     5/11-patient seen at bedside, patient's sister/POA at bedside discussed with both of them about poor p.o. intake and the need for a more definitive plan.  They both agree for PEG if needed patient to get MBS study done on Monday, will consult GI if patient fails swallowing.  She may need a PEG even if she able to swallow because her intake has been less than 50%.    5/12-patient seen at bedside, has NG in place, NG had, come out a few inches was replaced and x-ray was verified, swallow test tomorrow.    5/30-MRSA bacteremia, on IV vancomycin, TTE pending, patient did not do well with swallow eval, discussed with speech therapist-will proceed to consider PEG, consult GI, patient agrees with the plan.    5/15-patient feels well, denies complaints, GI concerned about aspiration pneumonia, will consult pulmonary to optimize her pulmonary issues, GI planning PEG placement Monday, will hold Eliquis after Friday, PT OT evaluation noted    5/16-patient has dysphagia, NG tube still in place, PEG placement planned for Monday, stop Eliquis, will start heparin subcu, patient stabilizing from the pulmonary perspective.      5/17 :  Hyperglycemic - will add Lantus, planning PEG on Monday , Eliquis on hold. Midline is being planned.  GI unable to place PEG because of staffing, will consult surgery per the recommendation.    5/18 : Surgery consulted for probable PEG on Monday - NGT in place, Encephalopathy     5/20-patient waiting for a PEG placement, she has been n.p.o. since yesterday, placement has been postponed to tomorrow, will start PEG feeds after placement tomorrow, continue IV fluids, has elevated WBC-IV antibiotics per ID,    5/21 : Pt s/p Percutaneous endoscopic gastrostomy tube placement , will start feeds when ok with surgery.   Radha Ward MD

## 2024-05-21 NOTE — ANESTHESIA PREPROCEDURE EVALUATION
Patient: Janette Martinez    Procedure Information       Date/Time: 05/21/24 1215    Procedure: Insertion Gastrostomy Tube Laparoscopy    [facility]: [facility] / [facility]    Surgeons: Yohannes Huggins MD            Relevant Problems   Anesthesia (within normal limits)      Cardiac   (+) Essential hypertension   (+) Mixed hyperlipidemia      Pulmonary   (+) Acute pulmonary embolism (Multi)      Neuro   (+) Diabetic polyneuropathy (Multi)   (+) Hemiplegia of nondominant side, late effect of cerebrovascular disease (Multi)      GI   (+) Esophageal dysphagia   (+) Gastroesophageal reflux disease   (+) Oropharyngeal dysphagia      Endocrine   (+) Diabetic polyneuropathy (Multi)   (+) Hypothyroidism   (+) T2DM (type 2 diabetes mellitus) (Multi) (BS-73)      ID   (+) HIV infection (Multi)       Clinical information reviewed:   Tobacco  Allergies  Meds   Med Hx  Surg Hx   Fam Hx  Soc Hx        NPO Detail:  NPO/Void Status  Date of Last Liquid: 05/20/24  Date of Last Solid: 05/20/24         Physical Exam    Airway  Mallampati: unable to assess     Cardiovascular    Dental    Pulmonary    Abdominal            Anesthesia Plan    History of general anesthesia?: yes  History of complications of general anesthesia?: no    ASA 4     MAC     The patient is not a current smoker.    Anesthetic plan and risks discussed with patient.    Plan discussed with CAA.

## 2024-05-21 NOTE — PROGRESS NOTES
05/21/24 1508   Discharge Planning   Patient expects to be discharged to: SW attempted to reach pt dtr Josh [phone], unable to reach, left message waiting on call back.

## 2024-05-21 NOTE — PROGRESS NOTES
"  INFECTIOUS DISEASE DAILY PROGRESS NOTE    SUBJECTIVE:    No overnight events. No new symptoms. Remains afebrile. Is on 1L NC. NG tube has been removed.    OBJECTIVE:  VITALS (Last 24 Hours)  /73 (Patient Position: Lying)   Pulse 93   Temp 36.6 °C (97.8 °F) (Temporal)   Resp 18   Ht 1.626 m (5' 4.02\")   Wt 96.5 kg (212 lb 11.9 oz)   SpO2 97%   BMI 36.50 kg/m²     PHYSICAL EXAM:  Gen - NAD, in bed, 1L NC  ENT - NG tube since removed  Lungs - no wh  Abd - soft, no ttp, BS present  Skin - no rash    ABX: IV Daptomycin    LABS:  Lab Results   Component Value Date    WBC 18.2 (H) 05/21/2024    HGB 7.7 (L) 05/21/2024    HCT 23.5 (L) 05/21/2024    MCV 93 05/21/2024     05/21/2024     Lab Results   Component Value Date    GLUCOSE 125 (H) 05/20/2024    CALCIUM 7.7 (L) 05/20/2024     (L) 05/20/2024    K 4.3 05/20/2024    CO2 33 (H) 05/20/2024    CL 98 05/20/2024    BUN 14 05/20/2024    CREATININE 1.02 05/20/2024           Estimated Creatinine Clearance: 70.2 mL/min (by C-G formula based on SCr of 1.02 mg/dL).      ASSESSMENT/PLAN:     Multifocal Aspiration PNA due to MRSA  MRSA Bacteremia - TTE negative for endocarditis  Abx Allergies - penicillin, sulfa. Limits abx options.  HIV - well controlled, 4/2024 labs VL undetectable and CD4 587. On Dolutegravir and Descovy - continue both here.    IV Daptomycin 700mg Q24H through 6/10/24. OK for midline.    CK level on 5/18 normal 21. WBC remains elevated and is mostly neutrophilic, no eosinophilia. Unclear why at this point. She has been on appropriate abx therapy with clearing of blood and improved respiratory status.    Monitoring for adverse effects of abx such as rash/itching/diarrhea - none apparent.     Will follow. Thanks!    Sadiq Mayo MD  [facility]  Office #[phone]      "

## 2024-05-21 NOTE — OP NOTE
Insertion Gastrostomy Tube Laparoscopy Operative Note     Date: 2024  OR Location: [facility] OR    Name: Janette Martinez, : 1970, Age: 53 y.o., MRN: [id number], Sex: female    Diagnosis  Pre-op Diagnosis     * Oropharyngeal dysphagia [R13.12] Post-op Diagnosis     * Oropharyngeal dysphagia [R13.12]     Procedures  Percutaneous endoscopic gastrostomy tube placement    Surgeons      * Yohannes Huggins - Primary    Resident/Fellow/Other Assistant:  Surgeons and Role:  * No surgeons found with a matching role *    Procedure Summary  Anesthesia: General  ASA: IV  Anesthesia Staff: Anesthesiologist: Demetrius Marlow MD  C-AA: JEREMY Bailey  Estimated Blood Loss: 1mL  Intra-op Medications:   Administrations occurring from 1215 to 1335 on 24:   Medication Name Total Dose   bupivacaine PF (Marcaine) 0.5 % (5 mg/mL) injection 5 mL   ipratropium-albuteroL (Duo-Neb) 0.5-2.5 mg/3 mL nebulizer solution 3 mL Cannot be calculated              Anesthesia Record               Intraprocedure I/O Totals          Intake    Propofol Drip 0.00 mL    The total shown is the total volume documented since Anesthesia Start was filed.    Total Intake 0 mL          Specimen: No specimens collected     Staff:   Circulator: Columba Mcnamara RN  Scrub Person: Luz Real         Drains and/or Catheters:   NG/OG/Feeding Tube Right nostril (Active)   Tube Status Continuous tube feeding 24 0400   Placement Verification X-ray 05/15/24 0637   Distal Tube Measurement 60 cm 24 0400   Site Assessment Other (Comment) 24 0810   Irrigant Tap water 05/15/24 0637   Response To Intervention No resistance met 05/15/24 0637   Tube Securement Taped to nostril center 24 0000   Intake (mL) 265 mL 24 1801   Intake - Flush (mL) 200 mL 24 1801       Gastrostomy/Enterostomy Gastrostomy 1 LUQ (Active)   Surrounding Skin Dry;Intact 24 1341   Drainage Appearance Bile 24 1341   Dressing Status Clean;Dry  05/21/24 1341       External Urinary Catheter Female (Active)   External Catheter Status In place 05/12/24 0000   Securement Method Mesh panties 05/12/24 0000   Output (mL) 800 mL 05/20/24 2101       Tourniquet Times:         Implants:     Findings:     Indications: Janette Martinez is an 53 y.o. female who is having surgery for Oropharyngeal dysphagia [R13.12].     The patient was seen in the preoperative area. The risks, benefits, complications, treatment options, non-operative alternatives, expected recovery and outcomes were discussed with the patient. The possibilities of reaction to medication, pulmonary aspiration, injury to surrounding structures, bleeding, recurrent infection, the need for additional procedures, failure to diagnose a condition, and creating a complication requiring transfusion or operation were discussed with the patient. The patient concurred with the proposed plan, giving informed consent.  The site of surgery was properly noted/marked if necessary per policy. The patient has been actively warmed in preoperative area. Preoperative antibiotics have been ordered and given within 1 hours of incision. Venous thrombosis prophylaxis have been ordered including bilateral sequential compression devices    Procedure Details: Patient was brought to the OR placed supine.  Timeout was performed confirm patient procedure.  Antibiotics were given.  IV sedation was administered.  A bite-block was placed.  I then performed upper endoscopy.  I intubated the esophagus and advanced the scope into the gastric lumen.  We insufflated the stomach and then via palpation and transillumination a left subcostal site was chosen.  I injected local and then made a small nick in the skin with scalpel.  The needle with sheath was then advanced into the gastric lumen which I was able to snare.  We exchanged the needle for the guidewire and I snared the guidewire and brought it out the oral cavity.  Then via pull technique  the PEG tube was positioned at 6 cm in the anterior abdominal wall with the external flange.  I then placed the gastroscope back in the stomach to confirm that the inner bumper was flush with the mucosa without bleeding.  We desufflated on the way out.  Accessories were attached to the PEG tube and she was ultimately transferred to the recovery room in satisfactory condition    Complications:  None; patient tolerated the procedure well.    Disposition: PACU - hemodynamically stable.  Condition: stable         Additional Details:     Attending Attestation: I was present for the entire procedure.    Yohannes Huggins  Phone Number: [phone]

## 2024-05-21 NOTE — ANESTHESIA POSTPROCEDURE EVALUATION
Patient: Janette Martniez    Procedure Summary       Date: 05/21/24 Room / Location: U A OR 02 / Virtual [facility] OR    Anesthesia Start: 1250 Anesthesia Stop: 1343    Procedure: Insertion Gastrostomy Tube Laparoscopy (Abdomen) Diagnosis:       Oropharyngeal dysphagia      (Oropharyngeal dysphagia [R13.12])    Surgeons: Yohannes Huggins MD Responsible Provider: Demetrius Marlow MD    Anesthesia Type: MAC ASA Status: 4            Anesthesia Type: MAC    Vitals Value Taken Time   /65 05/21/24 1401   Temp 36.8 °C (98.2 °F) 05/21/24 1341   Pulse 90 05/21/24 1406   Resp 22 05/21/24 1400   SpO2 100 % 05/21/24 1406   Vitals shown include unfiled device data.    Anesthesia Post Evaluation    Patient location during evaluation: PACU  Patient participation: complete - patient participated  Level of consciousness: awake  Pain management: adequate  Multimodal analgesia pain management approach  Airway patency: patent  Cardiovascular status: acceptable  Respiratory status: acceptable  Hydration status: acceptable  Postoperative Nausea and Vomiting: none  Comments: Will continue to assess PONV status.        No notable events documented.

## 2024-05-21 NOTE — PROGRESS NOTES
"   05/21/24 7116   Current Planned Discharge Disposition   Current Planned Discharge Disposition SNF     Call placed to patient's sister, Josh at [phone] and voicemail came on stating \"this person cannot accept calls at this time\" and then disconnects.  Patient's sister was to tour [facility] today and make a decision about the [facility].  Kalani Shrama RN     "

## 2024-05-22 ENCOUNTER — APPOINTMENT (OUTPATIENT)
Dept: CARDIOLOGY | Facility: HOSPITAL | Age: 54
DRG: 177 | End: 2024-05-22
Payer: MEDICARE

## 2024-05-22 LAB
BASOPHILS # BLD AUTO: 0.09 X10*3/UL (ref 0–0.1)
BASOPHILS NFR BLD AUTO: 0.6 %
EOSINOPHIL # BLD AUTO: 0.42 X10*3/UL (ref 0–0.7)
EOSINOPHIL NFR BLD AUTO: 2.8 %
ERYTHROCYTE [DISTWIDTH] IN BLOOD BY AUTOMATED COUNT: 17.6 % (ref 11.5–14.5)
GLUCOSE BLD MANUAL STRIP-MCNC: 138 MG/DL (ref 74–99)
GLUCOSE BLD MANUAL STRIP-MCNC: 158 MG/DL (ref 74–99)
GLUCOSE BLD MANUAL STRIP-MCNC: 161 MG/DL (ref 74–99)
GLUCOSE BLD MANUAL STRIP-MCNC: 98 MG/DL (ref 74–99)
HCT VFR BLD AUTO: 22.9 % (ref 36–46)
HGB BLD-MCNC: 7.3 G/DL (ref 12–16)
HOLD SPECIMEN: NORMAL
IMM GRANULOCYTES # BLD AUTO: 0.12 X10*3/UL (ref 0–0.7)
IMM GRANULOCYTES NFR BLD AUTO: 0.8 % (ref 0–0.9)
LYMPHOCYTES # BLD AUTO: 2.51 X10*3/UL (ref 1.2–4.8)
LYMPHOCYTES NFR BLD AUTO: 16.6 %
MCH RBC QN AUTO: 30.2 PG (ref 26–34)
MCHC RBC AUTO-ENTMCNC: 31.9 G/DL (ref 32–36)
MCV RBC AUTO: 95 FL (ref 80–100)
MONOCYTES # BLD AUTO: 1.26 X10*3/UL (ref 0.1–1)
MONOCYTES NFR BLD AUTO: 8.4 %
NEUTROPHILS # BLD AUTO: 10.68 X10*3/UL (ref 1.2–7.7)
NEUTROPHILS NFR BLD AUTO: 70.8 %
NRBC BLD-RTO: 0 /100 WBCS (ref 0–0)
PLATELET # BLD AUTO: 387 X10*3/UL (ref 150–450)
RBC # BLD AUTO: 2.42 X10*6/UL (ref 4–5.2)
WBC # BLD AUTO: 15.1 X10*3/UL (ref 4.4–11.3)

## 2024-05-22 PROCEDURE — 36415 COLL VENOUS BLD VENIPUNCTURE: CPT | Performed by: INTERNAL MEDICINE

## 2024-05-22 PROCEDURE — 2500000002 HC RX 250 W HCPCS SELF ADMINISTERED DRUGS (ALT 637 FOR MEDICARE OP, ALT 636 FOR OP/ED): Performed by: SURGERY

## 2024-05-22 PROCEDURE — 36410 VNPNXR 3YR/> PHY/QHP DX/THER: CPT

## 2024-05-22 PROCEDURE — 2500000004 HC RX 250 GENERAL PHARMACY W/ HCPCS (ALT 636 FOR OP/ED): Performed by: SURGERY

## 2024-05-22 PROCEDURE — 1100000001 HC PRIVATE ROOM DAILY

## 2024-05-22 PROCEDURE — 2500000005 HC RX 250 GENERAL PHARMACY W/O HCPCS: Performed by: SURGERY

## 2024-05-22 PROCEDURE — 2500000006 HC RX 250 W HCPCS SELF ADMINISTERED DRUGS (ALT 637 FOR ALL PAYERS): Performed by: SURGERY

## 2024-05-22 PROCEDURE — 94640 AIRWAY INHALATION TREATMENT: CPT

## 2024-05-22 PROCEDURE — 2720000007 HC OR 272 NO HCPCS

## 2024-05-22 PROCEDURE — 82947 ASSAY GLUCOSE BLOOD QUANT: CPT | Mod: 91

## 2024-05-22 PROCEDURE — C1751 CATH, INF, PER/CENT/MIDLINE: HCPCS

## 2024-05-22 PROCEDURE — 85025 COMPLETE CBC W/AUTO DIFF WBC: CPT | Performed by: INTERNAL MEDICINE

## 2024-05-22 PROCEDURE — 99233 SBSQ HOSP IP/OBS HIGH 50: CPT | Performed by: INTERNAL MEDICINE

## 2024-05-22 PROCEDURE — 05HB33Z INSERTION OF INFUSION DEVICE INTO RIGHT BASILIC VEIN, PERCUTANEOUS APPROACH: ICD-10-PCS | Performed by: INTERNAL MEDICINE

## 2024-05-22 PROCEDURE — 2500000001 HC RX 250 WO HCPCS SELF ADMINISTERED DRUGS (ALT 637 FOR MEDICARE OP): Performed by: SURGERY

## 2024-05-22 PROCEDURE — 2500000001 HC RX 250 WO HCPCS SELF ADMINISTERED DRUGS (ALT 637 FOR MEDICARE OP): Performed by: INTERNAL MEDICINE

## 2024-05-22 PROCEDURE — 2780000003 HC OR 278 NO HCPCS

## 2024-05-22 RX ORDER — LIDOCAINE HYDROCHLORIDE 10 MG/ML
5 INJECTION INFILTRATION; PERINEURAL ONCE
Status: DISCONTINUED | OUTPATIENT
Start: 2024-05-22 | End: 2024-05-23 | Stop reason: HOSPADM

## 2024-05-22 RX ADMIN — SERTRALINE HYDROCHLORIDE 200 MG: 50 TABLET ORAL at 10:05

## 2024-05-22 RX ADMIN — INSULIN GLARGINE 20 UNITS: 100 INJECTION, SOLUTION SUBCUTANEOUS at 21:44

## 2024-05-22 RX ADMIN — Medication 1 L/MIN: at 08:06

## 2024-05-22 RX ADMIN — IPRATROPIUM BROMIDE AND ALBUTEROL SULFATE 3 ML: 2.5; .5 SOLUTION RESPIRATORY (INHALATION) at 21:22

## 2024-05-22 RX ADMIN — AMLODIPINE BESYLATE 5 MG: 5 TABLET ORAL at 10:05

## 2024-05-22 RX ADMIN — APIXABAN 5 MG: 5 TABLET, FILM COATED ORAL at 21:44

## 2024-05-22 RX ADMIN — DAPTOMYCIN IN SODIUM CHLORIDE 700 MG: 700 INJECTION, SOLUTION INTRAVENOUS at 10:05

## 2024-05-22 RX ADMIN — INSULIN LISPRO 2 UNITS: 100 INJECTION, SOLUTION INTRAVENOUS; SUBCUTANEOUS at 10:06

## 2024-05-22 RX ADMIN — BACLOFEN 10 MG: 10 TABLET ORAL at 10:05

## 2024-05-22 RX ADMIN — POTASSIUM CHLORIDE 20 MEQ: 1.5 POWDER, FOR SOLUTION ORAL at 10:05

## 2024-05-22 RX ADMIN — DOLUTEGRAVIR SODIUM 50 MG: 50 TABLET, FILM COATED ORAL at 10:11

## 2024-05-22 RX ADMIN — EMTRICITABINE AND TENOFOVIR ALAFENAMIDE 1 TABLET: 200; 25 TABLET ORAL at 10:05

## 2024-05-22 RX ADMIN — Medication 1 L/MIN: at 21:22

## 2024-05-22 RX ADMIN — OXYCODONE HYDROCHLORIDE 5 MG: 5 TABLET ORAL at 06:14

## 2024-05-22 RX ADMIN — IPRATROPIUM BROMIDE AND ALBUTEROL SULFATE 3 ML: 2.5; .5 SOLUTION RESPIRATORY (INHALATION) at 13:32

## 2024-05-22 RX ADMIN — LAMOTRIGINE 25 MG: 25 TABLET ORAL at 21:44

## 2024-05-22 RX ADMIN — IPRATROPIUM BROMIDE AND ALBUTEROL SULFATE 3 ML: 2.5; .5 SOLUTION RESPIRATORY (INHALATION) at 08:06

## 2024-05-22 RX ADMIN — FUROSEMIDE 20 MG: 20 TABLET ORAL at 10:05

## 2024-05-22 RX ADMIN — INSULIN LISPRO 2 UNITS: 100 INJECTION, SOLUTION INTRAVENOUS; SUBCUTANEOUS at 16:52

## 2024-05-22 RX ADMIN — LEVOTHYROXINE SODIUM 175 MCG: 125 TABLET ORAL at 06:14

## 2024-05-22 RX ADMIN — BACLOFEN 10 MG: 10 TABLET ORAL at 21:44

## 2024-05-22 RX ADMIN — ENOXAPARIN SODIUM 40 MG: 40 INJECTION SUBCUTANEOUS at 10:11

## 2024-05-22 ASSESSMENT — COGNITIVE AND FUNCTIONAL STATUS - GENERAL
CLIMB 3 TO 5 STEPS WITH RAILING: TOTAL
EATING MEALS: TOTAL
TURNING FROM BACK TO SIDE WHILE IN FLAT BAD: TOTAL
MOVING TO AND FROM BED TO CHAIR: TOTAL
TOILETING: TOTAL
EATING MEALS: TOTAL
PERSONAL GROOMING: TOTAL
WALKING IN HOSPITAL ROOM: TOTAL
MOVING FROM LYING ON BACK TO SITTING ON SIDE OF FLAT BED WITH BEDRAILS: TOTAL
TURNING FROM BACK TO SIDE WHILE IN FLAT BAD: TOTAL
DRESSING REGULAR UPPER BODY CLOTHING: TOTAL
MOBILITY SCORE: 6
DAILY ACTIVITIY SCORE: 6
DRESSING REGULAR LOWER BODY CLOTHING: TOTAL
STANDING UP FROM CHAIR USING ARMS: TOTAL
MOVING FROM LYING ON BACK TO SITTING ON SIDE OF FLAT BED WITH BEDRAILS: TOTAL
PERSONAL GROOMING: TOTAL
HELP NEEDED FOR BATHING: TOTAL
HELP NEEDED FOR BATHING: TOTAL
CLIMB 3 TO 5 STEPS WITH RAILING: TOTAL
MOVING TO AND FROM BED TO CHAIR: TOTAL
MOBILITY SCORE: 6
DAILY ACTIVITIY SCORE: 6
STANDING UP FROM CHAIR USING ARMS: TOTAL
DRESSING REGULAR UPPER BODY CLOTHING: TOTAL
TOILETING: TOTAL
DRESSING REGULAR LOWER BODY CLOTHING: TOTAL
WALKING IN HOSPITAL ROOM: TOTAL

## 2024-05-22 ASSESSMENT — PAIN SCALES - GENERAL
PAINLEVEL_OUTOF10: 0 - NO PAIN
PAINLEVEL_OUTOF10: 0 - NO PAIN

## 2024-05-22 ASSESSMENT — PAIN SCALES - WONG BAKER: WONGBAKER_NUMERICALRESPONSE: NO HURT

## 2024-05-22 NOTE — PROCEDURES
Midline Insertion Procedure Note    Procedure: Insertion of #4 FR/18G Midline    Indications:  Long Term IV therapy    Procedure Details     Maximum sterile technique was used including antiseptics, cap, gloves, gown, hand hygiene, mask, and sheet.    Sedation: No    #4 FR/18G Midline inserted to the R Basilic vein per hospital protocol.   Blood return:  yes    Findings:  Catheter inserted to 16 cm, with 0 cm. Exposed. Mid upper arm circumference is 35 cm.  There were no changes to vital signs. Catheter was flushed with 20 cc NS. Patient did tolerate procedure well.    Recommendations:  Single lumen Midline inserted utilizing modified Seldinger technique under ultrasound guidance without difficulty or complications.   Midline Brochure given to patient with infection prevention teaching instruction.  Ok to use Midline.      Vascular Access Team Procedure Note     Visit Date: 5/22/2024      Patient Name: Janette Martinez         MRN: [id number]             Procedure: Midline Insertion           Midline 05/22/24 Single lumen Right Basilic vein (Active)   05/22/24 0918 Basilic vein   Earliest Known Present:    Placed by External Staff?:    Hand Hygiene Completed: Yes   Catheter Time Out Checklist Completed: Yes   Size (Fr): 4   Lumen Type: Single lumen   Description (optional):    Catheter to Vein Ratio Less Than 45%: Yes   Total Length (cm): 16 cm   External Length (cm): 0 cm   Orientation: Right   Site Prep: Chlorhexidine ;Usual sterile procedure followed   Local Anesthetic: Injectable   Indication: Parenteral medications   Insertion Team Members In The Room: Nurse   Initial Extremity Circumference (cm): 35 cm   Placed by: JORGE Gong RN   Insertion attempts: 1   Patient Tolerance: Tolerated well   Comfort Measures:    Procedure Location: Bedside   Safety Measures:    Estimated Blood Loss (mL): 3 mL   Vessel Fully Compressible Proximally and Distally to Insertion Site: Yes   Brisk Blood Return Obtained and Line Draws  Easily: Yes   Catheter Tip Location: Peripheral/midline   Line Confirmation: Blood return;Non-pulsatile blood flow   Lot #: [id number]   :    Expiration Date: 07/31/25   Securement Method: Skin barrier;Stat lock;Transparent dressing   Number of Sutures Placed: 0   Post Procedure Checklist: Handoff with RN;Bed at lowest level and wheels locked;Obtain all new IV tubing prior to use;Line discharge information at bedside   Earliest Known Removed:    Removal Reason :    Removal Catheter Length (cm):    Tip Intact:    Site Prep Agent has Completely Dried Before Insertion:    All 5 Sterile Barriers Used (Gloves, Gown, Cap, Mask, Large Sterile Drape):    Placement Verification:    Catheter Tip Cultured:    Securement Method:    Site Assessment Clean;Dry;Intact 05/22/24 0920   Proximal Lumen Status Blood return noted;Flushed 05/22/24 0920                 Patricia Gong RN  5/22/2024  9:22 AM

## 2024-05-22 NOTE — PROGRESS NOTES
"  INFECTIOUS DISEASE DAILY PROGRESS NOTE    SUBJECTIVE:    No new issues. Feels OK. Denies cough, shortness of breath. No abd pain, n/v/c/d. Afebrile. WBC is down to 15.1 today.    OBJECTIVE:  VITALS (Last 24 Hours)  /68   Pulse 96   Temp 36.5 °C (97.7 °F)   Resp 16   Ht 1.626 m (5' 4.02\")   Wt 96.5 kg (212 lb 11.9 oz)   SpO2 100%   BMI 36.50 kg/m²     PHYSICAL EXAM:  Gen - NAD, in bed, 1L NC  ENT - NG tube since removed  Lungs - no wh  Abd - soft, no ttp, BS present  Skin - no rash    ABX: IV Daptomycin    LABS:  Lab Results   Component Value Date    WBC 15.1 (H) 05/22/2024    HGB 7.3 (L) 05/22/2024    HCT 22.9 (L) 05/22/2024    MCV 95 05/22/2024     05/22/2024     Lab Results   Component Value Date    GLUCOSE 125 (H) 05/20/2024    CALCIUM 7.7 (L) 05/20/2024     (L) 05/20/2024    K 4.3 05/20/2024    CO2 33 (H) 05/20/2024    CL 98 05/20/2024    BUN 14 05/20/2024    CREATININE 1.02 05/20/2024           Estimated Creatinine Clearance: 70.2 mL/min (by C-G formula based on SCr of 1.02 mg/dL).      ASSESSMENT/PLAN:     Multifocal Aspiration PNA due to MRSA  MRSA Bacteremia - TTE negative for endocarditis  Abx Allergies - penicillin, sulfa. Limits abx options.  HIV - well controlled, 4/2024 labs VL undetectable and CD4 587. On Dolutegravir and Descovy - continue both here.    IV Daptomycin 700mg Q24H through 6/10/24. OK for midline. Midline to be removed once abx completed. Does not need appt with me.    CK level on 5/18 normal 21. WBC is improving, 15.1 today.    Once weekly labs CBC/diff, Creatinine, and CK level fax to Dr. Mayo [phone].    Monitoring for adverse effects of abx such as rash/itching/diarrhea - none apparent.     Will sign off. Please call back with questions. Thanks!    Sadiq Mayo MD  [facility]  Office #[phone]      "

## 2024-05-22 NOTE — CARE PLAN
The patient's goals for the shift include  tolerate feed    The clinical goals for the shift include remain comfortable during shift    Over the shift, the patient  not make progress toward the following goals.

## 2024-05-22 NOTE — PROGRESS NOTES
Nutrition Follow-up Note    Chart reviewed and pt visited.  Per RN pt tolerating TF, currently running at 45ml/hr  PEG placed 5/21    Results for orders placed or performed during the hospital encounter of 05/09/24 (from the past 24 hour(s))   POCT GLUCOSE   Result Value Ref Range    POCT Glucose 105 (H) 74 - 99 mg/dL   POCT GLUCOSE   Result Value Ref Range    POCT Glucose 98 74 - 99 mg/dL   CBC and Auto Differential   Result Value Ref Range    WBC 15.1 (H) 4.4 - 11.3 x10*3/uL    nRBC 0.0 0.0 - 0.0 /100 WBCs    RBC 2.42 (L) 4.00 - 5.20 x10*6/uL    Hemoglobin 7.3 (L) 12.0 - 16.0 g/dL    Hematocrit 22.9 (L) 36.0 - 46.0 %    MCV 95 80 - 100 fL    MCH 30.2 26.0 - 34.0 pg    MCHC 31.9 (L) 32.0 - 36.0 g/dL    RDW 17.6 (H) 11.5 - 14.5 %    Platelets 387 150 - 450 x10*3/uL    Neutrophils % 70.8 40.0 - 80.0 %    Immature Granulocytes %, Automated 0.8 0.0 - 0.9 %    Lymphocytes % 16.6 13.0 - 44.0 %    Monocytes % 8.4 2.0 - 10.0 %    Eosinophils % 2.8 0.0 - 6.0 %    Basophils % 0.6 0.0 - 2.0 %    Neutrophils Absolute 10.68 (H) 1.20 - 7.70 x10*3/uL    Immature Granulocytes Absolute, Automated 0.12 0.00 - 0.70 x10*3/uL    Lymphocytes Absolute 2.51 1.20 - 4.80 x10*3/uL    Monocytes Absolute 1.26 (H) 0.10 - 1.00 x10*3/uL    Eosinophils Absolute 0.42 0.00 - 0.70 x10*3/uL    Basophils Absolute 0.09 0.00 - 0.10 x10*3/uL   Green Top   Result Value Ref Range    Extra Tube Hold for add-ons.    POCT GLUCOSE   Result Value Ref Range    POCT Glucose 161 (H) 74 - 99 mg/dL   POCT GLUCOSE   Result Value Ref Range    POCT Glucose 138 (H) 74 - 99 mg/dL     Scheduled medications  amLODIPine, 5 mg, oral, Daily  apixaban, 5 mg, oral, BID  baclofen, 10 mg, oral, BID  daptomycin, 700 mg, intravenous, q24h MARTIN  dolutegravir, 50 mg, oral, Daily  emtricitabine-tenofovir alafen, 1 tablet, oral, Daily  furosemide, 20 mg, oral, Daily  insulin glargine, 20 Units, subcutaneous, q24h  insulin lispro, 0-10 Units, subcutaneous, TID  ipratropium-albuteroL, 3  "mL, nebulization, TID  lamoTRIgine, 25 mg, oral, q PM  levothyroxine, 175 mcg, oral, Daily before breakfast  lidocaine, 5 mL, infiltration, Once  lidocaine, 5 mL, infiltration, Once  oxygen, , inhalation, Continuous - Inhalation  polyethylene glycol, 17 g, orogastric tube, Daily  potassium chloride, 20 mEq, nasogastric tube, Daily  sertraline, 200 mg, oral, Daily      Continuous medications  sodium chloride 0.9%, 75 mL/hr, Last Rate: 75 mL/hr (05/21/24 1250)      PRN medications  PRN medications: dextrose, dextrose, glucagon, glucagon, guaiFENesin, hydrALAZINE, ipratropium-albuteroL, oxyCODONE  Dietary Orders (From admission, onward)       Start     Ordered    05/21/24 1930  Enteral feeding with NPO PEG (percutaneous endoscopic gastric); 25 (Start TF @ 25 ml/hr. Advance by 10 ml every six hours as tolerated to goal rate 55 ml/hr.); 60; Water; Tap water; Every 6 hours  Diet effective now        Question Answer Comment   Tube feeding formula: Glucerna 1.5    Feeding route: PEG (percutaneous endoscopic gastric)    Tube feeding continuous rate (mL/hr): 25 Start TF @ 25 ml/hr. Advance by 10 ml every six hours as tolerated to goal rate 55 ml/hr.   Tube feeding flush (mL): 60    Flush type: Water    Water type: Tap water    Flush frequency: Every 6 hours        05/21/24 1930                  History:  Food and Nutrient History  Food and Nutrient History: NPO, PEG in place, Glucerna1.5 @ 55 ml/hr ordered    Anthropometrics:  Height: 162.6 cm (5' 4.02\")  Weight: 96.5 kg (212 lb 11.9 oz)  BMI (Calculated): 36.5    Wt Readings from Last 28 Encounters:   05/15/24 96.5 kg (212 lb 11.9 oz)   05/06/24 93 kg (205 lb)   04/26/24 93 kg (205 lb)   04/22/24 93.3 kg (205 lb 9.6 oz)   04/15/24 93.3 kg (205 lb 9.6 oz)   04/09/24 93.3 kg (205 lb 9.6 oz)   03/11/24 94.9 kg (209 lb 3.2 oz)   02/05/24 102 kg (224 lb 12.8 oz)   01/15/24 102 kg (224 lb 12.8 oz)   01/02/24 102 kg (224 lb)   12/04/23 108 kg (238 lb)   11/27/23 108 kg (238 lb) "   11/21/23 108 kg (238 lb)   11/13/23 108 kg (238 lb)   11/06/23 108 kg (238 lb)   10/31/23 108 kg (238 lb)   10/23/23 104 kg (228 lb 12.8 oz)   09/18/23 104 kg (228 lb 4.8 oz)   09/11/23 104 kg (228 lb 4.8 oz)   09/05/23 103 kg (227 lb 3.2 oz)   08/28/23 103 kg (227 lb 3.2 oz)   08/07/23 103 kg (227 lb 3.2 oz)   07/31/23 105 kg (231 lb 6.4 oz)   07/24/23 105 kg (231 lb 6.4 oz)   07/10/23 105 kg (231 lb 6.4 oz)   06/12/23 105 kg (231 lb 6.4 oz)   06/05/23 106 kg (233 lb 9.6 oz)   05/22/23 106 kg (233 lb 9.6 oz)     Weight Change  Weight History / % Weight Change: 93 kg 4/30/24; 102.2kg 1/10/24, 104.2kg 10/17/23, 113.9kg 5/9/23, 114.1 kg 4/28/23.  Significant Weight Loss: Yes  Interpretation of Weight Loss: >10% in 6 months    IBW/kg (Dietitian Calculated): 54.5 kg  Percent of IBW: 177 %    Estimated Energy Needs  Total Energy Estimated Needs (kCal): 1635 kCal  Total Estimated Energy Need per Day (kCal/kg): 1910 kCal/kg  Method for Estimating Needs: 30-35 IBW    Estimated Protein Needs  Total Protein Estimated Needs (g): 55 g  Total Protein Estimated Needs (g/kg): 110 g/kg  Method for Estimating Needs: 1.0-2.0 IBW    Estimated Fluid Needs  Method for Estimating Needs: 1ml/kcal or per MD    Nutrition Focused Physical Findings:  Subcutaneous Fat Loss  Orbital Fat Pads: Mild-Moderate (slight dark circles and slight hollowing)  Buccal Fat Pads: Well nourished (full, rounded cheeks)    Muscle Wasting  Temporalis: Mild-Moderate (slight depression)  Pectoralis (Clavicular Region): Mild-Moderate (some protrusion of clavicle)  Deltoid/Trapezius: Mild-Moderate (slight protrusion of acromion process)  Interosseous: Mild-Moderate (slightly depressed area between thumb and forefinger)    Edema  Edema: +2 mild, +1 trace  Edema Location: RLE, LLE; RUE, LUE    Physical Findings (Nutrition Deficiency/Toxicity)  Skin: Positive (coccyx unstageable)     Nutrition Diagnosis   Malnutrition Diagnosis  Patient has Malnutrition Diagnosis:  Yes  Diagnosis Status: Ongoing  Malnutrition Diagnosis: Severe malnutrition related to chronic disease or condition  As Evidenced by: pt with approximately 11% weight loss over past 6-7 months, areas of depleted adipose tissues and skeletal tissue wasting per nutrition focused physical exam, generalized edema    Patient has Nutrition Diagnosis: Yes  Nutrition Diagnosis 1: Swallowing difficulity  Diagnosis Status (1): Ongoing  Related to (1): dysphagia  As Evidenced by (1): NPO status, PEG for enteral nutrition support       Nutrition Diagnosis 2: Increased nutrient needs  Diagnosis Status (2): New  Related to (2): pressure injury  As Evidenced by (2): coccyx unstageable       Nutrition Diagnosis 3: Altered nutrition related to laboratory values  Diagnosis Status (3): New  Related to (3): chronic illness  As Evidenced by (3): Glucose 138        Nutrition Interventions/Recommendations   Nutrition Prescription  Individualized Nutrition Prescription Provided for : Glucerna 1.5, goal rate 55ml/hr provides: 1980kcal, 108.9g protein & 1002ml free h2o; flushes per MD rec or 150 x 6    Food and/or Nutrient Delivery Interventions  Interventions: Enteral intake    Enteral Intake: Enteral nutrition site care, Feeding tube flush    Additional Interventions: nutrition plan of care to parallell overall goals of care    Coordination of Nutrition Care by a Nutrition Professional  Collaboration and Referral of Nutrition Care: Collaboration by nutrition professional with other providers    Education Documentation  No documentation found.      Nutrition Monitoring and Evaluation   Food and Nutrient Related History    Enteral and Parenteral Nutrition Intake: Enteral nutrition intake, Enteral nutrition formula/solution  Criteria: Monitor TF tolerance; Contact nutrition services for intolerances    Anthropometrics: Body Composition/Growth/Weight History  Weight Change: Weight gain, Weight loss    Biochemical Data, Medical Tests and  Procedures  Electrolyte and Renal Panel: Other (Comment)  Criteria: as clinically indicated    Gastrointestinal Profile: Other (Comment)  Criteria: as clinically indicated    Glucose/Endocrine Profile: Other (Comment)  Criteria: as clinically indicated    Nutritional Anemia Profile: Other (Comment)  Criteria: as clinically indicated    Vitamin Profile: Other (Comment)  Criteria: as clinically indicated    Nutrition Focused Physical Findings  Adipose: Loss of subcutaneous fat    Muscles: Muscle atrophy    Skin: Impaired wound healing    Other: Fluid Accumulation, Stool output, Urine volume, Overall appearance    Follow Up  Time Spent (min): 60 minutes  Last Date of Nutrition Visit: 05/22/24  Nutrition Follow-Up Needed?: Dietitian to reassess per policy  Follow up Comment: BRETT EN follow up

## 2024-05-22 NOTE — CARE PLAN
"WEN  spoke  with  sister  Maik. She  said  that she  still did not  go and visit  [facility]. When told  dc is  soon  and  a choice will need to be made she  said \"well then she can go back to [facility] and I will find  a facility  from there\".     [facility] updated    EWN  sent update  to [facility].     Kailey Waters, MSW, LSW    "

## 2024-05-22 NOTE — PROGRESS NOTES
Janette Martinez is a 53 y.o. female on day 13 of admission presenting with AMS (altered mental status).  Patient with h/o HTN, Grave's disease s/p thyroidectomy, hypothyroidism, DM, dysphagia, HIV, metabolic encephalopathy, who p/w AMS and fever. Work up revealed enterococcal UTI, MRSA bacteremia and pneumonia (likely aspiration) c/b hypoxic respiratory failure for which pulmonary is consulted. Since then has been treated with antibiotics.   Subjective   No acute overnight events. S/p PEG placement 5/21. O2 requirements improved to 1L     Overall is feeling OK. Denies SOB, cough, sputum, N/V, or any pains. No wheezing or other complaints.   Objective   Scheduled medications  amLODIPine, 5 mg, oral, Daily  apixaban, 5 mg, oral, BID  baclofen, 10 mg, oral, BID  daptomycin, 700 mg, intravenous, q24h MARTIN  dolutegravir, 50 mg, oral, Daily  emtricitabine-tenofovir alafen, 1 tablet, oral, Daily  furosemide, 20 mg, oral, Daily  insulin glargine, 20 Units, subcutaneous, q24h  insulin lispro, 0-10 Units, subcutaneous, TID  ipratropium-albuteroL, 3 mL, nebulization, TID  lamoTRIgine, 25 mg, oral, q PM  levothyroxine, 175 mcg, oral, Daily before breakfast  lidocaine, 5 mL, infiltration, Once  lidocaine, 5 mL, infiltration, Once  oxygen, , inhalation, Continuous - Inhalation  polyethylene glycol, 17 g, orogastric tube, Daily  potassium chloride, 20 mEq, nasogastric tube, Daily  sertraline, 200 mg, oral, Daily    Continuous medications  sodium chloride 0.9%, 75 mL/hr, Last Rate: 75 mL/hr (05/21/24 1250)    PRN medications  PRN medications: dextrose, dextrose, glucagon, glucagon, guaiFENesin, hydrALAZINE, ipratropium-albuteroL, oxyCODONE   Physical Exam  Constitutional:       General: She is not in acute distress.     Appearance: She is obese. She is ill-appearing. She is not toxic-appearing.   HENT:      Head: Normocephalic and atraumatic.      Nose:      Comments: On 1L NC.      Mouth/Throat:      Mouth: Mucous membranes are  "moist.      Comments: Mallampati 3.   Eyes:      General: No scleral icterus.     Extraocular Movements: Extraocular movements intact.      Pupils: Pupils are equal, round, and reactive to light.   Cardiovascular:      Rate and Rhythm: Normal rate and regular rhythm.      Heart sounds: No murmur heard.     No friction rub. No gallop.   Pulmonary:      Effort: Pulmonary effort is normal. No respiratory distress.      Breath sounds: Rales (diffuse crackles.) present. No wheezing.   Abdominal:      General: There is no distension.      Palpations: Abdomen is soft.      Tenderness: There is no abdominal tenderness.   Musculoskeletal:         General: No tenderness.      Cervical back: Neck supple. No rigidity or tenderness.      Right lower leg: No edema.      Left lower leg: No edema.   Lymphadenopathy:      Cervical: No cervical adenopathy.   Skin:     General: Skin is warm and dry.      Coloration: Skin is not jaundiced.   Neurological:      Mental Status: She is alert.      Comments: Awake and alert, R facial droop with R sided hemiparalysis. Able to move LUE and LLE.    Psychiatric:         Mood and Affect: Mood normal.         Behavior: Behavior normal.     Last Recorded Vitals  Blood pressure 99/63, pulse 94, temperature 36.8 °C (98.2 °F), resp. rate 16, height 1.626 m (5' 4.02\"), weight 96.5 kg (212 lb 11.9 oz), SpO2 92%.  Intake/Output last 3 Shifts:  I/O last 3 completed shifts:  In: 66 (0.7 mL/kg) [I.V.:66 (0.7 mL/kg)]  Out: 1300 (13.5 mL/kg) [Urine:1300 (0.4 mL/kg/hr)]  Weight: 96.5 kg   Relevant Results  Results for orders placed or performed during the hospital encounter of 05/09/24 (from the past 24 hour(s))   POCT GLUCOSE   Result Value Ref Range    POCT Glucose 105 (H) 74 - 99 mg/dL   POCT GLUCOSE   Result Value Ref Range    POCT Glucose 98 74 - 99 mg/dL   CBC and Auto Differential   Result Value Ref Range    WBC 15.1 (H) 4.4 - 11.3 x10*3/uL    nRBC 0.0 0.0 - 0.0 /100 WBCs    RBC 2.42 (L) 4.00 - 5.20 " x10*6/uL    Hemoglobin 7.3 (L) 12.0 - 16.0 g/dL    Hematocrit 22.9 (L) 36.0 - 46.0 %    MCV 95 80 - 100 fL    MCH 30.2 26.0 - 34.0 pg    MCHC 31.9 (L) 32.0 - 36.0 g/dL    RDW 17.6 (H) 11.5 - 14.5 %    Platelets 387 150 - 450 x10*3/uL    Neutrophils % 70.8 40.0 - 80.0 %    Immature Granulocytes %, Automated 0.8 0.0 - 0.9 %    Lymphocytes % 16.6 13.0 - 44.0 %    Monocytes % 8.4 2.0 - 10.0 %    Eosinophils % 2.8 0.0 - 6.0 %    Basophils % 0.6 0.0 - 2.0 %    Neutrophils Absolute 10.68 (H) 1.20 - 7.70 x10*3/uL    Immature Granulocytes Absolute, Automated 0.12 0.00 - 0.70 x10*3/uL    Lymphocytes Absolute 2.51 1.20 - 4.80 x10*3/uL    Monocytes Absolute 1.26 (H) 0.10 - 1.00 x10*3/uL    Eosinophils Absolute 0.42 0.00 - 0.70 x10*3/uL    Basophils Absolute 0.09 0.00 - 0.10 x10*3/uL   Green Top   Result Value Ref Range    Extra Tube Hold for add-ons.    POCT GLUCOSE   Result Value Ref Range    POCT Glucose 161 (H) 74 - 99 mg/dL   POCT GLUCOSE   Result Value Ref Range    POCT Glucose 138 (H) 74 - 99 mg/dL   Bedside Midline Imaging    Result Date: 5/22/2024  These images are not reportable by radiology and will not be interpreted by  Radiologists.   Assessment/Plan   Principal Problem:    AMS (altered mental status)  Active Problems:    Oropharyngeal dysphagia  53 YOF with h/o HTN, Grave's disease s/p thyroidectomy, hypothyroidism, DM, dysphagia, HIV, metabolic encephalopathy, who p/w AMS and fever. Work up revealed enterococcal UTI, MRSA bacteremia and pneumonia (likely aspiration) c/b hypoxic respiratory failure for which pulmonary is consulted. Since then has been treated with antibiotics.     Pneumonia: ? Causes, multifocal, with bronchial spread. Aspiration vs bacterial vs atypical. MRSA swab +ve, also +ve bacteremia with MRSA overall suggesting MRSA pneumonia. Overall is improving.       ID on consult, will FU with their recommendations      Continue Daptomycin      Consider bronch + BAL if clinically worse, but currently  is improved clinically        Respiratory failure: acute with hypoxia, due to above. Echo unremarkable. Also no evidence suggesting obstructive lung disease. Muscle weakness from CVA also might be playing a role. O2 requirements more or less stable.       Continue supplemental O2, wean off as tolerates      BPH       Continue Duo-neb         Oropharyngeal dysphagia: S/p PEG placement      Tube feeds via PEG    DVT prophylaxis: with Lovenox    Pulmonary will FU while in house.    Vlad Lee MD

## 2024-05-22 NOTE — PROGRESS NOTES
05/22/24 1340   Current Planned Discharge Disposition   Current Planned Discharge Disposition Inter     Per patient's sister, she will return to [facility] when medically cleared.  TF order and IV antibiotic order uploaded into [facility]port for LTC.  ADOD: 1-3 days  DISP: return to LTC  BARRIER: TF adjustment  Kalani Sharma RN

## 2024-05-23 VITALS
TEMPERATURE: 98.4 F | DIASTOLIC BLOOD PRESSURE: 86 MMHG | RESPIRATION RATE: 16 BRPM | WEIGHT: 212.74 LBS | SYSTOLIC BLOOD PRESSURE: 144 MMHG | HEART RATE: 95 BPM | BODY MASS INDEX: 36.32 KG/M2 | HEIGHT: 64 IN | OXYGEN SATURATION: 96 %

## 2024-05-23 LAB
GLUCOSE BLD MANUAL STRIP-MCNC: 197 MG/DL (ref 74–99)
GLUCOSE BLD MANUAL STRIP-MCNC: 216 MG/DL (ref 74–99)

## 2024-05-23 PROCEDURE — 2500000004 HC RX 250 GENERAL PHARMACY W/ HCPCS (ALT 636 FOR OP/ED): Performed by: SURGERY

## 2024-05-23 PROCEDURE — 2500000001 HC RX 250 WO HCPCS SELF ADMINISTERED DRUGS (ALT 637 FOR MEDICARE OP): Performed by: INTERNAL MEDICINE

## 2024-05-23 PROCEDURE — 2500000001 HC RX 250 WO HCPCS SELF ADMINISTERED DRUGS (ALT 637 FOR MEDICARE OP): Performed by: SURGERY

## 2024-05-23 PROCEDURE — 94640 AIRWAY INHALATION TREATMENT: CPT

## 2024-05-23 PROCEDURE — 2500000005 HC RX 250 GENERAL PHARMACY W/O HCPCS: Performed by: SURGERY

## 2024-05-23 PROCEDURE — 2500000002 HC RX 250 W HCPCS SELF ADMINISTERED DRUGS (ALT 637 FOR MEDICARE OP, ALT 636 FOR OP/ED): Performed by: SURGERY

## 2024-05-23 PROCEDURE — 2500000006 HC RX 250 W HCPCS SELF ADMINISTERED DRUGS (ALT 637 FOR ALL PAYERS): Performed by: SURGERY

## 2024-05-23 PROCEDURE — 82947 ASSAY GLUCOSE BLOOD QUANT: CPT

## 2024-05-23 PROCEDURE — 99233 SBSQ HOSP IP/OBS HIGH 50: CPT | Performed by: INTERNAL MEDICINE

## 2024-05-23 RX ADMIN — LEVOTHYROXINE SODIUM 175 MCG: 125 TABLET ORAL at 06:33

## 2024-05-23 RX ADMIN — APIXABAN 5 MG: 5 TABLET, FILM COATED ORAL at 10:49

## 2024-05-23 RX ADMIN — IPRATROPIUM BROMIDE AND ALBUTEROL SULFATE 3 ML: 2.5; .5 SOLUTION RESPIRATORY (INHALATION) at 12:39

## 2024-05-23 RX ADMIN — SERTRALINE HYDROCHLORIDE 200 MG: 50 TABLET ORAL at 10:47

## 2024-05-23 RX ADMIN — POLYETHYLENE GLYCOL 3350 17 G: 17 POWDER, FOR SOLUTION ORAL at 06:33

## 2024-05-23 RX ADMIN — Medication 2 L/MIN: at 07:54

## 2024-05-23 RX ADMIN — AMLODIPINE BESYLATE 5 MG: 5 TABLET ORAL at 10:47

## 2024-05-23 RX ADMIN — INSULIN LISPRO 4 UNITS: 100 INJECTION, SOLUTION INTRAVENOUS; SUBCUTANEOUS at 12:00

## 2024-05-23 RX ADMIN — IPRATROPIUM BROMIDE AND ALBUTEROL SULFATE 3 ML: 2.5; .5 SOLUTION RESPIRATORY (INHALATION) at 07:54

## 2024-05-23 RX ADMIN — INSULIN LISPRO 2 UNITS: 100 INJECTION, SOLUTION INTRAVENOUS; SUBCUTANEOUS at 08:00

## 2024-05-23 RX ADMIN — BACLOFEN 10 MG: 10 TABLET ORAL at 10:49

## 2024-05-23 RX ADMIN — DOLUTEGRAVIR SODIUM 50 MG: 50 TABLET, FILM COATED ORAL at 10:53

## 2024-05-23 RX ADMIN — DAPTOMYCIN IN SODIUM CHLORIDE 700 MG: 700 INJECTION, SOLUTION INTRAVENOUS at 10:47

## 2024-05-23 RX ADMIN — FUROSEMIDE 20 MG: 20 TABLET ORAL at 10:47

## 2024-05-23 RX ADMIN — EMTRICITABINE AND TENOFOVIR ALAFENAMIDE 1 TABLET: 200; 25 TABLET ORAL at 10:47

## 2024-05-23 RX ADMIN — POTASSIUM CHLORIDE 20 MEQ: 1.5 POWDER, FOR SOLUTION ORAL at 10:47

## 2024-05-23 ASSESSMENT — COGNITIVE AND FUNCTIONAL STATUS - GENERAL
DRESSING REGULAR LOWER BODY CLOTHING: TOTAL
PERSONAL GROOMING: TOTAL
EATING MEALS: TOTAL
HELP NEEDED FOR BATHING: TOTAL
MOVING TO AND FROM BED TO CHAIR: TOTAL
DRESSING REGULAR UPPER BODY CLOTHING: TOTAL
MOVING FROM LYING ON BACK TO SITTING ON SIDE OF FLAT BED WITH BEDRAILS: TOTAL
DAILY ACTIVITIY SCORE: 6
WALKING IN HOSPITAL ROOM: TOTAL
STANDING UP FROM CHAIR USING ARMS: TOTAL
MOBILITY SCORE: 6
TOILETING: TOTAL
CLIMB 3 TO 5 STEPS WITH RAILING: TOTAL
TURNING FROM BACK TO SIDE WHILE IN FLAT BAD: TOTAL

## 2024-05-23 NOTE — CARE PLAN
Problem: Pain - Adult  Goal: Verbalizes/displays adequate comfort level or baseline comfort level  Outcome: Progressing     Problem: Safety - Adult  Goal: Free from fall injury  Outcome: Progressing     Problem: Discharge Planning  Goal: Discharge to home or other facility with appropriate resources  Outcome: Progressing     Problem: Chronic Conditions and Co-morbidities  Goal: Patient's chronic conditions and co-morbidity symptoms are monitored and maintained or improved  Outcome: Progressing     Problem: Diabetes  Goal: Achieve decreasing blood glucose levels by end of shift  Outcome: Progressing  Goal: Decrease in ketones present in urine by end of shift  Outcome: Progressing  Goal: Maintain electrolyte levels within acceptable range throughout shift  Outcome: Progressing  Goal: Maintain glucose levels >70mg/dl to <250mg/dl throughout shift  Outcome: Progressing     Problem: Skin  Goal: Decreased wound size/increased tissue granulation at next dressing change  Outcome: Progressing  Goal: Participates in plan/prevention/treatment measures  Outcome: Progressing  Goal: Prevent/minimize sheer/friction injuries  Outcome: Progressing  Goal: Promote/optimize nutrition  Outcome: Progressing  Goal: Promote skin healing  Outcome: Progressing     Problem: Nutrition  Goal: Less than 5 days NPO/clear liquids  Outcome: Progressing  Goal: Oral intake greater 75%  Outcome: Progressing  Goal: Nutrition support is meeting 75% of nutrient needs  Outcome: Progressing  Goal: Lab values WNL  Outcome: Progressing  Goal: Promote healing  Outcome: Progressing   The patient's goals for the shift include      The clinical goals for the shift include tolerate feed

## 2024-05-23 NOTE — PROGRESS NOTES
Janette Martinez is a 53 y.o. female on day 14 of admission presenting with AMS (altered mental status).  Patient with h/o HTN, Grave's disease s/p thyroidectomy, hypothyroidism, DM, dysphagia, HIV, metabolic encephalopathy, who p/w AMS and fever. Work up revealed enterococcal UTI, MRSA bacteremia and pneumonia (likely aspiration) c/b hypoxic respiratory failure for which pulmonary is consulted. Since then has been treated with antibiotics.   Subjective   No acute overnight events. S/p PEG placement 5/21. O2 requirements stable at 1L     Overall is feeling well. Denies SOB, cough, sputum, wheezing, CP or any other complaints.   Objective   Scheduled medications  amLODIPine, 5 mg, oral, Daily  apixaban, 5 mg, oral, BID  baclofen, 10 mg, oral, BID  daptomycin, 700 mg, intravenous, q24h MARTIN  dolutegravir, 50 mg, oral, Daily  emtricitabine-tenofovir alafen, 1 tablet, oral, Daily  furosemide, 20 mg, oral, Daily  insulin glargine, 20 Units, subcutaneous, q24h  insulin lispro, 0-10 Units, subcutaneous, TID  ipratropium-albuteroL, 3 mL, nebulization, TID  lamoTRIgine, 25 mg, oral, q PM  levothyroxine, 175 mcg, oral, Daily before breakfast  lidocaine, 5 mL, infiltration, Once  lidocaine, 5 mL, infiltration, Once  oxygen, , inhalation, Continuous - Inhalation  polyethylene glycol, 17 g, orogastric tube, Daily  potassium chloride, 20 mEq, nasogastric tube, Daily  sertraline, 200 mg, oral, Daily    Continuous medications  sodium chloride 0.9%, 75 mL/hr, Last Rate: 75 mL/hr (05/21/24 1250)    PRN medications  PRN medications: dextrose, dextrose, glucagon, glucagon, guaiFENesin, hydrALAZINE, ipratropium-albuteroL, oxyCODONE   Physical Exam  Constitutional:       General: She is not in acute distress.     Appearance: She is obese. She is ill-appearing. She is not toxic-appearing.   HENT:      Head: Normocephalic and atraumatic.      Nose:      Comments: On 1L NC.      Mouth/Throat:      Mouth: Mucous membranes are moist.       "Comments: Mallampati 3.   Eyes:      General: No scleral icterus.     Extraocular Movements: Extraocular movements intact.      Pupils: Pupils are equal, round, and reactive to light.   Cardiovascular:      Rate and Rhythm: Normal rate and regular rhythm.      Heart sounds: No murmur heard.     No friction rub. No gallop.   Pulmonary:      Effort: Pulmonary effort is normal. No respiratory distress.      Breath sounds: Rales (diffuse crackles.) present. No wheezing.   Abdominal:      General: There is no distension.      Palpations: Abdomen is soft.      Tenderness: There is no abdominal tenderness.   Musculoskeletal:         General: No tenderness.      Cervical back: Neck supple. No rigidity or tenderness.      Right lower leg: No edema.      Left lower leg: No edema.   Lymphadenopathy:      Cervical: No cervical adenopathy.   Skin:     General: Skin is warm and dry.      Coloration: Skin is not jaundiced.   Neurological:      Mental Status: She is alert.      Comments: Awake and alert, R facial droop with R sided hemiparalysis. Able to move LUE and LLE.    Psychiatric:         Mood and Affect: Mood normal.         Behavior: Behavior normal.   Last Recorded Vitals  Blood pressure 130/76, pulse 103, temperature 36.8 °C (98.2 °F), resp. rate 16, height 1.626 m (5' 4.02\"), weight 96.5 kg (212 lb 11.9 oz), SpO2 95%.  Intake/Output last 3 Shifts:  I/O last 3 completed shifts:  In: 114.5 (1.2 mL/kg) [I.V.:14.5 (0.2 mL/kg); IV Piggyback:100]  Out: 2200 (22.8 mL/kg) [Urine:2200 (0.6 mL/kg/hr)]  Weight: 96.5 kg   Relevant Results  Results for orders placed or performed during the hospital encounter of 05/09/24 (from the past 24 hour(s))   POCT GLUCOSE   Result Value Ref Range    POCT Glucose 161 (H) 74 - 99 mg/dL   POCT GLUCOSE   Result Value Ref Range    POCT Glucose 138 (H) 74 - 99 mg/dL   POCT GLUCOSE   Result Value Ref Range    POCT Glucose 158 (H) 74 - 99 mg/dL   POCT GLUCOSE   Result Value Ref Range    POCT Glucose " 197 (H) 74 - 99 mg/dL   Bedside Midline Imaging    Result Date: 5/22/2024  These images are not reportable by radiology and will not be interpreted by  Radiologists.   Assessment/Plan   Principal Problem:    AMS (altered mental status)  Active Problems:    Oropharyngeal dysphagia  53 YOF with h/o HTN, Grave's disease s/p thyroidectomy, hypothyroidism, DM, dysphagia, HIV, metabolic encephalopathy, who p/w AMS and fever. Work up revealed enterococcal UTI, MRSA bacteremia and pneumonia (likely aspiration) c/b hypoxic respiratory failure for which pulmonary is consulted. Since then has been treated with antibiotics.     Pneumonia: ? Causes, multifocal, with bronchial spread. Aspiration vs bacterial vs atypical. MRSA swab +ve, also +ve bacteremia with MRSA overall suggesting MRSA pneumonia. Overall is markedly improved clinically.        ID on consult, will FU with their recommendations      Continue Daptomycin, stop date as per ID      Would consider bronch + BAL if clinically worse, but currently is improved clinically        Respiratory failure: acute with hypoxia, due to above. Echo unremarkable. Also no evidence suggesting obstructive lung disease. Muscle weakness from CVA also might be playing a role. O2 requirements is slowly improving.      Continue supplemental O2, wean off as tolerates      BPH       Continue Duo-neb         Oropharyngeal dysphagia: S/p PEG placement      Tube feeds via PEG    DVT prophylaxis: with Lovenox    Pulmonary will FU while in house.    Vlad Lee MD

## 2024-05-23 NOTE — PROGRESS NOTES
Janette Martinez is a 53 y.o. female on day 14 of admission presenting with AMS (altered mental status).      Subjective   HPI: This is a 53 y.o. female who was recently discharged after an admission for encephalopathy/sepsis.  Patient had a protracted hospitalization last visit, one of her issues was swallowing which was found to be okay for the puréed diet honey thickened liquids.  Today I was called from the nursing home with concerns for fever/altered mental status/suspicion for aspiration.  Discussed with director of nursing at the facility-patient was a full code and wanted her transferred to the ER by EMS.  After patient's arrival in the ER and further evaluation discussed with emergency room physician and agreed with hospitalization, as patient's condition was worsening in the ER the provider contacted patient's sister/POA and patient is currently DO NOT RESUSCITATE with limitations.  Patient is too drowsy to answer any questions, turns when called, not answering any questions     Objective     Last Recorded Vitals  /76   Pulse 103   Temp 36.8 °C (98.2 °F)   Resp 16   Wt 96.5 kg (212 lb 11.9 oz)   SpO2 95%   Intake/Output last 3 Shifts:    Intake/Output Summary (Last 24 hours) at 5/23/2024 0820  Last data filed at 5/23/2024 0318  Gross per 24 hour   Intake 114.48 ml   Output 1700 ml   Net -1585.52 ml       Admission Weight  Weight: 90.7 kg (200 lb) (05/09/24 1204)    Daily Weight  05/15/24 : 96.5 kg (212 lb 11.9 oz)    Image Results  Bedside Midline Imaging  These images are not reportable by radiology and will not be interpreted   by  Radiologists.      Physical Exam  GENERAL: awake, Ox0 cSKIN: Skin turgor normal. No rashes  HEENT: no epistaxis, Moist mucosa.  NECK: supple  BACK: spine nontender to palpation, No CVAT.  LUNGS: Vesicular breath sounds, with no wheeze, no crepitations.   CARDIAC: REGULAR. S1 and S2; no rubs, no murmur  ABDOMEN: Abdomen soft, non-tender. BS+  EXTREMITIES: No edema,  Good capillary refill.   NEURO: History of dysarthria, hemiparesis, but today's exam is limited   MUSCULOSKELETAL: No acute inflammation    Relevant Results    Results for orders placed or performed during the hospital encounter of 05/09/24 (from the past 24 hour(s))   POCT GLUCOSE   Result Value Ref Range    POCT Glucose 161 (H) 74 - 99 mg/dL   POCT GLUCOSE   Result Value Ref Range    POCT Glucose 138 (H) 74 - 99 mg/dL   POCT GLUCOSE   Result Value Ref Range    POCT Glucose 158 (H) 74 - 99 mg/dL   POCT GLUCOSE   Result Value Ref Range    POCT Glucose 197 (H) 74 - 99 mg/dL       Scheduled medications  amLODIPine, 5 mg, oral, Daily  apixaban, 5 mg, oral, BID  baclofen, 10 mg, oral, BID  daptomycin, 700 mg, intravenous, q24h MARTIN  dolutegravir, 50 mg, oral, Daily  emtricitabine-tenofovir alafen, 1 tablet, oral, Daily  furosemide, 20 mg, oral, Daily  insulin glargine, 20 Units, subcutaneous, q24h  insulin lispro, 0-10 Units, subcutaneous, TID  ipratropium-albuteroL, 3 mL, nebulization, TID  lamoTRIgine, 25 mg, oral, q PM  levothyroxine, 175 mcg, oral, Daily before breakfast  lidocaine, 5 mL, infiltration, Once  lidocaine, 5 mL, infiltration, Once  oxygen, , inhalation, Continuous - Inhalation  polyethylene glycol, 17 g, orogastric tube, Daily  potassium chloride, 20 mEq, nasogastric tube, Daily  sertraline, 200 mg, oral, Daily      Continuous medications  sodium chloride 0.9%, 75 mL/hr, Last Rate: 75 mL/hr (05/21/24 1250)      PRN medications  PRN medications: dextrose, dextrose, glucagon, glucagon, guaiFENesin, hydrALAZINE, ipratropium-albuteroL, oxyCODONE             Assessment/Plan      # Encephalopathy  - improved today     # Acute respiratory failure  -DNR with limitations per family  -Not a current candidate for ICU care or intubation     # Probable aspiration pneumonia  -Will initiate IV antibiotics     # YESSICA-resolved     # Old right hemiparesis/dysarthria     HIV:  Hx graves s/p thyroidectomy   Post surgical  hypothyroid  HTN  HLP  T2DM    5/10 : Pt has poor swallowing - seen by ST - will start NGT feeds over the weekend - NGT placed today. Pt more awake - was waving at me - speaking appropriately.     5/11-patient seen at bedside, patient's sister/POA at bedside discussed with both of them about poor p.o. intake and the need for a more definitive plan.  They both agree for PEG if needed patient to get MBS study done on Monday, will consult GI if patient fails swallowing.  She may need a PEG even if she able to swallow because her intake has been less than 50%.    5/12-patient seen at bedside, has NG in place, NG had, come out a few inches was replaced and x-ray was verified, swallow test tomorrow.    5/30-MRSA bacteremia, on IV vancomycin, TTE pending, patient did not do well with swallow eval, discussed with speech therapist-will proceed to consider PEG, consult GI, patient agrees with the plan.    5/15-patient feels well, denies complaints, GI concerned about aspiration pneumonia, will consult pulmonary to optimize her pulmonary issues, GI planning PEG placement Monday, will hold Eliquis after Friday, PT OT evaluation noted    5/16-patient has dysphagia, NG tube still in place, PEG placement planned for Monday, stop Eliquis, will start heparin subcu, patient stabilizing from the pulmonary perspective.      5/17 : Hyperglycemic - will add Lantus, planning PEG on Monday , Eliquis on hold. Midline is being planned.  GI unable to place PEG because of staffing, will consult surgery per the recommendation.    5/18 : Surgery consulted for probable PEG on Monday - NGT in place, Encephalopathy     5/20-patient waiting for a PEG placement, she has been n.p.o. since yesterday, placement has been postponed to tomorrow, will start PEG feeds after placement tomorrow, continue IV fluids, has elevated WBC-IV antibiotics per ID,    5/22-patient has PEG tube, PEG feeds on flow, tolerating PEG feeds well, midline placement today,  continue IV antibiotics per ID, will plan discharge back to NH tomorrow      5/23 :Pt feels well - tolereating TF well, IV abx per ID - ok for DC today to NH      Discharge is planned for today . Discharge medications were reconciled. Discharge orders completed. Hospital course , medication changes and Investigation's conducted were reviewed with the patient / Family. Activity, Diet and Medications after discharge were reviewed with the patient / Family. Medication reconciliation done - pls refer to medication reconciliation sheet.Follow up with Primary Care Physician were reviewed with the patient / Family. Discharge planning was discussed with staff. Refer to discharge orders sheet. Total time to coordinate disch process incl counseling family, coordinating with other care givers,  and pharmacist was >35 min.         Radha Ward MD

## 2024-05-23 NOTE — DISCHARGE SUMMARY
Discharge Diagnosis  AMS (altered mental status)    Issues Requiring Follow-Up  ID    Discharge Meds     Your medication list        START taking these medications        Instructions Last Dose Given Next Dose Due   sodium chloride 0.9% parenteral solution 50 mL with DAPTOmycin 50 mg/mL recon soln 775 mg      Infuse 775 mg at 131 mL/hr over 30 minutes into a venous catheter once every 24 hours for 23 days. Do not fill before May 19, 2024.              CONTINUE taking these medications        Instructions Last Dose Given Next Dose Due   acetaminophen 500 mg tablet  Commonly known as: Tylenol           amLODIPine 5 mg tablet  Commonly known as: Norvasc           apixaban 5 mg tablet  Commonly known as: Eliquis           baclofen 10 mg tablet  Commonly known as: Lioresal           Basaglar KwikPen U-100 Insulin 100 unit/mL (3 mL) pen  Generic drug: insulin glargine           Descovy 200-25 mg tablet  Generic drug: emtricitabine-tenofovir alafen           docusate sodium 100 mg capsule  Commonly known as: Colace           furosemide 20 mg tablet  Commonly known as: Lasix           gabapentin 300 mg capsule  Commonly known as: Neurontin           hydroCHLOROthiazide 25 mg tablet  Commonly known as: HYDRODiuril           insulin lispro 100 unit/mL injection  Commonly known as: HumaLOG           ipratropium-albuteroL 0.5-2.5 mg/3 mL nebulizer solution  Commonly known as: Duo-Neb           lamoTRIgine 25 mg tablet  Commonly known as: LaMICtal           levothyroxine 175 mcg tablet  Commonly known as: Synthroid, Levoxyl           melatonin 3 mg tablet           moxifloxacin 0.5 % ophthalmic solution  Commonly known as: Vigamox           oxyCODONE 5 mg immediate release tablet  Commonly known as: Roxicodone           polyethylene glycol 17 gram packet  Commonly known as: Glycolax, Miralax           potassium chloride ER 10 mEq ER capsule  Commonly known as: Micro-K           sertraline 100 mg tablet  Commonly known as:  Zoloft           Tivicay 50 mg tablet  Generic drug: dolutegravir           Voltaren 1 % gel  Generic drug: diclofenac sodium                  STOP taking these medications      atorvastatin 10 mg tablet  Commonly known as: Lipitor                  Where to Get Your Medications        You can get these medications from any pharmacy    Bring a paper prescription for each of these medications  sodium chloride 0.9% parenteral solution 50 mL with DAPTOmycin 50 mg/mL recon soln 775 mg         Test Results Pending At Discharge  Pending Labs       No current pending labs.            Hospital Course  # Encephalopathy  - improved today     # Acute respiratory failure  -DNR with limitations per family  -Not a current candidate for ICU care or intubation     # Probable aspiration pneumonia  -Will initiate IV antibiotics     # YESSICA-resolved     # Old right hemiparesis/dysarthria     HIV:  Hx graves s/p thyroidectomy   Post surgical hypothyroid  HTN  HLP  T2DM    5/10 : Pt has poor swallowing - seen by ST - will start NGT feeds over the weekend - NGT placed today. Pt more awake - was waving at me - speaking appropriately.     5/11-patient seen at bedside, patient's sister/POA at bedside discussed with both of them about poor p.o. intake and the need for a more definitive plan.  They both agree for PEG if needed patient to get MBS study done on Monday, will consult GI if patient fails swallowing.  She may need a PEG even if she able to swallow because her intake has been less than 50%.    5/12-patient seen at bedside, has NG in place, NG had, come out a few inches was replaced and x-ray was verified, swallow test tomorrow.    5/30-MRSA bacteremia, on IV vancomycin, TTE pending, patient did not do well with swallow eval, discussed with speech therapist-will proceed to consider PEG, consult GI, patient agrees with the plan.    5/15-patient feels well, denies complaints, GI concerned about aspiration pneumonia, will consult pulmonary  to optimize her pulmonary issues, GI planning PEG placement Monday, will hold Eliquis after Friday, PT OT evaluation noted    5/16-patient has dysphagia, NG tube still in place, PEG placement planned for Monday, stop Eliquis, will start heparin subcu, patient stabilizing from the pulmonary perspective.      5/17 : Hyperglycemic - will add Lantus, planning PEG on Monday , Eliquis on hold. Midline is being planned.  GI unable to place PEG because of staffing, will consult surgery per the recommendation.    5/18 : Surgery consulted for probable PEG on Monday - NGT in place, Encephalopathy     5/20-patient waiting for a PEG placement, she has been n.p.o. since yesterday, placement has been postponed to tomorrow, will start PEG feeds after placement tomorrow, continue IV fluids, has elevated WBC-IV antibiotics per ID,    5/22-patient has PEG tube, PEG feeds on flow, tolerating PEG feeds well, midline placement today, continue IV antibiotics per ID, will plan discharge back to NH tomorrow      5/23 :Pt feels well - tolereating TF well, IV abx per ID - ok for DC today to NH      Discharge is planned for today . Discharge medications were reconciled. Discharge orders completed. Hospital course , medication changes and Investigation's conducted were reviewed with the patient / Family. Activity, Diet and Medications after discharge were reviewed with the patient / Family. Medication reconciliation done - pls refer to medication reconciliation sheet.Follow up with Primary Care Physician were reviewed with the patient / Family. Discharge planning was discussed with staff. Refer to discharge orders sheet. Total time to coordinate disch process incl counseling family, coordinating with other care givers,  and pharmacist was >35 min.     Radha Ward MD

## 2024-05-23 NOTE — CARE PLAN
Problem: Pain - Adult  Goal: Verbalizes/displays adequate comfort level or baseline comfort level  Outcome: Progressing     Problem: Safety - Adult  Goal: Free from fall injury  Outcome: Progressing     Problem: Discharge Planning  Goal: Discharge to home or other facility with appropriate resources  Outcome: Progressing     Problem: Chronic Conditions and Co-morbidities  Goal: Patient's chronic conditions and co-morbidity symptoms are monitored and maintained or improved  Outcome: Progressing     Problem: Diabetes  Goal: Achieve decreasing blood glucose levels by end of shift  Outcome: Progressing  Goal: Decrease in ketones present in urine by end of shift  Outcome: Progressing  Goal: Maintain electrolyte levels within acceptable range throughout shift  Outcome: Progressing  Goal: Maintain glucose levels >70mg/dl to <250mg/dl throughout shift  Outcome: Progressing     Problem: Skin  Goal: Decreased wound size/increased tissue granulation at next dressing change  5/22/2024 2200 by Juliet Addison RN  Flowsheets (Taken 5/22/2024 2000)  Decreased wound size/increased tissue granulation at next dressing change: Promote sleep for wound healing  5/22/2024 2159 by Juliet Addison RN  Outcome: Progressing  Flowsheets (Taken 5/22/2024 2000)  Decreased wound size/increased tissue granulation at next dressing change: Promote sleep for wound healing  Goal: Participates in plan/prevention/treatment measures  5/22/2024 2200 by Juliet Addison RN  Flowsheets (Taken 5/22/2024 2000)  Participates in plan/prevention/treatment measures:   Elevate heels   Increase activity/out of bed for meals  5/22/2024 2159 by Juliet Addison RN  Outcome: Progressing  Flowsheets (Taken 5/22/2024 2000)  Participates in plan/prevention/treatment measures:   Elevate heels   Increase activity/out of bed for meals  Goal: Prevent/minimize sheer/friction injuries  5/22/2024 2200 by Juliet Addison RN  Flowsheets (Taken 5/22/2024 2000)  Prevent/minimize  sheer/friction injuries:   Increase activity/out of bed for meals   HOB 30 degrees or less  5/22/2024 2159 by Juliet Addison RN  Outcome: Progressing  Flowsheets (Taken 5/22/2024 2000)  Prevent/minimize sheer/friction injuries:   Increase activity/out of bed for meals   HOB 30 degrees or less  Goal: Promote/optimize nutrition  5/22/2024 2200 by Juliet Addison RN  Flowsheets (Taken 5/22/2024 2000)  Promote/optimize nutrition:   Monitor/record intake including meals   Offer water/supplements/favorite foods  5/22/2024 2159 by Juliet Addison RN  Outcome: Progressing  Flowsheets (Taken 5/22/2024 2000)  Promote/optimize nutrition:   Monitor/record intake including meals   Offer water/supplements/favorite foods  Goal: Promote skin healing  5/22/2024 2200 by Juliet Addison RN  Flowsheets (Taken 5/22/2024 2000)  Promote skin healing: Ensure correct size (line/device) and apply per  instructions  5/22/2024 2159 by Juliet Addison RN  Outcome: Progressing  Flowsheets (Taken 5/22/2024 2000)  Promote skin healing: Ensure correct size (line/device) and apply per  instructions     Problem: Nutrition  Goal: Less than 5 days NPO/clear liquids  Outcome: Progressing  Goal: Oral intake greater 75%  Outcome: Progressing  Goal: Nutrition support is meeting 75% of nutrient needs  Outcome: Progressing  Goal: Lab values WNL  Outcome: Progressing  Goal: Promote healing  Outcome: Progressing   The patient's goals for the shift include      The clinical goals for the shift include tolerate feed

## 2024-05-23 NOTE — DOCUMENTATION CLARIFICATION NOTE
"    PATIENT:               FLACO DURBIN  ACCT #:                  [id number]  MRN:                       [id number]  :                       1970  ADMIT DATE:       2024 11:44 AM  DISCH DATE:        2024 1:20 PM  RESPONDING PROVIDER #:        [id number]          PROVIDER RESPONSE TEXT:    I agree and support the diagnosis of  Severe Protein Calorie Malnutrition as indicated on the 24 nutrition assessment consult note    CDI QUERY TEXT:    Clarification      Instruction:    Based on your assessment of the patient and the clinical information, please provide the requested documentation by clicking on the appropriate radio button and enter any additional information if prompted.    Question: Please further clarify this patient nutritional status as    When answering this query, please exercise your independent professional judgment. The fact that a question is being asked, does not imply that any particular answer is desired or expected.    The patient's clinical indicators include:  Clinical Information: Patient admitted with MRSA pneumonia and asp pneumonia    Clinical Indicators:  -24- Nutrition assessment consult note by dietician: Malnutrition Diagnosis: \"Severe malnutrition related to chronic disease or condition as evidenced by: pt with approximately 11percent weight loss over past 6-7 months, areas of depleted adipose tissues and skeletal tissue wasting per nutrition focused physical exam, generalized edema\"      Treatment: PEG,  Glucerna    Risk Factors: acute and chronic illnesses including dysphagia  Options provided:  -- I agree and support the diagnosis of  Severe Protein Calorie Malnutrition as indicated on the 24 nutrition assessment consult note  -- Protein Calorie Malnutrition, Please specify severity  -- Other - I will add my own diagnosis  -- Refer to Clinical Documentation Reviewer    Query created by: Gayle Gant on 2024 2:48 PM      Electronically signed by:  " ARCELIA CRAFT MD 5/23/2024 2:57 PM

## 2024-05-23 NOTE — PROGRESS NOTES
Janette Martinez is a 53 y.o. female on day 13 of admission presenting with AMS (altered mental status).      Subjective   HPI: This is a 53 y.o. female who was recently discharged after an admission for encephalopathy/sepsis.  Patient had a protracted hospitalization last visit, one of her issues was swallowing which was found to be okay for the puréed diet honey thickened liquids.  Today I was called from the nursing home with concerns for fever/altered mental status/suspicion for aspiration.  Discussed with director of nursing at the facility-patient was a full code and wanted her transferred to the ER by EMS.  After patient's arrival in the ER and further evaluation discussed with emergency room physician and agreed with hospitalization, as patient's condition was worsening in the ER the provider contacted patient's sister/POA and patient is currently DO NOT RESUSCITATE with limitations.  Patient is too drowsy to answer any questions, turns when called, not answering any questions     Objective     Last Recorded Vitals  /72   Pulse 96   Temp 36.9 °C (98.4 °F)   Resp 16   Wt 96.5 kg (212 lb 11.9 oz)   SpO2 92%   Intake/Output last 3 Shifts:    Intake/Output Summary (Last 24 hours) at 5/22/2024 2323  Last data filed at 5/22/2024 1945  Gross per 24 hour   Intake 114.48 ml   Output 1800 ml   Net -1685.52 ml       Admission Weight  Weight: 90.7 kg (200 lb) (05/09/24 1204)    Daily Weight  05/15/24 : 96.5 kg (212 lb 11.9 oz)    Image Results  Bedside Midline Imaging  These images are not reportable by radiology and will not be interpreted   by  Radiologists.      Physical Exam  GENERAL: awake, Ox0 cSKIN: Skin turgor normal. No rashes  HEENT: no epistaxis, Moist mucosa.  NECK: supple  BACK: spine nontender to palpation, No CVAT.  LUNGS: Vesicular breath sounds, with no wheeze, no crepitations.   CARDIAC: REGULAR. S1 and S2; no rubs, no murmur  ABDOMEN: Abdomen soft, non-tender. BS+  EXTREMITIES: No edema,  Good capillary refill.   NEURO: History of dysarthria, hemiparesis, but today's exam is limited   MUSCULOSKELETAL: No acute inflammation    Relevant Results    Results for orders placed or performed during the hospital encounter of 05/09/24 (from the past 24 hour(s))   POCT GLUCOSE   Result Value Ref Range    POCT Glucose 98 74 - 99 mg/dL   CBC and Auto Differential   Result Value Ref Range    WBC 15.1 (H) 4.4 - 11.3 x10*3/uL    nRBC 0.0 0.0 - 0.0 /100 WBCs    RBC 2.42 (L) 4.00 - 5.20 x10*6/uL    Hemoglobin 7.3 (L) 12.0 - 16.0 g/dL    Hematocrit 22.9 (L) 36.0 - 46.0 %    MCV 95 80 - 100 fL    MCH 30.2 26.0 - 34.0 pg    MCHC 31.9 (L) 32.0 - 36.0 g/dL    RDW 17.6 (H) 11.5 - 14.5 %    Platelets 387 150 - 450 x10*3/uL    Neutrophils % 70.8 40.0 - 80.0 %    Immature Granulocytes %, Automated 0.8 0.0 - 0.9 %    Lymphocytes % 16.6 13.0 - 44.0 %    Monocytes % 8.4 2.0 - 10.0 %    Eosinophils % 2.8 0.0 - 6.0 %    Basophils % 0.6 0.0 - 2.0 %    Neutrophils Absolute 10.68 (H) 1.20 - 7.70 x10*3/uL    Immature Granulocytes Absolute, Automated 0.12 0.00 - 0.70 x10*3/uL    Lymphocytes Absolute 2.51 1.20 - 4.80 x10*3/uL    Monocytes Absolute 1.26 (H) 0.10 - 1.00 x10*3/uL    Eosinophils Absolute 0.42 0.00 - 0.70 x10*3/uL    Basophils Absolute 0.09 0.00 - 0.10 x10*3/uL   Green Top   Result Value Ref Range    Extra Tube Hold for add-ons.    POCT GLUCOSE   Result Value Ref Range    POCT Glucose 161 (H) 74 - 99 mg/dL   POCT GLUCOSE   Result Value Ref Range    POCT Glucose 138 (H) 74 - 99 mg/dL   POCT GLUCOSE   Result Value Ref Range    POCT Glucose 158 (H) 74 - 99 mg/dL       Scheduled medications  amLODIPine, 5 mg, oral, Daily  apixaban, 5 mg, oral, BID  baclofen, 10 mg, oral, BID  daptomycin, 700 mg, intravenous, q24h MARTIN  dolutegravir, 50 mg, oral, Daily  emtricitabine-tenofovir alafen, 1 tablet, oral, Daily  furosemide, 20 mg, oral, Daily  insulin glargine, 20 Units, subcutaneous, q24h  insulin lispro, 0-10 Units, subcutaneous,  TID  ipratropium-albuteroL, 3 mL, nebulization, TID  lamoTRIgine, 25 mg, oral, q PM  levothyroxine, 175 mcg, oral, Daily before breakfast  lidocaine, 5 mL, infiltration, Once  lidocaine, 5 mL, infiltration, Once  oxygen, , inhalation, Continuous - Inhalation  polyethylene glycol, 17 g, orogastric tube, Daily  potassium chloride, 20 mEq, nasogastric tube, Daily  sertraline, 200 mg, oral, Daily      Continuous medications  sodium chloride 0.9%, 75 mL/hr, Last Rate: 75 mL/hr (05/21/24 1250)      PRN medications  PRN medications: dextrose, dextrose, glucagon, glucagon, guaiFENesin, hydrALAZINE, ipratropium-albuteroL, oxyCODONE             Assessment/Plan      # Encephalopathy  - improved today     # Acute respiratory failure  -DNR with limitations per family  -Not a current candidate for ICU care or intubation     # Probable aspiration pneumonia  -Will initiate IV antibiotics     # YESSICA-resolved     # Old right hemiparesis/dysarthria     HIV:  Hx graves s/p thyroidectomy   Post surgical hypothyroid  HTN  HLP  T2DM    5/10 : Pt has poor swallowing - seen by ST - will start NGT feeds over the weekend - NGT placed today. Pt more awake - was waving at me - speaking appropriately.     5/11-patient seen at bedside, patient's sister/POA at bedside discussed with both of them about poor p.o. intake and the need for a more definitive plan.  They both agree for PEG if needed patient to get MBS study done on Monday, will consult GI if patient fails swallowing.  She may need a PEG even if she able to swallow because her intake has been less than 50%.    5/12-patient seen at bedside, has NG in place, NG had, come out a few inches was replaced and x-ray was verified, swallow test tomorrow.    5/30-MRSA bacteremia, on IV vancomycin, TTE pending, patient did not do well with swallow eval, discussed with speech therapist-will proceed to consider PEG, consult GI, patient agrees with the plan.    5/15-patient feels well, denies complaints,  GI concerned about aspiration pneumonia, will consult pulmonary to optimize her pulmonary issues, GI planning PEG placement Monday, will hold Eliquis after Friday, PT OT evaluation noted    5/16-patient has dysphagia, NG tube still in place, PEG placement planned for Monday, stop Eliquis, will start heparin subcu, patient stabilizing from the pulmonary perspective.      5/17 : Hyperglycemic - will add Lantus, planning PEG on Monday , Eliquis on hold. Midline is being planned.  GI unable to place PEG because of staffing, will consult surgery per the recommendation.    5/18 : Surgery consulted for probable PEG on Monday - NGT in place, Encephalopathy     5/20-patient waiting for a PEG placement, she has been n.p.o. since yesterday, placement has been postponed to tomorrow, will start PEG feeds after placement tomorrow, continue IV fluids, has elevated WBC-IV antibiotics per ID,    5/22-patient has PEG tube, PEG feeds on flow, tolerating PEG feeds well, midline placement today, continue IV antibiotics per ID, will plan discharge back to NH tomorrow  Radha Ward MD

## 2024-05-23 NOTE — PROGRESS NOTES
05/23/24 0949   Current Planned Discharge Disposition   Current Planned Discharge Disposition Inter     Patient has been cleared for discharge back to her [facility].  Requested [facility] set up transport.  SW to inform patient's sister of discharge.  Kalani Sharma RN     5/23/24 @ 1007  The S Vehicle you requested for Janette SHARMA in unit/room 728A on 05/23/2024 is scheduled to arrive at 12:00pm EDT! [facility] is handling this ride and you can contact them at [phone].  Nurse to call report to [phone]  Kalani Sharma RN

## 2024-05-28 ENCOUNTER — NURSING HOME VISIT (OUTPATIENT)
Dept: POST ACUTE CARE | Facility: EXTERNAL LOCATION | Age: 54
End: 2024-05-28
Payer: MEDICARE

## 2024-05-28 DIAGNOSIS — J96.01 ACUTE RESPIRATORY FAILURE WITH HYPOXIA AND HYPERCAPNIA (MULTI): ICD-10-CM

## 2024-05-28 DIAGNOSIS — F32.9 MAJOR DEPRESSIVE DISORDER WITH CURRENT ACTIVE EPISODE, UNSPECIFIED DEPRESSION EPISODE SEVERITY, UNSPECIFIED WHETHER RECURRENT: ICD-10-CM

## 2024-05-28 DIAGNOSIS — G93.40 ENCEPHALOPATHY: Primary | ICD-10-CM

## 2024-05-28 DIAGNOSIS — R13.19 ESOPHAGEAL DYSPHAGIA: ICD-10-CM

## 2024-05-28 DIAGNOSIS — R47.01 EXPRESSIVE APHASIA: ICD-10-CM

## 2024-05-28 DIAGNOSIS — J69.0 ASPIRATION PNEUMONIA, UNSPECIFIED ASPIRATION PNEUMONIA TYPE, UNSPECIFIED LATERALITY, UNSPECIFIED PART OF LUNG (MULTI): ICD-10-CM

## 2024-05-28 DIAGNOSIS — E11.65 TYPE 2 DIABETES MELLITUS WITH HYPERGLYCEMIA, WITH LONG-TERM CURRENT USE OF INSULIN (MULTI): ICD-10-CM

## 2024-05-28 DIAGNOSIS — M62.81 GENERALIZED MUSCLE WEAKNESS: ICD-10-CM

## 2024-05-28 DIAGNOSIS — Z79.4 TYPE 2 DIABETES MELLITUS WITH HYPERGLYCEMIA, WITH LONG-TERM CURRENT USE OF INSULIN (MULTI): ICD-10-CM

## 2024-05-28 DIAGNOSIS — N17.9 ACUTE KIDNEY INJURY SUPERIMPOSED ON CKD (CMS-HCC): ICD-10-CM

## 2024-05-28 DIAGNOSIS — I50.22 CHRONIC SYSTOLIC HEART FAILURE (MULTI): ICD-10-CM

## 2024-05-28 DIAGNOSIS — N18.9 ACUTE KIDNEY INJURY SUPERIMPOSED ON CKD (CMS-HCC): ICD-10-CM

## 2024-05-28 DIAGNOSIS — Z74.09 IMMOBILITY: ICD-10-CM

## 2024-05-28 DIAGNOSIS — E03.9 HYPOTHYROIDISM, UNSPECIFIED TYPE: ICD-10-CM

## 2024-05-28 DIAGNOSIS — I69.351 HEMIPARESIS AFFECTING RIGHT SIDE AS LATE EFFECT OF STROKE (MULTI): ICD-10-CM

## 2024-05-28 DIAGNOSIS — B20 HIV INFECTION, UNSPECIFIED SYMPTOM STATUS (MULTI): Chronic | ICD-10-CM

## 2024-05-28 DIAGNOSIS — E11.42 DIABETIC POLYNEUROPATHY ASSOCIATED WITH TYPE 2 DIABETES MELLITUS (MULTI): ICD-10-CM

## 2024-05-28 DIAGNOSIS — I10 BENIGN ESSENTIAL HTN: ICD-10-CM

## 2024-05-28 DIAGNOSIS — Z93.1 S/P PERCUTANEOUS ENDOSCOPIC GASTROSTOMY (PEG) TUBE PLACEMENT (MULTI): ICD-10-CM

## 2024-05-28 DIAGNOSIS — I69.30 SEQUELA OF CEREBROVASCULAR ACCIDENT: ICD-10-CM

## 2024-05-28 DIAGNOSIS — J96.02 ACUTE RESPIRATORY FAILURE WITH HYPOXIA AND HYPERCAPNIA (MULTI): ICD-10-CM

## 2024-05-28 PROCEDURE — 99310 SBSQ NF CARE HIGH MDM 45: CPT | Performed by: NURSE PRACTITIONER

## 2024-05-28 NOTE — LETTER
Patient: Janette Martinez  : 1970    Encounter Date: 2024    Name: Janette Martinez    YOB: 1970    Code Status: DNRcc arrest    Chief complaint:  Readmit after hospitalization;   Encephalopathy;  etc.....      HPI:    53 year old female with past medical history of heart failure with reduced EF,   diabetes, hypertension, hyperlipidemia,        CVA (left MCA  with residual right-sided hemiparesis), GERD, HIV, hypothyroidism, hypertension,   and diabetes.    RECENT HOSPITAL COURSE:  Patient was recently discharged after an admission for encephalopathy/sepsis.    Patient had a protracted hospitalization last visit, one of her issues was swallowing which was found to be okay for the puréed diet honey thickened liquids. Patient was sent to [facility] for  fever/altered mental status/suspicion for aspiration.         Patient's condition was worsening in the ER so the provider               contacted patient's sister/POA and patient is currently DO NOT RESUSCITATE with limitations.  Patient was too drowsy to answer any questions, turns when called, not answering any questions.  CT chest was performed on 24.  Results as follows:  Poor inspiratory effort is seen with crowding of pulmonary  vasculature at the lung bases. The heart is mildly enlarged. There is  diffuse bilateral interstitial prominence with patchy densities in  bilateral parahilar regions which on the chest CT obtained on the  same day demonstrated to be related to infiltrates. No gross pleural  effusion or pneumothorax is seen  1. Multifocal pneumonia with more dense airspace consolidation  demonstrated within the medial right and left lower lobes in the  posterior perihilar location. Scattered patchy areas of ground-glass  airspace infiltrate and consolidation within bilateral lung fields.  Follow-up to clearing.      2. The IVC is flattened in appearance suggesting component of  hypovolemia. Correlate      3. No dilated  loops of bowel. Postsurgical changes with enteroenteric  anastomosis in the right lower quadrant appearing unremarkable.  CT images of the brain were also obtained without IV  contrast.      FINDINGS:  The ventricles, cisterns and sulci are prominent, consistent with  moderate diffuse volume loss. Areas of white matter low attenuation  are nonspecific but likely related to chronic microvascular disease.  There is intracranial atherosclerosis.      Gray-white differentiation is preserved.  No acute intracranial hemorrhage or mass effect.  No midline shift. Patent basal cisterns.  No extraaxial fluid collections.    On 5/21/24 patient had an insertion of gastrostomy tube laparoscopy.                                    PREVIOUS HOSPITAL COURSE:       Patient was sent to [facility] ER due to concern for AMS and worsening left sided facial droop.  HPI limited as patient poor historian, with prior stroke with residual aphasia.  Multiple labs and diagnostic tests performed in the hospital.   CBC performed: +leukocytosis, no acute anemia, no thrombocytopenia  CMP: no acute electrolyte abnormalities, no acute renal or liver dysfunction appreciated   CTH: no acute processes. Negative for acute stroke.   MRI/MRA: No acute processes.  No LVO or acute stenosis.   UA: +leukest, no nitrites, 1-5 WBC --> which could suggest UTI.  Patient denies urinary complaints (unclear if reliable historian).   Received  a dose of Rocephin in the ER.       Patient was evaluated by speech therapy and had a modified barium swallow performed in the hospital.      Recommendations as follows:   PUREE DIET WITH HONEY THICKENED LIQUIDS  UPRIGHT AT 90 DEGREES FOR ALL PO  SLOW RATE AND SMALL BOLUS SIZE  LIQUIDS VIA TSP ONLY  MEDICATION CRUSHED IN PUREE CARRIER         Patient re-admitted to [facility] On 5/24/24 after her most recent hospitalization.        Hospital and Chronic Diagnoses as follows:    Encephalopathy:   Hx CVA with residual R  hemiparesis, aphasia  CTH: no acute processes  -MRI/MRA: No acute processes.  No LVO or acute stenosis.   Patient seen today, she is in her bed.  Calm, cooperative.  Mentation improving close to baseline.  No complaints of headaches or dizziness.  No chest pain.    Acute respiratory failure:  DNR code status with limitations per family  Not a current candidate for ICU care or intubation     Probable aspiration pneumonia       Started on IV antibiotics    Chronic systolic HF; Benign essential HTN :  On Amlodipine and hydrochlorothiazide.              No oxygen desaturations reported.               Recent BP                No recent complaints of  chest pain, headaches or dizziness.       YESSICA on CKD 3:  Treated with IVF.     T2DM  Hypoglycemia protocol was implented in the hospital.  Accuchecks  ordered ACHS, ISS coverage  Hba1c ordered.        Esophageal dysphagia s/p PEG:  Patient was evaluated by speech therapy.  During previous hospitalization she was ordered --PUREE DIET WITH HONEY THICKENED LIQUIDS.  However this hospitalization, she was made NPO and a PEG tube was placed.  Currently on PEG tube feeds.     HIV:        Recent CD4 count 4/2022: CD3 + T cell % 89 (60-89%), CD3 + T cell #3070       (high) ([phone]), CD3 and CD4 + Tcell % 23 (34-61%), CD3 + CD4 + T     cell    # [phone] (  ([phone])       Patient needs ID consult.      On Descovy.    Hypothyroidism; Hx graves s/p thyroidectomy   Post surgical hypothyroid  TSH  was low, Free thyroxine high; consistent with over medicated hypothyroid          DM type 2 with hyperglycemia; diabetic polyneuropathy:        Patient is frequently noncompliant with diet.       On Lantus 43 units at bedtime and Humalog sliding scale.       Recent accuchecks: 225 mg/dL, 214 mg/dL, 147 mg/dL.        No episodes of hypoglycemia reported.        Ha1c trends:       5/1/23 Ha1c 8.1 %        10/17/23 Ha1c 8.3 %       12/4/23 hemoglobin A1c 8.9%     No complaints of chest pain.    No  headaches.    No dizziness.            Sequela of Cerebrovascular accident; Hx CVA with right-sided hemiparesis; right sided weakness;   dysphagia 2/2 cva; expressive aphasia; History of acute/subacute stroke:   MRI was performed during a PREVIOUS hospitalization and it showed Acute/subacute tiny infarct in the left anterior brock.   Dr. Jiang from [facility] neuro/stroke was consulted at that time.  Currently on Eliquis 5 mg PO BID.  Pt has baseline dysarthria from a previous CVA, right Hemiparesis,  and expressive aphasia.  At her usual baseline she is able to communicate in short sentences and she is able to nod appropriately to questions.   She is on a mechanical soft, thin consistency diet.              Major depressive disorder:   On Sertraline.               Immobility; Generalized muscle weakness;  At  in long term care.  Not able to ambulate.  Very weak.  Bedbound/wheelchair bound.                                 Reviewed  hospital records from recent hospitalization.   Reviewed medical, social, surgical and family history.  Reviewed all current medications and performed medication reconciliation.  Reviewed vital signs AND recent blood work results.  Performed prescription drug management.   Reviewed [facility] Documentation  Discussed patient with nursing and .  Spent greater than 50 minutes on visit                  ROS: 10 point ROS performed; negative unless noted in HPI.       LABS:  BMP: Date: 5/1/2023 Na 137 K 4.3 Cr 1.1 BUN 15 glucose 141 Ca 8.8 GFR 52  CBC: Date: 5/1/2023 WBC 8.4 Hgb 11.1 hematocrit 33.9 platelets 337  Liver function: Date: 5/1/2023 ALT 10 AST 29 alkaline phos.  63 bilirubin 0.4 albumin 3.6 protein 7.7       Ha1c 8.1 % on 5/1/23          BMP: Date: 5/15/2023 Na 139  K 3.4 Cr 1.2 BUN 20 glucose 122 Ca 8.8 GFR 57        CBC: Date: 5/15/2023 WBC 9 Hgb 10.2 hematocrit 30.9 platelets 277       Liver function: 5/15/2023 ALT 8 AST 28 alkaline phos.  57 bilirubin  0.5 albumin 3.5 protein 7.3          BMP: Date: 5/22/2023 Na 141 K 3.1 Cr 1 BUN 11 glucose 227 Ca 8.7 GFR 70        CBC: Date: 5/22/2023 WBC 8.2 Hgb 10 hematocrit 30.5 platelets 372        Liver function: Date: 5/22/2023 ALT 8 AST 22 alkaline phos.  63 bilirubin 0.3 albumin 3.2 protein 7.2     BMP: Date: 6/5/2023 Na 140 K 4.2 Cr 1.1 BUN 14 glucose 79 Ca 1.1 GFR 63  CBC: Date: 6/5/2023 WBC 8.1 Hgb 10.5 hematocrit 32.3 platelets 350  Liver function: Date: 6/5/2023 ALT 7 AST 25 alkaline phos.  59 bilirubin 0.4 albumin 3.3 protein 7.7    BMP: Date: 7/17/2023 Na 140 K 4.1 Cr 0.8 BUN 17 glucose 126 Ca 9.1 GFR 90  CBC: Date: 7/17/2023 WBC 14.1 Hgb 10.2 hematocrit 32.1 platelets 265  Liver function: Date: 7/17/2023 ALT 8 AST 23 alkaline phos.  72 bilirubin 0.5 albumin 3.4 protein 7.4    CBC: Date: 7/19/2023 WBC 10.7 Hgb 9.5 hematocrit 28.8 platelets 257    BMP: Date: 7/24/2023 Na 137 K 4.2 Cr 0.9 BUN 19 glucose 186 Ca 9.1 GFR 79  CBC: Date: 7/24/2023 WBC 11.6 Hgb 10.3 hematocrit 31.4 platelets 305  Liver function: Date: 7/24/2023 ALT 11 AST 27 alkaline phos.  77 bilirubin 0.3 albumin 3.6 protein 7.8    BMP: Date: 7/31/2023 Na 141 K 4.2 Cr 0.8 BUN 17 glucose 138 Ca 8.7 GFR 90  CBC: Date: 7/31/2023 WBC 12.3 Hgb 10.5 hematocrit 32.4 platelets 307  Liver function: Date: 7/31/2023 ALT 14 AST 24 alkaline phos.  81 bilirubin 0.4 albumin 3.5 protein 7.4    BMP: Date: 8/7/2023 Na 141 K 3.9 Cr 1.0 BUN 12 glucose 167 Ca 8.6 GFR 70  CBC: Date: 8/7/2023 WBC 10 Hgb 10.5 hematocrit 32.7 platelets 256  Liver function: Date: 8/7/2023 ALT 9 AST 23 alkaline phos.  76 bilirubin 0.5 albumin 3.3 protein 7    BMP: Date: 8/21/2023 Na 142 K 4.1 Cr 1 BUN 17 glucose 195 Ca 8.5 GFR 70  CBC: Date: 8/21/2023 WBC 10.9 Hgb 9.6 hematocrit 30 platelets 304  Liver function: Date: 8/21/2023 ALT 9 AST 24 alkaline phos.  74 bilirubin 0.4 albumin 3.2 protein 7.1    BMP: Date: 8/31/2023 Na 141 K 3.8 Cr 0.9 BUN 14 glucose 118 Ca 8.2 GFR 79  CBC: Date: 8/31/2023  WBC 12 Hgb 9.6 hematocrit 29.8 platelets 294  Liver function: Date: 8/31/2023 ALT 8 AST 21 alkaline phos.  64 bilirubin 0.4 albumin 3.1 protein 7    BMP: Date: 9/4/2023 Na 141 K 3.9 Cr 1.1 BUN 17 glucose 107 Ca 8.5 GFR 63   CBC: Date: 9/4/2023 WBC 12 Hgb 9.5 hematocrit 29.7 platelets 282  Liver function: Date: 9/4/2023 ALT 7 AST 22 alkaline phos.  64 bilirubin 0.4 albumin 3.3 protein 7.1    BMP: Date: 9/11/2023 Na 142 K 4 Cr 1 BUN 12 glucose 81 Ca 8.2 GFR 70  CBC: Date: 9/11/2023 WBC 8.7 Hgb 9.2 hematocrit 28.3 platelets 305  Liver function: Date: 9/11/2023 ALT 7 AST 22 alkaline phos.  61 bilirubin 0.3 albumin 3 protein 6.8    BMP: Date: 9/18/2023 Na 140 K 4 Cr 1 BUN 15 glucose 150 Ca 8.3 GFR 70  CBC: Date: 9/18/2023 WBC 12.1 Hgb 9.5 hematocrit 29.8 platelets 287  Liver function: Date: 9/18/2023 ALT 7 AST 23 alkaline phos.  62 bilirubin 0.4 albumin 3.4 protein 7.1    10/17/23 Ha1c 8.3 %    BMP: Date: 10/23/2023 Na 141 K 3.6 Cr 1 BUN 22 glucose 65 Ca 8.3 GFR 70  CBC: Date: 10/23/2023 WBC 12.5 Hgb 10.8  hematocrit 33.2 platelets 290  Liver function: Date: 10/23/2023 ALT 9 AST 31 alkaline phos.  66 bilirubin 0.6 albumin 3.6 protein 7.8    10/26/2023: Lipid panel: Cholesterol 111; triglycerides 111; HDL 33; LDL 56; VLDL 22.    10/26/2023: TSH: 1.081 (range 0.340-5.500).    BMP: Date: 10/30/2023 Na 143  K 3.5 Cr 0.9 BUN 11 glucose 171 Ca 8 GFR 65  CBC: Date: 10/30/2023 WBC 9.2 Hgb 10.1 hematocrit 31.1 platelets 280  Liver function: Date: 10/30/2023 ALT 11 AST 27 alkaline phos.  66 bilirubin 0.4 albumin 3.4 protein 7.2    BMP: Date: 11/13/2023 Na 138 K 3.9 Cr 0.9 BUN 11 glucose 308 Ca 8.4 GFR 79  CBC: Date: 11/13/2023 WBC 8.2 Hgb 10.6 hematocrit 33 platelets 295  Liver function: Date: 11/13/2023 ALT 12 AST 37 alkaline phos.  69 bilirubin 0.4 albumin 3.2 protein 7.1    BMP: Date: 11/21/2023 Na 142 K 3.6 Cr 0.9 BUN 14 glucose 200 Ca 8.4 GFR 79.  CBC: Date: 11/21/2023 WBC 8.8 Hgb 10.1 hematocrit 30.4 platelets 290    BMP:  Date: 11/27/2023 Na 138 K 3.4 Cr 1 BUN 12 glucose 54 Ca 8.7 GFR 70  CBC: Date: 11/27/2023 WBC 12.3 Hgb 10.6 hematocrit 32.4 platelets 307  Liver function: Date: 11/27/2023 ALT 7 AST 27 alkaline phos.  72 bilirubin 0.5 albumin 3.3 protein 7.7    BMP: Date: 12/4/2023 Na 141 K 4 Cr 1 BUN 12 glucose 209 Ca 8.5 GFR 70  CBC: Date: 12/4/2023 WBC 8.5 Hgb 10.6 hematocrit 32.7 platelets 326  Liver function: Date: 12/4/2023 ALT 8 AST 25 alkaline phos.  64 bilirubin 0.4 albumin 3.3 protein 7.1  12/4/2023 hemoglobin A1c 8.9%    BMP: Date: 12/25/2023 Na 140 K 3.9 Cr 0.9 BUN 17 glucose 172 Ca 8.4 GFR 79  CBC: Date: 12/25/2023 WBC 8.9 Hgb 9.9 hematocrit 30.7 platelets 272  Liver function: Date: 12/25/2023 ALT 8 AST 26 alkaline phos.  67 bilirubin 0.3 albumin 3.2 protein 7    BMP: Date: 12/26/2023 Na 142 K 4.2 Cr 0.9 BUN 15 glucose 168 Ca 8.6 GFR 79  CBC: Date: 12/26/2023 WBC 9.9 Hgb 10.5 hematocrit 32.9 platelets 302    BMP: Date: 1/15/2024 Na 139 K 3.9 Cr 1 BUN 14 glucose 206 Ca 8.5 GFR 70  CBC: Date: 1/15/2024 WBC 8.3 Hgb 9.8 hematocrit 29.8 platelets 244  Liver function: Date: 1/15/2024 ALT 7 AST 22 alkaline phos.  62 bilirubin 0.3 albumin 3.3 protein 6.8    1/16/2024: Lipid panel: Cholesterol 119; triglycerides 174; HDL 32; LDL 52; VLDL 35.    BMP: Date: 2/5/2024 Na 141 K 4.2 Cr 1 BUN 22 glucose 150 Ca 8.3 GFR 70  CBC: Date: 2/5/2024 WBC 10.9 Hgb 10.2 hematocrit 31.1 platelets 319  Liver function: Date: 2/5/2024 ALT 7 AST 29 alkaline phos.  77 bilirubin 0.6 albumin 3.4 protein 7.4    BMP: Date: 2/26/2024 Na 143 K 3.6 Cr 1.3 BUN 23 glucose 152 Ca 8.5 GFR 51  CBC: Date: 2/26/2024 WBC 8 Hgb 11.1 hematocrit 33.6 platelets 318  Liver function: Date: 2/26/2024 ALT 11 AST 48 alkaline phos.  67 bilirubin 0.5 albumin 3.5 protein 7.8    BMP: Date: 3/11/2024 Na 142 K 3.8 Cr 0.9 BUN 16 glucose 97 Ca 8.7 GFR 79  CBC: Date: 3/11/2024 WBC 9 Hgb 10.5 hematocrit 32.3 platelets 302  Liver function: Date: 3/11/2024  ALT 10 AST 39 alkaline phos.   64 bilirubin 0.4 albumin 3.1 protein 7.1    BMP: Date: 3/18/2024 Na 143 K 4.4 Cr 0.9 BUN 15 glucose 111 Ca 8.7 GFR 79  CBC: Date: 3/18/2024 WBC 9.1 Hgb 10.8 hematocrit 32.6 platelets 294  Liver function: Date: 3/18/2024 ALT 13 AST 49 alkaline phos.  68 bilirubin 0.6 albumin 3.2 protein 7.4    BMP: Date: 3/25/2024 Na 140 K 3.7 Cr 1 BUN 17 glucose 225 Ca 8.7 GFR 70  CBC: Date: 3/25/2024  WBC 8.6 Hgb 10.7 hematocrit 32 platelets 304  Liver function: Date: 3/25/2024 ALT 15 AST 56 alkaline phos.  53 bilirubin 0.5 albumin 3.3 protein 7.1    BMP: Date: 4/1/2024 Na 143 K 3.5 Cr 1 BUN 19 glucose 151 Ca 8.7 GFR 70  CBC: Date: 4/1/2024 WBC 8.4 Hgb 10.2 hematocrit 30.8 platelets 254  Liver function: Date: 4/1/2024 ALT 12 AST 32 alkaline phos.  56 bilirubin 0.4 albumin 3.3 protein 6.8                Past Medical History:   Diagnosis Date   • Acute pulmonary embolism (Multi) 04/22/2024   • Aphasia as late effect of cerebrovascular accident 04/25/2023   • Essential hypertension 06/25/2010   • Gastroesophageal reflux disease 03/10/2009   • Hemiplegia of nondominant side, late effect of cerebrovascular disease (Multi) 09/03/2008   • Hemorrhagic stroke (Multi) 06/25/2010   • HIV infection (Multi) 02/21/2007   • Hypothyroidism 10/10/2002    Formatting of this note might be different from the original.   S/p thyroidectomy 2002   Patholgy c/w Graves   • Mixed hyperlipidemia 06/25/2010   • Stage 3a chronic kidney disease (Multi) 04/22/2024   • T2DM (type 2 diabetes mellitus) (Multi) 11/14/2012           Past Surgical History:   Procedure Laterality Date   • CHOLECYSTECTOMY  01/08/2016    Cholecystectomy   • COLON SURGERY  01/08/2016    Colon Surgery   • CT ANGIO NECK  3/19/2019    CT NECK ANGIO W AND WO IV CONTRAST 3/19/2019 [facility]ERGENCY LEGACY   • CT HEAD ANGIO W AND WO IV CONTRAST  3/19/2019    CT HEAD ANGIO W AND WO IV CONTRAST 3/19/2019 [facility]GENCY LEGAC[facility]   • HERNIA REPAIR  01/08/2016    Hernia Repair   • HYSTERECTOMY  01/08/2016     Hysterectomy   • MR HEAD ANGIO WO IV CONTRAST  6/4/2018    MR HEAD ANGIO WO IV CONTRAST 6/4/2018 [facility] CLINICAL LEGACY   • MR HEAD ANGIO WO IV CONTRAST  7/11/2020    MR HEAD ANGIO WO IV CONTRAST 7/11/2020 [facility] CLINICAL LEGACY   • MR HEAD ANGIO WO IV CONTRAST  9/30/2020    MR HEAD ANGIO WO IV CONTRAST 9/30/2020 [facility] LEGACY   • MR HEAD ANGIO WO IV CONTRAST  5/22/2022    MR HEAD ANGIO WO IV CONTRAST 5/22/2022 [facility] CLINICAL LEGACY   • MR HEAD ANGIO WO IV CONTRAST  4/19/2023    MR HEAD ANGIO WO IV CONTRAST [facility] MRI   • MR NECK ANGIO WO IV CONTRAST  6/4/2018    MR NECK ANGIO WO IV CONTRAST 6/4/2018 [facility] CLINICAL LEGACY   • MR NECK ANGIO WO IV CONTRAST  7/11/2020    MR NECK ANGIO WO IV CONTRAST 7/11/2020 [facility] CLINICAL LEGACY   • MR NECK ANGIO WO IV CONTRAST  9/30/2020    MR NECK ANGIO WO IV CONTRAST 9/30/2020 [facility] LEGAC[facility]   • MR NECK ANGIO WO IV CONTRAST  5/22/2022    MR NECK ANGIO WO IV CONTRAST 5/22/2022 [facility] CLINICAL LEGACY   • MR NECK ANGIO WO IV CONTRAST  4/19/2023    MR NECK ANGIO WO IV CONTRAST [facility] MRI   • OTHER SURGICAL HISTORY  01/08/2016    Tubal Stabilization   • OTHER SURGICAL HISTORY  09/01/2016    Cervical Surgery (Gyn) Laser Vaporization Of Transformation Zone   • THYROID SURGERY  09/27/2013    Thyroid Surgery            Surgical History  Problems    · History of Cervical Surgery (Gyn) Laser Vaporization Of Transformation Zone   · History of Cholecystectomy   · History of Colon Surgery   · History of Hernia Repair   · History of Hysterectomy   · History of Thyroid Surgery   · History of Tubal Stabilization     Family History  Mother    · Family history of Cancer  Family History    · Family history of Diabetes Mellitus (V18.0)   · Family history of Hypertension (V17.49)     Social History  Problems    · Disabled   · Does not use illicit drugs (V49.89) (Z78.9)   · Former smoker (V15.82) (Z87.891)   · Marital History - Single   · No alcohol.   · Denied: History of Secondhand smoke exposure   · Single    "  Allergies  Medication    · Aspirin TABS   · Contrast Media Ready-Box MISC   · morphine   · Sulfa Drugs        /72   Pulse 87   Temp 36.7 °C (98 °F)   Resp 20   Ht 1.626 m (5' 4\")   Wt 88.9 kg (196 lb)   SpO2 98%   BMI 33.64 kg/m²      Physical Exam  Vitals and nursing note reviewed.   Constitutional:       General: She is awake.      Appearance: Normal appearance. She is ill-appearing.   HENT:      Head: Normocephalic.      Right Ear: External ear normal.      Left Ear: External ear normal.      Nose: Nose normal.      Mouth/Throat:      Mouth: Mucous membranes are moist.      Pharynx: Oropharynx is clear.   Eyes:      Extraocular Movements: Extraocular movements intact.      Conjunctiva/sclera:      Right eye: Right conjunctiva is injected. No exudate.     Left eye: Left conjunctiva is not injected. No exudate.     Pupils: Pupils are equal, round, and reactive to light.   Cardiovascular:      Rate and Rhythm: Normal rate and regular rhythm.      Pulses: Normal pulses.      Heart sounds: Normal heart sounds.   Pulmonary:      Effort: Pulmonary effort is normal.      Breath sounds: Normal breath sounds.   Abdominal:      General: Bowel sounds are normal.      Palpations: Abdomen is soft.      Comments: PEG tube intact   Musculoskeletal:      Cervical back: Normal range of motion and neck supple.      Comments: Generalized muscle weakness; immobility.    Right sided hemiparesis   Skin:     General: Skin is warm and dry.   Neurological:      Mental Status: She is alert. Mental status is at baseline.      Motor: Weakness present.      Coordination: Coordination abnormal.      Gait: Gait abnormal.      Comments: Right sided weakness--RLE 1/5; RUE 3/5; sensation intact to touch x 4 extremities.     Left sided facial droop.    Generalized muscle weakness   Psychiatric:         Mood and Affect: Mood normal.         Behavior: Behavior normal. Behavior is cooperative.          Assessment/Plan  Ordered CMP and " CBC with diff for tomorrow.     Encephalopathy:  -Monitor CBC.  Monitor WCP, H & H, and platelets.  Monitor for leukocytosis, no acute anemia, no thrombocytopenia        Order CBC.  Monitor for fevers.  Encourage PO fluids.     Acute respiratory failure:  DNR code status with limitations per family  Not a current candidate for ICU care or intubation     Probable aspiration pneumonia:       Started on IV antibiotics in the hospital.       Continue IV antibiotics.    Chronic systolic HF; Benign essential HTN :  Continue Amlodipine and hydrochlorothiazide.              Monitor  oxygen saturations.              .          YESSICA on CKD 3:  Treated in the hospital.  Monitor renal function.        Esophageal dysphagia s/p PEG:  Patient was evaluated by speech therapy.  During previous hospitalization she was ordered --PUREE DIET WITH HONEY THICKENED LIQUIDS.  However this hospitalization, she was made NPO and a PEG tube was placed.  Currently on PEG tube feeds--continue.     HIV:        Recent CD4 count 4/2022: CD3 + T cell % 89 (60-89%), CD3 + T cell #3070       (high) ([phone]), CD3 and CD4 + Tcell % 23 (34-61%),          CD3 + CD4 + T   cell    # 779 (  ([phone])       Follow up with infectious disease.      Continue Descovy.    Hypothyroidism; Hx graves s/p thyroidectomy   Post surgical hypothyroid  TSH  was low, Free thyroxine high; consistent with over medicated hypothyroid       Sequela of Cerebrovascular accident; Hx CVA with right-sided hemiparesis; right sided weakness;   dysphagia 2/2 cva; expressive aphasia; History of acute/subacute stroke:   MRI was performed during a PREVIOUS hospitalization and it showed Acute/subacute tiny infarct in the left anterior brock.   Dr. Jiang from [facility] neuro/stroke was consulted at that time.  Currently on Eliquis 5 mg PO BID.  Pt has baseline dysarthria from a previous CVA, right Hemiparesis,  and expressive aphasia.  At her usual baseline she is able to communicate in  short sentences and she is able to nod appropriately to questions.   She is on a mechanical soft, thin consistency diet.                                 Major depressive disorder:   Continue Sertraline.       Generalized muscle weakness;Immobility; ;  At  in long term care.  Not able to ambulate.  Very weak.  Bedbound/wheelchair bound.                        Problem List Items Addressed This Visit          Cardiac and Vasculature    Chronic systolic heart failure (Multi) (Chronic)       Endocrine/Metabolic    Hypothyroidism (Chronic)    T2DM (type 2 diabetes mellitus) (Multi) (Chronic)       Gastrointestinal and Abdominal    Esophageal dysphagia (Chronic)       Infectious Diseases    HIV infection (Multi) (Chronic)       Neuro    Diabetic polyneuropathy (Multi) (Chronic)    Encephalopathy - Primary     Other Visit Diagnoses       Acute respiratory failure with hypoxia and hypercapnia (Multi)        Aspiration pneumonia, unspecified aspiration pneumonia type, unspecified laterality, unspecified part of lung (Multi)        Benign essential HTN        Acute kidney injury superimposed on CKD (CMS-HCC)        S/P percutaneous endoscopic gastrostomy (PEG) tube placement (Multi)        Sequela of cerebrovascular accident        Hemiparesis affecting right side as late effect of stroke (Multi)        Expressive aphasia        Major depressive disorder with current active episode, unspecified depression episode severity, unspecified whether recurrent        Immobility        Generalized muscle weakness                      JASON Espitia       Electronically Signed By: JASON Espitia   5/30/24 10:42 PM

## 2024-05-30 VITALS
WEIGHT: 196 LBS | TEMPERATURE: 98 F | BODY MASS INDEX: 33.46 KG/M2 | DIASTOLIC BLOOD PRESSURE: 72 MMHG | OXYGEN SATURATION: 98 % | HEIGHT: 64 IN | RESPIRATION RATE: 20 BRPM | HEART RATE: 87 BPM | SYSTOLIC BLOOD PRESSURE: 112 MMHG

## 2024-05-31 ENCOUNTER — APPOINTMENT (OUTPATIENT)
Dept: RADIOLOGY | Facility: HOSPITAL | Age: 54
DRG: 871 | End: 2024-05-31
Payer: MEDICARE

## 2024-05-31 ENCOUNTER — HOSPITAL ENCOUNTER (INPATIENT)
Facility: HOSPITAL | Age: 54
LOS: 10 days | Discharge: NURSING, REHAB OR LONG-TERM CARE | End: 2024-06-11
Attending: INTERNAL MEDICINE | Admitting: INTERNAL MEDICINE
Payer: MEDICARE

## 2024-05-31 ENCOUNTER — APPOINTMENT (OUTPATIENT)
Dept: CARDIOLOGY | Facility: HOSPITAL | Age: 54
DRG: 871 | End: 2024-05-31
Payer: MEDICARE

## 2024-05-31 DIAGNOSIS — A41.9 SEPSIS WITHOUT ACUTE ORGAN DYSFUNCTION, DUE TO UNSPECIFIED ORGANISM (MULTI): Primary | ICD-10-CM

## 2024-05-31 DIAGNOSIS — G93.40 ENCEPHALOPATHY: ICD-10-CM

## 2024-05-31 LAB
ABO GROUP (TYPE) IN BLOOD: NORMAL
ALBUMIN SERPL BCP-MCNC: 2.3 G/DL (ref 3.4–5)
ALP SERPL-CCNC: 111 U/L (ref 33–110)
ALT SERPL W P-5'-P-CCNC: 13 U/L (ref 7–45)
ANION GAP BLDV CALCULATED.4IONS-SCNC: 7 MMOL/L (ref 10–25)
ANION GAP SERPL CALC-SCNC: 11 MMOL/L (ref 10–20)
ANTIBODY SCREEN: NORMAL
APTT PPP: 53 SECONDS (ref 27–38)
AST SERPL W P-5'-P-CCNC: 36 U/L (ref 9–39)
BASE EXCESS BLDV CALC-SCNC: 12.2 MMOL/L (ref -2–3)
BASOPHILS # BLD MANUAL: 0.32 X10*3/UL (ref 0–0.1)
BASOPHILS NFR BLD MANUAL: 1 %
BILIRUB SERPL-MCNC: 0.2 MG/DL (ref 0–1.2)
BITE CELLS BLD QL SMEAR: PRESENT
BNP SERPL-MCNC: 105 PG/ML (ref 0–99)
BODY TEMPERATURE: 37 DEGREES CELSIUS
BUN SERPL-MCNC: 53 MG/DL (ref 6–23)
CA-I BLDV-SCNC: 1.17 MMOL/L (ref 1.1–1.33)
CALCIUM SERPL-MCNC: 8.5 MG/DL (ref 8.6–10.3)
CARDIAC TROPONIN I PNL SERPL HS: 7 NG/L (ref 0–13)
CHLORIDE BLDV-SCNC: 101 MMOL/L (ref 98–107)
CHLORIDE SERPL-SCNC: 96 MMOL/L (ref 98–107)
CK SERPL-CCNC: 38 U/L (ref 0–215)
CO2 SERPL-SCNC: 34 MMOL/L (ref 21–32)
CREAT SERPL-MCNC: 1.73 MG/DL (ref 0.5–1.05)
DACRYOCYTES BLD QL SMEAR: ABNORMAL
EGFRCR SERPLBLD CKD-EPI 2021: 35 ML/MIN/1.73M*2
EOSINOPHIL # BLD MANUAL: 0 X10*3/UL (ref 0–0.7)
EOSINOPHIL NFR BLD MANUAL: 0 %
ERYTHROCYTE [DISTWIDTH] IN BLOOD BY AUTOMATED COUNT: 17.2 % (ref 11.5–14.5)
GLUCOSE BLD MANUAL STRIP-MCNC: 377 MG/DL (ref 74–99)
GLUCOSE BLDV-MCNC: 432 MG/DL (ref 74–99)
GLUCOSE SERPL-MCNC: 408 MG/DL (ref 74–99)
HCO3 BLDV-SCNC: 37.5 MMOL/L (ref 22–26)
HCT VFR BLD AUTO: 22.2 % (ref 36–46)
HCT VFR BLD EST: 23 % (ref 36–46)
HGB BLD-MCNC: 7 G/DL (ref 12–16)
HGB BLDV-MCNC: 7.6 G/DL (ref 12–16)
IMM GRANULOCYTES # BLD AUTO: 0.84 X10*3/UL (ref 0–0.7)
IMM GRANULOCYTES NFR BLD AUTO: 2.6 % (ref 0–0.9)
INHALED O2 CONCENTRATION: 100 %
INR PPP: 1.5 (ref 0.9–1.1)
LACTATE BLDV-SCNC: 1.8 MMOL/L (ref 0.4–2)
LACTATE SERPL-SCNC: 1.1 MMOL/L (ref 0.4–2)
LACTATE SERPL-SCNC: 2.1 MMOL/L (ref 0.4–2)
LYMPHOCYTES # BLD MANUAL: 3.24 X10*3/UL (ref 1.2–4.8)
LYMPHOCYTES NFR BLD MANUAL: 10 %
MCH RBC QN AUTO: 30.3 PG (ref 26–34)
MCHC RBC AUTO-ENTMCNC: 31.5 G/DL (ref 32–36)
MCV RBC AUTO: 96 FL (ref 80–100)
MONOCYTES # BLD MANUAL: 2.59 X10*3/UL (ref 0.1–1)
MONOCYTES NFR BLD MANUAL: 8 %
NEUTROPHILS # BLD MANUAL: 26.24 X10*3/UL (ref 1.2–7.7)
NEUTS BAND # BLD MANUAL: 0.32 X10*3/UL (ref 0–0.7)
NEUTS BAND NFR BLD MANUAL: 1 %
NEUTS SEG # BLD MANUAL: 25.92 X10*3/UL (ref 1.2–7)
NEUTS SEG NFR BLD MANUAL: 80 %
NRBC BLD-RTO: 0.1 /100 WBCS (ref 0–0)
OXYHGB MFR BLDV: 67.7 % (ref 45–75)
PCO2 BLDV: 54 MM HG (ref 41–51)
PH BLDV: 7.45 PH (ref 7.33–7.43)
PLATELET # BLD AUTO: 353 X10*3/UL (ref 150–450)
PO2 BLDV: 42 MM HG (ref 35–45)
POLYCHROMASIA BLD QL SMEAR: ABNORMAL
POTASSIUM BLDV-SCNC: 4.7 MMOL/L (ref 3.5–5.3)
POTASSIUM SERPL-SCNC: 4.3 MMOL/L (ref 3.5–5.3)
PROT SERPL-MCNC: 8.4 G/DL (ref 6.4–8.2)
PROTHROMBIN TIME: 16.4 SECONDS (ref 9.8–12.8)
RBC # BLD AUTO: 2.31 X10*6/UL (ref 4–5.2)
RBC MORPH BLD: ABNORMAL
RH FACTOR (ANTIGEN D): NORMAL
SAO2 % BLDV: 69 % (ref 45–75)
SCHISTOCYTES BLD QL SMEAR: ABNORMAL
SODIUM BLDV-SCNC: 141 MMOL/L (ref 136–145)
SODIUM SERPL-SCNC: 137 MMOL/L (ref 136–145)
TARGETS BLD QL SMEAR: ABNORMAL
TOTAL CELLS COUNTED BLD: 100
WBC # BLD AUTO: 32.4 X10*3/UL (ref 4.4–11.3)

## 2024-05-31 PROCEDURE — 71250 CT THORAX DX C-: CPT | Performed by: RADIOLOGY

## 2024-05-31 PROCEDURE — 83605 ASSAY OF LACTIC ACID: CPT | Mod: 91 | Performed by: INTERNAL MEDICINE

## 2024-05-31 PROCEDURE — 86901 BLOOD TYPING SEROLOGIC RH(D): CPT | Performed by: INTERNAL MEDICINE

## 2024-05-31 PROCEDURE — 96367 TX/PROPH/DG ADDL SEQ IV INF: CPT

## 2024-05-31 PROCEDURE — 80053 COMPREHEN METABOLIC PANEL: CPT | Performed by: INTERNAL MEDICINE

## 2024-05-31 PROCEDURE — 74176 CT ABD & PELVIS W/O CONTRAST: CPT | Performed by: RADIOLOGY

## 2024-05-31 PROCEDURE — 82947 ASSAY GLUCOSE BLOOD QUANT: CPT

## 2024-05-31 PROCEDURE — 96361 HYDRATE IV INFUSION ADD-ON: CPT

## 2024-05-31 PROCEDURE — 2500000004 HC RX 250 GENERAL PHARMACY W/ HCPCS (ALT 636 FOR OP/ED): Performed by: INTERNAL MEDICINE

## 2024-05-31 PROCEDURE — 74176 CT ABD & PELVIS W/O CONTRAST: CPT

## 2024-05-31 PROCEDURE — 71045 X-RAY EXAM CHEST 1 VIEW: CPT | Performed by: RADIOLOGY

## 2024-05-31 PROCEDURE — 84132 ASSAY OF SERUM POTASSIUM: CPT | Mod: 91 | Performed by: INTERNAL MEDICINE

## 2024-05-31 PROCEDURE — 71045 X-RAY EXAM CHEST 1 VIEW: CPT

## 2024-05-31 PROCEDURE — 70450 CT HEAD/BRAIN W/O DYE: CPT

## 2024-05-31 PROCEDURE — 93005 ELECTROCARDIOGRAM TRACING: CPT

## 2024-05-31 PROCEDURE — 84484 ASSAY OF TROPONIN QUANT: CPT | Performed by: INTERNAL MEDICINE

## 2024-05-31 PROCEDURE — 83880 ASSAY OF NATRIURETIC PEPTIDE: CPT | Performed by: INTERNAL MEDICINE

## 2024-05-31 PROCEDURE — 70450 CT HEAD/BRAIN W/O DYE: CPT | Performed by: RADIOLOGY

## 2024-05-31 PROCEDURE — 85027 COMPLETE CBC AUTOMATED: CPT | Performed by: INTERNAL MEDICINE

## 2024-05-31 PROCEDURE — 82550 ASSAY OF CK (CPK): CPT | Performed by: INTERNAL MEDICINE

## 2024-05-31 PROCEDURE — 99285 EMERGENCY DEPT VISIT HI MDM: CPT

## 2024-05-31 PROCEDURE — 36415 COLL VENOUS BLD VENIPUNCTURE: CPT | Performed by: INTERNAL MEDICINE

## 2024-05-31 PROCEDURE — 87040 BLOOD CULTURE FOR BACTERIA: CPT | Mod: 91,AHULAB | Performed by: INTERNAL MEDICINE

## 2024-05-31 PROCEDURE — 96365 THER/PROPH/DIAG IV INF INIT: CPT

## 2024-05-31 PROCEDURE — 85007 BL SMEAR W/DIFF WBC COUNT: CPT | Performed by: INTERNAL MEDICINE

## 2024-05-31 PROCEDURE — 85610 PROTHROMBIN TIME: CPT | Performed by: INTERNAL MEDICINE

## 2024-05-31 RX ORDER — GUAIFENESIN/DEXTROMETHORPHAN 100-10MG/5
5 SYRUP ORAL EVERY 4 HOURS PRN
Status: DISCONTINUED | OUTPATIENT
Start: 2024-05-31 | End: 2024-06-11 | Stop reason: HOSPADM

## 2024-05-31 RX ORDER — DOCUSATE SODIUM 100 MG/1
100 CAPSULE, LIQUID FILLED ORAL 2 TIMES DAILY
Status: DISCONTINUED | OUTPATIENT
Start: 2024-05-31 | End: 2024-06-01

## 2024-05-31 RX ORDER — BISACODYL 5 MG
10 TABLET, DELAYED RELEASE (ENTERIC COATED) ORAL DAILY PRN
Status: DISCONTINUED | OUTPATIENT
Start: 2024-05-31 | End: 2024-06-11 | Stop reason: HOSPADM

## 2024-05-31 RX ORDER — AMLODIPINE BESYLATE 5 MG/1
5 TABLET ORAL DAILY
Status: DISCONTINUED | OUTPATIENT
Start: 2024-06-01 | End: 2024-06-11 | Stop reason: HOSPADM

## 2024-05-31 RX ORDER — DAPTOMYCIN IN SODIUM CHLORIDE 700 MG/100ML
700 INJECTION, SOLUTION INTRAVENOUS
Status: DISCONTINUED | OUTPATIENT
Start: 2024-05-31 | End: 2024-05-31

## 2024-05-31 RX ORDER — BACLOFEN 10 MG/1
10 TABLET ORAL 2 TIMES DAILY
Status: DISCONTINUED | OUTPATIENT
Start: 2024-05-31 | End: 2024-06-11 | Stop reason: HOSPADM

## 2024-05-31 RX ORDER — ACETAMINOPHEN 325 MG/1
650 TABLET ORAL ONCE
Status: COMPLETED | OUTPATIENT
Start: 2024-05-31 | End: 2024-05-31

## 2024-05-31 RX ORDER — GABAPENTIN 300 MG/1
600 CAPSULE ORAL 3 TIMES DAILY
Status: DISCONTINUED | OUTPATIENT
Start: 2024-05-31 | End: 2024-06-02

## 2024-05-31 RX ORDER — LAMOTRIGINE 25 MG/1
25 TABLET ORAL EVERY EVENING
Status: DISCONTINUED | OUTPATIENT
Start: 2024-05-31 | End: 2024-06-11 | Stop reason: HOSPADM

## 2024-05-31 RX ORDER — DIPHENHYDRAMINE HCL 25 MG
25 CAPSULE ORAL 2 TIMES DAILY PRN
Status: DISCONTINUED | OUTPATIENT
Start: 2024-05-31 | End: 2024-06-11 | Stop reason: HOSPADM

## 2024-05-31 RX ORDER — ONDANSETRON HYDROCHLORIDE 2 MG/ML
4 INJECTION, SOLUTION INTRAVENOUS EVERY 4 HOURS PRN
Status: DISCONTINUED | OUTPATIENT
Start: 2024-05-31 | End: 2024-06-11 | Stop reason: HOSPADM

## 2024-05-31 RX ORDER — ALPRAZOLAM 0.5 MG/1
0.5 TABLET ORAL 2 TIMES DAILY PRN
Status: DISCONTINUED | OUTPATIENT
Start: 2024-05-31 | End: 2024-06-11 | Stop reason: HOSPADM

## 2024-05-31 RX ORDER — ALUMINUM HYDROXIDE, MAGNESIUM HYDROXIDE, AND SIMETHICONE 1200; 120; 1200 MG/30ML; MG/30ML; MG/30ML
20 SUSPENSION ORAL 3 TIMES DAILY PRN
Status: DISCONTINUED | OUTPATIENT
Start: 2024-05-31 | End: 2024-06-11 | Stop reason: HOSPADM

## 2024-05-31 RX ORDER — METRONIDAZOLE 500 MG/100ML
500 INJECTION, SOLUTION INTRAVENOUS ONCE
Status: COMPLETED | OUTPATIENT
Start: 2024-05-31 | End: 2024-05-31

## 2024-05-31 RX ORDER — GUAIFENESIN 600 MG/1
600 TABLET, EXTENDED RELEASE ORAL 2 TIMES DAILY PRN
Status: DISCONTINUED | OUTPATIENT
Start: 2024-05-31 | End: 2024-06-11 | Stop reason: HOSPADM

## 2024-05-31 RX ORDER — FUROSEMIDE 20 MG/1
20 TABLET ORAL DAILY
Status: DISCONTINUED | OUTPATIENT
Start: 2024-06-01 | End: 2024-06-11 | Stop reason: HOSPADM

## 2024-05-31 RX ORDER — ACETAMINOPHEN 325 MG/1
650 TABLET ORAL EVERY 6 HOURS PRN
Status: DISCONTINUED | OUTPATIENT
Start: 2024-05-31 | End: 2024-06-11 | Stop reason: HOSPADM

## 2024-05-31 RX ORDER — MORPHINE SULFATE 4 MG/ML
4 INJECTION, SOLUTION INTRAMUSCULAR; INTRAVENOUS
Status: DISCONTINUED | OUTPATIENT
Start: 2024-05-31 | End: 2024-06-11 | Stop reason: HOSPADM

## 2024-05-31 RX ADMIN — METRONIDAZOLE 500 MG: 500 INJECTION, SOLUTION INTRAVENOUS at 19:37

## 2024-05-31 RX ADMIN — SODIUM CHLORIDE 1000 ML: 9 INJECTION, SOLUTION INTRAVENOUS at 19:35

## 2024-05-31 RX ADMIN — ACETAMINOPHEN 650 MG: 325 TABLET ORAL at 18:35

## 2024-05-31 RX ADMIN — VANCOMYCIN HYDROCHLORIDE 1500 MG: 1.5 INJECTION, POWDER, LYOPHILIZED, FOR SOLUTION INTRAVENOUS at 22:01

## 2024-05-31 RX ADMIN — CEFEPIME 2 G: 2 INJECTION, POWDER, FOR SOLUTION INTRAVENOUS at 19:36

## 2024-05-31 RX ADMIN — SODIUM CHLORIDE 1000 ML: 9 INJECTION, SOLUTION INTRAVENOUS at 23:32

## 2024-05-31 ASSESSMENT — PAIN SCALES - WONG BAKER: WONGBAKER_NUMERICALRESPONSE: NO HURT

## 2024-05-31 ASSESSMENT — PAIN - FUNCTIONAL ASSESSMENT
PAIN_FUNCTIONAL_ASSESSMENT: UNABLE TO SELF-REPORT
PAIN_FUNCTIONAL_ASSESSMENT: WONG-BAKER FACES

## 2024-05-31 NOTE — ED TRIAGE NOTES
Pt brought in by ems from [facility] for low hgb.  Pt is unable to respond to questions. Ems stated that the pt had a stroke 2 weeks ago with right sided deficit. Ems got a bgl of 540, pt was being treated for pneumonia and has a midline in her right upper arm.  Pt has a history of HIV, DMI, CVA

## 2024-05-31 NOTE — ED PROVIDER NOTES
HPI     CC: abnormal labs     HPI: Janette Martinez is a 53 y.o. female with a history of HFrEF EF 45%, hypertension, hyperlipidemia, diabetes, CKD 3, hypothyroidism, HIV, multiple CVAs with residual right-sided hemiparesis and aphasia, recent hospital admission last week for acute stroke with encephalopathy, acute respiratory failure, pneumonia, PEG placement with MRSA bacteremia discharged on IV daptomycin via right upper extremity midline, presents from her nursing facility for anemia.  RN spoke with nursing home staff member who stated that patient was sent here for hemoglobin of 6.1 needing transfusion.  Family reportedly has a meeting tomorrow to discuss hospice.  Patient has been reportedly nonverbal since her recent stroke and is reportedly at her mental status baseline.  She was notably febrile and tachycardic on presentation.  She is unable to provide a history.  She is documented to be DNR/DNI/no ICU level of care.    ROS: 10-point review of systems unable to perform as patient is aphasic    Limitations to history: Patient nonverbal    Independent Historians: None available    External Records Reviewed: Recent DC summary from 5/23    Past Medical History: Noncontributory except per HPI     Past Surgical History: Noncontributory except per HPI     Family History: Reviewed and noncontributory     Social History:  Denies tobacco. Denies ETOH. Denies illicit drugs.    Social Determinants Affecting Care: Chronically ill patient resident of skilled nursing facility    Allergies   Allergen Reactions    Aspirin Unknown, Bleeding and Itching    Iodinated Contrast Media Unknown and Hives    Amoxicillin Hives    Other Unknown    Morphine Unknown, Swelling, Itching and Rash    Sulfa (Sulfonamide Antibiotics) Unknown and Rash       Home Meds:   Current Outpatient Medications   Medication Instructions    acetaminophen (TYLENOL) 1,000 mg, oral, 3 times daily    amLODIPine (NORVASC) 5 mg, oral, Daily    apixaban (ELIQUIS)  "5 mg, oral, 2 times daily    baclofen (LIORESAL) 10 mg, oral, 2 times daily    Basaglar KwikPen U-100 Insulin 43 Units, subcutaneous, Nightly, Take as directed per insulin instructions.    diclofenac sodium (VOLTAREN) 4 g, Topical, Every 12 hours PRN    docusate sodium (COLACE) 100 mg, oral, 2 times daily    dolutegravir (TIVICAY) 50 mg, oral, Daily    emtricitabine-tenofovir alafen (Descovy) 200-25 mg tablet 1 tablet, oral, Daily    furosemide (LASIX) 20 mg, oral, Daily    gabapentin (NEURONTIN) 600 mg, oral, 3 times daily    hydroCHLOROthiazide (HYDRODIURIL) 25 mg, oral, Daily, Hold if SBP <110 or Heart rate <60    insulin lispro (HUMALOG) 12 Units, subcutaneous, Daily, At 1:30pm    ipratropium-albuteroL (Duo-Neb) 0.5-2.5 mg/3 mL nebulizer solution 3 mL, nebulization, 4 times daily PRN    lamoTRIgine (LAMICTAL) 25 mg, oral, Every evening    levothyroxine (SYNTHROID, LEVOXYL) 175 mcg, oral, Daily before breakfast    melatonin 6 mg, oral, Nightly    moxifloxacin (Vigamox) 0.5 % ophthalmic solution 1 drop, Right Eye, 3 times daily, 6am , 2pm, 10pm    oxyCODONE (ROXICODONE) 5 mg, oral, Every 6 hours PRN    polyethylene glycol (GLYCOLAX, MIRALAX) 17 g, oral, Daily    potassium chloride ER (Micro-K) 10 mEq ER capsule 20 mEq, oral, Daily, Do not crush or chew.    sertraline (ZOLOFT) 200 mg, oral, Daily    sodium chloride 0.9% parenteral solution 50 mL with DAPTOmycin 50 mg/mL recon soln 775 mg 8 mg/kg, intravenous, Every 24 hours scheduled        Physical Exam     ED Triage Vitals [05/31/24 1806]   Temperature Heart Rate Respirations BP   (!) 38.2 °C (100.7 °F) (!) 126 18 110/64      Pulse Ox Temp Source Heart Rate Source Patient Position   99 % Tympanic Monitor Sitting      BP Location FiO2 (%)     Left arm --         Heart Rate:  [110-126]   Temperature:  [38.2 °C (100.7 °F)]   Respirations:  [18-20]   BP: (110-142)/(64-76)   Height:  [172.7 cm (5' 8\")]   Weight:  [86.2 kg (190 lb)]   Pulse Ox:  [96 %-100 %]  "     Physical Exam  Vitals and nursing note reviewed.     CONSTITUTIONAL: Ill-appearing  HENMT: Head atraumatic. Airway patent. Nasal mucosa clear. Mouth with dry mucosa, clear oropharynx. Uvula midline. Neck supple.    EYES: Unable to close R eye. R facial droop with significant R eye erythema.    CARDIOVASCULAR: Tachycardic, regular rhythm.  Heart sounds S1, S2.  No murmurs, rubs or gallops. Normal pulses. Capillary refill < 2 sec.   RESPIRATORY: Breath sounds wet with rales throughout bilateral lung fields.  GASTROINTESTINAL: Abdomen soft, non-distended, non-tender. No rebound, no guarding. Normal bowel sounds. No palpable masses.  Brown stool on rectal.  GENITOURINARY: Large unstageable sacral decubitus ulceration contaminated with feces.  No Luna present.  Normal external genitalia.  MUSCULOSKELETAL: Spine appears normal, range of motion is not limited, no muscle or joint tenderness.  Diffuse peripheral edema.  NEUROLOGICAL: Awake but nonverbal.  Does not withdraw to painful stimuli in any extremity.  SKIN: Warm, dry and intact. No rash or other notable lesions.  PSYCHIATRIC: Unable to assess  HEME/LYMPH: No adenopathy or splenomegaly.    Diagnostic Results      ECG: ECGs read and interpreted by me. See ED Course, below, for interpretation.    Labs Reviewed   CBC WITH AUTO DIFFERENTIAL - Abnormal       Result Value    WBC 32.4 (*)     nRBC 0.1 (*)     RBC 2.31 (*)     Hemoglobin 7.0 (*)     Hematocrit 22.2 (*)     MCV 96      MCH 30.3      MCHC 31.5 (*)     RDW 17.2 (*)     Platelets 353      Immature Granulocytes %, Automated 2.6 (*)     Immature Granulocytes Absolute, Automated 0.84 (*)     Narrative:     The previously reported component Neutrophils % is no longer being reported.  The previously reported component Lymphocytes % is no longer being reported.  The previously reported component Monocytes % is no longer being reported.  The previously   reported component Eosinophils % is no longer being  reported.  The previously reported component Basophils % is no longer being reported.  The previously reported component Absolute Neutrophils is no longer being reported.  The previously reported   component Absolute Lymphocytes is no longer being reported.  The previously reported component Absolute Monocytes is no longer being reported.  The previously reported component Absolute Eosinophils is no longer being reported.  The previously reported   component Absolute Basophils is no longer being reported.   COMPREHENSIVE METABOLIC PANEL - Abnormal    Glucose 408 (*)     Sodium 137      Potassium 4.3      Chloride 96 (*)     Bicarbonate 34 (*)     Anion Gap 11      Urea Nitrogen 53 (*)     Creatinine 1.73 (*)     eGFR 35 (*)     Calcium 8.5 (*)     Albumin 2.3 (*)     Alkaline Phosphatase 111 (*)     Total Protein 8.4 (*)     AST 36      Bilirubin, Total 0.2      ALT 13     LACTATE - Abnormal    Lactate 2.1 (*)     Narrative:     Venipuncture immediately after or during the administration of Metamizole may lead to falsely low results. Testing should be performed immediately  prior to Metamizole dosing.   B-TYPE NATRIURETIC PEPTIDE - Abnormal     (*)     Narrative:        <100 pg/mL - Heart failure unlikely  100-299 pg/mL - Intermediate probability of acute heart                  failure exacerbation. Correlate with clinical                  context and patient history.    >=300 pg/mL - Heart Failure likely. Correlate with clinical                  context and patient history.    BNP testing is performed using different testing methodology at [facility] than at other [facility] hospitals. Direct result comparisons should only be made within the same method.      BLOOD GAS VENOUS FULL PANEL - Abnormal    POCT pH, Venous 7.45 (*)     POCT pCO2, Venous 54 (*)     POCT pO2, Venous 42      POCT SO2, Venous 69      POCT Oxy Hemoglobin, Venous 67.7      POCT Hematocrit Calculated, Venous 23.0 (*)     POCT  Sodium, Venous 141      POCT Potassium, Venous 4.7      POCT Chloride, Venous 101      POCT Ionized Calicum, Venous 1.17      POCT Glucose, Venous 432 (*)     POCT Lactate, Venous 1.8      POCT Base Excess, Venous 12.2 (*)     POCT HCO3 Calculated, Venous 37.5 (*)     POCT Hemoglobin, Venous 7.6 (*)     POCT Anion Gap, Venous 7.0 (*)     Patient Temperature 37.0      FiO2 100     COAGULATION SCREEN - Abnormal    Protime 16.4 (*)     INR 1.5 (*)     aPTT 53 (*)     Narrative:     The APTT is no longer used for monitoring Unfractionated Heparin Therapy. For monitoring Heparin Therapy, use the Heparin Assay.   POCT GLUCOSE - Abnormal    POCT Glucose 377 (*)    MANUAL DIFFERENTIAL - Abnormal    Neutrophils %, Manual 80.0      Bands %, Manual 1.0      Lymphocytes %, Manual 10.0      Monocytes %, Manual 8.0      Eosinophils %, Manual 0.0      Basophils %, Manual 1.0      Seg Neutrophils Absolute, Manual 25.92 (*)     Bands Absolute, Manual 0.32      Lymphocytes Absolute, Manual 3.24      Monocytes Absolute, Manual 2.59 (*)     Eosinophils Absolute, Manual 0.00      Basophils Absolute, Manual 0.32 (*)     Total Cells Counted 100      Neutrophils Absolute, Manual 26.24 (*)     RBC Morphology See Below      Polychromasia Mild      RBC Fragments Few      Target Cells Few      Teardrop Cells Few      Bite Cells Present     CREATINE KINASE - Normal    Creatine Kinase 38     TROPONIN I, HIGH SENSITIVITY - Normal    Troponin I, High Sensitivity 7      Narrative:     Less than 99th percentile of normal range cutoff-  Female and children under 18 years old <14 ng/L; Male <21 ng/L: Negative  Repeat testing should be performed if clinically indicated.     Female and children under 18 years old 14-50 ng/L; Male 21-50 ng/L:  Consistent with possible cardiac damage and possible increased clinical   risk. Serial measurements may help to assess extent of myocardial damage.     >50 ng/L: Consistent with cardiac damage, increased clinical  risk and  myocardial infarction. Serial measurements may help assess extent of   myocardial damage.      NOTE: Children less than 1 year old may have higher baseline troponin   levels and results should be interpreted in conjunction with the overall   clinical context.     NOTE: Troponin I testing is performed using a different   testing methodology at [facility] than at other   [facility]. Direct result comparisons should only   be made within the same method.   LACTATE - Normal    Lactate 1.1      Narrative:     Venipuncture immediately after or during the administration of Metamizole may lead to falsely low results. Testing should be performed immediately  prior to Metamizole dosing.   BLOOD CULTURE   BLOOD CULTURE   TYPE AND SCREEN    ABO TYPE O      Rh TYPE POS      ANTIBODY SCREEN NEG     URINALYSIS WITH REFLEX CULTURE AND MICROSCOPIC    Narrative:     The following orders were created for panel order Urinalysis with Reflex Culture and Microscopic.  Procedure                               Abnormality         Status                     ---------                               -----------         ------                     Urinalysis with Reflex C...[[id number]]                                                 Extra Urine Gray Tube[[id number]]                                                         Please view results for these tests on the individual orders.   URINALYSIS WITH REFLEX CULTURE AND MICROSCOPIC   EXTRA URINE GRAY TUBE   CBC   CBC   COMPREHENSIVE METABOLIC PANEL         CT chest abdomen pelvis wo IV contrast   Final Result   There are bilateral patchy airspace opacities with areas of   consolidation in the lower lobes. There is slight interval   improvement from prior CT. Findings concerning for multifocal   pneumonia with slight interval improvement. Continued radiographic   follow-up to resolution is advised.        A gastrostomy tube is present in the gastric body. Postsurgical    changes of partial bowel resection and anastomosis in the right mid   abdomen. Visualized loops of bowel are without evidence for   obstruction.        There is stranding of bilateral gluteal subcutaneous soft tissues   which is new from prior CT. There is a ill-defined fluid collection   along the right medial gluteal fold with multiple locules of   subcutaneous free air. There is surrounding inflammatory changes   which extends to the coccyx. These findings raise concern for   developing decubitus ulcer. Correlate with exam.        Additional findings as described above.        MACRO:   None        Signed by: Hector Maguire 5/31/2024 10:46 PM   Dictation workstation:   [id number]      CT head wo IV contrast   Final Result   No acute intracranial abnormality. If symptoms persist or there is   high clinical suspicion further evaluation with MRI can be considered.        Mild soft tissue swelling left parietal scalp.        Chronic changes as noted above.             MACRO:   None        Signed by: Hector Maguire 5/31/2024 10:29 PM   Dictation workstation:   [id number]      XR chest 1 view   Final Result   1.  No evidence of acute cardiopulmonary process.             Signed by: Javed Mcclure 5/31/2024 7:06 PM   Dictation workstation:   [id number]                    Mike Coma Scale Score: 6                  Procedure  Procedures    ED Course & MDM   Assessment/Plan:   Janette Martinez is a 53 y.o. female with a history of HFrEF EF 45%, hypertension, hyperlipidemia, diabetes, CKD 3, hypothyroidism, HIV, multiple CVAs with residual right-sided hemiparesis and aphasia, recent hospital admission last week for acute stroke with encephalopathy, acute respiratory failure, pneumonia, PEG placement with MRSA bacteremia discharged on IV daptomycin via right upper extremity midline, presents from her nursing facility for anemia  But incidentally found to be tachycardic and febrile concerning for recurrent sepsis.  She  was started on vancomycin, cefepime, Flagyl.  She has already se received e daptomycin today per pharmacy.  She has multiple possible sources of infection including a large sacral wound, recurrent pneumonia, bacteremia with midline present.  Despite her anemia on outpatient workup, she has brown stool on rectal exam.  She is chronically anemic on history.  Broad infectious, metabolic given patient is a poor historian and a difficult exam will do CT of the head, chest abdomen pelvis workup was initiated.  To rule out occult pathology. See below for details of ED course and ultimate disposition.    Medications   sodium chloride 0.9 % bolus 1,000 mL (0 mL intravenous Stopped 5/31/24 2135)   cefepime (Maxipime) in dextrose 5% IV 2 g (0 g intravenous Stopped 5/31/24 2006)   metroNIDAZOLE (Flagyl) 500 mg in NaCl (iso-os) 100 mL (0 mg intravenous Stopped 5/31/24 2037)   acetaminophen (Tylenol) tablet 650 mg (650 mg oral Given 5/31/24 1835)   vancomycin (Vancocin) 1,500 mg in dextrose 5%  mL (0 mg intravenous Stopped 5/31/24 2331)   sodium chloride 0.9 % bolus 1,000 mL (0 mL intravenous Stopped 6/1/24 0002)        ED Course as of 06/01/24 0053   Fri May 31, 2024   1906 I spoke with Dr. Duval from ID who agrees with empiric antibiotic therapy.  I spoke with pharmacy who recommends adding vancomycin in case of pneumonia as daptomycin has for lung penetration. [CG]   1919 Chest x-ray shows no acute cardiopulmonary process. [CG]   2216 ECG read interpreted by me.  Sinus tachycardia, rate 118.  Normal axis.  Normal intervals.  Nonspecific T wave abnormality. [CG]   2216 Labs are notable for CBC with leukocytosis 32.4, anemia 7.0, normal platelets 353, CMP with hyperglycemia 408 with normal anion gap (no DKA), elevated BUN 53 and creatinine 1.73, elevated alkaline phosphatase 111 otherwise normal LFTs, normal CK, normal troponin, mildly elevated lactate 2.1, venous blood gas with metabolic alkalosis 7.45, pCO2 54, bicarb  37.5. [CG]   2234 Repeat lactate 1.1 [CG]   2254 CT chest abdomen pelvis with bilateral patchy airspace opacities with areas of consolidation in the lower lobes with slightly into the full improvement from prior CT concerning for multifocal pneumonia, stranding of bilateral gluteal subcutaneous soft tissues which is new from prior CT with ill-defined fluid collection along the right medial gluteal fold with multiple locules of subcutaneous free air, surrounding inflammatory changes which extends to the coccyx raising concern for developing decubitus ulcer. [CG]   2254 CTH no acute pathology [CG]      ED Course User Index  [CG] Cassidy Hoffman MD         Diagnoses as of 06/01/24 0053   Sepsis without acute organ dysfunction, due to unspecified organism (Multi)   Encephalopathy       Disposition:   Admitted to Dr. Ward's team, discussed differential and findings with patient as well as any family members at bedside.      ED Prescriptions    None         Cassidy Hoffman MD  EM/IM/Peds    This note was dictated by speech recognition. Minor errors in transcription may be present.     Cassidy Hoffman MD  06/01/24 0053

## 2024-05-31 NOTE — PROGRESS NOTES
Name: Janette Martinez    YOB: 1970    Code Status: DNRcc arrest    Chief complaint:  Readmit after hospitalization;   Encephalopathy;  etc.....      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    RECENT HOSPITAL COURSE:  Patient was recently discharged after an admission for encephalopathy/sepsis.    Patient had a protracted hospitalization last visit, one of her issues was swallowing which was found to be okay for the puréed diet honey thickened liquids. Patient was sent to [facility] for  fever/altered mental status/suspicion for aspiration.         Patient's condition was worsening in the ER so the provider               contacted patient's sister/POA and patient is currently DO NOT RESUSCITATE with limitations.  Patient was too drowsy to answer any questions, turns when called, not answering any questions.  CT chest was performed on 5/9/24.  Results as follows:  Poor inspiratory effort is seen with crowding of pulmonary  vasculature at the lung bases. The heart is mildly enlarged. There is  diffuse bilateral interstitial prominence with patchy densities in  bilateral parahilar regions which on the chest CT obtained on the  same day demonstrated to be related to infiltrates. No gross pleural  effusion or pneumothorax is seen  1. Multifocal pneumonia with more dense airspace consolidation  demonstrated within the medial right and left lower lobes in the  posterior perihilar location. Scattered patchy areas of ground-glass  airspace infiltrate and consolidation within bilateral lung fields.  Follow-up to clearing.      2. The IVC is flattened in appearance suggesting component of  hypovolemia. Correlate      3. No dilated loops of bowel. Postsurgical changes with enteroenteric  anastomosis in the  right lower quadrant appearing unremarkable.  CT images of the brain were also obtained without IV  contrast.      FINDINGS:  The ventricles, cisterns and sulci are prominent, consistent with  moderate diffuse volume loss. Areas of white matter low attenuation  are nonspecific but likely related to chronic microvascular disease.  There is intracranial atherosclerosis.      Gray-white differentiation is preserved.  No acute intracranial hemorrhage or mass effect.  No midline shift. Patent basal cisterns.  No extraaxial fluid collections.    On 5/21/24 patient had an insertion of gastrostomy tube laparoscopy.                                    PREVIOUS HOSPITAL COURSE:       Patient was sent to [facility] ER due to concern for AMS and worsening left sided facial droop.  HPI limited as patient poor historian, with prior stroke with residual aphasia.  Multiple labs and diagnostic tests performed in the hospital.   CBC performed: +leukocytosis, no acute anemia, no thrombocytopenia  CMP: no acute electrolyte abnormalities, no acute renal or liver dysfunction appreciated   CTH: no acute processes. Negative for acute stroke.   MRI/MRA: No acute processes.  No LVO or acute stenosis.   UA: +leukest, no nitrites, 1-5 WBC --> which could suggest UTI.  Patient denies urinary complaints (unclear if reliable historian).   Received  a dose of Rocephin in the ER.       Patient was evaluated by speech therapy and had a modified barium swallow performed in the hospital.      Recommendations as follows:   PUREE DIET WITH HONEY THICKENED LIQUIDS  UPRIGHT AT 90 DEGREES FOR ALL PO  SLOW RATE AND SMALL BOLUS SIZE  LIQUIDS VIA TSP ONLY  MEDICATION CRUSHED IN PUREE CARRIER         Patient re-admitted to [facility] On 5/24/24 after her most recent hospitalization.        Hospital and Chronic Diagnoses as follows:    Encephalopathy:   Hx CVA with residual R hemiparesis, aphasia  CTH: no acute processes  -MRI/MRA: No acute processes.   No LVO or acute stenosis.   Patient seen today, she is in her bed.  Calm, cooperative.  Mentation improving close to baseline.  No complaints of headaches or dizziness.  No chest pain.    Acute respiratory failure:  DNR code status with limitations per family  Not a current candidate for ICU care or intubation     Probable aspiration pneumonia       Started on IV antibiotics    Chronic systolic HF; Benign essential HTN :  On Amlodipine and hydrochlorothiazide.              No oxygen desaturations reported.               Recent BP                No recent complaints of  chest pain, headaches or dizziness.       YESSICA on CKD 3:  Treated with IVF.     T2DM  Hypoglycemia protocol was implented in the hospital.  Accuchecks  ordered ACHS, ISS coverage  Hba1c ordered.        Esophageal dysphagia s/p PEG:  Patient was evaluated by speech therapy.  During previous hospitalization she was ordered --PUREE DIET WITH HONEY THICKENED LIQUIDS.  However this hospitalization, she was made NPO and a PEG tube was placed.  Currently on PEG tube feeds.     HIV:        Recent CD4 count 4/2022: CD3 + T cell % 89 (60-89%), CD3 + T cell #3070       (high) ([phone]), CD3 and CD4 + Tcell % 23 (34-61%), CD3 + CD4 + T     cell    # [phone] (  ([phone])       Patient needs ID consult.      On Descovy.    Hypothyroidism; Hx graves s/p thyroidectomy   Post surgical hypothyroid  TSH  was low, Free thyroxine high; consistent with over medicated hypothyroid          DM type 2 with hyperglycemia; diabetic polyneuropathy:        Patient is frequently noncompliant with diet.       On Lantus 43 units at bedtime and Humalog sliding scale.       Recent accuchecks: 225 mg/dL, 214 mg/dL, 147 mg/dL.        No episodes of hypoglycemia reported.        Ha1c trends:       5/1/23 Ha1c 8.1 %        10/17/23 Ha1c 8.3 %       12/4/23 hemoglobin A1c 8.9%     No complaints of chest pain.    No headaches.    No dizziness.            Sequela of Cerebrovascular accident; Hx  CVA with right-sided hemiparesis; right sided weakness;   dysphagia 2/2 cva; expressive aphasia; History of acute/subacute stroke:   MRI was performed during a PREVIOUS hospitalization and it showed Acute/subacute tiny infarct in the left anterior brock.   Dr. Jiang from [facility] neuro/stroke was consulted at that time.  Currently on Eliquis 5 mg PO BID.  Pt has baseline dysarthria from a previous CVA, right Hemiparesis,  and expressive aphasia.  At her usual baseline she is able to communicate in short sentences and she is able to nod appropriately to questions.   She is on a mechanical soft, thin consistency diet.              Major depressive disorder:   On Sertraline.               Immobility; Generalized muscle weakness;  At  in long term care.  Not able to ambulate.  Very weak.  Bedbound/wheelchair bound.                                 Reviewed  hospital records from recent hospitalization.   Reviewed medical, social, surgical and family history.  Reviewed all current medications and performed medication reconciliation.  Reviewed vital signs AND recent blood work results.  Performed prescription drug management.   Reviewed [facility] Documentation  Discussed patient with nursing and .  Spent greater than 50 minutes on visit                  ROS: 10 point ROS performed; negative unless noted in HPI.       LABS:  BMP: Date: 5/1/2023 Na 137 K 4.3 Cr 1.1 BUN 15 glucose 141 Ca 8.8 GFR 52  CBC: Date: 5/1/2023 WBC 8.4 Hgb 11.1 hematocrit 33.9 platelets 337  Liver function: Date: 5/1/2023 ALT 10 AST 29 alkaline phos.  63 bilirubin 0.4 albumin 3.6 protein 7.7       Ha1c 8.1 % on 5/1/23          BMP: Date: 5/15/2023 Na 139  K 3.4 Cr 1.2 BUN 20 glucose 122 Ca 8.8 GFR 57        CBC: Date: 5/15/2023 WBC 9 Hgb 10.2 hematocrit 30.9 platelets 277       Liver function: 5/15/2023 ALT 8 AST 28 alkaline phos.  57 bilirubin 0.5 albumin 3.5 protein 7.3          BMP: Date: 5/22/2023 Na 141 K 3.1 Cr 1 BUN  11 glucose 227 Ca 8.7 GFR 70        CBC: Date: 5/22/2023 WBC 8.2 Hgb 10 hematocrit 30.5 platelets 372        Liver function: Date: 5/22/2023 ALT 8 AST 22 alkaline phos.  63 bilirubin 0.3 albumin 3.2 protein 7.2     BMP: Date: 6/5/2023 Na 140 K 4.2 Cr 1.1 BUN 14 glucose 79 Ca 1.1 GFR 63  CBC: Date: 6/5/2023 WBC 8.1 Hgb 10.5 hematocrit 32.3 platelets 350  Liver function: Date: 6/5/2023 ALT 7 AST 25 alkaline phos.  59 bilirubin 0.4 albumin 3.3 protein 7.7    BMP: Date: 7/17/2023 Na 140 K 4.1 Cr 0.8 BUN 17 glucose 126 Ca 9.1 GFR 90  CBC: Date: 7/17/2023 WBC 14.1 Hgb 10.2 hematocrit 32.1 platelets 265  Liver function: Date: 7/17/2023 ALT 8 AST 23 alkaline phos.  72 bilirubin 0.5 albumin 3.4 protein 7.4    CBC: Date: 7/19/2023 WBC 10.7 Hgb 9.5 hematocrit 28.8 platelets 257    BMP: Date: 7/24/2023 Na 137 K 4.2 Cr 0.9 BUN 19 glucose 186 Ca 9.1 GFR 79  CBC: Date: 7/24/2023 WBC 11.6 Hgb 10.3 hematocrit 31.4 platelets 305  Liver function: Date: 7/24/2023 ALT 11 AST 27 alkaline phos.  77 bilirubin 0.3 albumin 3.6 protein 7.8    BMP: Date: 7/31/2023 Na 141 K 4.2 Cr 0.8 BUN 17 glucose 138 Ca 8.7 GFR 90  CBC: Date: 7/31/2023 WBC 12.3 Hgb 10.5 hematocrit 32.4 platelets 307  Liver function: Date: 7/31/2023 ALT 14 AST 24 alkaline phos.  81 bilirubin 0.4 albumin 3.5 protein 7.4    BMP: Date: 8/7/2023 Na 141 K 3.9 Cr 1.0 BUN 12 glucose 167 Ca 8.6 GFR 70  CBC: Date: 8/7/2023 WBC 10 Hgb 10.5 hematocrit 32.7 platelets 256  Liver function: Date: 8/7/2023 ALT 9 AST 23 alkaline phos.  76 bilirubin 0.5 albumin 3.3 protein 7    BMP: Date: 8/21/2023 Na 142 K 4.1 Cr 1 BUN 17 glucose 195 Ca 8.5 GFR 70  CBC: Date: 8/21/2023 WBC 10.9 Hgb 9.6 hematocrit 30 platelets 304  Liver function: Date: 8/21/2023 ALT 9 AST 24 alkaline phos.  74 bilirubin 0.4 albumin 3.2 protein 7.1    BMP: Date: 8/31/2023 Na 141 K 3.8 Cr 0.9 BUN 14 glucose 118 Ca 8.2 GFR 79  CBC: Date: 8/31/2023 WBC 12 Hgb 9.6 hematocrit 29.8 platelets 294  Liver function: Date: 8/31/2023 ALT  8 AST 21 alkaline phos.  64 bilirubin 0.4 albumin 3.1 protein 7    BMP: Date: 9/4/2023 Na 141 K 3.9 Cr 1.1 BUN 17 glucose 107 Ca 8.5 GFR 63   CBC: Date: 9/4/2023 WBC 12 Hgb 9.5 hematocrit 29.7 platelets 282  Liver function: Date: 9/4/2023 ALT 7 AST 22 alkaline phos.  64 bilirubin 0.4 albumin 3.3 protein 7.1    BMP: Date: 9/11/2023 Na 142 K 4 Cr 1 BUN 12 glucose 81 Ca 8.2 GFR 70  CBC: Date: 9/11/2023 WBC 8.7 Hgb 9.2 hematocrit 28.3 platelets 305  Liver function: Date: 9/11/2023 ALT 7 AST 22 alkaline phos.  61 bilirubin 0.3 albumin 3 protein 6.8    BMP: Date: 9/18/2023 Na 140 K 4 Cr 1 BUN 15 glucose 150 Ca 8.3 GFR 70  CBC: Date: 9/18/2023 WBC 12.1 Hgb 9.5 hematocrit 29.8 platelets 287  Liver function: Date: 9/18/2023 ALT 7 AST 23 alkaline phos.  62 bilirubin 0.4 albumin 3.4 protein 7.1    10/17/23 Ha1c 8.3 %    BMP: Date: 10/23/2023 Na 141 K 3.6 Cr 1 BUN 22 glucose 65 Ca 8.3 GFR 70  CBC: Date: 10/23/2023 WBC 12.5 Hgb 10.8  hematocrit 33.2 platelets 290  Liver function: Date: 10/23/2023 ALT 9 AST 31 alkaline phos.  66 bilirubin 0.6 albumin 3.6 protein 7.8    10/26/2023: Lipid panel: Cholesterol 111; triglycerides 111; HDL 33; LDL 56; VLDL 22.    10/26/2023: TSH: 1.081 (range 0.340-5.500).    BMP: Date: 10/30/2023 Na 143  K 3.5 Cr 0.9 BUN 11 glucose 171 Ca 8 GFR 65  CBC: Date: 10/30/2023 WBC 9.2 Hgb 10.1 hematocrit 31.1 platelets 280  Liver function: Date: 10/30/2023 ALT 11 AST 27 alkaline phos.  66 bilirubin 0.4 albumin 3.4 protein 7.2    BMP: Date: 11/13/2023 Na 138 K 3.9 Cr 0.9 BUN 11 glucose 308 Ca 8.4 GFR 79  CBC: Date: 11/13/2023 WBC 8.2 Hgb 10.6 hematocrit 33 platelets 295  Liver function: Date: 11/13/2023 ALT 12 AST 37 alkaline phos.  69 bilirubin 0.4 albumin 3.2 protein 7.1    BMP: Date: 11/21/2023 Na 142 K 3.6 Cr 0.9 BUN 14 glucose 200 Ca 8.4 GFR 79.  CBC: Date: 11/21/2023 WBC 8.8 Hgb 10.1 hematocrit 30.4 platelets 290    BMP: Date: 11/27/2023 Na 138 K 3.4 Cr 1 BUN 12 glucose 54 Ca 8.7 GFR 70  CBC: Date:  11/27/2023 WBC 12.3 Hgb 10.6 hematocrit 32.4 platelets 307  Liver function: Date: 11/27/2023 ALT 7 AST 27 alkaline phos.  72 bilirubin 0.5 albumin 3.3 protein 7.7    BMP: Date: 12/4/2023 Na 141 K 4 Cr 1 BUN 12 glucose 209 Ca 8.5 GFR 70  CBC: Date: 12/4/2023 WBC 8.5 Hgb 10.6 hematocrit 32.7 platelets 326  Liver function: Date: 12/4/2023 ALT 8 AST 25 alkaline phos.  64 bilirubin 0.4 albumin 3.3 protein 7.1  12/4/2023 hemoglobin A1c 8.9%    BMP: Date: 12/25/2023 Na 140 K 3.9 Cr 0.9 BUN 17 glucose 172 Ca 8.4 GFR 79  CBC: Date: 12/25/2023 WBC 8.9 Hgb 9.9 hematocrit 30.7 platelets 272  Liver function: Date: 12/25/2023 ALT 8 AST 26 alkaline phos.  67 bilirubin 0.3 albumin 3.2 protein 7    BMP: Date: 12/26/2023 Na 142 K 4.2 Cr 0.9 BUN 15 glucose 168 Ca 8.6 GFR 79  CBC: Date: 12/26/2023 WBC 9.9 Hgb 10.5 hematocrit 32.9 platelets 302    BMP: Date: 1/15/2024 Na 139 K 3.9 Cr 1 BUN 14 glucose 206 Ca 8.5 GFR 70  CBC: Date: 1/15/2024 WBC 8.3 Hgb 9.8 hematocrit 29.8 platelets 244  Liver function: Date: 1/15/2024 ALT 7 AST 22 alkaline phos.  62 bilirubin 0.3 albumin 3.3 protein 6.8    1/16/2024: Lipid panel: Cholesterol 119; triglycerides 174; HDL 32; LDL 52; VLDL 35.    BMP: Date: 2/5/2024 Na 141 K 4.2 Cr 1 BUN 22 glucose 150 Ca 8.3 GFR 70  CBC: Date: 2/5/2024 WBC 10.9 Hgb 10.2 hematocrit 31.1 platelets 319  Liver function: Date: 2/5/2024 ALT 7 AST 29 alkaline phos.  77 bilirubin 0.6 albumin 3.4 protein 7.4    BMP: Date: 2/26/2024 Na 143 K 3.6 Cr 1.3 BUN 23 glucose 152 Ca 8.5 GFR 51  CBC: Date: 2/26/2024 WBC 8 Hgb 11.1 hematocrit 33.6 platelets 318  Liver function: Date: 2/26/2024 ALT 11 AST 48 alkaline phos.  67 bilirubin 0.5 albumin 3.5 protein 7.8    BMP: Date: 3/11/2024 Na 142 K 3.8 Cr 0.9 BUN 16 glucose 97 Ca 8.7 GFR 79  CBC: Date: 3/11/2024 WBC 9 Hgb 10.5 hematocrit 32.3 platelets 302  Liver function: Date: 3/11/2024  ALT 10 AST 39 alkaline phos.  64 bilirubin 0.4 albumin 3.1 protein 7.1    BMP: Date: 3/18/2024 Na 143 K 4.4  Cr 0.9 BUN 15 glucose 111 Ca 8.7 GFR 79  CBC: Date: 3/18/2024 WBC 9.1 Hgb 10.8 hematocrit 32.6 platelets 294  Liver function: Date: 3/18/2024 ALT 13 AST 49 alkaline phos.  68 bilirubin 0.6 albumin 3.2 protein 7.4    BMP: Date: 3/25/2024 Na 140 K 3.7 Cr 1 BUN 17 glucose 225 Ca 8.7 GFR 70  CBC: Date: 3/25/2024  WBC 8.6 Hgb 10.7 hematocrit 32 platelets 304  Liver function: Date: 3/25/2024 ALT 15 AST 56 alkaline phos.  53 bilirubin 0.5 albumin 3.3 protein 7.1    BMP: Date: 4/1/2024 Na 143 K 3.5 Cr 1 BUN 19 glucose 151 Ca 8.7 GFR 70  CBC: Date: 4/1/2024 WBC 8.4 Hgb 10.2 hematocrit 30.8 platelets 254  Liver function: Date: 4/1/2024 ALT 12 AST 32 alkaline phos.  56 bilirubin 0.4 albumin 3.3 protein 6.8                Past Medical History:   Diagnosis Date    Acute pulmonary embolism (Multi) 04/22/2024    Aphasia as late effect of cerebrovascular accident 04/25/2023    Essential hypertension 06/25/2010    Gastroesophageal reflux disease 03/10/2009    Hemiplegia of nondominant side, late effect of cerebrovascular disease (Multi) 09/03/2008    Hemorrhagic stroke (Multi) 06/25/2010    HIV infection (Multi) 02/21/2007    Hypothyroidism 10/10/2002    Formatting of this note might be different from the original.   S/p thyroidectomy 2002   Patholgy c/w Graves    Mixed hyperlipidemia 06/25/2010    Stage 3a chronic kidney disease (Multi) 04/22/2024    T2DM (type 2 diabetes mellitus) (Multi) 11/14/2012           Past Surgical History:   Procedure Laterality Date    CHOLECYSTECTOMY  01/08/2016    Cholecystectomy    COLON SURGERY  01/08/2016    Colon Surgery    CT ANGIO NECK  3/19/2019    CT NECK ANGIO W AND WO IV CONTRAST 3/19/2019 [facility]    CT HEAD ANGIO W AND WO IV CONTRAST  3/19/2019    CT HEAD ANGIO W AND WO IV CONTRAST 3/19/2019 [facility]    HERNIA REPAIR  01/08/2016    Hernia Repair    HYSTERECTOMY  01/08/2016    Hysterectomy    MR HEAD ANGIO WO IV CONTRAST  6/4/2018    MR HEAD ANGIO WO IV CONTRAST  6/4/2018 [facility]    MR HEAD ANGIO WO IV CONTRAST  7/11/2020    MR HEAD ANGIO WO IV CONTRAST 7/11/2020 [facility] CL[facility]ICAL LEGAC[facility]    MR HEAD ANGIO WO IV CONTRAST  9/30/2020    MR HEAD ANGIO WO IV CONTRAST 9/30/2020 [facility] LEGAC[facility]    MR HEAD ANGIO WO IV CONTRAST  5/22/2022    MR HEAD ANGIO WO IV CONTRAST 5/22/2022 [facility] CL[facility]ICAL LEGAC[facility]    MR HEAD ANGIO WO IV CONTRAST  4/19/2023    MR HEAD ANGIO WO IV CONTRAST [facility] MRI    MR NECK ANGIO WO IV CONTRAST  6/4/2018    MR NECK ANGIO WO IV CONTRAST 6/4/2018 [facility] CL[facility]ICAL LEGAC[facility]    MR NECK ANGIO WO IV CONTRAST  7/11/2020    MR NECK ANGIO WO IV CONTRAST 7/11/2020 [facility]ICAL LEGAC[facility]    MR NECK ANGIO WO IV CONTRAST  9/30/2020    MR NECK ANGIO WO IV CONTRAST 9/30/2020 [facility] LEGAC[facility]    MR NECK ANGIO WO IV CONTRAST  5/22/2022    MR NECK ANGIO WO IV CONTRAST 5/22/2022 [facility] CL[facility]ICAL [facility]GAC[facility]    MR NECK ANGIO WO IV CONTRAST  4/19/2023    MR NECK ANGIO WO IV CONTRAST [facility] MRI    OTHER SURGICAL HISTORY  01/08/2016    Tubal Stabilization    OTHER SURGICAL HISTORY  09/01/2016    Cervical Surgery (Gyn) Laser Vaporization Of Transformation Zone    THYROID SURGERY  09/27/2013    Thyroid Surgery            Surgical History  Problems    · History of Cervical Surgery (Gyn) Laser Vaporization Of Transformation Zone   · History of Cholecystectomy   · History of Colon Surgery   · History of Hernia Repair   · History of Hysterectomy   · History of Thyroid Surgery   · History of Tubal Stabilization     Family History  Mother    · Family history of Cancer  Family History    · Family history of Diabetes Mellitus (V18.0)   · Family history of Hypertension (V17.49)     Social History  Problems    · Disabled   · Does not use illicit drugs (V49.89) (Z78.9)   · Former smoker (V15.82) (Z87.891)   · Marital History - Single   · No alcohol.   · Denied: History of Secondhand smoke exposure   · Single     Allergies  Medication    · Aspirin TABS   · Contrast Media Ready-Box MISC   · morphine   · Sulfa  "Drugs        /72   Pulse 87   Temp 36.7 °C (98 °F)   Resp 20   Ht 1.626 m (5' 4\")   Wt 88.9 kg (196 lb)   SpO2 98%   BMI 33.64 kg/m²      Physical Exam  Vitals and nursing note reviewed.   Constitutional:       General: She is awake.      Appearance: Normal appearance. She is ill-appearing.   HENT:      Head: Normocephalic.      Right Ear: External ear normal.      Left Ear: External ear normal.      Nose: Nose normal.      Mouth/Throat:      Mouth: Mucous membranes are moist.      Pharynx: Oropharynx is clear.   Eyes:      Extraocular Movements: Extraocular movements intact.      Conjunctiva/sclera:      Right eye: Right conjunctiva is injected. No exudate.     Left eye: Left conjunctiva is not injected. No exudate.     Pupils: Pupils are equal, round, and reactive to light.   Cardiovascular:      Rate and Rhythm: Normal rate and regular rhythm.      Pulses: Normal pulses.      Heart sounds: Normal heart sounds.   Pulmonary:      Effort: Pulmonary effort is normal.      Breath sounds: Normal breath sounds.   Abdominal:      General: Bowel sounds are normal.      Palpations: Abdomen is soft.      Comments: PEG tube intact   Musculoskeletal:      Cervical back: Normal range of motion and neck supple.      Comments: Generalized muscle weakness; immobility.    Right sided hemiparesis   Skin:     General: Skin is warm and dry.   Neurological:      Mental Status: She is alert. Mental status is at baseline.      Motor: Weakness present.      Coordination: Coordination abnormal.      Gait: Gait abnormal.      Comments: Right sided weakness--RLE 1/5; RUE 3/5; sensation intact to touch x 4 extremities.     Left sided facial droop.    Generalized muscle weakness   Psychiatric:         Mood and Affect: Mood normal.         Behavior: Behavior normal. Behavior is cooperative.          Assessment/Plan   Ordered CMP and CBC with diff for tomorrow.     Encephalopathy:  -Monitor CBC.  Monitor WCP, H & H, and " platelets.  Monitor for leukocytosis, no acute anemia, no thrombocytopenia        Order CBC.  Monitor for fevers.  Encourage PO fluids.     Acute respiratory failure:  DNR code status with limitations per family  Not a current candidate for ICU care or intubation     Probable aspiration pneumonia:       Started on IV antibiotics in the hospital.       Continue IV antibiotics.    Chronic systolic HF; Benign essential HTN :  Continue Amlodipine and hydrochlorothiazide.              Monitor  oxygen saturations.              .          YESSICA on CKD 3:  Treated in the hospital.  Monitor renal function.        Esophageal dysphagia s/p PEG:  Patient was evaluated by speech therapy.  During previous hospitalization she was ordered --PUREE DIET WITH HONEY THICKENED LIQUIDS.  However this hospitalization, she was made NPO and a PEG tube was placed.  Currently on PEG tube feeds--continue.     HIV:        Recent CD4 count 4/2022: CD3 + T cell % 89 (60-89%), CD3 + T cell #3070       (high) ([phone]), CD3 and CD4 + Tcell % 23 (34-61%),          CD3 + CD4 + T   cell    # 779 (  ([phone])       Follow up with infectious disease.      Continue Descovy.    Hypothyroidism; Hx graves s/p thyroidectomy   Post surgical hypothyroid  TSH  was low, Free thyroxine high; consistent with over medicated hypothyroid       Sequela of Cerebrovascular accident; Hx CVA with right-sided hemiparesis; right sided weakness;   dysphagia 2/2 cva; expressive aphasia; History of acute/subacute stroke:   MRI was performed during a PREVIOUS hospitalization and it showed Acute/subacute tiny infarct in the left anterior brock.   Dr. Jiang from [facility] neuro/stroke was consulted at that time.  Currently on Eliquis 5 mg PO BID.  Pt has baseline dysarthria from a previous CVA, right Hemiparesis,  and expressive aphasia.  At her usual baseline she is able to communicate in short sentences and she is able to nod appropriately to questions.   She is on a mechanical  soft, thin consistency diet.                                 Major depressive disorder:   Continue Sertraline.       Generalized muscle weakness;Immobility; ;  At  in long term care.  Not able to ambulate.  Very weak.  Bedbound/wheelchair bound.                        Problem List Items Addressed This Visit          Cardiac and Vasculature    Chronic systolic heart failure (Multi) (Chronic)       Endocrine/Metabolic    Hypothyroidism (Chronic)    T2DM (type 2 diabetes mellitus) (Multi) (Chronic)       Gastrointestinal and Abdominal    Esophageal dysphagia (Chronic)       Infectious Diseases    HIV infection (Multi) (Chronic)       Neuro    Diabetic polyneuropathy (Multi) (Chronic)    Encephalopathy - Primary     Other Visit Diagnoses       Acute respiratory failure with hypoxia and hypercapnia (Multi)        Aspiration pneumonia, unspecified aspiration pneumonia type, unspecified laterality, unspecified part of lung (Multi)        Benign essential HTN        Acute kidney injury superimposed on CKD (CMS-HCC)        S/P percutaneous endoscopic gastrostomy (PEG) tube placement (Multi)        Sequela of cerebrovascular accident        Hemiparesis affecting right side as late effect of stroke (Multi)        Expressive aphasia        Major depressive disorder with current active episode, unspecified depression episode severity, unspecified whether recurrent        Immobility        Generalized muscle weakness                      Maria Del Carmen Garduno, APRN-CNP

## 2024-06-01 PROBLEM — A41.9 SEPSIS WITHOUT ACUTE ORGAN DYSFUNCTION, DUE TO UNSPECIFIED ORGANISM (MULTI): Status: ACTIVE | Noted: 2024-06-01

## 2024-06-01 LAB
ALBUMIN SERPL BCP-MCNC: 2 G/DL (ref 3.4–5)
ALP SERPL-CCNC: 99 U/L (ref 33–110)
ALT SERPL W P-5'-P-CCNC: 13 U/L (ref 7–45)
ANION GAP SERPL CALC-SCNC: 11 MMOL/L (ref 10–20)
AST SERPL W P-5'-P-CCNC: 34 U/L (ref 9–39)
BILIRUB SERPL-MCNC: 0.4 MG/DL (ref 0–1.2)
BUN SERPL-MCNC: 46 MG/DL (ref 6–23)
CALCIUM SERPL-MCNC: 7.5 MG/DL (ref 8.6–10.3)
CHLORIDE SERPL-SCNC: 102 MMOL/L (ref 98–107)
CO2 SERPL-SCNC: 32 MMOL/L (ref 21–32)
CREAT SERPL-MCNC: 1.63 MG/DL (ref 0.5–1.05)
EGFRCR SERPLBLD CKD-EPI 2021: 37 ML/MIN/1.73M*2
ERYTHROCYTE [DISTWIDTH] IN BLOOD BY AUTOMATED COUNT: 17.2 % (ref 11.5–14.5)
GLUCOSE SERPL-MCNC: 416 MG/DL (ref 74–99)
HCT VFR BLD AUTO: 20.2 % (ref 36–46)
HGB BLD-MCNC: 6.4 G/DL (ref 12–16)
MCH RBC QN AUTO: 30.9 PG (ref 26–34)
MCHC RBC AUTO-ENTMCNC: 31.7 G/DL (ref 32–36)
MCV RBC AUTO: 98 FL (ref 80–100)
MRSA DNA SPEC QL NAA+PROBE: DETECTED
NRBC BLD-RTO: 0 /100 WBCS (ref 0–0)
PLATELET # BLD AUTO: 287 X10*3/UL (ref 150–450)
POTASSIUM SERPL-SCNC: 4.1 MMOL/L (ref 3.5–5.3)
PROT SERPL-MCNC: 7.1 G/DL (ref 6.4–8.2)
RBC # BLD AUTO: 2.07 X10*6/UL (ref 4–5.2)
SODIUM SERPL-SCNC: 141 MMOL/L (ref 136–145)
WBC # BLD AUTO: 30.1 X10*3/UL (ref 4.4–11.3)

## 2024-06-01 PROCEDURE — 31720 CLEARANCE OF AIRWAYS: CPT

## 2024-06-01 PROCEDURE — 80053 COMPREHEN METABOLIC PANEL: CPT | Performed by: INTERNAL MEDICINE

## 2024-06-01 PROCEDURE — 2500000005 HC RX 250 GENERAL PHARMACY W/O HCPCS: Performed by: INTERNAL MEDICINE

## 2024-06-01 PROCEDURE — 87081 CULTURE SCREEN ONLY: CPT | Mod: AHULAB | Performed by: INTERNAL MEDICINE

## 2024-06-01 PROCEDURE — 85027 COMPLETE CBC AUTOMATED: CPT | Performed by: INTERNAL MEDICINE

## 2024-06-01 PROCEDURE — 87641 MR-STAPH DNA AMP PROBE: CPT | Performed by: INTERNAL MEDICINE

## 2024-06-01 PROCEDURE — 84145 PROCALCITONIN (PCT): CPT | Mod: AHULAB | Performed by: INTERNAL MEDICINE

## 2024-06-01 PROCEDURE — 2500000001 HC RX 250 WO HCPCS SELF ADMINISTERED DRUGS (ALT 637 FOR MEDICARE OP): Performed by: INTERNAL MEDICINE

## 2024-06-01 PROCEDURE — 1200000002 HC GENERAL ROOM WITH TELEMETRY DAILY

## 2024-06-01 PROCEDURE — 2500000004 HC RX 250 GENERAL PHARMACY W/ HCPCS (ALT 636 FOR OP/ED): Mod: JZ | Performed by: INTERNAL MEDICINE

## 2024-06-01 PROCEDURE — 37799 UNLISTED PX VASCULAR SURGERY: CPT | Performed by: INTERNAL MEDICINE

## 2024-06-01 RX ORDER — VANCOMYCIN HYDROCHLORIDE 1 G/20ML
INJECTION, POWDER, LYOPHILIZED, FOR SOLUTION INTRAVENOUS DAILY PRN
Status: DISCONTINUED | OUTPATIENT
Start: 2024-06-01 | End: 2024-06-10

## 2024-06-01 RX ORDER — VANCOMYCIN HYDROCHLORIDE 1 G/20ML
INJECTION, POWDER, LYOPHILIZED, FOR SOLUTION INTRAVENOUS DAILY PRN
Status: DISCONTINUED | OUTPATIENT
Start: 2024-06-01 | End: 2024-06-02

## 2024-06-01 RX ORDER — DOCUSATE SODIUM 50 MG/5ML
100 LIQUID ORAL DAILY
Status: DISCONTINUED | OUTPATIENT
Start: 2024-06-01 | End: 2024-06-11 | Stop reason: HOSPADM

## 2024-06-01 RX ADMIN — EMTRICITABINE AND TENOFOVIR ALAFENAMIDE 1 TABLET: 200; 25 TABLET ORAL at 09:11

## 2024-06-01 RX ADMIN — GABAPENTIN 600 MG: 300 CAPSULE ORAL at 21:47

## 2024-06-01 RX ADMIN — CEFEPIME HYDROCHLORIDE 2 G: 2 INJECTION, POWDER, FOR SOLUTION INTRAVENOUS at 11:38

## 2024-06-01 RX ADMIN — DOLUTEGRAVIR SODIUM 50 MG: 50 TABLET, FILM COATED ORAL at 09:21

## 2024-06-01 RX ADMIN — BACLOFEN 10 MG: 10 TABLET ORAL at 00:45

## 2024-06-01 RX ADMIN — AMLODIPINE BESYLATE 5 MG: 5 TABLET ORAL at 09:11

## 2024-06-01 RX ADMIN — APIXABAN 5 MG: 5 TABLET, FILM COATED ORAL at 00:45

## 2024-06-01 RX ADMIN — BACLOFEN 10 MG: 10 TABLET ORAL at 09:11

## 2024-06-01 RX ADMIN — LAMOTRIGINE 25 MG: 25 TABLET ORAL at 22:22

## 2024-06-01 RX ADMIN — APIXABAN 5 MG: 5 TABLET, FILM COATED ORAL at 09:11

## 2024-06-01 RX ADMIN — Medication 4 L/MIN: at 20:00

## 2024-06-01 RX ADMIN — GABAPENTIN 600 MG: 300 CAPSULE ORAL at 00:45

## 2024-06-01 RX ADMIN — FUROSEMIDE 20 MG: 20 TABLET ORAL at 09:11

## 2024-06-01 RX ADMIN — DOCUSATE SODIUM 100 MG: 50 LIQUID ORAL at 09:11

## 2024-06-01 RX ADMIN — GABAPENTIN 600 MG: 300 CAPSULE ORAL at 17:25

## 2024-06-01 RX ADMIN — GABAPENTIN 600 MG: 300 CAPSULE ORAL at 09:11

## 2024-06-01 RX ADMIN — Medication 5 L/MIN: at 04:15

## 2024-06-01 RX ADMIN — APIXABAN 5 MG: 5 TABLET, FILM COATED ORAL at 21:46

## 2024-06-01 RX ADMIN — VANCOMYCIN HYDROCHLORIDE 1250 MG: 1.25 INJECTION, POWDER, LYOPHILIZED, FOR SOLUTION INTRAVENOUS at 12:23

## 2024-06-01 RX ADMIN — LEVOTHYROXINE SODIUM 175 MCG: 125 TABLET ORAL at 09:11

## 2024-06-01 RX ADMIN — CEFEPIME HYDROCHLORIDE 2 G: 2 INJECTION, POWDER, FOR SOLUTION INTRAVENOUS at 21:46

## 2024-06-01 RX ADMIN — BACLOFEN 10 MG: 10 TABLET ORAL at 21:46

## 2024-06-01 SDOH — SOCIAL STABILITY: SOCIAL INSECURITY: ARE YOU OR HAVE YOU BEEN THREATENED OR ABUSED PHYSICALLY, EMOTIONALLY, OR SEXUALLY BY ANYONE?: UNABLE TO ASSESS

## 2024-06-01 SDOH — SOCIAL STABILITY: SOCIAL INSECURITY: DO YOU FEEL ANYONE HAS EXPLOITED OR TAKEN ADVANTAGE OF YOU FINANCIALLY OR OF YOUR PERSONAL PROPERTY?: UNABLE TO ASSESS

## 2024-06-01 SDOH — SOCIAL STABILITY: SOCIAL INSECURITY: ABUSE: ADULT

## 2024-06-01 SDOH — SOCIAL STABILITY: SOCIAL INSECURITY: WERE YOU ABLE TO COMPLETE ALL THE BEHAVIORAL HEALTH SCREENINGS?: NO

## 2024-06-01 SDOH — SOCIAL STABILITY: SOCIAL INSECURITY: HAS ANYONE EVER THREATENED TO HURT YOUR FAMILY OR YOUR PETS?: UNABLE TO ASSESS

## 2024-06-01 SDOH — SOCIAL STABILITY: SOCIAL INSECURITY: ARE THERE ANY APPARENT SIGNS OF INJURIES/BEHAVIORS THAT COULD BE RELATED TO ABUSE/NEGLECT?: UNABLE TO ASSESS

## 2024-06-01 SDOH — SOCIAL STABILITY: SOCIAL INSECURITY: HAVE YOU HAD THOUGHTS OF HARMING ANYONE ELSE?: UNABLE TO ASSESS

## 2024-06-01 SDOH — SOCIAL STABILITY: SOCIAL INSECURITY: DO YOU FEEL UNSAFE GOING BACK TO THE PLACE WHERE YOU ARE LIVING?: UNABLE TO ASSESS

## 2024-06-01 SDOH — SOCIAL STABILITY: SOCIAL INSECURITY: DOES ANYONE TRY TO KEEP YOU FROM HAVING/CONTACTING OTHER FRIENDS OR DOING THINGS OUTSIDE YOUR HOME?: UNABLE TO ASSESS

## 2024-06-01 SDOH — SOCIAL STABILITY: SOCIAL INSECURITY: HAVE YOU HAD ANY THOUGHTS OF HARMING ANYONE ELSE?: UNABLE TO ASSESS

## 2024-06-01 ASSESSMENT — COGNITIVE AND FUNCTIONAL STATUS - GENERAL
HELP NEEDED FOR BATHING: TOTAL
DRESSING REGULAR UPPER BODY CLOTHING: TOTAL
DAILY ACTIVITIY SCORE: 6
DAILY ACTIVITIY SCORE: 6
MOVING FROM LYING ON BACK TO SITTING ON SIDE OF FLAT BED WITH BEDRAILS: TOTAL
TOILETING: TOTAL
EATING MEALS: TOTAL
STANDING UP FROM CHAIR USING ARMS: TOTAL
MOBILITY SCORE: 6
PATIENT BASELINE BEDBOUND: YES
TURNING FROM BACK TO SIDE WHILE IN FLAT BAD: TOTAL
MOVING TO AND FROM BED TO CHAIR: TOTAL
DRESSING REGULAR LOWER BODY CLOTHING: TOTAL
EATING MEALS: TOTAL
MOBILITY SCORE: 6
PERSONAL GROOMING: TOTAL
TOILETING: TOTAL
STANDING UP FROM CHAIR USING ARMS: TOTAL
MOVING FROM LYING ON BACK TO SITTING ON SIDE OF FLAT BED WITH BEDRAILS: TOTAL
WALKING IN HOSPITAL ROOM: TOTAL
MOVING TO AND FROM BED TO CHAIR: TOTAL
DRESSING REGULAR UPPER BODY CLOTHING: TOTAL
CLIMB 3 TO 5 STEPS WITH RAILING: TOTAL
WALKING IN HOSPITAL ROOM: TOTAL
PERSONAL GROOMING: TOTAL
CLIMB 3 TO 5 STEPS WITH RAILING: TOTAL
TURNING FROM BACK TO SIDE WHILE IN FLAT BAD: TOTAL
HELP NEEDED FOR BATHING: TOTAL
DRESSING REGULAR LOWER BODY CLOTHING: TOTAL

## 2024-06-01 ASSESSMENT — ACTIVITIES OF DAILY LIVING (ADL)
HEARING - LEFT EAR: UNABLE TO ASSESS
TOILETING: DEPENDENT
WALKS IN HOME: DEPENDENT
JUDGMENT_ADEQUATE_SAFELY_COMPLETE_DAILY_ACTIVITIES: UNABLE TO ASSESS
HEARING - RIGHT EAR: UNABLE TO ASSESS
GROOMING: DEPENDENT
LACK_OF_TRANSPORTATION: PATIENT UNABLE TO ANSWER
ADEQUATE_TO_COMPLETE_ADL: UNABLE TO ASSESS
PATIENT'S MEMORY ADEQUATE TO SAFELY COMPLETE DAILY ACTIVITIES?: UNABLE TO ASSESS
DRESSING YOURSELF: DEPENDENT
BATHING: DEPENDENT
FEEDING YOURSELF: DEPENDENT

## 2024-06-01 ASSESSMENT — PATIENT HEALTH QUESTIONNAIRE - PHQ9
2. FEELING DOWN, DEPRESSED OR HOPELESS: NOT AT ALL
1. LITTLE INTEREST OR PLEASURE IN DOING THINGS: NOT AT ALL
SUM OF ALL RESPONSES TO PHQ9 QUESTIONS 1 & 2: 0

## 2024-06-01 ASSESSMENT — PAIN SCALES - WONG BAKER
WONGBAKER_NUMERICALRESPONSE: NO HURT
WONGBAKER_NUMERICALRESPONSE: NO HURT

## 2024-06-01 ASSESSMENT — LIFESTYLE VARIABLES
AUDIT-C TOTAL SCORE: -1
HOW OFTEN DO YOU HAVE 6 OR MORE DRINKS ON ONE OCCASION: PATIENT UNABLE TO ANSWER
HOW MANY STANDARD DRINKS CONTAINING ALCOHOL DO YOU HAVE ON A TYPICAL DAY: PATIENT UNABLE TO ANSWER
AUDIT-C TOTAL SCORE: -1
SKIP TO QUESTIONS 9-10: 0
HOW OFTEN DO YOU HAVE A DRINK CONTAINING ALCOHOL: PATIENT UNABLE TO ANSWER

## 2024-06-01 ASSESSMENT — PAIN - FUNCTIONAL ASSESSMENT: PAIN_FUNCTIONAL_ASSESSMENT: UNABLE TO SELF-REPORT

## 2024-06-01 NOTE — PROGRESS NOTES
Vancomycin Dosing by Pharmacy- INITIAL    Janette Martinez is a 53 y.o. year old female who Pharmacy has been consulted for vancomycin dosing for pneumonia. Based on the patient's indication and renal status this patient will be dosed based on a goal AUC of 400-600.     Renal function is currently stable.    Visit Vitals  /65   Pulse 110   Temp 36.6 °C (97.8 °F) (Temporal)   Resp 18        Lab Results   Component Value Date    CREATININE 1.63 (H) 2024    CREATININE 1.73 (H) 2024    CREATININE 1.02 2024    CREATININE 1.08 (H) 2024        Patient weight is as follows:   Vitals:    24 1806   Weight: 86.2 kg (190 lb)       Cultures:  No results found for the encounter in last 14 days.        I/O last 3 completed shifts:  In: 2200 (25.5 mL/kg) [IV Piggyback:2200]  Out: - (0 mL/kg)   Weight: 86.2 kg   I/O during current shift:  No intake/output data recorded.    Temp (24hrs), Av.4 °C (99.3 °F), Min:36.6 °C (97.8 °F), Max:38.2 °C (100.7 °F)         Assessment/Plan     Patient will not be given a loading dose.  Will initiate vancomycin maintenance,  1250 mg every 24 hours.    This dosing regimen is predicted by [employer] to result in the following pharmacokinetic parameters:  Loading dose: N/A  Regimen: 1250 mg IV every 24 hours.  Start time: 10:01 on 2024  Exposure target: AUC24 (range)400-600 mg/L.hr   AUC24,ss: 517 mg/L.hr  Probability of AUC24 > 400: 78 %  Ctrough,ss: 16.1 mg/L  Probability of Ctrough,ss > 20: 30 %  Probability of nephrotoxicity (Lodise DONALD ): 11 %      Follow-up level will be ordered on 24 at 0400 unless clinically indicated sooner.  Will continue to monitor renal function daily while on vancomycin and order serum creatinine at least every 48 hours if not already ordered.  Follow for continued vancomycin needs, clinical response, and signs/symptoms of toxicity.       Leroy Goins, PharmD

## 2024-06-01 NOTE — CONSULTS
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  On admission she was febrile and tachycardic.  Her WBC was 32 K, Cr 1.7 and lactate 2.1.  CT chest showed bilateral lower lobe infiltrates.  ID is consulted for evaluation of her sepsis.     Current Antibiotic:  Vancomycin  Cefepime  Flagyl    Medications:  No current facility-administered medications on file prior to encounter.     Current Outpatient Medications on File Prior to Encounter   Medication Sig Dispense Refill    acetaminophen (Tylenol) 500 mg tablet Take 2 tablets (1,000 mg) by mouth 3 times a day.      amLODIPine (Norvasc) 5 mg tablet Take 1 tablet (5 mg) by mouth once daily.      apixaban (Eliquis) 5 mg tablet Take 1 tablet (5 mg) by mouth 2 times a day.      baclofen (Lioresal) 10 mg tablet Take 1 tablet (10 mg) by mouth 2 times a day.      diclofenac sodium (Voltaren) 1 % gel Apply 4.5 inches (4 g) topically every 12 hours if needed.      docusate sodium (Colace) 100 mg capsule Take 1 capsule (100 mg) by mouth 2 times a day.      dolutegravir (Tivicay) 50 mg tablet Take 1 tablet (50 mg) by mouth once daily.      emtricitabine-tenofovir alafen (Descovy) 200-25 mg tablet Take 1 tablet by mouth once daily.      furosemide (Lasix) 20 mg tablet Take 1 tablet (20 mg) by mouth once daily.      gabapentin (Neurontin) 300 mg capsule Take 2 capsules (600 mg) by mouth 3 times a day.      hydroCHLOROthiazide (HYDRODiuril) 25 mg tablet Take 1 tablet  (25 mg) by mouth once daily. Hold if SBP <110 or Heart rate <60      insulin glargine (Basaglar KwikPen U-100 Insulin) 100 unit/mL (3 mL) pen Inject 43 Units under the skin once daily at bedtime. Take as directed per insulin instructions.      insulin lispro (HumaLOG) 100 unit/mL injection Inject 0.12 mL (12 Units) under the skin once daily. At 1:30pm      ipratropium-albuteroL (Duo-Neb) 0.5-2.5 mg/3 mL nebulizer solution Take 3 mL by nebulization 4 times a day as needed for wheezing.      lamoTRIgine (LaMICtal) 25 mg tablet Take 1 tablet (25 mg) by mouth once daily in the evening.      levothyroxine (Synthroid, Levoxyl) 175 mcg tablet Take 1 tablet (175 mcg) by mouth once daily in the morning. Take before meals.      melatonin 3 mg tablet Take 2 tablets (6 mg) by mouth once daily at bedtime.      moxifloxacin (Vigamox) 0.5 % ophthalmic solution Administer 1 drop into the right eye 3 times a day. 6am , 2pm, 10pm      oxyCODONE (Roxicodone) 5 mg immediate release tablet Take 1 tablet (5 mg) by mouth every 6 hours if needed for severe pain (7 - 10).      polyethylene glycol (Glycolax, Miralax) 17 gram packet Take 17 g by mouth once daily.      potassium chloride ER (Micro-K) 10 mEq ER capsule Take 2 capsules (20 mEq) by mouth once daily. Do not crush or chew.      sertraline (Zoloft) 100 mg tablet Take 2 tablets (200 mg) by mouth once daily.      sodium chloride 0.9% parenteral solution 50 mL with DAPTOmycin 50 mg/mL recon soln 775 mg Infuse 775 mg at 131 mL/hr over 30 minutes into a venous catheter once every 24 hours for 23 days. Do not fill before May 19, 2024. 1150 mL 0     Past Medical History:   Diagnosis Date    Acute pulmonary embolism (Multi) 04/22/2024    Aphasia as late effect of cerebrovascular accident 04/25/2023    Essential hypertension 06/25/2010    Gastroesophageal reflux disease 03/10/2009    Hemiplegia of nondominant side, late effect of cerebrovascular disease (Multi) 09/03/2008    Hemorrhagic  stroke (Multi) 06/25/2010    HIV infection (Multi) 02/21/2007    Hypothyroidism 10/10/2002    Formatting of this note might be different from the original.   S/p thyroidectomy 2002   Patholgy c/w Graves    Mixed hyperlipidemia 06/25/2010    Stage 3a chronic kidney disease (Multi) 04/22/2024    T2DM (type 2 diabetes mellitus) (Multi) 11/14/2012     Past Surgical History:   Procedure Laterality Date    CHOLECYSTECTOMY  01/08/2016    Cholecystectomy    COLON SURGERY  01/08/2016    Colon Surgery    CT ANGIO NECK  3/19/2019    CT NECK ANGIO W AND WO IV CONTRAST 3/19/2019 [facility] [place] [facility]GENCY [facility]GAC[facility]    CT HEAD ANGIO W AND WO IV CONTRAST  3/19/2019    CT HEAD ANGIO W AND WO IV CONTRAST 3/19/2019 [facility] [place] [facility]GENCY LEGAC[facility]    HERNIA REPAIR  01/08/2016    Hernia Repair    HYSTERECTOMY  01/08/2016    Hysterectomy    MR HEAD ANGIO WO IV CONTRAST  6/4/2018    MR HEAD ANGIO WO IV CONTRAST 6/4/2018 [facility] CLINICAL LEGACY    MR HEAD ANGIO WO IV CONTRAST  7/11/2020    MR HEAD ANGIO WO IV CONTRAST 7/11/2020 [facility] CLINICAL LEGACY    MR HEAD ANGIO WO IV CONTRAST  9/30/2020    MR HEAD ANGIO WO IV CONTRAST 9/30/2020 [facility]IB LEGACY    MR HEAD ANGIO WO IV CONTRAST  5/22/2022    MR HEAD ANGIO WO IV CONTRAST 5/22/2022 [facility] CLINICAL LEGACY    MR HEAD ANGIO WO IV CONTRAST  4/19/2023    MR HEAD ANGIO WO IV CONTRAST [facility] MRI    MR NECK ANGIO WO IV CONTRAST  6/4/2018    MR NECK ANGIO WO IV CONTRAST 6/4/2018 [facility] CLINICAL LEGACY    MR NECK ANGIO WO IV CONTRAST  7/11/2020    MR NECK ANGIO WO IV CONTRAST 7/11/2020 [facility] CLINICAL LEGACY    MR NECK ANGIO WO IV CONTRAST  9/30/2020    MR NECK ANGIO WO IV CONTRAST 9/30/2020 [facility] LEGACY    MR NECK ANGIO WO IV CONTRAST  5/22/2022    MR NECK ANGIO WO IV CONTRAST 5/22/2022 [facility] CLINICAL LEGACY    MR NECK ANGIO WO IV CONTRAST  4/19/2023    MR NECK ANGIO WO IV CONTRAST [facility] MRI    OTHER SURGICAL HISTORY  01/08/2016    Tubal Stabilization    OTHER SURGICAL HISTORY  09/01/2016    Cervical Surgery (Gyn) [facility] V[facility]ization [facility]    THYROID SURGERY  09/27/2013    Thyroid Surgery     Social History     Socioeconomic History    Marital status: Single     Spouse name: Not on file    Number of children: Not on file    Years of education: Not on file    Highest education level: Not on file   Occupational History    Not on file   Tobacco Use    Smoking status: Never    Smokeless tobacco: Never   Vaping Use    Vaping status: Unknown   Substance and Sexual Activity    Alcohol use: Not on file    Drug use: Not on file    Sexual activity: Not on file   Other Topics Concern    Not on file   Social History Narrative    Not on file     Social Determinants of Health     Financial Resource Strain: Patient Unable To Answer (6/1/2024)    Overall Financial Resource Strain (CARDIA)     Difficulty of Paying Living Expenses: Patient unable to answer   Food Insecurity: Not on file   Transportation Needs: Patient Unable To Answer (6/1/2024)    PRAPARE - Transportation     Lack of Transportation (Medical): Patient unable to answer     Lack of Transportation (Non-Medical): Patient unable to answer   Physical Activity: Not on file   Stress: Not on file   Social Connections: Not on file   Intimate Partner Violence: Not on file   Housing Stability: Patient Unable To Answer (6/1/2024)    Housing Stability Vital Sign     Unable to Pay for Housing in the Last Year: Patient unable to answer     Number of Places Lived in the Last Year: 1     Unstable Housing in the Last Year: Patient unable to answer     No family history on file.  Allergies   Allergen Reactions    Aspirin Unknown, Bleeding and Itching    Iodinated Contrast Media Unknown and Hives    Amoxicillin Hives    Other Unknown    Morphine Unknown, Swelling, Itching and Rash    Sulfa (Sulfonamide Antibiotics) Unknown and Rash     Review of Systems:   Patient is drowsy and aphasic and I was not able to take ROS from her     Physical Exam:  /65   Pulse 110   Temp 36.6 °C (97.8 °F) (Temporal)  "  Resp 18   Ht 1.727 m (5' 8\")   Wt 86.2 kg (190 lb)   SpO2 100%   BMI 28.89 kg/m²   General: NAD, nontoxic appearing,on 4 L NC  Skin: no rashes or wounds  Eyes: no scleral icterus  ENT: no oral thrush or lesions  Resp: Bilateral diminished breathing sounds  CV:  normal S1/S2, no murmur   Abd: soft, non-tender, + PEG  Back: no CVA tenderness   Ext: no edema, RUE middline        Lab:  Lab Results   Component Value Date    WBC 30.1 (H) 06/01/2024    HGB 6.4 (LL) 06/01/2024    HCT 20.2 (L) 06/01/2024    MCV 98 06/01/2024     06/01/2024      Results from last 72 hours   Lab Units 06/01/24  0433   SODIUM mmol/L 141   POTASSIUM mmol/L 4.1   CHLORIDE mmol/L 102   CO2 mmol/L 32   BUN mg/dL 46*   CREATININE mg/dL 1.63*   GLUCOSE mg/dL 416*   CALCIUM mg/dL 7.5*   ANION GAP mmol/L 11   EGFR mL/min/1.73m*2 37*     Results from last 72 hours   Lab Units 06/01/24  0433   ALK PHOS U/L 99   BILIRUBIN TOTAL mg/dL 0.4   PROTEIN TOTAL g/dL 7.1   ALT U/L 13   AST U/L 34   ALBUMIN g/dL 2.0*     Estimated Creatinine Clearance: 44.8 mL/min (A) (by C-G formula based on SCr of 1.63 mg/dL (H)).  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      -Sepsis  -Pneumonia/aspiration  -Anemia   -Leukocytosis  -Acute hypoxic respiratory failure  -HIV infection on Tivicay and Descovy  -Recent admission for MRSA bacteremia and multifocal aspiration pneumonia s/p DC on daptomycin 700 mg every 24 hours through " 6/10/24  -Allergy to amoxicillin      Plan/Recommendations:  -Continue vancomycin, cefepime and Flagyl  -Follow-up blood culture  -Trend hemoglobin, WBC and creatinine  -Sputum culture   -Procalcitonin and MRSA screen  -Monitoring for adverse effects of antibiotics including nephrotoxicity, diarrhea and rash      Please call ID with any concerns or questions.     Judah Duval MD  [facility]  #[phone]

## 2024-06-01 NOTE — CARE PLAN
The patient's goals for the shift include sleep    The clinical goals for the shift include comfort and rest    Over the shift, the patient did not make progress toward the following goals. Barriers to progression include chronic illness. Recommendations to address these barriers include provide a quiet environment.

## 2024-06-01 NOTE — H&P
HISTORY AND PHYSICAL EXAMINATION    PATIENT NAME: Janette Martinez    MRN: [id number]  SERVICE DATE: 6/1/2024       PRIMARY CARE PHYSICIAN: Radha Ward MD          ASSESSMENT AND PLAN     # Encephalopathy  -Secondary to pneumonia    # Pneumonia  -elevated WBC  -Probably due to aspiration  -Recent PEG placement    # Dysphagia/CVA  -S/p PEG placement recently    # Old right hemiparesis/dysarthria    # Sacral decubitus  -Wound care consulted     HIV:  Hx graves s/p thyroidectomy   Post surgical hypothyroid  HTN  HLP  T2DM      Discussed with nurses/case management team and the specialists involved in this patient's care. Reviewed the EMR and documentation from other care-givers.    SUBJECTIVE  CHIEF COMPLAINT:      HPI: This is a 53 y.o. female who is from a nursing home, was transferred here because of altered mental state.  Patient unable to offer any history, history from reports and nursing home.  Patient is usually awake enough to whisper when asked questions, was unable to respond, wakes up when called, hence was sent to the ER for eval.  Patient found to have very high WBC with findings suggestive of a pneumonia and hence admitted for the same.  Recently patient had a PEG placed as her swallowing was compromised.  She has been on continuous PEG feeds at the nursing home.      ED HPI: Janette Martinez is a 53 y.o. female with a history of HFrEF EF 45%, hypertension, hyperlipidemia, diabetes, CKD 3, hypothyroidism, HIV, multiple CVAs with residual right-sided hemiparesis and aphasia, recent hospital admission last week for acute stroke with encephalopathy, acute respiratory failure, pneumonia, PEG placement with MRSA bacteremia discharged on IV daptomycin via right upper extremity midline, presents from her nursing facility for anemia.  RN spoke with nursing home staff member who stated that patient was sent here for hemoglobin of 6.1 needing transfusion.  Family reportedly has a meeting tomorrow to discuss  hospice.  Patient has been reportedly nonverbal since her recent stroke and is reportedly at her mental status baseline.  She was notably febrile and tachycardic on presentation.  She is unable to provide a history.  She is documented to be DNR/DNI/no ICU level of care.     PAST MEDICAL HISTORY:   Past Medical History:   Diagnosis Date    Acute pulmonary embolism (Multi) 04/22/2024    Aphasia as late effect of cerebrovascular accident 04/25/2023    Essential hypertension 06/25/2010    Gastroesophageal reflux disease 03/10/2009    Hemiplegia of nondominant side, late effect of cerebrovascular disease (Multi) 09/03/2008    Hemorrhagic stroke (Multi) 06/25/2010    HIV infection (Multi) 02/21/2007    Hypothyroidism 10/10/2002    Formatting of this note might be different from the original.   S/p thyroidectomy 2002   Patholgy c/w Graves    Mixed hyperlipidemia 06/25/2010    Stage 3a chronic kidney disease (Multi) 04/22/2024    T2DM (type 2 diabetes mellitus) (Multi) 11/14/2012     PAST SURGICAL HISTORY:   Past Surgical History:   Procedure Laterality Date    CHOLECYSTECTOMY  01/08/2016    Cholecystectomy    COLON SURGERY  01/08/2016    Colon Surgery    CT ANGIO NECK  3/19/2019    CT NECK ANGIO W AND WO [place] [facility] [place] [place]    CT HEAD ANGIO W AND WO IV CONTRAST  3/19/2019    CT HEAD ANGIO W AND WO IV CONTR[place] [facility] [place] [place]    HERNIA REPAIR  01/08/2016    Hernia Repair    HYSTERECTOMY  01/08/2016    Hysterectomy    MR HEAD ANGIO WO IV CONTRAST  6/4/2018    MR HEAD ANGIO WO IV CONTRAST 6/4/2018 [facility] CLINICAL LEGACY    MR HEAD ANGIO WO IV CONTRAST  7/11/2020    MR HEAD ANGIO WO IV CONTRAST 7/11/2020 [facility] CLINICAL LEGACY    MR HEAD ANGIO WO IV CONTRAST  9/30/2020    MR HEAD ANGIO WO IV CONTRAST 9/30/2020 [facility]    MR HEAD ANGIO WO IV CONTRAST  5/22/2022    MR HEAD ANGIO WO IV CONTRAST 5/22/2022 [facility] CLINICAL LEGACY    MR HEAD ANGIO WO IV CONTRAST  4/19/2023    MR HEAD ANGIO WO IV  CONTRAST [facility] MRI    MR NECK ANGIO WO IV CONTRAST  6/4/2018    MR NECK ANGIO WO IV CONTRAST 6/4/2018 [facility] CLINICAL LEGACY    MR NECK ANGIO WO IV CONTRAST  7/11/2020    MR NECK ANGIO WO IV CONTRAST 7/11/2020 [facility] CLINICAL LEGACY    MR NECK ANGIO WO IV CONTRAST  9/30/2020    MR NECK ANGIO WO IV CONTRAST 9/30/2020 [facility]    MR NECK ANGIO WO IV CONTRAST  5/22/2022    MR NECK ANGIO WO IV CONTRAST 5/22/2022 [facility] CLINICAL LEGACY    MR NECK ANGIO WO IV CONTRAST  4/19/2023    MR NECK ANGIO WO IV CONTRAST [facility] MRI    OTHER SURGICAL HISTORY  01/08/2016    Tubal Stabilization    OTHER SURGICAL HISTORY  09/01/2016    Cervical Surgery (Gyn) Laser Vaporization Of Transformation Zone    THYROID SURGERY  09/27/2013    Thyroid Surgery     FAMILY HISTORY: No family history on file.  SOCIAL HISTORY:   Social History     Tobacco Use    Smoking status: Never    Smokeless tobacco: Never   Vaping Use    Vaping status: Unknown       MEDICATIONS: Prior to Admission Medications  Medications Prior to Admission   Medication Sig Dispense Refill Last Dose    acetaminophen (Tylenol) 500 mg tablet Take 2 tablets (1,000 mg) by mouth 3 times a day.       amLODIPine (Norvasc) 5 mg tablet Take 1 tablet (5 mg) by mouth once daily.       apixaban (Eliquis) 5 mg tablet Take 1 tablet (5 mg) by mouth 2 times a day.       baclofen (Lioresal) 10 mg tablet Take 1 tablet (10 mg) by mouth 2 times a day.       diclofenac sodium (Voltaren) 1 % gel Apply 4.5 inches (4 g) topically every 12 hours if needed.       docusate sodium (Colace) 100 mg capsule Take 1 capsule (100 mg) by mouth 2 times a day.       dolutegravir (Tivicay) 50 mg tablet Take 1 tablet (50 mg) by mouth once daily.       emtricitabine-tenofovir alafen (Descovy) 200-25 mg tablet Take 1 tablet by mouth once daily.       furosemide (Lasix) 20 mg tablet Take 1 tablet (20 mg) by mouth once daily.       gabapentin (Neurontin) 300 mg capsule Take 2 capsules (600 mg) by mouth 3 times a day.        hydroCHLOROthiazide (HYDRODiuril) 25 mg tablet Take 1 tablet (25 mg) by mouth once daily. Hold if SBP <110 or Heart rate <60       insulin glargine (Basaglar KwikPen U-100 Insulin) 100 unit/mL (3 mL) pen Inject 43 Units under the skin once daily at bedtime. Take as directed per insulin instructions.       insulin lispro (HumaLOG) 100 unit/mL injection Inject 0.12 mL (12 Units) under the skin once daily. At 1:30pm       ipratropium-albuteroL (Duo-Neb) 0.5-2.5 mg/3 mL nebulizer solution Take 3 mL by nebulization 4 times a day as needed for wheezing.       lamoTRIgine (LaMICtal) 25 mg tablet Take 1 tablet (25 mg) by mouth once daily in the evening.       levothyroxine (Synthroid, Levoxyl) 175 mcg tablet Take 1 tablet (175 mcg) by mouth once daily in the morning. Take before meals.       melatonin 3 mg tablet Take 2 tablets (6 mg) by mouth once daily at bedtime.       moxifloxacin (Vigamox) 0.5 % ophthalmic solution Administer 1 drop into the right eye 3 times a day. 6am , 2pm, 10pm       oxyCODONE (Roxicodone) 5 mg immediate release tablet Take 1 tablet (5 mg) by mouth every 6 hours if needed for severe pain (7 - 10).       polyethylene glycol (Glycolax, Miralax) 17 gram packet Take 17 g by mouth once daily.       potassium chloride ER (Micro-K) 10 mEq ER capsule Take 2 capsules (20 mEq) by mouth once daily. Do not crush or chew.       sertraline (Zoloft) 100 mg tablet Take 2 tablets (200 mg) by mouth once daily.       sodium chloride 0.9% parenteral solution 50 mL with DAPTOmycin 50 mg/mL recon soln 775 mg Infuse 775 mg at 131 mL/hr over 30 minutes into a venous catheter once every 24 hours for 23 days. Do not fill before May 19, 2024. 1150 mL 0       CURRENT ALLERGIES:   Allergies   Allergen Reactions    Aspirin Unknown, Bleeding and Itching    Iodinated Contrast Media Unknown and Hives    Amoxicillin Hives    Other Unknown    Morphine Unknown, Swelling, Itching and Rash    Sulfa (Sulfonamide Antibiotics) Unknown and  "Rash       COMPLETE REVIEW OF SYSTEMS:      GENERAL: No fever, appetite stable.  HEENT: No epistaxis, no mouth ulcers  NECK: no neck pain  RESPIRATORY: No new resp symptoms.  CARDIOVASCULAR: No cp, no leg edema, No orthopnea  GI: No NVD, no GI Bleed  : No hematuria, no dysuria  MUSCULOSKELETAL: No new jt pains or swelling  SKIN: No rashes, no ulcers  PSYCH: Denies feeling anxious or depressed.   HEMATOLOGY/LYMPHOLOGY: No bruising, no hx of VTE  ENDOCRINE: No hx of DM  NEURO: No hx of seizures or syncope, No hx of CVA      OBJECTIVE  PHYSICAL EXAM:   Heart Rate:  [110-126]   Temp:  [36.2 °C (97.2 °F)-38.2 °C (100.7 °F)]   Resp:  [18-20]   BP: (110-142)/(58-76)   Height:  [172.7 cm (5' 8\")]   Weight:  [86.2 kg (190 lb)]   SpO2:  [95 %-100 %]     Body mass index is 28.89 kg/m².    GENERAL: Lethargic, wakes up when called  SKIN: Skin turgor normal. No rashes  HEENT: no epistaxis, Moist mucosa.  NECK: supple  BACK: spine nontender to palpation, No CVAT.  LUNGS: Vesicular breath sounds, with no wheeze, no crepitations.   CARDIAC: REGULAR. S1 and S2; no rubs, no murmur  ABDOMEN: Abdomen soft, non-tender. BS+  EXTREMITIES: No edema, Good capillary refill.   NEURO: Lethargic unable to examine    MUSCULOSKELETAL: No acute inflammation       .  Current Facility-Administered Medications:     acetaminophen (Tylenol) tablet 650 mg, 650 mg, oral, q6h PRN, Radha Ward MD    ALPRAZolam (Xanax) tablet 0.5 mg, 0.5 mg, oral, BID PRN, Radha Ward MD    alum-mag hydroxide-simeth (Mylanta) 200-200-20 mg/5 mL oral suspension 20 mL, 20 mL, oral, TID PRN, Radha Ward MD    amLODIPine (Norvasc) tablet 5 mg, 5 mg, oral, Daily, Radha Ward MD, 5 mg at 06/01/24 0911    apixaban (Eliquis) tablet 5 mg, 5 mg, oral, BID, Radha Ward MD, 5 mg at 06/01/24 0911    baclofen (Lioresal) tablet 10 mg, 10 mg, oral, BID, Radha Ward MD, 10 mg at 06/01/24 0911    bisacodyl (Dulcolax) EC tablet 10 mg, 10 mg, " oral, Daily PRN, Radha Ward MD    cefepime (Maxipime) in dextrose 5% IV 2 g, 2 g, intravenous, q12h, Radha Ward MD, Stopped at 06/01/24 1208    dextromethorphan-guaifenesin (Robitussin DM)  mg/5 mL oral liquid 5 mL, 5 mL, oral, q4h PRN, Radha Ward MD    diphenhydrAMINE (BENADryl) capsule 25 mg, 25 mg, oral, BID PRN, Radha Ward MD    docusate sodium (Colace) oral liquid 100 mg, 100 mg, oral, Daily, Radha Ward MD, 100 mg at 06/01/24 0911    dolutegravir (Tivicay) tablet 50 mg, 50 mg, oral, Daily, Radha Ward MD, 50 mg at 06/01/24 0921    emtricitabine-tenofovir alafen (Descovy) 200-25 mg per tablet 1 tablet, 1 tablet, oral, Daily, Radah Ward MD, 1 tablet at 06/01/24 0911    furosemide (Lasix) tablet 20 mg, 20 mg, oral, Daily, Radha Ward MD, 20 mg at 06/01/24 0911    gabapentin (Neurontin) capsule 600 mg, 600 mg, oral, TID, Radha aWrd MD, 600 mg at 06/01/24 0911    guaiFENesin (Mucinex) 12 hr tablet 600 mg, 600 mg, oral, BID PRN, Radha Ward MD    lamoTRIgine (LaMICtal) tablet 25 mg, 25 mg, oral, q PM, Radha Ward MD    levothyroxine (Synthroid, Levoxyl) tablet 175 mcg, 175 mcg, oral, Daily before breakfast, Radha Ward MD, 175 mcg at 06/01/24 0911    morphine injection 4 mg, 4 mg, intravenous, q3h PRN, Radha Ward MD    ondansetron (Zofran) injection 4 mg, 4 mg, intravenous, q4h PRN, Radha Ward MD    oxygen (O2) therapy, , inhalation, Continuous - Inhalation, Radha Ward MD, 5 L/min at 06/01/24 0415    vancomycin (Vancocin) in dextrose 5 % water (D5W) 250 mL IV 1,250 mg, 1,250 mg, intravenous, q24h, Radha Ward MD, Last Rate: 200 mL/hr at 06/01/24 1223, 1,250 mg at 06/01/24 1223    vancomycin (Vancocin) pharmacy to dose - pharmacy monitoring, , miscellaneous, Daily PRN, Radha Ward MD    vancomycin (Vancocin) pharmacy to dose - pharmacy monitoring, , miscellaneous,  Daily PRN, Radha Ward MD    DATA:   Diagnostic tests reviewed for today's visit:    Most recent labs  Admission on 05/31/2024   Component Date Value Ref Range Status    POCT Glucose 05/31/2024 377 (H)  74 - 99 mg/dL Final    WBC 05/31/2024 32.4 (H)  4.4 - 11.3 x10*3/uL Final    nRBC 05/31/2024 0.1 (H)  0.0 - 0.0 /100 WBCs Final    RBC 05/31/2024 2.31 (L)  4.00 - 5.20 x10*6/uL Final    Hemoglobin 05/31/2024 7.0 (L)  12.0 - 16.0 g/dL Final    Hematocrit 05/31/2024 22.2 (L)  36.0 - 46.0 % Final    MCV 05/31/2024 96  80 - 100 fL Final    MCH 05/31/2024 30.3  26.0 - 34.0 pg Final    MCHC 05/31/2024 31.5 (L)  32.0 - 36.0 g/dL Final    RDW 05/31/2024 17.2 (H)  11.5 - 14.5 % Final    Platelets 05/31/2024 353  150 - 450 x10*3/uL Final    Immature Granulocytes %, Automated 05/31/2024 2.6 (H)  0.0 - 0.9 % Final    Immature Granulocyte Count (IG) includes promyelocytes, myelocytes and metamyelocytes but does not include bands. Percent differential counts (%) should be interpreted in the context of the absolute cell counts (cells/UL).    Immature Granulocytes Absolute, Au* 05/31/2024 0.84 (H)  0.00 - 0.70 x10*3/uL Final    Glucose 05/31/2024 408 (H)  74 - 99 mg/dL Final    Sodium 05/31/2024 137  136 - 145 mmol/L Final    Potassium 05/31/2024 4.3  3.5 - 5.3 mmol/L Final    Chloride 05/31/2024 96 (L)  98 - 107 mmol/L Final    Bicarbonate 05/31/2024 34 (H)  21 - 32 mmol/L Final    Anion Gap 05/31/2024 11  10 - 20 mmol/L Final    Urea Nitrogen 05/31/2024 53 (H)  6 - 23 mg/dL Final    Creatinine 05/31/2024 1.73 (H)  0.50 - 1.05 mg/dL Final    eGFR 05/31/2024 35 (L)  >60 mL/min/1.73m*2 Final    Calculations of estimated GFR are performed using the 2021 CKD-EPI Study Refit equation without the race variable for the IDMS-Traceable creatinine methods.  https://jasn.asnjournals.org/content/early/2021/09/22/ASN.4751291229    Calcium 05/31/2024 8.5 (L)  8.6 - 10.3 mg/dL Final    Albumin 05/31/2024 2.3 (L)  3.4 - 5.0 g/dL Final     Alkaline Phosphatase 05/31/2024 111 (H)  33 - 110 U/L Final    Total Protein 05/31/2024 8.4 (H)  6.4 - 8.2 g/dL Final    AST 05/31/2024 36  9 - 39 U/L Final    Bilirubin, Total 05/31/2024 0.2  0.0 - 1.2 mg/dL Final    ALT 05/31/2024 13  7 - 45 U/L Final    Patients treated with Sulfasalazine may generate falsely decreased results for ALT.    Lactate 05/31/2024 2.1 (H)  0.4 - 2.0 mmol/L Final    Blood Culture 05/31/2024 Loaded on Instrument - Culture in progress   Preliminary    Blood Culture 05/31/2024 Loaded on Instrument - Culture in progress   Preliminary    Creatine Kinase 05/31/2024 38  0 - 215 U/L Final    Troponin I, High Sensitivity 05/31/2024 7  0 - 13 ng/L Final    BNP 05/31/2024 105 (H)  0 - 99 pg/mL Final    POCT pH, Venous 05/31/2024 7.45 (H)  7.33 - 7.43 pH Final    POCT pCO2, Venous 05/31/2024 54 (H)  41 - 51 mm Hg Final    POCT pO2, Venous 05/31/2024 42  35 - 45 mm Hg Final    POCT SO2, Venous 05/31/2024 69  45 - 75 % Final    POCT Oxy Hemoglobin, Venous 05/31/2024 67.7  45.0 - 75.0 % Final    POCT Hematocrit Calculated, Venous 05/31/2024 23.0 (L)  36.0 - 46.0 % Final    POCT Sodium, Venous 05/31/2024 141  136 - 145 mmol/L Final    POCT Potassium, Venous 05/31/2024 4.7  3.5 - 5.3 mmol/L Final    POCT Chloride, Venous 05/31/2024 101  98 - 107 mmol/L Final    POCT Ionized Calicum, Venous 05/31/2024 1.17  1.10 - 1.33 mmol/L Final    POCT Glucose, Venous 05/31/2024 432 (H)  74 - 99 mg/dL Final    POCT Lactate, Venous 05/31/2024 1.8  0.4 - 2.0 mmol/L Final    POCT Base Excess, Venous 05/31/2024 12.2 (H)  -2.0 - 3.0 mmol/L Final    POCT HCO3 Calculated, Venous 05/31/2024 37.5 (H)  22.0 - 26.0 mmol/L Final    POCT Hemoglobin, Venous 05/31/2024 7.6 (L)  12.0 - 16.0 g/dL Final    POCT Anion Gap, Venous 05/31/2024 7.0 (L)  10.0 - 25.0 mmol/L Final    Patient Temperature 05/31/2024 37.0  degrees Celsius Final    FiO2 05/31/2024 100  % Final    Protime 05/31/2024 16.4 (H)  9.8 - 12.8 seconds Final    INR  05/31/2024 1.5 (H)  0.9 - 1.1 Final    aPTT 05/31/2024 53 (H)  27 - 38 seconds Final    ABO TYPE 05/31/2024 O   Final    Rh TYPE 05/31/2024 POS   Final    ANTIBODY SCREEN 05/31/2024 NEG   Final    Lactate 05/31/2024 1.1  0.4 - 2.0 mmol/L Final    Neutrophils %, Manual 05/31/2024 80.0  40.0 - 80.0 % Final    Percent differential counts (%) should be interpreted in the context of the absolute cell counts (cells/uL).    Bands %, Manual 05/31/2024 1.0  0.0 - 5.0 % Final    Lymphocytes %, Manual 05/31/2024 10.0  13.0 - 44.0 % Final    Monocytes %, Manual 05/31/2024 8.0  2.0 - 10.0 % Final    Eosinophils %, Manual 05/31/2024 0.0  0.0 - 6.0 % Final    Basophils %, Manual 05/31/2024 1.0  0.0 - 2.0 % Final    Seg Neutrophils Absolute, Manual 05/31/2024 25.92 (H)  1.20 - 7.00 x10*3/uL Final    Bands Absolute, Manual 05/31/2024 0.32  0.00 - 0.70 x10*3/uL Final    Lymphocytes Absolute, Manual 05/31/2024 3.24  1.20 - 4.80 x10*3/uL Final    Monocytes Absolute, Manual 05/31/2024 2.59 (H)  0.10 - 1.00 x10*3/uL Final    Eosinophils Absolute, Manual 05/31/2024 0.00  0.00 - 0.70 x10*3/uL Final    Basophils Absolute, Manual 05/31/2024 0.32 (H)  0.00 - 0.10 x10*3/uL Final    Total Cells Counted 05/31/2024 100   Final    Neutrophils Absolute, Manual 05/31/2024 26.24 (H)  1.20 - 7.70 x10*3/uL Final    RBC Morphology 05/31/2024 See Below   Final    Polychromasia 05/31/2024 Mild   Final    RBC Fragments 05/31/2024 Few   Final    Target Cells 05/31/2024 Few   Final    Teardrop Cells 05/31/2024 Few   Final    Bite Cells 05/31/2024 Present   Final    WBC 06/01/2024 30.1 (H)  4.4 - 11.3 x10*3/uL Final    nRBC 06/01/2024 0.0  0.0 - 0.0 /100 WBCs Final    RBC 06/01/2024 2.07 (L)  4.00 - 5.20 x10*6/uL Final    Hemoglobin 06/01/2024 6.4 (LL)  12.0 - 16.0 g/dL Final    Hematocrit 06/01/2024 20.2 (L)  36.0 - 46.0 % Final    MCV 06/01/2024 98  80 - 100 fL Final    MCH 06/01/2024 30.9  26.0 - 34.0 pg Final    MCHC 06/01/2024 31.7 (L)  32.0 - 36.0 g/dL  "Final    RDW 06/01/2024 17.2 (H)  11.5 - 14.5 % Final    Platelets 06/01/2024 287  150 - 450 x10*3/uL Final    Glucose 06/01/2024 416 (H)  74 - 99 mg/dL Final    Sodium 06/01/2024 141  136 - 145 mmol/L Final    Potassium 06/01/2024 4.1  3.5 - 5.3 mmol/L Final    Chloride 06/01/2024 102  98 - 107 mmol/L Final    Bicarbonate 06/01/2024 32  21 - 32 mmol/L Final    Anion Gap 06/01/2024 11  10 - 20 mmol/L Final    Urea Nitrogen 06/01/2024 46 (H)  6 - 23 mg/dL Final    Creatinine 06/01/2024 1.63 (H)  0.50 - 1.05 mg/dL Final    eGFR 06/01/2024 37 (L)  >60 mL/min/1.73m*2 Final    Calculations of estimated GFR are performed using the 2021 CKD-EPI Study Refit equation without the race variable for the IDMS-Traceable creatinine methods.  https://jasn.asnjournals.org/content/early/2021/09/22/ASN.5109107374    Calcium 06/01/2024 7.5 (L)  8.6 - 10.3 mg/dL Final    Albumin 06/01/2024 2.0 (L)  3.4 - 5.0 g/dL Final    Alkaline Phosphatase 06/01/2024 99  33 - 110 U/L Final    Total Protein 06/01/2024 7.1  6.4 - 8.2 g/dL Final    AST 06/01/2024 34  9 - 39 U/L Final    Bilirubin, Total 06/01/2024 0.4  0.0 - 1.2 mg/dL Final    ALT 06/01/2024 13  7 - 45 U/L Final    Patients treated with Sulfasalazine may generate falsely decreased results for ALT.    MRSA PCR 06/01/2024 Detected (A)  Not Detected Final       No results found for: \"GLU\"     CT chest abdomen pelvis wo IV contrast  Narrative: Interpreted By:  Hector Maguire,   STUDY:  CT CHEST ABDOMEN PELVIS WO CONTRAST;  5/31/2024 9:33 pm      INDICATION:  Signs/Symptoms:Sepsis.      COMPARISON:  CT scan of the chest, abdomen and pelvis 05/09/2024.      ACCESSION NUMBER(S):  [id number]      ORDERING CLINICIAN:  EZEKIEL PEREZ      TECHNIQUE:  Axial CT images of the chest, abdomen and pelvis with coronal and  sagittal reconstructed images obtained without contrast.      FINDINGS:  CHEST:      VESSELS: Aorta is normal caliber. Atherosclerotic changes in the  aorta and arch vessels. Lack " of contrast limits evaluation of the  vasculature. HEART: Normal size. Trace pericardial effusion.  MEDIASTINUM AND EYAL: No axillary or mediastinal adenopathy. Lack of  contrast limits evaluation of the eyal. LUNG, PLEURA, LARGE AIRWAYS:  There are bilateral patchy airspace opacities with areas of  consolidation in the lower lobes. There is slight interval  improvement from prior CT. Findings concerning for multifocal  pneumonia with slight interval improvement. No effusion or  pneumothorax. CHEST WALL AND LOWER NECK: Within normal limits.  BONES: Multilevel degenerative changes of the spine. Bilateral  shoulder osteoarthrosis.      ABDOMEN:      LIVER: Within normal limits.  BILE DUCTS: Normal caliber.  GALLBLADDER: Status post cholecystectomy.  PANCREAS: Within normal limits.  SPLEEN: Within normal limits.  ADRENALS: Within normal limits.  KIDNEYS: Kidneys are symmetric in size without evidence for calculi,  hydronephrosis, hydroureter or perinephric inflammatory changes.      VESSELS: Mild calcific atherosclerosis of the aortoiliac vessels. No  aortic aneurysm. RETROPERITONEUM: Within normal limits.      PELVIS:      REPRODUCTIVE ORGANS: Uterus is present with several coarse  calcifications which may relate to calcified uterine fibroids.  BLADDER: Within normal limits.      BOWEL: A gastrostomy tube is present in the gastric body.  Postsurgical changes of partial bowel resection and anastomosis in  the right mid abdomen. Visualized loops of bowel are without evidence  for obstruction. Appendix is not identified with certainty. No  pericecal inflammatory changes seen. Moderate stool burden. No  pneumatosis or portal venous gas. PERITONEUM: No ascites or free air,  no fluid collection.      ABDOMINAL WALL: Postsurgical changes of ventral hernia mesh repair  with multiple spiral staples noted. A gastrostomy tube is noted in  the left upper quadrant. Areas of subcutaneous soft tissue stranding  and nodularity may  relate to sequela of prior injections. There is  stranding of bilateral gluteal subcutaneous soft tissues which is new  from prior CT. There is a ill-defined fluid collection along the  right medial gluteal fold with multiple locules of subcutaneous free  air. There is surrounding inflammatory changes which extends to the  coccyx. These findings raise concern for developing decubitus ulcer.  Fatty atrophy of the paraspinal and gluteal musculature. BONES:  Multilevel degenerative changes of the spine.      Impression: There are bilateral patchy airspace opacities with areas of  consolidation in the lower lobes. There is slight interval  improvement from prior CT. Findings concerning for multifocal  pneumonia with slight interval improvement. Continued radiographic  follow-up to resolution is advised.      A gastrostomy tube is present in the gastric body. Postsurgical  changes of partial bowel resection and anastomosis in the right mid  abdomen. Visualized loops of bowel are without evidence for  obstruction.      There is stranding of bilateral gluteal subcutaneous soft tissues  which is new from prior CT. There is a ill-defined fluid collection  along the right medial gluteal fold with multiple locules of  subcutaneous free air. There is surrounding inflammatory changes  which extends to the coccyx. These findings raise concern for  developing decubitus ulcer. Correlate with exam.      Additional findings as described above.      MACRO:  None      Signed by: Hector Maguire 5/31/2024 10:46 PM  Dictation workstation:   [id number]  CT head wo IV contrast  Narrative: Interpreted By:  Hector Maguire,   STUDY:  CT HEAD WO IV CONTRAST;  5/31/2024 9:33 pm      INDICATION:  Signs/Symptoms:AMS.      COMPARISON:  CT scan of the head 05/09/2024      ACCESSION NUMBER(S):  [id number]      ORDERING CLINICIAN:  EZEKIEL PEREZ      TECHNIQUE:  Axial noncontrast CT images of the head.      FINDINGS:  BRAIN PARENCHYMA: Stable  focal hypodensities bilateral basal ganglia,  left thalamus, brock and left cerebellar hemisphere likely sequela of  remote infarct. Gray-white matter interfaces are otherwise preserved.  No mass, mass effect or midline shift. Moderate deep and  periventricular white matter hypodensities are nonspecific, but  favored to represent chronic small vessel ischemic changes.      HEMORRHAGE: No acute intracranial hemorrhage.  VENTRICLES and EXTRA-AXIAL SPACES: Moderate volume loss with  prominence of the ventricles and sulci. EXTRACRANIAL SOFT TISSUES:  Mild soft tissue stranding left parietal scalp. PARANASAL  SINUSES/MASTOIDS: The visualized paranasal sinuses and mastoid air  cells are aerated. CALVARIUM: No depressed skull fracture. Bilateral  parietal jordyn holes suspected.      OTHER FINDINGS: Atherosclerotic calcification of the carotid siphons  and vertebral arteries. Multiple dental caries and extractions with  periapical lucencies concerning for periodontal disease/periapical  abscess..      Impression: No acute intracranial abnormality. If symptoms persist or there is  high clinical suspicion further evaluation with MRI can be considered.      Mild soft tissue swelling left parietal scalp.      Chronic changes as noted above.          MACRO:  None      Signed by: Hector Maguire 5/31/2024 10:29 PM  Dictation workstation:   [id number]  XR chest 1 view  Narrative: Interpreted By:  Javed Mcclure,   STUDY:  XR CHEST 1 VIEW;  5/31/2024 6:47 pm      INDICATION:  Signs/Symptoms:Sepsis.      COMPARISON:  Chest radiograph 05/12/2024      ACCESSION NUMBER(S):  [id number]      ORDERING CLINICIAN:  EZEKIEL PEREZ      FINDINGS:          CARDIOMEDIASTINAL SILHOUETTE:  Cardiomediastinal silhouette is stable in size and configuration.      LUNGS:  No consolidation, pneumothorax, or significant effusion. Chronic  appearing bilateral extensive reticular changes.      ABDOMEN:  No remarkable upper abdominal findings.       BONES:  No acute osseous changes.      Impression: 1.  No evidence of acute cardiopulmonary process.          Signed by: Javed Mcclure 5/31/2024 7:06 PM  Dictation workstation:   [id number]        EKG:   Tele:     SIGNATURE: Radha Ward MD  DATE: June 1, 2024  TIME: 12:57 PM

## 2024-06-01 NOTE — CARE PLAN
The patient's goals for the shift include sleep    The clinical goals for the shift include remain comfortable

## 2024-06-02 LAB
ANION GAP SERPL CALC-SCNC: 11 MMOL/L (ref 10–20)
BUN SERPL-MCNC: 41 MG/DL (ref 6–23)
CALCIUM SERPL-MCNC: 7.5 MG/DL (ref 8.6–10.3)
CHLORIDE SERPL-SCNC: 102 MMOL/L (ref 98–107)
CO2 SERPL-SCNC: 30 MMOL/L (ref 21–32)
CREAT SERPL-MCNC: 1.52 MG/DL (ref 0.5–1.05)
EGFRCR SERPLBLD CKD-EPI 2021: 40 ML/MIN/1.73M*2
ERYTHROCYTE [DISTWIDTH] IN BLOOD BY AUTOMATED COUNT: 16.9 % (ref 11.5–14.5)
GLUCOSE BLD MANUAL STRIP-MCNC: 404 MG/DL (ref 74–99)
GLUCOSE BLD MANUAL STRIP-MCNC: 439 MG/DL (ref 74–99)
GLUCOSE SERPL-MCNC: 421 MG/DL (ref 74–99)
HCT VFR BLD AUTO: 19.8 % (ref 36–46)
HGB BLD-MCNC: 6.4 G/DL (ref 12–16)
HOLD SPECIMEN: NORMAL
HOLD SPECIMEN: NORMAL
MCH RBC QN AUTO: 30.8 PG (ref 26–34)
MCHC RBC AUTO-ENTMCNC: 32.3 G/DL (ref 32–36)
MCV RBC AUTO: 95 FL (ref 80–100)
NRBC BLD-RTO: 0 /100 WBCS (ref 0–0)
PLATELET # BLD AUTO: 279 X10*3/UL (ref 150–450)
POTASSIUM SERPL-SCNC: 3.7 MMOL/L (ref 3.5–5.3)
PROCALCITONIN SERPL-MCNC: 0.66 NG/ML
RBC # BLD AUTO: 2.08 X10*6/UL (ref 4–5.2)
SODIUM SERPL-SCNC: 139 MMOL/L (ref 136–145)
VANCOMYCIN TROUGH SERPL-MCNC: 24.8 UG/ML (ref 5–20)
WBC # BLD AUTO: 28.1 X10*3/UL (ref 4.4–11.3)

## 2024-06-02 PROCEDURE — 80202 ASSAY OF VANCOMYCIN: CPT | Performed by: INTERNAL MEDICINE

## 2024-06-02 PROCEDURE — 2500000005 HC RX 250 GENERAL PHARMACY W/O HCPCS: Performed by: INTERNAL MEDICINE

## 2024-06-02 PROCEDURE — 2500000001 HC RX 250 WO HCPCS SELF ADMINISTERED DRUGS (ALT 637 FOR MEDICARE OP): Performed by: INTERNAL MEDICINE

## 2024-06-02 PROCEDURE — 85027 COMPLETE CBC AUTOMATED: CPT | Performed by: INTERNAL MEDICINE

## 2024-06-02 PROCEDURE — 82374 ASSAY BLOOD CARBON DIOXIDE: CPT | Performed by: INTERNAL MEDICINE

## 2024-06-02 PROCEDURE — 82947 ASSAY GLUCOSE BLOOD QUANT: CPT

## 2024-06-02 PROCEDURE — 1200000002 HC GENERAL ROOM WITH TELEMETRY DAILY

## 2024-06-02 PROCEDURE — 2500000002 HC RX 250 W HCPCS SELF ADMINISTERED DRUGS (ALT 637 FOR MEDICARE OP, ALT 636 FOR OP/ED): Performed by: INTERNAL MEDICINE

## 2024-06-02 PROCEDURE — 2500000004 HC RX 250 GENERAL PHARMACY W/ HCPCS (ALT 636 FOR OP/ED): Performed by: INTERNAL MEDICINE

## 2024-06-02 RX ORDER — DEXTROSE 50 % IN WATER (D50W) INTRAVENOUS SYRINGE
25
Status: DISCONTINUED | OUTPATIENT
Start: 2024-06-02 | End: 2024-06-11 | Stop reason: HOSPADM

## 2024-06-02 RX ORDER — DEXTROSE MONOHYDRATE AND SODIUM CHLORIDE 5; .9 G/100ML; G/100ML
75 INJECTION, SOLUTION INTRAVENOUS CONTINUOUS
Status: DISCONTINUED | OUTPATIENT
Start: 2024-06-02 | End: 2024-06-03

## 2024-06-02 RX ORDER — INSULIN LISPRO 100 [IU]/ML
0-10 INJECTION, SOLUTION INTRAVENOUS; SUBCUTANEOUS
Status: DISCONTINUED | OUTPATIENT
Start: 2024-06-02 | End: 2024-06-11 | Stop reason: HOSPADM

## 2024-06-02 RX ORDER — DEXTROSE 50 % IN WATER (D50W) INTRAVENOUS SYRINGE
12.5
Status: DISCONTINUED | OUTPATIENT
Start: 2024-06-02 | End: 2024-06-11 | Stop reason: HOSPADM

## 2024-06-02 RX ADMIN — AMLODIPINE BESYLATE 5 MG: 5 TABLET ORAL at 09:22

## 2024-06-02 RX ADMIN — CEFEPIME HYDROCHLORIDE 2 G: 2 INJECTION, POWDER, FOR SOLUTION INTRAVENOUS at 09:29

## 2024-06-02 RX ADMIN — DEXTROSE AND SODIUM CHLORIDE 75 ML/HR: 5; 900 INJECTION, SOLUTION INTRAVENOUS at 15:01

## 2024-06-02 RX ADMIN — BACLOFEN 10 MG: 10 TABLET ORAL at 21:05

## 2024-06-02 RX ADMIN — BACLOFEN 10 MG: 10 TABLET ORAL at 09:21

## 2024-06-02 RX ADMIN — GABAPENTIN 600 MG: 300 CAPSULE ORAL at 09:21

## 2024-06-02 RX ADMIN — INSULIN LISPRO 10 UNITS: 100 INJECTION, SOLUTION INTRAVENOUS; SUBCUTANEOUS at 17:30

## 2024-06-02 RX ADMIN — LAMOTRIGINE 25 MG: 25 TABLET ORAL at 21:04

## 2024-06-02 RX ADMIN — Medication 5 L/MIN: at 09:33

## 2024-06-02 RX ADMIN — APIXABAN 5 MG: 5 TABLET, FILM COATED ORAL at 09:21

## 2024-06-02 RX ADMIN — Medication 4 L/MIN: at 20:00

## 2024-06-02 RX ADMIN — LEVOTHYROXINE SODIUM 175 MCG: 125 TABLET ORAL at 09:22

## 2024-06-02 RX ADMIN — EMTRICITABINE AND TENOFOVIR ALAFENAMIDE 1 TABLET: 200; 25 TABLET ORAL at 09:22

## 2024-06-02 RX ADMIN — CEFEPIME HYDROCHLORIDE 2 G: 2 INJECTION, POWDER, FOR SOLUTION INTRAVENOUS at 21:04

## 2024-06-02 RX ADMIN — FUROSEMIDE 20 MG: 20 TABLET ORAL at 09:22

## 2024-06-02 RX ADMIN — VANCOMYCIN HYDROCHLORIDE 1250 MG: 1.25 INJECTION, POWDER, LYOPHILIZED, FOR SOLUTION INTRAVENOUS at 11:39

## 2024-06-02 RX ADMIN — DOLUTEGRAVIR SODIUM 50 MG: 50 TABLET, FILM COATED ORAL at 09:21

## 2024-06-02 ASSESSMENT — PAIN - FUNCTIONAL ASSESSMENT
PAIN_FUNCTIONAL_ASSESSMENT: WONG-BAKER FACES
PAIN_FUNCTIONAL_ASSESSMENT: 0-10

## 2024-06-02 ASSESSMENT — COGNITIVE AND FUNCTIONAL STATUS - GENERAL
TOILETING: TOTAL
HELP NEEDED FOR BATHING: TOTAL
MOBILITY SCORE: 6
MOVING TO AND FROM BED TO CHAIR: TOTAL
DRESSING REGULAR UPPER BODY CLOTHING: TOTAL
DRESSING REGULAR LOWER BODY CLOTHING: TOTAL
MOVING FROM LYING ON BACK TO SITTING ON SIDE OF FLAT BED WITH BEDRAILS: TOTAL
CLIMB 3 TO 5 STEPS WITH RAILING: TOTAL
MOBILITY SCORE: 6
CLIMB 3 TO 5 STEPS WITH RAILING: TOTAL
MOVING FROM LYING ON BACK TO SITTING ON SIDE OF FLAT BED WITH BEDRAILS: TOTAL
STANDING UP FROM CHAIR USING ARMS: TOTAL
DAILY ACTIVITIY SCORE: 6
TURNING FROM BACK TO SIDE WHILE IN FLAT BAD: TOTAL
MOVING TO AND FROM BED TO CHAIR: TOTAL
EATING MEALS: TOTAL
PERSONAL GROOMING: TOTAL
STANDING UP FROM CHAIR USING ARMS: TOTAL
WALKING IN HOSPITAL ROOM: TOTAL
TURNING FROM BACK TO SIDE WHILE IN FLAT BAD: TOTAL
WALKING IN HOSPITAL ROOM: TOTAL

## 2024-06-02 ASSESSMENT — PAIN SCALES - GENERAL: PAINLEVEL_OUTOF10: 0 - NO PAIN

## 2024-06-02 ASSESSMENT — PAIN SCALES - WONG BAKER: WONGBAKER_NUMERICALRESPONSE: NO HURT

## 2024-06-02 NOTE — PROGRESS NOTES
"                                                                                                               '' Infectious Disease Progress Note''        Interval Events:  No new symptoms  Afebrile  Blood culture no growth to date  MRSA screen: Positive      Current antibiotic:  Vancomycin  Cefepime  Flagyl    Physical Exam:  /67 (BP Location: Right arm, Patient Position: Lying)   Pulse 108   Temp 37.1 °C (98.8 °F) (Temporal)   Resp 20   Ht 1.727 m (5' 8\")   Wt 86.2 kg (190 lb)   SpO2 100%   BMI 28.89 kg/m²   General: NAD, nontoxic appearing  Skin: no new rash  Resp: Bilateral diminished breathing sounds  CV:  normal S1/S2, no murmur   Abd: soft, non-tender, + PEG  Ext: no edema, + midline      Lab Results:  Reviewed    Micro:  5/31 blood culture: No growth to date  5/31 MRSA screen: Positive       Imaging: reviewed         Assessment:  Patient is a 53-year-old female with history of HIV infection, diabetes, hypertension, 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       -Sepsis  -Pneumonia/aspiration  -Anemia   -Leukocytosis  -Acute hypoxic respiratory failure  -HIV infection on Tivicay and Descovy  -Recent admission for MRSA bacteremia and multifocal aspiration pneumonia s/p DC on daptomycin 700 mg every 24 hours through 6/10/24  -Allergy to amoxicillin        Plan/Recommendations:  -Continue vancomycin, cefepime and Flagyl  -Follow-up blood culture  -Trend hemoglobin, WBC and creatinine  -Sputum culture   -Procalcitonin   -Monitoring for adverse effects of antibiotics including nephrotoxicity, diarrhea and rash       Please call ID with any concerns or questions.      Judah Duval MD  [facility]  #[phone]      "

## 2024-06-02 NOTE — NURSING NOTE
Spoke with attending regarding patient's blood glucose and insulin coverage. Advised to cover her as if she was eating because she is getting D5NS intravenously. Patient's blood glucose was 439. Will cover per MAR

## 2024-06-02 NOTE — PROGRESS NOTES
Janette Martinez is a 53 y.o. female on day 1 of admission presenting with Sepsis without acute organ dysfunction, due to unspecified organism (Multi).      Subjective   HPI: This is a 53 y.o. female who is from a nursing home, was transferred here because of altered mental state.  Patient unable to offer any history, history from reports and nursing home.  Patient is usually awake enough to whisper when asked questions, was unable to respond, wakes up when called, hence was sent to the ER for eval.  Patient found to have very high WBC with findings suggestive of a pneumonia and hence admitted for the same.  Recently patient had a PEG placed as her swallowing was compromised.  She has been on continuous PEG feeds at the nursing home.     # Patient seen at bedside.   # Events from the last visit reviewed.   # Discussed with staff.   # Results of tests and investigations from last visit reviewed and discussed with patient/Family.  #  Electronic chart reviewed.   # Input / Recommendations  from other care providers appreciated and reviewed.        Objective     Last Recorded Vitals  /67 (BP Location: Right arm, Patient Position: Lying)   Pulse 108   Temp 37.1 °C (98.8 °F) (Temporal)   Resp 20   Wt 86.2 kg (190 lb)   SpO2 100%   Intake/Output last 3 Shifts:    Intake/Output Summary (Last 24 hours) at 6/2/2024 1242  Last data filed at 6/2/2024 1047  Gross per 24 hour   Intake 550 ml   Output 300 ml   Net 250 ml       Admission Weight  Weight: 86.2 kg (190 lb) (05/31/24 1806)    Daily Weight  05/31/24 : 86.2 kg (190 lb)    Image Results  CT chest abdomen pelvis wo IV contrast  Narrative: Interpreted By:  Hector Maguire,   STUDY:  CT CHEST ABDOMEN PELVIS WO CONTRAST;  5/31/2024 9:33 pm      INDICATION:  Signs/Symptoms:Sepsis.      COMPARISON:  CT scan of the chest, abdomen and pelvis 05/09/2024.      ACCESSION NUMBER(S):  [id number]      ORDERING CLINICIAN:  EZEKIEL PEREZ      TECHNIQUE:  Axial CT images of  the chest, abdomen and pelvis with coronal and  sagittal reconstructed images obtained without contrast.      FINDINGS:  CHEST:      VESSELS: Aorta is normal caliber. Atherosclerotic changes in the  aorta and arch vessels. Lack of contrast limits evaluation of the  vasculature. HEART: Normal size. Trace pericardial effusion.  MEDIASTINUM AND EYAL: No axillary or mediastinal adenopathy. Lack of  contrast limits evaluation of the eyal. LUNG, PLEURA, LARGE AIRWAYS:  There are bilateral patchy airspace opacities with areas of  consolidation in the lower lobes. There is slight interval  improvement from prior CT. Findings concerning for multifocal  pneumonia with slight interval improvement. No effusion or  pneumothorax. CHEST WALL AND LOWER NECK: Within normal limits.  BONES: Multilevel degenerative changes of the spine. Bilateral  shoulder osteoarthrosis.      ABDOMEN:      LIVER: Within normal limits.  BILE DUCTS: Normal caliber.  GALLBLADDER: Status post cholecystectomy.  PANCREAS: Within normal limits.  SPLEEN: Within normal limits.  ADRENALS: Within normal limits.  KIDNEYS: Kidneys are symmetric in size without evidence for calculi,  hydronephrosis, hydroureter or perinephric inflammatory changes.      VESSELS: Mild calcific atherosclerosis of the aortoiliac vessels. No  aortic aneurysm. RETROPERITONEUM: Within normal limits.      PELVIS:      REPRODUCTIVE ORGANS: Uterus is present with several coarse  calcifications which may relate to calcified uterine fibroids.  BLADDER: Within normal limits.      BOWEL: A gastrostomy tube is present in the gastric body.  Postsurgical changes of partial bowel resection and anastomosis in  the right mid abdomen. Visualized loops of bowel are without evidence  for obstruction. Appendix is not identified with certainty. No  pericecal inflammatory changes seen. Moderate stool burden. No  pneumatosis or portal venous gas. PERITONEUM: No ascites or free air,  no fluid collection.       ABDOMINAL WALL: Postsurgical changes of ventral hernia mesh repair  with multiple spiral staples noted. A gastrostomy tube is noted in  the left upper quadrant. Areas of subcutaneous soft tissue stranding  and nodularity may relate to sequela of prior injections. There is  stranding of bilateral gluteal subcutaneous soft tissues which is new  from prior CT. There is a ill-defined fluid collection along the  right medial gluteal fold with multiple locules of subcutaneous free  air. There is surrounding inflammatory changes which extends to the  coccyx. These findings raise concern for developing decubitus ulcer.  Fatty atrophy of the paraspinal and gluteal musculature. BONES:  Multilevel degenerative changes of the spine.      Impression: There are bilateral patchy airspace opacities with areas of  consolidation in the lower lobes. There is slight interval  improvement from prior CT. Findings concerning for multifocal  pneumonia with slight interval improvement. Continued radiographic  follow-up to resolution is advised.      A gastrostomy tube is present in the gastric body. Postsurgical  changes of partial bowel resection and anastomosis in the right mid  abdomen. Visualized loops of bowel are without evidence for  obstruction.      There is stranding of bilateral gluteal subcutaneous soft tissues  which is new from prior CT. There is a ill-defined fluid collection  along the right medial gluteal fold with multiple locules of  subcutaneous free air. There is surrounding inflammatory changes  which extends to the coccyx. These findings raise concern for  developing decubitus ulcer. Correlate with exam.      Additional findings as described above.      MACRO:  None      Signed by: Hector Maguire 5/31/2024 10:46 PM  Dictation workstation:   [id number]  CT head wo IV contrast  Narrative: Interpreted By:  Hector Maguire,   STUDY:  CT HEAD WO IV CONTRAST;  5/31/2024 9:33 pm      INDICATION:  Signs/Symptoms:AMS.       COMPARISON:  CT scan of the head 05/09/2024      ACCESSION NUMBER(S):  [id number]      ORDERING CLINICIAN:  EZEKIEL PEREZ      TECHNIQUE:  Axial noncontrast CT images of the head.      FINDINGS:  BRAIN PARENCHYMA: Stable focal hypodensities bilateral basal ganglia,  left thalamus, brock and left cerebellar hemisphere likely sequela of  remote infarct. Gray-white matter interfaces are otherwise preserved.  No mass, mass effect or midline shift. Moderate deep and  periventricular white matter hypodensities are nonspecific, but  favored to represent chronic small vessel ischemic changes.      HEMORRHAGE: No acute intracranial hemorrhage.  VENTRICLES and EXTRA-AXIAL SPACES: Moderate volume loss with  prominence of the ventricles and sulci. EXTRACRANIAL SOFT TISSUES:  Mild soft tissue stranding left parietal scalp. PARANASAL  SINUSES/MASTOIDS: The visualized paranasal sinuses and mastoid air  cells are aerated. CALVARIUM: No depressed skull fracture. Bilateral  parietal jordyn holes suspected.      OTHER FINDINGS: Atherosclerotic calcification of the carotid siphons  and vertebral arteries. Multiple dental caries and extractions with  periapical lucencies concerning for periodontal disease/periapical  abscess..      Impression: No acute intracranial abnormality. If symptoms persist or there is  high clinical suspicion further evaluation with MRI can be considered.      Mild soft tissue swelling left parietal scalp.      Chronic changes as noted above.          MACRO:  None      Signed by: Hector Maguire 5/31/2024 10:29 PM  Dictation workstation:   [id number]  XR chest 1 view  Narrative: Interpreted By:  Javed Mcclure,   STUDY:  XR CHEST 1 VIEW;  5/31/2024 6:47 pm      INDICATION:  Signs/Symptoms:Sepsis.      COMPARISON:  Chest radiograph 05/12/2024      ACCESSION NUMBER(S):  [id number]      ORDERING CLINICIAN:  EZEKIEL PEREZ      FINDINGS:          CARDIOMEDIASTINAL SILHOUETTE:  Cardiomediastinal silhouette is  stable in size and configuration.      LUNGS:  No consolidation, pneumothorax, or significant effusion. Chronic  appearing bilateral extensive reticular changes.      ABDOMEN:  No remarkable upper abdominal findings.      BONES:  No acute osseous changes.      Impression: 1.  No evidence of acute cardiopulmonary process.          Signed by: Javed Mcclure 5/31/2024 7:06 PM  Dictation workstation:   [id number]      Physical Exam  GENERAL: Lethargic, wakes up when called  SKIN: Skin turgor normal. No rashes  HEENT: no epistaxis, Moist mucosa.  NECK: supple  BACK: spine nontender to palpation, No CVAT.  LUNGS: Vesicular breath sounds, with no wheeze, no crepitations.   CARDIAC: REGULAR. S1 and S2; no rubs, no murmur  ABDOMEN: Abdomen soft, non-tender. BS+  EXTREMITIES: No edema, Good capillary refill.   NEURO: Lethargic unable to examine    MUSCULOSKELETAL: No acute inflammation       Relevant Results    Results for orders placed or performed during the hospital encounter of 05/31/24 (from the past 24 hour(s))   SST TOP   Result Value Ref Range    Extra Tube Hold for add-ons.    PST Top   Result Value Ref Range    Extra Tube Hold for add-ons.    CBC   Result Value Ref Range    WBC 28.1 (H) 4.4 - 11.3 x10*3/uL    nRBC 0.0 0.0 - 0.0 /100 WBCs    RBC 2.08 (L) 4.00 - 5.20 x10*6/uL    Hemoglobin 6.4 (LL) 12.0 - 16.0 g/dL    Hematocrit 19.8 (L) 36.0 - 46.0 %    MCV 95 80 - 100 fL    MCH 30.8 26.0 - 34.0 pg    MCHC 32.3 32.0 - 36.0 g/dL    RDW 16.9 (H) 11.5 - 14.5 %    Platelets 279 150 - 450 x10*3/uL   Basic metabolic panel   Result Value Ref Range    Glucose 421 (H) 74 - 99 mg/dL    Sodium 139 136 - 145 mmol/L    Potassium 3.7 3.5 - 5.3 mmol/L    Chloride 102 98 - 107 mmol/L    Bicarbonate 30 21 - 32 mmol/L    Anion Gap 11 10 - 20 mmol/L    Urea Nitrogen 41 (H) 6 - 23 mg/dL    Creatinine 1.52 (H) 0.50 - 1.05 mg/dL    eGFR 40 (L) >60 mL/min/1.73m*2    Calcium 7.5 (L) 8.6 - 10.3 mg/dL   Vancomycin, Trough   Result Value Ref  Range    Vancomycin, Trough 24.8 () 5.0 - 20.0 ug/mL       Scheduled medications  amLODIPine, 5 mg, oral, Daily  baclofen, 10 mg, oral, BID  cefepime, 2 g, intravenous, q12h  docusate sodium, 100 mg, oral, Daily  dolutegravir, 50 mg, oral, Daily  emtricitabine-tenofovir alafen, 1 tablet, oral, Daily  furosemide, 20 mg, oral, Daily  lamoTRIgine, 25 mg, oral, q PM  levothyroxine, 175 mcg, oral, Daily before breakfast  oxygen, , inhalation, Continuous - Inhalation  vancomycin, 1,250 mg, intravenous, q24h      Continuous medications     PRN medications  PRN medications: acetaminophen, ALPRAZolam, alum-mag hydroxide-simeth, bisacodyl, dextromethorphan-guaifenesin, diphenhydrAMINE, guaiFENesin, morphine, ondansetron, vancomycin, vancomycin      Assessment/Plan        # Encephalopathy  -Secondary to pneumonia     # Pneumonia  -elevated WBC  -Probably due to aspiration  -Recent PEG placement     # Dysphagia/CVA  -S/p PEG placement recently     # Old right hemiparesis/dysarthria     # Sacral decubitus  -Wound care consulted     HIV:  Hx graves s/p thyroidectomy   Post surgical hypothyroid  HTN  HLP  T2DM       6/2-discussed with patient's sister she Josh Martinez yesterday ,who is the POA about patient's poor prognosis, explained the reason for her present condition and the very likely poor prognosis, she agrees to go with hospice, she had already planned to sign up with hospice while she was at [facility], consulted palliative care, will consult hospice, patient has a hemoglobin of 6.4, transfusion will not affect outcome.  Patient continues to have encephalopathy, on IV antibiotics for pneumonia, continue insulin sliding scale, on D5 normal saline at 75 mL an hour.  Will await hospice and palliative care input.  Patient DNR.    Radha Ward MD

## 2024-06-02 NOTE — CARE PLAN
The patient's goals for the shift include sleep    The clinical goals for the shift include comfort and rest      Problem: Pain  Goal: My pain/discomfort is manageable  Outcome: Progressing     Problem: Safety  Goal: Patient will be injury free during hospitalization  Outcome: Progressing  Goal: I will remain free of falls  Outcome: Progressing     Problem: Daily Care  Goal: Daily care needs are met  Outcome: Progressing     Problem: Psychosocial Needs  Goal: Demonstrates ability to cope with hospitalization/illness  Outcome: Progressing  Goal: Collaborate with me, my family, and caregiver to identify my specific goals  Outcome: Progressing     Problem: Discharge Barriers  Goal: My discharge needs are met  Outcome: Progressing     Problem: Skin  Goal: Decreased wound size/increased tissue granulation at next dressing change  Outcome: Progressing  Goal: Participates in plan/prevention/treatment measures  Outcome: Progressing  Goal: Prevent/manage excess moisture  Outcome: Progressing  Goal: Prevent/minimize sheer/friction injuries  Outcome: Progressing  Goal: Promote/optimize nutrition  Outcome: Progressing  Goal: Promote skin healing  Outcome: Progressing

## 2024-06-03 LAB
ABO GROUP (TYPE) IN BLOOD: NORMAL
ANION GAP SERPL CALC-SCNC: 13 MMOL/L (ref 10–20)
ANTIBODY SCREEN: NORMAL
BASOPHILS # BLD MANUAL: 0 X10*3/UL (ref 0–0.1)
BASOPHILS NFR BLD MANUAL: 0 %
BLOOD EXPIRATION DATE: NORMAL
BUN SERPL-MCNC: 35 MG/DL (ref 6–23)
BURR CELLS BLD QL SMEAR: ABNORMAL
CALCIUM SERPL-MCNC: 7.6 MG/DL (ref 8.6–10.3)
CHLORIDE SERPL-SCNC: 105 MMOL/L (ref 98–107)
CO2 SERPL-SCNC: 27 MMOL/L (ref 21–32)
CREAT SERPL-MCNC: 1.55 MG/DL (ref 0.5–1.05)
DISPENSE STATUS: NORMAL
EGFRCR SERPLBLD CKD-EPI 2021: 39 ML/MIN/1.73M*2
EOSINOPHIL # BLD MANUAL: 0.95 X10*3/UL (ref 0–0.7)
EOSINOPHIL NFR BLD MANUAL: 3 %
ERYTHROCYTE [DISTWIDTH] IN BLOOD BY AUTOMATED COUNT: 17 % (ref 11.5–14.5)
ERYTHROCYTE [DISTWIDTH] IN BLOOD BY AUTOMATED COUNT: 17 % (ref 11.5–14.5)
GLUCOSE BLD MANUAL STRIP-MCNC: 343 MG/DL (ref 74–99)
GLUCOSE BLD MANUAL STRIP-MCNC: 375 MG/DL (ref 74–99)
GLUCOSE BLD MANUAL STRIP-MCNC: 383 MG/DL (ref 74–99)
GLUCOSE BLD MANUAL STRIP-MCNC: 412 MG/DL (ref 74–99)
GLUCOSE SERPL-MCNC: 522 MG/DL (ref 74–99)
HCT VFR BLD AUTO: 19.7 % (ref 36–46)
HCT VFR BLD AUTO: 19.7 % (ref 36–46)
HGB BLD-MCNC: 6.3 G/DL (ref 12–16)
HGB BLD-MCNC: 6.3 G/DL (ref 12–16)
IMM GRANULOCYTES # BLD AUTO: 1.08 X10*3/UL (ref 0–0.7)
IMM GRANULOCYTES NFR BLD AUTO: 3.4 % (ref 0–0.9)
LYMPHOCYTES # BLD MANUAL: 0.95 X10*3/UL (ref 1.2–4.8)
LYMPHOCYTES NFR BLD MANUAL: 3 %
MCH RBC QN AUTO: 30.7 PG (ref 26–34)
MCH RBC QN AUTO: 30.7 PG (ref 26–34)
MCHC RBC AUTO-ENTMCNC: 32 G/DL (ref 32–36)
MCHC RBC AUTO-ENTMCNC: 32 G/DL (ref 32–36)
MCV RBC AUTO: 96 FL (ref 80–100)
MCV RBC AUTO: 96 FL (ref 80–100)
MONOCYTES # BLD MANUAL: 2.23 X10*3/UL (ref 0.1–1)
MONOCYTES NFR BLD MANUAL: 7 %
MYELOCYTES # BLD MANUAL: 0.32 X10*3/UL
MYELOCYTES NFR BLD MANUAL: 1 %
NEUTROPHILS # BLD MANUAL: 27.35 X10*3/UL (ref 1.2–7.7)
NEUTS BAND # BLD MANUAL: 1.91 X10*3/UL (ref 0–0.7)
NEUTS BAND NFR BLD MANUAL: 6 %
NEUTS SEG # BLD MANUAL: 25.44 X10*3/UL (ref 1.2–7)
NEUTS SEG NFR BLD MANUAL: 80 %
NRBC BLD-RTO: 0.1 /100 WBCS (ref 0–0)
NRBC BLD-RTO: 0.1 /100 WBCS (ref 0–0)
PLATELET # BLD AUTO: 289 X10*3/UL (ref 150–450)
PLATELET # BLD AUTO: 289 X10*3/UL (ref 150–450)
POTASSIUM SERPL-SCNC: 3.5 MMOL/L (ref 3.5–5.3)
PRODUCT BLOOD TYPE: 5100
PRODUCT CODE: NORMAL
RBC # BLD AUTO: 2.05 X10*6/UL (ref 4–5.2)
RBC # BLD AUTO: 2.05 X10*6/UL (ref 4–5.2)
RBC MORPH BLD: ABNORMAL
RH FACTOR (ANTIGEN D): NORMAL
SODIUM SERPL-SCNC: 141 MMOL/L (ref 136–145)
STAPHYLOCOCCUS SPEC CULT: ABNORMAL
TOTAL CELLS COUNTED BLD: 100
UNIT ABO: NORMAL
UNIT NUMBER: NORMAL
UNIT RH: NORMAL
UNIT VOLUME: 350
VANCOMYCIN SERPL-MCNC: 24.8 UG/ML (ref 5–20)
VANCOMYCIN SERPL-MCNC: 27.1 UG/ML (ref 5–20)
WBC # BLD AUTO: 31.8 X10*3/UL (ref 4.4–11.3)
WBC # BLD AUTO: 31.8 X10*3/UL (ref 4.4–11.3)
XM INTEP: NORMAL

## 2024-06-03 PROCEDURE — 2500000004 HC RX 250 GENERAL PHARMACY W/ HCPCS (ALT 636 FOR OP/ED): Performed by: PHARMACIST

## 2024-06-03 PROCEDURE — 2500000004 HC RX 250 GENERAL PHARMACY W/ HCPCS (ALT 636 FOR OP/ED): Performed by: INTERNAL MEDICINE

## 2024-06-03 PROCEDURE — 2500000001 HC RX 250 WO HCPCS SELF ADMINISTERED DRUGS (ALT 637 FOR MEDICARE OP): Performed by: INTERNAL MEDICINE

## 2024-06-03 PROCEDURE — 2500000002 HC RX 250 W HCPCS SELF ADMINISTERED DRUGS (ALT 637 FOR MEDICARE OP, ALT 636 FOR OP/ED): Performed by: INTERNAL MEDICINE

## 2024-06-03 PROCEDURE — 2500000005 HC RX 250 GENERAL PHARMACY W/O HCPCS: Performed by: INTERNAL MEDICINE

## 2024-06-03 PROCEDURE — 99222 1ST HOSP IP/OBS MODERATE 55: CPT | Performed by: NURSE PRACTITIONER

## 2024-06-03 PROCEDURE — 80202 ASSAY OF VANCOMYCIN: CPT | Mod: 91 | Performed by: PHARMACIST

## 2024-06-03 PROCEDURE — 36430 TRANSFUSION BLD/BLD COMPNT: CPT

## 2024-06-03 PROCEDURE — 80202 ASSAY OF VANCOMYCIN: CPT | Performed by: INTERNAL MEDICINE

## 2024-06-03 PROCEDURE — 36415 COLL VENOUS BLD VENIPUNCTURE: CPT | Performed by: INTERNAL MEDICINE

## 2024-06-03 PROCEDURE — 85027 COMPLETE CBC AUTOMATED: CPT | Performed by: INTERNAL MEDICINE

## 2024-06-03 PROCEDURE — 86920 COMPATIBILITY TEST SPIN: CPT

## 2024-06-03 PROCEDURE — 86901 BLOOD TYPING SEROLOGIC RH(D): CPT | Performed by: INTERNAL MEDICINE

## 2024-06-03 PROCEDURE — 85007 BL SMEAR W/DIFF WBC COUNT: CPT | Performed by: INTERNAL MEDICINE

## 2024-06-03 PROCEDURE — 80048 BASIC METABOLIC PNL TOTAL CA: CPT | Performed by: INTERNAL MEDICINE

## 2024-06-03 PROCEDURE — P9016 RBC LEUKOCYTES REDUCED: HCPCS

## 2024-06-03 PROCEDURE — 82947 ASSAY GLUCOSE BLOOD QUANT: CPT | Mod: 91

## 2024-06-03 PROCEDURE — 1200000002 HC GENERAL ROOM WITH TELEMETRY DAILY

## 2024-06-03 RX ORDER — VANCOMYCIN HYDROCHLORIDE 750 MG/150ML
750 INJECTION, SOLUTION INTRAVENOUS ONCE
Status: DISCONTINUED | OUTPATIENT
Start: 2024-06-03 | End: 2024-06-03

## 2024-06-03 RX ORDER — VANCOMYCIN HYDROCHLORIDE 750 MG/150ML
750 INJECTION, SOLUTION INTRAVENOUS ONCE
Status: COMPLETED | OUTPATIENT
Start: 2024-06-03 | End: 2024-06-03

## 2024-06-03 RX ORDER — SODIUM CHLORIDE 9 MG/ML
75 INJECTION, SOLUTION INTRAVENOUS CONTINUOUS
Status: DISCONTINUED | OUTPATIENT
Start: 2024-06-03 | End: 2024-06-11 | Stop reason: HOSPADM

## 2024-06-03 RX ADMIN — INSULIN LISPRO 10 UNITS: 100 INJECTION, SOLUTION INTRAVENOUS; SUBCUTANEOUS at 17:55

## 2024-06-03 RX ADMIN — ALTEPLASE 1 MG: 2.2 INJECTION, POWDER, LYOPHILIZED, FOR SOLUTION INTRAVENOUS at 11:16

## 2024-06-03 RX ADMIN — LAMOTRIGINE 25 MG: 25 TABLET ORAL at 21:03

## 2024-06-03 RX ADMIN — DOLUTEGRAVIR SODIUM 50 MG: 50 TABLET, FILM COATED ORAL at 10:07

## 2024-06-03 RX ADMIN — INSULIN LISPRO 10 UNITS: 100 INJECTION, SOLUTION INTRAVENOUS; SUBCUTANEOUS at 06:15

## 2024-06-03 RX ADMIN — SODIUM CHLORIDE 75 ML/HR: 9 INJECTION, SOLUTION INTRAVENOUS at 10:07

## 2024-06-03 RX ADMIN — EMTRICITABINE AND TENOFOVIR ALAFENAMIDE 1 TABLET: 200; 25 TABLET ORAL at 09:58

## 2024-06-03 RX ADMIN — LEVOTHYROXINE SODIUM 175 MCG: 125 TABLET ORAL at 10:10

## 2024-06-03 RX ADMIN — AMLODIPINE BESYLATE 5 MG: 5 TABLET ORAL at 09:58

## 2024-06-03 RX ADMIN — CEFEPIME HYDROCHLORIDE 2 G: 2 INJECTION, POWDER, FOR SOLUTION INTRAVENOUS at 21:49

## 2024-06-03 RX ADMIN — FUROSEMIDE 20 MG: 20 TABLET ORAL at 09:58

## 2024-06-03 RX ADMIN — Medication 4 L/MIN: at 10:16

## 2024-06-03 RX ADMIN — CEFEPIME HYDROCHLORIDE 2 G: 2 INJECTION, POWDER, FOR SOLUTION INTRAVENOUS at 09:58

## 2024-06-03 RX ADMIN — VANCOMYCIN HYDROCHLORIDE 750 MG: 750 INJECTION, SOLUTION INTRAVENOUS at 14:29

## 2024-06-03 RX ADMIN — Medication 4 L/MIN: at 20:00

## 2024-06-03 RX ADMIN — BACLOFEN 10 MG: 10 TABLET ORAL at 09:58

## 2024-06-03 RX ADMIN — BACLOFEN 10 MG: 10 TABLET ORAL at 21:02

## 2024-06-03 RX ADMIN — DOCUSATE SODIUM 100 MG: 50 LIQUID ORAL at 09:57

## 2024-06-03 RX ADMIN — INSULIN LISPRO 10 UNITS: 100 INJECTION, SOLUTION INTRAVENOUS; SUBCUTANEOUS at 14:17

## 2024-06-03 RX ADMIN — DEXTROSE AND SODIUM CHLORIDE 75 ML/HR: 5; 900 INJECTION, SOLUTION INTRAVENOUS at 03:21

## 2024-06-03 ASSESSMENT — COGNITIVE AND FUNCTIONAL STATUS - GENERAL
CLIMB 3 TO 5 STEPS WITH RAILING: TOTAL
DAILY ACTIVITIY SCORE: 6
STANDING UP FROM CHAIR USING ARMS: TOTAL
TURNING FROM BACK TO SIDE WHILE IN FLAT BAD: TOTAL
TOILETING: TOTAL
MOBILITY SCORE: 6
EATING MEALS: TOTAL
DRESSING REGULAR LOWER BODY CLOTHING: TOTAL
WALKING IN HOSPITAL ROOM: TOTAL
PERSONAL GROOMING: TOTAL
DRESSING REGULAR UPPER BODY CLOTHING: TOTAL
MOVING FROM LYING ON BACK TO SITTING ON SIDE OF FLAT BED WITH BEDRAILS: TOTAL
MOVING TO AND FROM BED TO CHAIR: TOTAL
HELP NEEDED FOR BATHING: TOTAL

## 2024-06-03 ASSESSMENT — PAIN SCALES - WONG BAKER
WONGBAKER_NUMERICALRESPONSE: NO HURT
WONGBAKER_NUMERICALRESPONSE: NO HURT

## 2024-06-03 ASSESSMENT — PAIN SCALES - GENERAL: PAINLEVEL_OUTOF10: 0 - NO PAIN

## 2024-06-03 ASSESSMENT — PAIN - FUNCTIONAL ASSESSMENT: PAIN_FUNCTIONAL_ASSESSMENT: FLACC (FACE, LEGS, ACTIVITY, CRY, CONSOLABILITY)

## 2024-06-03 NOTE — CONSULTS
Wound Care Consult     Visit Date: 6/3/2024      Patient Name: Janette Martinez         MRN: [id number]           YOB: 1970     Reason for Consult: patient known to [facility] team.  Presents with unstageable pressure injury to the sacrum.          Skin prevention supplies applied/in place:   EHOB waffle overlay mattress  EHOB Truvue boots (green)  EHOB TAP system with green turning wedges  Mepilex heel foam dressing  Pillows under bony prominences       Pertinent Labs:   Albumin   Date Value Ref Range Status   06/01/2024 2.0 (L) 3.4 - 5.0 g/dL Final   06/14/2022 4.0 3.4 - 5.0 g/dL Final       Wound Assessment:  Wound 04/23/24 Pressure Injury Coccyx (Active)   Wound Image   06/03/24 1210   Site Assessment Painful;Red;Sloughing;Yellow 06/03/24 1210   Caro-Wound Assessment Macerated;Moist ;Hyperpigmented 06/03/24 1210   Pressure Injury Stage U 06/03/24 1210   Shape scattered region of sacrum and bilateral buttock 06/03/24 1210   Wound Length (cm) 13 cm 06/03/24 1210   Wound Width (cm) 11 cm 06/03/24 1210   Wound Surface Area (cm^2) 143 cm^2 06/03/24 1210   Wound Depth (cm) 0.3 cm 06/03/24 1210   Wound Volume (cm^3) 42.9 cm^3 06/03/24 1210   Wound Healing % -3475 06/03/24 1210   State of Healing Slough;Non-healing 06/03/24 1210   Wound Bed Granulation (%) 0 % 06/03/24 1210   Wound Bed Slough (%) 90 % 06/03/24 1210   Wound Bed Eschar (%) 0 % 06/03/24 1210   Margins Well-defined edges 06/03/24 1210   Drainage Description Purulent;Tan;Yellow 06/03/24 1210   Drainage Amount Large 06/03/24 1210   Dressing Hydrofiber;Foam 06/03/24 1210   Dressing Changed Changed 06/03/24 1210   Dressing Status Clean;Dry 06/03/24 1210       Wound 06/01/24 Pressure Injury Heel Right (Active)   Wound Image   06/03/24 1232   Site Assessment Purple 06/03/24 1232   Caro-Wound Assessment Dry 06/03/24 1232   Pressure Injury Stage DTPI 06/03/24 1232   Shape oval 06/03/24 1232   Wound Length (cm) 3 cm 06/03/24 1232   Wound Width (cm) 0 cm  06/03/24 1232   Wound Surface Area (cm^2) 0 cm^2 06/03/24 1232   Wound Depth (cm) 0 cm 06/03/24 1232   Wound Volume (cm^3) 0 cm^3 06/03/24 1232   State of Healing Closed wound edges 06/03/24 1232   Margins Attached edges 06/03/24 1232   Drainage Description None 06/03/24 1232   Drainage Amount None 06/03/24 1232   Dressing Foam 06/03/24 1232   Dressing Changed New 06/03/24 1232   Dressing Status Clean;Dry 06/03/24 1232       Wound Team Summary Assessment: assessment complete- recommend surgical consult for wound debridement to sacrum.  Region with adhered slough and large amount of purulent drainage.  Patient incontinent of urine and liquid stool- maggi care provided, Purewick replaced and Criticaid cream applied.      Sacrum: unstageable pressure injury  Daily: Bedside RN/LPN to complete wound care.  Cleanse with quarter strength Dakin's solution and pat dry.  Dust stomahesive powder to surrounding scattered full thickness regions.  Top wound with Aquacel Ag and Drawtex (for drainage).  Cover with Mepilex sacrum foam border dressing.  Replace to Mepilex as needed with soiling.   Criticaid cream to surrounding buttock and perineum.   Strict q2 turns with green wedges to offload sacrum and buttock.     Right heel: deep tissue injury  Every other day: Bedside RN/LPN to complete wound care.  Cleanse with normal saline and pat dry.  Cover with Mepilex heel foam border dressing.  Apply green Truvue boots to offload heels.     While in bed patient should only be on one fitted sheet, and one chux. Please, do not use brief while patient is resting in bed. Elevate heels off the bed surface at all times. Turn and reposition at least every 2 hours.    Wound Team Plan: [facility] team will continue to follow and will follow up after possible wound debridement.  Thank you for this consultation, while inpatient please contact with any questions or changes in condition.     Farideh Mi, RN, BSN, [facility]  Phone: [phone]  6/3/2024   1:44 PM

## 2024-06-03 NOTE — PROGRESS NOTES
Vancomycin Dosing by Pharmacy- FOLLOW UP    Janette Martinez is a 53 y.o. year old female who Pharmacy has been consulted for vancomycin dosing for line infections. Based on the patient's indication and renal status this patient is being dosed based on a goal AUC of 500-600.     Renal function is currently declining.    Most recent random level: 24.8 mcg/mL    Visit Vitals  /72 (BP Location: Left arm, Patient Position: Lying)   Pulse 97   Temp 36.5 °C (97.7 °F)   Resp 18        Lab Results   Component Value Date    CREATININE 1.55 (H) 2024    CREATININE 1.52 (H) 2024    CREATININE 1.63 (H) 2024    CREATININE 1.73 (H) 2024        Patient weight is as follows:   Vitals:    24 1806   Weight: 86.2 kg (190 lb)       Cultures:  No results found for the encounter in last 14 days.       I/O last 3 completed shifts:  In: 1922.5 (22.3 mL/kg) [I.V.:1022.5 (11.9 mL/kg); IV Piggyback:900]  Out: - (0 mL/kg)   Weight: 86.2 kg   I/O during current shift:  I/O this shift:  In: 0   Out: 300 [Urine:300]    Temp (24hrs), Av.9 °C (98.4 °F), Min:36.5 °C (97.7 °F), Max:37.1 °C (98.8 °F)      Assessment/Plan    True is 24.8. Will give one dose of 750mg @ 1500.  The next level will be obtained on  at 1400. May be obtained sooner if clinically indicated.   Will continue to monitor renal function daily while on vancomycin and order serum creatinine at least every 48 hours if not already ordered.  Follow for continued vancomycin needs, clinical response, and signs/symptoms of toxicity.       Mattie Hodge, PharmD

## 2024-06-03 NOTE — PROGRESS NOTES
Janette Martinez is a 53 y.o. female on day 2 of admission presenting with Sepsis without acute organ dysfunction, due to unspecified organism (Multi).      Subjective   HPI: This is a 53 y.o. female who is from a nursing home, was transferred here because of altered mental state.  Patient unable to offer any history, history from reports and nursing home.  Patient is usually awake enough to whisper when asked questions, was unable to respond, wakes up when called, hence was sent to the ER for eval.  Patient found to have very high WBC with findings suggestive of a pneumonia and hence admitted for the same.  Recently patient had a PEG placed as her swallowing was compromised.  She has been on continuous PEG feeds at the nursing home.     # Patient seen at bedside.   # Events from the last visit reviewed.   # Discussed with staff.   # Results of tests and investigations from last visit reviewed and discussed with patient/Family.  #  Electronic chart reviewed.   # Input / Recommendations  from other care providers appreciated and reviewed.        Objective     Last Recorded Vitals  /72 (BP Location: Left arm, Patient Position: Lying)   Pulse 97   Temp 36.5 °C (97.7 °F)   Resp 18   Wt 86.2 kg (190 lb)   SpO2 100%   Intake/Output last 3 Shifts:    Intake/Output Summary (Last 24 hours) at 6/3/2024 0902  Last data filed at 6/3/2024 0757  Gross per 24 hour   Intake 1922.5 ml   Output 300 ml   Net 1622.5 ml       Admission Weight  Weight: 86.2 kg (190 lb) (05/31/24 1806)    Daily Weight  05/31/24 : 86.2 kg (190 lb)    Image Results  CT chest abdomen pelvis wo IV contrast  Narrative: Interpreted By:  Hector Maguire,   STUDY:  CT CHEST ABDOMEN PELVIS WO CONTRAST;  5/31/2024 9:33 pm      INDICATION:  Signs/Symptoms:Sepsis.      COMPARISON:  CT scan of the chest, abdomen and pelvis 05/09/2024.      ACCESSION NUMBER(S):  [id number]      ORDERING CLINICIAN:  EZEKIEL PEREZ      TECHNIQUE:  Axial CT images of the  chest, abdomen and pelvis with coronal and  sagittal reconstructed images obtained without contrast.      FINDINGS:  CHEST:      VESSELS: Aorta is normal caliber. Atherosclerotic changes in the  aorta and arch vessels. Lack of contrast limits evaluation of the  vasculature. HEART: Normal size. Trace pericardial effusion.  MEDIASTINUM AND EYAL: No axillary or mediastinal adenopathy. Lack of  contrast limits evaluation of the eyal. LUNG, PLEURA, LARGE AIRWAYS:  There are bilateral patchy airspace opacities with areas of  consolidation in the lower lobes. There is slight interval  improvement from prior CT. Findings concerning for multifocal  pneumonia with slight interval improvement. No effusion or  pneumothorax. CHEST WALL AND LOWER NECK: Within normal limits.  BONES: Multilevel degenerative changes of the spine. Bilateral  shoulder osteoarthrosis.      ABDOMEN:      LIVER: Within normal limits.  BILE DUCTS: Normal caliber.  GALLBLADDER: Status post cholecystectomy.  PANCREAS: Within normal limits.  SPLEEN: Within normal limits.  ADRENALS: Within normal limits.  KIDNEYS: Kidneys are symmetric in size without evidence for calculi,  hydronephrosis, hydroureter or perinephric inflammatory changes.      VESSELS: Mild calcific atherosclerosis of the aortoiliac vessels. No  aortic aneurysm. RETROPERITONEUM: Within normal limits.      PELVIS:      REPRODUCTIVE ORGANS: Uterus is present with several coarse  calcifications which may relate to calcified uterine fibroids.  BLADDER: Within normal limits.      BOWEL: A gastrostomy tube is present in the gastric body.  Postsurgical changes of partial bowel resection and anastomosis in  the right mid abdomen. Visualized loops of bowel are without evidence  for obstruction. Appendix is not identified with certainty. No  pericecal inflammatory changes seen. Moderate stool burden. No  pneumatosis or portal venous gas. PERITONEUM: No ascites or free air,  no fluid collection.       ABDOMINAL WALL: Postsurgical changes of ventral hernia mesh repair  with multiple spiral staples noted. A gastrostomy tube is noted in  the left upper quadrant. Areas of subcutaneous soft tissue stranding  and nodularity may relate to sequela of prior injections. There is  stranding of bilateral gluteal subcutaneous soft tissues which is new  from prior CT. There is a ill-defined fluid collection along the  right medial gluteal fold with multiple locules of subcutaneous free  air. There is surrounding inflammatory changes which extends to the  coccyx. These findings raise concern for developing decubitus ulcer.  Fatty atrophy of the paraspinal and gluteal musculature. BONES:  Multilevel degenerative changes of the spine.      Impression: There are bilateral patchy airspace opacities with areas of  consolidation in the lower lobes. There is slight interval  improvement from prior CT. Findings concerning for multifocal  pneumonia with slight interval improvement. Continued radiographic  follow-up to resolution is advised.      A gastrostomy tube is present in the gastric body. Postsurgical  changes of partial bowel resection and anastomosis in the right mid  abdomen. Visualized loops of bowel are without evidence for  obstruction.      There is stranding of bilateral gluteal subcutaneous soft tissues  which is new from prior CT. There is a ill-defined fluid collection  along the right medial gluteal fold with multiple locules of  subcutaneous free air. There is surrounding inflammatory changes  which extends to the coccyx. These findings raise concern for  developing decubitus ulcer. Correlate with exam.      Additional findings as described above.      MACRO:  None      Signed by: Hector Maguire 5/31/2024 10:46 PM  Dictation workstation:   [id number]  CT head wo IV contrast  Narrative: Interpreted By:  Hector Maguire,   STUDY:  CT HEAD WO IV CONTRAST;  5/31/2024 9:33 pm      INDICATION:  Signs/Symptoms:AMS.       COMPARISON:  CT scan of the head 05/09/2024      ACCESSION NUMBER(S):  [id number]      ORDERING CLINICIAN:  EZEKIEL PEREZ      TECHNIQUE:  Axial noncontrast CT images of the head.      FINDINGS:  BRAIN PARENCHYMA: Stable focal hypodensities bilateral basal ganglia,  left thalamus, brock and left cerebellar hemisphere likely sequela of  remote infarct. Gray-white matter interfaces are otherwise preserved.  No mass, mass effect or midline shift. Moderate deep and  periventricular white matter hypodensities are nonspecific, but  favored to represent chronic small vessel ischemic changes.      HEMORRHAGE: No acute intracranial hemorrhage.  VENTRICLES and EXTRA-AXIAL SPACES: Moderate volume loss with  prominence of the ventricles and sulci. EXTRACRANIAL SOFT TISSUES:  Mild soft tissue stranding left parietal scalp. PARANASAL  SINUSES/MASTOIDS: The visualized paranasal sinuses and mastoid air  cells are aerated. CALVARIUM: No depressed skull fracture. Bilateral  parietal jordyn holes suspected.      OTHER FINDINGS: Atherosclerotic calcification of the carotid siphons  and vertebral arteries. Multiple dental caries and extractions with  periapical lucencies concerning for periodontal disease/periapical  abscess..      Impression: No acute intracranial abnormality. If symptoms persist or there is  high clinical suspicion further evaluation with MRI can be considered.      Mild soft tissue swelling left parietal scalp.      Chronic changes as noted above.          MACRO:  None      Signed by: Hector Maguire 5/31/2024 10:29 PM  Dictation workstation:   [id number]  XR chest 1 view  Narrative: Interpreted By:  Javed Mcclure,   STUDY:  XR CHEST 1 VIEW;  5/31/2024 6:47 pm      INDICATION:  Signs/Symptoms:Sepsis.      COMPARISON:  Chest radiograph 05/12/2024      ACCESSION NUMBER(S):  [id number]      ORDERING CLINICIAN:  EZEKIEL PEREZ      FINDINGS:          CARDIOMEDIASTINAL SILHOUETTE:  Cardiomediastinal silhouette is  stable in size and configuration.      LUNGS:  No consolidation, pneumothorax, or significant effusion. Chronic  appearing bilateral extensive reticular changes.      ABDOMEN:  No remarkable upper abdominal findings.      BONES:  No acute osseous changes.      Impression: 1.  No evidence of acute cardiopulmonary process.          Signed by: Javed Mcclure 5/31/2024 7:06 PM  Dictation workstation:   [id number]      Physical Exam  GENERAL: Lethargic, wakes up when called  SKIN: Skin turgor normal. No rashes  HEENT: no epistaxis, Moist mucosa.  NECK: supple  BACK: spine nontender to palpation, No CVAT.  LUNGS: Vesicular breath sounds, with no wheeze, no crepitations.   CARDIAC: REGULAR. S1 and S2; no rubs, no murmur  ABDOMEN: Abdomen soft, non-tender. BS+  EXTREMITIES: No edema, Good capillary refill.   NEURO: Lethargic unable to examine    MUSCULOSKELETAL: No acute inflammation       Relevant Results    Results for orders placed or performed during the hospital encounter of 05/31/24 (from the past 24 hour(s))   POCT GLUCOSE   Result Value Ref Range    POCT Glucose 439 (H) 74 - 99 mg/dL   POCT GLUCOSE   Result Value Ref Range    POCT Glucose 404 (H) 74 - 99 mg/dL   POCT GLUCOSE   Result Value Ref Range    POCT Glucose 383 (H) 74 - 99 mg/dL   CBC   Result Value Ref Range    WBC 31.8 (H) 4.4 - 11.3 x10*3/uL    nRBC 0.1 (H) 0.0 - 0.0 /100 WBCs    RBC 2.05 (L) 4.00 - 5.20 x10*6/uL    Hemoglobin 6.3 (LL) 12.0 - 16.0 g/dL    Hematocrit 19.7 (L) 36.0 - 46.0 %    MCV 96 80 - 100 fL    MCH 30.7 26.0 - 34.0 pg    MCHC 32.0 32.0 - 36.0 g/dL    RDW 17.0 (H) 11.5 - 14.5 %    Platelets 289 150 - 450 x10*3/uL   Basic metabolic panel   Result Value Ref Range    Glucose 522 (HH) 74 - 99 mg/dL    Sodium 141 136 - 145 mmol/L    Potassium 3.5 3.5 - 5.3 mmol/L    Chloride 105 98 - 107 mmol/L    Bicarbonate 27 21 - 32 mmol/L    Anion Gap 13 10 - 20 mmol/L    Urea Nitrogen 35 (H) 6 - 23 mg/dL    Creatinine 1.55 (H) 0.50 - 1.05 mg/dL    eGFR  39 (L) >60 mL/min/1.73m*2    Calcium 7.6 (L) 8.6 - 10.3 mg/dL       Scheduled medications  amLODIPine, 5 mg, oral, Daily  baclofen, 10 mg, oral, BID  cefepime, 2 g, intravenous, q12h  docusate sodium, 100 mg, oral, Daily  dolutegravir, 50 mg, oral, Daily  emtricitabine-tenofovir alafen, 1 tablet, oral, Daily  furosemide, 20 mg, oral, Daily  insulin lispro, 0-10 Units, subcutaneous, TID  lamoTRIgine, 25 mg, oral, q PM  levothyroxine, 175 mcg, oral, Daily before breakfast  oxygen, , inhalation, Continuous - Inhalation      Continuous medications  D5 % and 0.9 % sodium chloride, 75 mL/hr, Last Rate: 75 mL/hr (06/03/24 0321)      PRN medications  PRN medications: acetaminophen, ALPRAZolam, alum-mag hydroxide-simeth, bisacodyl, dextromethorphan-guaifenesin, dextrose, dextrose, diphenhydrAMINE, glucagon, glucagon, guaiFENesin, morphine, ondansetron, vancomycin      Assessment/Plan        # Encephalopathy  -Secondary to pneumonia     # Pneumonia  -elevated WBC  -Probably due to aspiration  -Recent PEG placement     # Dysphagia/CVA  -S/p PEG placement recently     # Old right hemiparesis/dysarthria     # Sacral decubitus  -Wound care consulted     HIV:  Hx graves s/p thyroidectomy   Post surgical hypothyroid  HTN  HLP  T2DM       6/2-discussed with patient's sister she Josh Martinez yesterday ,who is the POA about patient's poor prognosis, explained the reason for her present condition and the very likely poor prognosis, she agrees to go with hospice, she had already planned to sign up with hospice while she was at [facility], consulted palliative care, will consult hospice, patient has a hemoglobin of 6.4, transfusion will not affect outcome.  Patient continues to have encephalopathy, on IV antibiotics for pneumonia, continue insulin sliding scale, on D5 normal saline at 75 mL an hour.  Will await hospice and palliative care input.  Patient DNR.    6/3 : Pt still encephalopathic, barely responding, d/w  palliative care . D/W Sister / POA - agrees with Hospice consult. Prefers blood transfusion - consent obtained by phone - will order 1 unit PRBC transfusion    Radha Ward MD

## 2024-06-03 NOTE — CONSULTS
Inpatient consult to Palliative Care  Consult performed by: NAVEEN Moran-CNP  Consult ordered by: Radha Ward MD          Reason For Consult  Goals of care     History Of Present Illness  Janette Martinez is a 53 y.o. female  who presented to our ED 5/31/24 with low Hgb.  Patient recently hospitalized with encephalopathy, PNA, acute respiratory failure and bacteremia.  A PEG was placed due to dysphagia.  She was discharged to SNF and routine labs there showed Hgb 6.1.  WBC in ED were 32.4, she was admitted for sepsis.   Patient has multiple avenues for sepsis: PNA, bacteremia and midline, and sacral wound.  No evidence of GI bleed on admit. Appears to have baseline anemia for sometime with Hgbs upper 6s-8s on last admit.  Family apparently had been planning on signing patient with hospice when lab came back and plan made to send to ED    PMHx:  CVA with aphasia, right sided weakness, Grave's disease with thyroidectomy, surgical hypothyroidism, HTN, HLD, DM II, HIV on antiretroviral therapy, Crohn's disease with partial colectomy, CKD III    Family includes Josh Martinez.  She has a Living Will that lists an Dorothea Nolasco as first proxy.    Symptoms (0 - 10, Best to Worst)  [place] Symptom Assessment System  Pain Score: 0 - No pain    BM in last 48 hours? yes         Personal/Social History   She reports that she has never smoked. She has never used smokeless tobacco. No history on file for alcohol use and drug use.    Functional Status:  total care for ADLs         Past Medical History  She has a past medical history of Acute pulmonary embolism (Multi) (04/22/2024), Aphasia as late effect of cerebrovascular accident (04/25/2023), Essential hypertension (06/25/2010), Gastroesophageal reflux disease (03/10/2009), Hemiplegia of nondominant side, late effect of cerebrovascular disease (Multi) (09/03/2008), Hemorrhagic stroke (Multi) (06/25/2010), HIV infection (Multi) (02/21/2007), Hypothyroidism  (10/10/2002), Mixed hyperlipidemia (06/25/2010), Stage 3a chronic kidney disease (Multi) (04/22/2024), and T2DM (type 2 diabetes mellitus) (Multi) (11/14/2012).    Surgical History  She has a past surgical history that includes Thyroid surgery (09/27/2013); Other surgical history (01/08/2016); Hysterectomy (01/08/2016); Cholecystectomy (01/08/2016); Hernia repair (01/08/2016); Colon surgery (01/08/2016); Other surgical history (09/01/2016); MR angio head wo IV contrast (6/4/2018); MR angio neck wo IV contrast (6/4/2018); CT angio head w and wo IV contrast (3/19/2019); CT angio neck (3/19/2019); MR angio head wo IV contrast (7/11/2020); MR angio neck wo IV contrast (7/11/2020); MR angio head wo IV contrast (9/30/2020); MR angio neck wo IV contrast (9/30/2020); MR angio head wo IV contrast (5/22/2022); MR angio neck wo IV contrast (5/22/2022); MR angio head wo IV contrast (4/19/2023); and MR angio neck wo IV contrast (4/19/2023).     Family History  No family history on file.  Allergies  Aspirin, Iodinated contrast media, Amoxicillin, Other, Morphine, and Sulfa (sulfonamide antibiotics)    Review of Systems   Unable to perform ROS: Patient unresponsive        Physical Exam  Constitutional:       Appearance: She is ill-appearing.   HENT:      Head: Normocephalic and atraumatic.      Nose: Nose normal.      Mouth/Throat:      Mouth: Mucous membranes are moist.      Pharynx: Oropharynx is clear.   Eyes:      Conjunctiva/sclera: Conjunctivae normal.      Comments: Right eye clouded, no blink reflex.  Pupil responsive on the left and normal blink   Cardiovascular:      Rate and Rhythm: Regular rhythm.      Heart sounds: Normal heart sounds.   Pulmonary:      Effort: Pulmonary effort is normal.      Breath sounds: Normal breath sounds.      Comments: Snoring resps but unabored  Abdominal:      General: Abdomen is flat. Bowel sounds are normal.      Palpations: Abdomen is soft.      Comments: PEG in place   Genitourinary:     " Comments: deferred  Musculoskeletal:         General: No swelling or signs of injury. Normal range of motion.      Cervical back: Normal range of motion.   Skin:     General: Skin is warm and dry.   Neurological:      Comments: Unresponsive   Psychiatric:      Comments: Unable to assess         Last Recorded Vitals  Blood pressure 125/72, pulse 97, temperature 36.5 °C (97.7 °F), resp. rate 18, height 1.727 m (5' 8\"), weight 86.2 kg (190 lb), SpO2 100%.    Relevant Results  CT chest abdomen pelvis wo IV contrast    Result Date: 5/31/2024  Interpreted By:  Hector Maguire, STUDY: CT CHEST ABDOMEN PELVIS WO CONTRAST;  5/31/2024 9:33 pm   INDICATION: Signs/Symptoms:Sepsis.   COMPARISON: CT scan of the chest, abdomen and pelvis 05/09/2024.   ACCESSION NUMBER(S): [id number]   ORDERING CLINICIAN: EZEKIEL PEREZ   TECHNIQUE: Axial CT images of the chest, abdomen and pelvis with coronal and sagittal reconstructed images obtained without contrast.   FINDINGS: CHEST:   VESSELS: Aorta is normal caliber. Atherosclerotic changes in the aorta and arch vessels. Lack of contrast limits evaluation of the vasculature. HEART: Normal size. Trace pericardial effusion. MEDIASTINUM AND EYAL: No axillary or mediastinal adenopathy. Lack of contrast limits evaluation of the eyal. LUNG, PLEURA, LARGE AIRWAYS: There are bilateral patchy airspace opacities with areas of consolidation in the lower lobes. There is slight interval improvement from prior CT. Findings concerning for multifocal pneumonia with slight interval improvement. No effusion or pneumothorax. CHEST WALL AND LOWER NECK: Within normal limits. BONES: Multilevel degenerative changes of the spine. Bilateral shoulder osteoarthrosis.   ABDOMEN:   LIVER: Within normal limits. BILE DUCTS: Normal caliber. GALLBLADDER: Status post cholecystectomy. PANCREAS: Within normal limits. SPLEEN: Within normal limits. ADRENALS: Within normal limits. KIDNEYS: Kidneys are symmetric in size " without evidence for calculi, hydronephrosis, hydroureter or perinephric inflammatory changes.   VESSELS: Mild calcific atherosclerosis of the aortoiliac vessels. No aortic aneurysm. RETROPERITONEUM: Within normal limits.   PELVIS:   REPRODUCTIVE ORGANS: Uterus is present with several coarse calcifications which may relate to calcified uterine fibroids. BLADDER: Within normal limits.   BOWEL: A gastrostomy tube is present in the gastric body. Postsurgical changes of partial bowel resection and anastomosis in the right mid abdomen. Visualized loops of bowel are without evidence for obstruction. Appendix is not identified with certainty. No pericecal inflammatory changes seen. Moderate stool burden. No pneumatosis or portal venous gas. PERITONEUM: No ascites or free air, no fluid collection.   ABDOMINAL WALL: Postsurgical changes of ventral hernia mesh repair with multiple spiral staples noted. A gastrostomy tube is noted in the left upper quadrant. Areas of subcutaneous soft tissue stranding and nodularity may relate to sequela of prior injections. There is stranding of bilateral gluteal subcutaneous soft tissues which is new from prior CT. There is a ill-defined fluid collection along the right medial gluteal fold with multiple locules of subcutaneous free air. There is surrounding inflammatory changes which extends to the coccyx. These findings raise concern for developing decubitus ulcer. Fatty atrophy of the paraspinal and gluteal musculature. BONES: Multilevel degenerative changes of the spine.       There are bilateral patchy airspace opacities with areas of consolidation in the lower lobes. There is slight interval improvement from prior CT. Findings concerning for multifocal pneumonia with slight interval improvement. Continued radiographic follow-up to resolution is advised.   A gastrostomy tube is present in the gastric body. Postsurgical changes of partial bowel resection and anastomosis in the right mid  abdomen. Visualized loops of bowel are without evidence for obstruction.   There is stranding of bilateral gluteal subcutaneous soft tissues which is new from prior CT. There is a ill-defined fluid collection along the right medial gluteal fold with multiple locules of subcutaneous free air. There is surrounding inflammatory changes which extends to the coccyx. These findings raise concern for developing decubitus ulcer. Correlate with exam.   Additional findings as described above.   MACRO: None   Signed by: Hector Maguire 5/31/2024 10:46 PM Dictation workstation:   [id number]    CT head wo IV contrast    Result Date: 5/31/2024  Interpreted By:  Hector Maguire, STUDY: CT HEAD WO IV CONTRAST;  5/31/2024 9:33 pm   INDICATION: Signs/Symptoms:AMS.   COMPARISON: CT scan of the head 05/09/2024   ACCESSION NUMBER(S): [id number]   ORDERING CLINICIAN: EZEKIEL PEREZ   TECHNIQUE: Axial noncontrast CT images of the head.   FINDINGS: BRAIN PARENCHYMA: Stable focal hypodensities bilateral basal ganglia, left thalamus, brock and left cerebellar hemisphere likely sequela of remote infarct. Gray-white matter interfaces are otherwise preserved. No mass, mass effect or midline shift. Moderate deep and periventricular white matter hypodensities are nonspecific, but favored to represent chronic small vessel ischemic changes.   HEMORRHAGE: No acute intracranial hemorrhage. VENTRICLES and EXTRA-AXIAL SPACES: Moderate volume loss with prominence of the ventricles and sulci. EXTRACRANIAL SOFT TISSUES: Mild soft tissue stranding left parietal scalp. PARANASAL SINUSES/MASTOIDS: The visualized paranasal sinuses and mastoid air cells are aerated. CALVARIUM: No depressed skull fracture. Bilateral parietal jordyn holes suspected.   OTHER FINDINGS: Atherosclerotic calcification of the carotid siphons and vertebral arteries. Multiple dental caries and extractions with periapical lucencies concerning for periodontal disease/periapical abscess..        No acute intracranial abnormality. If symptoms persist or there is high clinical suspicion further evaluation with MRI can be considered.   Mild soft tissue swelling left parietal scalp.   Chronic changes as noted above.     MACRO: None   Signed by: Hector Maguire 5/31/2024 10:29 PM Dictation workstation:   [id number]    XR chest 1 view    Result Date: 5/31/2024  Interpreted By:  Javed Mcclure, STUDY: XR CHEST 1 VIEW;  5/31/2024 6:47 pm   INDICATION: Signs/Symptoms:Sepsis.   COMPARISON: Chest radiograph 05/12/2024   ACCESSION NUMBER(S): [id number]   ORDERING CLINICIAN: EZEKIEL PEREZ   FINDINGS:     CARDIOMEDIASTINAL SILHOUETTE: Cardiomediastinal silhouette is stable in size and configuration.   LUNGS: No consolidation, pneumothorax, or significant effusion. Chronic appearing bilateral extensive reticular changes.   ABDOMEN: No remarkable upper abdominal findings.   BONES: No acute osseous changes.       1.  No evidence of acute cardiopulmonary process.     Signed by: Javed Mcclure 5/31/2024 7:06 PM Dictation workstation:   [id number]    Bedside Midline Imaging    Result Date: 5/22/2024  These images are not reportable by radiology and will not be interpreted by  Radiologists.    Lower extremity venous duplex bilateral    Result Date: 5/18/2024  Interpreted By:  Nazario Mcmahon, STUDY: VAS US LOWER EXTREMITY VENOUS DUPLEX BILATERAL;  5/18/2024 1:25 pm   INDICATION: Signs/Symptoms:Bj LE swelling, R/O DVT.   COMPARISON: None.   ACCESSION NUMBER(S): [id number]   ORDERING CLINICIAN: ARCELIA CRAFT   TECHNIQUE: Vascular ultrasound of the bilateral lower extremities was performed. Real-time compression views as well as Gray scale, color Doppler and spectral Doppler waveform analysis was performed.   FINDINGS: Evaluation of the visualized portions of the bilateral common femoral vein, proximal, mid, and distal femoral vein, and popliteal vein were performed.  Evaluation of the visualized portions of the  calf veins was also performed.   The evaluated veins demonstrate normal compressibility. There is intact venous flow demonstrating normal respiratory variability and normal augmentation of flow with calf compression. Therefore, there is no ultrasonographic evidence for deep vein thrombosis within the evaluated veins.       No sonographic evidence for deep vein thrombosis within the evaluated veins of the lower extremities bilaterally.   MACRO: None.   Signed by: Nazario Mcmahon 5/18/2024 3:55 PM Dictation workstation:   [id number]    Transthoracic Echo Complete    Result Date: 5/15/2024   [facility], [address] [place] [address]              Tel [phone] and Fax [phone] TRANSTHORACIC ECHOCARDIOGRAM REPORT  Patient Name:      FLACO Simpson Physician:    [id number] Arjun Leal MD Study Date:        5/15/2024            Ordering Provider:    [id number] LATASHA ZULUAGA MRN/PID:           [id number]             Fellow: Accession#:        [id number]         Nurse: Date of Birth/Age: 1970 / 53      Sonographer:          Gonzalo Bah RDCS                    years Gender:            F                    Additional Staff: Height:            163.00 cm            Admit Date: Weight:            96.50 kg             Admission Status: BSA / BMI:         2.01 m2 / 36.32      Encounter#:           [id number]                    kg/m2                                         Department Location:  [facility]                                                               Invasive Study Type:    TRANSTHORACIC ECHO (TTE) COMPLETE Diagnosis/ICD: Endocarditis, valve unspecified-I38 Indication:    endocarditis CPT Code:      Echo Limited-00329 Patient History: Pertinent History: Endocarditis. Study Detail: The following Echo studies were performed: 2D, M-Mode, Doppler and               color flow. Technically challenging study due to poor  acoustic               windows, body habitus and patient lying in supine position. Unable               to obtain suprasternal notch, subcostal and apical 2-chamber view.  PHYSICIAN INTERPRETATION: Left Ventricle: The left ventricular systolic function is normal, with an estimated ejection fraction of 55-60%. There are no regional wall motion abnormalities. The left ventricular cavity size is normal. Left ventricular diastolic filling was not assessed. Left Atrium: The left atrium was not assessed. Right Ventricle: The right ventricle was not assessed. There is normal right ventricular global systolic function. Right Atrium: The right atrium was not assessed. Aortic Valve: The aortic valve is trileaflet. There is no evidence of aortic valve regurgitation. The peak instantaneous gradient of the aortic valve is 6.6 mmHg. Mitral Valve: The mitral valve is normal in structure. There is mild mitral valve regurgitation. Tricuspid Valve: The tricuspid valve is structurally normal. There is trace tricuspid regurgitation. The right ventricular systolic pressure is unable to be estimated. Pulmonic Valve: The pulmonic valve is structurally normal. There is trace pulmonic valve regurgitation. Pericardium: There is a trivial pericardial effusion. Aorta: The aortic root was not assessed. In comparison to the previous echocardiogram(s): Compared with study from 4/23/2024, no significant change.  CONCLUSIONS:  1. Left ventricular systolic function is normal with a 55-60% estimated ejection fraction.  2. No definite valvular vegetations were visualized. QUANTITATIVE DATA SUMMARY: 2D MEASUREMENTS:                          Normal Ranges: IVSd:          0.81 cm   (0.6-1.1cm) LVPWd:         1.04 cm   (0.6-1.1cm) LVIDd:         5.02 cm   (3.9-5.9cm) LVIDs:         3.62 cm LV Mass Index: 81.7 g/m2 LV % FS        28.0 % LV DIASTOLIC FUNCTION:                     Normal Ranges: MV Peak E: 0.81 m/s (0.7-1.2 m/s) MV Peak A: 0.99 m/s (0.42-0.7  m/s) E/A Ratio: 0.82     (1.0-2.2) MITRAL VALVE:                 Normal Ranges: MV DT: 193 msec (150-240msec) AORTIC VALVE:                       Normal Ranges: AoV Vmax:    1.28 m/s (<=1.7m/s) AoV Peak P.6 mmHg (<20mmHg)  [id number] Arjun Leal MD Electronically signed on 5/15/2024 at 4:10:25 PM  ** Final **     XR chest abdomen for OG NG placement    Result Date: 2024  Interpreted By:  Deyanira Velez, STUDY: XR CHEST ABDOMEN FOR OG NG PLACEMENT;  2024 10:18 am   INDICATION: Signs/Symptoms:NGT advanced.   COMPARISON: Chest and abdomen dated 2024   ACCESSION NUMBER(S): [id number]   ORDERING CLINICIAN: ARCELIA CRAFT   TECHNIQUE: AP portable chest and abdomen image were obtained.   FINDINGS: Heart is normal in size.   There is bilateral parenchymal infiltration.   There are atherosclerotic changes of the aorta. There are degenerative changes of the spine. There are degenerative changes of the shoulders.   A nasogastric tube is coarse in the region of the stomach.   There is no significant bowel distention.   There are numerous clips and staples throughout the abdomen.   There are degenerative changes of the spine.   COMPARISON OF FINDINGS: The chest is similar. The nasogastric tube was placed in the interval.       Parenchymal infiltration. Interval nasogastric tube placement.   Nonspecific bowel gas pattern.   MACRO: none   Signed by: Deyanira Velez 2024 10:36 AM Dictation workstation:   [id number]    XR chest 1 view    Result Date: 5/10/2024  Interpreted By:  Desi Ordonez, STUDY: XR CHEST 1 VIEW;  5/10/2024 7:45 pm   INDICATION: Signs/Symptoms:Nasal gastric tube placement   COMPARISON: Chest x-ray 2024   ACCESSION NUMBER(S): [id number]   ORDERING CLINICIAN: ARCELIA CRAFT   TECHNIQUE: Portable upright frontal view of the chest was obtained .   FINDINGS: There is an enteric tube bent in the region of the gastric antrum with the tip in the region of the gastric body.   The heart  is enlarged. There is mild vascular congestion and interstitial edema.   There are ill-defined bilateral airspace opacities. There are trace bilateral pleural effusions. No pneumothorax.       1. Enteric tube bent in the region of the gastric antrum with the tip in the region of the gastric body. 2. Cardiomegaly. Mild vascular congestion and interstitial edema. Trace bilateral pleural effusions. Ill-defined bilateral airspace opacities, suggestive of multifocal pneumonia.       MACRO: None.   Signed by: Desi Ordonez 5/10/2024 9:53 PM Dictation workstation:   [id number]    Electrocardiogram, 12-lead PRN ACS symptoms    Result Date: 5/10/2024  Sinus tachycardia ST & T wave abnormality, consider lateral ischemia Abnormal ECG When compared with ECG of 24-APR-2024 08:17, Significant changes have occurred See ED provider note for full interpretation and clinical correlation Confirmed by Columba Islas ([id number]) on 5/10/2024 11:06:45 AM    XR abdomen 1 view    Result Date: 5/9/2024  Interpreted By:  Fidel Mueller, STUDY: XR ABDOMEN 1 VIEW; 5/9/2024 4:18 pm   INDICATION: Signs/Symptoms:NG placement   COMPARISON: August 2022 and 05/09/2024.   ACCESSION NUMBER(S): [id number]   ORDERING CLINICIAN: ROSAURA CHACKO   TECHNIQUE: Number of films: Two-view KUB   FINDINGS: No NG tube is visualized in the lower chest or in the abdomen. Bibasilar consolidations are noted. The bowel gas pattern is nonobstructive nonspecific, with gas and stool throughout the colon. There is no free intraperitoneal air. Surgical staples are noted in the pelvis. No abnormal intra-abdominal calcifications are seen. Degenerative changes involve the spine and hips.       No nasogastric tube seen in the lower chest or abdomen; correlate with chest radiographs or repeat KUB after NG repositioning.   Signed by: Fidel Mueller 5/9/2024 4:41 PM Dictation workstation:   [id number]    ECG 12 lead    Result Date: 5/9/2024  Sinus tachycardia Possible  Anterior infarct , age undetermined Abnormal ECG When compared with ECG of 09-MAY-2024 12:16, (unconfirmed) Questionable change in QRS axis See ED provider note for full interpretation and clinical correlation Confirmed by Columba Cabrera ([id number]) on 5/9/2024 4:04:08 PM    XR chest 1 view    Result Date: 5/9/2024  Interpreted By:  Mercedes Salazar, STUDY: XR CHEST 1 VIEW;  5/9/2024 1:27 pm   INDICATION: Signs/Symptoms:AMS.   COMPARISON: 04/02/2024 and CT of the chest dated 05/09/2024   ACCESSION NUMBER(S): [id number]   ORDERING CLINICIAN: ROSAURA CHACKO   FINDINGS: Poor inspiratory effort is seen with crowding of pulmonary vasculature at the lung bases. The heart is mildly enlarged. There is diffuse bilateral interstitial prominence with patchy densities in bilateral parahilar regions which on the chest CT obtained on the same day demonstrated to be related to infiltrates. No gross pleural effusion or pneumothorax is seen       Poor inspiration.   Mild cardiomegaly.   Bilateral infiltrates.       MACRO: None   Signed by: Mercedes Salazar 5/9/2024 1:52 PM Dictation workstation:   [id number]    CT chest abdomen pelvis wo IV contrast    Result Date: 5/9/2024  Interpreted By:  Kishor Muniz, STUDY: CT CHEST ABDOMEN PELVIS WO CONTRAST; ;  5/9/2024 12:09 pm   INDICATION: Signs/Symptoms:AMS tachycardia.   COMPARISON: 05/22/2022   ACCESSION NUMBER(S): [id number]   ORDERING CLINICIAN: ROSAURA CHACKO   TECHNIQUE: Serial axial unenhanced CT images obtained of the chest, abdomen, and pelvis. Images reformatted in the coronal and sagittal projection   All CT examinations are performed with 1 or more of the following dose reduction techniques: Automated exposure control, adjustment of mA and/or kv according to patient's size, or use of iterative reconstruction techniques.   FINDINGS: CT chest:   Mediastinum demonstrates right paratracheal lymph node in the superior mediastinum measuring 8 x 6 mm not seen on the prior  examination. Also, there is component of precarinal lymphadenopathy identified intervally developed from prior imaging measuring 12 x 8 mm. Subcarinal lymph node identified measures 9 mm in the short axis. Esophagus is unremarkable   Heart and great vessels demonstrate mild vascular calcification of the aortic arch. Ascending thoracic aorta measures 2.5 cm. No cardiomegaly demonstrated. Sliver of pericardial effusion noted.   Lung parenchyma demonstrates patchy areas of consolidation throughout bilateral lung fields with more focal dense airspace consolidation in the dependent portions of the medial segment of the right lower lobe as well as medial basal segment of the left lower lobe in the posterior perihilar distribution with associated air bronchograms demonstrated. Other patchy areas of consolidation in the right perihilar location in particular within the right upper lobe with multifocal pneumonia. There are small bilateral basilar pleural effusions. Within the areas of dense airspace consolidation there are linear appearing areas of hyperdensity. Correlate with recent contrast administration and aspiration.   Visualized osseous structures demonstrate multilevel degenerative discogenic changes lower thoracic spine with multilevel anterior osteophyte formation.   CT abdomen:   Liver is unremarkable within limits of this unenhanced CT   Spleen and adrenal glands are unremarkable   Status post cholecystectomy   Pancreas is unremarkable   Right kidney demonstrates no hydronephrosis or nephrolithiasis. Visualized course of the right ureter is unremarkable.   Left kidney demonstrates no hydronephrosis or nephrolithiasis.   Retroperitoneum demonstrates flattened IVC. Correlate with component of hypovolemia. Mild vascular calcification of the abdominal aorta   Loops of large bowel demonstrate fluid-filled portions of the proximal to mid colon. Mild stool-filled distal colon with hyperdense component within the fecal  column unclear if this relates to recent contrast administration. Small bowel loops are nondilated. Postsurgical changes are demonstrated with enteroenteric anastomosis in the right lower quadrant appearing unremarkable. No perienteric fat stranding. Stomach is unremarkable with dependent hyperdense fluid in the stomach lumen.   CT pelvis:   Unopacified bladder is unremarkable. There is no pelvic lymphadenopathy. No free fluid demonstrated. Uterus and adnexa are unremarkable   Visualized osseous structures demonstrate moderate osteoarthritic degenerative change bilateral hips. Mild bilateral SI joint osteoarthritis demonstrated. There is moderate facet arthropathy L4/5.               1. Multifocal pneumonia with more dense airspace consolidation demonstrated within the medial right and left lower lobes in the posterior perihilar location. Scattered patchy areas of ground-glass airspace infiltrate and consolidation within bilateral lung fields. Follow-up to clearing.   2. The IVC is flattened in appearance suggesting component of hypovolemia. Correlate   3. No dilated loops of bowel. Postsurgical changes with enteroenteric anastomosis in the right lower quadrant appearing unremarkable.     MACRO: None   Signed by: Kishor Muniz 5/9/2024 1:01 PM Dictation workstation:   [id number]    CT brain attack head wo IV contrast    Result Date: 5/9/2024  Interpreted By:  Michael Lucia, STUDY: CT BRAIN ATTACK HEAD WO IV CONTRAST  5/9/2024 12:02 pm   INDICATION: Signs/Symptoms:AMS   COMPARISON: 04/22/2024   ACCESSION NUMBER(S): [id number]   ORDERING CLINICIAN: ROSAURA CHACKO   TECHNIQUE: Contiguous axial CT images of the brain were obtained without IV contrast.   FINDINGS: The ventricles, cisterns and sulci are prominent, consistent with moderate diffuse volume loss. Areas of white matter low attenuation are nonspecific but likely related to chronic microvascular disease. There is intracranial atherosclerosis.   Gray-white  differentiation is preserved. No acute intracranial hemorrhage or mass effect. No midline shift. Patent basal cisterns. No extraaxial fluid collections.   The calvaria is intact. The visualized paranasal sinuses and mastoid air cells are clear.       No acute intracranial pathology.     MACRO: Michael Lucia discussed the significance and urgency of this critical finding by telephone with  ROSAURA CHACKO on 5/9/2024 at 12:21 pm. (**-RCF-**) Findings:  See findings.     Signed by: Michael Lucia 5/9/2024 12:21 PM Dictation workstation:   [id number]    Results for orders placed or performed during the hospital encounter of 05/31/24 (from the past 24 hour(s))   POCT GLUCOSE   Result Value Ref Range    POCT Glucose 439 (H) 74 - 99 mg/dL   POCT GLUCOSE   Result Value Ref Range    POCT Glucose 404 (H) 74 - 99 mg/dL   POCT GLUCOSE   Result Value Ref Range    POCT Glucose 383 (H) 74 - 99 mg/dL   CBC   Result Value Ref Range    WBC 31.8 (H) 4.4 - 11.3 x10*3/uL    nRBC 0.1 (H) 0.0 - 0.0 /100 WBCs    RBC 2.05 (L) 4.00 - 5.20 x10*6/uL    Hemoglobin 6.3 (LL) 12.0 - 16.0 g/dL    Hematocrit 19.7 (L) 36.0 - 46.0 %    MCV 96 80 - 100 fL    MCH 30.7 26.0 - 34.0 pg    MCHC 32.0 32.0 - 36.0 g/dL    RDW 17.0 (H) 11.5 - 14.5 %    Platelets 289 150 - 450 x10*3/uL   Basic metabolic panel   Result Value Ref Range    Glucose 522 (HH) 74 - 99 mg/dL    Sodium 141 136 - 145 mmol/L    Potassium 3.5 3.5 - 5.3 mmol/L    Chloride 105 98 - 107 mmol/L    Bicarbonate 27 21 - 32 mmol/L    Anion Gap 13 10 - 20 mmol/L    Urea Nitrogen 35 (H) 6 - 23 mg/dL    Creatinine 1.55 (H) 0.50 - 1.05 mg/dL    eGFR 39 (L) >60 mL/min/1.73m*2    Calcium 7.6 (L) 8.6 - 10.3 mg/dL    Scheduled medications  amLODIPine, 5 mg, oral, Daily  baclofen, 10 mg, oral, BID  cefepime, 2 g, intravenous, q12h  docusate sodium, 100 mg, oral, Daily  dolutegravir, 50 mg, oral, Daily  emtricitabine-tenofovir alafen, 1 tablet, oral, Daily  furosemide, 20 mg, oral, Daily  insulin lispro, 0-10  Units, subcutaneous, TID  lamoTRIgine, 25 mg, oral, q PM  levothyroxine, 175 mcg, oral, Daily before breakfast  oxygen, , inhalation, Continuous - Inhalation      Continuous medications  D5 % and 0.9 % sodium chloride, 75 mL/hr, Last Rate: 75 mL/hr (06/03/24 0321)      PRN medications  PRN medications: acetaminophen, ALPRAZolam, alum-mag hydroxide-simeth, bisacodyl, dextromethorphan-guaifenesin, dextrose, dextrose, diphenhydrAMINE, glucagon, glucagon, guaiFENesin, morphine, ondansetron, vancomycin      Assessment/Plan      53 year old woman with recent CVA with deficits, new PEG, recent PNA is here with PNA, sepsis, sacral wound, and anemia.  Severe anemia, likely related to metabolic stressors, no acute bleeding.  Family consented for blood transfusions.    Palliative goals:  already DNR/DNI.  Care transition team has already spoken with sister who wants hospice to start after ATB completed  -appropriate for hospice care under end stage CVA  -formal order placed for hospice  -will place [facility] Portable form at bedside for transport    Palliative care will follow peripherally for sx burden, change in status etc.    Thank you for the consult.            Mihaela Little, APRN-CNP

## 2024-06-03 NOTE — PROGRESS NOTES
6/3/2024 11:22 AM I spoke with Suyapa Chan from [facility] [phone]. Plan is to go with hospice at [facility]. Sister would like to continue the antibiotics and then go hospice. Selam ÁLVAREZ

## 2024-06-03 NOTE — PROGRESS NOTES
06/03/24 1057   Discharge Planning   Patient expects to be discharged to: Josep Pte- return with [facility]     Per LTC, patient active with [facility] pal care, plan was for family to meet with them on Saturday at LTC for hospice meeting, Shona CARVER aware of this plan and will contact family/hospice to arrange. Will need to confirm if patient will dc ON hospice or will sign with hospice once she returns to LTC?    ADOD today/tomorrow  BARRIERS hospice plan, equipment  DISPO LTC with hospice

## 2024-06-03 NOTE — NURSING NOTE
Dr Ward notified of Hgb 6.3  and serum glucose 522.  Patient covered with insulin per the sliding scale.  Physician aware and no further orders.

## 2024-06-03 NOTE — PROGRESS NOTES
"  INFECTIOUS DISEASE DAILY PROGRESS NOTE    SUBJECTIVE:    Not responding. No fevers. WBC remains high. Still on O2 support. Plans seem to be for hospice transition now.    OBJECTIVE:  VITALS (Last 24 Hours)  /72 (BP Location: Left arm, Patient Position: Lying)   Pulse 97   Temp 36.5 °C (97.7 °F)   Resp 18   Ht 1.727 m (5' 8\")   Wt 86.2 kg (190 lb)   SpO2 100%   BMI 28.89 kg/m²     PHYSICAL EXAM:  Gen - lethargic, in bed, not speaking  Lungs - coarse upper lungs bilaterally, no wheezing  Abd - no apparent ttp, BS present, PEG tube present  Skin - no rash    ABX: IV Cefepime    LABS:  Lab Results   Component Value Date    WBC 31.8 (H) 06/03/2024    HGB 6.3 (LL) 06/03/2024    HCT 19.7 (L) 06/03/2024    MCV 96 06/03/2024     06/03/2024     Lab Results   Component Value Date    GLUCOSE 522 (HH) 06/03/2024    CALCIUM 7.6 (L) 06/03/2024     06/03/2024    K 3.5 06/03/2024    CO2 27 06/03/2024     06/03/2024    BUN 35 (H) 06/03/2024    CREATININE 1.55 (H) 06/03/2024     Results from last 72 hours   Lab Units 06/01/24  0433   ALK PHOS U/L 99   BILIRUBIN TOTAL mg/dL 0.4   PROTEIN TOTAL g/dL 7.1   ALT U/L 13   AST U/L 34   ALBUMIN g/dL 2.0*     Estimated Creatinine Clearance: 47.1 mL/min (A) (by C-G formula based on SCr of 1.55 mg/dL (H)).      ASSESSMENT/PLAN:    Recent MRSA Bacteremia - plan was IV Dapto through 6/10/24  Recurrent Aspiration PNA  HIV - well controlled, on Tivicay/Descovy    Plans may be for hospice. Until then will continue on IV Vanc/Cefepime. Adding diff to CBC.    Monitoring for adverse effects of abx such as rash/itching/diarrhea.    Will follow. Thanks! D/w RN    Sadiq Mayo MD  [facility]  Office #[phone]      "

## 2024-06-04 LAB
ANION GAP SERPL CALC-SCNC: 10 MMOL/L (ref 10–20)
BUN SERPL-MCNC: 31 MG/DL (ref 6–23)
CALCIUM SERPL-MCNC: 8.1 MG/DL (ref 8.6–10.3)
CHLORIDE SERPL-SCNC: 109 MMOL/L (ref 98–107)
CO2 SERPL-SCNC: 26 MMOL/L (ref 21–32)
CREAT SERPL-MCNC: 1.42 MG/DL (ref 0.5–1.05)
EGFRCR SERPLBLD CKD-EPI 2021: 44 ML/MIN/1.73M*2
ERYTHROCYTE [DISTWIDTH] IN BLOOD BY AUTOMATED COUNT: 16.4 % (ref 11.5–14.5)
GLUCOSE BLD MANUAL STRIP-MCNC: 212 MG/DL (ref 74–99)
GLUCOSE BLD MANUAL STRIP-MCNC: 216 MG/DL (ref 74–99)
GLUCOSE BLD MANUAL STRIP-MCNC: 261 MG/DL (ref 74–99)
GLUCOSE BLD MANUAL STRIP-MCNC: 288 MG/DL (ref 74–99)
GLUCOSE SERPL-MCNC: 332 MG/DL (ref 74–99)
HCT VFR BLD AUTO: 25 % (ref 36–46)
HGB BLD-MCNC: 8.3 G/DL (ref 12–16)
MCH RBC QN AUTO: 30.7 PG (ref 26–34)
MCHC RBC AUTO-ENTMCNC: 33.2 G/DL (ref 32–36)
MCV RBC AUTO: 93 FL (ref 80–100)
NRBC BLD-RTO: 0 /100 WBCS (ref 0–0)
PLATELET # BLD AUTO: 279 X10*3/UL (ref 150–450)
POTASSIUM SERPL-SCNC: 3.1 MMOL/L (ref 3.5–5.3)
RBC # BLD AUTO: 2.7 X10*6/UL (ref 4–5.2)
SODIUM SERPL-SCNC: 142 MMOL/L (ref 136–145)
WBC # BLD AUTO: 30.6 X10*3/UL (ref 4.4–11.3)

## 2024-06-04 PROCEDURE — 2500000001 HC RX 250 WO HCPCS SELF ADMINISTERED DRUGS (ALT 637 FOR MEDICARE OP): Performed by: NURSE PRACTITIONER

## 2024-06-04 PROCEDURE — 82947 ASSAY GLUCOSE BLOOD QUANT: CPT

## 2024-06-04 PROCEDURE — 85027 COMPLETE CBC AUTOMATED: CPT | Performed by: INTERNAL MEDICINE

## 2024-06-04 PROCEDURE — 2500000002 HC RX 250 W HCPCS SELF ADMINISTERED DRUGS (ALT 637 FOR MEDICARE OP, ALT 636 FOR OP/ED): Performed by: INTERNAL MEDICINE

## 2024-06-04 PROCEDURE — 1200000002 HC GENERAL ROOM WITH TELEMETRY DAILY

## 2024-06-04 PROCEDURE — 99233 SBSQ HOSP IP/OBS HIGH 50: CPT | Performed by: NURSE PRACTITIONER

## 2024-06-04 PROCEDURE — 80048 BASIC METABOLIC PNL TOTAL CA: CPT | Performed by: INTERNAL MEDICINE

## 2024-06-04 PROCEDURE — 2500000005 HC RX 250 GENERAL PHARMACY W/O HCPCS: Performed by: INTERNAL MEDICINE

## 2024-06-04 PROCEDURE — 2500000001 HC RX 250 WO HCPCS SELF ADMINISTERED DRUGS (ALT 637 FOR MEDICARE OP): Performed by: INTERNAL MEDICINE

## 2024-06-04 PROCEDURE — 2500000004 HC RX 250 GENERAL PHARMACY W/ HCPCS (ALT 636 FOR OP/ED): Performed by: INTERNAL MEDICINE

## 2024-06-04 RX ORDER — METRONIDAZOLE 7.5 MG/G
GEL TOPICAL DAILY
Status: DISCONTINUED | OUTPATIENT
Start: 2024-06-04 | End: 2024-06-04

## 2024-06-04 RX ORDER — METRONIDAZOLE 500 MG/1
500 TABLET ORAL DAILY
Status: DISCONTINUED | OUTPATIENT
Start: 2024-06-04 | End: 2024-06-11 | Stop reason: HOSPADM

## 2024-06-04 RX ORDER — POTASSIUM CHLORIDE 14.9 MG/ML
20 INJECTION INTRAVENOUS ONCE
Status: COMPLETED | OUTPATIENT
Start: 2024-06-04 | End: 2024-06-04

## 2024-06-04 RX ORDER — OXYCODONE HYDROCHLORIDE 5 MG/1
5 TABLET ORAL EVERY 8 HOURS
Status: DISCONTINUED | OUTPATIENT
Start: 2024-06-04 | End: 2024-06-05

## 2024-06-04 RX ADMIN — DOCUSATE SODIUM 100 MG: 50 LIQUID ORAL at 09:18

## 2024-06-04 RX ADMIN — BACLOFEN 10 MG: 10 TABLET ORAL at 09:18

## 2024-06-04 RX ADMIN — APIXABAN 5 MG: 5 TABLET, FILM COATED ORAL at 20:39

## 2024-06-04 RX ADMIN — OXYCODONE HYDROCHLORIDE 5 MG: 5 TABLET ORAL at 17:42

## 2024-06-04 RX ADMIN — DOLUTEGRAVIR SODIUM 50 MG: 50 TABLET, FILM COATED ORAL at 09:22

## 2024-06-04 RX ADMIN — INSULIN LISPRO 6 UNITS: 100 INJECTION, SOLUTION INTRAVENOUS; SUBCUTANEOUS at 12:07

## 2024-06-04 RX ADMIN — LEVOTHYROXINE SODIUM 175 MCG: 125 TABLET ORAL at 06:15

## 2024-06-04 RX ADMIN — METRONIDAZOLE 500 MG: 500 TABLET ORAL at 12:03

## 2024-06-04 RX ADMIN — Medication 2 L/MIN: at 20:00

## 2024-06-04 RX ADMIN — POTASSIUM CHLORIDE 20 MEQ: 14.9 INJECTION, SOLUTION INTRAVENOUS at 11:44

## 2024-06-04 RX ADMIN — APIXABAN 5 MG: 5 TABLET, FILM COATED ORAL at 12:03

## 2024-06-04 RX ADMIN — LAMOTRIGINE 25 MG: 25 TABLET ORAL at 20:39

## 2024-06-04 RX ADMIN — INSULIN LISPRO 6 UNITS: 100 INJECTION, SOLUTION INTRAVENOUS; SUBCUTANEOUS at 09:24

## 2024-06-04 RX ADMIN — OXYCODONE HYDROCHLORIDE 5 MG: 5 TABLET ORAL at 09:22

## 2024-06-04 RX ADMIN — INSULIN LISPRO 4 UNITS: 100 INJECTION, SOLUTION INTRAVENOUS; SUBCUTANEOUS at 17:42

## 2024-06-04 RX ADMIN — AMLODIPINE BESYLATE 5 MG: 5 TABLET ORAL at 09:18

## 2024-06-04 RX ADMIN — FUROSEMIDE 20 MG: 20 TABLET ORAL at 09:18

## 2024-06-04 RX ADMIN — BACLOFEN 10 MG: 10 TABLET ORAL at 20:39

## 2024-06-04 RX ADMIN — EMTRICITABINE AND TENOFOVIR ALAFENAMIDE 1 TABLET: 200; 25 TABLET ORAL at 09:18

## 2024-06-04 ASSESSMENT — COGNITIVE AND FUNCTIONAL STATUS - GENERAL
MOVING FROM LYING ON BACK TO SITTING ON SIDE OF FLAT BED WITH BEDRAILS: TOTAL
MOVING TO AND FROM BED TO CHAIR: TOTAL
TURNING FROM BACK TO SIDE WHILE IN FLAT BAD: TOTAL
DRESSING REGULAR UPPER BODY CLOTHING: TOTAL
DAILY ACTIVITIY SCORE: 6
MOVING TO AND FROM BED TO CHAIR: TOTAL
DRESSING REGULAR LOWER BODY CLOTHING: TOTAL
HELP NEEDED FOR BATHING: TOTAL
MOBILITY SCORE: 6
EATING MEALS: TOTAL
CLIMB 3 TO 5 STEPS WITH RAILING: TOTAL
HELP NEEDED FOR BATHING: TOTAL
CLIMB 3 TO 5 STEPS WITH RAILING: TOTAL
DRESSING REGULAR UPPER BODY CLOTHING: TOTAL
MOBILITY SCORE: 6
DAILY ACTIVITIY SCORE: 6
TOILETING: TOTAL
STANDING UP FROM CHAIR USING ARMS: TOTAL
PERSONAL GROOMING: TOTAL
MOVING FROM LYING ON BACK TO SITTING ON SIDE OF FLAT BED WITH BEDRAILS: TOTAL
TURNING FROM BACK TO SIDE WHILE IN FLAT BAD: TOTAL
WALKING IN HOSPITAL ROOM: TOTAL
PERSONAL GROOMING: TOTAL
EATING MEALS: TOTAL
DRESSING REGULAR LOWER BODY CLOTHING: TOTAL
TOILETING: TOTAL
STANDING UP FROM CHAIR USING ARMS: TOTAL
WALKING IN HOSPITAL ROOM: TOTAL

## 2024-06-04 ASSESSMENT — PAIN - FUNCTIONAL ASSESSMENT: PAIN_FUNCTIONAL_ASSESSMENT: WONG-BAKER FACES

## 2024-06-04 ASSESSMENT — PAIN SCALES - WONG BAKER: WONGBAKER_NUMERICALRESPONSE: NO HURT

## 2024-06-04 ASSESSMENT — PAIN SCALES - GENERAL: PAINLEVEL_OUTOF10: 0 - NO PAIN

## 2024-06-04 NOTE — CARE PLAN
Problem: Pain  Goal: My pain/discomfort is manageable  Outcome: Progressing     Problem: Safety  Goal: Patient will be injury free during hospitalization  Outcome: Progressing  Goal: I will remain free of falls  Outcome: Progressing     Problem: Daily Care  Goal: Daily care needs are met  Outcome: Progressing     The clinical goals for the shift include safety, HD stability

## 2024-06-04 NOTE — CONSULTS
Nutrition Assessment Note    Reason for Assessment  Reason for Assessment: Admission nursing screening    Pt admitted for:  Encephalopathy [G93.40]  Sepsis without acute organ dysfunction, due to unspecified organism (Multi) [A41.9]    Pt known to nutrition services.  Chart reviewed and pt visited.  MD requested enteral feeds at half of goal rate due to suspicion of aspiration.  Palliative care on consult    RDN recommendations:  Pt to be NPO/CLD< 5 days.  Should pt require to be NPO/CLD > 5 days, consider alternate route for nutrition. (Glucerna 1.5, goal rate of 55ml/hr would provide 1980kcal, 108.9g protein & 1001ml free h2o; flushes per MD rec or 150 x 6)    Past Medical History:   Diagnosis Date    Acute pulmonary embolism (Multi) 04/22/2024    Aphasia as late effect of cerebrovascular accident 04/25/2023    Essential hypertension 06/25/2010    Gastroesophageal reflux disease 03/10/2009    Hemiplegia of nondominant side, late effect of cerebrovascular disease (Multi) 09/03/2008    Hemorrhagic stroke (Multi) 06/25/2010    HIV infection (Multi) 02/21/2007    Hypothyroidism 10/10/2002    Formatting of this note might be different from the original.   S/p thyroidectomy 2002   Patholgy c/w Graves    Mixed hyperlipidemia 06/25/2010    Stage 3a chronic kidney disease (Multi) 04/22/2024    T2DM (type 2 diabetes mellitus) (Multi) 11/14/2012     Results for orders placed or performed during the hospital encounter of 05/31/24 (from the past 24 hour(s))   Vancomycin   Result Value Ref Range    Vancomycin 27.1 (H) 5.0 - 20.0 ug/mL   POCT GLUCOSE   Result Value Ref Range    POCT Glucose 375 (H) 74 - 99 mg/dL   POCT GLUCOSE   Result Value Ref Range    POCT Glucose 343 (H) 74 - 99 mg/dL   Basic metabolic panel   Result Value Ref Range    Glucose 332 (H) 74 - 99 mg/dL    Sodium 142 136 - 145 mmol/L    Potassium 3.1 (L) 3.5 - 5.3 mmol/L    Chloride 109 (H) 98 - 107 mmol/L    Bicarbonate 26 21 - 32 mmol/L    Anion Gap 10 10 - 20  "mmol/L    Urea Nitrogen 31 (H) 6 - 23 mg/dL    Creatinine 1.42 (H) 0.50 - 1.05 mg/dL    eGFR 44 (L) >60 mL/min/1.73m*2    Calcium 8.1 (L) 8.6 - 10.3 mg/dL   CBC   Result Value Ref Range    WBC 30.6 (H) 4.4 - 11.3 x10*3/uL    nRBC 0.0 0.0 - 0.0 /100 WBCs    RBC 2.70 (L) 4.00 - 5.20 x10*6/uL    Hemoglobin 8.3 (L) 12.0 - 16.0 g/dL    Hematocrit 25.0 (L) 36.0 - 46.0 %    MCV 93 80 - 100 fL    MCH 30.7 26.0 - 34.0 pg    MCHC 33.2 32.0 - 36.0 g/dL    RDW 16.4 (H) 11.5 - 14.5 %    Platelets 279 150 - 450 x10*3/uL   POCT GLUCOSE   Result Value Ref Range    POCT Glucose 288 (H) 74 - 99 mg/dL   POCT GLUCOSE   Result Value Ref Range    POCT Glucose 261 (H) 74 - 99 mg/dL     Scheduled medications  amLODIPine, 5 mg, oral, Daily  apixaban, 5 mg, oral, BID  baclofen, 10 mg, oral, BID  docusate sodium, 100 mg, oral, Daily  dolutegravir, 50 mg, oral, Daily  emtricitabine-tenofovir alafen, 1 tablet, oral, Daily  furosemide, 20 mg, oral, Daily  insulin lispro, 0-10 Units, subcutaneous, TID  lamoTRIgine, 25 mg, oral, q PM  levothyroxine, 175 mcg, oral, Daily before breakfast  metroNIDAZOLE, 500 mg, oral, Daily  oxyCODONE, 5 mg, g-tube, q8h  oxygen, , inhalation, Continuous - Inhalation  sodium hypochlorite, , irrigation, Daily      Continuous medications  sodium chloride 0.9%, 75 mL/hr, Last Rate: 75 mL/hr (06/03/24 1623)      PRN medications  PRN medications: acetaminophen, ALPRAZolam, alteplase, alum-mag hydroxide-simeth, bisacodyl, dextromethorphan-guaifenesin, dextrose, dextrose, diphenhydrAMINE, glucagon, glucagon, guaiFENesin, morphine, ondansetron, vancomycin    History:  Food and Nutrient History  Food and Nutrient History: NPO; previous admission on Glucerna 1.5, goal rate 55ml/hr, 60ml h2o flushes x 4    Anthropometrics:  Height: 172.7 cm (5' 8\")  Weight: 86.2 kg (190 lb)  BMI (Calculated): 28.9  Current height different from prior admission.    Wt Readings from Last 29 Encounters:   05/31/24 86.2 kg (190 lb)   05/28/24 88.9 " kg (196 lb)   05/15/24 96.5 kg (212 lb 11.9 oz)   05/06/24 93 kg (205 lb)   04/26/24 93 kg (205 lb)   04/22/24 93.3 kg (205 lb 9.6 oz)   04/15/24 93.3 kg (205 lb 9.6 oz)   04/09/24 93.3 kg (205 lb 9.6 oz)   03/11/24 94.9 kg (209 lb 3.2 oz)   02/05/24 102 kg (224 lb 12.8 oz)   01/15/24 102 kg (224 lb 12.8 oz)   01/02/24 102 kg (224 lb)   12/04/23 108 kg (238 lb)   11/27/23 108 kg (238 lb)   11/21/23 108 kg (238 lb)   11/13/23 108 kg (238 lb)   11/06/23 108 kg (238 lb)   10/31/23 108 kg (238 lb)   10/23/23 104 kg (228 lb 12.8 oz)   09/18/23 104 kg (228 lb 4.8 oz)   09/11/23 104 kg (228 lb 4.8 oz)   09/05/23 103 kg (227 lb 3.2 oz)   08/28/23 103 kg (227 lb 3.2 oz)   08/07/23 103 kg (227 lb 3.2 oz)   07/31/23 105 kg (231 lb 6.4 oz)   07/24/23 105 kg (231 lb 6.4 oz)   07/10/23 105 kg (231 lb 6.4 oz)   06/12/23 105 kg (231 lb 6.4 oz)   06/05/23 106 kg (233 lb 9.6 oz)     Weight Change  Significant Weight Loss: Yes  Interpretation of Weight Loss: >10% in 6 months    Estimated Energy Needs  Total Energy Estimated Needs (kCal): 1635 kCal  Total Estimated Energy Need per Day (kCal/kg): 1910 kCal/kg  Method for Estimating Needs: 30-35 IBW    Estimated Protein Needs  Total Protein Estimated Needs (g): 55 g  Total Protein Estimated Needs (g/kg): 110 g/kg  Method for Estimating Needs: 1.0-2.0 IBW    Estimated Fluid Needs  Method for Estimating Needs: 1ml/kcal or per MD    Nutrition Focused Physical Findings:  Subcutaneous Fat Loss  Orbital Fat Pads: Mild-Moderate (slight dark circles and slight hollowing)  Buccal Fat Pads: Well nourished (full, rounded cheeks)    Muscle Wasting  Temporalis: Mild-Moderate (slight depression)    Edema  Edema: +1 trace  Edema Location: generalized    Physical Findings (Nutrition Deficiency/Toxicity)  Skin: Positive (unstageable sacral wound, right heel wound)     Nutrition Diagnosis   Malnutrition Diagnosis  Patient has Malnutrition Diagnosis: Yes  Diagnosis Status: New  Malnutrition Diagnosis:  Severe malnutrition related to chronic disease or condition  As Evidenced by: > 10% weight loss in 6 months, moderate subcutaneous fat loss, mild muscle wasting and trace fluid accumulation present    Patient has Nutrition Diagnosis: Yes  Nutrition Diagnosis 1: Swallowing difficulity  Diagnosis Status (1): New  Related to (1): dysphagia  As Evidenced by (1): NPO status, PEG for enteral nutrition support       Nutrition Diagnosis 2: Increased nutrient needs  Diagnosis Status (2): New  Related to (2): pressure injury  As Evidenced by (2): unstageable sacral wound, right heel wound       Nutrition Interventions/Recommendations   Nutrition Prescription  Individualized Nutrition Prescription Provided for : Pt to be NPO/CLD< 5 days.  Should pt require to be NPO/CLD > 5 days, consider alternate route for nutrition. (Glucerna 1.5, goal rate of 55ml/hr would provide 1980kcal, 108.9g protein & 1001ml free h2o; flushes per MD rec or 150 x 6)    Food and/or Nutrient Delivery Interventions  Enteral Intake: Enteral nutrition site care, Feeding tube flush  Goal: start at 10ml/hr and increase by 10ml/hr every 6 hrs until initial goal rate of 30ml/hr per MD has been met    Coordination of Nutrition Care by a Nutrition Professional  Collaboration and Referral of Nutrition Care: Collaboration by nutrition professional with other providers    Education Documentation  No documentation found.      Nutrition Monitoring and Evaluation   Food and Nutrient Related History  Energy Intake: Estimated energy intake    Fluid Intake: Estimated fluid intake    Enteral and Parenteral Nutrition Intake: Enteral nutrition intake, Enteral nutrition formula/solution  Criteria: Monitor TF tolerance; Contact nutrition services for intolerances    Anthropometrics: Body Composition/Growth/Weight History  Weight Change: Weight gain, Weight loss    Biochemical Data, Medical Tests and Procedures  Electrolyte and Renal Panel: Other (Comment)  Criteria: as  clinically indicated    Gastrointestinal Profile: Other (Comment)  Criteria: as clinically indicated    Glucose/Endocrine Profile: Other (Comment)  Criteria: as clinically indicated    Nutritional Anemia Profile: Other (Comment)  Criteria: as clinically indicated    Vitamin Profile: Other (Comment)  Criteria: as clinically indicated    Nutrition Focused Physical Findings  Adipose: Loss of subcutaneous fat    Digestive System: Other (Comment)  Criteria: as clinically indicated    Muscles: Muscle atrophy    Skin: Impaired wound healing    Other: Fluid Accumulation, Stool output, Urine volume, Overall appearance    Follow Up  Time Spent (min): 60 minutes  Last Date of Nutrition Visit: 06/04/24  Nutrition Follow-Up Needed?: Dietitian to reassess per policy  Follow up Comment: BRETT ALEXANDER SPCM EN recs

## 2024-06-04 NOTE — PROGRESS NOTES
"  INFECTIOUS DISEASE DAILY PROGRESS NOTE    SUBJECTIVE:    No new events. Afebrile. WBC is still elevated - differential yesterday with slight rise in eosinophils but mostly neutrophilic.    OBJECTIVE:  VITALS (Last 24 Hours)  /66 (BP Location: Left arm, Patient Position: Lying)   Pulse 87   Temp 36.6 °C (97.8 °F) (Temporal)   Resp 17   Ht 1.727 m (5' 8\")   Wt 86.2 kg (190 lb)   SpO2 99%   BMI 28.89 kg/m²     PHYSICAL EXAM:  Gen - lethargic, in bed  Lungs - no wheezing  Abd - no apparent ttp, BS present, PEG tube present  Skin - no rashes    ABX: IV Vanc/Cefepime    LABS:  Lab Results   Component Value Date    WBC 30.6 (H) 06/04/2024    HGB 8.3 (L) 06/04/2024    HCT 25.0 (L) 06/04/2024    MCV 93 06/04/2024     06/04/2024     Lab Results   Component Value Date    GLUCOSE 332 (H) 06/04/2024    CALCIUM 8.1 (L) 06/04/2024     06/04/2024    K 3.1 (L) 06/04/2024    CO2 26 06/04/2024     (H) 06/04/2024    BUN 31 (H) 06/04/2024    CREATININE 1.42 (H) 06/04/2024           Estimated Creatinine Clearance: 51.4 mL/min (A) (by C-G formula based on SCr of 1.42 mg/dL (H)).      ASSESSMENT/PLAN:    Recent MRSA Bacteremia - previous plan was IV Dapto through 6/10/24  Recurrent Aspiration PNA  HIV - well controlled, on Tivicay/Descovy    Plan will be to continue on IV Vancomycin through 6/10/24 to complete treatment for prior bacteremia. Family would like this prior to moving patient to hospice.     I will stop IV Cefepime, this can potentially contribute to encephalopathy although dosing is fine for her current renal function.    Monitoring for adverse effects of abx such as rash/itching/diarrhea.    Will follow. Thanks!    Sadiq Mayo MD  [facility]  Office #[phone]      " 

## 2024-06-04 NOTE — CARE PLAN
The patient's goals for the shift include sleep    The clinical goals for the shift include remain safe and recieve blood transfusion    Over the shift, the patient did not make progress toward the following goals.       Problem: Pain  Goal: My pain/discomfort is manageable  Outcome: Progressing     Problem: Safety  Goal: Patient will be injury free during hospitalization  Outcome: Progressing     Problem: Daily Care  Goal: Daily care needs are met  Outcome: Progressing     Problem: Skin  Goal: Prevent/manage excess moisture  Outcome: Progressing  Goal: Prevent/minimize sheer/friction injuries  Outcome: Progressing  Goal: Promote skin healing  Outcome: Progressing

## 2024-06-04 NOTE — DISCHARGE SUMMARY
Discharge Diagnosis  Encephalopathy    Issues Requiring Follow-Up  PCP    Discharge Meds     Your medication list        CHANGE how you take these medications        Instructions Last Dose Given Next Dose Due   Voltaren 1 % gel  Generic drug: diclofenac sodium  What changed: Another medication with the same name was removed. Continue taking this medication, and follow the directions you see here.                  CONTINUE taking these medications        Instructions Last Dose Given Next Dose Due   acetaminophen 500 mg tablet  Commonly known as: Tylenol           amLODIPine 5 mg tablet  Commonly known as: Norvasc           apixaban 5 mg tablet  Commonly known as: Eliquis           atorvastatin 10 mg tablet  Commonly known as: Lipitor           atorvastatin 40 mg tablet  Commonly known as: Lipitor           baclofen 10 mg tablet  Commonly known as: Lioresal           Basaglar KwikPen U-100 Insulin 100 unit/mL (3 mL) pen  Generic drug: insulin glargine           Descovy 200-25 mg tablet  Generic drug: emtricitabine-tenofovir alafen           docusate sodium 100 mg capsule  Commonly known as: Colace           furosemide 20 mg tablet  Commonly known as: Lasix           gabapentin 300 mg capsule  Commonly known as: Neurontin           hydroCHLOROthiazide 25 mg tablet  Commonly known as: HYDRODiuril           insulin lispro 100 unit/mL injection  Commonly known as: HumaLOG           ipratropium-albuteroL 0.5-2.5 mg/3 mL nebulizer solution  Commonly known as: Duo-Neb           lactulose 10 gram/15 mL (15 mL) solution           lamoTRIgine 25 mg tablet  Commonly known as: LaMICtal           levothyroxine 175 mcg tablet  Commonly known as: Synthroid, Levoxyl           melatonin 3 mg tablet           omega 3-dha-epa-fish oil 300 mg (120 mg- 180mg)-1,000 mg capsule           oxyCODONE 5 mg immediate release tablet  Commonly known as: Roxicodone           polyethylene glycol 17 gram packet  Commonly known as: Glycolax, Miralax            potassium chloride ER 10 mEq ER capsule  Commonly known as: Micro-K           sennosides-docusate sodium 8.6-50 mg tablet  Commonly known as: Caro-Colace           sertraline 100 mg tablet  Commonly known as: Zoloft           Tivicay 50 mg tablet  Generic drug: dolutegravir                    Test Results Pending At Discharge  Pending Labs       No current pending labs.            Hospital Course  history of CVA and HIV. She presented today from her skilled nursing facility at about 2 PM with worsening left-sided facial droop. Last time known well was 8 AM. She normally speaks but was mute when she arrived. She was not really following commands. She will somewhat move her left upper extremity now but her presentation is more consistent with an encephalopathy. I spoke to Dr. Morse from neurology who agrees. I got the MRA and MRI which showed no vascular cutoff. The nurse is going to straight cath her now and will give antibiotics if she has a UTI.       Left thalamic ICH (2007)  thalamic infarct (2022)  Left corona radiata infarct (2023)  PE (Apixaban)  HTN   HLD  CKD3: baseline 1.3   T2DM  HIV    DNR-CCA per document 4/15/24?    -LVEF 35-40% 2/17/2022, improved to 45% on most  recent echo 5/22. no pfo.   - Patient not on any GDMT?     heart failure with reduced EF, diabetes, hypertension, hyperlipidemia and CVA with right-sided weakness and HIV,   #dysphagia 2/2 cva  - currently with residual r ight sided weakness, facial weakness, dysarthria. h/o stroke 2018.     CD4 absolute count (2/24/23)  CD3 + T cell % 89 (60-89%)  CD3 + T cell #3070 (high) ([phone])  CD3 and CD4 + Tcell % 23 (34-61%)  CD3 + CD4 + T cell # 779 ([phone])    # Encephalopathy  - improved today     # Acute respiratory failure  -DNR with limitations per family  -Not a current candidate for ICU care or intubation     # Probable aspiration pneumonia  -Will initiate IV antibiotics     # YESSICA-resolved     # Old right hemiparesis/dysarthria      HIV:  Hx graves s/p thyroidectomy   Post surgical hypothyroid  HTN  HLP  T2DM     5/10 : Pt has poor swallowing - seen by ST - will start NGT feeds over the weekend - NGT placed today. Pt more awake - was waving at me - speaking appropriately.        5/11-patient seen at bedside, patient's sister/POA at bedside discussed with both of them about poor p.o. intake and the need for a more definitive plan.  They both agree for PEG if needed patient to get MBS study done on Monday, will consult GI if patient fails swallowing.  She may need a PEG even if she able to swallow because her intake has been less than 50%.     5/12-patient seen at bedside, has NG in place, NG had, come out a few inches was replaced and x-ray was verified, swallow test tomorrow.     5/30-MRSA bacteremia, on IV vancomycin, TTE pending, patient did not do well with swallow eval, discussed with speech therapist-will proceed to consider PEG, consult GI, patient agrees with the plan.     5/15-patient feels well, denies complaints, GI concerned about aspiration pneumonia, will consult pulmonary to optimize her pulmonary issues, GI planning PEG placement Monday, will hold Eliquis after Friday, PT OT evaluation noted     5/16-patient has dysphagia, NG tube still in place, PEG placement planned for Monday, stop Eliquis, will start heparin subcu, patient stabilizing from the pulmonary perspective.        5/17 : Hyperglycemic - will add Lantus, planning PEG on Monday , Eliquis on hold. Midline is being planned.  GI unable to place PEG because of staffing, will consult surgery per the recommendation.     5/18 : Surgery consulted for probable PEG on Monday - NGT in place, Encephalopathy      5/20-patient waiting for a PEG placement, she has been n.p.o. since yesterday, placement has been postponed to tomorrow, will start PEG feeds after placement tomorrow, continue IV fluids, has elevated WBC-IV antibiotics per ID,     5/22-patient has PEG tube, PEG feeds on  flow, tolerating PEG feeds well, midline placement today, continue IV antibiotics per ID, will plan discharge back to NH tomorrow        5/23 :Pt feels well - tolereating TF well, IV abx per ID - ok for DC today to NH        Discharge is planned for today . Discharge medications were reconciled. Discharge orders completed. Hospital course , medication changes and Investigation's conducted were reviewed with the patient / Family. Activity, Diet and Medications after discharge were reviewed with the patient / Family. Medication reconciliation done - pls refer to medication reconciliation sheet.Follow up with Primary Care Physician were reviewed with the patient / Family. Discharge planning was discussed with staff. Refer to discharge orders sheet. Total time to coordinate disch process incl counseling family, coordinating with other care givers,  and pharmacist was >35 min.   Outpatient Follow-Up  No future appointments.      Radha Ward MD

## 2024-06-04 NOTE — PROGRESS NOTES
"Janette Martinez is a 53 y.o. female on day 3 of admission presenting with Sepsis without acute organ dysfunction, due to unspecified organism (Multi).  Patient transfused yesterday.  Wound care recommending surgical consult for sacral wound debridement.  Eventual plan is for patient to discharge back to facility with hospice.    Patient cannot full provide ROS due to cognition but does not head \"yes\" when asked about pain and \"yes\" again when I localized to her sacral wound.  Wound care team reports patient was moaning during wound care    Subjective   Symptoms (0 - 10, Best to Worst)  Levels Symptom Assessment System  Pain Score: 0 - No pain          Objective     Physical Exam  Constitutional:       Appearance: She is obese.   HENT:      Head: Normocephalic and atraumatic.      Nose: Nose normal.      Mouth/Throat:      Mouth: Mucous membranes are moist.      Pharynx: Oropharynx is clear.   Eyes:      Conjunctiva/sclera: Conjunctivae normal.   Cardiovascular:      Rate and Rhythm: Normal rate and regular rhythm.      Heart sounds: Normal heart sounds.   Pulmonary:      Effort: Pulmonary effort is normal.      Breath sounds: Normal breath sounds.   Abdominal:      General: Bowel sounds are normal.      Palpations: Abdomen is soft.      Comments: PEG in place   Genitourinary:     Comments: deferred  Musculoskeletal:         General: No swelling or signs of injury.      Cervical back: Normal range of motion.   Skin:     General: Skin is warm.   Neurological:      Mental Status: She is alert.      Comments: More alert today, + eye contact, no vocalizations   Psychiatric:         Mood and Affect: Mood normal.      Comments: Does not appear distressed         Last Recorded Vitals  Blood pressure 123/66, pulse 87, temperature 36.6 °C (97.8 °F), temperature source Temporal, resp. rate 17, height 1.727 m (5' 8\"), weight 86.2 kg (190 lb), SpO2 99%.  Intake/Output last 3 Shifts:  I/O last 3 completed shifts:  In: 2035.6 " (23.6 mL/kg) [I.V.:1268.8 (14.7 mL/kg); Blood:416.9; IV Piggyback:350]  Out: 1275 (14.8 mL/kg) [Urine:1275 (0.4 mL/kg/hr)]  Weight: 86.2 kg     Relevant Results  No new imaging  Results for orders placed or performed during the hospital encounter of 05/31/24 (from the past 24 hour(s))   Prepare RBC: 1 Units   Result Value Ref Range    PRODUCT CODE [id number]     Unit Number [id number]     Unit ABO O     Unit RH POS     XM INTEP COMP     Dispense Status TR     Blood Expiration Date June 19, 2024 23:59 EDT     PRODUCT BLOOD TYPE 5100     UNIT VOLUME 350    Vancomycin   Result Value Ref Range    Vancomycin 24.8 (H) 5.0 - 20.0 ug/mL   Type and screen   Result Value Ref Range    ABO TYPE O     Rh TYPE POS     ANTIBODY SCREEN NEG    POCT GLUCOSE   Result Value Ref Range    POCT Glucose 412 (H) 74 - 99 mg/dL   Vancomycin   Result Value Ref Range    Vancomycin 27.1 (H) 5.0 - 20.0 ug/mL   POCT GLUCOSE   Result Value Ref Range    POCT Glucose 375 (H) 74 - 99 mg/dL   POCT GLUCOSE   Result Value Ref Range    POCT Glucose 343 (H) 74 - 99 mg/dL   Basic metabolic panel   Result Value Ref Range    Glucose 332 (H) 74 - 99 mg/dL    Sodium 142 136 - 145 mmol/L    Potassium 3.1 (L) 3.5 - 5.3 mmol/L    Chloride 109 (H) 98 - 107 mmol/L    Bicarbonate 26 21 - 32 mmol/L    Anion Gap 10 10 - 20 mmol/L    Urea Nitrogen 31 (H) 6 - 23 mg/dL    Creatinine 1.42 (H) 0.50 - 1.05 mg/dL    eGFR 44 (L) >60 mL/min/1.73m*2    Calcium 8.1 (L) 8.6 - 10.3 mg/dL   CBC   Result Value Ref Range    WBC 30.6 (H) 4.4 - 11.3 x10*3/uL    nRBC 0.0 0.0 - 0.0 /100 WBCs    RBC 2.70 (L) 4.00 - 5.20 x10*6/uL    Hemoglobin 8.3 (L) 12.0 - 16.0 g/dL    Hematocrit 25.0 (L) 36.0 - 46.0 %    MCV 93 80 - 100 fL    MCH 30.7 26.0 - 34.0 pg    MCHC 33.2 32.0 - 36.0 g/dL    RDW 16.4 (H) 11.5 - 14.5 %    Platelets 279 150 - 450 x10*3/uL   POCT GLUCOSE   Result Value Ref Range    POCT Glucose 288 (H) 74 - 99 mg/dL    Scheduled medications  amLODIPine, 5 mg, oral,  Daily  baclofen, 10 mg, oral, BID  cefepime, 2 g, intravenous, q12h  docusate sodium, 100 mg, oral, Daily  dolutegravir, 50 mg, oral, Daily  emtricitabine-tenofovir alafen, 1 tablet, oral, Daily  furosemide, 20 mg, oral, Daily  insulin lispro, 0-10 Units, subcutaneous, TID  lamoTRIgine, 25 mg, oral, q PM  levothyroxine, 175 mcg, oral, Daily before breakfast  oxygen, , inhalation, Continuous - Inhalation  sodium hypochlorite, , irrigation, Daily      Continuous medications  sodium chloride 0.9%, 75 mL/hr, Last Rate: 75 mL/hr (06/03/24 1623)      PRN medications  PRN medications: acetaminophen, ALPRAZolam, alteplase, alum-mag hydroxide-simeth, bisacodyl, dextromethorphan-guaifenesin, dextrose, dextrose, diphenhydrAMINE, glucagon, glucagon, guaiFENesin, morphine, ondansetron, vancomycin             Assessment/Plan     53 year old woman with recent CVA with deficits, new PEG, recent PNA is here with PNA, sepsis, sacral wound, and anemia.  Severe anemia, likely related to metabolic stressors, no acute bleeding.  Family consented for blood transfusions and she has been transfused. ID following for sepsis.  WBC remains very elevated despite ATB     Palliative goals:  already DNR/DNI.  Care transition team has already spoken with sister who wants hospice to start after ATB completed  -appropriate for hospice care under end stage CVA  -formal order placed for hospice  -will place OH Portable form at bedside for transport    Unstageable sacral wound with constant pain and odor, worse with changes.  Unclear if patient can heal wound  -will scheduled oxycodone 5 mg TID, monitor for allergy sx  -surgery has been consulted for debridement. Suggest to surgery that if wound does not appear to have good chance of healing, consider necessity of debridement given goals of care  -adding crushed flagyl 500 mg tablet to wound bed  with dressing changes for odor control     Palliative care will follow peripherally for sx burden, change in  status etc.     Thank you for the consult.                    Mihaela Little, APRN-CNP

## 2024-06-04 NOTE — PROGRESS NOTES
Janette Martinez is a 53 y.o. female on day 3 of admission presenting with Sepsis without acute organ dysfunction, due to unspecified organism (Multi).      Subjective   HPI: This is a 53 y.o. female who is from a nursing home, was transferred here because of altered mental state.  Patient unable to offer any history, history from reports and nursing home.  Patient is usually awake enough to whisper when asked questions, was unable to respond, wakes up when called, hence was sent to the ER for eval.  Patient found to have very high WBC with findings suggestive of a pneumonia and hence admitted for the same.  Recently patient had a PEG placed as her swallowing was compromised.  She has been on continuous PEG feeds at the nursing home.     # Patient seen at bedside.   # Events from the last visit reviewed.   # Discussed with staff.   # Results of tests and investigations from last visit reviewed and discussed with patient/Family.  #  Electronic chart reviewed.   # Input / Recommendations  from other care providers appreciated and reviewed.        Objective     Last Recorded Vitals  /66 (BP Location: Left arm, Patient Position: Lying)   Pulse 87   Temp 36.6 °C (97.8 °F) (Temporal)   Resp 17   Wt 86.2 kg (190 lb)   SpO2 99%   Intake/Output last 3 Shifts:    Intake/Output Summary (Last 24 hours) at 6/4/2024 1112  Last data filed at 6/4/2024 0556  Gross per 24 hour   Intake 1136.85 ml   Output 975 ml   Net 161.85 ml       Admission Weight  Weight: 86.2 kg (190 lb) (05/31/24 1806)    Daily Weight  05/31/24 : 86.2 kg (190 lb)    Image Results  ECG 12 lead  Sinus tachycardia  Nonspecific T wave abnormality  Abnormal ECG  When compared with ECG of 09-MAY-2024 15:28,  Questionable change in QRS axis      Physical Exam  GENERAL: Lethargic, wakes up when called  SKIN: Skin turgor normal. No rashes  HEENT: no epistaxis, Moist mucosa.  NECK: supple  BACK: spine nontender to palpation, No CVAT.  LUNGS: Vesicular breath  sounds, with no wheeze, no crepitations.   CARDIAC: REGULAR. S1 and S2; no rubs, no murmur  ABDOMEN: Abdomen soft, non-tender. BS+  EXTREMITIES: No edema, Good capillary refill.   NEURO: Lethargic unable to examine    MUSCULOSKELETAL: No acute inflammation       Relevant Results    Results for orders placed or performed during the hospital encounter of 05/31/24 (from the past 24 hour(s))   Type and screen   Result Value Ref Range    ABO TYPE O     Rh TYPE POS     ANTIBODY SCREEN NEG    POCT GLUCOSE   Result Value Ref Range    POCT Glucose 412 (H) 74 - 99 mg/dL   Vancomycin   Result Value Ref Range    Vancomycin 27.1 (H) 5.0 - 20.0 ug/mL   POCT GLUCOSE   Result Value Ref Range    POCT Glucose 375 (H) 74 - 99 mg/dL   POCT GLUCOSE   Result Value Ref Range    POCT Glucose 343 (H) 74 - 99 mg/dL   Basic metabolic panel   Result Value Ref Range    Glucose 332 (H) 74 - 99 mg/dL    Sodium 142 136 - 145 mmol/L    Potassium 3.1 (L) 3.5 - 5.3 mmol/L    Chloride 109 (H) 98 - 107 mmol/L    Bicarbonate 26 21 - 32 mmol/L    Anion Gap 10 10 - 20 mmol/L    Urea Nitrogen 31 (H) 6 - 23 mg/dL    Creatinine 1.42 (H) 0.50 - 1.05 mg/dL    eGFR 44 (L) >60 mL/min/1.73m*2    Calcium 8.1 (L) 8.6 - 10.3 mg/dL   CBC   Result Value Ref Range    WBC 30.6 (H) 4.4 - 11.3 x10*3/uL    nRBC 0.0 0.0 - 0.0 /100 WBCs    RBC 2.70 (L) 4.00 - 5.20 x10*6/uL    Hemoglobin 8.3 (L) 12.0 - 16.0 g/dL    Hematocrit 25.0 (L) 36.0 - 46.0 %    MCV 93 80 - 100 fL    MCH 30.7 26.0 - 34.0 pg    MCHC 33.2 32.0 - 36.0 g/dL    RDW 16.4 (H) 11.5 - 14.5 %    Platelets 279 150 - 450 x10*3/uL   POCT GLUCOSE   Result Value Ref Range    POCT Glucose 288 (H) 74 - 99 mg/dL       Scheduled medications  amLODIPine, 5 mg, oral, Daily  baclofen, 10 mg, oral, BID  docusate sodium, 100 mg, oral, Daily  dolutegravir, 50 mg, oral, Daily  emtricitabine-tenofovir alafen, 1 tablet, oral, Daily  furosemide, 20 mg, oral, Daily  insulin lispro, 0-10 Units, subcutaneous, TID  lamoTRIgine, 25 mg,  oral, q PM  levothyroxine, 175 mcg, oral, Daily before breakfast  metroNIDAZOLE, 500 mg, oral, Daily  oxyCODONE, 5 mg, g-tube, q8h  oxygen, , inhalation, Continuous - Inhalation  sodium hypochlorite, , irrigation, Daily      Continuous medications  sodium chloride 0.9%, 75 mL/hr, Last Rate: 75 mL/hr (06/03/24 1623)      PRN medications  PRN medications: acetaminophen, ALPRAZolam, alteplase, alum-mag hydroxide-simeth, bisacodyl, dextromethorphan-guaifenesin, dextrose, dextrose, diphenhydrAMINE, glucagon, glucagon, guaiFENesin, morphine, ondansetron, vancomycin      Assessment/Plan        # Encephalopathy  -Secondary to pneumonia     # Pneumonia  -elevated WBC  -Probably due to aspiration  -Recent PEG placement     # Dysphagia/CVA  -S/p PEG placement recently     # Old right hemiparesis/dysarthria     # Sacral decubitus  -Wound care consulted     HIV:  Hx graves s/p thyroidectomy   Post surgical hypothyroid  HTN  HLP  T2DM       6/2-discussed with patient's sister she Josh Martinez yesterday ,who is the POA about patient's poor prognosis, explained the reason for her present condition and the very likely poor prognosis, she agrees to go with hospice, she had already planned to sign up with hospice while she was at [facility], consulted palliative care, will consult hospice, patient has a hemoglobin of 6.4, transfusion will not affect outcome.  Patient continues to have encephalopathy, on IV antibiotics for pneumonia, continue insulin sliding scale, on D5 normal saline at 75 mL an hour.  Will await hospice and palliative care input.  Patient DNR.    6/3 : Pt still encephalopathic, barely responding, d/w palliative care . D/W Sister / POA - agrees with Hospice consult. Prefers blood transfusion - consent obtained by phone - will order 1 unit PRBC transfusion    6/4 : Pt s/p transfusion - H&H improved , no obvious signs of bleeding, cont IV fluids, restarting PEG feeds at 1/2 the rate as there is  suspicion of aspiration - surgery consulted for sacral wound, palliative care input appreciated. Cont IV abx for pneumonia. Replace Potassium.    Radha Ward MD

## 2024-06-05 LAB
ANION GAP SERPL CALC-SCNC: 10 MMOL/L (ref 10–20)
BACTERIA BLD CULT: NORMAL
BACTERIA BLD CULT: NORMAL
BUN SERPL-MCNC: 31 MG/DL (ref 6–23)
CALCIUM SERPL-MCNC: 7.8 MG/DL (ref 8.6–10.3)
CHLORIDE SERPL-SCNC: 110 MMOL/L (ref 98–107)
CO2 SERPL-SCNC: 26 MMOL/L (ref 21–32)
CREAT SERPL-MCNC: 1.52 MG/DL (ref 0.5–1.05)
EGFRCR SERPLBLD CKD-EPI 2021: 40 ML/MIN/1.73M*2
ERYTHROCYTE [DISTWIDTH] IN BLOOD BY AUTOMATED COUNT: 17.1 % (ref 11.5–14.5)
GLUCOSE BLD MANUAL STRIP-MCNC: 311 MG/DL (ref 74–99)
GLUCOSE BLD MANUAL STRIP-MCNC: 319 MG/DL (ref 74–99)
GLUCOSE BLD MANUAL STRIP-MCNC: 326 MG/DL (ref 74–99)
GLUCOSE SERPL-MCNC: 348 MG/DL (ref 74–99)
HCT VFR BLD AUTO: 25.1 % (ref 36–46)
HGB BLD-MCNC: 8.3 G/DL (ref 12–16)
MCH RBC QN AUTO: 30.9 PG (ref 26–34)
MCHC RBC AUTO-ENTMCNC: 33.1 G/DL (ref 32–36)
MCV RBC AUTO: 93 FL (ref 80–100)
NRBC BLD-RTO: 0.1 /100 WBCS (ref 0–0)
PLATELET # BLD AUTO: 282 X10*3/UL (ref 150–450)
POTASSIUM SERPL-SCNC: 3.7 MMOL/L (ref 3.5–5.3)
RBC # BLD AUTO: 2.69 X10*6/UL (ref 4–5.2)
SODIUM SERPL-SCNC: 142 MMOL/L (ref 136–145)
VANCOMYCIN SERPL-MCNC: 23.1 UG/ML (ref 5–20)
WBC # BLD AUTO: 33.3 X10*3/UL (ref 4.4–11.3)

## 2024-06-05 PROCEDURE — 2500000005 HC RX 250 GENERAL PHARMACY W/O HCPCS: Performed by: NURSE PRACTITIONER

## 2024-06-05 PROCEDURE — 82947 ASSAY GLUCOSE BLOOD QUANT: CPT | Mod: 91

## 2024-06-05 PROCEDURE — 82947 ASSAY GLUCOSE BLOOD QUANT: CPT

## 2024-06-05 PROCEDURE — 80202 ASSAY OF VANCOMYCIN: CPT | Performed by: PHARMACIST

## 2024-06-05 PROCEDURE — 2500000001 HC RX 250 WO HCPCS SELF ADMINISTERED DRUGS (ALT 637 FOR MEDICARE OP): Performed by: NURSE PRACTITIONER

## 2024-06-05 PROCEDURE — 80048 BASIC METABOLIC PNL TOTAL CA: CPT | Performed by: INTERNAL MEDICINE

## 2024-06-05 PROCEDURE — 85027 COMPLETE CBC AUTOMATED: CPT | Performed by: INTERNAL MEDICINE

## 2024-06-05 PROCEDURE — 99233 SBSQ HOSP IP/OBS HIGH 50: CPT | Performed by: NURSE PRACTITIONER

## 2024-06-05 PROCEDURE — 1200000002 HC GENERAL ROOM WITH TELEMETRY DAILY

## 2024-06-05 PROCEDURE — 2500000002 HC RX 250 W HCPCS SELF ADMINISTERED DRUGS (ALT 637 FOR MEDICARE OP, ALT 636 FOR OP/ED): Performed by: INTERNAL MEDICINE

## 2024-06-05 PROCEDURE — 2500000001 HC RX 250 WO HCPCS SELF ADMINISTERED DRUGS (ALT 637 FOR MEDICARE OP): Performed by: INTERNAL MEDICINE

## 2024-06-05 RX ORDER — OXYCODONE HYDROCHLORIDE 5 MG/1
5 TABLET ORAL EVERY 6 HOURS
Status: DISCONTINUED | OUTPATIENT
Start: 2024-06-05 | End: 2024-06-11 | Stop reason: HOSPADM

## 2024-06-05 RX ADMIN — METRONIDAZOLE 500 MG: 500 TABLET ORAL at 08:24

## 2024-06-05 RX ADMIN — OXYCODONE HYDROCHLORIDE 5 MG: 5 TABLET ORAL at 01:24

## 2024-06-05 RX ADMIN — APIXABAN 5 MG: 5 TABLET, FILM COATED ORAL at 08:24

## 2024-06-05 RX ADMIN — INSULIN LISPRO 8 UNITS: 100 INJECTION, SOLUTION INTRAVENOUS; SUBCUTANEOUS at 16:23

## 2024-06-05 RX ADMIN — BACLOFEN 10 MG: 10 TABLET ORAL at 08:24

## 2024-06-05 RX ADMIN — INSULIN LISPRO 8 UNITS: 100 INJECTION, SOLUTION INTRAVENOUS; SUBCUTANEOUS at 08:31

## 2024-06-05 RX ADMIN — OXYCODONE HYDROCHLORIDE 5 MG: 5 TABLET ORAL at 08:30

## 2024-06-05 RX ADMIN — BACLOFEN 10 MG: 10 TABLET ORAL at 20:50

## 2024-06-05 RX ADMIN — DOLUTEGRAVIR SODIUM 50 MG: 50 TABLET, FILM COATED ORAL at 08:29

## 2024-06-05 RX ADMIN — TOBRAMYCIN AND DEXAMETHASONE 0.5 INCH: 3; 1 OINTMENT OPHTHALMIC at 12:04

## 2024-06-05 RX ADMIN — AMLODIPINE BESYLATE 5 MG: 5 TABLET ORAL at 08:24

## 2024-06-05 RX ADMIN — OXYCODONE HYDROCHLORIDE 5 MG: 5 TABLET ORAL at 15:33

## 2024-06-05 RX ADMIN — DOCUSATE SODIUM 100 MG: 50 LIQUID ORAL at 08:24

## 2024-06-05 RX ADMIN — APIXABAN 5 MG: 5 TABLET, FILM COATED ORAL at 20:50

## 2024-06-05 RX ADMIN — LEVOTHYROXINE SODIUM 175 MCG: 125 TABLET ORAL at 06:22

## 2024-06-05 RX ADMIN — EMTRICITABINE AND TENOFOVIR ALAFENAMIDE 1 TABLET: 200; 25 TABLET ORAL at 08:24

## 2024-06-05 RX ADMIN — LAMOTRIGINE 25 MG: 25 TABLET ORAL at 21:02

## 2024-06-05 RX ADMIN — TOBRAMYCIN AND DEXAMETHASONE 0.5 INCH: 3; 1 OINTMENT OPHTHALMIC at 21:10

## 2024-06-05 RX ADMIN — INSULIN LISPRO 8 UNITS: 100 INJECTION, SOLUTION INTRAVENOUS; SUBCUTANEOUS at 12:31

## 2024-06-05 RX ADMIN — OXYCODONE HYDROCHLORIDE 5 MG: 5 TABLET ORAL at 20:50

## 2024-06-05 RX ADMIN — TOBRAMYCIN AND DEXAMETHASONE 0.5 INCH: 3; 1 OINTMENT OPHTHALMIC at 15:34

## 2024-06-05 RX ADMIN — FUROSEMIDE 20 MG: 20 TABLET ORAL at 08:24

## 2024-06-05 ASSESSMENT — COGNITIVE AND FUNCTIONAL STATUS - GENERAL
MOBILITY SCORE: 6
MOVING TO AND FROM BED TO CHAIR: TOTAL
DAILY ACTIVITIY SCORE: 6
STANDING UP FROM CHAIR USING ARMS: TOTAL
PERSONAL GROOMING: TOTAL
DRESSING REGULAR UPPER BODY CLOTHING: TOTAL
DRESSING REGULAR LOWER BODY CLOTHING: TOTAL
MOVING TO AND FROM BED TO CHAIR: TOTAL
EATING MEALS: TOTAL
TURNING FROM BACK TO SIDE WHILE IN FLAT BAD: TOTAL
WALKING IN HOSPITAL ROOM: TOTAL
HELP NEEDED FOR BATHING: TOTAL
CLIMB 3 TO 5 STEPS WITH RAILING: TOTAL
DRESSING REGULAR LOWER BODY CLOTHING: TOTAL
TURNING FROM BACK TO SIDE WHILE IN FLAT BAD: TOTAL
PERSONAL GROOMING: TOTAL
MOVING FROM LYING ON BACK TO SITTING ON SIDE OF FLAT BED WITH BEDRAILS: TOTAL
HELP NEEDED FOR BATHING: TOTAL
MOVING FROM LYING ON BACK TO SITTING ON SIDE OF FLAT BED WITH BEDRAILS: TOTAL
TOILETING: TOTAL
DAILY ACTIVITIY SCORE: 6
CLIMB 3 TO 5 STEPS WITH RAILING: TOTAL
MOBILITY SCORE: 6
EATING MEALS: TOTAL
STANDING UP FROM CHAIR USING ARMS: TOTAL
DRESSING REGULAR UPPER BODY CLOTHING: TOTAL
WALKING IN HOSPITAL ROOM: TOTAL
TOILETING: TOTAL

## 2024-06-05 ASSESSMENT — PAIN SCALES - PAIN ASSESSMENT IN ADVANCED DEMENTIA (PAINAD)
CONSOLABILITY: NO NEED TO CONSOLE
TOTALSCORE: 0
FACIALEXPRESSION: SMILING OR INEXPRESSIVE
BREATHING: NORMAL
BODYLANGUAGE: RELAXED

## 2024-06-05 ASSESSMENT — PAIN - FUNCTIONAL ASSESSMENT
PAIN_FUNCTIONAL_ASSESSMENT: FLACC (FACE, LEGS, ACTIVITY, CRY, CONSOLABILITY)
PAIN_FUNCTIONAL_ASSESSMENT: WONG-BAKER FACES
PAIN_FUNCTIONAL_ASSESSMENT: WONG-BAKER FACES
PAIN_FUNCTIONAL_ASSESSMENT: FLACC (FACE, LEGS, ACTIVITY, CRY, CONSOLABILITY)
PAIN_FUNCTIONAL_ASSESSMENT: FLACC (FACE, LEGS, ACTIVITY, CRY, CONSOLABILITY)

## 2024-06-05 ASSESSMENT — PAIN SCALES - GENERAL
PAINLEVEL_OUTOF10: 2

## 2024-06-05 ASSESSMENT — PAIN SCALES - WONG BAKER
WONGBAKER_NUMERICALRESPONSE: NO HURT
WONGBAKER_NUMERICALRESPONSE: NO HURT

## 2024-06-05 NOTE — PROGRESS NOTES
Vancomycin Dosing by Pharmacy- FOLLOW UP    Janette Martinez is a 53 y.o. year old female who Pharmacy has been consulted for vancomycin dosing for line infections. Based on the patient's indication and renal status this patient is being dosed based on a goal AUC of 500-600.     Renal function is currently stable.    Most recent random level: 23.1 mcg/mL    Visit Vitals  /70 (BP Location: Left arm, Patient Position: Lying)   Pulse 82   Temp 36.8 °C (98.2 °F) (Temporal)   Resp 18        Lab Results   Component Value Date    CREATININE 1.52 (H) 2024    CREATININE 1.42 (H) 2024    CREATININE 1.55 (H) 2024    CREATININE 1.52 (H) 2024        Patient weight is as follows:   Vitals:    24 1806   Weight: 86.2 kg (190 lb)       Cultures:  Susceptibility data for the encounter in last 14 days.  Collected Specimen Info Organism   24 Swab from Anterior Nares Methicillin Resistant Staphylococcus aureus (MRSA)        I/O last 3 completed shifts:  In: 496.9 (5.8 mL/kg) [Blood:416.9; NG/GT:80]  Out: 975 (11.3 mL/kg) [Urine:975 (0.3 mL/kg/hr)]  Weight: 86.2 kg   I/O during current shift:  No intake/output data recorded.    Temp (24hrs), Av.6 °C (97.9 °F), Min:36.6 °C (97.8 °F), Max:36.8 °C (98.2 °F)      Assessment/Plan    No doses will be given today. The next level will be obtained on  at 500. May be obtained sooner if clinically indicated.   Will continue to monitor renal function daily while on vancomycin and order serum creatinine at least every 48 hours if not already ordered.  Follow for continued vancomycin needs, clinical response, and signs/symptoms of toxicity.       Mattie Hodge, PharmD

## 2024-06-05 NOTE — CARE PLAN
Problem: Pain  Goal: My pain/discomfort is manageable  Outcome: Progressing     Problem: Safety  Goal: Patient will be injury free during hospitalization  Outcome: Progressing  Goal: I will remain free of falls  Outcome: Progressing     Problem: Daily Care  Goal: Daily care needs are met  Outcome: Progressing     Problem: Psychosocial Needs  Goal: Demonstrates ability to cope with hospitalization/illness  Outcome: Progressing  Goal: Collaborate with me, my family, and caregiver to identify my specific goals  Outcome: Progressing     Problem: Discharge Barriers  Goal: My discharge needs are met  Outcome: Progressing     Problem: Skin  Goal: Decreased wound size/increased tissue granulation at next dressing change  Outcome: Progressing  Goal: Participates in plan/prevention/treatment measures  Outcome: Progressing  Goal: Prevent/manage excess moisture  Outcome: Progressing  Goal: Prevent/minimize sheer/friction injuries  Outcome: Progressing  Goal: Promote/optimize nutrition  Outcome: Progressing  Goal: Promote skin healing  Outcome: Progressing   The patient's goals for the shift include sleep    The clinical goals for the shift include safety, HD stability    Over the shift, the patient did not make progress toward the following goals. Barriers to progression include . Recommendations to address these barriers include .

## 2024-06-05 NOTE — PROGRESS NOTES
Janette Martinez is a 53 y.o. female on day 4 of admission presenting with Sepsis without acute organ dysfunction, due to unspecified organism (Multi).      Subjective   HPI: This is a 53 y.o. female who is from a nursing home, was transferred here because of altered mental state.  Patient unable to offer any history, history from reports and nursing home.  Patient is usually awake enough to whisper when asked questions, was unable to respond, wakes up when called, hence was sent to the ER for eval.  Patient found to have very high WBC with findings suggestive of a pneumonia and hence admitted for the same.  Recently patient had a PEG placed as her swallowing was compromised.  She has been on continuous PEG feeds at the nursing home.     # Patient seen at bedside.   # Events from the last visit reviewed.   # Discussed with staff.   # Results of tests and investigations from last visit reviewed and discussed with patient/Family.  #  Electronic chart reviewed.   # Input / Recommendations  from other care providers appreciated and reviewed.        Objective     Last Recorded Vitals  /77 (BP Location: Left arm, Patient Position: Lying)   Pulse 82   Temp 36.5 °C (97.7 °F) (Temporal)   Resp 18   Wt 86.2 kg (190 lb)   SpO2 97%   Intake/Output last 3 Shifts:    Intake/Output Summary (Last 24 hours) at 6/5/2024 0909  Last data filed at 6/5/2024 0740  Gross per 24 hour   Intake 80 ml   Output 450 ml   Net -370 ml       Admission Weight  Weight: 86.2 kg (190 lb) (05/31/24 1806)    Daily Weight  05/31/24 : 86.2 kg (190 lb)    Image Results  ECG 12 lead  Sinus tachycardia  Nonspecific T wave abnormality  Abnormal ECG  When compared with ECG of 09-MAY-2024 15:28,  Questionable change in QRS axis      Physical Exam  GENERAL: Lethargic, wakes up when called  SKIN: Skin turgor normal. No rashes  HEENT: no epistaxis, Moist mucosa.  NECK: supple  BACK: spine nontender to palpation, No CVAT.  LUNGS: Vesicular breath sounds,  with no wheeze, no crepitations.   CARDIAC: REGULAR. S1 and S2; no rubs, no murmur  ABDOMEN: Abdomen soft, non-tender. BS+  EXTREMITIES: No edema, Good capillary refill.   NEURO: Lethargic unable to examine    MUSCULOSKELETAL: No acute inflammation       Relevant Results    Results for orders placed or performed during the hospital encounter of 05/31/24 (from the past 24 hour(s))   POCT GLUCOSE   Result Value Ref Range    POCT Glucose 261 (H) 74 - 99 mg/dL   POCT GLUCOSE   Result Value Ref Range    POCT Glucose 216 (H) 74 - 99 mg/dL   POCT GLUCOSE   Result Value Ref Range    POCT Glucose 212 (H) 74 - 99 mg/dL   CBC   Result Value Ref Range    WBC 33.3 (H) 4.4 - 11.3 x10*3/uL    nRBC 0.1 (H) 0.0 - 0.0 /100 WBCs    RBC 2.69 (L) 4.00 - 5.20 x10*6/uL    Hemoglobin 8.3 (L) 12.0 - 16.0 g/dL    Hematocrit 25.1 (L) 36.0 - 46.0 %    MCV 93 80 - 100 fL    MCH 30.9 26.0 - 34.0 pg    MCHC 33.1 32.0 - 36.0 g/dL    RDW 17.1 (H) 11.5 - 14.5 %    Platelets 282 150 - 450 x10*3/uL   Basic metabolic panel   Result Value Ref Range    Glucose 348 (H) 74 - 99 mg/dL    Sodium 142 136 - 145 mmol/L    Potassium 3.7 3.5 - 5.3 mmol/L    Chloride 110 (H) 98 - 107 mmol/L    Bicarbonate 26 21 - 32 mmol/L    Anion Gap 10 10 - 20 mmol/L    Urea Nitrogen 31 (H) 6 - 23 mg/dL    Creatinine 1.52 (H) 0.50 - 1.05 mg/dL    eGFR 40 (L) >60 mL/min/1.73m*2    Calcium 7.8 (L) 8.6 - 10.3 mg/dL   Vancomycin   Result Value Ref Range    Vancomycin 23.1 (H) 5.0 - 20.0 ug/mL   POCT GLUCOSE   Result Value Ref Range    POCT Glucose 311 (H) 74 - 99 mg/dL       Scheduled medications  amLODIPine, 5 mg, oral, Daily  apixaban, 5 mg, oral, BID  baclofen, 10 mg, oral, BID  docusate sodium, 100 mg, oral, Daily  dolutegravir, 50 mg, oral, Daily  emtricitabine-tenofovir alafen, 1 tablet, oral, Daily  furosemide, 20 mg, oral, Daily  insulin lispro, 0-10 Units, subcutaneous, TID  lamoTRIgine, 25 mg, oral, q PM  levothyroxine, 175 mcg, oral, Daily before  breakfast  metroNIDAZOLE, 500 mg, oral, Daily  oxyCODONE, 5 mg, g-tube, q8h  oxygen, , inhalation, Continuous - Inhalation  sodium hypochlorite, , irrigation, Daily      Continuous medications  sodium chloride 0.9%, 75 mL/hr, Last Rate: 75 mL/hr (06/03/24 1623)      PRN medications  PRN medications: acetaminophen, ALPRAZolam, alteplase, alum-mag hydroxide-simeth, bisacodyl, dextromethorphan-guaifenesin, dextrose, dextrose, diphenhydrAMINE, glucagon, glucagon, guaiFENesin, morphine, ondansetron, vancomycin      Assessment/Plan        # Encephalopathy  -Secondary to pneumonia     # Pneumonia  -elevated WBC  -Probably due to aspiration  -Recent PEG placement     # Dysphagia/CVA  -S/p PEG placement recently     # Old right hemiparesis/dysarthria     # Sacral decubitus  -Wound care consulted     HIV:  Hx graves s/p thyroidectomy   Post surgical hypothyroid  HTN  HLP  T2DM       6/2-discussed with patient's sister she Josh Martinez yesterday ,who is the POA about patient's poor prognosis, explained the reason for her present condition and the very likely poor prognosis, she agrees to go with hospice, she had already planned to sign up with hospice while she was at [facility], consulted palliative care, will consult hospice, patient has a hemoglobin of 6.4, transfusion will not affect outcome.  Patient continues to have encephalopathy, on IV antibiotics for pneumonia, continue insulin sliding scale, on D5 normal saline at 75 mL an hour.  Will await hospice and palliative care input.  Patient DNR.    6/3 : Pt still encephalopathic, barely responding, d/w palliative care . D/W Sister / POA - agrees with Hospice consult. Prefers blood transfusion - consent obtained by phone - will order 1 unit PRBC transfusion    6/4 : Pt s/p transfusion - H&H improved , no obvious signs of bleeding, cont IV fluids, restarting PEG feeds at 1/2 the rate as there is suspicion of aspiration - surgery consulted for sacral  wound, palliative care input appreciated. Cont IV abx for pneumonia. Replace Potassium.    6/5-patient opens eyes when called, discussed with palliative care, await surgery input on sacral decub, on PEG feeds at a low rate, continue IV antibiotics, will start planning discharge back to nursing home after surgery input.    Radha Ward MD

## 2024-06-05 NOTE — PROGRESS NOTES
06/05/24 1450   Discharge Planning   Patient expects to be discharged to: [facility]- return with [facility] meeting, monitor surgery plan, let [facility] and [facility] know final plan via careport

## 2024-06-05 NOTE — PROGRESS NOTES
"Janette Martinez is a 53 y.o. female on day 4 of admission presenting with Sepsis without acute organ dysfunction, due to unspecified organism (Multi).  Patient transfused earlier in the week and Hgb stable. Wound care recommending surgical consult for sacral wound debridement. Eventual plan is for patient to discharge back to facility with hospice.   Scheduled oxycodone started yesterday for wound pain.  Patient seems more alert today, hums with nods yes/no in attempt to vocalize.  Seems to confirm she is still having pain in sacrum due to wound.  Also noted today right eye is very red, she nods that it is painful.  No other reports of sx    Subjective   Symptoms (0 - 10, Best to Worst)  [place] Symptom Assessment System  Pain Score: 0 - No pain          Objective     Physical Exam  Constitutional:       Appearance: She is obese.   HENT:      Head: Normocephalic.      Nose: Nose normal.      Mouth/Throat:      Mouth: Mucous membranes are moist.      Pharynx: Oropharynx is clear.   Eyes:      General:         Right eye: Discharge present.      Comments: Right eye with redness, crusting, left eye WNL   Cardiovascular:      Rate and Rhythm: Regular rhythm.      Heart sounds: Normal heart sounds.   Pulmonary:      Breath sounds: Normal breath sounds.   Abdominal:      General: Abdomen is flat. Bowel sounds are normal.      Palpations: Abdomen is soft.   Genitourinary:     Comments: deferred  Musculoskeletal:         General: No deformity or signs of injury.      Cervical back: Normal range of motion.   Skin:     General: Skin is warm and dry.   Neurological:      Mental Status: She is alert. Mental status is at baseline.   Psychiatric:         Mood and Affect: Mood normal.         Last Recorded Vitals  Blood pressure 128/77, pulse 82, temperature 36.5 °C (97.7 °F), temperature source Temporal, resp. rate 18, height 1.727 m (5' 8\"), weight 86.2 kg (190 lb), SpO2 97%.  Intake/Output last 3 Shifts:  I/O last 3 completed " shifts:  In: 496.9 (5.8 mL/kg) [Blood:416.9; NG/GT:80]  Out: 975 (11.3 mL/kg) [Urine:975 (0.3 mL/kg/hr)]  Weight: 86.2 kg     Relevant Results  No recent imaging  Results for orders placed or performed during the hospital encounter of 05/31/24 (from the past 24 hour(s))   POCT GLUCOSE   Result Value Ref Range    POCT Glucose 261 (H) 74 - 99 mg/dL   POCT GLUCOSE   Result Value Ref Range    POCT Glucose 216 (H) 74 - 99 mg/dL   POCT GLUCOSE   Result Value Ref Range    POCT Glucose 212 (H) 74 - 99 mg/dL   CBC   Result Value Ref Range    WBC 33.3 (H) 4.4 - 11.3 x10*3/uL    nRBC 0.1 (H) 0.0 - 0.0 /100 WBCs    RBC 2.69 (L) 4.00 - 5.20 x10*6/uL    Hemoglobin 8.3 (L) 12.0 - 16.0 g/dL    Hematocrit 25.1 (L) 36.0 - 46.0 %    MCV 93 80 - 100 fL    MCH 30.9 26.0 - 34.0 pg    MCHC 33.1 32.0 - 36.0 g/dL    RDW 17.1 (H) 11.5 - 14.5 %    Platelets 282 150 - 450 x10*3/uL   Basic metabolic panel   Result Value Ref Range    Glucose 348 (H) 74 - 99 mg/dL    Sodium 142 136 - 145 mmol/L    Potassium 3.7 3.5 - 5.3 mmol/L    Chloride 110 (H) 98 - 107 mmol/L    Bicarbonate 26 21 - 32 mmol/L    Anion Gap 10 10 - 20 mmol/L    Urea Nitrogen 31 (H) 6 - 23 mg/dL    Creatinine 1.52 (H) 0.50 - 1.05 mg/dL    eGFR 40 (L) >60 mL/min/1.73m*2    Calcium 7.8 (L) 8.6 - 10.3 mg/dL   Vancomycin   Result Value Ref Range    Vancomycin 23.1 (H) 5.0 - 20.0 ug/mL   POCT GLUCOSE   Result Value Ref Range    POCT Glucose 311 (H) 74 - 99 mg/dL    Scheduled medications  amLODIPine, 5 mg, oral, Daily  apixaban, 5 mg, oral, BID  baclofen, 10 mg, oral, BID  docusate sodium, 100 mg, oral, Daily  dolutegravir, 50 mg, oral, Daily  emtricitabine-tenofovir alafen, 1 tablet, oral, Daily  furosemide, 20 mg, oral, Daily  insulin lispro, 0-10 Units, subcutaneous, TID  lamoTRIgine, 25 mg, oral, q PM  levothyroxine, 175 mcg, oral, Daily before breakfast  metroNIDAZOLE, 500 mg, oral, Daily  oxyCODONE, 5 mg, g-tube, q8h  oxygen, , inhalation, Continuous - Inhalation  sodium  hypochlorite, , irrigation, Daily      Continuous medications  sodium chloride 0.9%, 75 mL/hr, Last Rate: 75 mL/hr (06/03/24 1623)      PRN medications  PRN medications: acetaminophen, ALPRAZolam, alteplase, alum-mag hydroxide-simeth, bisacodyl, dextromethorphan-guaifenesin, dextrose, dextrose, diphenhydrAMINE, glucagon, glucagon, guaiFENesin, morphine, ondansetron, vancomycin          Assessment/Plan      53 year old woman with recent CVA with deficits, new PEG, recent PNA is here with PNA, sepsis, sacral wound, and anemia.  Severe anemia, likely related to metabolic stressors, no acute bleeding.  Family consented for blood transfusions and she has been transfused. ID following for sepsis.  WBC remains very elevated despite ATB     Palliative goals:  already DNR/DNI.  Care transition team has already spoken with sister who wants hospice to start after ATB completed  -appropriate for hospice care under end stage CVA  -formal order placed for hospice  -will place OH Portable form at bedside for transport    Conjunctivitis right eye due:  mechanical due to lid lag from CVA  -adding tobradex ointment TID x 5 days  -suggest addition of lacrilube at discharge once Tobradex completed     Unstageable sacral wound with constant pain and odor, worse with changes.  Unclear if patient can heal wound.  Some improvement with TID dosing but still having pain, subjective along with moaning and tachycardia  -encourage use of IV morphine prior to dressing changes  -will scheduled oxycodone 5 mg but change from TID to QID  -surgery has been consulted for debridement   -continue crushed flagyl 500 mg tablet to wound bed  with dressing changes for odor control     Palliative care will follow for sx burden, change in status etc.     Thank you for the consult.    Mihaela Little, APRN-CNP

## 2024-06-05 NOTE — PROGRESS NOTES
"  INFECTIOUS DISEASE DAILY PROGRESS NOTE    SUBJECTIVE:    No overnight events. Afebrile. No rash/itching/diarrhea.    OBJECTIVE:  VITALS (Last 24 Hours)  /77 (BP Location: Left arm, Patient Position: Lying)   Pulse 82   Temp 36.5 °C (97.7 °F) (Temporal)   Resp 18   Ht 1.727 m (5' 8\")   Wt 86.2 kg (190 lb)   SpO2 97%   BMI 28.89 kg/m²     PHYSICAL EXAM:  Gen - lethargic, in bed  Lungs - no wh  Abd - no apparent ttp, BS present, PEG tube present  Skin - no rash    ABX: IV Vanc    LABS:  Lab Results   Component Value Date    WBC 33.3 (H) 06/05/2024    HGB 8.3 (L) 06/05/2024    HCT 25.1 (L) 06/05/2024    MCV 93 06/05/2024     06/05/2024     Lab Results   Component Value Date    GLUCOSE 348 (H) 06/05/2024    CALCIUM 7.8 (L) 06/05/2024     06/05/2024    K 3.7 06/05/2024    CO2 26 06/05/2024     (H) 06/05/2024    BUN 31 (H) 06/05/2024    CREATININE 1.52 (H) 06/05/2024           Estimated Creatinine Clearance: 48.1 mL/min (A) (by C-G formula based on SCr of 1.52 mg/dL (H)).      ASSESSMENT/PLAN:    Recent MRSA Bacteremia - previous plan was IV Dapto through 6/10/24  Recurrent Aspiration PNA  HIV - well controlled, on Tivicay/Descovy    Plan will be to continue on IV Vancomycin through 6/10/24 to complete treatment for prior bacteremia. Family would like this prior to moving patient to hospice.     I don't believe that completing this abx course will result in any meaningful clinical change in her status. Hospice will still be appropriate.    Monitoring for adverse effects of abx such as rash/itching/diarrhea.    Will sign off. Please call back with questions. Thanks!    Sadiq Mayo MD  [facility]  Office #[phone]      "

## 2024-06-06 LAB
ANION GAP SERPL CALC-SCNC: 12 MMOL/L (ref 10–20)
BUN SERPL-MCNC: 31 MG/DL (ref 6–23)
CALCIUM SERPL-MCNC: 7.7 MG/DL (ref 8.6–10.3)
CHLORIDE SERPL-SCNC: 111 MMOL/L (ref 98–107)
CO2 SERPL-SCNC: 23 MMOL/L (ref 21–32)
CREAT SERPL-MCNC: 1.46 MG/DL (ref 0.5–1.05)
EGFRCR SERPLBLD CKD-EPI 2021: 42 ML/MIN/1.73M*2
ERYTHROCYTE [DISTWIDTH] IN BLOOD BY AUTOMATED COUNT: 17.2 % (ref 11.5–14.5)
GLUCOSE BLD MANUAL STRIP-MCNC: 298 MG/DL (ref 74–99)
GLUCOSE BLD MANUAL STRIP-MCNC: 320 MG/DL (ref 74–99)
GLUCOSE BLD MANUAL STRIP-MCNC: 346 MG/DL (ref 74–99)
GLUCOSE BLD MANUAL STRIP-MCNC: 356 MG/DL (ref 74–99)
GLUCOSE BLD MANUAL STRIP-MCNC: 377 MG/DL (ref 74–99)
GLUCOSE SERPL-MCNC: 429 MG/DL (ref 74–99)
HCT VFR BLD AUTO: 23.9 % (ref 36–46)
HGB BLD-MCNC: 7.9 G/DL (ref 12–16)
MCH RBC QN AUTO: 30.7 PG (ref 26–34)
MCHC RBC AUTO-ENTMCNC: 33.1 G/DL (ref 32–36)
MCV RBC AUTO: 93 FL (ref 80–100)
NRBC BLD-RTO: 0.1 /100 WBCS (ref 0–0)
PLATELET # BLD AUTO: 311 X10*3/UL (ref 150–450)
POTASSIUM SERPL-SCNC: 4 MMOL/L (ref 3.5–5.3)
RBC # BLD AUTO: 2.57 X10*6/UL (ref 4–5.2)
SODIUM SERPL-SCNC: 142 MMOL/L (ref 136–145)
VANCOMYCIN SERPL-MCNC: 13.8 UG/ML (ref 5–20)
WBC # BLD AUTO: 35.4 X10*3/UL (ref 4.4–11.3)

## 2024-06-06 PROCEDURE — 85027 COMPLETE CBC AUTOMATED: CPT | Performed by: INTERNAL MEDICINE

## 2024-06-06 PROCEDURE — 2500000002 HC RX 250 W HCPCS SELF ADMINISTERED DRUGS (ALT 637 FOR MEDICARE OP, ALT 636 FOR OP/ED): Performed by: INTERNAL MEDICINE

## 2024-06-06 PROCEDURE — 1200000002 HC GENERAL ROOM WITH TELEMETRY DAILY

## 2024-06-06 PROCEDURE — 82947 ASSAY GLUCOSE BLOOD QUANT: CPT | Mod: 91

## 2024-06-06 PROCEDURE — 80202 ASSAY OF VANCOMYCIN: CPT | Performed by: PHARMACIST

## 2024-06-06 PROCEDURE — 2500000004 HC RX 250 GENERAL PHARMACY W/ HCPCS (ALT 636 FOR OP/ED): Performed by: INTERNAL MEDICINE

## 2024-06-06 PROCEDURE — 80048 BASIC METABOLIC PNL TOTAL CA: CPT | Performed by: INTERNAL MEDICINE

## 2024-06-06 PROCEDURE — 2500000004 HC RX 250 GENERAL PHARMACY W/ HCPCS (ALT 636 FOR OP/ED): Performed by: PHARMACIST

## 2024-06-06 PROCEDURE — 99232 SBSQ HOSP IP/OBS MODERATE 35: CPT | Performed by: NURSE PRACTITIONER

## 2024-06-06 PROCEDURE — 2500000001 HC RX 250 WO HCPCS SELF ADMINISTERED DRUGS (ALT 637 FOR MEDICARE OP): Performed by: NURSE PRACTITIONER

## 2024-06-06 PROCEDURE — 2500000001 HC RX 250 WO HCPCS SELF ADMINISTERED DRUGS (ALT 637 FOR MEDICARE OP): Performed by: INTERNAL MEDICINE

## 2024-06-06 RX ORDER — INSULIN GLARGINE 100 [IU]/ML
30 INJECTION, SOLUTION SUBCUTANEOUS EVERY 24 HOURS
Status: DISCONTINUED | OUTPATIENT
Start: 2024-06-06 | End: 2024-06-11 | Stop reason: HOSPADM

## 2024-06-06 RX ORDER — VANCOMYCIN HYDROCHLORIDE 500 MG/100ML
500 INJECTION, SOLUTION INTRAVENOUS ONCE
Status: COMPLETED | OUTPATIENT
Start: 2024-06-06 | End: 2024-06-06

## 2024-06-06 RX ADMIN — TOBRAMYCIN AND DEXAMETHASONE 0.5 INCH: 3; 1 OINTMENT OPHTHALMIC at 15:34

## 2024-06-06 RX ADMIN — LEVOTHYROXINE SODIUM 175 MCG: 125 TABLET ORAL at 07:00

## 2024-06-06 RX ADMIN — INSULIN LISPRO 8 UNITS: 100 INJECTION, SOLUTION INTRAVENOUS; SUBCUTANEOUS at 12:57

## 2024-06-06 RX ADMIN — APIXABAN 5 MG: 5 TABLET, FILM COATED ORAL at 08:13

## 2024-06-06 RX ADMIN — INSULIN LISPRO 8 UNITS: 100 INJECTION, SOLUTION INTRAVENOUS; SUBCUTANEOUS at 17:19

## 2024-06-06 RX ADMIN — VANCOMYCIN HYDROCHLORIDE 500 MG: 500 INJECTION, SOLUTION INTRAVENOUS at 11:34

## 2024-06-06 RX ADMIN — FUROSEMIDE 20 MG: 20 TABLET ORAL at 08:17

## 2024-06-06 RX ADMIN — OXYCODONE HYDROCHLORIDE 5 MG: 5 TABLET ORAL at 05:41

## 2024-06-06 RX ADMIN — DOCUSATE SODIUM 100 MG: 50 LIQUID ORAL at 08:14

## 2024-06-06 RX ADMIN — EMTRICITABINE AND TENOFOVIR ALAFENAMIDE 1 TABLET: 200; 25 TABLET ORAL at 08:14

## 2024-06-06 RX ADMIN — INSULIN GLARGINE 30 UNITS: 100 INJECTION, SOLUTION SUBCUTANEOUS at 21:55

## 2024-06-06 RX ADMIN — SODIUM CHLORIDE 75 ML/HR: 9 INJECTION, SOLUTION INTRAVENOUS at 08:23

## 2024-06-06 RX ADMIN — APIXABAN 5 MG: 5 TABLET, FILM COATED ORAL at 21:55

## 2024-06-06 RX ADMIN — OXYCODONE HYDROCHLORIDE 5 MG: 5 TABLET ORAL at 21:55

## 2024-06-06 RX ADMIN — TOBRAMYCIN AND DEXAMETHASONE 0.5 INCH: 3; 1 OINTMENT OPHTHALMIC at 21:56

## 2024-06-06 RX ADMIN — DOLUTEGRAVIR SODIUM 50 MG: 50 TABLET, FILM COATED ORAL at 08:13

## 2024-06-06 RX ADMIN — BACLOFEN 10 MG: 10 TABLET ORAL at 08:14

## 2024-06-06 RX ADMIN — INSULIN LISPRO 10 UNITS: 100 INJECTION, SOLUTION INTRAVENOUS; SUBCUTANEOUS at 08:15

## 2024-06-06 RX ADMIN — AMLODIPINE BESYLATE 5 MG: 5 TABLET ORAL at 08:13

## 2024-06-06 RX ADMIN — OXYCODONE HYDROCHLORIDE 5 MG: 5 TABLET ORAL at 15:32

## 2024-06-06 RX ADMIN — TOBRAMYCIN AND DEXAMETHASONE 0.5 INCH: 3; 1 OINTMENT OPHTHALMIC at 08:14

## 2024-06-06 RX ADMIN — LAMOTRIGINE 25 MG: 25 TABLET ORAL at 21:55

## 2024-06-06 RX ADMIN — OXYCODONE HYDROCHLORIDE 5 MG: 5 TABLET ORAL at 09:00

## 2024-06-06 RX ADMIN — METRONIDAZOLE 500 MG: 500 TABLET ORAL at 08:14

## 2024-06-06 RX ADMIN — SODIUM CHLORIDE 75 ML/HR: 9 INJECTION, SOLUTION INTRAVENOUS at 22:19

## 2024-06-06 RX ADMIN — BACLOFEN 10 MG: 10 TABLET ORAL at 21:55

## 2024-06-06 ASSESSMENT — COGNITIVE AND FUNCTIONAL STATUS - GENERAL
CLIMB 3 TO 5 STEPS WITH RAILING: TOTAL
TURNING FROM BACK TO SIDE WHILE IN FLAT BAD: TOTAL
EATING MEALS: TOTAL
MOVING FROM LYING ON BACK TO SITTING ON SIDE OF FLAT BED WITH BEDRAILS: TOTAL
DAILY ACTIVITIY SCORE: 6
HELP NEEDED FOR BATHING: TOTAL
PERSONAL GROOMING: TOTAL
DRESSING REGULAR UPPER BODY CLOTHING: TOTAL
MOBILITY SCORE: 6
WALKING IN HOSPITAL ROOM: TOTAL
TOILETING: TOTAL
DRESSING REGULAR LOWER BODY CLOTHING: TOTAL
STANDING UP FROM CHAIR USING ARMS: TOTAL
MOVING TO AND FROM BED TO CHAIR: TOTAL

## 2024-06-06 ASSESSMENT — PAIN - FUNCTIONAL ASSESSMENT
PAIN_FUNCTIONAL_ASSESSMENT: FLACC (FACE, LEGS, ACTIVITY, CRY, CONSOLABILITY)
PAIN_FUNCTIONAL_ASSESSMENT: FLACC (FACE, LEGS, ACTIVITY, CRY, CONSOLABILITY)

## 2024-06-06 NOTE — PROGRESS NOTES
Janette Martinez is a 53 y.o. female on day 5 of admission presenting with Sepsis without acute organ dysfunction, due to unspecified organism (Multi).      Subjective   HPI: This is a 53 y.o. female who is from a nursing home, was transferred here because of altered mental state.  Patient unable to offer any history, history from reports and nursing home.  Patient is usually awake enough to whisper when asked questions, was unable to respond, wakes up when called, hence was sent to the ER for eval.  Patient found to have very high WBC with findings suggestive of a pneumonia and hence admitted for the same.  Recently patient had a PEG placed as her swallowing was compromised.  She has been on continuous PEG feeds at the nursing home.     # Patient seen at bedside.   # Events from the last visit reviewed.   # Discussed with staff.   # Results of tests and investigations from last visit reviewed and discussed with patient/Family.  #  Electronic chart reviewed.   # Input / Recommendations  from other care providers appreciated and reviewed.        Objective     Last Recorded Vitals  BP (!) 171/94 (BP Location: Left arm, Patient Position: Lying)   Pulse 82   Temp 36.6 °C (97.9 °F) (Temporal)   Resp 18   Wt 86.2 kg (190 lb)   SpO2 99%   Intake/Output last 3 Shifts:    Intake/Output Summary (Last 24 hours) at 6/6/2024 0919  Last data filed at 6/6/2024 0400  Gross per 24 hour   Intake --   Output 1950 ml   Net -1950 ml       Admission Weight  Weight: 86.2 kg (190 lb) (05/31/24 1806)    Daily Weight  05/31/24 : 86.2 kg (190 lb)    Image Results  ECG 12 lead  Sinus tachycardia  Nonspecific T wave abnormality  Abnormal ECG  When compared with ECG of 09-MAY-2024 15:28,  Questionable change in QRS axis      Physical Exam  GENERAL: Lethargic, wakes up when called  SKIN: Skin turgor normal. No rashes  HEENT: no epistaxis, Moist mucosa.  NECK: supple  BACK: spine nontender to palpation, No CVAT.  LUNGS: Vesicular breath  sounds, with no wheeze, no crepitations.   CARDIAC: REGULAR. S1 and S2; no rubs, no murmur  ABDOMEN: Abdomen soft, non-tender. BS+  EXTREMITIES: No edema, Good capillary refill.   NEURO: Lethargic unable to examine    MUSCULOSKELETAL: No acute inflammation       Relevant Results    Results for orders placed or performed during the hospital encounter of 05/31/24 (from the past 24 hour(s))   POCT GLUCOSE   Result Value Ref Range    POCT Glucose 326 (H) 74 - 99 mg/dL   POCT GLUCOSE   Result Value Ref Range    POCT Glucose 319 (H) 74 - 99 mg/dL   POCT GLUCOSE   Result Value Ref Range    POCT Glucose 320 (H) 74 - 99 mg/dL   CBC   Result Value Ref Range    WBC 35.4 (H) 4.4 - 11.3 x10*3/uL    nRBC 0.1 (H) 0.0 - 0.0 /100 WBCs    RBC 2.57 (L) 4.00 - 5.20 x10*6/uL    Hemoglobin 7.9 (L) 12.0 - 16.0 g/dL    Hematocrit 23.9 (L) 36.0 - 46.0 %    MCV 93 80 - 100 fL    MCH 30.7 26.0 - 34.0 pg    MCHC 33.1 32.0 - 36.0 g/dL    RDW 17.2 (H) 11.5 - 14.5 %    Platelets 311 150 - 450 x10*3/uL   Basic metabolic panel   Result Value Ref Range    Glucose 429 (H) 74 - 99 mg/dL    Sodium 142 136 - 145 mmol/L    Potassium 4.0 3.5 - 5.3 mmol/L    Chloride 111 (H) 98 - 107 mmol/L    Bicarbonate 23 21 - 32 mmol/L    Anion Gap 12 10 - 20 mmol/L    Urea Nitrogen 31 (H) 6 - 23 mg/dL    Creatinine 1.46 (H) 0.50 - 1.05 mg/dL    eGFR 42 (L) >60 mL/min/1.73m*2    Calcium 7.7 (L) 8.6 - 10.3 mg/dL   Vancomycin   Result Value Ref Range    Vancomycin 13.8 5.0 - 20.0 ug/mL   POCT GLUCOSE   Result Value Ref Range    POCT Glucose 377 (H) 74 - 99 mg/dL       Scheduled medications  amLODIPine, 5 mg, oral, Daily  apixaban, 5 mg, oral, BID  baclofen, 10 mg, oral, BID  docusate sodium, 100 mg, oral, Daily  dolutegravir, 50 mg, oral, Daily  emtricitabine-tenofovir alafen, 1 tablet, oral, Daily  furosemide, 20 mg, oral, Daily  insulin lispro, 0-10 Units, subcutaneous, TID  lamoTRIgine, 25 mg, oral, q PM  levothyroxine, 175 mcg, oral, Daily before  breakfast  metroNIDAZOLE, 500 mg, oral, Daily  oxyCODONE, 5 mg, g-tube, q6h  oxygen, , inhalation, Continuous - Inhalation  sodium hypochlorite, , irrigation, Daily  tobramycin-dexamethasone, 0.5 inch, Right Eye, TID  vancomycin, 500 mg, intravenous, Once      Continuous medications  sodium chloride 0.9%, 75 mL/hr, Last Rate: 75 mL/hr (06/06/24 0823)      PRN medications  PRN medications: acetaminophen, ALPRAZolam, alteplase, alum-mag hydroxide-simeth, bisacodyl, dextromethorphan-guaifenesin, dextrose, dextrose, diphenhydrAMINE, glucagon, glucagon, guaiFENesin, morphine, ondansetron, vancomycin      Assessment/Plan        # Encephalopathy  -Secondary to pneumonia     # Pneumonia  -elevated WBC  -Probably due to aspiration  -Recent PEG placement     # Dysphagia/CVA  -S/p PEG placement recently     # Old right hemiparesis/dysarthria     # Sacral decubitus  -Wound care consulted     HIV:  Hx graves s/p thyroidectomy   Post surgical hypothyroid  HTN  HLP  T2DM       6/2-discussed with patient's sister she Josh Martinez yesterday ,who is the POA about patient's poor prognosis, explained the reason for her present condition and the very likely poor prognosis, she agrees to go with hospice, she had already planned to sign up with hospice while she was at [facility], consulted palliative care, will consult hospice, patient has a hemoglobin of 6.4, transfusion will not affect outcome.  Patient continues to have encephalopathy, on IV antibiotics for pneumonia, continue insulin sliding scale, on D5 normal saline at 75 mL an hour.  Will await hospice and palliative care input.  Patient DNR.    6/3 : Pt still encephalopathic, barely responding, d/w palliative care . D/W Sister / POA - agrees with Hospice consult. Prefers blood transfusion - consent obtained by phone - will order 1 unit PRBC transfusion    6/4 : Pt s/p transfusion - H&H improved , no obvious signs of bleeding, cont IV fluids, restarting PEG  feeds at 1/2 the rate as there is suspicion of aspiration - surgery consulted for sacral wound, palliative care input appreciated. Cont IV abx for pneumonia. Replace Potassium.    6/5-patient opens eyes when called, discussed with palliative care, await surgery input on sacral decub, on PEG feeds at a low rate, continue IV antibiotics, will start planning discharge back to nursing home after surgery input.    6/6 : Pt encephalopathic, will await surgery input on sacral Decub - cont PEG feeds, cont IV abx    Radha Ward MD

## 2024-06-06 NOTE — PROGRESS NOTES
Pharmacy Medication History Review    Janette Martinez is a 53 y.o. female admitted for Sepsis without acute organ dysfunction, due to unspecified organism (Multi). Pharmacy reviewed the patient's [id number] medications and allergies for accuracy.    The list below reflectives the updated PTA list. Please review each medication in order reconciliation for additional clarification and justification.  Prior to Admission Medications   Prescriptions Last Dose Informant     acetaminophen (Tylenol) 500 mg tablet       Sig: Take 2 tablets (1,000 mg) by mouth 3 times a day.   amLODIPine (Norvasc) 5 mg tablet       Sig: Take 1 tablet (5 mg) by mouth once daily.   apixaban (Eliquis) 5 mg tablet       Sig: Take 1 tablet (5 mg) by mouth 2 times a day.   baclofen (Lioresal) 10 mg tablet       Sig: Take 1 tablet (10 mg) by mouth 2 times a day.   diclofenac sodium (Voltaren) 1 % gel       Sig: Apply 4.5 inches (4 g) topically every 12 hours if needed.   docusate sodium (Colace) 100 mg capsule       Sig: Take 1 capsule (100 mg) by mouth 2 times a day.   dolutegravir (Tivicay) 50 mg tablet       Sig: Take 1 tablet (50 mg) by mouth once daily.   emtricitabine-tenofovir alafen (Descovy) 200-25 mg tablet       Sig: Take 1 tablet by mouth once daily.   furosemide (Lasix) 20 mg tablet       Sig: Take 1 tablet (20 mg) by mouth once daily.   gabapentin (Neurontin) 300 mg capsule       Sig: Take 2 capsules (600 mg) by mouth 3 times a day.   hydroCHLOROthiazide (HYDRODiuril) 25 mg tablet       Sig: Take 1 tablet (25 mg) by mouth once daily. Hold if SBP <110 or Heart rate <60   insulin glargine (Basaglar KwikPen U-100 Insulin) 100 unit/mL (3 mL) pen       Sig: Inject 43 Units under the skin once daily at bedtime. Take as directed per insulin instructions.   insulin lispro (HumaLOG) 100 unit/mL injection       Sig: Inject 0.12 mL (12 Units) under the skin once daily. At 1:30pm   ipratropium-albuteroL (Duo-Neb) 0.5-2.5 mg/3 mL nebulizer  solution       Sig: Take 3 mL by nebulization 4 times a day as needed for wheezing.   lamoTRIgine (LaMICtal) 25 mg tablet       Sig: Take 1 tablet (25 mg) by mouth once daily in the evening.   levothyroxine (Synthroid, Levoxyl) 175 mcg tablet       Sig: Take 1 tablet (175 mcg) by mouth once daily in the morning. Take before meals.   melatonin 3 mg tablet       Sig: Take 2 tablets (6 mg) by mouth once daily at bedtime.   moxifloxacin (Vigamox) 0.5 % ophthalmic solution       Sig: Administer 1 drop into the right eye 3 times a day. 6am , 2pm, 10pm   oxyCODONE (Roxicodone) 5 mg immediate release tablet       Sig: Take 1 tablet (5 mg) by mouth every 6 hours if needed for severe pain (7 - 10).   polyethylene glycol (Glycolax, Miralax) 17 gram packet       Sig: Take 17 g by mouth once daily.   potassium chloride ER (Micro-K) 10 mEq ER capsule       Sig: Take 2 capsules (20 mEq) by mouth once daily. Do not crush or chew.   sertraline (Zoloft) 100 mg tablet       Sig: Take 2 tablets (200 mg) by mouth once daily.   sodium chloride 0.9% parenteral solution 50 mL with DAPTOmycin 50 mg/mL recon soln 775 mg       Sig: Infuse 775 mg at 131 mL/hr over 30 minutes into a venous catheter once every 24 hours for 23 days. Do not fill before May 19, 2024.      Facility-Administered Medications: None      Allergies  Reviewed by Krystyna Medeiros, EMT on 5/31/2024        Severity Reactions Comments    Aspirin High Unknown, Bleeding, Itching     Iodinated Contrast Media Medium Unknown, Hives     Amoxicillin Not Specified Hives     Other Not Specified Unknown     Morphine Low Unknown, Swelling, Itching, Rash     Sulfa (sulfonamide Antibiotics) Low Unknown, Rash             Below are additional concerns with the patient's PTA list.  Patient's family verified all medications and doses.    Ro Hanson

## 2024-06-06 NOTE — CARE PLAN
Problem: Safety  Goal: Patient will be injury free during hospitalization  Outcome: Progressing     Problem: Daily Care  Goal: Daily care needs are met  Outcome: Progressing

## 2024-06-06 NOTE — PROGRESS NOTES
"Janette Martinez is a 53 y.o. female on day 5 of admission presenting with Sepsis without acute organ dysfunction, due to unspecified organism (Multi).    Subjective   Symptoms (0 - 10, Best to Worst)  [place] Symptom Assessment System  Pain Score: 2  At time of visit patient resting comfortably in bed without signs of discomfort.  No family present.  Patient opens eyes and looks at me, occasionally moans/makes sounds but does not appear to make attempt to communicate.  Patient follow commands.       Objective     Physical Exam    Constitutional:       General: Patient is not in acute distress. obese  HENT:      Head: Normocephalic.      Mouth: Mucous membranes are moist.   Eyes:      Conjunctiva/sclera: Conjunctivae clear on left, injected on right, sclerae white. No visible discharge.     Pupils: Pupils are equal, round, and reactive to light.   Neck:      Vascular: No carotid bruit.   Cardiovascular:      Rate and Rhythm: Normal rate and regular rhythm.      Heart sounds: No murmur heard.  Pulmonary:      Effort: No respiratory distress.      Breath sounds: Clear to auscultation  Abdominal:      General: There is no distension.      Tenderness: There is no abdominal tenderness. There is no guarding.   Musculoskeletal:         General: No deformity. No spontaneous movement during my visit  Skin:     Coloration: Skin is not jaundiced. Sacral wound not inspected  Neurological:      General: opens eyes which are perrla. Does not follow commands, does grimace to pain  Psychiatric:         Behavior: completely flat affect        Last Recorded Vitals  Blood pressure 162/86, pulse 82, temperature 36.5 °C (97.7 °F), temperature source Temporal, resp. rate 18, height 1.727 m (5' 8\"), weight 86.2 kg (190 lb), SpO2 99%.  Intake/Output last 3 Shifts:  I/O last 3 completed shifts:  In: - (0 mL/kg)   Out: 2400 (27.8 mL/kg) [Urine:2400 (0.8 mL/kg/hr)]  Weight: 86.2 kg     Relevant Results  Results for orders placed or performed " during the hospital encounter of 05/31/24 (from the past 24 hour(s))   POCT GLUCOSE   Result Value Ref Range    POCT Glucose 319 (H) 74 - 99 mg/dL   POCT GLUCOSE   Result Value Ref Range    POCT Glucose 320 (H) 74 - 99 mg/dL   CBC   Result Value Ref Range    WBC 35.4 (H) 4.4 - 11.3 x10*3/uL    nRBC 0.1 (H) 0.0 - 0.0 /100 WBCs    RBC 2.57 (L) 4.00 - 5.20 x10*6/uL    Hemoglobin 7.9 (L) 12.0 - 16.0 g/dL    Hematocrit 23.9 (L) 36.0 - 46.0 %    MCV 93 80 - 100 fL    MCH 30.7 26.0 - 34.0 pg    MCHC 33.1 32.0 - 36.0 g/dL    RDW 17.2 (H) 11.5 - 14.5 %    Platelets 311 150 - 450 x10*3/uL   Basic metabolic panel   Result Value Ref Range    Glucose 429 (H) 74 - 99 mg/dL    Sodium 142 136 - 145 mmol/L    Potassium 4.0 3.5 - 5.3 mmol/L    Chloride 111 (H) 98 - 107 mmol/L    Bicarbonate 23 21 - 32 mmol/L    Anion Gap 12 10 - 20 mmol/L    Urea Nitrogen 31 (H) 6 - 23 mg/dL    Creatinine 1.46 (H) 0.50 - 1.05 mg/dL    eGFR 42 (L) >60 mL/min/1.73m*2    Calcium 7.7 (L) 8.6 - 10.3 mg/dL   Vancomycin   Result Value Ref Range    Vancomycin 13.8 5.0 - 20.0 ug/mL   POCT GLUCOSE   Result Value Ref Range    POCT Glucose 377 (H) 74 - 99 mg/dL   POCT GLUCOSE   Result Value Ref Range    POCT Glucose 356 (H) 74 - 99 mg/dL         Assessment/Plan   Sepsis  - WBC worse, more anemic, BS not well controlled  CVA with residual deficits - remains disengaged  Sacral Wound - awaiting surgical input  Conjunctivitis- continue treatment  Goals/pall care    DNR CCA DNI  No capable  Family desires work up and treatment of infection.   Awaiting surgical input if needs debridement, I suspect wound will continue to get infected, unlikely to heal  Plan is to pursue comfot care when atbx thearpy finished/pt status optimized.       I spent 35 minutes in the professional and overall care of this patient.      Myra Zuniga, APRN-CNP

## 2024-06-06 NOTE — PROGRESS NOTES
Care Coordinator Note:    Plan: patient in with sepsis, aspiration PNA. ID following - IV vanco through 6/10 for prior bacteremia. Surgery consult-? Sacral wound debridement may be needed prior to dc back to LTC.     Status: inpatient  Payor: Medicare  Disposition: Re [facility] with [facility] to follow patient there. LTC is aware of this plan.   Barrier: Surgical plans  ADOD: 1-3 days pending surgery    Laury Herr [facility]      06/06/24 1055   Discharge Planning   Patient expects to be discharged to: re: [facility] with [facility] to follow patient there

## 2024-06-06 NOTE — CARE PLAN
Problem: Skin  Goal: Decreased wound size/increased tissue granulation at next dressing change  Outcome: Progressing  Flowsheets (Taken 6/3/2024 1622 by Patricia Tavera RN)  Decreased wound size/increased tissue granulation at next dressing change:   Protective dressings over bony prominences   Promote sleep for wound healing  Goal: Participates in plan/prevention/treatment measures  Outcome: Progressing  Flowsheets (Taken 6/6/2024 0009)  Participates in plan/prevention/treatment measures: Elevate heels  Goal: Prevent/manage excess moisture  Outcome: Progressing  Flowsheets (Taken 6/3/2024 1622 by Patricia Tavera RN)  Prevent/manage excess moisture:   Moisturize dry skin   Monitor for/manage infection if present   Cleanse incontinence/protect with barrier cream  Goal: Prevent/minimize sheer/friction injuries  Outcome: Progressing  Flowsheets (Taken 6/6/2024 0009)  Prevent/minimize sheer/friction injuries: Turn/reposition every 2 hours/use positioning/transfer devices  Goal: Promote/optimize nutrition  Outcome: Progressing  Flowsheets (Taken 6/6/2024 0009)  Promote/optimize nutrition: Consume > 50% meals/supplements  Goal: Promote skin healing  Outcome: Progressing  Flowsheets (Taken 6/6/2024 0009)  Promote skin healing: Turn/reposition every 2 hours/use positioning/transfer devices   The patient's goals for the shift include sleep    The clinical goals for the shift include pt safety will be maintained in duration of the shift.    Over the shift, the patient did  make progress toward the following goals.

## 2024-06-06 NOTE — PROGRESS NOTES
Vancomycin Dosing by Pharmacy- FOLLOW UP    Janette Martinez is a 53 y.o. year old female who Pharmacy has been consulted for vancomycin dosing for line infections. Based on the patient's indication and renal status this patient is being dosed based on a goal AUC of 500-600.     Renal function is currently stable.    Most recent random level: 13.8 mcg/mL    Visit Vitals  BP (!) 171/94 (BP Location: Left arm, Patient Position: Lying)   Pulse 82   Temp 36.6 °C (97.9 °F) (Temporal)   Resp 18        Lab Results   Component Value Date    CREATININE 1.46 (H) 2024    CREATININE 1.52 (H) 2024    CREATININE 1.42 (H) 2024    CREATININE 1.55 (H) 2024        Patient weight is as follows:   Vitals:    24 1806   Weight: 86.2 kg (190 lb)       Cultures:  Susceptibility data for the encounter in last 14 days.  Collected Specimen Info Organism   24 Swab from Anterior Nares Methicillin Resistant Staphylococcus aureus (MRSA)        I/O last 3 completed shifts:  In: - (0 mL/kg)   Out: 2400 (27.8 mL/kg) [Urine:2400 (0.8 mL/kg/hr)]  Weight: 86.2 kg   I/O during current shift:  No intake/output data recorded.    Temp (24hrs), Av.6 °C (97.9 °F), Min:36.4 °C (97.5 °F), Max:37 °C (98.6 °F)      Assessment/Plan    Within goal random/trough level/will give one dose of 500mg today.  The next level will be obtained on  at 0500. May be obtained sooner if clinically indicated.   Will continue to monitor renal function daily while on vancomycin and order serum creatinine at least every 48 hours if not already ordered.  Follow for continued vancomycin needs, clinical response, and signs/symptoms of toxicity.       Mattie Hodge, PharmD

## 2024-06-07 LAB
ERYTHROCYTE [DISTWIDTH] IN BLOOD BY AUTOMATED COUNT: 17.3 % (ref 11.5–14.5)
GLUCOSE BLD MANUAL STRIP-MCNC: 276 MG/DL (ref 74–99)
GLUCOSE BLD MANUAL STRIP-MCNC: 299 MG/DL (ref 74–99)
GLUCOSE BLD MANUAL STRIP-MCNC: 321 MG/DL (ref 74–99)
GLUCOSE BLD MANUAL STRIP-MCNC: 354 MG/DL (ref 74–99)
HCT VFR BLD AUTO: 23.3 % (ref 36–46)
HGB BLD-MCNC: 7.7 G/DL (ref 12–16)
HOLD SPECIMEN: NORMAL
HOLD SPECIMEN: NORMAL
MCH RBC QN AUTO: 30.3 PG (ref 26–34)
MCHC RBC AUTO-ENTMCNC: 33 G/DL (ref 32–36)
MCV RBC AUTO: 92 FL (ref 80–100)
NRBC BLD-RTO: 0.1 /100 WBCS (ref 0–0)
PLATELET # BLD AUTO: 295 X10*3/UL (ref 150–450)
RBC # BLD AUTO: 2.54 X10*6/UL (ref 4–5.2)
WBC # BLD AUTO: 37.2 X10*3/UL (ref 4.4–11.3)

## 2024-06-07 PROCEDURE — 99232 SBSQ HOSP IP/OBS MODERATE 35: CPT | Performed by: NURSE PRACTITIONER

## 2024-06-07 PROCEDURE — 2500000004 HC RX 250 GENERAL PHARMACY W/ HCPCS (ALT 636 FOR OP/ED): Performed by: INTERNAL MEDICINE

## 2024-06-07 PROCEDURE — 82947 ASSAY GLUCOSE BLOOD QUANT: CPT | Mod: 91

## 2024-06-07 PROCEDURE — 2500000002 HC RX 250 W HCPCS SELF ADMINISTERED DRUGS (ALT 637 FOR MEDICARE OP, ALT 636 FOR OP/ED): Performed by: INTERNAL MEDICINE

## 2024-06-07 PROCEDURE — 37799 UNLISTED PX VASCULAR SURGERY: CPT | Performed by: INTERNAL MEDICINE

## 2024-06-07 PROCEDURE — 2500000001 HC RX 250 WO HCPCS SELF ADMINISTERED DRUGS (ALT 637 FOR MEDICARE OP): Performed by: NURSE PRACTITIONER

## 2024-06-07 PROCEDURE — 2500000005 HC RX 250 GENERAL PHARMACY W/O HCPCS: Performed by: INTERNAL MEDICINE

## 2024-06-07 PROCEDURE — 2500000001 HC RX 250 WO HCPCS SELF ADMINISTERED DRUGS (ALT 637 FOR MEDICARE OP): Performed by: INTERNAL MEDICINE

## 2024-06-07 PROCEDURE — 85027 COMPLETE CBC AUTOMATED: CPT | Performed by: INTERNAL MEDICINE

## 2024-06-07 PROCEDURE — 1200000002 HC GENERAL ROOM WITH TELEMETRY DAILY

## 2024-06-07 RX ADMIN — SODIUM CHLORIDE 75 ML/HR: 9 INJECTION, SOLUTION INTRAVENOUS at 12:21

## 2024-06-07 RX ADMIN — BACLOFEN 10 MG: 10 TABLET ORAL at 10:26

## 2024-06-07 RX ADMIN — FUROSEMIDE 20 MG: 20 TABLET ORAL at 10:27

## 2024-06-07 RX ADMIN — INSULIN GLARGINE 30 UNITS: 100 INJECTION, SOLUTION SUBCUTANEOUS at 21:35

## 2024-06-07 RX ADMIN — APIXABAN 5 MG: 5 TABLET, FILM COATED ORAL at 10:27

## 2024-06-07 RX ADMIN — OXYCODONE HYDROCHLORIDE 5 MG: 5 TABLET ORAL at 16:54

## 2024-06-07 RX ADMIN — LAMOTRIGINE 25 MG: 25 TABLET ORAL at 21:35

## 2024-06-07 RX ADMIN — BACLOFEN 10 MG: 10 TABLET ORAL at 21:31

## 2024-06-07 RX ADMIN — METRONIDAZOLE 500 MG: 500 TABLET ORAL at 10:25

## 2024-06-07 RX ADMIN — OXYCODONE HYDROCHLORIDE 5 MG: 5 TABLET ORAL at 03:30

## 2024-06-07 RX ADMIN — TOBRAMYCIN AND DEXAMETHASONE 0.5 INCH: 3; 1 OINTMENT OPHTHALMIC at 10:29

## 2024-06-07 RX ADMIN — TOBRAMYCIN AND DEXAMETHASONE 0.5 INCH: 3; 1 OINTMENT OPHTHALMIC at 16:54

## 2024-06-07 RX ADMIN — DOCUSATE SODIUM 100 MG: 50 LIQUID ORAL at 10:28

## 2024-06-07 RX ADMIN — Medication 2 L/MIN: at 20:00

## 2024-06-07 RX ADMIN — OXYCODONE HYDROCHLORIDE 5 MG: 5 TABLET ORAL at 21:31

## 2024-06-07 RX ADMIN — TOBRAMYCIN AND DEXAMETHASONE 0.5 INCH: 3; 1 OINTMENT OPHTHALMIC at 21:32

## 2024-06-07 RX ADMIN — INSULIN LISPRO 8 UNITS: 100 INJECTION, SOLUTION INTRAVENOUS; SUBCUTANEOUS at 13:57

## 2024-06-07 RX ADMIN — AMLODIPINE BESYLATE 5 MG: 5 TABLET ORAL at 10:26

## 2024-06-07 RX ADMIN — INSULIN LISPRO 10 UNITS: 100 INJECTION, SOLUTION INTRAVENOUS; SUBCUTANEOUS at 10:30

## 2024-06-07 RX ADMIN — APIXABAN 5 MG: 5 TABLET, FILM COATED ORAL at 21:31

## 2024-06-07 RX ADMIN — LEVOTHYROXINE SODIUM 175 MCG: 125 TABLET ORAL at 10:27

## 2024-06-07 RX ADMIN — Medication 2 L/MIN: at 08:00

## 2024-06-07 RX ADMIN — DAKIN'S SOLUTION 0.125% (QUARTER STRENGTH): 0.12 SOLUTION at 10:29

## 2024-06-07 RX ADMIN — DOLUTEGRAVIR SODIUM 50 MG: 50 TABLET, FILM COATED ORAL at 10:25

## 2024-06-07 RX ADMIN — INSULIN LISPRO 6 UNITS: 100 INJECTION, SOLUTION INTRAVENOUS; SUBCUTANEOUS at 16:55

## 2024-06-07 RX ADMIN — EMTRICITABINE AND TENOFOVIR ALAFENAMIDE 1 TABLET: 200; 25 TABLET ORAL at 10:26

## 2024-06-07 RX ADMIN — OXYCODONE HYDROCHLORIDE 5 MG: 5 TABLET ORAL at 10:26

## 2024-06-07 ASSESSMENT — PAIN - FUNCTIONAL ASSESSMENT
PAIN_FUNCTIONAL_ASSESSMENT: FLACC (FACE, LEGS, ACTIVITY, CRY, CONSOLABILITY)

## 2024-06-07 ASSESSMENT — PAIN SCALES - GENERAL: PAINLEVEL_OUTOF10: 2

## 2024-06-07 NOTE — PROGRESS NOTES
Janette Martinez is a 53 y.o. female on day 6 of admission presenting with Sepsis without acute organ dysfunction, due to unspecified organism (Multi).      Subjective   HPI: This is a 53 y.o. female who is from a nursing home, was transferred here because of altered mental state.  Patient unable to offer any history, history from reports and nursing home.  Patient is usually awake enough to whisper when asked questions, was unable to respond, wakes up when called, hence was sent to the ER for eval.  Patient found to have very high WBC with findings suggestive of a pneumonia and hence admitted for the same.  Recently patient had a PEG placed as her swallowing was compromised.  She has been on continuous PEG feeds at the nursing home.     # Patient seen at bedside.   # Events from the last visit reviewed.   # Discussed with staff.   # Results of tests and investigations from last visit reviewed and discussed with patient/Family.  #  Electronic chart reviewed.   # Input / Recommendations  from other care providers appreciated and reviewed.        Objective     Last Recorded Vitals  /81 (BP Location: Left arm, Patient Position: Lying)   Pulse 104   Temp 36.6 °C (97.9 °F) (Temporal)   Resp 18   Wt 86.2 kg (190 lb)   SpO2 100%   Intake/Output last 3 Shifts:    Intake/Output Summary (Last 24 hours) at 6/7/2024 1212  Last data filed at 6/7/2024 0600  Gross per 24 hour   Intake 2176 ml   Output 2150 ml   Net 26 ml       Admission Weight  Weight: 86.2 kg (190 lb) (05/31/24 1806)    Daily Weight  05/31/24 : 86.2 kg (190 lb)    Image Results  ECG 12 lead  Sinus tachycardia  Nonspecific T wave abnormality  Abnormal ECG  When compared with ECG of 09-MAY-2024 15:28,  Questionable change in QRS axis      Physical Exam  GENERAL: Lethargic, wakes up when called  SKIN: Skin turgor normal. No rashes  HEENT: no epistaxis, Moist mucosa.  NECK: supple  BACK: spine nontender to palpation, No CVAT.  LUNGS: Vesicular breath  sounds, with no wheeze, no crepitations.   CARDIAC: REGULAR. S1 and S2; no rubs, no murmur  ABDOMEN: Abdomen soft, non-tender. BS+  EXTREMITIES: No edema, Good capillary refill.   NEURO: Lethargic unable to examine    MUSCULOSKELETAL: No acute inflammation       Relevant Results    Results for orders placed or performed during the hospital encounter of 05/31/24 (from the past 24 hour(s))   POCT GLUCOSE   Result Value Ref Range    POCT Glucose 356 (H) 74 - 99 mg/dL   POCT GLUCOSE   Result Value Ref Range    POCT Glucose 346 (H) 74 - 99 mg/dL   POCT GLUCOSE   Result Value Ref Range    POCT Glucose 298 (H) 74 - 99 mg/dL   CBC   Result Value Ref Range    WBC 37.2 (H) 4.4 - 11.3 x10*3/uL    nRBC 0.1 (H) 0.0 - 0.0 /100 WBCs    RBC 2.54 (L) 4.00 - 5.20 x10*6/uL    Hemoglobin 7.7 (L) 12.0 - 16.0 g/dL    Hematocrit 23.3 (L) 36.0 - 46.0 %    MCV 92 80 - 100 fL    MCH 30.3 26.0 - 34.0 pg    MCHC 33.0 32.0 - 36.0 g/dL    RDW 17.3 (H) 11.5 - 14.5 %    Platelets 295 150 - 450 x10*3/uL   Green Top   Result Value Ref Range    Extra Tube Hold for add-ons.    SST TOP   Result Value Ref Range    Extra Tube Hold for add-ons.    POCT GLUCOSE   Result Value Ref Range    POCT Glucose 354 (H) 74 - 99 mg/dL       Scheduled medications  amLODIPine, 5 mg, oral, Daily  apixaban, 5 mg, oral, BID  baclofen, 10 mg, oral, BID  docusate sodium, 100 mg, oral, Daily  dolutegravir, 50 mg, oral, Daily  emtricitabine-tenofovir alafen, 1 tablet, oral, Daily  furosemide, 20 mg, oral, Daily  insulin glargine, 30 Units, subcutaneous, q24h  insulin lispro, 0-10 Units, subcutaneous, TID  lamoTRIgine, 25 mg, oral, q PM  levothyroxine, 175 mcg, oral, Daily before breakfast  metroNIDAZOLE, 500 mg, oral, Daily  oxyCODONE, 5 mg, g-tube, q6h  oxygen, , inhalation, Continuous - Inhalation  sodium hypochlorite, , irrigation, Daily  tobramycin-dexamethasone, 0.5 inch, Right Eye, TID      Continuous medications  sodium chloride 0.9%, 75 mL/hr, Last Rate: 75 mL/hr  (06/06/24 7073)      PRN medications  PRN medications: acetaminophen, ALPRAZolam, alteplase, alum-mag hydroxide-simeth, bisacodyl, dextromethorphan-guaifenesin, dextrose, dextrose, diphenhydrAMINE, glucagon, glucagon, guaiFENesin, morphine, ondansetron, vancomycin      Assessment/Plan        # Encephalopathy  -Secondary to pneumonia     # Pneumonia  -elevated WBC  -Probably due to aspiration  -Recent PEG placement     # Dysphagia/CVA  -S/p PEG placement recently     # Old right hemiparesis/dysarthria     # Sacral decubitus  -Wound care consulted     HIV:  Hx graves s/p thyroidectomy   Post surgical hypothyroid  HTN  HLP  T2DM       6/2-discussed with patient's sister she Josh Martinez yesterday ,who is the POA about patient's poor prognosis, explained the reason for her present condition and the very likely poor prognosis, she agrees to go with hospice, she had already planned to sign up with hospice while she was at [facility], consulted palliative care, will consult hospice, patient has a hemoglobin of 6.4, transfusion will not affect outcome.  Patient continues to have encephalopathy, on IV antibiotics for pneumonia, continue insulin sliding scale, on D5 normal saline at 75 mL an hour.  Will await hospice and palliative care input.  Patient DNR.    6/3 : Pt still encephalopathic, barely responding, d/w palliative care . D/W Sister / POA - agrees with Hospice consult. Prefers blood transfusion - consent obtained by phone - will order 1 unit PRBC transfusion    6/4 : Pt s/p transfusion - H&H improved , no obvious signs of bleeding, cont IV fluids, restarting PEG feeds at 1/2 the rate as there is suspicion of aspiration - surgery consulted for sacral wound, palliative care input appreciated. Cont IV abx for pneumonia. Replace Potassium.    6/5-patient opens eyes when called, discussed with palliative care, await surgery input on sacral decub, on PEG feeds at a low rate, continue IV antibiotics,  will start planning discharge back to nursing home after surgery input.    6/6 : Pt encephalopathic, will await surgery input on sacral Decub - cont PEG feeds, cont IV abx    6/7 : Pt encephalopathic some improvement - nods to questions , has been waiting for surgery consult for sacral decub - will need input before DC planning - she is not under hospice yet.    Radha Ward MD

## 2024-06-07 NOTE — PROGRESS NOTES
"Janette Martinez is a 53 y.o. female on day 6 of admission presenting with Sepsis without acute organ dysfunction, due to unspecified organism (Multi).    Subjective   Symptoms (0 - 10, Best to Worst)  [place] Symptom Assessment System  Pain Score: 2  Patient resting sleeping with 1 I have open grimaces when I try to close it and opens eyes back to senior care.  Otherwise does not try to communicate with me and appears calm and comfortable.  No issues reported overnight.         Objective     Physical Exam  Constitutional:       General: Patient is not in acute distress. obese  HENT:      Head: Normocephalic.      Mouth: Mucous membranes are moist.   Eyes:      Conjunctiva/sclera: Conjunctivae clear on left, injected on right which is half open while she is sleeping, sclerae white. No visible discharge.     Pupils: Pupils are equal, round, and reactive to light.   Neck:      Vascular: No carotid bruit.   Cardiovascular:      Rate and Rhythm: Normal rate and regular rhythm.      Heart sounds: No murmur heard.  Pulmonary:      Effort: No respiratory distress.      Breath sounds: Clear to auscultation  Abdominal:      General: There is no distension.      Tenderness: There is no abdominal tenderness. There is no guarding.   Musculoskeletal:         General: No deformity. No spontaneous movement during my visit  Skin:     Coloration: Skin is not jaundiced. Sacral wound not inspected  Neurological:      General: opens eyes which are perrla. Does not follow commands, does grimace to pain  Psychiatric:         Behavior: completely flat affect    Last Recorded Vitals  Blood pressure 164/81, pulse 104, temperature 36.6 °C (97.9 °F), temperature source Temporal, resp. rate 18, height 1.727 m (5' 8\"), weight 86.2 kg (190 lb), SpO2 100%.  Intake/Output last 3 Shifts:  I/O last 3 completed shifts:  In: 2176 (25.2 mL/kg) [I.V.:900 (10.4 mL/kg); NG/GT:1276]  Out: 4250 (49.3 mL/kg) [Urine:4250 (1.4 mL/kg/hr)]  Weight: 86.2 kg "     Relevant Results  Results for orders placed or performed during the hospital encounter of 05/31/24 (from the past 24 hour(s))   POCT GLUCOSE   Result Value Ref Range    POCT Glucose 377 (H) 74 - 99 mg/dL   POCT GLUCOSE   Result Value Ref Range    POCT Glucose 356 (H) 74 - 99 mg/dL   POCT GLUCOSE   Result Value Ref Range    POCT Glucose 346 (H) 74 - 99 mg/dL   POCT GLUCOSE   Result Value Ref Range    POCT Glucose 298 (H) 74 - 99 mg/dL   CBC   Result Value Ref Range    WBC 37.2 (H) 4.4 - 11.3 x10*3/uL    nRBC 0.1 (H) 0.0 - 0.0 /100 WBCs    RBC 2.54 (L) 4.00 - 5.20 x10*6/uL    Hemoglobin 7.7 (L) 12.0 - 16.0 g/dL    Hematocrit 23.3 (L) 36.0 - 46.0 %    MCV 92 80 - 100 fL    MCH 30.3 26.0 - 34.0 pg    MCHC 33.0 32.0 - 36.0 g/dL    RDW 17.3 (H) 11.5 - 14.5 %    Platelets 295 150 - 450 x10*3/uL   POCT GLUCOSE   Result Value Ref Range    POCT Glucose 354 (H) 74 - 99 mg/dL       Assessment/Plan   Sepsis  - WBC worse, more anemic, BS not well controlled  CVA with residual deficits - remains disengaged  Sacral Wound - awaiting surgical input  Conjunctivitis- continue treatment  Goals/pall care     DNR CCA DNI  No capable  Family desires work up and treatment of infection.   Plan unchanged:  Still awaiting surgical input if needs debridement, I suspect wound will continue to get infected, unlikely to heal  Plan is to pursue comfot care when atbx thearpy finished/pt status optimized.  At that point family will engage with hospice.       I spent 35 minutes in the professional and overall care of this patient.      Myra Zuniga, APRN-CNP

## 2024-06-08 LAB
ABO GROUP (TYPE) IN BLOOD: NORMAL
ANTIBODY SCREEN: NORMAL
BLOOD EXPIRATION DATE: NORMAL
DISPENSE STATUS: NORMAL
ERYTHROCYTE [DISTWIDTH] IN BLOOD BY AUTOMATED COUNT: 17.8 % (ref 11.5–14.5)
GLUCOSE BLD MANUAL STRIP-MCNC: 215 MG/DL (ref 74–99)
GLUCOSE BLD MANUAL STRIP-MCNC: 234 MG/DL (ref 74–99)
GLUCOSE BLD MANUAL STRIP-MCNC: 248 MG/DL (ref 74–99)
GLUCOSE BLD MANUAL STRIP-MCNC: 252 MG/DL (ref 74–99)
HCT VFR BLD AUTO: 19.7 % (ref 36–46)
HGB BLD-MCNC: 6.3 G/DL (ref 12–16)
HOLD SPECIMEN: NORMAL
MCH RBC QN AUTO: 29.6 PG (ref 26–34)
MCHC RBC AUTO-ENTMCNC: 32 G/DL (ref 32–36)
MCV RBC AUTO: 93 FL (ref 80–100)
NRBC BLD-RTO: 0.1 /100 WBCS (ref 0–0)
PLATELET # BLD AUTO: 238 X10*3/UL (ref 150–450)
PRODUCT BLOOD TYPE: 5100
PRODUCT CODE: NORMAL
RBC # BLD AUTO: 2.13 X10*6/UL (ref 4–5.2)
RH FACTOR (ANTIGEN D): NORMAL
UNIT ABO: NORMAL
UNIT NUMBER: NORMAL
UNIT RH: NORMAL
UNIT VOLUME: 350
VANCOMYCIN SERPL-MCNC: 11.2 UG/ML (ref 5–20)
WBC # BLD AUTO: 31 X10*3/UL (ref 4.4–11.3)
XM INTEP: NORMAL

## 2024-06-08 PROCEDURE — 36430 TRANSFUSION BLD/BLD COMPNT: CPT

## 2024-06-08 PROCEDURE — 85025 COMPLETE CBC W/AUTO DIFF WBC: CPT | Performed by: INTERNAL MEDICINE

## 2024-06-08 PROCEDURE — 37799 UNLISTED PX VASCULAR SURGERY: CPT | Performed by: INTERNAL MEDICINE

## 2024-06-08 PROCEDURE — 2500000001 HC RX 250 WO HCPCS SELF ADMINISTERED DRUGS (ALT 637 FOR MEDICARE OP): Performed by: NURSE PRACTITIONER

## 2024-06-08 PROCEDURE — 86901 BLOOD TYPING SEROLOGIC RH(D): CPT | Performed by: INTERNAL MEDICINE

## 2024-06-08 PROCEDURE — 2500000001 HC RX 250 WO HCPCS SELF ADMINISTERED DRUGS (ALT 637 FOR MEDICARE OP): Performed by: INTERNAL MEDICINE

## 2024-06-08 PROCEDURE — 82947 ASSAY GLUCOSE BLOOD QUANT: CPT | Mod: 91

## 2024-06-08 PROCEDURE — 2500000005 HC RX 250 GENERAL PHARMACY W/O HCPCS: Performed by: INTERNAL MEDICINE

## 2024-06-08 PROCEDURE — 2500000004 HC RX 250 GENERAL PHARMACY W/ HCPCS (ALT 636 FOR OP/ED): Performed by: INTERNAL MEDICINE

## 2024-06-08 PROCEDURE — P9016 RBC LEUKOCYTES REDUCED: HCPCS

## 2024-06-08 PROCEDURE — 80202 ASSAY OF VANCOMYCIN: CPT | Performed by: PHARMACIST

## 2024-06-08 PROCEDURE — 86920 COMPATIBILITY TEST SPIN: CPT

## 2024-06-08 PROCEDURE — 85027 COMPLETE CBC AUTOMATED: CPT | Performed by: INTERNAL MEDICINE

## 2024-06-08 PROCEDURE — 2500000002 HC RX 250 W HCPCS SELF ADMINISTERED DRUGS (ALT 637 FOR MEDICARE OP, ALT 636 FOR OP/ED): Performed by: INTERNAL MEDICINE

## 2024-06-08 PROCEDURE — 1200000002 HC GENERAL ROOM WITH TELEMETRY DAILY

## 2024-06-08 RX ADMIN — DOLUTEGRAVIR SODIUM 50 MG: 50 TABLET, FILM COATED ORAL at 09:44

## 2024-06-08 RX ADMIN — APIXABAN 5 MG: 5 TABLET, FILM COATED ORAL at 20:54

## 2024-06-08 RX ADMIN — LAMOTRIGINE 25 MG: 25 TABLET ORAL at 20:54

## 2024-06-08 RX ADMIN — DOCUSATE SODIUM 100 MG: 50 LIQUID ORAL at 09:42

## 2024-06-08 RX ADMIN — SODIUM CHLORIDE 75 ML/HR: 9 INJECTION, SOLUTION INTRAVENOUS at 14:17

## 2024-06-08 RX ADMIN — OXYCODONE HYDROCHLORIDE 5 MG: 5 TABLET ORAL at 20:54

## 2024-06-08 RX ADMIN — TOBRAMYCIN AND DEXAMETHASONE 0.5 INCH: 3; 1 OINTMENT OPHTHALMIC at 09:48

## 2024-06-08 RX ADMIN — LEVOTHYROXINE SODIUM 175 MCG: 125 TABLET ORAL at 06:06

## 2024-06-08 RX ADMIN — TOBRAMYCIN AND DEXAMETHASONE 0.5 INCH: 3; 1 OINTMENT OPHTHALMIC at 21:00

## 2024-06-08 RX ADMIN — OXYCODONE HYDROCHLORIDE 5 MG: 5 TABLET ORAL at 03:45

## 2024-06-08 RX ADMIN — OXYCODONE HYDROCHLORIDE 5 MG: 5 TABLET ORAL at 09:42

## 2024-06-08 RX ADMIN — DAKIN'S SOLUTION 0.125% (QUARTER STRENGTH): 0.12 SOLUTION at 10:22

## 2024-06-08 RX ADMIN — METRONIDAZOLE 500 MG: 500 TABLET ORAL at 09:42

## 2024-06-08 RX ADMIN — FUROSEMIDE 20 MG: 20 TABLET ORAL at 09:44

## 2024-06-08 RX ADMIN — BACLOFEN 10 MG: 10 TABLET ORAL at 09:43

## 2024-06-08 RX ADMIN — EMTRICITABINE AND TENOFOVIR ALAFENAMIDE 1 TABLET: 200; 25 TABLET ORAL at 09:44

## 2024-06-08 RX ADMIN — Medication 2 L/MIN: at 20:00

## 2024-06-08 RX ADMIN — BACLOFEN 10 MG: 10 TABLET ORAL at 20:54

## 2024-06-08 RX ADMIN — INSULIN GLARGINE 30 UNITS: 100 INJECTION, SOLUTION SUBCUTANEOUS at 20:54

## 2024-06-08 RX ADMIN — OXYCODONE HYDROCHLORIDE 5 MG: 5 TABLET ORAL at 14:31

## 2024-06-08 RX ADMIN — Medication 2 L/MIN: at 08:00

## 2024-06-08 RX ADMIN — INSULIN LISPRO 6 UNITS: 100 INJECTION, SOLUTION INTRAVENOUS; SUBCUTANEOUS at 12:10

## 2024-06-08 RX ADMIN — INSULIN LISPRO 4 UNITS: 100 INJECTION, SOLUTION INTRAVENOUS; SUBCUTANEOUS at 17:33

## 2024-06-08 RX ADMIN — TOBRAMYCIN AND DEXAMETHASONE 0.5 INCH: 3; 1 OINTMENT OPHTHALMIC at 14:18

## 2024-06-08 RX ADMIN — AMLODIPINE BESYLATE 5 MG: 5 TABLET ORAL at 09:44

## 2024-06-08 RX ADMIN — INSULIN LISPRO 4 UNITS: 100 INJECTION, SOLUTION INTRAVENOUS; SUBCUTANEOUS at 09:41

## 2024-06-08 RX ADMIN — APIXABAN 5 MG: 5 TABLET, FILM COATED ORAL at 09:44

## 2024-06-08 ASSESSMENT — PAIN SCALES - GENERAL: PAINLEVEL_OUTOF10: 0 - NO PAIN

## 2024-06-08 ASSESSMENT — COGNITIVE AND FUNCTIONAL STATUS - GENERAL
MOBILITY SCORE: 6
MOVING TO AND FROM BED TO CHAIR: TOTAL
MOVING FROM LYING ON BACK TO SITTING ON SIDE OF FLAT BED WITH BEDRAILS: TOTAL
TURNING FROM BACK TO SIDE WHILE IN FLAT BAD: TOTAL
STANDING UP FROM CHAIR USING ARMS: TOTAL
CLIMB 3 TO 5 STEPS WITH RAILING: TOTAL
WALKING IN HOSPITAL ROOM: TOTAL

## 2024-06-08 ASSESSMENT — PAIN SCALES - PAIN ASSESSMENT IN ADVANCED DEMENTIA (PAINAD)
FACIALEXPRESSION: SMILING OR INEXPRESSIVE
CONSOLABILITY: NO NEED TO CONSOLE
BREATHING: NORMAL
TOTALSCORE: 0
BODYLANGUAGE: RELAXED

## 2024-06-08 ASSESSMENT — PAIN - FUNCTIONAL ASSESSMENT
PAIN_FUNCTIONAL_ASSESSMENT: FLACC (FACE, LEGS, ACTIVITY, CRY, CONSOLABILITY)
PAIN_FUNCTIONAL_ASSESSMENT: PAINAD (PAIN ASSESSMENT IN ADVANCED DEMENTIA SCALE)

## 2024-06-08 NOTE — PROGRESS NOTES
Janette Martinez is a 53 y.o. female on day 7 of admission presenting with Sepsis without acute organ dysfunction, due to unspecified organism (Multi).      Subjective   HPI: This is a 53 y.o. female who is from a nursing home, was transferred here because of altered mental state.  Patient unable to offer any history, history from reports and nursing home.  Patient is usually awake enough to whisper when asked questions, was unable to respond, wakes up when called, hence was sent to the ER for eval.  Patient found to have very high WBC with findings suggestive of a pneumonia and hence admitted for the same.  Recently patient had a PEG placed as her swallowing was compromised.  She has been on continuous PEG feeds at the nursing home.     # Patient seen at bedside.   # Events from the last visit reviewed.   # Discussed with staff.   # Results of tests and investigations from last visit reviewed and discussed with patient/Family.  #  Electronic chart reviewed.   # Input / Recommendations  from other care providers appreciated and reviewed.        Objective     Last Recorded Vitals  /53 (BP Location: Left arm, Patient Position: Lying)   Pulse 105   Temp 36.7 °C (98 °F) (Temporal)   Resp 18   Wt 86.2 kg (190 lb)   SpO2 97%   Intake/Output last 3 Shifts:    Intake/Output Summary (Last 24 hours) at 6/8/2024 0930  Last data filed at 6/8/2024 0600  Gross per 24 hour   Intake --   Output 550 ml   Net -550 ml       Admission Weight  Weight: 86.2 kg (190 lb) (05/31/24 1806)    Daily Weight  05/31/24 : 86.2 kg (190 lb)    Image Results  ECG 12 lead  Sinus tachycardia  Nonspecific T wave abnormality  Abnormal ECG  When compared with ECG of 09-MAY-2024 15:28,  Questionable change in QRS axis      Physical Exam  GENERAL: Lethargic, wakes up when called  SKIN: Skin turgor normal. No rashes  HEENT: no epistaxis, Moist mucosa.  NECK: supple  BACK: spine nontender to palpation, No CVAT.  LUNGS: Vesicular breath sounds, with  no wheeze, no crepitations.   CARDIAC: REGULAR. S1 and S2; no rubs, no murmur  ABDOMEN: Abdomen soft, non-tender. BS+  EXTREMITIES: No edema, Good capillary refill.   NEURO: Lethargic unable to examine    MUSCULOSKELETAL: No acute inflammation       Relevant Results    Results for orders placed or performed during the hospital encounter of 05/31/24 (from the past 24 hour(s))   POCT GLUCOSE   Result Value Ref Range    POCT Glucose 321 (H) 74 - 99 mg/dL   POCT GLUCOSE   Result Value Ref Range    POCT Glucose 299 (H) 74 - 99 mg/dL   POCT GLUCOSE   Result Value Ref Range    POCT Glucose 276 (H) 74 - 99 mg/dL   CBC   Result Value Ref Range    WBC 31.0 (H) 4.4 - 11.3 x10*3/uL    nRBC 0.1 (H) 0.0 - 0.0 /100 WBCs    RBC 2.13 (L) 4.00 - 5.20 x10*6/uL    Hemoglobin 6.3 (LL) 12.0 - 16.0 g/dL    Hematocrit 19.7 (L) 36.0 - 46.0 %    MCV 93 80 - 100 fL    MCH 29.6 26.0 - 34.0 pg    MCHC 32.0 32.0 - 36.0 g/dL    RDW 17.8 (H) 11.5 - 14.5 %    Platelets 238 150 - 450 x10*3/uL   Vancomycin   Result Value Ref Range    Vancomycin 11.2 5.0 - 20.0 ug/mL   POCT GLUCOSE   Result Value Ref Range    POCT Glucose 248 (H) 74 - 99 mg/dL       Scheduled medications  amLODIPine, 5 mg, oral, Daily  apixaban, 5 mg, oral, BID  baclofen, 10 mg, oral, BID  docusate sodium, 100 mg, oral, Daily  dolutegravir, 50 mg, oral, Daily  emtricitabine-tenofovir alafen, 1 tablet, oral, Daily  furosemide, 20 mg, oral, Daily  insulin glargine, 30 Units, subcutaneous, q24h  insulin lispro, 0-10 Units, subcutaneous, TID  lamoTRIgine, 25 mg, oral, q PM  levothyroxine, 175 mcg, oral, Daily before breakfast  metroNIDAZOLE, 500 mg, oral, Daily  oxyCODONE, 5 mg, g-tube, q6h  oxygen, , inhalation, Continuous - Inhalation  sodium hypochlorite, , irrigation, Daily  tobramycin-dexamethasone, 0.5 inch, Right Eye, TID      Continuous medications  sodium chloride 0.9%, 75 mL/hr, Last Rate: 75 mL/hr (06/07/24 1221)      PRN medications  PRN medications: acetaminophen, ALPRAZolam,  alteplase, alum-mag hydroxide-simeth, bisacodyl, dextromethorphan-guaifenesin, dextrose, dextrose, diphenhydrAMINE, glucagon, glucagon, guaiFENesin, morphine, ondansetron, vancomycin      Assessment/Plan        # Encephalopathy  -Secondary to pneumonia     # Pneumonia  -elevated WBC  -Probably due to aspiration  -Recent PEG placement     # Dysphagia/CVA  -S/p PEG placement recently     # Old right hemiparesis/dysarthria     # Sacral decubitus  -Wound care consulted     HIV:  Hx graves s/p thyroidectomy   Post surgical hypothyroid  HTN  HLP  T2DM       6/2-discussed with patient's sister she Josh Martinez yesterday ,who is the POA about patient's poor prognosis, explained the reason for her present condition and the very likely poor prognosis, she agrees to go with hospice, she had already planned to sign up with hospice while she was at [facility], consulted palliative care, will consult hospice, patient has a hemoglobin of 6.4, transfusion will not affect outcome.  Patient continues to have encephalopathy, on IV antibiotics for pneumonia, continue insulin sliding scale, on D5 normal saline at 75 mL an hour.  Will await hospice and palliative care input.  Patient DNR.    6/3 : Pt still encephalopathic, barely responding, d/w palliative care . D/W Sister / POA - agrees with Hospice consult. Prefers blood transfusion - consent obtained by phone - will order 1 unit PRBC transfusion    6/4 : Pt s/p transfusion - H&H improved , no obvious signs of bleeding, cont IV fluids, restarting PEG feeds at 1/2 the rate as there is suspicion of aspiration - surgery consulted for sacral wound, palliative care input appreciated. Cont IV abx for pneumonia. Replace Potassium.    6/5-patient opens eyes when called, discussed with palliative care, await surgery input on sacral decub, on PEG feeds at a low rate, continue IV antibiotics, will start planning discharge back to nursing home after surgery input.    6/6 : Pt  encephalopathic, will await surgery input on sacral Decub - cont PEG feeds, cont IV abx    6/7 : Pt encephalopathic some improvement - nods to questions , has been waiting for surgery consult for sacral decub - will need input before DC planning - she is not under hospice yet.    6/8 : Pts last dose of Vancomycin on 6/10 - will DC pt to NH 6/10. Surgery input appreciated. Encephalopathy - showing mild improvement, pt awake, she is tracking, whispers words.    Radha Ward MD

## 2024-06-09 LAB
ANION GAP SERPL CALC-SCNC: 11 MMOL/L (ref 10–20)
ATRIAL RATE: 118 BPM
BASO STIPL BLD QL SMEAR: PRESENT
BASOPHILS # BLD AUTO: 0.13 X10*3/UL (ref 0–0.1)
BASOPHILS NFR BLD AUTO: 0.4 %
BUN SERPL-MCNC: 23 MG/DL (ref 6–23)
CALCIUM SERPL-MCNC: 7.8 MG/DL (ref 8.6–10.3)
CHLORIDE SERPL-SCNC: 107 MMOL/L (ref 98–107)
CO2 SERPL-SCNC: 26 MMOL/L (ref 21–32)
CREAT SERPL-MCNC: 1.13 MG/DL (ref 0.5–1.05)
DACRYOCYTES BLD QL SMEAR: NORMAL
EGFRCR SERPLBLD CKD-EPI 2021: 58 ML/MIN/1.73M*2
EOSINOPHIL # BLD AUTO: 0.14 X10*3/UL (ref 0–0.7)
EOSINOPHIL NFR BLD AUTO: 0.4 %
ERYTHROCYTE [DISTWIDTH] IN BLOOD BY AUTOMATED COUNT: 16.8 % (ref 11.5–14.5)
ERYTHROCYTE [DISTWIDTH] IN BLOOD BY AUTOMATED COUNT: 17 % (ref 11.5–14.5)
GLUCOSE BLD MANUAL STRIP-MCNC: 230 MG/DL (ref 74–99)
GLUCOSE BLD MANUAL STRIP-MCNC: 230 MG/DL (ref 74–99)
GLUCOSE BLD MANUAL STRIP-MCNC: 266 MG/DL (ref 74–99)
GLUCOSE BLD MANUAL STRIP-MCNC: 273 MG/DL (ref 74–99)
GLUCOSE BLD MANUAL STRIP-MCNC: 291 MG/DL (ref 74–99)
GLUCOSE SERPL-MCNC: 317 MG/DL (ref 74–99)
HCT VFR BLD AUTO: 23.6 % (ref 36–46)
HCT VFR BLD AUTO: 24 % (ref 36–46)
HGB BLD-MCNC: 7.8 G/DL (ref 12–16)
HGB BLD-MCNC: 7.8 G/DL (ref 12–16)
HOLD SPECIMEN: NORMAL
HYPOCHROMIA BLD QL SMEAR: NORMAL
IMM GRANULOCYTES # BLD AUTO: 1.07 X10*3/UL (ref 0–0.7)
IMM GRANULOCYTES NFR BLD AUTO: 3.4 % (ref 0–0.9)
LYMPHOCYTES # BLD AUTO: 3 X10*3/UL (ref 1.2–4.8)
LYMPHOCYTES NFR BLD AUTO: 9.6 %
MCH RBC QN AUTO: 29.8 PG (ref 26–34)
MCH RBC QN AUTO: 30.4 PG (ref 26–34)
MCHC RBC AUTO-ENTMCNC: 32.5 G/DL (ref 32–36)
MCHC RBC AUTO-ENTMCNC: 33.1 G/DL (ref 32–36)
MCV RBC AUTO: 92 FL (ref 80–100)
MCV RBC AUTO: 92 FL (ref 80–100)
MONOCYTES # BLD AUTO: 1.84 X10*3/UL (ref 0.1–1)
MONOCYTES NFR BLD AUTO: 5.9 %
NEUTROPHILS # BLD AUTO: 25 X10*3/UL (ref 1.2–7.7)
NEUTROPHILS NFR BLD AUTO: 80.3 %
NRBC BLD-RTO: 0.1 /100 WBCS (ref 0–0)
NRBC BLD-RTO: 0.2 /100 WBCS (ref 0–0)
P AXIS: 72 DEGREES
P OFFSET: 207 MS
P ONSET: 145 MS
PLATELET # BLD AUTO: 239 X10*3/UL (ref 150–450)
PLATELET # BLD AUTO: 243 X10*3/UL (ref 150–450)
POLYCHROMASIA BLD QL SMEAR: NORMAL
POTASSIUM SERPL-SCNC: 3.7 MMOL/L (ref 3.5–5.3)
PR INTERVAL: 154 MS
Q ONSET: 222 MS
QRS COUNT: 19 BEATS
QRS DURATION: 70 MS
QT INTERVAL: 314 MS
QTC CALCULATION(BAZETT): 440 MS
QTC FREDERICIA: 393 MS
R AXIS: 2 DEGREES
RBC # BLD AUTO: 2.57 X10*6/UL (ref 4–5.2)
RBC # BLD AUTO: 2.62 X10*6/UL (ref 4–5.2)
RBC MORPH BLD: NORMAL
SODIUM SERPL-SCNC: 140 MMOL/L (ref 136–145)
T AXIS: 27 DEGREES
T OFFSET: 379 MS
TARGETS BLD QL SMEAR: NORMAL
VENTRICULAR RATE: 118 BPM
WBC # BLD AUTO: 31.2 X10*3/UL (ref 4.4–11.3)
WBC # BLD AUTO: 31.7 X10*3/UL (ref 4.4–11.3)

## 2024-06-09 PROCEDURE — 2500000004 HC RX 250 GENERAL PHARMACY W/ HCPCS (ALT 636 FOR OP/ED): Performed by: INTERNAL MEDICINE

## 2024-06-09 PROCEDURE — 2500000002 HC RX 250 W HCPCS SELF ADMINISTERED DRUGS (ALT 637 FOR MEDICARE OP, ALT 636 FOR OP/ED): Performed by: INTERNAL MEDICINE

## 2024-06-09 PROCEDURE — 82947 ASSAY GLUCOSE BLOOD QUANT: CPT | Mod: 91

## 2024-06-09 PROCEDURE — 85027 COMPLETE CBC AUTOMATED: CPT | Performed by: INTERNAL MEDICINE

## 2024-06-09 PROCEDURE — 1200000002 HC GENERAL ROOM WITH TELEMETRY DAILY

## 2024-06-09 PROCEDURE — 80048 BASIC METABOLIC PNL TOTAL CA: CPT | Performed by: INTERNAL MEDICINE

## 2024-06-09 PROCEDURE — 2500000001 HC RX 250 WO HCPCS SELF ADMINISTERED DRUGS (ALT 637 FOR MEDICARE OP): Performed by: NURSE PRACTITIONER

## 2024-06-09 PROCEDURE — 2500000001 HC RX 250 WO HCPCS SELF ADMINISTERED DRUGS (ALT 637 FOR MEDICARE OP): Performed by: INTERNAL MEDICINE

## 2024-06-09 PROCEDURE — 2500000005 HC RX 250 GENERAL PHARMACY W/O HCPCS: Performed by: INTERNAL MEDICINE

## 2024-06-09 RX ORDER — VANCOMYCIN HYDROCHLORIDE 1 G/200ML
1000 INJECTION, SOLUTION INTRAVENOUS ONCE
Status: DISCONTINUED | OUTPATIENT
Start: 2024-06-09 | End: 2024-06-09

## 2024-06-09 RX ORDER — VANCOMYCIN HYDROCHLORIDE 750 MG/150ML
750 INJECTION, SOLUTION INTRAVENOUS EVERY 24 HOURS
Status: DISCONTINUED | OUTPATIENT
Start: 2024-06-09 | End: 2024-06-10

## 2024-06-09 RX ADMIN — ACETAMINOPHEN 650 MG: 325 TABLET ORAL at 15:44

## 2024-06-09 RX ADMIN — TOBRAMYCIN AND DEXAMETHASONE 0.5 INCH: 3; 1 OINTMENT OPHTHALMIC at 15:22

## 2024-06-09 RX ADMIN — INSULIN GLARGINE 30 UNITS: 100 INJECTION, SOLUTION SUBCUTANEOUS at 22:01

## 2024-06-09 RX ADMIN — BACLOFEN 10 MG: 10 TABLET ORAL at 20:32

## 2024-06-09 RX ADMIN — DOCUSATE SODIUM 100 MG: 50 LIQUID ORAL at 09:17

## 2024-06-09 RX ADMIN — EMTRICITABINE AND TENOFOVIR ALAFENAMIDE 1 TABLET: 200; 25 TABLET ORAL at 09:17

## 2024-06-09 RX ADMIN — AMLODIPINE BESYLATE 5 MG: 5 TABLET ORAL at 09:17

## 2024-06-09 RX ADMIN — APIXABAN 5 MG: 5 TABLET, FILM COATED ORAL at 20:32

## 2024-06-09 RX ADMIN — DOLUTEGRAVIR SODIUM 50 MG: 50 TABLET, FILM COATED ORAL at 09:17

## 2024-06-09 RX ADMIN — SODIUM CHLORIDE 75 ML/HR: 9 INJECTION, SOLUTION INTRAVENOUS at 20:32

## 2024-06-09 RX ADMIN — METRONIDAZOLE 500 MG: 500 TABLET ORAL at 09:17

## 2024-06-09 RX ADMIN — VANCOMYCIN HYDROCHLORIDE 750 MG: 750 INJECTION, SOLUTION INTRAVENOUS at 10:48

## 2024-06-09 RX ADMIN — LEVOTHYROXINE SODIUM 175 MCG: 125 TABLET ORAL at 05:40

## 2024-06-09 RX ADMIN — APIXABAN 5 MG: 5 TABLET, FILM COATED ORAL at 09:17

## 2024-06-09 RX ADMIN — OXYCODONE HYDROCHLORIDE 5 MG: 5 TABLET ORAL at 09:17

## 2024-06-09 RX ADMIN — TOBRAMYCIN AND DEXAMETHASONE 0.5 INCH: 3; 1 OINTMENT OPHTHALMIC at 20:34

## 2024-06-09 RX ADMIN — INSULIN LISPRO 6 UNITS: 100 INJECTION, SOLUTION INTRAVENOUS; SUBCUTANEOUS at 16:04

## 2024-06-09 RX ADMIN — ACETAMINOPHEN 650 MG: 325 TABLET ORAL at 20:35

## 2024-06-09 RX ADMIN — INSULIN LISPRO 8 UNITS: 100 INJECTION, SOLUTION INTRAVENOUS; SUBCUTANEOUS at 09:37

## 2024-06-09 RX ADMIN — OXYCODONE HYDROCHLORIDE 5 MG: 5 TABLET ORAL at 03:30

## 2024-06-09 RX ADMIN — TOBRAMYCIN AND DEXAMETHASONE 0.5 INCH: 3; 1 OINTMENT OPHTHALMIC at 09:29

## 2024-06-09 RX ADMIN — Medication 2 L/MIN: at 20:00

## 2024-06-09 RX ADMIN — LAMOTRIGINE 25 MG: 25 TABLET ORAL at 20:32

## 2024-06-09 RX ADMIN — SODIUM CHLORIDE 75 ML/HR: 9 INJECTION, SOLUTION INTRAVENOUS at 05:39

## 2024-06-09 RX ADMIN — INSULIN LISPRO 6 UNITS: 100 INJECTION, SOLUTION INTRAVENOUS; SUBCUTANEOUS at 12:20

## 2024-06-09 RX ADMIN — OXYCODONE HYDROCHLORIDE 5 MG: 5 TABLET ORAL at 20:32

## 2024-06-09 RX ADMIN — BACLOFEN 10 MG: 10 TABLET ORAL at 09:17

## 2024-06-09 RX ADMIN — OXYCODONE HYDROCHLORIDE 5 MG: 5 TABLET ORAL at 15:21

## 2024-06-09 RX ADMIN — FUROSEMIDE 20 MG: 20 TABLET ORAL at 09:17

## 2024-06-09 ASSESSMENT — COGNITIVE AND FUNCTIONAL STATUS - GENERAL
PERSONAL GROOMING: TOTAL
HELP NEEDED FOR BATHING: TOTAL
MOVING TO AND FROM BED TO CHAIR: TOTAL
TOILETING: TOTAL
MOBILITY SCORE: 6
TURNING FROM BACK TO SIDE WHILE IN FLAT BAD: TOTAL
WALKING IN HOSPITAL ROOM: TOTAL
DAILY ACTIVITIY SCORE: 6
MOVING FROM LYING ON BACK TO SITTING ON SIDE OF FLAT BED WITH BEDRAILS: TOTAL
STANDING UP FROM CHAIR USING ARMS: TOTAL
DRESSING REGULAR LOWER BODY CLOTHING: TOTAL
CLIMB 3 TO 5 STEPS WITH RAILING: TOTAL
DRESSING REGULAR UPPER BODY CLOTHING: TOTAL
EATING MEALS: TOTAL

## 2024-06-09 NOTE — PROGRESS NOTES
Janette Martinez is a 53 y.o. female on day 8 of admission presenting with Sepsis without acute organ dysfunction, due to unspecified organism (Multi).      Subjective   HPI: This is a 53 y.o. female who is from a nursing home, was transferred here because of altered mental state.  Patient unable to offer any history, history from reports and nursing home.  Patient is usually awake enough to whisper when asked questions, was unable to respond, wakes up when called, hence was sent to the ER for eval.  Patient found to have very high WBC with findings suggestive of a pneumonia and hence admitted for the same.  Recently patient had a PEG placed as her swallowing was compromised.  She has been on continuous PEG feeds at the nursing home.     # Patient seen at bedside.   # Events from the last visit reviewed.   # Discussed with staff.   # Results of tests and investigations from last visit reviewed and discussed with patient/Family.  #  Electronic chart reviewed.   # Input / Recommendations  from other care providers appreciated and reviewed.        Objective     Last Recorded Vitals  /78 (BP Location: Left arm, Patient Position: Lying)   Pulse (!) 119   Temp 36.4 °C (97.5 °F) (Temporal)   Resp 19   Wt 86.2 kg (190 lb)   SpO2 100%   Intake/Output last 3 Shifts:    Intake/Output Summary (Last 24 hours) at 6/9/2024 1540  Last data filed at 6/9/2024 0750  Gross per 24 hour   Intake 1270 ml   Output 1950 ml   Net -680 ml       Admission Weight  Weight: 86.2 kg (190 lb) (05/31/24 1806)    Daily Weight  05/31/24 : 86.2 kg (190 lb)    Image Results  ECG 12 lead  Sinus tachycardia  Nonspecific T wave abnormality  Abnormal ECG  When compared with ECG of 09-MAY-2024 15:28,  Questionable change in QRS axis  See ED provider note for full interpretation and clinical correlation  Confirmed by Columba Islas ([id number]) on 6/9/2024 10:14:11 AM      Physical Exam  GENERAL: Lethargic, wakes up when called  SKIN: Skin turgor  normal. No rashes  HEENT: no epistaxis, Moist mucosa.  NECK: supple  BACK: spine nontender to palpation, No CVAT.  LUNGS: Vesicular breath sounds, with no wheeze, no crepitations.   CARDIAC: REGULAR. S1 and S2; no rubs, no murmur  ABDOMEN: Abdomen soft, non-tender. BS+  EXTREMITIES: No edema, Good capillary refill.   NEURO: Lethargic unable to examine    MUSCULOSKELETAL: No acute inflammation       Relevant Results    Results for orders placed or performed during the hospital encounter of 05/31/24 (from the past 24 hour(s))   POCT GLUCOSE   Result Value Ref Range    POCT Glucose 234 (H) 74 - 99 mg/dL   POCT GLUCOSE   Result Value Ref Range    POCT Glucose 215 (H) 74 - 99 mg/dL   CBC and Auto Differential   Result Value Ref Range    WBC 31.2 (H) 4.4 - 11.3 x10*3/uL    nRBC 0.1 (H) 0.0 - 0.0 /100 WBCs    RBC 2.57 (L) 4.00 - 5.20 x10*6/uL    Hemoglobin 7.8 (L) 12.0 - 16.0 g/dL    Hematocrit 23.6 (L) 36.0 - 46.0 %    MCV 92 80 - 100 fL    MCH 30.4 26.0 - 34.0 pg    MCHC 33.1 32.0 - 36.0 g/dL    RDW 16.8 (H) 11.5 - 14.5 %    Platelets 243 150 - 450 x10*3/uL    Neutrophils % 80.3 40.0 - 80.0 %    Immature Granulocytes %, Automated 3.4 (H) 0.0 - 0.9 %    Lymphocytes % 9.6 13.0 - 44.0 %    Monocytes % 5.9 2.0 - 10.0 %    Eosinophils % 0.4 0.0 - 6.0 %    Basophils % 0.4 0.0 - 2.0 %    Neutrophils Absolute 25.00 (H) 1.20 - 7.70 x10*3/uL    Immature Granulocytes Absolute, Automated 1.07 (H) 0.00 - 0.70 x10*3/uL    Lymphocytes Absolute 3.00 1.20 - 4.80 x10*3/uL    Monocytes Absolute 1.84 (H) 0.10 - 1.00 x10*3/uL    Eosinophils Absolute 0.14 0.00 - 0.70 x10*3/uL    Basophils Absolute 0.13 (H) 0.00 - 0.10 x10*3/uL   Morphology   Result Value Ref Range    RBC Morphology See Below     Polychromasia Mild     Hypochromia Mild     Target Cells Many     Teardrop Cells Few     Basophilic Stippling Present    POCT GLUCOSE   Result Value Ref Range    POCT Glucose 230 (H) 74 - 99 mg/dL   CBC   Result Value Ref Range    WBC 31.7 (H) 4.4 -  11.3 x10*3/uL    nRBC 0.2 (H) 0.0 - 0.0 /100 WBCs    RBC 2.62 (L) 4.00 - 5.20 x10*6/uL    Hemoglobin 7.8 (L) 12.0 - 16.0 g/dL    Hematocrit 24.0 (L) 36.0 - 46.0 %    MCV 92 80 - 100 fL    MCH 29.8 26.0 - 34.0 pg    MCHC 32.5 32.0 - 36.0 g/dL    RDW 17.0 (H) 11.5 - 14.5 %    Platelets 239 150 - 450 x10*3/uL   Lavender Top   Result Value Ref Range    Extra Tube Hold for add-ons.    Basic Metabolic Panel   Result Value Ref Range    Glucose 317 (H) 74 - 99 mg/dL    Sodium 140 136 - 145 mmol/L    Potassium 3.7 3.5 - 5.3 mmol/L    Chloride 107 98 - 107 mmol/L    Bicarbonate 26 21 - 32 mmol/L    Anion Gap 11 10 - 20 mmol/L    Urea Nitrogen 23 6 - 23 mg/dL    Creatinine 1.13 (H) 0.50 - 1.05 mg/dL    eGFR 58 (L) >60 mL/min/1.73m*2    Calcium 7.8 (L) 8.6 - 10.3 mg/dL   POCT GLUCOSE   Result Value Ref Range    POCT Glucose 291 (H) 74 - 99 mg/dL       Scheduled medications  amLODIPine, 5 mg, oral, Daily  apixaban, 5 mg, oral, BID  baclofen, 10 mg, oral, BID  docusate sodium, 100 mg, oral, Daily  dolutegravir, 50 mg, oral, Daily  emtricitabine-tenofovir alafen, 1 tablet, oral, Daily  furosemide, 20 mg, oral, Daily  insulin glargine, 30 Units, subcutaneous, q24h  insulin lispro, 0-10 Units, subcutaneous, TID  lamoTRIgine, 25 mg, oral, q PM  levothyroxine, 175 mcg, oral, Daily before breakfast  metroNIDAZOLE, 500 mg, oral, Daily  oxyCODONE, 5 mg, g-tube, q6h  oxygen, , inhalation, Continuous - Inhalation  sodium hypochlorite, , irrigation, Daily  tobramycin-dexamethasone, 0.5 inch, Right Eye, TID  vancomycin, 750 mg, intravenous, q24h      Continuous medications  sodium chloride 0.9%, 75 mL/hr, Last Rate: 75 mL/hr (06/09/24 0539)      PRN medications  PRN medications: acetaminophen, ALPRAZolam, alteplase, alum-mag hydroxide-simeth, bisacodyl, dextromethorphan-guaifenesin, dextrose, dextrose, diphenhydrAMINE, glucagon, glucagon, guaiFENesin, morphine, ondansetron, vancomycin      Assessment/Plan        # Encephalopathy  -Secondary  to pneumonia     # Pneumonia  -elevated WBC  -Probably due to aspiration  -Recent PEG placement     # Dysphagia/CVA  -S/p PEG placement recently     # Old right hemiparesis/dysarthria     # Sacral decubitus  -Wound care consulted     HIV:  Hx graves s/p thyroidectomy   Post surgical hypothyroid  HTN  HLP  T2DM       6/2-discussed with patient's sister she Josh Martinez yesterday ,who is the POA about patient's poor prognosis, explained the reason for her present condition and the very likely poor prognosis, she agrees to go with hospice, she had already planned to sign up with hospice while she was at [facility], consulted palliative care, will consult hospice, patient has a hemoglobin of 6.4, transfusion will not affect outcome.  Patient continues to have encephalopathy, on IV antibiotics for pneumonia, continue insulin sliding scale, on D5 normal saline at 75 mL an hour.  Will await hospice and palliative care input.  Patient DNR.    6/3 : Pt still encephalopathic, barely responding, d/w palliative care . D/W Sister / POA - agrees with Hospice consult. Prefers blood transfusion - consent obtained by phone - will order 1 unit PRBC transfusion    6/4 : Pt s/p transfusion - H&H improved , no obvious signs of bleeding, cont IV fluids, restarting PEG feeds at 1/2 the rate as there is suspicion of aspiration - surgery consulted for sacral wound, palliative care input appreciated. Cont IV abx for pneumonia. Replace Potassium.    6/5-patient opens eyes when called, discussed with palliative care, await surgery input on sacral decub, on PEG feeds at a low rate, continue IV antibiotics, will start planning discharge back to nursing home after surgery input.    6/6 : Pt encephalopathic, will await surgery input on sacral Decub - cont PEG feeds, cont IV abx    6/7 : Pt encephalopathic some improvement - nods to questions , has been waiting for surgery consult for sacral decub - will need input before DC  planning - she is not under hospice yet.    6/8 : Pts last dose of Vancomycin on 6/10 - will DC pt to NH 6/10. Surgery input appreciated. Encephalopathy - showing mild improvement, pt awake, she is tracking, whispers words.    6/9 : s/p transfusion, h&h improved, encephalopathy improved, wound care input noted, surgery not planning any further intervention, will plan DC tomorrow back to NH.D/W Sister/POA - she is going to sign up with hospice after return to [facility]. Will plan DC back tomorrow.    Radha Ward MD

## 2024-06-09 NOTE — CARE PLAN
The patient's goals for the shift include injury free    The clinical goals for the shift include remain safe throughout shift    Over the shift, the patient did make progress towards goals.

## 2024-06-09 NOTE — PROGRESS NOTES
Vancomycin Dosing by Pharmacy- FOLLOW UP    Janette Martinez is a 53 y.o. year old female who Pharmacy has been consulted for vancomycin dosing for pneumonia. Based on the patient's indication and renal status this patient is being dosed based on a goal trough/random level of 15-20.     Renal function is currently improving.    Current vancomycin dose: dose per level last dose 500 mg IV once given on  @1134    Most recent random level: 11.2 mcg/mL on  @0702    Visit Vitals  /78 (BP Location: Left arm, Patient Position: Lying)   Pulse 86   Temp 36.4 °C (97.5 °F) (Temporal)   Resp 20        Lab Results   Component Value Date    CREATININE 1.13 (H) 2024    CREATININE 1.46 (H) 2024    CREATININE 1.52 (H) 2024    CREATININE 1.42 (H) 2024        Patient weight is as follows:   Vitals:    24 1806   Weight: 86.2 kg (190 lb)       Cultures:  Susceptibility data for the encounter in last 14 days.  Collected Specimen Info Organism   24 Swab from Anterior Nares Methicillin Resistant Staphylococcus aureus (MRSA)        I/O last 3 completed shifts:  In: 2270 (26.3 mL/kg) [I.V.:1970 (22.9 mL/kg); Blood:300]  Out: 2400 (27.8 mL/kg) [Urine:2400 (0.8 mL/kg/hr)]  Weight: 86.2 kg   I/O during current shift:  I/O this shift:  In: -   Out: 500 [Urine:500]    Temp (24hrs), Av.8 °C (98.2 °F), Min:36.4 °C (97.5 °F), Max:37.4 °C (99.3 °F)      Assessment/Plan    Below goal random/trough level. Orders placed for new vancomycin regimen of 750 every 24 hours to begin at 6/ . @1000    This dosing regimen is predicted by [employer] to result in the following pharmacokinetic parameters:  Loading dose: N/A  Regimen: 750 mg IV every 24 hours.  Start time: 11:34 on 2024  Exposure target: AUC24 (range)400-600 mg/L.hr   AUC24,ss: 495 mg/L.hr  Probability of AUC24 > 400: 100 %  Ctrough,ss: 16.7 mg/L  Probability of Ctrough,ss > 20: 0 %  Probability of nephrotoxicity (Lodise DONALD 2009): 12  %      The next level will be obtained on - last dose will be  on 6/10 so may not need a level. May be obtained sooner if clinically indicated.   Will continue to monitor renal function daily while on vancomycin and order serum creatinine at least every 48 hours if not already ordered.  Follow for continued vancomycin needs, clinical response, and signs/symptoms of toxicity.       Juany Lucia, PharmD

## 2024-06-10 LAB
GLUCOSE BLD MANUAL STRIP-MCNC: 209 MG/DL (ref 74–99)
GLUCOSE BLD MANUAL STRIP-MCNC: 264 MG/DL (ref 74–99)
GLUCOSE BLD MANUAL STRIP-MCNC: 296 MG/DL (ref 74–99)
GLUCOSE BLD MANUAL STRIP-MCNC: 296 MG/DL (ref 74–99)

## 2024-06-10 PROCEDURE — 2500000001 HC RX 250 WO HCPCS SELF ADMINISTERED DRUGS (ALT 637 FOR MEDICARE OP): Performed by: NURSE PRACTITIONER

## 2024-06-10 PROCEDURE — 82947 ASSAY GLUCOSE BLOOD QUANT: CPT | Mod: 91

## 2024-06-10 PROCEDURE — 2500000002 HC RX 250 W HCPCS SELF ADMINISTERED DRUGS (ALT 637 FOR MEDICARE OP, ALT 636 FOR OP/ED): Performed by: INTERNAL MEDICINE

## 2024-06-10 PROCEDURE — 2500000001 HC RX 250 WO HCPCS SELF ADMINISTERED DRUGS (ALT 637 FOR MEDICARE OP): Performed by: INTERNAL MEDICINE

## 2024-06-10 PROCEDURE — 1200000002 HC GENERAL ROOM WITH TELEMETRY DAILY

## 2024-06-10 RX ADMIN — INSULIN LISPRO 6 UNITS: 100 INJECTION, SOLUTION INTRAVENOUS; SUBCUTANEOUS at 17:00

## 2024-06-10 RX ADMIN — BACLOFEN 10 MG: 10 TABLET ORAL at 21:48

## 2024-06-10 RX ADMIN — LAMOTRIGINE 25 MG: 25 TABLET ORAL at 21:00

## 2024-06-10 RX ADMIN — OXYCODONE HYDROCHLORIDE 5 MG: 5 TABLET ORAL at 03:06

## 2024-06-10 RX ADMIN — ALPRAZOLAM 0.5 MG: 0.5 TABLET ORAL at 21:48

## 2024-06-10 RX ADMIN — INSULIN LISPRO 6 UNITS: 100 INJECTION, SOLUTION INTRAVENOUS; SUBCUTANEOUS at 12:36

## 2024-06-10 RX ADMIN — APIXABAN 5 MG: 5 TABLET, FILM COATED ORAL at 21:48

## 2024-06-10 RX ADMIN — ACETAMINOPHEN 650 MG: 325 TABLET ORAL at 03:06

## 2024-06-10 RX ADMIN — OXYCODONE HYDROCHLORIDE 5 MG: 5 TABLET ORAL at 08:49

## 2024-06-10 RX ADMIN — FUROSEMIDE 20 MG: 20 TABLET ORAL at 08:49

## 2024-06-10 RX ADMIN — METRONIDAZOLE 500 MG: 500 TABLET ORAL at 08:49

## 2024-06-10 RX ADMIN — INSULIN LISPRO 6 UNITS: 100 INJECTION, SOLUTION INTRAVENOUS; SUBCUTANEOUS at 08:53

## 2024-06-10 RX ADMIN — AMLODIPINE BESYLATE 5 MG: 5 TABLET ORAL at 08:49

## 2024-06-10 RX ADMIN — APIXABAN 5 MG: 5 TABLET, FILM COATED ORAL at 08:49

## 2024-06-10 RX ADMIN — BACLOFEN 10 MG: 10 TABLET ORAL at 08:49

## 2024-06-10 RX ADMIN — OXYCODONE HYDROCHLORIDE 5 MG: 5 TABLET ORAL at 21:48

## 2024-06-10 RX ADMIN — INSULIN GLARGINE 30 UNITS: 100 INJECTION, SOLUTION SUBCUTANEOUS at 22:00

## 2024-06-10 RX ADMIN — OXYCODONE HYDROCHLORIDE 5 MG: 5 TABLET ORAL at 16:07

## 2024-06-10 RX ADMIN — DOCUSATE SODIUM 100 MG: 50 LIQUID ORAL at 08:49

## 2024-06-10 RX ADMIN — DIPHENHYDRAMINE HYDROCHLORIDE 25 MG: 25 CAPSULE ORAL at 21:48

## 2024-06-10 RX ADMIN — LEVOTHYROXINE SODIUM 175 MCG: 125 TABLET ORAL at 05:31

## 2024-06-10 RX ADMIN — EMTRICITABINE AND TENOFOVIR ALAFENAMIDE 1 TABLET: 200; 25 TABLET ORAL at 08:49

## 2024-06-10 ASSESSMENT — COGNITIVE AND FUNCTIONAL STATUS - GENERAL
MOVING FROM LYING ON BACK TO SITTING ON SIDE OF FLAT BED WITH BEDRAILS: TOTAL
EATING MEALS: TOTAL
MOBILITY SCORE: 6
DAILY ACTIVITIY SCORE: 6
DRESSING REGULAR UPPER BODY CLOTHING: TOTAL
MOVING TO AND FROM BED TO CHAIR: TOTAL
DRESSING REGULAR LOWER BODY CLOTHING: TOTAL
TURNING FROM BACK TO SIDE WHILE IN FLAT BAD: TOTAL
TOILETING: TOTAL
PERSONAL GROOMING: TOTAL
STANDING UP FROM CHAIR USING ARMS: TOTAL
CLIMB 3 TO 5 STEPS WITH RAILING: TOTAL
HELP NEEDED FOR BATHING: TOTAL
WALKING IN HOSPITAL ROOM: TOTAL

## 2024-06-10 ASSESSMENT — PAIN SCALES - WONG BAKER: WONGBAKER_NUMERICALRESPONSE: NO HURT

## 2024-06-10 ASSESSMENT — PAIN SCALES - GENERAL: PAINLEVEL_OUTOF10: 0 - NO PAIN

## 2024-06-10 NOTE — PROGRESS NOTES
Vancomycin Dosing by Pharmacy- Cessation of Therapy    Consult to pharmacy for vancomycin dosing has been discontinued by the prescriber, pharmacy will sign off at this time.    Please call pharmacy if there are further questions or re-enter a consult if vancomycin is resumed.     Mattie Hodge, PharmD

## 2024-06-10 NOTE — CARE PLAN
Palliative sign off:  consulted for goals of care, I also addressed pain issues.  Patient has been comfortable on current regimen per nursing, plan for discharge with hospice care today/tomorrow.  Will sign off at this time.

## 2024-06-10 NOTE — PROGRESS NOTES
Janette Martinez is a 53 y.o. female on day 9 of admission presenting with Sepsis without acute organ dysfunction, due to unspecified organism (Multi).      Subjective   HPI: This is a 53 y.o. female who is from a nursing home, was transferred here because of altered mental state.  Patient unable to offer any history, history from reports and nursing home.  Patient is usually awake enough to whisper when asked questions, was unable to respond, wakes up when called, hence was sent to the ER for eval.  Patient found to have very high WBC with findings suggestive of a pneumonia and hence admitted for the same.  Recently patient had a PEG placed as her swallowing was compromised.  She has been on continuous PEG feeds at the nursing home.     # Patient seen at bedside.   # Events from the last visit reviewed.   # Discussed with staff.   # Results of tests and investigations from last visit reviewed and discussed with patient/Family.  #  Electronic chart reviewed.   # Input / Recommendations  from other care providers appreciated and reviewed.        Objective     Last Recorded Vitals  /82 (BP Location: Left arm, Patient Position: Lying)   Pulse 108   Temp 36.6 °C (97.9 °F) (Temporal)   Resp 18   Wt 86.2 kg (190 lb)   SpO2 100%   Intake/Output last 3 Shifts:    Intake/Output Summary (Last 24 hours) at 6/10/2024 1548  Last data filed at 6/10/2024 0942  Gross per 24 hour   Intake 2286.25 ml   Output --   Net 2286.25 ml       Admission Weight  Weight: 86.2 kg (190 lb) (05/31/24 1806)    Daily Weight  05/31/24 : 86.2 kg (190 lb)    Image Results  ECG 12 lead  Sinus tachycardia  Nonspecific T wave abnormality  Abnormal ECG  When compared with ECG of 09-MAY-2024 15:28,  Questionable change in QRS axis  See ED provider note for full interpretation and clinical correlation  Confirmed by Columba Islas ([id number]) on 6/9/2024 10:14:11 AM      Physical Exam  GENERAL: Lethargic, wakes up when called  SKIN: Skin turgor  normal. No rashes  HEENT: no epistaxis, Moist mucosa.  NECK: supple  BACK: spine nontender to palpation, No CVAT.  LUNGS: Vesicular breath sounds, with no wheeze, no crepitations.   CARDIAC: REGULAR. S1 and S2; no rubs, no murmur  ABDOMEN: Abdomen soft, non-tender. BS+  EXTREMITIES: No edema, Good capillary refill.   NEURO: Lethargic unable to examine    MUSCULOSKELETAL: No acute inflammation       Relevant Results    Results for orders placed or performed during the hospital encounter of 05/31/24 (from the past 24 hour(s))   POCT GLUCOSE   Result Value Ref Range    POCT Glucose 230 (H) 74 - 99 mg/dL   POCT GLUCOSE   Result Value Ref Range    POCT Glucose 296 (H) 74 - 99 mg/dL   POCT GLUCOSE   Result Value Ref Range    POCT Glucose 296 (H) 74 - 99 mg/dL       Scheduled medications  amLODIPine, 5 mg, oral, Daily  apixaban, 5 mg, oral, BID  baclofen, 10 mg, oral, BID  docusate sodium, 100 mg, oral, Daily  dolutegravir, 50 mg, oral, Daily  emtricitabine-tenofovir alafen, 1 tablet, oral, Daily  furosemide, 20 mg, oral, Daily  insulin glargine, 30 Units, subcutaneous, q24h  insulin lispro, 0-10 Units, subcutaneous, TID  lamoTRIgine, 25 mg, oral, q PM  levothyroxine, 175 mcg, oral, Daily before breakfast  metroNIDAZOLE, 500 mg, oral, Daily  oxyCODONE, 5 mg, g-tube, q6h  sodium hypochlorite, , irrigation, Daily      Continuous medications  sodium chloride 0.9%, 75 mL/hr, Last Rate: 75 mL/hr (06/10/24 0942)      PRN medications  PRN medications: acetaminophen, ALPRAZolam, alteplase, alum-mag hydroxide-simeth, bisacodyl, dextromethorphan-guaifenesin, dextrose, dextrose, diphenhydrAMINE, glucagon, glucagon, guaiFENesin, morphine, ondansetron, oxygen      Assessment/Plan        # Encephalopathy  -Secondary to pneumonia     # Pneumonia  -elevated WBC  -Probably due to aspiration  -Recent PEG placement     # Dysphagia/CVA  -S/p PEG placement recently     # Old right hemiparesis/dysarthria     # Sacral decubitus  -Wound care  consulted     HIV:  Hx graves s/p thyroidectomy   Post surgical hypothyroid  HTN  HLP  T2DM       6/2-discussed with patient's sister she Josh Martinez yesterday ,who is the POA about patient's poor prognosis, explained the reason for her present condition and the very likely poor prognosis, she agrees to go with hospice, she had already planned to sign up with hospice while she was at [facility], consulted palliative care, will consult hospice, patient has a hemoglobin of 6.4, transfusion will not affect outcome.  Patient continues to have encephalopathy, on IV antibiotics for pneumonia, continue insulin sliding scale, on D5 normal saline at 75 mL an hour.  Will await hospice and palliative care input.  Patient DNR.    6/3 : Pt still encephalopathic, barely responding, d/w palliative care . D/W Sister / POA - agrees with Hospice consult. Prefers blood transfusion - consent obtained by phone - will order 1 unit PRBC transfusion    6/4 : Pt s/p transfusion - H&H improved , no obvious signs of bleeding, cont IV fluids, restarting PEG feeds at 1/2 the rate as there is suspicion of aspiration - surgery consulted for sacral wound, palliative care input appreciated. Cont IV abx for pneumonia. Replace Potassium.    6/5-patient opens eyes when called, discussed with palliative care, await surgery input on sacral decub, on PEG feeds at a low rate, continue IV antibiotics, will start planning discharge back to nursing home after surgery input.    6/6 : Pt encephalopathic, will await surgery input on sacral Decub - cont PEG feeds, cont IV abx    6/7 : Pt encephalopathic some improvement - nods to questions , has been waiting for surgery consult for sacral decub - will need input before DC planning - she is not under hospice yet.    6/8 : Pts last dose of Vancomycin on 6/10 - will DC pt to NH 6/10. Surgery input appreciated. Encephalopathy - showing mild improvement, pt awake, she is tracking, whispers  words.    6/9 : s/p transfusion, h&h improved, encephalopathy improved, wound care input noted, surgery not planning any further intervention, will plan DC tomorrow back to NH.D/W Sister/POA - she is going to sign up with hospice after return to [facility]. Will plan DC back tomorrow.    6/10 : Pt able to make eye contact. Completing IV abx today - H&H stable - will Dc back to NH today    Discharge is planned for today . Discharge medications were reconciled. Discharge orders completed. Hospital course , medication changes and Investigation's conducted were reviewed with the patient / Family. Activity, Diet and Medications after discharge were reviewed with the patient / Family. Medication reconciliation done - pls refer to medication reconciliation sheet.Follow up with Primary Care Physician were reviewed with the patient / Family. Discharge planning was discussed with staff. Refer to discharge orders sheet. Total time to coordinate disch process incl counseling family, coordinating with other care givers,  and pharmacist was >35 min.       Radha Ward MD

## 2024-06-10 NOTE — DISCHARGE SUMMARY
Discharge Diagnosis  Sepsis without acute organ dysfunction, due to unspecified organism (Multi)    Issues Requiring Follow-Up  HOSPICE CARE    Discharge Meds     Your medication list        CONTINUE taking these medications        Instructions Last Dose Given Next Dose Due   acetaminophen 500 mg tablet  Commonly known as: Tylenol           amLODIPine 5 mg tablet  Commonly known as: Norvasc           apixaban 5 mg tablet  Commonly known as: Eliquis           baclofen 10 mg tablet  Commonly known as: Lioresal           Basaglar KwikPen U-100 Insulin 100 unit/mL (3 mL) pen  Generic drug: insulin glargine           Descovy 200-25 mg tablet  Generic drug: emtricitabine-tenofovir alafen           docusate sodium 100 mg capsule  Commonly known as: Colace           furosemide 20 mg tablet  Commonly known as: Lasix           gabapentin 300 mg capsule  Commonly known as: Neurontin           hydroCHLOROthiazide 25 mg tablet  Commonly known as: HYDRODiuril           insulin lispro 100 unit/mL injection  Commonly known as: HumaLOG           ipratropium-albuteroL 0.5-2.5 mg/3 mL nebulizer solution  Commonly known as: Duo-Neb           lamoTRIgine 25 mg tablet  Commonly known as: LaMICtal           levothyroxine 175 mcg tablet  Commonly known as: Synthroid, Levoxyl           melatonin 3 mg tablet           moxifloxacin 0.5 % ophthalmic solution  Commonly known as: Vigamox           oxyCODONE 5 mg immediate release tablet  Commonly known as: Roxicodone           polyethylene glycol 17 gram packet  Commonly known as: Glycolax, Miralax           potassium chloride ER 10 mEq ER capsule  Commonly known as: Micro-K           sertraline 100 mg tablet  Commonly known as: Zoloft           sodium chloride 0.9% parenteral solution 50 mL with DAPTOmycin 50 mg/mL recon soln 775 mg      Infuse 775 mg at 131 mL/hr over 30 minutes into a venous catheter once every 24 hours for 23 days. Do not fill before May 19, 2024.       Tivicay 50 mg  tablet  Generic drug: dolutegravir           Voltaren 1 % gel  Generic drug: diclofenac sodium                    Test Results Pending At Discharge  Pending Labs       No current pending labs.            Hospital Course  # Encephalopathy  -Secondary to pneumonia     # Pneumonia  -elevated WBC  -Probably due to aspiration  -Recent PEG placement     # Dysphagia/CVA  -S/p PEG placement recently     # Old right hemiparesis/dysarthria     # Sacral decubitus  -Wound care consulted     HIV:  Hx graves s/p thyroidectomy   Post surgical hypothyroid  HTN  HLP  T2DM       6/2-discussed with patient's sister she Josh Martinez yesterday ,who is the POA about patient's poor prognosis, explained the reason for her present condition and the very likely poor prognosis, she agrees to go with hospice, she had already planned to sign up with hospice while she was at [facility], consulted palliative care, will consult hospice, patient has a hemoglobin of 6.4, transfusion will not affect outcome.  Patient continues to have encephalopathy, on IV antibiotics for pneumonia, continue insulin sliding scale, on D5 normal saline at 75 mL an hour.  Will await hospice and palliative care input.  Patient DNR.    6/3 : Pt still encephalopathic, barely responding, d/w palliative care . D/W Sister / POA - agrees with Hospice consult. Prefers blood transfusion - consent obtained by phone - will order 1 unit PRBC transfusion    6/4 : Pt s/p transfusion - H&H improved , no obvious signs of bleeding, cont IV fluids, restarting PEG feeds at 1/2 the rate as there is suspicion of aspiration - surgery consulted for sacral wound, palliative care input appreciated. Cont IV abx for pneumonia. Replace Potassium.    6/5-patient opens eyes when called, discussed with palliative care, await surgery input on sacral decub, on PEG feeds at a low rate, continue IV antibiotics, will start planning discharge back to nursing home after surgery  input.    6/6 : Pt encephalopathic, will await surgery input on sacral Decub - cont PEG feeds, cont IV abx    6/7 : Pt encephalopathic some improvement - nods to questions , has been waiting for surgery consult for sacral decub - will need input before DC planning - she is not under hospice yet.    6/8 : Pts last dose of Vancomycin on 6/10 - will DC pt to NH 6/10. Surgery input appreciated. Encephalopathy - showing mild improvement, pt awake, she is tracking, whispers words.    6/9 : s/p transfusion, h&h improved, encephalopathy improved, wound care input noted, surgery not planning any further intervention, will plan DC tomorrow back to NH.D/W Sister/POA - she is going to sign up with hospice after return to [facility]. Will plan DC back tomorrow.    6/10 : Pt able to make eye contact. Completing IV abx today - H&H stable - will Dc back to NH today    Discharge is planned for today . Discharge medications were reconciled. Discharge orders completed. Hospital course , medication changes and Investigation's conducted were reviewed with the patient / Family. Activity, Diet and Medications after discharge were reviewed with the patient / Family. Medication reconciliation done - pls refer to medication reconciliation sheet.Follow up with Primary Care Physician were reviewed with the patient / Family. Discharge planning was discussed with staff. Refer to discharge orders sheet. Total time to coordinate disch process incl counseling family, coordinating with other care givers,  and pharmacist was >35 min.       Radha Ward MD

## 2024-06-10 NOTE — PROGRESS NOTES
06/10/24 1432   Discharge Planning   Patient expects to be discharged to: [facility] with [facility]- please let hospice know via careport when transport has been arranged     Per Dr Leija notes- patient should be med ready for dc today, surgery does not have plans for any surgical intervention for sacral wound    ADOD today  [facility]RIERS dc orders, [facility]  [facility]

## 2024-06-11 VITALS
WEIGHT: 190 LBS | HEART RATE: 114 BPM | TEMPERATURE: 98.4 F | SYSTOLIC BLOOD PRESSURE: 138 MMHG | HEIGHT: 68 IN | OXYGEN SATURATION: 94 % | BODY MASS INDEX: 28.79 KG/M2 | DIASTOLIC BLOOD PRESSURE: 82 MMHG | RESPIRATION RATE: 19 BRPM

## 2024-06-11 LAB
GLUCOSE BLD MANUAL STRIP-MCNC: 233 MG/DL (ref 74–99)
GLUCOSE BLD MANUAL STRIP-MCNC: 239 MG/DL (ref 74–99)
GLUCOSE BLD MANUAL STRIP-MCNC: 274 MG/DL (ref 74–99)

## 2024-06-11 PROCEDURE — 2500000001 HC RX 250 WO HCPCS SELF ADMINISTERED DRUGS (ALT 637 FOR MEDICARE OP): Performed by: NURSE PRACTITIONER

## 2024-06-11 PROCEDURE — 2500000002 HC RX 250 W HCPCS SELF ADMINISTERED DRUGS (ALT 637 FOR MEDICARE OP, ALT 636 FOR OP/ED): Performed by: INTERNAL MEDICINE

## 2024-06-11 PROCEDURE — 82947 ASSAY GLUCOSE BLOOD QUANT: CPT | Mod: 91

## 2024-06-11 PROCEDURE — 2500000001 HC RX 250 WO HCPCS SELF ADMINISTERED DRUGS (ALT 637 FOR MEDICARE OP): Performed by: INTERNAL MEDICINE

## 2024-06-11 RX ADMIN — OXYCODONE HYDROCHLORIDE 5 MG: 5 TABLET ORAL at 10:18

## 2024-06-11 RX ADMIN — AMLODIPINE BESYLATE 5 MG: 5 TABLET ORAL at 10:17

## 2024-06-11 RX ADMIN — LEVOTHYROXINE SODIUM 175 MCG: 125 TABLET ORAL at 06:26

## 2024-06-11 RX ADMIN — FUROSEMIDE 20 MG: 20 TABLET ORAL at 10:18

## 2024-06-11 RX ADMIN — METRONIDAZOLE 500 MG: 500 TABLET ORAL at 10:17

## 2024-06-11 RX ADMIN — BACLOFEN 10 MG: 10 TABLET ORAL at 10:17

## 2024-06-11 RX ADMIN — EMTRICITABINE AND TENOFOVIR ALAFENAMIDE 1 TABLET: 200; 25 TABLET ORAL at 10:17

## 2024-06-11 RX ADMIN — DOCUSATE SODIUM 100 MG: 50 LIQUID ORAL at 10:18

## 2024-06-11 RX ADMIN — INSULIN LISPRO 4 UNITS: 100 INJECTION, SOLUTION INTRAVENOUS; SUBCUTANEOUS at 12:19

## 2024-06-11 RX ADMIN — INSULIN LISPRO 4 UNITS: 100 INJECTION, SOLUTION INTRAVENOUS; SUBCUTANEOUS at 10:16

## 2024-06-11 RX ADMIN — APIXABAN 5 MG: 5 TABLET, FILM COATED ORAL at 10:17

## 2024-06-11 ASSESSMENT — COGNITIVE AND FUNCTIONAL STATUS - GENERAL
PERSONAL GROOMING: TOTAL
MOBILITY SCORE: 6
DRESSING REGULAR LOWER BODY CLOTHING: TOTAL
DRESSING REGULAR UPPER BODY CLOTHING: TOTAL
TOILETING: TOTAL
DAILY ACTIVITIY SCORE: 6
EATING MEALS: TOTAL
TURNING FROM BACK TO SIDE WHILE IN FLAT BAD: TOTAL
STANDING UP FROM CHAIR USING ARMS: TOTAL
HELP NEEDED FOR BATHING: TOTAL
MOVING TO AND FROM BED TO CHAIR: TOTAL
MOVING FROM LYING ON BACK TO SITTING ON SIDE OF FLAT BED WITH BEDRAILS: TOTAL
CLIMB 3 TO 5 STEPS WITH RAILING: TOTAL
WALKING IN HOSPITAL ROOM: TOTAL

## 2024-06-11 ASSESSMENT — PAIN SCALES - GENERAL: PAINLEVEL_OUTOF10: 0 - NO PAIN

## 2024-06-11 ASSESSMENT — PAIN - FUNCTIONAL ASSESSMENT: PAIN_FUNCTIONAL_ASSESSMENT: WONG-BAKER FACES

## 2024-06-11 NOTE — PROGRESS NOTES
6/11/2024 9:09 AM I spoke with patient's sister. I notified her of 11:30 AM discharge transport time. Selam ÁLVAREZ

## 2024-06-11 NOTE — PROGRESS NOTES
PALLIATIVE CARE SOCIAL WORK NOTE     Notified that transport has been arranged for an 11:30am . Have notified [facility] via Careport. They will plan to see pt at [facility] this afternoon. Thank you.     IMELDA Hernandez

## (undated) DEVICE — PULL PEG, SAFETY, 20 FR, W/XYLOCAINE AMPULE

## (undated) DEVICE — GLOVE, SURGICAL, PROTEXIS PI ORTHO, 7.0, PF, LF